# Patient Record
Sex: MALE | Race: WHITE | NOT HISPANIC OR LATINO | Employment: OTHER | URBAN - METROPOLITAN AREA
[De-identification: names, ages, dates, MRNs, and addresses within clinical notes are randomized per-mention and may not be internally consistent; named-entity substitution may affect disease eponyms.]

---

## 2017-01-25 ENCOUNTER — GENERIC CONVERSION - ENCOUNTER (OUTPATIENT)
Dept: OTHER | Facility: OTHER | Age: 82
End: 2017-01-25

## 2017-03-07 ENCOUNTER — ALLSCRIPTS OFFICE VISIT (OUTPATIENT)
Dept: OTHER | Facility: OTHER | Age: 82
End: 2017-03-07

## 2017-03-07 DIAGNOSIS — J20.9 ACUTE BRONCHITIS: ICD-10-CM

## 2017-03-13 ENCOUNTER — HOSPITAL ENCOUNTER (OUTPATIENT)
Dept: RADIOLOGY | Facility: HOSPITAL | Age: 82
Discharge: HOME | End: 2017-03-13
Payer: COMMERCIAL

## 2017-03-13 DIAGNOSIS — J20.9 ACUTE BRONCHITIS: ICD-10-CM

## 2017-03-13 PROCEDURE — 71020 HB CHEST X-RAY 2VW FRONTAL&LATL: CPT

## 2017-03-19 ENCOUNTER — GENERIC CONVERSION - ENCOUNTER (OUTPATIENT)
Dept: OTHER | Facility: OTHER | Age: 82
End: 2017-03-19

## 2017-03-20 ENCOUNTER — ALLSCRIPTS OFFICE VISIT (OUTPATIENT)
Dept: OTHER | Facility: OTHER | Age: 82
End: 2017-03-20

## 2017-04-24 ENCOUNTER — GENERIC CONVERSION - ENCOUNTER (OUTPATIENT)
Dept: OTHER | Facility: OTHER | Age: 82
End: 2017-04-24

## 2017-05-08 ENCOUNTER — ALLSCRIPTS OFFICE VISIT (OUTPATIENT)
Dept: OTHER | Facility: OTHER | Age: 82
End: 2017-05-08

## 2017-05-08 ENCOUNTER — TRANSCRIBE ORDERS (OUTPATIENT)
Dept: ADMINISTRATIVE | Facility: HOSPITAL | Age: 82
End: 2017-05-08

## 2017-05-08 DIAGNOSIS — I82.4Z1 ACUTE EMBOLISM AND THROMBOSIS OF DEEP VEIN OF RIGHT DISTAL LOWER EXTREMITY (HCC): ICD-10-CM

## 2017-05-08 DIAGNOSIS — J40 BRONCHITIS: ICD-10-CM

## 2017-05-08 DIAGNOSIS — I10 ESSENTIAL HYPERTENSION, MALIGNANT: Primary | ICD-10-CM

## 2017-05-08 DIAGNOSIS — H26.9 CATARACT: ICD-10-CM

## 2017-05-08 DIAGNOSIS — F41.9 ANXIETY DISORDER: ICD-10-CM

## 2017-05-08 DIAGNOSIS — D23.30 OTHER BENIGN NEOPLASM OF SKIN OF UNSPECIFIED PART OF FACE: ICD-10-CM

## 2017-05-08 DIAGNOSIS — N40.0 ENLARGED PROSTATE WITHOUT LOWER URINARY TRACT SYMPTOMS (LUTS): ICD-10-CM

## 2017-05-08 DIAGNOSIS — J61 PNEUMOCONIOSIS DUE TO ASBESTOS AND OTHER MINERAL FIBERS (HCC): ICD-10-CM

## 2017-05-31 ENCOUNTER — HOSPITAL ENCOUNTER (OUTPATIENT)
Dept: NON INVASIVE DIAGNOSTICS | Facility: CLINIC | Age: 82
Discharge: HOME | End: 2017-05-31
Payer: COMMERCIAL

## 2017-05-31 ENCOUNTER — TRANSCRIBE ORDERS (OUTPATIENT)
Dept: ADMINISTRATIVE | Facility: HOSPITAL | Age: 82
End: 2017-05-31

## 2017-05-31 DIAGNOSIS — F41.9 ANXIETY DISORDER: ICD-10-CM

## 2017-05-31 DIAGNOSIS — J40 BRONCHITIS: ICD-10-CM

## 2017-05-31 DIAGNOSIS — I82.4Z1 ACUTE EMBOLISM AND THROMBOSIS OF DEEP VEIN OF RIGHT DISTAL LOWER EXTREMITY (HCC): ICD-10-CM

## 2017-05-31 DIAGNOSIS — N40.0 ENLARGED PROSTATE WITHOUT LOWER URINARY TRACT SYMPTOMS (LUTS): ICD-10-CM

## 2017-05-31 DIAGNOSIS — D23.30 OTHER BENIGN NEOPLASM OF SKIN OF UNSPECIFIED PART OF FACE: ICD-10-CM

## 2017-05-31 DIAGNOSIS — H26.9 CATARACT: ICD-10-CM

## 2017-05-31 DIAGNOSIS — J61 PNEUMOCONIOSIS DUE TO ASBESTOS AND OTHER MINERAL FIBERS (HCC): ICD-10-CM

## 2017-05-31 DIAGNOSIS — Q24.9 HEART ABNORMALITY: Primary | ICD-10-CM

## 2017-05-31 DIAGNOSIS — I10 ESSENTIAL HYPERTENSION, MALIGNANT: ICD-10-CM

## 2017-05-31 LAB
ARRHY DURING EX: NORMAL
CHEST PAIN STATEMENT: NORMAL
MAX DIASTOLIC BP: 90 MMHG
MAX HEART RATE: 146 BPM
MAX PREDICTED HEART RATE: 138 BPM
MAX. SYSTOLIC BP: 180 MMHG
PROTOCOL NAME: NORMAL
REASON FOR TERMINATION: NORMAL
TARGET HR FORMULA: NORMAL
TEST INDICATION: NORMAL
TIME IN EXERCISE PHASE: 300 S

## 2017-05-31 PROCEDURE — 93226 XTRNL ECG REC<48 HR SCAN A/R: CPT

## 2017-05-31 PROCEDURE — 93306 TTE W/DOPPLER COMPLETE: CPT

## 2017-05-31 PROCEDURE — 93017 CV STRESS TEST TRACING ONLY: CPT

## 2017-05-31 PROCEDURE — 93225 XTRNL ECG REC<48 HRS REC: CPT

## 2017-06-28 ENCOUNTER — TRANSCRIBE ORDERS (OUTPATIENT)
Dept: ADMINISTRATIVE | Facility: HOSPITAL | Age: 82
End: 2017-06-28

## 2017-06-28 ENCOUNTER — HOSPITAL ENCOUNTER (OUTPATIENT)
Dept: NON INVASIVE DIAGNOSTICS | Facility: CLINIC | Age: 82
Discharge: HOME | End: 2017-06-28
Payer: COMMERCIAL

## 2017-06-28 DIAGNOSIS — J90 UNSPECIFIED PLEURAL EFFUSION: Primary | ICD-10-CM

## 2017-06-28 DIAGNOSIS — J90 PLEURAL EFFUSION, NOT ELSEWHERE CLASSIFIED: ICD-10-CM

## 2017-06-28 DIAGNOSIS — Q24.9 HEART ABNORMALITY: ICD-10-CM

## 2017-06-28 PROCEDURE — 93308 TTE F-UP OR LMTD: CPT

## 2017-07-05 ENCOUNTER — APPOINTMENT (OUTPATIENT)
Dept: LAB | Facility: HOSPITAL | Age: 82
End: 2017-07-05
Attending: INTERNAL MEDICINE
Payer: COMMERCIAL

## 2017-07-05 DIAGNOSIS — J90 PLEURAL EFFUSION, NOT ELSEWHERE CLASSIFIED: ICD-10-CM

## 2017-07-05 LAB
ANION GAP SERPL CALCULATED.3IONS-SCNC: 8 MMOL/L (ref 4–13)
BUN SERPL-MCNC: 25 MG/DL (ref 5–25)
CALCIUM SERPL-MCNC: 9.6 MG/DL (ref 8.3–10.1)
CHLORIDE SERPL-SCNC: 102 MMOL/L (ref 100–108)
CO2 SERPL-SCNC: 30 MMOL/L (ref 21–32)
CREAT SERPL-MCNC: 1.39 MG/DL (ref 0.6–1.3)
GFR SERPL CREATININE-BSD FRML MDRD: 48.9 ML/MIN/1.73SQ M
GLUCOSE P FAST SERPL-MCNC: 119 MG/DL (ref 65–99)
MAGNESIUM SERPL-MCNC: 1.7 MG/DL (ref 1.6–2.6)
POTASSIUM SERPL-SCNC: 4.1 MMOL/L (ref 3.5–5.3)
SODIUM SERPL-SCNC: 140 MMOL/L (ref 136–145)

## 2017-07-05 PROCEDURE — 80048 BASIC METABOLIC PNL TOTAL CA: CPT

## 2017-07-05 PROCEDURE — 36415 COLL VENOUS BLD VENIPUNCTURE: CPT

## 2017-07-05 PROCEDURE — 83735 ASSAY OF MAGNESIUM: CPT

## 2017-07-17 ENCOUNTER — HOSPITAL ENCOUNTER (OUTPATIENT)
Dept: NON INVASIVE DIAGNOSTICS | Facility: CLINIC | Age: 82
Discharge: HOME | End: 2017-07-17
Payer: COMMERCIAL

## 2017-07-17 DIAGNOSIS — J90 UNSPECIFIED PLEURAL EFFUSION: ICD-10-CM

## 2017-07-17 PROCEDURE — 93308 TTE F-UP OR LMTD: CPT

## 2017-08-08 ENCOUNTER — ALLSCRIPTS OFFICE VISIT (OUTPATIENT)
Dept: OTHER | Facility: OTHER | Age: 82
End: 2017-08-08

## 2017-08-28 ENCOUNTER — TRANSCRIBE ORDERS (OUTPATIENT)
Dept: ADMINISTRATIVE | Facility: HOSPITAL | Age: 82
End: 2017-08-28

## 2017-08-28 ENCOUNTER — ALLSCRIPTS OFFICE VISIT (OUTPATIENT)
Dept: OTHER | Facility: OTHER | Age: 82
End: 2017-08-28

## 2017-08-28 DIAGNOSIS — I31.3 EFFUSION, PERICARDIUM: Primary | ICD-10-CM

## 2017-08-28 DIAGNOSIS — I31.3 NONINFLAMMATORY PERICARDIAL EFFUSION: ICD-10-CM

## 2017-09-01 ENCOUNTER — HOSPITAL ENCOUNTER (OUTPATIENT)
Dept: NON INVASIVE DIAGNOSTICS | Facility: CLINIC | Age: 82
Discharge: HOME | End: 2017-09-01
Payer: COMMERCIAL

## 2017-09-01 DIAGNOSIS — I31.3 EFFUSION, PERICARDIUM: ICD-10-CM

## 2017-09-01 PROCEDURE — 93308 TTE F-UP OR LMTD: CPT

## 2017-09-08 ENCOUNTER — TRANSCRIBE ORDERS (OUTPATIENT)
Dept: ADMINISTRATIVE | Facility: HOSPITAL | Age: 82
End: 2017-09-08

## 2017-09-08 ENCOUNTER — APPOINTMENT (OUTPATIENT)
Dept: LAB | Facility: HOSPITAL | Age: 82
End: 2017-09-08
Attending: INTERNAL MEDICINE
Payer: COMMERCIAL

## 2017-09-08 DIAGNOSIS — I31.3 EFFUSION, PERICARDIUM: ICD-10-CM

## 2017-09-08 DIAGNOSIS — I31.3 EFFUSION, PERICARDIUM: Primary | ICD-10-CM

## 2017-09-08 DIAGNOSIS — I31.3 NONINFLAMMATORY PERICARDIAL EFFUSION: ICD-10-CM

## 2017-09-08 LAB
ALBUMIN SERPL BCP-MCNC: 3.8 G/DL (ref 3.5–5)
ALP SERPL-CCNC: 52 U/L (ref 46–116)
ALT SERPL W P-5'-P-CCNC: 24 U/L (ref 12–78)
ANION GAP SERPL CALCULATED.3IONS-SCNC: 6 MMOL/L (ref 4–13)
AST SERPL W P-5'-P-CCNC: 18 U/L (ref 5–45)
BILIRUB SERPL-MCNC: 0.36 MG/DL (ref 0.2–1)
BUN SERPL-MCNC: 23 MG/DL (ref 5–25)
CALCIUM SERPL-MCNC: 9.3 MG/DL (ref 8.3–10.1)
CHLORIDE SERPL-SCNC: 102 MMOL/L (ref 100–108)
CO2 SERPL-SCNC: 29 MMOL/L (ref 21–32)
CREAT SERPL-MCNC: 1.57 MG/DL (ref 0.6–1.3)
GFR SERPL CREATININE-BSD FRML MDRD: 40 ML/MIN/1.73SQ M
GLUCOSE P FAST SERPL-MCNC: 122 MG/DL (ref 65–99)
MAGNESIUM SERPL-MCNC: 1.8 MG/DL (ref 1.6–2.6)
NT-PROBNP SERPL-MCNC: 59 PG/ML
POTASSIUM SERPL-SCNC: 3.9 MMOL/L (ref 3.5–5.3)
PROT SERPL-MCNC: 7.2 G/DL (ref 6.4–8.2)
SODIUM SERPL-SCNC: 137 MMOL/L (ref 136–145)

## 2017-09-08 PROCEDURE — 36415 COLL VENOUS BLD VENIPUNCTURE: CPT

## 2017-09-08 PROCEDURE — 80053 COMPREHEN METABOLIC PANEL: CPT

## 2017-09-08 PROCEDURE — 83735 ASSAY OF MAGNESIUM: CPT

## 2017-09-08 PROCEDURE — 83880 ASSAY OF NATRIURETIC PEPTIDE: CPT

## 2017-10-24 NOTE — PROGRESS NOTES
Assessment  Assessed    1  Cardiomegaly (429 3) (I51 7)   2  Pericardial effusion without cardiac tamponade (423 9) (I31 3)    Plan  Pericardial effusion without cardiac tamponade    · Furosemide 40 MG Oral Tablet; TAKE 1 TABLET TWICE DAILY   Rx By: Aurora Orellana; Dispense: 90 Days ; #:180 Tablet; Refill: 3;For: Pericardial effusion without cardiac tamponade; ZEYNEP = N; Sent To: [facility]/PHARMACY #2736  · Potassium Chloride ER 10 MEQ Oral Capsule Extended Release; Take 1  capsule daily twice a day   Rx By: Aurora Orellana; Dispense: 90 Days ; #:180 Capsule Extended Release; Refill: 3;For: Pericardial effusion without cardiac tamponade; ZEYNEP = N; Sent To: [facility]/PHARMACY #9761  · (1) COMPREHENSIVE METABOLIC PANEL; Status:Active; Requested for:28Aug2017;    Perform:[facility]; Due:28Aug2018; Ordered; For:Pericardial effusion without cardiac tamponade; Ordered By:Chandra Mcneal;   · (1) MAGNESIUM; Status:Active; Requested for:28Aug2017;    Perform:[facility]; Due:28Aug2018; Ordered; For:Pericardial effusion without cardiac tamponade; Ordered By:Chandra Mcneal;   · (1) NT- BNP (PRO BRAIN NATRIURETIC PEPTIDE); Status:Active; Requested  for:28Aug2017;    Perform:[facility]; Due:28Aug2018; Ordered; For:Pericardial effusion without cardiac tamponade; Ordered By:Chandra Mcneal;   · ECHO FOLLOW UP/LIMITED; Status:Need Information - Financial Authorization; Requested for:28Aug2017;    Perform:[facility]; Due:28Aug2018; Last Updated By:Ling Castillo; 8/28/2017 8:49:12 AM;Ordered; For:Pericardial effusion without cardiac tamponade; Ordered By:Chandra Mcneal;   · Follow-up visit in 6 months Evaluation and Treatment  Follow-up  Status: Complete   Done: 28Aug2017   Ordered; For: Pericardial effusion without cardiac tamponade; Ordered By: Aurora Orellana Performed:  Due: 40Zit8686; Last Updated By: Elena Mena; 8/28/2017 8:49:12 AM    Discussion/Summary  Cardiology Discussion Summary Free Text Note Form [facility]:   Sub acute pericardial effusion, first diagnosed in May this year  After abnormal chest x-ray  Follow-up echocardiogram revealed no improvement, moderate in severity  Early tamponade physiology with some respiratory variation left-sided AV valve  Left ventricle function initially normal, most recently low normal 45-50% in the setting of atrial fibrillation  With mild concomitant RV dysfunction mild in severity  We'll follow-up with an echo in 3 months times  At the present time there is no indication for pericardiocentesis  There is no clinical tamponade  Stress test suggested no ischemia  Holter monitor revealed controlled atrial fibrillation average of 98  No sustained tachyarrhythmias or bradyarrhythmias  We did a started diuretic therapy, will increase it to twice a day for the next few weeks and assess response on follow-up echocardiogram       Chief Complaint  Chief Complaint Free Text Note Form: 2 month f/u  Pt has no cardiac complaints  History of Present Illness  Cardiology HPI Free Text Note Form [facility]: Cardiology follow-up  Patient continues to do well, he denies any chest pain or dyspnea, his active as a slow pace  Denies orthopnea or PND  No issues with blood pressure, comply with his medications  No issues with bleeding on anticoagulation  Review of Systems  Cardiology Male ROS:     Cardiac: rhythm problems, but-- no chest pain,-- no fainting/blackouts,-- no heart murmur present,-- no signs of swelling-- and-- no palpitations present  Skin: No complaints of nonhealing sores or skin rash  Genitourinary: No complaints of recurrent urinary tract infections, frequent urination at night, difficult urination, blood in urine, kidney stones, loss of bladder control, no kidney or prostate problems, no erectile dysfunction  -- and-- no kidney problems   Psychological: No complaints of feeling depressed, anxiety, panic attacks, or difficulty concentrating     General: No complaints of trouble sleeping, lack of energy, fatigue, appetite changes, weight changes, fever, frequent infections, or night sweats  ,-- no trouble sleeping,-- no changes in weight-- and-- no lack of energy/fatigue  Respiratory: No complaints of shortness of breath, cough with sputum, or wheezing ,-- no shortness of breath-- and-- no hemoptysis  HEENT: No complaints of serious problems, hearing problems, nose problems, throat problems, or snoring  Gastrointestinal: No complaints of liver problems, nausea, vomiting, heartburn, constipation, bloody stools, diarrhea, problems swallowing, adbominal pain, or rectal bleeding  Hematologic: No complaints of bleeding disorders, anemia, blood clots, or excessive brusing ,-- no bleeding disorders,-- no anemia-- and-- no excessive bruising   Musculoskeletal: No complaints of arthritis, back pain, or painfull swelling  Active Problems  Problems    1  Anxiety disorder (300 00) (F41 9)   2  Asbestosis (0681 [id number] [id number] 54) (Jodi Rias)   3  Benign enlargement of prostate (600 00) (N40 0)   4  Benign neoplasm of skin of face (216 3) (D23 30)   5  Bronchitis (490) (J40)   6  Cardiomegaly (429 3) (I51 7)   7  Cataract (366 9) (H26 9)   8  Cataract (366 9) (H26 9)   9  Chest discomfort (786 59) (R07 89)   10  Dyslipidemia (272 4) (E78 5)   11  Esophageal reflux (530 81) (K21 9)   12  Flu vaccine need (V04 81) (Z23)   13  Hypertension (401 9) (I10)   14  Intractable hiccups (786 8) (R06 6)   15  Lower leg DVT (deep venous thromboembolism), acute, right (453 42) (I82 4Z1)   16  Medicare annual wellness visit, subsequent (V70 0) (Z00 00)   17  Pancreatic cyst (577 2) (K86 2)   18  Pleural effusion (511 9) (J90)   19  Pneumonia (486) (J18 9)   20  Pulmonary embolism (415 19) (I26 99)   21  Pulmonary nodule seen on imaging study (793 11) (R91 1)   22  Type 2 diabetes mellitus (250 00) (E11 9)   23  Vertigo (780 4) (R42)   24  Visit for pre-operative examination (V72 84) (L57 142)    Past Medical History  Problems    1  Acute bronchitis (466 0) (J20 9)   2  History of Hearing Loss (389 9)   3  History of acute bronchitis (V12 69) (Z87 09)   4  History of acute sinusitis (V12 69) (Z87 09)   5  History of conjunctivitis (V12 49) (Z86 69)   6  History of Medicare annual wellness visit, subsequent (V70 0) (Z00 00)   7  History of Medicare annual wellness visit, subsequent (V70 0) (Z00 00)   8  History of Visit for pre-operative examination (V72 84) (J96 418)  Active Problems And Past Medical History Reviewed: The active problems and past medical history were reviewed and updated today  Surgical History  Surgical History Reviewed: The surgical history was reviewed and updated today  Family History  Mother    1  Family history of Diabetes Mellitus (V18 0)  Father    2  Family history of Diabetes Mellitus (V18 0)  Sister    3  Family history of Pulmonary Embolism  Brother    4  Family history of Diabetes Mellitus (V18 0)  Family History Reviewed: The family history was reviewed and updated today  Social History  Problems    · Current non-smoker (V49 89) (Z78 9)   · Never a smoker   · History of Never a smoker   · Stopped Drinking Alcohol  Social History Reviewed: The social history was reviewed and is unchanged  Current Meds   1  ALPRAZolam 0 25 MG Oral Tablet; 1 tablet every 8 hours as needed for anxiety; Therapy: 94WFY5797 to (Last Rx:28Mar2017)  Requested for: 28Mar2017 Ordered   2  AmLODIPine Besylate 5 MG Oral Tablet; TAKE 1 TABLET DAILY; Therapy: 44Lzg0014 to (Evaluate:11Mry7739)  Requested for: 35Gwi6745; Last   Rx:42Xzo7825 Ordered   3  Blood Glucose Monitor System w/Device Kit; Test once daily prior to meal;   Therapy: 69Otb3002 to (Last Rx:18Vxe1081) Ordered   4  Blood Glucose Test In Vitro Strip; TEST TWICE A DAY; Therapy: 86WGI8101 to (Last WB:71UZO8550)  Requested for: 62VOU4431 Ordered   5  Dulera 100-5 MCG/ACT Inhalation Aerosol; INHALE 2 PUFFS AT 12 HOUR INTERVALS   (MORNING AND EVENING);    Therapy: 16TAW4201 to (Last SW:07RIA5548)  Requested for: 32HPD8600 Ordered   6  Eliquis 5 MG Oral Tablet; 2 tablets twice daily for 7 days, then 1 tablet twice daily after   that; Therapy: 91OGN9989 to (Last Rx:2017)  Requested for: 2017 Ordered   7  EpiPen 2-Kirt 0 3 MG/0 3ML YASMEEN; use as directed, as needed for allergic reaction; Therapy: 56DWF8082 to (Evaluate:79Dtf8229)  Requested for: 04SDI4055; Last   Rx:77Wfw5419 Ordered   8  Fish Oil 1200 MG Oral Capsule; take 1 capsule daily; Therapy: (Recorded:05Gds4532) to Recorded   9  Furosemide 40 MG Oral Tablet; Take 1 tablet daily; Therapy: 84IGA1550 to (Last Darlina Click)  Requested for: 2017 Ordered   10  Lancets Miscellaneous; test twice daily; Therapy: 51Tmo5783 to (Last Rx:65Kyx0902) Ordered   11  Losartan Potassium 50 MG Oral Tablet; TAKE 1 TABLET DAILY; Therapy: 07QUB9143 to (Evaluate:79Dsu5935)  Requested for: 81Uqn3190; Last    Rx:79Vqk7287 Ordered   12  MetFORMIN HCl - 500 MG Oral Tablet; TAKE 1 TABLET TWICE DAILY; Therapy: 84JZM1906 to (Evaluate:19Din0426)  Requested for: 79ATZ2617; Last    Rx:2017 Ordered   13  Omeprazole 40 MG Oral Capsule Delayed Release; TAKE 1 CAPSULE Daily for    heartburn; Therapy: 93VLW7088 to (Last Rx:2017)  Requested for: 2017 Ordered   14  PARoxetine HCl - 30 MG Oral Tablet; TAKE 1 TABLET DAILY AS DIRECTED; Therapy: 01XWN8096 to (Evaluate:16Mmx2246)  Requested for: 96Awk2868; Last    Rx:55Zzu3368 Ordered   15  Potassium Chloride ER 10 MEQ Oral Capsule Extended Release; take 1 capsule daily; Therapy: 67HQN2949 to (Last Darlina Click)  Requested for: 2017 Ordered   16  Rosuvastatin Calcium 10 MG Oral Tablet; One tablet at bedtime each night for elevated    cholesterol; Therapy: 83JTL9222 to (Last Rx:2017)  Requested for: 51TNR8625 Ordered   17  Terazosin HCl - 2 MG Oral Capsule; TAKE 1 CAPSULE DAILY;     Therapy: 98DLF6697 to (Bernice Apgar)  Requested for: 87MXJ3863; Last YR:13KTV1769 Ordered   18  Vitamin D 2000 UNIT Oral Capsule; Therapy: (Recorded:58Jpu0272) to Recorded   19  V-R Vitamin C 1000 MG TABS; Therapy: (Recorded:65Xvl9340) to Recorded  Medication List Reviewed: The medication list was reviewed and updated today  Allergies  Medication    1  ACE Inhibitors  Non-Medication    2  Bee sting    Vitals  Vital Signs    Recorded: 89Jnk0682 08:34AM   Heart Rate 67, L Radial   Systolic 774, LUE, Sitting   Diastolic 62, LUE, Sitting   Height 5 ft 4 in   Weight 153 lb 3 oz   BMI Calculated 26 29   BSA Calculated 1 75     Physical Exam    Constitutional   General appearance: No acute distress, well appearing and well nourished  appears healthy,-- within normal limits of ideal weight,-- well hydrated-- and-- appearance reflects stated age  Neck   Neck and thyroid: Normal, supple, trachea midline, no thyromegaly  Jugular Veins: the JVP was not elevated  Pulmonary   Respiratory effort: No increased work of breathing or signs of respiratory distress  Auscultation of lungs: Clear to auscultation, no rales, no rhonchi, no wheezing, good air movement  Cardiovascular   Palpation of heart: Abnormal   The apical impulse was not palpable  Auscultation of heart: Abnormal   The heart rate was normal  The rhythm was irregularly irregular  Heart sounds: the heart sounds were distant, but-- normal S1,-- normal S2-- and-- no gallop heard  No pericardial rub  no murmurs were heard  Carotid pulses: Normal, 2+ bilaterally  Pedal pulses: Normal, 2+ bilaterally      Examination of extremities for edema and/or varicosities: Normal     Chest - Chest: Normal    Neurologic - Speech: Normal     Psychiatric - Orientation to person, place, and time: Normal -- Mood and affect: Normal       Future Appointments    Date/Time Provider Specialty Site   01/25/2018 09:00 AM Daniele Bernal MD Urology [address]   01/31/2018 08:00 AM DO Katarzyna Velazco [address] [address]     Signatures   Electronically signed by : GRETA Anderson ; Aug 28 2017  8:52AM EST                       (Author)

## 2017-11-17 ENCOUNTER — GENERIC CONVERSION - ENCOUNTER (OUTPATIENT)
Dept: FAMILY MEDICINE CLINIC | Facility: CLINIC | Age: 82
End: 2017-11-17

## 2017-11-17 ENCOUNTER — GENERIC CONVERSION - ENCOUNTER (OUTPATIENT)
Dept: OTHER | Facility: OTHER | Age: 82
End: 2017-11-17

## 2018-01-11 NOTE — RESULT NOTES
Message   Blood testing looks okay  Verified Results  (1) CBC/PLT/DIFF 94NYI7586 07:38AM Nash Russell Order Number: [id number]     Test Name Result Flag Reference   WBC COUNT 4 46 Thousand/uL  4 31-10 16   RBC COUNT 4 25 Million/uL  3 88-5 62   HEMOGLOBIN 13 2 g/dL  12 0-17 0   HEMATOCRIT 39 0 %  36 5-49 3   MCV 92 fL  82-98   MCH 31 1 pg  26 8-34 3   MCHC 33 8 g/dL  31 4-37 4   RDW 12 2 %  11 6-15 1   MPV 10 4 fL  8 9-12 7   PLATELET COUNT 004 Thousands/uL  149-390   nRBC AUTOMATED 0 /100 WBCs     NEUTROPHILS RELATIVE PERCENT 58 %  43-75   LYMPHOCYTES RELATIVE PERCENT 30 %  14-44   MONOCYTES RELATIVE PERCENT 9 %  4-12   EOSINOPHILS RELATIVE PERCENT 3 %  0-6   BASOPHILS RELATIVE PERCENT 0 %  0-1   NEUTROPHILS ABSOLUTE COUNT 2 57 Thousands/?L  1 85-7 62   LYMPHOCYTES ABSOLUTE COUNT 1 33 Thousands/?L  0 60-4 47   MONOCYTES ABSOLUTE COUNT 0 42 Thousand/?L  0 17-1 22   EOSINOPHILS ABSOLUTE COUNT 0 13 Thousand/?L  0 00-0 61   BASOPHILS ABSOLUTE COUNT 0 01 Thousands/?L  0 00-0 10   - Patient Instructions: This bloodwork is non-fasting  Please drink two glasses of water morning of bloodwork  - Patient Instructions: This bloodwork is non-fasting  Please drink two glasses of water morning of bloodwork  (1) COMPREHENSIVE METABOLIC PANEL 96JCZ8991 40:61IZ Nash Russell Order Number: [id number]     Test Name Result Flag Reference   GLUCOSE,RANDM 124 mg/dL     If the patient is fasting, the ADA then defines impaired fasting glucose as > 100 mg/dL and diabetes as > or equal to 123 mg/dL     SODIUM 143 mmol/L  136-145   POTASSIUM 3 9 mmol/L  3 5-5 3   CHLORIDE 106 mmol/L  100-108   CARBON DIOXIDE 28 mmol/L  21-32   ANION GAP (CALC) 9 mmol/L  4-13   BLOOD UREA NITROGEN 18 mg/dL  5-25   CREATININE 0 91 mg/dL  0 60-1 30   Standardized to IDMS reference method   CALCIUM 9 4 mg/dL  8 3-10 1   BILI, TOTAL 0 29 mg/dL  0 20-1 00   ALK PHOSPHATAS 60 U/L     ALT (SGPT) 34 U/L  12-78 AST(SGOT) 21 U/L  5-45   ALBUMIN 3 7 g/dL  3 5-5 0   TOTAL PROTEIN 6 9 g/dL  6 4-8 2   eGFR Non-African American      >60 0 ml/min/1 73sq m   - Patient Instructions: This is a fasting blood test  Water,black tea or black  coffee only after 9:00pm the night before test Drink 2 glasses of water the morning of test   National Kidney Disease Education Program recommendations are as follows:  GFR calculation is accurate only with a steady state creatinine  Chronic Kidney disease less than 60 ml/min/1 73 sq  meters  Kidney failure less than 15 ml/min/1 73 sq  meters  (1) HEMOGLOBIN A1C [id number] 07:38AM Dylon Baeza Order Number: [id number]     Test Name Result Flag Reference   HEMOGLOBIN A1C 6 4 % H 4 2-6 3   EST  AVG  GLUCOSE 137 mg/dl       (1) LIPID PANEL, FASTING 24KTA1260 07:38AM Dylon Baeza Order Number: [id number]     Test Name Result Flag Reference   CHOLESTEROL 141 mg/dL     HDL,DIRECT 34 mg/dL L 40-60   Specimen collection should occur prior to Metamizole administration due to the potential for falsely depressed results  LDL CHOLESTEROL CALCULATED 78 mg/dL  0-100   - Patient Instructions: This is a fasting blood test  Water,black tea or black  coffee only after 9:00pm the night before test   Drink 2 glasses of water the morning of test     - Patient Instructions: This is a fasting blood test  Water,black tea or black  coffee only after 9:00pm the night before test Drink 2 glasses of water the morning of test   Triglyceride:         Normal              <150 mg/dl       Borderline High    150-199 mg/dl       High               200-499 mg/dl       Very High          >499 mg/dl  Cholesterol:         Desirable        <200 mg/dl      Borderline High  200-239 mg/dl      High             >239 mg/dl  HDL Cholesterol:        High    >59 mg/dL      Low     <41 mg/dL  LDL CALCULATED:    This screening LDL is a calculated result    It does not have the accuracy of the Direct Measured LDL in the monitoring of patients with hyperlipidemia and/or statin therapy  Direct Measure LDL (CTL600) must be ordered separately in these patients  TRIGLYCERIDES 146 mg/dL  <=150   Specimen collection should occur prior to N-Acetylcysteine or Metamizole administration due to the potential for falsely depressed results       (1) MICROALBUMIN CREATININE RATIO, RANDOM URINE [id number] 07:38AM Shira Duckworthman Order Number: [id number]     Test Name Result Flag Reference   MICROALBUMIN/ CREAT R <6 mg/g creatinine  0-30   MICROALBUMIN,URINE <5 0 mg/L  0 0-20 0   CREATININE URINE 81 8 mg/dL

## 2018-01-12 VITALS
DIASTOLIC BLOOD PRESSURE: 60 MMHG | TEMPERATURE: 97.5 F | WEIGHT: 161 LBS | BODY MASS INDEX: 28.53 KG/M2 | SYSTOLIC BLOOD PRESSURE: 120 MMHG | HEIGHT: 63 IN

## 2018-01-12 NOTE — RESULT NOTES
Message   no pneumonia on Chest xray but heart is enlarged  Rec: follow up with cardiology  Verified Results  * XR CHEST PA & LATERAL 76HRY1807 12:56PM Gretchen Baxter Order Number: [id number]     Test Name Result Flag Reference   XR CHEST PA & LATERAL (Report)     CHEST      INDICATION: Cough and congestion  COMPARISON: 8/10/2015     VIEWS: Frontal and lateral projections     IMAGES: 3     FINDINGS:        Cardiomediastinal silhouette appears enlarged  Scarring or atelectasis at the left lung base  No infiltrates  Minimal blunting of the costophrenic angles  Visualized osseous structures appear within normal limits for the patient's age  IMPRESSION:     Cardiomegaly  Scarring or atelectasis at the left lung base  Workstation performed: [id number]     Signed by:   Luther Blanchard MD   3/14/17       Plan  Cardiomegaly    · *1 -  [facility] (CARDIOLOGY ) Physician Referral  Consult Only: the  expectation is that the referring provider will communicate back to the patient on  treatment options  Evaluation and Treatment: the expectation is that the referred to  provider will communicate back to the patient on treatment options    Status: Active   Requested for: 71JDT4513  Care Summary provided  : Yes    Signatures   Electronically signed by : Lien Oliveros DO; Mar 19 2017 10:41PM EST                       (Author)

## 2018-01-13 VITALS
DIASTOLIC BLOOD PRESSURE: 62 MMHG | HEART RATE: 67 BPM | WEIGHT: 153.19 LBS | HEIGHT: 64 IN | BODY MASS INDEX: 26.15 KG/M2 | SYSTOLIC BLOOD PRESSURE: 122 MMHG

## 2018-01-13 VITALS
SYSTOLIC BLOOD PRESSURE: 124 MMHG | DIASTOLIC BLOOD PRESSURE: 72 MMHG | HEIGHT: 64 IN | HEART RATE: 90 BPM | BODY MASS INDEX: 27.31 KG/M2 | WEIGHT: 160 LBS

## 2018-01-13 VITALS
HEIGHT: 63 IN | BODY MASS INDEX: 28.53 KG/M2 | DIASTOLIC BLOOD PRESSURE: 72 MMHG | SYSTOLIC BLOOD PRESSURE: 118 MMHG | WEIGHT: 161 LBS | TEMPERATURE: 97.9 F

## 2018-01-13 NOTE — PROGRESS NOTES
Assessment    1  Medicare annual wellness visit, subsequent (V70 0) (Z00 00)    Plan  Health Maintenance    · AmLODIPine Besylate 5 MG Oral Tablet; TAKE 1 TABLET DAILY   · PARoxetine HCl - 30 MG Oral Tablet; TAKE 1 TABLET DAILY AS DIRECTED  Hypertension    · Losartan Potassium 50 MG Oral Tablet; TAKE 1 TABLET DAILY    Discussion/Summary  Impression: Subsequent Annual Wellness Visit, with preventive exam as well as age and risk appropriate counseling completed  Cardiovascular screening and counseling: the risks and benefits of screening were discussed  Diabetes screening and counseling: the risks and benefits of screening were discussed  Colorectal cancer screening and counseling: the risks and benefits of screening were discussed  Prostate cancer screening and counseling: the risks and benefits of screening were discussed  Osteoporosis screening and counseling: the risks and benefits of screening were discussed  Abdominal aortic aneurysm screening and counseling: the risks and benefits of screening were discussed  Glaucoma screening and counseling: screening is current  HIV screening and counseling: the risks and benefits of screening were discussed and screening not indicated  Immunizations: the risks and benefits of influenza vaccination were discussed with the patient, the risks and benefits of pneumococcal vaccination were discussed with the patient, the risks and benefits of the hepatitis vaccination series were discussed with the patient, the risks and benefits of the Zostavax vaccine were discussed with the patient and the risks and benefits of the Tdap vaccine were discussed with the patient  Advance Directive Planning: not complete, he was encouraged to follow-up with me to discuss his questions and/or decisions  Advice and education were given regarding fall risk reduction  He was referred to cardiology  Patient Discussion: follow-up visit needed in 6 months       Self Referrals: No History of Present Illness  The patient is being seen for the subsequent annual wellness visit  Medicare Screening and Risk Factors   Hospitalizations: no previous hospitalizations  Medicare Screening Tests Risk Questions   Drug and Alcohol Use: The patient has never smoked cigarettes  The patient reports never drinking alcohol  He has never used illicit drugs  Diet and Physical Activity: Current diet includes unhealthy food choices, frequent junk food, 1 servings of fruit per day, 1 servings of vegetables per day, 1 servings of meat per day, 1 servings of whole grains per day, 0 servings of simple carbohydrates per day, 1 servings of dairy products per day, 1 cups of coffee per day, 0 cups of tea per day, 0 cans of regular soda per day, 0 cans of diet soda per day and 4 glasses/day  He exercises 3 times per week  Exercise: walking  Mood Disorder and Cognitive Impairment Screening: He denies feeling down, depressed, or hopeless over the past two weeks  He denies feeling little interest or pleasure in doing things over the past two weeks  Cognitive impairment screening: denies difficulty learning/retaining new information, denies difficulty handling complex tasks, denies difficulty with reasoning, denies difficulty with spatial ability and orientation, denies difficulty with language and denies difficulty with behavior  Functional Ability/Level of Safety: Hearing is significantly decreased, significantly decreased in the right ear and significantly decreased in the left ear  He reports hearing difficulties  He uses a hearing aid  Activities of daily living details: needs help using the phone, but no transportation help needed, does not need help shopping, no meal preparation help needed, does not need help doing housework, does not need help doing laundry, does not need help managing medications and does not need help managing money  Fall risk factors: The patient fell 0 times in the past 12 months  Home safety risk factors:  no handrails on the stairs, but no unfamiliar surroundings, no loose rugs, no poor household lighting, no uneven floors, no household clutter and grab bars in the bathroom  Advance Directives: Advance directives: no living will, no durable power of  for health care directives and no advance directives  end of life decisions were reviewed with the patient and I agree with the patient's decisions  Co-Managers and Medical Equipment/Suppliers: See Patient Care Team      Patient Care Team    Care Team Member Role Specialty Office Number   Lashaun Rosales M D  Cardiology [phone]     Review of Systems    Constitutional: negative  Eyes: negative  ENT: negative  Cardiovascular: negative  Respiratory: negative  Gastrointestinal: negative  Genitourinary: negative  Musculoskeletal: negative  Integumentary and Breasts: negative  Neurological: negative  Psychiatric: negative  Endocrine: negative  Hematologic and Lymphatic: negative  Active Problems    1  Anxiety disorder (300 00) (F41 9)   2  Asbestosis ([phone]) (Annita Valadez)   3  Benign enlargement of prostate (600 00) (N40 0)   4  Benign neoplasm of skin of face (216 3) (D23 30)   5  Bronchitis (490) (J40)   6  Cardiomegaly (429 3) (I51 7)   7  Cataract (366 9) (H26 9)   8  Cataract (366 9) (H26 9)   9  Chest discomfort (786 59) (R07 89)   10  Dyslipidemia (272 4) (E78 5)   11  Esophageal reflux (530 81) (K21 9)   12  Flu vaccine need (V04 81) (Z23)   13  Hypertension (401 9) (I10)   14  Intractable hiccups (786 8) (R06 6)   15  Lower leg DVT (deep venous thromboembolism), acute, right (453 42) (I82 4Z1)   16  Pancreatic cyst (577 2) (K86 2)   17  Pleural effusion (511 9) (J90)   18  Pneumonia (486) (J18 9)   19  Pulmonary embolism (415 19) (I26 99)   20  Pulmonary nodule seen on imaging study (793 11) (R91 1)   21  Type 2 diabetes mellitus (250 00) (E11 9)   22  Vertigo (780 4) (R42)   23   Visit for pre-operative examination (V72 84) (E43 007)    Past Medical History    1  Acute bronchitis (466 0) (J20 9)   2  History of Hearing Loss (389 9)   3  History of acute bronchitis (V12 69) (Z87 09)   4  History of acute sinusitis (V12 69) (Z87 09)   5  History of conjunctivitis (V12 49) (Z86 69)   6  History of Medicare annual wellness visit, subsequent (V70 0) (Z00 00)   7  History of Medicare annual wellness visit, subsequent (V70 0) (Z00 00)   8  History of Visit for pre-operative examination (V72 84) (O15 699)    The active problems and past medical history were reviewed and updated today  Family History  Mother    1  Family history of Diabetes Mellitus (V18 0)  Father    2  Family history of Diabetes Mellitus (V18 0)  Sister    3  Family history of Pulmonary Embolism  Brother    4  Family history of Diabetes Mellitus (V18 0)    Social History    · Current non-smoker (V49 89) (Z78 9)   · Never a smoker   · History of Never a smoker   · Stopped Drinking Alcohol  The social history was reviewed and updated today  The social history was reviewed and is unchanged  Current Meds   1  ALPRAZolam 0 25 MG Oral Tablet; 1 tablet every 8 hours as needed for anxiety; Therapy: 68KYN8183 to (Last Rx:28Mar2017)  Requested for: 28Mar2017 Ordered   2  AmLODIPine Besylate 5 MG Oral Tablet; TAKE 1 TABLET DAILY; Therapy: 58Krd9447 to (Evaluate:55Pds5281)  Requested for: 07VGF4248; Last   Rx:08Jun2017 Ordered   3  Baclofen 10 MG Oral Tablet; 1 tablet every 8 hours as needed for hiccups; Therapy: 96LPA4648 to (Last Rx:28Mar2017)  Requested for: 28Mar2017 Ordered   4  Blood Glucose Monitor System w/Device Kit; Test once daily prior to meal;   Therapy: 81Jpx4830 to (Last Rx:75Jbc1611) Ordered   5  Blood Glucose Test In Vitro Strip; TEST TWICE A DAY; Therapy: 22QKQ3543 to (Last SV:99PKM9245)  Requested for: 86WYV3990 Ordered   6   Dulera 100-5 MCG/ACT Inhalation Aerosol; INHALE 2 PUFFS AT 12 HOUR INTERVALS   (MORNING AND EVENING); Therapy: 01OPP0255 to (Last Rx:07Mar2017)  Requested for: 43RYZ2972 Ordered   7  Eliquis 5 MG Oral Tablet; 2 tablets twice daily for 7 days, then 1 tablet twice daily after   that; Therapy: 85RND6052 to (Last Rx:27Lkr0654)  Requested for: 40Znz3753 Ordered   8  EpiPen 2-Kirt 0 3 MG/0 3ML YASMEEN; use as directed, as needed for allergic reaction; Therapy: 04QKW2882 to (Evaluate:51Yqo8373)  Requested for: 43TCG9895; Last   Rx:28Jwv9256 Ordered   9  Fish Oil 1200 MG Oral Capsule; take 1 capsule daily; Therapy: (Recorded:82Zxn3282) to Recorded   10  Furosemide 40 MG Oral Tablet; Take 1 tablet daily; Therapy: 29ZOU7021 to (Last Nabila Neville)  Requested for: 28Jun2017 Ordered   11  Lancets Miscellaneous; test twice daily; Therapy: 01Efz6849 to (Last Rx:95Rqj8084) Ordered   12  Losartan Potassium 50 MG Oral Tablet; TAKE 1 TABLET DAILY; Therapy: 34PFZ5110 to (Evaluate:03Jun2018)  Requested for: 07GPS0194; Last    Rx:08Jun2017 Ordered   13  MetFORMIN HCl - 500 MG Oral Tablet; TAKE 1 TABLET TWICE DAILY; Therapy: 42OZR1988 to (Evaluate:71Vsq9939)  Requested for: 95XKU4877; Last    Rx:05Jan2017 Ordered   14  Omeprazole 40 MG Oral Capsule Delayed Release; TAKE 1 CAPSULE Daily for    heartburn; Therapy: 43XJG4219 to (Last Rx:20Mar2017)  Requested for: 20Mar2017 Ordered   15  PARoxetine HCl - 30 MG Oral Tablet; TAKE 1 TABLET DAILY AS DIRECTED; Therapy: 98ETD2982 to (Evaluate:03Jun2018)  Requested for: 39HKF7319; Last    Rx:08Jun2017 Ordered   16  Potassium Chloride ER 10 MEQ Oral Capsule Extended Release; take 1 capsule daily; Therapy: 52HYE0902 to (Last Claryce Pascaleler)  Requested for: 28Jun2017 Ordered   17  Rosuvastatin Calcium 10 MG Oral Tablet; One tablet at bedtime each night for elevated    cholesterol; Therapy: 81HUO7169 to (Last Rx:05Jun2017)  Requested for: 93TYM6705 Ordered   18  Terazosin HCl - 2 MG Oral Capsule; TAKE 1 CAPSULE DAILY;     Therapy: 40CZL4703 to (Evaluate:40Axe6906)  Requested for: 24SPZ8297; Last    L13PMO4521 Ordered   19  Vitamin D 2000 UNIT Oral Capsule; Therapy: (Recorded:90Rop9275) to Recorded   20  V-R Vitamin C 1000 MG TABS; Therapy: (Recorded:93Xnu8464) to Recorded    The medication list was reviewed and updated today  Allergies    1  ACE Inhibitors    2  Bee sting    Immunizations  Influenza --- Anastasiia Escobar: [id number] Buff: 19-Sep-2012; Zula Cipro: 12-Sep-2013; Saira Christiansener:  04-Sep-2014; Series5: 03-Sep-2015; Series6: 03-Oct-2016   PCV --- Series1: 01-Oct-2015   PPSV --- Anastasiia Shawn: 2005     Vitals  Signs   Recorded: 40Sko5509 10:40AM   Temperature: 04 9 F  Systolic: 958  Diastolic: 78  Height: 5 ft 4 in  Weight: 154 lb   BMI Calculated: 26 43  BSA Calculated: 1 75    Results/Data  PHQ-9 Adult Depression Screening 77Vjw9456 10:51AM User, s     Test Name Result Flag Reference   PHQ-9 Adult Depression Score 0     Over the last two weeks, how often have you been bothered by any of the following problems? Little interest or pleasure in doing things: Not at all - 0  Feeling down, depressed, or hopeless: Not at all - 0  Trouble falling or staying asleep, or sleeping too much: Not at all - 0  Feeling tired or having little energy: Not at all - 0  Poor appetite or over eating: Not at all - 0  Feeling bad about yourself - or that you are a failure or have let yourself or your family down: Not at all - 0  Trouble concentrating on things, such as reading the newspaper or watching television: Not at all - 0  Moving or speaking so slowly that other people could have noticed   Or the opposite -  being so fidgety or restless that you have been moving around a lot more than usual: Not at all - 0  Thoughts that you would be better off dead, or of hurting yourself in some way: Not at all - 0   PHQ-9 Adult Depression Screening Negative     PHQ-9 Difficulty Level Not difficult at all     PHQ-9 Severity No Depression         Future Appointments    Date/Time Provider Specialty Site   2017 09:00 AM GRETA Nathan   Cardiology [facility]     Signatures   Electronically signed by : César Gonsales DO; Aug  8 2017 11:18AM EST                       (Author)

## 2018-01-14 NOTE — RESULT NOTES
Verified Results  (Q) CBC (H/H, RBC, INDICES, WBC, PLT) 48UED5575 12:00AM Fred Smith     Test Name Result Flag Reference   WHITE BLOOD CELL COUNT 4 5 Thousand/uL  3 8-10 8   RED BLOOD CELL COUNT 4 41 Million/uL  4 20-5 80   HEMOGLOBIN 13 4 g/dL  13 2-17 1   HEMATOCRIT 40 9 %  38 5-50 0   MCV 92 8 fL  80 0-100 0   MCH 30 5 pg  27 0-33 0   MCHC 32 9 g/dL  32 0-36 0   RDW 13 2 %  11 0-15 0   PLATELET COUNT 368 Thousand/uL  140-400   MPV 8 7 fL  7 5-11 5   WHITE BLOOD CELL COUNT 4 5 Thousand/uL  3 8-10 8   RED BLOOD CELL COUNT 4 41 Million/uL  4 20-5 80   HEMOGLOBIN 13 4 g/dL  13 2-17 1   HEMATOCRIT 40 9 %  38 5-50 0   MCV 92 8 fL  80 0-100 0   MCH 30 5 pg  27 0-33 0   MCHC 32 9 g/dL  32 0-36 0   RDW 13 2 %  11 0-15 0   PLATELET COUNT 455 Thousand/uL  140-400   MPV 8 7 fL  7 5-11 5           (Q) COMPREHENSIVE METABOLIC PNL W/ADJUSTED CALCIUM 80Nau5855 12:00AM Ezadeline Arys     Test Name Result Flag Reference   GLUCOSE 102 mg/dL H 65-99   Fasting reference interval   UREA NITROGEN (BUN) 17 mg/dL  7-25   CREATININE 1 02 mg/dL  0 70-1 11   For patients >52years of age, the reference limit  for Creatinine is approximately 13% higher for people  identified as -American  eGFR NON-AFR   AMERICAN 69 mL/min/1 73m2  > OR = 60   eGFR AFRICAN AMERICAN 80 mL/min/1 73m2  > OR = 60   BUN/CREATININE RATIO   1-65   NOT APPLICABLE (calc)   AST 15 U/L  10-35   ALT 12 U/L  9-46   PROTEIN, TOTAL 7 3 g/dL  6 1-8 1   ALBUMIN 4 5 g/dL  3 6-5 1   GLOBULIN 2 8 g/dL (calc)  1 9-3 7   ALBUMIN/GLOBULIN RATIO 1 6 (calc)  1 0-2 5   BILIRUBIN, TOTAL 0 4 mg/dL  0 2-1 2   ALKALINE PHOSPHATASE 57 U/L     SODIUM 138 mmol/L  135-146   POTASSIUM 4 3 mmol/L  3 5-5 3   CHLORIDE 103 mmol/L     CARBON DIOXIDE 20 mmol/L  19-30   CALCIUM 9 7 mg/dL  8 6-10 3   CALCIUM (ADJUSTED FOR$ALBUMIN) 9 6 mg/dL (calc)  8 6-10 2     (Q) HEMOGLOBIN A1c WITH eAG 69Vzk3181 12:00AM Fred Smith     Test Name Result Flag Reference   HEMOGLOBIN A1c 6 5 % of total Hgb H <5 7   According to ADA guidelines, hemoglobin A1c <7 0%  represents optimal control in non-pregnant diabetic  patients  Different metrics may apply to specific  patient populations  Standards of Medical Care in    Diabetes Care  2013;36:s11-s66     For the purpose of screening for the presence of  diabetes  <5 7%       Consistent with the absence of diabetes  5 7-6 4%    Consistent with increased risk for diabetes              (prediabetes)  >or=6 5%    Consistent with diabetes     This assay result is consistent with diabetes  mellitus  Currently, no consensus exists for use of hemoglobin  A1c for diagnosis of diabetes for children  eAG (mg/dL) 140 (calc)     eAG (mmol/L) 7 7 (calc)       (Q) LIPID PANEL WITH DIRECT LDL 70YBO9398 12:00AM Joaquim Bryant     Test Name Result Flag Reference   CHOLESTEROL, TOTAL 151 mg/dL  125-200   HDL CHOLESTEROL 27 mg/dL L > OR = 40   TRIGLICERIDES 590 mg/dL H <150   DIRECT LDL 96 mg/dL  <130   Desirable range <100 mg/dL for patients with CHD or  diabetes and <70 mg/dL for diabetic patients with  known heart disease  CHOL/HDLC RATIO 5 6 (calc) H < OR = 5 0   NON HDL CHOLESTEROL 124 mg/dL (calc)     Target for non-HDL cholesterol is 30 mg/dL higher than   LDL cholesterol target       (Q) MICROALBUMIN, RANDOM URINE (W/CREATININE) [id number] 12:00AM Rosalba Perry     Test Name Result Flag Reference   CREATININE, RANDOM URINE 260 mg/dL     MICROALBUMIN 2 0 mg/dL     Reference Range  Not established   MICROALBUMIN/CREATININE$RATIO, RANDOM URINE 8 mcg/mg creat  <30   The ADA defines abnormalities in albumin  excretion as follows:     Category         Result (mcg/mg creatinine)     Normal                    <30  Microalbuminuria            Clinical albuminuria   > OR = 300     The ADA recommends that at least two of three  specimens collected within a 3-6 month period be  abnormal before considering a patient to be  within a diagnostic category       *VB-Foot Exam [id number] 12:00AM Uzma Brochure     Test Name Result Flag Reference   FOOT EXAM [id number]

## 2018-01-18 ENCOUNTER — TRANSCRIBE ORDERS (OUTPATIENT)
Dept: ADMINISTRATIVE | Facility: HOSPITAL | Age: 83
End: 2018-01-18

## 2018-01-18 ENCOUNTER — APPOINTMENT (OUTPATIENT)
Dept: LAB | Facility: HOSPITAL | Age: 83
End: 2018-01-18
Attending: UROLOGY
Payer: COMMERCIAL

## 2018-01-18 DIAGNOSIS — N40.1 ENLARGED PROSTATE WITH URINARY OBSTRUCTION: ICD-10-CM

## 2018-01-18 DIAGNOSIS — N40.1 ENLARGED PROSTATE WITH URINARY OBSTRUCTION: Primary | ICD-10-CM

## 2018-01-18 DIAGNOSIS — N13.8 ENLARGED PROSTATE WITH URINARY OBSTRUCTION: Primary | ICD-10-CM

## 2018-01-18 DIAGNOSIS — N13.8 ENLARGED PROSTATE WITH URINARY OBSTRUCTION: ICD-10-CM

## 2018-01-18 LAB
ALBUMIN SERPL BCP-MCNC: 4.2 G/DL (ref 3.5–5)
ALP SERPL-CCNC: 55 U/L (ref 46–116)
ALT SERPL W P-5'-P-CCNC: 26 U/L (ref 12–78)
ANION GAP SERPL CALCULATED.3IONS-SCNC: 9 MMOL/L (ref 4–13)
AST SERPL W P-5'-P-CCNC: 21 U/L (ref 5–45)
BILIRUB SERPL-MCNC: 0.55 MG/DL (ref 0.2–1)
BUN SERPL-MCNC: 22 MG/DL (ref 5–25)
CALCIUM SERPL-MCNC: 9.4 MG/DL (ref 8.3–10.1)
CHLORIDE SERPL-SCNC: 103 MMOL/L (ref 100–108)
CO2 SERPL-SCNC: 30 MMOL/L (ref 21–32)
CREAT SERPL-MCNC: 1.41 MG/DL (ref 0.6–1.3)
GFR SERPL CREATININE-BSD FRML MDRD: 46 ML/MIN/1.73SQ M
GLUCOSE P FAST SERPL-MCNC: 125 MG/DL (ref 65–99)
POTASSIUM SERPL-SCNC: 4.5 MMOL/L (ref 3.5–5.3)
PROT SERPL-MCNC: 7.7 G/DL (ref 6.4–8.2)
PSA SERPL-MCNC: 9.4 NG/ML (ref 0–4)
SODIUM SERPL-SCNC: 142 MMOL/L (ref 136–145)

## 2018-01-18 PROCEDURE — G0103 PSA SCREENING: HCPCS

## 2018-01-18 PROCEDURE — 36415 COLL VENOUS BLD VENIPUNCTURE: CPT

## 2018-01-18 PROCEDURE — 80053 COMPREHEN METABOLIC PANEL: CPT

## 2018-01-22 VITALS
DIASTOLIC BLOOD PRESSURE: 78 MMHG | BODY MASS INDEX: 26.29 KG/M2 | TEMPERATURE: 97.5 F | WEIGHT: 154 LBS | HEIGHT: 64 IN | SYSTOLIC BLOOD PRESSURE: 118 MMHG

## 2018-01-25 ENCOUNTER — OFFICE VISIT (OUTPATIENT)
Dept: UROLOGY | Facility: MEDICAL CENTER | Age: 83
End: 2018-01-25
Payer: COMMERCIAL

## 2018-01-25 VITALS
BODY MASS INDEX: 26.93 KG/M2 | WEIGHT: 152 LBS | DIASTOLIC BLOOD PRESSURE: 76 MMHG | SYSTOLIC BLOOD PRESSURE: 124 MMHG | HEIGHT: 63 IN

## 2018-01-25 DIAGNOSIS — N40.1 BENIGN PROSTATIC HYPERPLASIA WITH URINARY FREQUENCY: Primary | ICD-10-CM

## 2018-01-25 DIAGNOSIS — R35.0 BENIGN PROSTATIC HYPERPLASIA WITH URINARY FREQUENCY: Primary | ICD-10-CM

## 2018-01-25 DIAGNOSIS — R97.20 ELEVATED PSA, LESS THAN 10 NG/ML: ICD-10-CM

## 2018-01-25 LAB
SL AMB  POCT GLUCOSE, UA: NORMAL
SL AMB LEUKOCYTE ESTERASE,UA: NORMAL
SL AMB POCT BILIRUBIN,UA: NORMAL
SL AMB POCT BLOOD,UA: NORMAL
SL AMB POCT CLARITY,UA: CLEAR
SL AMB POCT COLOR,UA: YELLOW
SL AMB POCT KETONES,UA: NORMAL
SL AMB POCT NITRITE,UA: NORMAL
SL AMB POCT PH,UA: 6.5
SL AMB POCT SPECIFIC GRAVITY,UA: 1.01

## 2018-01-25 PROCEDURE — 99214 OFFICE O/P EST MOD 30 MIN: CPT | Performed by: UROLOGY

## 2018-01-25 PROCEDURE — 81002 URINALYSIS NONAUTO W/O SCOPE: CPT | Performed by: UROLOGY

## 2018-01-25 RX ORDER — EPINEPHRINE 0.3 MG/.3ML
INJECTION SUBCUTANEOUS
COMMUNITY
Start: 2011-06-23 | End: 2020-09-17

## 2018-01-25 RX ORDER — LOSARTAN POTASSIUM 50 MG/1
50 TABLET ORAL DAILY
COMMUNITY
Start: 2017-11-23 | End: 2018-07-25 | Stop reason: SDUPTHER

## 2018-01-25 RX ORDER — POTASSIUM CHLORIDE 750 MG/1
CAPSULE, EXTENDED RELEASE ORAL
COMMUNITY
Start: 2018-01-22 | End: 2018-05-29 | Stop reason: SDUPTHER

## 2018-01-25 RX ORDER — FUROSEMIDE 40 MG/1
TABLET ORAL DAILY
COMMUNITY
Start: 2018-01-22 | End: 2018-05-29 | Stop reason: SDUPTHER

## 2018-01-25 RX ORDER — ALPRAZOLAM 0.25 MG/1
TABLET ORAL
COMMUNITY
Start: 2011-08-19 | End: 2018-06-27 | Stop reason: SDUPTHER

## 2018-01-25 RX ORDER — AMLODIPINE BESYLATE 5 MG/1
5 TABLET ORAL DAILY
COMMUNITY
Start: 2017-10-22 | End: 2018-09-04 | Stop reason: SDUPTHER

## 2018-01-25 RX ORDER — ROSUVASTATIN CALCIUM 10 MG/1
10 TABLET, COATED ORAL
Refills: 3 | COMMUNITY
Start: 2017-11-13 | End: 2018-05-10 | Stop reason: SDUPTHER

## 2018-01-25 RX ORDER — PAROXETINE 30 MG/1
TABLET, FILM COATED ORAL EVERY MORNING
COMMUNITY
Start: 2017-10-22 | End: 2018-09-04 | Stop reason: SDUPTHER

## 2018-01-25 RX ORDER — TERAZOSIN 2 MG/1
2 CAPSULE ORAL DAILY
Refills: 3 | COMMUNITY
Start: 2018-01-08 | End: 2018-01-25 | Stop reason: SDUPTHER

## 2018-01-25 RX ORDER — AMOXICILLIN 500 MG
1 CAPSULE ORAL EVERY MORNING
COMMUNITY
End: 2019-07-02 | Stop reason: ALTCHOICE

## 2018-01-25 RX ORDER — TERAZOSIN 2 MG/1
2 CAPSULE ORAL DAILY
Qty: 90 CAPSULE | Refills: 3 | Status: SHIPPED | OUTPATIENT
Start: 2018-01-25 | End: 2019-03-06 | Stop reason: HOSPADM

## 2018-01-25 RX ORDER — OMEPRAZOLE 40 MG/1
CAPSULE, DELAYED RELEASE ORAL
Refills: 1 | COMMUNITY
Start: 2017-12-06 | End: 2018-03-05 | Stop reason: SDUPTHER

## 2018-01-25 NOTE — PROGRESS NOTES
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  Inasmuch as the patient is 80years of age and relatively asymptomatic urinary wise I will keep an eye on his PSA levels but do not recommend any intervention therapeutically or diagnostically at this time  2   BPH with urinary frequency-the patient notes that his symptoms have been stable for quite some time  He remains on terazosin 2 mg p o  q h s     This has been refilled for the next year  No problem-specific Assessment & Plan notes found for this encounter  Diagnoses and all orders for this visit:    Benign prostatic hyperplasia with urinary frequency  -     POCT urine dip  -     PSA, total and free; Future  -     Comprehensive metabolic panel; Future  -     terazosin (HYTRIN) 2 mg capsule; Take 1 capsule by mouth daily  -     PSA, total and free  -     Comprehensive metabolic panel    Elevated PSA, less than 10 ng/ml    Other orders  -     ALPRAZolam (XANAX) 0 25 mg tablet; Take by mouth  -     amLODIPine (NORVASC) 5 mg tablet;   -     Mometasone Furo-Formoterol Fum (DULERA) 100-5 MCG/ACT AERO; Inhale 2 puffs every 12 (twelve) hours  -     apixaban (ELIQUIS) 5 mg; Take by mouth  -     EPINEPHrine (EPIPEN) 0 3 mg/0 3 mL SOAJ; Inject as directed  -     Omega-3 Fatty Acids (FISH OIL) 1200 MG CAPS; Take by mouth  -     furosemide (LASIX) 40 mg tablet;   -     ONE TOUCH ULTRA TEST test strip; TEST TWICE A DAY  -     losartan (COZAAR) 50 mg tablet;   -     metFORMIN (GLUCOPHAGE) 500 mg tablet;  Take 500 mg by mouth 2 (two) times a day  -     omeprazole (PriLOSEC) 40 MG capsule; TAKE 1 CAPSULE DAILY FOR HEARTBURN  -     PARoxetine (PAXIL) 30 mg tablet;   -     potassium chloride (MICRO-K) 10 MEQ CR capsule;   -     rosuvastatin (CRESTOR) 10 MG tablet; Take 10 mg by mouth daily at bedtime  -     Discontinue: terazosin (HYTRIN) 2 mg capsule; Take 2 mg by mouth daily  -     Cholecalciferol (VITAMIN D) 2000 units CAPS; Take by mouth  -     Ascorbic Acid (V-R VITAMIN C CR PO); Take by mouth          Subjective:      Patient ID: Tona Tariq  is a 80 y o  male  HPI this is a 72-year-old male who is well known to me  He has been followed in this office for BPH with urinary frequency and urgency for many years  He is known to have had an elevated PSA below 10 for some time with variability in that number  At no time has any malignancy been identified on biopsy except for 1 core with high-grade PIN in 2012  The patient denies dysuria  urgency incontinence but does note daytime frequency  Interestingly he rarely has nocturia  He notes no gross hematuria  The following portions of the patient's history were reviewed and updated as appropriate: allergies, current medications, past family history, past medical history, past social history, past surgical history and problem list     Review of Systems   Constitutional: Negative  HENT: Negative  Eyes: Negative  Respiratory: Negative  Cardiovascular: Negative  Gastrointestinal: Negative  Genitourinary: Positive for frequency  Musculoskeletal: Negative  Neurological: Negative  Hematological: Negative  All other systems reviewed and are negative  Objective:     Physical Exam   Constitutional: He appears well-developed and well-nourished  HENT:   Head: Normocephalic and atraumatic  Eyes: EOM are normal    Neck: Neck supple  Pulmonary/Chest: Effort normal  No respiratory distress  Abdominal: Soft

## 2018-01-31 ENCOUNTER — OFFICE VISIT (OUTPATIENT)
Dept: FAMILY MEDICINE CLINIC | Facility: CLINIC | Age: 83
End: 2018-01-31
Payer: COMMERCIAL

## 2018-01-31 VITALS
BODY MASS INDEX: 27.46 KG/M2 | SYSTOLIC BLOOD PRESSURE: 122 MMHG | TEMPERATURE: 97.3 F | DIASTOLIC BLOOD PRESSURE: 80 MMHG | WEIGHT: 155 LBS | HEIGHT: 63 IN

## 2018-01-31 DIAGNOSIS — I10 ESSENTIAL HYPERTENSION: ICD-10-CM

## 2018-01-31 DIAGNOSIS — K21.9 GASTROESOPHAGEAL REFLUX DISEASE WITHOUT ESOPHAGITIS: ICD-10-CM

## 2018-01-31 DIAGNOSIS — E11.9 TYPE 2 DIABETES MELLITUS WITHOUT COMPLICATION, WITHOUT LONG-TERM CURRENT USE OF INSULIN (HCC): ICD-10-CM

## 2018-01-31 DIAGNOSIS — Z00.00 MEDICARE ANNUAL WELLNESS VISIT, SUBSEQUENT: Primary | ICD-10-CM

## 2018-01-31 DIAGNOSIS — E78.5 DYSLIPIDEMIA: ICD-10-CM

## 2018-01-31 PROBLEM — J90 PLEURAL EFFUSION: Status: ACTIVE | Noted: 2017-06-28

## 2018-01-31 PROBLEM — I51.7 CARDIOMEGALY: Status: ACTIVE | Noted: 2017-03-19

## 2018-01-31 PROCEDURE — G0439 PPPS, SUBSEQ VISIT: HCPCS | Performed by: FAMILY MEDICINE

## 2018-01-31 PROCEDURE — 99213 OFFICE O/P EST LOW 20 MIN: CPT | Performed by: FAMILY MEDICINE

## 2018-01-31 NOTE — PROGRESS NOTES
Assessment/Plan:  Patient appears to be doing well on current medications  Script given for lab testing  Recommend return to office for recheck in 3-4 months  Sooner if needed  Continue current medication  Adjust medication if A1c results warrant  No problem-specific Assessment & Plan notes found for this encounter  Diagnoses and all orders for this visit:    Medicare annual wellness visit, subsequent    Type 2 diabetes mellitus without complication, without long-term current use of insulin (HCC)  -     CBC and differential; Future  -     Comprehensive metabolic panel; Future  -     Hemoglobin A1c; Future  -     Lipid Panel with Direct LDL reflex; Future  -     Microalbumin / creatinine urine ratio  -     CBC and differential  -     Comprehensive metabolic panel  -     Hemoglobin A1c    Dyslipidemia    Essential hypertension    Gastroesophageal reflux disease without esophagitis          Subjective:      Patient ID: Joanna Matias  is a 80 y o  male  Patient is here for recheck on diabetes  He is generally feeling well  Patient denies any shortness of breath or chest pain  No GI complaints  He is due for blood testing for check on diabetes  He continues to see an eye doctor annually and just saw them a few weeks ago  No changes in his vision  The following portions of the patient's history were reviewed and updated as appropriate: allergies, current medications, past family history, past medical history, past social history, past surgical history and problem list     Review of Systems   Constitutional: Negative  HENT: Negative  Eyes: Negative  Respiratory: Negative  Cardiovascular: Negative  Gastrointestinal: Negative  Endocrine: Negative  Genitourinary: Negative  Musculoskeletal: Negative  Skin: Negative  Allergic/Immunologic: Negative  Neurological: Negative  Hematological: Negative  Psychiatric/Behavioral: Negative            Objective: Physical Exam   Constitutional: He is oriented to person, place, and time  He appears well-developed and well-nourished  HENT:   Head: Normocephalic and atraumatic  Right Ear: External ear normal    Left Ear: External ear normal    Nose: Nose normal    Mouth/Throat: Oropharynx is clear and moist  No oropharyngeal exudate  Eyes: Conjunctivae and EOM are normal  Right eye exhibits no discharge  Left eye exhibits no discharge  No scleral icterus  Neck: Normal range of motion  Neck supple  No thyromegaly present  Cardiovascular: Normal rate, regular rhythm and normal heart sounds  Exam reveals no gallop and no friction rub  No murmur heard  Pulmonary/Chest: Effort normal  No respiratory distress  He has no wheezes  He has no rales  He exhibits no tenderness  Abdominal: Soft  Bowel sounds are normal  He exhibits no distension and no mass  There is no tenderness  There is no rebound and no guarding  Musculoskeletal: Normal range of motion  He exhibits no edema, tenderness or deformity  Lymphadenopathy:     He has no cervical adenopathy  Neurological: He is alert and oriented to person, place, and time  He has normal reflexes  No cranial nerve deficit  He exhibits normal muscle tone  Coordination normal    Skin: Skin is warm and dry  No rash noted  No erythema  No pallor  Psychiatric: He has a normal mood and affect  His behavior is normal    Vitals reviewed  HPI:  Hannah Hayes  is a 80 y o  male here for his Subsequent Wellness Visit      Patient Active Problem List   Diagnosis    Benign prostatic hyperplasia with urinary frequency    Elevated PSA, less than 10 ng/ml    Anxiety disorder    Asbestosis ([address] )    Benign enlargement of prostate    Bronchitis    Cardiomegaly    Dyslipidemia    Esophageal reflux    Hypertension    Lower leg DVT (deep venous thromboembolism), acute, right (HCC)    Pancreatic cyst    Pleural effusion    Pulmonary embolism (HCC)    Pulmonary nodule seen on imaging study    Type 2 diabetes mellitus ([facility] [address] )     Past Medical History:   Diagnosis Date    Anxiety     Diabetes ([facility] [facility]     Hypercholesterolemia     Hypertension     Skin cancer      Past Surgical History:   Procedure Laterality Date    APPENDECTOMY      CATARACT EXTRACTION      PROSTATE BIOPSY       Family History   Problem Relation Age of Onset    Diabetes Mother     Diabetes Father     Prostate cancer Brother     Diabetes Brother     Pulmonary embolism Sister      History   Smoking Status    Former Smoker   Smokeless Tobacco    Never Used     Comment: current non-smoker, per [employer]     History   Alcohol Use No      History   Drug Use No       Current Outpatient Prescriptions   Medication Sig Dispense Refill    ALPRAZolam (XANAX) 0 25 mg tablet Take by mouth      amLODIPine (NORVASC) 5 mg tablet       apixaban (ELIQUIS) 5 mg Take by mouth      Ascorbic Acid (V-R VITAMIN C CR PO) Take by mouth      Cholecalciferol (VITAMIN D) 2000 units CAPS Take by mouth      EPINEPHrine (EPIPEN) 0 3 mg/0 3 mL SOAJ Inject as directed      furosemide (LASIX) 40 mg tablet       losartan (COZAAR) 50 mg tablet       metFORMIN (GLUCOPHAGE) 500 mg tablet Take 500 mg by mouth 2 (two) times a day  3    Mometasone Furo-Formoterol Fum (DULERA) 100-5 MCG/ACT AERO Inhale 2 puffs every 12 (twelve) hours      Omega-3 Fatty Acids (FISH OIL) 1200 MG CAPS Take by mouth      omeprazole (PriLOSEC) 40 MG capsule TAKE 1 CAPSULE DAILY FOR HEARTBURN  1    ONE TOUCH ULTRA TEST test strip TEST TWICE A DAY  1    PARoxetine (PAXIL) 30 mg tablet       potassium chloride (MICRO-K) 10 MEQ CR capsule       rosuvastatin (CRESTOR) 10 MG tablet Take 10 mg by mouth daily at bedtime  3    terazosin (HYTRIN) 2 mg capsule Take 1 capsule by mouth daily 90 capsule 3     No current facility-administered medications for this visit        Allergies   Allergen Reactions    Ace Inhibitors     Bee Venom      Immunization History Administered Date(s) Administered    Influenza 09/12/2013    Influenza Split High Dose Preservative Free IM 09/19/2012, 09/04/2014, 09/03/2015, 10/03/2016, 09/13/2017    Influenza TIV (IM) 10/06/2011    Pneumococcal Conjugate 13-Valent 10/01/2015    Pneumococcal Polysaccharide PPV23 01/01/2005       Patient Care Team:  Therese Griggs DO as PCP - General  MD Therese Bob DO      Medicare Screening Tests and Risk Assessments:  AWV Clinical     ISAR:   Previous hospitalizations?:  No       Once in a Lifetime Medicare Screening:       Medicare Screening Tests and Risk Assessment:   AAA Risk Assessment    Osteoporosis Risk Assessment    HIV Risk Assessment        Drug and Alcohol Use:   Tobacco use    Cigarettes:  former smoker    Tobacco use duration    Tobacco Cessation Readiness    Alcohol use    Alcohol use:  never    Alcohol Treatment Readiness   Illicit Drug Use    Drug use:  never        Diet & Exercise:   Diet   What is your diet?:  Regular, Low Saturated Fat, Limited junk food   How many servings a day of the following:   Fruits and Vegetables:  1-2, 3-4 Meat:  1-2   Whole Grains:  2    Coffee:  2    Exercise    Do you currently exercise?:  currently not exercising       Cognitive Impairment Screening:   Depression screening preformed:  Yes    Depression screening results:  negative for symptoms   Cognitive Impairment Screening    Do you have difficulty learning or retaining new information?:  No Do you have difficulty handling new tasks?:  No   Do you have difficulty with reasoning?:  No Do you have difficulty with spatial ability and orientation?:  No   Do you have difficulty with language?:  No Do you have difficulty with behavior?:  No       Functional Ability/Level of Safety:   Hearing    Hearing difficulties:  Yes Bilateral:  significantly decreased   Left:  significantly decreased Right:  significantly decreased   Hearing aid:  Yes    Hearing Impairment Assessment    Current Activities    Help needed with the folllowing:    Using the phone:  No Transportation:  No   Shopping:  No Preparing Meals:  No   Doing Housework:  No Doing Laundry:  No   Managing Medications:  No Managing Money:  No   ADL    Fall Risk   Have you fallen in the last 12 months?:  No    Injury History       Home Safety:   Home Safety Risk Factors   Unfamilar with surroundings:  No Uneven floors:  No   Stairs or handrail saftey risk:  No Loose rugs:  No   Household clutter:  No Poor household lighting:  No   No grab bars in bathroom:  No Further evaluation needed:  No       Advanced Directives:   Advanced Directives    Living Will:  Yes Durable POA for healthcare: Yes   Advanced directive:  Yes    Patient's End of Life Decisions        Urinary Incontinence:       Glaucoma:            Provider Screening    No data filed        No exam data present  Reviewed Updated [facility] Prior Wellness Visits:   Last Medicare wellness visit information was reviewed, patient interviewed , no change since last AWVyes  Last Medicare wellness visit information was reviewed, patient interviewed and updates made to the record today yes    Assessment and Plan:  1  Medicare annual wellness visit, subsequent     2  Type 2 diabetes mellitus without complication, without long-term current use of insulin (HCC)  CBC and differential    Comprehensive metabolic panel    Hemoglobin A1c    Lipid Panel with Direct LDL reflex    Microalbumin / creatinine urine ratio    CBC and differential    Comprehensive metabolic panel    Hemoglobin A1c   3  Dyslipidemia     4  Essential hypertension     5   Gastroesophageal reflux disease without esophagitis         Health Maintenance Due   Topic Date Due    DTaP,Tdap,and Td Vaccines (1 - Tdap) 01/23/1942    GLAUCOMA SCREENING 67+ YR  01/23/2002

## 2018-02-12 ENCOUNTER — OFFICE VISIT (OUTPATIENT)
Dept: CARDIOLOGY CLINIC | Facility: CLINIC | Age: 83
End: 2018-02-12
Payer: COMMERCIAL

## 2018-02-12 VITALS
HEIGHT: 65 IN | HEART RATE: 66 BPM | BODY MASS INDEX: 25.33 KG/M2 | WEIGHT: 152 LBS | DIASTOLIC BLOOD PRESSURE: 66 MMHG | SYSTOLIC BLOOD PRESSURE: 120 MMHG

## 2018-02-12 DIAGNOSIS — I48.0 PAROXYSMAL ATRIAL FIBRILLATION (HCC): Chronic | ICD-10-CM

## 2018-02-12 DIAGNOSIS — I10 ESSENTIAL HYPERTENSION: ICD-10-CM

## 2018-02-12 DIAGNOSIS — I31.3 PERICARDIAL EFFUSION: Primary | Chronic | ICD-10-CM

## 2018-02-12 PROBLEM — I31.39 PERICARDIAL EFFUSION: Chronic | Status: ACTIVE | Noted: 2018-02-12

## 2018-02-12 PROCEDURE — 99214 OFFICE O/P EST MOD 30 MIN: CPT | Performed by: INTERNAL MEDICINE

## 2018-02-12 NOTE — PROGRESS NOTES
Cardiology Follow Up    Verlin Cornea   1935  [id number]  [address] [facility]  [address] [place] [address] [address]    1  Pericardial effusion  Echo complete with contrast if indicated   2  Essential hypertension     3  Paroxysmal atrial fibrillation (HCC)  Echo complete with contrast if indicated       Interval History:  Cardiology follow-up  Patient doing well  Denies any chest pain or dyspnea  No orthopnea no PND  States his blood pressures been well control, compliant with low-sodium diet  Compliant with low-cholesterol diet, on high-intensity statin therapy denies any palpitations, no syncope or presyncope  Patient Active Problem List   Diagnosis    Benign prostatic hyperplasia with urinary frequency    Elevated PSA, less than 10 ng/ml    Anxiety disorder    [facility]bestosis ([facility]    Benign enlargement of prostate    Bronchitis    Cardiomegaly    Dyslipidemia    Esophageal reflux    Hypertension    Lower leg DVT (deep venous thromboembolism), acute, right (HCC)    Pancreatic cyst    Pleural effusion    Pulmonary embolism (HCC)    Pulmonary nodule seen on imaging study    Type 2 diabetes mellitus (HCC)    Pericardial effusion    Paroxysmal atrial fibrillation (HCC)     Past Medical History:   Diagnosis Date    Anxiety     Diabetes ([facility] [facility] )     Hypercholesterolemia     Hypertension     Skin cancer      Social History     Social History    Marital status: /Civil Union     Spouse name: N/A    Number of children: N/A    Years of education: N/A     Occupational History    Not on file       Social History Main Topics    Smoking status: Former Smoker    Smokeless tobacco: Never Used      Comment: current non-smoker, per [employer]    Alcohol use No    Drug use: No    Sexual activity: Not on file     Other Topics Concern    Not on file     Social History Narrative    No narrative on file Family History   Problem Relation Age of Onset    Diabetes Mother     Diabetes Father     Prostate cancer Brother     Diabetes Brother     Pulmonary embolism Sister      Past Surgical History:   Procedure Laterality Date    APPENDECTOMY      CATARACT EXTRACTION      PROSTATE BIOPSY         Current Outpatient Prescriptions:     ALPRAZolam (XANAX) 0 25 mg tablet, Take by mouth, Disp: , Rfl:     amLODIPine (NORVASC) 5 mg tablet, 5 mg daily  , Disp: , Rfl:     apixaban (ELIQUIS) 5 mg, Take by mouth, Disp: , Rfl:     Ascorbic Acid (V-R VITAMIN C CR PO), Take by mouth, Disp: , Rfl:     Cholecalciferol (VITAMIN D) 2000 units CAPS, Take by mouth, Disp: , Rfl:     EPINEPHrine (EPIPEN) 0 3 mg/0 3 mL SOAJ, Inject as directed, Disp: , Rfl:     furosemide (LASIX) 40 mg tablet, daily  , Disp: , Rfl:     losartan (COZAAR) 50 mg tablet, 50 mg daily  , Disp: , Rfl:     metFORMIN (GLUCOPHAGE) 500 mg tablet, Take 500 mg by mouth 2 (two) times a day, Disp: , Rfl: 3    Mometasone Furo-Formoterol Fum (DULERA) 100-5 MCG/ACT AERO, Inhale 2 puffs every 12 (twelve) hours, Disp: , Rfl:     Omega-3 Fatty Acids (FISH OIL) 1200 MG CAPS, Take by mouth, Disp: , Rfl:     omeprazole (PriLOSEC) 40 MG capsule, TAKE 1 CAPSULE DAILY FOR HEARTBURN, Disp: , Rfl: 1    ONE TOUCH ULTRA TEST test strip, TEST TWICE A DAY, Disp: , Rfl: 1    PARoxetine (PAXIL) 30 mg tablet, every morning  , Disp: , Rfl:     potassium chloride (MICRO-K) 10 MEQ CR capsule, , Disp: , Rfl:     rosuvastatin (CRESTOR) 10 MG tablet, Take 10 mg by mouth daily at bedtime, Disp: , Rfl: 3    terazosin (HYTRIN) 2 mg capsule, Take 1 capsule by mouth daily, Disp: 90 capsule, Rfl: 3  Allergies   Allergen Reactions    Ace Inhibitors     Bee Venom        Labs:  Office Visit on 01/25/2018   Component Date Value     COLOR,UA 01/25/2018 YELLOW      CLARITY,UA 01/25/2018 CLEAR      SPECIFIC GRAVITY,UA 01/25/2018 1 010      PH,UA 01/25/2018 6 5     LEUKOCYTE ESTERASE,UA 01/25/2018 NEG      NITRITE,UA 01/25/2018 NEG     GLUCOSE, UA 01/25/2018 NEG      KETONES,UA 01/25/2018 NEG      BILIRUBIN,UA 01/25/2018 NEG      BLOOD,UA 01/25/2018 NEG    Appointment on 01/18/2018   Component Date Value    Sodium 01/18/2018 142     Potassium 01/18/2018 4 5     Chloride 01/18/2018 103     CO2 01/18/2018 30     Anion Gap 01/18/2018 9     BUN 01/18/2018 22     Creatinine 01/18/2018 1 41*    Glucose, Fasting 01/18/2018 125*    Calcium 01/18/2018 9 4     AST 01/18/2018 21     ALT 01/18/2018 26     Alkaline Phosphatase 01/18/2018 55     Total Protein 01/18/2018 7 7     Albumin 01/18/2018 4 2     Total Bilirubin 01/18/2018 0 55     eGFR 01/18/2018 46     PSA 01/18/2018 9 4*   Appointment on 09/08/2017   Component Date Value    NT-proBNP 09/08/2017 59     Sodium 09/08/2017 137     Potassium 09/08/2017 3 9     Chloride 09/08/2017 102     CO2 09/08/2017 29     Anion Gap 09/08/2017 6     BUN 09/08/2017 23     Creatinine 09/08/2017 1 57*    Glucose, Fasting 09/08/2017 122*    Calcium 09/08/2017 9 3     AST 09/08/2017 18     ALT 09/08/2017 24     Alkaline Phosphatase 09/08/2017 52     Total Protein 09/08/2017 7 2     Albumin 09/08/2017 3 8     Total Bilirubin 09/08/2017 0 36     eGFR 09/08/2017 40     Magnesium 09/08/2017 1 8      Imaging: No results found  Review of Systems:  Review of Systems   Constitutional: Negative for activity change, appetite change, diaphoresis, fatigue and unexpected weight change  HENT: Negative for nosebleeds  Eyes: Negative for visual disturbance  Respiratory: Negative for apnea, cough, chest tightness, shortness of breath, wheezing and stridor  Cardiovascular: Negative for chest pain, palpitations and leg swelling  Gastrointestinal: Negative for abdominal pain and blood in stool  Endocrine: Negative for cold intolerance  Genitourinary: Negative for hematuria     Musculoskeletal: Negative for arthralgias, back pain, gait problem, joint swelling and myalgias  Skin: Negative for pallor and rash  Allergic/Immunologic: Negative for immunocompromised state  Neurological: Negative for dizziness, tremors, syncope, speech difficulty, weakness, light-headedness and numbness  Hematological: Bruises/bleeds easily  Psychiatric/Behavioral: Negative for confusion  Physical Exam:  Physical Exam   Constitutional: He is oriented to person, place, and time  He appears well-developed and well-nourished  No distress  HENT:   Head: Normocephalic  Eyes: No scleral icterus  Neck: No JVD present  No tracheal deviation present  Cardiovascular: Normal rate, regular rhythm, normal heart sounds and intact distal pulses  Exam reveals no gallop and no friction rub  No murmur heard  Pulses paradox is less than 5 mm of mercury   Pulmonary/Chest: Effort normal and breath sounds normal  No stridor  No respiratory distress  He has no wheezes  He has no rales  He exhibits no tenderness  Abdominal: Soft  Neurological: He is alert and oriented to person, place, and time  Skin: Skin is warm and dry  He is not diaphoretic  Psychiatric: He has a normal mood and affect  Discussion/Summary:  Chronic pericardial effusion, uncertain etiology, 1st diagnosed 5/17, follow-up echocardiogram on 08/17 revealed no changes  Left ventricular systolic function of initially reported as low normal, currently normal  RV function also was reported low-normal range  Most recent echocardiogram normal  No significant valvular abnormalities  Repeat an echocardiogram to assess stability  Stress test last year he did 5 minutes on a [place] protocol, there was no EKG criteria for ischemia  Paroxysmal atrial fibrillation, remains clinically electrographically in sinus rhythm  On beta-blocker plus full anticoagulation  Factor Xa inhibitor  Creatinine 1 4, GFR in the 40s

## 2018-02-14 ENCOUNTER — APPOINTMENT (OUTPATIENT)
Dept: LAB | Facility: HOSPITAL | Age: 83
End: 2018-02-14
Payer: COMMERCIAL

## 2018-02-14 DIAGNOSIS — E11.9 TYPE 2 DIABETES MELLITUS WITHOUT COMPLICATION, WITHOUT LONG-TERM CURRENT USE OF INSULIN (HCC): ICD-10-CM

## 2018-02-14 LAB
ALBUMIN SERPL BCP-MCNC: 4.1 G/DL (ref 3.5–5)
ALP SERPL-CCNC: 61 U/L (ref 46–116)
ALT SERPL W P-5'-P-CCNC: 21 U/L (ref 12–78)
ANION GAP SERPL CALCULATED.3IONS-SCNC: 7 MMOL/L (ref 4–13)
AST SERPL W P-5'-P-CCNC: 18 U/L (ref 5–45)
BASOPHILS # BLD AUTO: 0.01 THOUSANDS/ΜL (ref 0–0.1)
BASOPHILS NFR BLD AUTO: 0 % (ref 0–1)
BILIRUB SERPL-MCNC: 0.25 MG/DL (ref 0.2–1)
BUN SERPL-MCNC: 25 MG/DL (ref 5–25)
CALCIUM SERPL-MCNC: 9.7 MG/DL (ref 8.3–10.1)
CHLORIDE SERPL-SCNC: 104 MMOL/L (ref 100–108)
CHOLEST SERPL-MCNC: 119 MG/DL (ref 50–200)
CO2 SERPL-SCNC: 30 MMOL/L (ref 21–32)
CREAT SERPL-MCNC: 1.37 MG/DL (ref 0.6–1.3)
CREAT UR-MCNC: 110 MG/DL
EOSINOPHIL # BLD AUTO: 0.07 THOUSAND/ΜL (ref 0–0.61)
EOSINOPHIL NFR BLD AUTO: 2 % (ref 0–6)
ERYTHROCYTE [DISTWIDTH] IN BLOOD BY AUTOMATED COUNT: 12.4 % (ref 11.6–15.1)
EST. AVERAGE GLUCOSE BLD GHB EST-MCNC: 148 MG/DL
GFR SERPL CREATININE-BSD FRML MDRD: 47 ML/MIN/1.73SQ M
GLUCOSE P FAST SERPL-MCNC: 141 MG/DL (ref 65–99)
HBA1C MFR BLD: 6.8 % (ref 4.2–6.3)
HCT VFR BLD AUTO: 39.3 % (ref 36.5–49.3)
HDLC SERPL-MCNC: 34 MG/DL (ref 40–60)
HGB BLD-MCNC: 13.1 G/DL (ref 12–17)
LDLC SERPL CALC-MCNC: 68 MG/DL (ref 0–100)
LYMPHOCYTES # BLD AUTO: 0.91 THOUSANDS/ΜL (ref 0.6–4.47)
LYMPHOCYTES NFR BLD AUTO: 21 % (ref 14–44)
MCH RBC QN AUTO: 31.9 PG (ref 26.8–34.3)
MCHC RBC AUTO-ENTMCNC: 33.3 G/DL (ref 31.4–37.4)
MCV RBC AUTO: 96 FL (ref 82–98)
MICROALBUMIN UR-MCNC: 12.3 MG/L (ref 0–20)
MICROALBUMIN/CREAT 24H UR: 11 MG/G CREATININE (ref 0–30)
MONOCYTES # BLD AUTO: 0.3 THOUSAND/ΜL (ref 0.17–1.22)
MONOCYTES NFR BLD AUTO: 7 % (ref 4–12)
NEUTROPHILS # BLD AUTO: 3.05 THOUSANDS/ΜL (ref 1.85–7.62)
NEUTS SEG NFR BLD AUTO: 70 % (ref 43–75)
PLATELET # BLD AUTO: 179 THOUSANDS/UL (ref 149–390)
PMV BLD AUTO: 10 FL (ref 8.9–12.7)
POTASSIUM SERPL-SCNC: 4.7 MMOL/L (ref 3.5–5.3)
PROT SERPL-MCNC: 7.7 G/DL (ref 6.4–8.2)
RBC # BLD AUTO: 4.11 MILLION/UL (ref 3.88–5.62)
SODIUM SERPL-SCNC: 141 MMOL/L (ref 136–145)
TRIGL SERPL-MCNC: 87 MG/DL
WBC # BLD AUTO: 4.34 THOUSAND/UL (ref 4.31–10.16)

## 2018-02-14 PROCEDURE — 85025 COMPLETE CBC W/AUTO DIFF WBC: CPT | Performed by: FAMILY MEDICINE

## 2018-02-14 PROCEDURE — 80061 LIPID PANEL: CPT

## 2018-02-14 PROCEDURE — 84154 ASSAY OF PSA FREE: CPT | Performed by: UROLOGY

## 2018-02-14 PROCEDURE — 82570 ASSAY OF URINE CREATININE: CPT | Performed by: FAMILY MEDICINE

## 2018-02-14 PROCEDURE — 80053 COMPREHEN METABOLIC PANEL: CPT | Performed by: UROLOGY

## 2018-02-14 PROCEDURE — 83036 HEMOGLOBIN GLYCOSYLATED A1C: CPT | Performed by: FAMILY MEDICINE

## 2018-02-14 PROCEDURE — 82043 UR ALBUMIN QUANTITATIVE: CPT | Performed by: FAMILY MEDICINE

## 2018-02-14 PROCEDURE — 84153 ASSAY OF PSA TOTAL: CPT | Performed by: UROLOGY

## 2018-02-14 PROCEDURE — 36415 COLL VENOUS BLD VENIPUNCTURE: CPT | Performed by: UROLOGY

## 2018-02-15 LAB
PSA FREE MFR SERPL: 18.3 %
PSA FREE SERPL-MCNC: 1.63 NG/ML
PSA SERPL-MCNC: 8.9 NG/ML (ref 0–4)

## 2018-02-27 ENCOUNTER — HOSPITAL ENCOUNTER (OUTPATIENT)
Dept: NON INVASIVE DIAGNOSTICS | Facility: CLINIC | Age: 83
Discharge: HOME | End: 2018-02-27
Payer: COMMERCIAL

## 2018-02-27 DIAGNOSIS — I48.0 PAROXYSMAL ATRIAL FIBRILLATION (HCC): Chronic | ICD-10-CM

## 2018-02-27 DIAGNOSIS — I31.3 PERICARDIAL EFFUSION: Chronic | ICD-10-CM

## 2018-02-27 PROCEDURE — 93306 TTE W/DOPPLER COMPLETE: CPT | Performed by: INTERNAL MEDICINE

## 2018-02-27 PROCEDURE — 93306 TTE W/DOPPLER COMPLETE: CPT

## 2018-03-05 DIAGNOSIS — K21.9 GASTROESOPHAGEAL REFLUX DISEASE WITHOUT ESOPHAGITIS: Primary | ICD-10-CM

## 2018-03-06 ENCOUNTER — TELEPHONE (OUTPATIENT)
Dept: CARDIOLOGY CLINIC | Facility: CLINIC | Age: 83
End: 2018-03-06

## 2018-03-06 RX ORDER — OMEPRAZOLE 40 MG/1
CAPSULE, DELAYED RELEASE ORAL
Qty: 90 CAPSULE | Refills: 1 | Status: SHIPPED | OUTPATIENT
Start: 2018-03-06 | End: 2018-06-11 | Stop reason: SDUPTHER

## 2018-03-06 NOTE — TELEPHONE ENCOUNTER
Improved but still present  Continue current medications current regimen    Please call patient thanks

## 2018-03-26 ENCOUNTER — OFFICE VISIT (OUTPATIENT)
Dept: FAMILY MEDICINE CLINIC | Facility: CLINIC | Age: 83
End: 2018-03-26
Payer: COMMERCIAL

## 2018-03-26 VITALS
TEMPERATURE: 97.4 F | BODY MASS INDEX: 25.29 KG/M2 | SYSTOLIC BLOOD PRESSURE: 108 MMHG | DIASTOLIC BLOOD PRESSURE: 48 MMHG | WEIGHT: 152 LBS

## 2018-03-26 DIAGNOSIS — E11.9 TYPE 2 DIABETES MELLITUS WITHOUT COMPLICATION, WITHOUT LONG-TERM CURRENT USE OF INSULIN (HCC): Primary | ICD-10-CM

## 2018-03-26 DIAGNOSIS — H65.93 BILATERAL NON-SUPPURATIVE OTITIS MEDIA: ICD-10-CM

## 2018-03-26 PROCEDURE — 99213 OFFICE O/P EST LOW 20 MIN: CPT | Performed by: FAMILY MEDICINE

## 2018-03-26 PROCEDURE — 3725F SCREEN DEPRESSION PERFORMED: CPT | Performed by: FAMILY MEDICINE

## 2018-03-26 RX ORDER — AMOXICILLIN 500 MG/1
500 TABLET, FILM COATED ORAL 3 TIMES DAILY
Qty: 21 TABLET | Refills: 0 | Status: SHIPPED | OUTPATIENT
Start: 2018-03-26 | End: 2018-04-02

## 2018-03-26 NOTE — PROGRESS NOTES
Assessment/Plan:    Patient will continue to check home blood glucose readings over the next 1 week  If blood glucose readings do not come down recommend recheck in office with some blood testing  Glucose may be elevated secondary to recent stress and otitis media  They will call with any new symptoms developing in the interim  No problem-specific Assessment & Plan notes found for this encounter  Diagnoses and all orders for this visit:    Type 2 diabetes mellitus without complication, without long-term current use of insulin (HCC)    Bilateral non-suppurative otitis media  -     amoxicillin (AMOXIL) 500 MG tablet; Take 1 tablet (500 mg total) by mouth 3 (three) times a day for 7 days          Subjective:      Patient ID: Kaleb Cary  is a 80 y o  male  Patient notes he had cold symptoms last week  Blood glucose readings have been averaging in the high 100s over the last several days  He is generally feeling fine otherwise  He typically runs in the low 100s  He is concern that there may be something triggering elevated blood glucose readings  He does admit to slight increase in number of stressful days  Sleep is unchanged  Blood Sugar Problem   Associated symptoms include congestion  The following portions of the patient's history were reviewed and updated as appropriate: allergies, current medications, past family history, past medical history, past social history, past surgical history and problem list     Review of Systems   Constitutional: Negative  HENT: Positive for congestion  Eyes: Negative  Respiratory: Negative  Cardiovascular: Negative  Gastrointestinal: Negative  Endocrine: Negative  Genitourinary: Negative  Musculoskeletal: Negative  Skin: Negative  Allergic/Immunologic: Negative  Neurological: Negative  Hematological: Negative  Psychiatric/Behavioral: Negative            Objective:      BP (!) 108/48   Temp (!) 97 4 °F (36 3 °C)   Wt 68 9 kg (152 lb)   BMI 25 29 kg/m²          Physical Exam   Constitutional: He is oriented to person, place, and time  He appears well-developed and well-nourished  HENT:   Head: Normocephalic and atraumatic  Right Ear: External ear normal  Tympanic membrane is not erythematous and not bulging  Left Ear: External ear normal  Tympanic membrane is not erythematous and not bulging  Nose: Nose normal    Mouth/Throat: Oropharynx is clear and moist and mucous membranes are normal  No oral lesions  No oropharyngeal exudate  Fluid to the posterior right TM  Eyes: Conjunctivae and EOM are normal  Right eye exhibits no discharge  Left eye exhibits no discharge  No scleral icterus  Neck: Normal range of motion  Neck supple  No thyromegaly present  Cardiovascular: Normal rate, regular rhythm and normal heart sounds  Exam reveals no gallop and no friction rub  No murmur heard  Pulmonary/Chest: Effort normal  No respiratory distress  He has no wheezes  He has no rales  He exhibits no tenderness  Abdominal: Soft  Bowel sounds are normal  He exhibits no distension and no mass  There is no tenderness  There is no rebound and no guarding  Musculoskeletal: Normal range of motion  He exhibits no edema, tenderness or deformity  Lymphadenopathy:     He has no cervical adenopathy  Neurological: He is alert and oriented to person, place, and time  He has normal reflexes  No cranial nerve deficit  He exhibits normal muscle tone  Coordination normal    Skin: Skin is warm and dry  No rash noted  No erythema  No pallor  Psychiatric: He has a normal mood and affect  His behavior is normal    Vitals reviewed

## 2018-04-01 DIAGNOSIS — I48.91 ATRIAL FIBRILLATION, UNSPECIFIED TYPE (HCC): Primary | ICD-10-CM

## 2018-04-06 DIAGNOSIS — E11.9 TYPE 2 DIABETES MELLITUS WITHOUT COMPLICATION, UNSPECIFIED LONG TERM INSULIN USE STATUS: Primary | ICD-10-CM

## 2018-05-10 DIAGNOSIS — E78.5 DYSLIPIDEMIA: Primary | ICD-10-CM

## 2018-05-10 RX ORDER — ROSUVASTATIN CALCIUM 10 MG/1
TABLET, COATED ORAL
Qty: 90 TABLET | Refills: 3 | Status: SHIPPED | OUTPATIENT
Start: 2018-05-10 | End: 2018-06-16 | Stop reason: ALTCHOICE

## 2018-05-29 DIAGNOSIS — I10 ESSENTIAL HYPERTENSION: Primary | ICD-10-CM

## 2018-06-04 RX ORDER — FUROSEMIDE 40 MG/1
TABLET ORAL
Qty: 30 TABLET | Refills: 3 | Status: SHIPPED | OUTPATIENT
Start: 2018-06-04 | End: 2018-07-25 | Stop reason: SDUPTHER

## 2018-06-04 RX ORDER — POTASSIUM CHLORIDE 750 MG/1
CAPSULE, EXTENDED RELEASE ORAL
Qty: 30 CAPSULE | Refills: 3 | Status: SHIPPED | OUTPATIENT
Start: 2018-06-04 | End: 2018-07-25 | Stop reason: SDUPTHER

## 2018-06-11 DIAGNOSIS — K21.9 GASTROESOPHAGEAL REFLUX DISEASE WITHOUT ESOPHAGITIS: ICD-10-CM

## 2018-06-11 RX ORDER — OMEPRAZOLE 40 MG/1
CAPSULE, DELAYED RELEASE ORAL
Qty: 90 CAPSULE | Refills: 3 | Status: SHIPPED | OUTPATIENT
Start: 2018-06-11 | End: 2018-06-16 | Stop reason: ALTCHOICE

## 2018-06-16 ENCOUNTER — HOSPITAL ENCOUNTER (EMERGENCY)
Facility: HOSPITAL | Age: 83
Discharge: HOME | End: 2018-06-16
Attending: EMERGENCY MEDICINE
Payer: COMMERCIAL

## 2018-06-16 VITALS
BODY MASS INDEX: 24.32 KG/M2 | OXYGEN SATURATION: 98 % | DIASTOLIC BLOOD PRESSURE: 60 MMHG | WEIGHT: 146.16 LBS | HEART RATE: 88 BPM | SYSTOLIC BLOOD PRESSURE: 118 MMHG | RESPIRATION RATE: 16 BRPM | TEMPERATURE: 98 F

## 2018-06-16 DIAGNOSIS — R36.9 BLOODY DISCHARGE FROM PENIS: Primary | ICD-10-CM

## 2018-06-16 LAB
BACTERIA UR QL AUTO: ABNORMAL /HPF
BILIRUB UR QL STRIP: NEGATIVE
CLARITY UR: CLEAR
COLOR UR: ABNORMAL
GLUCOSE UR STRIP-MCNC: NEGATIVE MG/DL
HGB UR QL STRIP.AUTO: ABNORMAL
KETONES UR STRIP-MCNC: NEGATIVE MG/DL
LEUKOCYTE ESTERASE UR QL STRIP: NEGATIVE
NITRITE UR QL STRIP: NEGATIVE
NON-SQ EPI CELLS URNS QL MICRO: ABNORMAL /HPF
PH UR STRIP.AUTO: 6.5 [PH] (ref 4.5–8)
PROT UR STRIP-MCNC: ABNORMAL MG/DL
RBC #/AREA URNS AUTO: ABNORMAL /HPF
SP GR UR STRIP.AUTO: 1.01 (ref 1–1.03)
UROBILINOGEN UR QL STRIP.AUTO: 1 E.U./DL
WBC #/AREA URNS AUTO: ABNORMAL /HPF

## 2018-06-16 PROCEDURE — 99283 EMERGENCY DEPT VISIT LOW MDM: CPT

## 2018-06-16 PROCEDURE — 81002 URINALYSIS NONAUTO W/O SCOPE: CPT | Performed by: EMERGENCY MEDICINE

## 2018-06-16 PROCEDURE — 81001 URINALYSIS AUTO W/SCOPE: CPT

## 2018-06-16 RX ORDER — ASPIRIN 325 MG
325 TABLET ORAL DAILY
COMMUNITY
End: 2018-07-25 | Stop reason: ALTCHOICE

## 2018-06-16 RX ORDER — SIMVASTATIN 40 MG
40 TABLET ORAL
COMMUNITY
End: 2018-07-25 | Stop reason: ALTCHOICE

## 2018-06-16 NOTE — DISCHARGE INSTRUCTIONS
Acute Hematuria   WHAT YOU SHOULD KNOW:   Hematuria is blood in your urine from an injury or medical condition  Acute means the problem starts suddenly, worsens quickly, and lasts a short time  Your urine may be bright red to dark brown  Some common causes of hematuria are bladder infection, kidney stone, trauma to the kidneys or bladder, and some medications  Sometimes blood clots can block the urethra so that you cannot empty your bladder  AFTER YOU LEAVE:   Medicines:  Ask about these or other medicines you may need to treat the cause of your acute hematuria:  · Antibiotics: This medicine is given to fight or prevent an infection caused by bacteria  Always take your antibiotics exactly as ordered by your healthcare provider  Do not stop taking your medicine unless directed by your healthcare provider  Never save antibiotics or take leftover antibiotics that were given to you for another illness  · Take your medicine as directed  Call your healthcare provider if you think your medicine is not helping or if you have side effects  Tell him if you are allergic to any medicine  Keep a list of the medicines, vitamins, and herbs you take  Include the amounts, and when and why you take them  Bring the list or the pill bottles to follow-up visits  Carry your medicine list with you in case of an emergency  Increase the amount of fluid you drink:  Drink clear fluids to help flush the blood from your urinary tract  Follow instructions about how much fluid to drink  Follow up with your healthcare provider as directed: Your healthcare provider will tell you how often to come in for follow-up visits  He may refer you to a specialist, such as a urologist or nephrologist  The specialists may do tests or procedures to find more serious problems with your urinary system  Write down your questions so you remember to ask them during your visits     Contact your healthcare provider if:   · You have a fever that gets worse or does not go away with treatment  · You cannot keep liquids or medicines down  · Your urine gets darker, even after you drink extra liquids  · You have questions or concerns about your condition, treatment, or care  Seek care immediately or call 911 if:   · You have blood in your urine after a new injury, such as a fall  · You are urinating very small amounts or not at all  · You feel like you cannot empty your bladder  · You have severe back or side pain that does not go away with treatment  © 2014 [address] is for End User's use only and may not be sold, redistributed or otherwise used for commercial purposes  All illustrations and images included in CareNotes® are the copyrighted property of [employer]  or [employer]  The above information is an  only  It is not intended as medical advice for individual conditions or treatments  Talk to your doctor, nurse or pharmacist before following any medical regimen to see if it is safe and effective for you

## 2018-06-16 NOTE — ED PROVIDER NOTES
Final Diagnosis:  1  Bloody discharge from penis      Chief Complaint   Patient presents with    Blood in Urine     Started yesterday  This is an 70-year-old male presenting for hematuria  The patient states that he was doing pretty well yesterday  About 2:30 p m  he took a shower, everything was normal at that time  Sometime in the evening after gardening he started knows that whenever he urinated he would see blood in the toilet  It would still be there at the start of urination but usually the urine was seemed to clear up by the end  The blood is intermittent in nature  Throughout the night though when he was lying down he would have dribbles of blood onto his underwear  He denies any complaints related to this though  Denies fevers chills nausea vomiting chest pain shortness of breath  He has a little bit of suprapubic discomfort at times  He denies back pain that is new  Denies falls or injuries  Denies any zipper related injuries  Denies history of similar episodes in the past   He does have history of prostate problems but has never had a catheter  No recent procedures  There is no burning with urination  There is no increased frequency of urination  He is on a blood thinner, aspirin 325 mg  PMH:  - Anxiety  - DM  - HLD  - HTN  - Skin CA  PSH:  - Appendectomy  - Cataract extration  - prostate biopsy: "He is known to have had an elevated PSA below 10 for some time with variability in that number  At no time has any malignancy been identified on biopsy except for 1 core with high-grade PIN in 2012 " per urologist  Former smoker  No alcohol  No drugs  PE:   Vitals:    06/16/18 0730   BP: 117/58   BP Location: Right arm   Pulse: 90   Resp: 16   Temp: 98 °F (36 7 °C)   TempSrc: Oral   SpO2: 95%   Weight: 66 3 kg (146 lb 2 6 oz)   General: VSS, NAD, awake, alert  Well-nourished, well-developed  Appears stated age  Head: Normocephalic, atraumatic, nontender  Eyes: PERRL, EOM-I  No diplopia  No hyphema  No subconjunctival hemorrhages  Symmetrical lids  ENT: Atraumatic external nose and ears  Pharynx normal    MMM  No malocclusion  No stridor  Normal phonation  No drooling  Normal swallowing  Neck: Symmetric, trachea midline  No JVD  CV: RRR  +S1/S2  No murmurs or gallops  Peripheral pulses +2 throughout  No chest wall tenderness  Lungs:   Unlabored No retractions  CTAB, lungs sounds equal bilateral    No crepitus  No tachypnea  No paradoxical motion  Abd: +BS, soft  Ventral hernia noted  NT/ND  No guarding  No rigidity  No rebound  No hepatosplenomegaly noted  No peritoneal signs  Heel strike sign absent  MSK:   FROM   No lower extremity edema  Back:   No CVAT  Skin: Dry, intact  Neuro: AAOx3, GCS 15, CN II-XII grossly intact  Motor grossly intact  Psychiatric/Behavioral: Appropriate mood and affect   Exam: uncircumcized penis  As soon as I started to unsheath the penis, there is dark red blood with a tiny clot present  There is no foreskin laceration  There is no pain with palpation of the penis  There is no rash  Urinoid smelling region  There is no blood when I milk the shaft  I visualized the penis continuously with foreskin pulled back for about 3-4 minutes without re-accumulation of blood  No signs of stricture of foreskin  A:  - blood at penile meatus without obvious source  P:  - I can't find the source of the tiny amount of blood that I am seeing    - It doesn't sound like it is from the urine but patient does state he saw a clot before when urinating  Will check urine (after wiping + cleaning) + PVR  - Likely DC with close outpatient urology f/u, strict return precautions  - 13 point ROS was performed and all are normal unless stated in the history above  - Nursing note reviewed  Vitals reviewed     - Orders placed by myself and/or advanced practitioner / resident     - Previous chart was reviewed  - No language barrier    - History obtained from wife patient  - There are no limitations to the history obtained  - Critical care time: Not applicable for this patient  ED Course as of Jun 16 0840   Sat Jun 16, 2018   0754 Leukocytes, UA: Negative   0754 Nitrite, UA: Negative   0803 PVR 86  Urology paged (x1) and texted  1538 Urology paged (x2)    6671 Patient apparently on Eliquis, but it isn't on his medication list      0431 Urology paged (x3)    3602 Spoke to   [facility] who suggests this is likely a urethral issue  Suggested f/u in office for cysto  200 I reviewed all testing with the patient: urine + discussions with urology    I gave oral return precautions for what to return for in addition to the written return precautions  The patient (and any family present: wife) verbalized understanding of the discharge instructions and warnings that would necessitate return to the Emergency Department  I specifically highlighted areas of special concern regarding the written and verbal discharge instructions and return precautions  All questions were answered prior to discharge        Medications - No data to display  No orders to display     Orders Placed This Encounter   Procedures    Urine Microscopic    Measure post void residual    POCT urinalysis dipstick     Labs Reviewed   POCT URINALYSIS DIPSTICK - Abnormal    ED URINE MACROSCOPIC - Abnormal        Result Value Ref Range Status    Color, UA Bloody   Final    Clarity, UA Clear   Final    pH, UA 6 5  4 5 - 8 0 Final    Leukocytes, UA Negative  Negative Final    Nitrite, UA Negative  Negative Final    Protein, UA 30 (1+) (*) Negative mg/dl Final    Glucose, UA Negative  Negative mg/dl Final    Ketones, UA Negative  Negative mg/dl Final    Urobilinogen, UA 1 0  0 2, 1 0 E U /dl E U /dl Final    Bilirubin, UA Negative  Negative Final    Blood, UA Large (*) Negative Final    Specific [place], UA 1 015  1 003 - 1 030 Final    Narrative:     CLINITEK RESULT   URINE MICROSCOPIC Time reflects when diagnosis was documented in both MDM as applicable and the Disposition within this note     Time User Action Codes Description Comment    6/16/2018  7:52 AM Joni Phipps Add [R36 9] Bloody discharge from penis       ED Disposition     ED Disposition Condition Comment    Discharge  Sara Reinoso  discharge to home/self care  Condition at discharge: Good        Follow-up Information     Follow up With Specialties Details Why Contact Info Additional [address] [facility] Emergency Department Emergency Medicine Go to If symptoms worsen or increased bleeding or dizziness/lightheadedness or any other specific concerns  [address] [facility]  [phone] [place] ED, [address] [facility] , [place], [place], [address], DO Family Medicine Call in 1 day To make appointment for re-evaluation in 1 week [place]   [phone]       Art Acosta MD Urology Call in 1 day To make appointment for re-eval in 3-5 days [address] [facility]  [phone]           Patient's Medications   Discharge Prescriptions    No medications on file     No discharge procedures on file  Prior to Admission Medications   Prescriptions Last Dose Informant Patient Reported? Taking?    ALPRAZolam (XANAX) 0 25 mg tablet 6/16/2018 at Unknown time Spouse/Significant Other Yes Yes   Sig: Take by mouth   Ascorbic Acid (V-R VITAMIN C CR PO) 6/16/2018 at Unknown time Spouse/Significant Other Yes Yes   Sig: Take by mouth   Cholecalciferol (VITAMIN D) 2000 units CAPS 6/16/2018 at Unknown time Spouse/Significant Other Yes Yes   Sig: Take by mouth   EPINEPHrine (EPIPEN) 0 3 mg/0 3 mL SOAJ More than a month at Unknown time Spouse/Significant Other Yes No   Sig: Inject as directed   Mometasone Furo-Formoterol Fum (DULERA) 100-5 MCG/ACT AERO 6/16/2018 at Unknown time Spouse/Significant Other Yes Yes   Sig: Inhale 2 puffs every 12 (twelve) hours   ONE TOUCH ULTRA TEST test strip 2018 at Unknown time  No Yes   Sig: TEST TWICE A DAY   Omega-3 Fatty Acids (FISH OIL) 1200 MG CAPS 2018 at Unknown time Spouse/Significant Other Yes Yes   Sig: Take by mouth   PARoxetine (PAXIL) 30 mg tablet 2018 at Unknown time Spouse/Significant Other Yes Yes   Sig: every morning     amLODIPine (NORVASC) 5 mg tablet 2018 at Unknown time Spouse/Significant Other Yes Yes   Si mg daily     apixaban (ELIQUIS) 5 mg 2018 at Unknown time  No Yes   Sig: Take 1 tablet (5 mg total) by mouth 2 (two) times a day   aspirin 325 mg tablet 2018 at Unknown time  Yes Yes   Sig: Take 325 mg by mouth daily   furosemide (LASIX) 40 mg tablet 2018 at Unknown time  No Yes   Sig: TAKE 1 TABLET BY MOUTH EVERY DAY   losartan (COZAAR) 50 mg tablet 2018 at Unknown time Spouse/Significant Other Yes Yes   Si mg daily     metFORMIN (GLUCOPHAGE) 500 mg tablet 2018 at Unknown time Spouse/Significant Other Yes Yes   Sig: Take 500 mg by mouth 2 (two) times a day   potassium chloride (MICRO-K) 10 MEQ CR capsule 2018 at Unknown time  No Yes   Sig: TAKE ONE CAPSULE BY MOUTH EVERY DAY   simvastatin (ZOCOR) 40 mg tablet   Yes Yes   Sig: Take 40 mg by mouth daily at bedtime   terazosin (HYTRIN) 2 mg capsule 2018 at Unknown time Spouse/Significant Other No Yes   Sig: Take 1 capsule by mouth daily      Facility-Administered Medications: None       Portions of the record may have been created with voice recognition software  Occasional wrong word or "sound a like" substitutions may have occurred due to the inherent limitations of voice recognition software  Read the chart carefully and recognize, using context, where substitutions have occurred      Electronically signed by:  Ant Mckeon MD  18 1705

## 2018-06-20 ENCOUNTER — OFFICE VISIT (OUTPATIENT)
Dept: UROLOGY | Facility: MEDICAL CENTER | Age: 83
End: 2018-06-20
Payer: COMMERCIAL

## 2018-06-20 VITALS
BODY MASS INDEX: 24.92 KG/M2 | SYSTOLIC BLOOD PRESSURE: 102 MMHG | DIASTOLIC BLOOD PRESSURE: 50 MMHG | WEIGHT: 146 LBS | HEIGHT: 64 IN

## 2018-06-20 DIAGNOSIS — N40.1 BPH WITH OBSTRUCTION/LOWER URINARY TRACT SYMPTOMS: ICD-10-CM

## 2018-06-20 DIAGNOSIS — N13.8 BPH WITH OBSTRUCTION/LOWER URINARY TRACT SYMPTOMS: ICD-10-CM

## 2018-06-20 DIAGNOSIS — R31.0 GROSS HEMATURIA: Primary | ICD-10-CM

## 2018-06-20 LAB
SL AMB  POCT GLUCOSE, UA: NORMAL
SL AMB LEUKOCYTE ESTERASE,UA: NORMAL
SL AMB POCT BILIRUBIN,UA: NORMAL
SL AMB POCT BLOOD,UA: NORMAL
SL AMB POCT CLARITY,UA: CLEAR
SL AMB POCT COLOR,UA: YELLOW
SL AMB POCT KETONES,UA: NORMAL
SL AMB POCT NITRITE,UA: NORMAL
SL AMB POCT PH,UA: 5.5
SL AMB POCT SPECIFIC GRAVITY,UA: 1.01
SL AMB POCT URINE PROTEIN: NORMAL
SL AMB POCT UROBILINOGEN: 0.2

## 2018-06-20 PROCEDURE — 81003 URINALYSIS AUTO W/O SCOPE: CPT | Performed by: UROLOGY

## 2018-06-20 PROCEDURE — 52000 CYSTOURETHROSCOPY: CPT | Performed by: UROLOGY

## 2018-06-20 PROCEDURE — 99214 OFFICE O/P EST MOD 30 MIN: CPT | Performed by: UROLOGY

## 2018-06-20 NOTE — PROGRESS NOTES
Assessment/Plan:   1  Gross painless hematuria  The patient notes approximately 4-5 days ago after a strenuous day working outside he noted blood spotting his undergarments  The patient is on Eliquis and noted that he had gross blood from the urethral meatus throughout the night into the next morning and then was seen in the emergency room  No treatment was prescribed other than increase fluid intake  The patient's blood spontaneously stopped and currently his urinalysis is free of even microscopic amounts of hematuria  The patient did not void bloody urine and in fact noted that even when his blood was coming out of the urethra when he voided the urine was clear  The patient will undergo renal ultrasound for upper tract evaluation with cystourethroscopy today revealing no evidence of lower urinary tract abnormality except for mild BPH and mild bladder trabeculation  2   BPH with obstructive symptoms  The patient notes mild urinary urgency but notes a good urinary flow and no change in his urinary habits  Diagnoses and all orders for this visit:    Gross hematuria  -     POCT urine dip auto non-scope  -     US kidney and bladder with pvr; Future    BPH with obstruction/lower urinary tract symptoms          Subjective:     Patient ID: Rudy Andino  is a 80 y o  male  HPI 49-year-old male followed in this office for BPH with an elevated PSA but no evidence of prostate cancer and previous biopsy  The patient notes a history of gross painless hematuria approximately 4 days ago noting that his urinary stream was clear but he was leaking some blood out of the urethral meatus 4 days ago  This spontaneously resolved with the patient denying dysuria noting no change in his baseline frequency and urgency  He notes a good urinary stream and denies fevers or pain  He is voiding adequately at this time  Review of Systems   Constitutional: Negative  HENT: Negative  Respiratory: Negative  Cardiovascular: Negative  Gastrointestinal: Negative  Genitourinary: Positive for hematuria  Musculoskeletal: Negative  Neurological: Negative  Hematological: Bruises/bleeds easily  Psychiatric/Behavioral: Negative  Objective:     Physical Exam   Constitutional: He is oriented to person, place, and time  He appears well-developed and well-nourished  No distress  HENT:   Head: Normocephalic and atraumatic  Eyes: Conjunctivae and EOM are normal    Neck: Neck supple  Pulmonary/Chest: Effort normal  No respiratory distress  Abdominal: Soft  He exhibits no distension  Genitourinary: Penis normal    Musculoskeletal: Normal range of motion  Neurological: He is alert and oriented to person, place, and time  Psychiatric: He has a normal mood and affect  His behavior is normal  Judgment and thought content normal    Vitals reviewed  Cystoscopy  Date/Time: 6/20/2018 11:22 AM  Performed by: Yandel Hannah  Authorized by: Yandel Hannah     Procedure details: cystoscopy    Patient tolerance: Patient tolerated the procedure well with no immediate complications    Additional Procedure Details: The patient was placed in the male lithotomy position in the urethral meatus prepped with Betadine and then 2% intraurethral lidocaine lubricant was left indwelling in the urethra for 2 minutes  Flexible cystoscopy revealed a completely normal anterior urethra  The prostatic urethra revealed bilobar enlargement of the lateral lobes of the prostate with mild to moderate median lobe enlargement  There was no active bleeding noted and no urethral abnormalities noted  The urinary bladder was free of any intrinsic lesions or evidence of extrinsic mass compression  Ureteral orifices were normal position and configuration bilaterally with clear efflux bilaterally  Patient tolerated the procedure well  He will return in 6 weeks to discuss results of renal ultrasound

## 2018-06-20 NOTE — LETTER
June 20, 2018     Felipe Edmonds DO  [address] [address]    Patient: José Miguel Morrell  YOB: 1935   Date of Visit: 6/20/2018       Dear Dr Kelley Collins: Thank you for referring Tayna melo to me for evaluation  Below are my notes for this consultation  If you have questions, please do not hesitate to call me  I look forward to following your patient along with you  Sincerely,        Bruce Albarran MD        CC: No Recipients  Bruce Albarran MD  6/20/2018 11:23 AM  Sign at close encounter  Assessment/Plan:   1  Gross painless hematuria  The patient notes approximately 4-5 days ago after a strenuous day working outside he noted blood spotting his undergarments  The patient is on Eliquis and noted that he had gross blood from the urethral meatus throughout the night into the next morning and then was seen in the emergency room  No treatment was prescribed other than increase fluid intake  The patient's blood spontaneously stopped and currently his urinalysis is free of even microscopic amounts of hematuria  The patient did not void bloody urine and in fact noted that even when his blood was coming out of the urethra when he voided the urine was clear  The patient will undergo renal ultrasound for upper tract evaluation with cystourethroscopy today revealing no evidence of lower urinary tract abnormality except for mild BPH and mild bladder trabeculation  2   BPH with obstructive symptoms  The patient notes mild urinary urgency but notes a good urinary flow and no change in his urinary habits  Diagnoses and all orders for this visit:    Gross hematuria  -     POCT urine dip auto non-scope  -     US kidney and bladder with pvr; Future    BPH with obstruction/lower urinary tract symptoms          Subjective:     Patient ID: José Miguel Morrell  is a 80 y o  male      HPI 72-year-old male followed in this office for BPH with an elevated PSA but no evidence of prostate cancer and previous biopsy  The patient notes a history of gross painless hematuria approximately 4 days ago noting that his urinary stream was clear but he was leaking some blood out of the urethral meatus 4 days ago  This spontaneously resolved with the patient denying dysuria noting no change in his baseline frequency and urgency  He notes a good urinary stream and denies fevers or pain  He is voiding adequately at this time  Review of Systems   Constitutional: Negative  HENT: Negative  Respiratory: Negative  Cardiovascular: Negative  Gastrointestinal: Negative  Genitourinary: Positive for hematuria  Musculoskeletal: Negative  Neurological: Negative  Hematological: Bruises/bleeds easily  Psychiatric/Behavioral: Negative  Objective:     Physical Exam   Constitutional: He is oriented to person, place, and time  He appears well-developed and well-nourished  No distress  HENT:   Head: Normocephalic and atraumatic  Eyes: Conjunctivae and EOM are normal    Neck: Neck supple  Pulmonary/Chest: Effort normal  No respiratory distress  Abdominal: Soft  He exhibits no distension  Genitourinary: Penis normal    Musculoskeletal: Normal range of motion  Neurological: He is alert and oriented to person, place, and time  Psychiatric: He has a normal mood and affect  His behavior is normal  Judgment and thought content normal    Vitals reviewed  Cystoscopy  Date/Time: 6/20/2018 11:22 AM  Performed by: Dominik Bryan  Authorized by: Dominik Bryan     Procedure details: cystoscopy    Patient tolerance: Patient tolerated the procedure well with no immediate complications    Additional Procedure Details: The patient was placed in the male lithotomy position in the urethral meatus prepped with Betadine and then 2% intraurethral lidocaine lubricant was left indwelling in the urethra for 2 minutes    Flexible cystoscopy revealed a completely normal anterior urethra  The prostatic urethra revealed bilobar enlargement of the lateral lobes of the prostate with mild to moderate median lobe enlargement  There was no active bleeding noted and no urethral abnormalities noted  The urinary bladder was free of any intrinsic lesions or evidence of extrinsic mass compression  Ureteral orifices were normal position and configuration bilaterally with clear efflux bilaterally  Patient tolerated the procedure well  He will return in 6 weeks to discuss results of renal ultrasound

## 2018-06-27 DIAGNOSIS — F41.1 GENERALIZED ANXIETY DISORDER: Primary | ICD-10-CM

## 2018-06-27 RX ORDER — ALPRAZOLAM 0.25 MG/1
TABLET ORAL
Qty: 270 TABLET | Refills: 0 | Status: SHIPPED | OUTPATIENT
Start: 2018-06-27 | End: 2018-11-06 | Stop reason: SDUPTHER

## 2018-07-09 ENCOUNTER — HOSPITAL ENCOUNTER (OUTPATIENT)
Dept: ULTRASOUND IMAGING | Facility: HOSPITAL | Age: 83
Discharge: HOME | End: 2018-07-09
Attending: UROLOGY
Payer: COMMERCIAL

## 2018-07-09 DIAGNOSIS — R31.0 GROSS HEMATURIA: ICD-10-CM

## 2018-07-09 PROCEDURE — 51798 US URINE CAPACITY MEASURE: CPT

## 2018-07-23 ENCOUNTER — TELEPHONE (OUTPATIENT)
Dept: UROLOGY | Facility: MEDICAL CENTER | Age: 83
End: 2018-07-23

## 2018-07-25 ENCOUNTER — OFFICE VISIT (OUTPATIENT)
Dept: FAMILY MEDICINE CLINIC | Facility: CLINIC | Age: 83
End: 2018-07-25
Payer: COMMERCIAL

## 2018-07-25 VITALS
SYSTOLIC BLOOD PRESSURE: 108 MMHG | DIASTOLIC BLOOD PRESSURE: 60 MMHG | BODY MASS INDEX: 26.58 KG/M2 | HEIGHT: 63 IN | OXYGEN SATURATION: 98 % | HEART RATE: 87 BPM | WEIGHT: 150 LBS | TEMPERATURE: 97.6 F

## 2018-07-25 DIAGNOSIS — I10 ESSENTIAL HYPERTENSION: ICD-10-CM

## 2018-07-25 DIAGNOSIS — I48.0 PAROXYSMAL ATRIAL FIBRILLATION (HCC): Primary | Chronic | ICD-10-CM

## 2018-07-25 DIAGNOSIS — E11.9 TYPE 2 DIABETES MELLITUS WITHOUT COMPLICATION, WITHOUT LONG-TERM CURRENT USE OF INSULIN (HCC): ICD-10-CM

## 2018-07-25 LAB — SL AMB POCT HEMOGLOBIN AIC: 7.3

## 2018-07-25 PROCEDURE — 99214 OFFICE O/P EST MOD 30 MIN: CPT | Performed by: FAMILY MEDICINE

## 2018-07-25 PROCEDURE — 3078F DIAST BP <80 MM HG: CPT | Performed by: FAMILY MEDICINE

## 2018-07-25 PROCEDURE — 83036 HEMOGLOBIN GLYCOSYLATED A1C: CPT | Performed by: FAMILY MEDICINE

## 2018-07-25 PROCEDURE — 3074F SYST BP LT 130 MM HG: CPT | Performed by: FAMILY MEDICINE

## 2018-07-25 RX ORDER — ROSUVASTATIN CALCIUM 40 MG/1
40 TABLET, COATED ORAL DAILY
COMMUNITY
End: 2018-07-25 | Stop reason: SDUPTHER

## 2018-07-25 RX ORDER — FUROSEMIDE 40 MG/1
40 TABLET ORAL DAILY
Qty: 90 TABLET | Refills: 3 | Status: SHIPPED | OUTPATIENT
Start: 2018-07-25 | End: 2019-03-06 | Stop reason: HOSPADM

## 2018-07-25 RX ORDER — POTASSIUM CHLORIDE 750 MG/1
10 CAPSULE, EXTENDED RELEASE ORAL DAILY
Qty: 90 CAPSULE | Refills: 1 | Status: SHIPPED | OUTPATIENT
Start: 2018-07-25 | End: 2019-01-23 | Stop reason: SDUPTHER

## 2018-07-25 RX ORDER — ROSUVASTATIN CALCIUM 40 MG/1
40 TABLET, COATED ORAL DAILY
Qty: 90 TABLET | Refills: 3 | Status: SHIPPED | OUTPATIENT
Start: 2018-07-25 | End: 2019-04-26 | Stop reason: SDUPTHER

## 2018-07-25 RX ORDER — LOSARTAN POTASSIUM 50 MG/1
50 TABLET ORAL DAILY
Qty: 90 TABLET | Refills: 3 | Status: SHIPPED | OUTPATIENT
Start: 2018-07-25 | End: 2018-09-04 | Stop reason: SDUPTHER

## 2018-07-25 NOTE — ASSESSMENT & PLAN NOTE
Lab Results   Component Value Date    HGBA1C 7 3 07/25/2018       No results for input(s): POCGLU in the last 72 hours  Blood Sugar Average: Last 72 hrs:   A1c is under fairly good control at 7 3  Recheck in 4-6 months  Sooner if needed

## 2018-07-27 DIAGNOSIS — J44.9 CHRONIC OBSTRUCTIVE PULMONARY DISEASE, UNSPECIFIED COPD TYPE (HCC): Primary | ICD-10-CM

## 2018-07-30 ENCOUNTER — OFFICE VISIT (OUTPATIENT)
Dept: UROLOGY | Facility: MEDICAL CENTER | Age: 83
End: 2018-07-30
Payer: COMMERCIAL

## 2018-07-30 VITALS
SYSTOLIC BLOOD PRESSURE: 120 MMHG | WEIGHT: 149 LBS | HEIGHT: 63 IN | DIASTOLIC BLOOD PRESSURE: 60 MMHG | BODY MASS INDEX: 26.4 KG/M2

## 2018-07-30 DIAGNOSIS — N13.8 BPH WITH URINARY OBSTRUCTION: Primary | ICD-10-CM

## 2018-07-30 DIAGNOSIS — N40.1 BPH WITH URINARY OBSTRUCTION: Primary | ICD-10-CM

## 2018-07-30 LAB
POST-VOID RESIDUAL VOLUME, ML POC: 178 ML
SL AMB  POCT GLUCOSE, UA: NORMAL
SL AMB LEUKOCYTE ESTERASE,UA: NORMAL
SL AMB POCT BILIRUBIN,UA: NORMAL
SL AMB POCT BLOOD,UA: NORMAL
SL AMB POCT CLARITY,UA: CLEAR
SL AMB POCT COLOR,UA: YELLOW
SL AMB POCT KETONES,UA: NORMAL
SL AMB POCT NITRITE,UA: NORMAL
SL AMB POCT PH,UA: 6
SL AMB POCT SPECIFIC GRAVITY,UA: 1.01
SL AMB POCT URINE PROTEIN: NORMAL
SL AMB POCT UROBILINOGEN: 0.2

## 2018-07-30 PROCEDURE — 99214 OFFICE O/P EST MOD 30 MIN: CPT | Performed by: UROLOGY

## 2018-07-30 PROCEDURE — 81003 URINALYSIS AUTO W/O SCOPE: CPT | Performed by: UROLOGY

## 2018-07-30 PROCEDURE — 51798 US URINE CAPACITY MEASURE: CPT | Performed by: UROLOGY

## 2018-07-30 NOTE — PROGRESS NOTES
IPSS Questionnaire (AUA-7): Over the past month    1)  How often have you had a sensation of not emptying your bladder completely after you finish urinating? 0 - Not at all   2)  How often have you had to urinate again less than two hours after you finished urinating? 1 - Less than 1 time in 5   3)  How often have you found you stopped and started again several times when you urinated? 2 - Less than half the time   4) How difficult have you found it to postpone urination? 0 - Not at all   5) How often have you had a weak urinary stream?  1 - Less than 1 time in 5   6) How often have you had to push or strain to begin urination? 0 - Not at all   7) How many times did you most typically get up to urinate from the time you went to bed until the time you got up in the morning?   1 - 1 time   Total Score:  5

## 2018-07-30 NOTE — PROGRESS NOTES
Assessment/Plan:   1  Gross hematuria on Eliquis  Renal ultrasound and cystourethroscopy failed to reveal any acute pathology that requires intervention  2   BPH with obstructive symptoms  The patient continues on Hytrin 2 mg p o  q h s  with excellent response  No change in therapy is recommended  Diagnoses and all orders for this visit:    BPH with urinary obstruction  -     POCT urine dip auto non-scope  -     POCT Measure PVR  -     Comprehensive metabolic panel; Future          Subjective:     Patient ID: Roe Joshi  is a 80 y o  male  Chief complaint:  Blood in urine    History of present illness:  68-year-old male who is on Eliquis returns for discussion of renal ultrasound results  He reported gross painless hematuria and underwent cystoscopy at the time shortly after presentation in June of 2018 with no evidence of lower urinary tract lesions  Renal ultrasound results will be reviewed with the patient today  Currently he notes a good urinary flow no urgency gross hematuria dysuria incontinence  Minimal nocturia is noted  The patient is pleased with results of his urinary voiding pattern on Hytrin 2 mg p o  q h s           Review of Systems   Constitutional: Negative  HENT: Positive for hearing loss  Respiratory: Negative  Cardiovascular: Negative  Gastrointestinal: Negative  Genitourinary: Positive for frequency and hematuria  Neurological: Negative  Psychiatric/Behavioral: Negative  Objective:     Physical Exam   Constitutional: He is oriented to person, place, and time  He appears well-nourished  HENT:   Head: Atraumatic  Eyes: EOM are normal    Neck: Neck supple  Pulmonary/Chest: Effort normal  No respiratory distress  Abdominal: Soft  Neurological: He is alert and oriented to person, place, and time  Psychiatric: He has a normal mood and affect  His behavior is normal  Judgment and thought content normal    Vitals reviewed

## 2018-08-03 ENCOUNTER — APPOINTMENT (OUTPATIENT)
Dept: LAB | Facility: HOSPITAL | Age: 83
End: 2018-08-03
Attending: UROLOGY
Payer: COMMERCIAL

## 2018-08-03 DIAGNOSIS — N40.1 BPH WITH URINARY OBSTRUCTION: ICD-10-CM

## 2018-08-03 DIAGNOSIS — N13.8 BPH WITH URINARY OBSTRUCTION: ICD-10-CM

## 2018-08-03 LAB
ALBUMIN SERPL BCP-MCNC: 3.7 G/DL (ref 3.5–5)
ALP SERPL-CCNC: 55 U/L (ref 46–116)
ALT SERPL W P-5'-P-CCNC: 25 U/L (ref 12–78)
ANION GAP SERPL CALCULATED.3IONS-SCNC: 5 MMOL/L (ref 4–13)
AST SERPL W P-5'-P-CCNC: 22 U/L (ref 5–45)
BILIRUB SERPL-MCNC: 0.25 MG/DL (ref 0.2–1)
BUN SERPL-MCNC: 22 MG/DL (ref 5–25)
CALCIUM SERPL-MCNC: 9.4 MG/DL (ref 8.3–10.1)
CHLORIDE SERPL-SCNC: 104 MMOL/L (ref 100–108)
CO2 SERPL-SCNC: 31 MMOL/L (ref 21–32)
CREAT SERPL-MCNC: 1.35 MG/DL (ref 0.6–1.3)
GFR SERPL CREATININE-BSD FRML MDRD: 48 ML/MIN/1.73SQ M
GLUCOSE P FAST SERPL-MCNC: 145 MG/DL (ref 65–99)
POTASSIUM SERPL-SCNC: 4 MMOL/L (ref 3.5–5.3)
PROT SERPL-MCNC: 7.2 G/DL (ref 6.4–8.2)
SODIUM SERPL-SCNC: 140 MMOL/L (ref 136–145)

## 2018-08-03 PROCEDURE — 80053 COMPREHEN METABOLIC PANEL: CPT

## 2018-08-03 PROCEDURE — 36415 COLL VENOUS BLD VENIPUNCTURE: CPT

## 2018-08-24 DIAGNOSIS — J44.9 CHRONIC OBSTRUCTIVE PULMONARY DISEASE, UNSPECIFIED COPD TYPE (HCC): ICD-10-CM

## 2018-08-28 ENCOUNTER — OFFICE VISIT (OUTPATIENT)
Dept: CARDIOLOGY CLINIC | Facility: CLINIC | Age: 83
End: 2018-08-28
Payer: COMMERCIAL

## 2018-08-28 VITALS
BODY MASS INDEX: 24.75 KG/M2 | WEIGHT: 145 LBS | SYSTOLIC BLOOD PRESSURE: 106 MMHG | HEIGHT: 64 IN | DIASTOLIC BLOOD PRESSURE: 60 MMHG | HEART RATE: 78 BPM

## 2018-08-28 DIAGNOSIS — I31.3 PERICARDIAL EFFUSION: Primary | Chronic | ICD-10-CM

## 2018-08-28 DIAGNOSIS — E78.5 DYSLIPIDEMIA: ICD-10-CM

## 2018-08-28 DIAGNOSIS — I48.0 PAROXYSMAL ATRIAL FIBRILLATION (HCC): Chronic | ICD-10-CM

## 2018-08-28 PROCEDURE — 93000 ELECTROCARDIOGRAM COMPLETE: CPT | Performed by: INTERNAL MEDICINE

## 2018-08-28 PROCEDURE — 99214 OFFICE O/P EST MOD 30 MIN: CPT | Performed by: INTERNAL MEDICINE

## 2018-08-28 NOTE — PROGRESS NOTES
Cardiology Follow Up    Beronica Paz   1935  [id number]  [address] [facility]  [address] [place] [address] [address] [place] [address]  [phone]    1  Pericardial effusion     2  Paroxysmal atrial fibrillation (HCC)  POCT ECG   3  Dyslipidemia         Interval History:   Cardiology follow-up  Patient continues to do well from a cardiac point of view  His ambulatory, walks regularly  No exertional symptoms denies chest pain or dyspnea  Denies any syncope or presyncope  Compliant with low-sodium diet, blood pressures been well control, compliant with low-cholesterol diet  His lipids are well control, LDL 68 on statin therapy high-intensity  Patient Active Problem List   Diagnosis    Benign prostatic hyperplasia with urinary frequency    Elevated PSA, less than 10 ng/ml    Anxiety disorder    [facility]bestosis [facility]    Benign enlargement of prostate    Bronchitis    Cardiomegaly    Dyslipidemia    Esophageal reflux    Essential hypertension    Lower leg DVT (deep venous thromboembolism), acute, right (HCC)    Pancreatic cyst    Pleural effusion    Pulmonary embolism (HCC)    Pulmonary nodule seen on imaging study    Type 2 diabetes mellitus (HCC)    Pericardial effusion    Paroxysmal atrial fibrillation (HCC)     Past Medical History:   Diagnosis Date    Anxiety     Diabetes ([facility] )     Hypercholesterolemia     Hypertension      Social History     Social History    Marital status: /Civil Union     Spouse name: N/A    Number of children: N/A    Years of education: N/A     Occupational History    Not on file       Social History Main Topics    Smoking status: Former Smoker    Smokeless tobacco: Never Used      Comment: current non-smoker, per [employer]    Alcohol use No    Drug use: No    Sexual activity: Not on file     Other Topics Concern    Not on file     Social History Narrative    No narrative on file      Family History   Problem Relation Age of Onset    Diabetes Mother     Diabetes Father     Prostate cancer Brother     Diabetes Brother     Pulmonary embolism Sister      Past Surgical History:   Procedure Laterality Date    APPENDECTOMY      CATARACT EXTRACTION      PROSTATE BIOPSY         Current Outpatient Prescriptions:     ALPRAZolam (XANAX) 0 25 mg tablet, TAKE 1 TABLET EVERY 8 HOURS AS NEEDED FOR ANXIETY, Disp: 270 tablet, Rfl: 0    amLODIPine (NORVASC) 5 mg tablet, 5 mg daily  , Disp: , Rfl:     apixaban (ELIQUIS) 5 mg, Take 1 tablet (5 mg total) by mouth 2 (two) times a day, Disp: 180 tablet, Rfl: 3    Ascorbic Acid (V-R VITAMIN C CR PO), Take by mouth, Disp: , Rfl:     Cholecalciferol (VITAMIN D) 2000 units CAPS, Take by mouth, Disp: , Rfl:     DULERA 100-5 MCG/ACT inhaler, INHALE 2 PUFFS EVERY 12 (TWELVE) HOURS, Disp: 13 Inhaler, Rfl: 0    EPINEPHrine (EPIPEN) 0 3 mg/0 3 mL SOAJ, Inject as directed, Disp: , Rfl:     furosemide (LASIX) 40 mg tablet, Take 1 tablet (40 mg total) by mouth daily, Disp: 90 tablet, Rfl: 3    losartan (COZAAR) 50 mg tablet, Take 1 tablet (50 mg total) by mouth daily, Disp: 90 tablet, Rfl: 3    metFORMIN (GLUCOPHAGE) 500 mg tablet, Take 500 mg by mouth 2 (two) times a day, Disp: , Rfl: 3    Omega-3 Fatty Acids (FISH OIL) 1200 MG CAPS, Take by mouth, Disp: , Rfl:     ONE TOUCH ULTRA TEST test strip, TEST TWICE A DAY, Disp: 180 each, Rfl: 1    PARoxetine (PAXIL) 30 mg tablet, every morning  , Disp: , Rfl:     potassium chloride (MICRO-K) 10 MEQ CR capsule, Take 1 capsule (10 mEq total) by mouth daily, Disp: 90 capsule, Rfl: 1    rosuvastatin (CRESTOR) 40 MG tablet, Take 1 tablet (40 mg total) by mouth daily for 90 days, Disp: 90 tablet, Rfl: 3    terazosin (HYTRIN) 2 mg capsule, Take 1 capsule by mouth daily, Disp: 90 capsule, Rfl: 3  Allergies   Allergen Reactions    Ace Inhibitors     Bee Venom        Labs:  Appointment on 08/03/2018   Component Date Value    Sodium 08/03/2018 140     Potassium 08/03/2018 4 0     Chloride 08/03/2018 104     CO2 08/03/2018 31     ANION GAP 08/03/2018 5     BUN 08/03/2018 22     Creatinine 08/03/2018 1 35*    Glucose, Fasting 08/03/2018 145*    Calcium 08/03/2018 9 4     AST 08/03/2018 22     ALT 08/03/2018 25     Alkaline Phosphatase 08/03/2018 55     Total Protein 08/03/2018 7 2     Albumin 08/03/2018 3 7     Total Bilirubin 08/03/2018 0 25     eGFR 08/03/2018 48    Office Visit on 07/30/2018   Component Date Value     COLOR,UA 07/30/2018 yellow      CLARITY,UA 07/30/2018 clear      SPECIFIC GRAVITY,UA 07/30/2018 1 010      PH,UA 07/30/2018 6 0     LEUKOCYTE ESTERASE,UA 07/30/2018 n      NITRITE,UA 07/30/2018 n     GLUCOSE, UA 07/30/2018 n      KETONES,UA 07/30/2018 n      BILIRUBIN,UA 07/30/2018 n      BLOOD,UA 07/30/2018 n     SL AMB POCT URINE PROTEIN 07/30/2018 n     SL AMB POCT UROBILINOGEN 07/30/2018 0 2     POST-VOID RESIDUAL VOLUM* 07/30/2018 178    Office Visit on 07/25/2018   Component Date Value    Hemoglobin A1C 07/25/2018 7 3    Office Visit on 06/20/2018   Component Date Value     COLOR,UA 06/20/2018 yellow      CLARITY,UA 06/20/2018 clear      SPECIFIC GRAVITY,UA 06/20/2018 1 010      PH,UA 06/20/2018 5 5     LEUKOCYTE ESTERASE,UA 06/20/2018 neg      NITRITE,UA 06/20/2018 neg     GLUCOSE, UA 06/20/2018 neg      KETONES,UA 06/20/2018 neg      BILIRUBIN,UA 06/20/2018 neg      BLOOD,UA 06/20/2018 neg     SL AMB POCT URINE PROTEIN 06/20/2018 neg     SL AMB POCT UROBILINOGEN 06/20/2018 0 2    Admission on 06/16/2018, Discharged on 06/16/2018   Component Date Value    Color, UA 06/16/2018 Bloody     Clarity, UA 06/16/2018 Clear     pH, UA 06/16/2018 6 5     Leukocytes, UA 06/16/2018 Negative     Nitrite, UA 06/16/2018 Negative     Protein, UA 06/16/2018 30 (1+)*    Glucose, UA 06/16/2018 Negative     Ketones, UA 06/16/2018 Negative     Urobilinogen, UA 06/16/2018 1  0     Bilirubin, UA 06/16/2018 Negative     Blood, UA 06/16/2018 Large*    Specific [place], UA 06/16/2018 1 015     RBC, UA 06/16/2018 Innumerable*    WBC, UA 06/16/2018 None Seen     Epithelial Cells 06/16/2018 Occasional     Bacteria, UA 06/16/2018 Occasional      Imaging: No results found  Review of Systems:  Review of Systems   Constitutional: Positive for unexpected weight change  Negative for activity change and fatigue  HENT: Negative for nosebleeds  Eyes: Negative for visual disturbance  Respiratory: Negative for apnea, shortness of breath, wheezing and stridor  Cardiovascular: Negative for chest pain, palpitations and leg swelling  Gastrointestinal: Negative for blood in stool  Endocrine: Negative for cold intolerance  Genitourinary: Negative for hematuria  Musculoskeletal: Positive for arthralgias and gait problem  Negative for myalgias  Skin: Negative for pallor  Allergic/Immunologic: Negative for immunocompromised state  Neurological: Negative for dizziness and syncope  Hematological: Bruises/bleeds easily  Psychiatric/Behavioral: Negative for sleep disturbance  The patient is nervous/anxious  Physical Exam:  Physical Exam   Constitutional: He appears well-developed and well-nourished  No distress  Eyes: No scleral icterus  Neck: No JVD present  No tracheal deviation present  Cardiovascular: Normal rate, normal heart sounds and intact distal pulses  Exam reveals no gallop and no friction rub  No murmur heard  Frequent extra systoles   Pulmonary/Chest: Effort normal and breath sounds normal  No respiratory distress  He has no wheezes  He has no rales  He exhibits no tenderness  Neurological: He is alert  Skin: Skin is warm and dry  He is not diaphoretic  Discussion/Summary:  Chronic pericardial effusion, unclear etiology    First diagnosed over a year ago, follow-up echocardiogram most recently earlier this year revealed mild-to-moderate in severity, improved  No cardiac tamponade  Left ventricular systolic function is low normal   There is mild tricuspid insufficiency with estimated normal pulmonary pressures suggested by Doppler criteria  Stress test last year was negative for ischemia  Continue clinical regimen

## 2018-09-04 DIAGNOSIS — F41.1 GENERALIZED ANXIETY DISORDER: Primary | ICD-10-CM

## 2018-09-04 DIAGNOSIS — I10 ESSENTIAL HYPERTENSION: ICD-10-CM

## 2018-09-04 RX ORDER — LOSARTAN POTASSIUM 50 MG/1
TABLET ORAL
Qty: 90 TABLET | Refills: 3 | Status: SHIPPED | OUTPATIENT
Start: 2018-09-04 | End: 2019-03-06 | Stop reason: HOSPADM

## 2018-09-04 RX ORDER — AMLODIPINE BESYLATE 5 MG/1
TABLET ORAL
Qty: 90 TABLET | Refills: 3 | Status: SHIPPED | OUTPATIENT
Start: 2018-09-04 | End: 2019-03-06 | Stop reason: HOSPADM

## 2018-09-04 RX ORDER — PAROXETINE 30 MG/1
TABLET, FILM COATED ORAL
Qty: 90 TABLET | Refills: 0 | Status: SHIPPED | OUTPATIENT
Start: 2018-09-04 | End: 2018-12-01 | Stop reason: SDUPTHER

## 2018-09-05 DIAGNOSIS — E11.8 TYPE 2 DIABETES MELLITUS WITH COMPLICATION, UNSPECIFIED WHETHER LONG TERM INSULIN USE: Primary | ICD-10-CM

## 2018-09-05 NOTE — TELEPHONE ENCOUNTER
Aiyana Garcia is also requesting an order for lancets - I do not see any on his med list if a new order could be sent as well   He uses the [employer] device testing 2 times a day

## 2018-09-09 DIAGNOSIS — E11.9 TYPE 2 DIABETES MELLITUS WITHOUT COMPLICATION, UNSPECIFIED WHETHER LONG TERM INSULIN USE (HCC): Primary | ICD-10-CM

## 2018-09-09 RX ORDER — LANCETS
EACH MISCELLANEOUS
Qty: 100 EACH | Refills: 3 | Status: SHIPPED | OUTPATIENT
Start: 2018-09-09 | End: 2018-12-06 | Stop reason: SDUPTHER

## 2018-09-21 ENCOUNTER — OFFICE VISIT (OUTPATIENT)
Dept: FAMILY MEDICINE CLINIC | Facility: CLINIC | Age: 83
End: 2018-09-21
Payer: COMMERCIAL

## 2018-09-21 VITALS
HEIGHT: 63 IN | OXYGEN SATURATION: 94 % | BODY MASS INDEX: 24.98 KG/M2 | TEMPERATURE: 97.6 F | SYSTOLIC BLOOD PRESSURE: 112 MMHG | WEIGHT: 141 LBS | HEART RATE: 88 BPM | DIASTOLIC BLOOD PRESSURE: 52 MMHG

## 2018-09-21 DIAGNOSIS — R10.9 ABDOMINAL PAIN, UNSPECIFIED ABDOMINAL LOCATION: Primary | ICD-10-CM

## 2018-09-21 DIAGNOSIS — Z23 NEEDS FLU SHOT: ICD-10-CM

## 2018-09-21 DIAGNOSIS — R63.4 WEIGHT LOSS: ICD-10-CM

## 2018-09-21 LAB
SL AMB  POCT GLUCOSE, UA: NORMAL
SL AMB LEUKOCYTE ESTERASE,UA: NEGATIVE
SL AMB POCT BILIRUBIN,UA: NEGATIVE
SL AMB POCT BLOOD,UA: NORMAL
SL AMB POCT CLARITY,UA: CLEAR
SL AMB POCT COLOR,UA: YELLOW
SL AMB POCT KETONES,UA: NEGATIVE
SL AMB POCT NITRITE,UA: NEGATIVE
SL AMB POCT PH,UA: 5
SL AMB POCT SPECIFIC GRAVITY,UA: 1.02
SL AMB POCT URINE PROTEIN: NORMAL
SL AMB POCT UROBILINOGEN: NORMAL

## 2018-09-21 PROCEDURE — G0008 ADMIN INFLUENZA VIRUS VAC: HCPCS

## 2018-09-21 PROCEDURE — 90662 IIV NO PRSV INCREASED AG IM: CPT

## 2018-09-21 PROCEDURE — 99214 OFFICE O/P EST MOD 30 MIN: CPT | Performed by: FAMILY MEDICINE

## 2018-09-21 PROCEDURE — 87086 URINE CULTURE/COLONY COUNT: CPT | Performed by: FAMILY MEDICINE

## 2018-09-21 PROCEDURE — 81002 URINALYSIS NONAUTO W/O SCOPE: CPT | Performed by: FAMILY MEDICINE

## 2018-09-21 PROCEDURE — 3008F BODY MASS INDEX DOCD: CPT | Performed by: FAMILY MEDICINE

## 2018-09-21 RX ORDER — PANTOPRAZOLE SODIUM 20 MG/1
20 TABLET, DELAYED RELEASE ORAL DAILY
Qty: 30 TABLET | Refills: 0 | Status: SHIPPED | OUTPATIENT
Start: 2018-09-21 | End: 2018-10-18 | Stop reason: SDUPTHER

## 2018-09-21 NOTE — PROGRESS NOTES
Assessment/Plan:  Considering unexpected weight change and abdominal symptoms recommended CT of the abdomen  Urine today appears normal   Await urine culture results  Return to office in 1 week for recheck  Recommend consideration for blood testing if continued weight loss  Counseling for flu vaccine given today  Risks and benefits reviewed  No problem-specific Assessment & Plan notes found for this encounter  Diagnoses and all orders for this visit:    Abdominal pain, unspecified abdominal location  -     POCT urine dip  -     Urine culture; Future  -     Urine culture  -     CT abdomen pelvis wo contrast; Future  -     pantoprazole (PROTONIX) 20 mg tablet; Take 1 tablet (20 mg total) by mouth daily For one month then discontinue    Weight loss    Needs flu shot  -     influenza vaccine, [id number], high-dose, PF 0 5 mL, for patients 65 yr+ (FLUZONE HIGH-DOSE)          Subjective:      Patient ID: Nina Kelly  is a 80 y o  male  Patient with abdominal discomfort and bloating over the last few weeks  He has had an 8 lb weight loss over that time  He notes bowels are slower  Occasional belching  He has abdominal burning sensation at times without fever  No chest pain or shortness of breath  Abdominal Pain         The following portions of the patient's history were reviewed and updated as appropriate: allergies, current medications, past family history, past medical history, past social history, past surgical history and problem list     Review of Systems   Constitutional: Positive for unexpected weight change  HENT: Negative  Eyes: Negative  Respiratory: Negative  Cardiovascular: Negative  Gastrointestinal: Positive for abdominal pain  Endocrine: Negative  Genitourinary: Negative  Musculoskeletal: Negative  Skin: Negative  Allergic/Immunologic: Negative  Neurological: Negative  Hematological: Negative  Psychiatric/Behavioral: Negative  Objective:      /52 (BP Location: Left arm, Patient Position: Sitting, Cuff Size: Standard)   Pulse 88   Temp 97 6 °F (36 4 °C) (Tympanic)   Ht 5' 2 8" (1 595 m)   Wt 64 kg (141 lb)   SpO2 94%   BMI 25 14 kg/m²          Physical Exam   Constitutional: He is oriented to person, place, and time  He appears well-developed and well-nourished  HENT:   Head: Normocephalic and atraumatic  Right Ear: External ear normal  Tympanic membrane is not erythematous and not bulging  Left Ear: External ear normal  Tympanic membrane is not erythematous and not bulging  Nose: Nose normal    Mouth/Throat: Oropharynx is clear and moist and mucous membranes are normal  No oral lesions  No oropharyngeal exudate  Eyes: Conjunctivae and EOM are normal  Right eye exhibits no discharge  Left eye exhibits no discharge  No scleral icterus  Neck: Normal range of motion  Neck supple  No thyromegaly present  Cardiovascular: Normal rate, regular rhythm and normal heart sounds  Exam reveals no gallop and no friction rub  No murmur heard  Pulmonary/Chest: Effort normal  No respiratory distress  He has no wheezes  He has no rales  He exhibits no tenderness  Abdominal: Soft  Bowel sounds are normal  He exhibits no distension and no mass  There is no tenderness  There is no rebound and no guarding  Musculoskeletal: Normal range of motion  He exhibits no edema, tenderness or deformity  Lymphadenopathy:     He has no cervical adenopathy  Neurological: He is alert and oriented to person, place, and time  He has normal reflexes  No cranial nerve deficit  He exhibits normal muscle tone  Coordination normal    Skin: Skin is warm and dry  No rash noted  No erythema  No pallor  Psychiatric: He has a normal mood and affect  His behavior is normal    Vitals reviewed

## 2018-09-22 LAB — BACTERIA UR CULT: NORMAL

## 2018-09-25 ENCOUNTER — TELEPHONE (OUTPATIENT)
Dept: FAMILY MEDICINE CLINIC | Facility: CLINIC | Age: 83
End: 2018-09-25

## 2018-09-25 NOTE — TELEPHONE ENCOUNTER
Spoke with Evelia To at [address] [place] [address] Road of Abdomen + Pelvis  (CPT [id number]) is approved  Auth number [id number] expires 10/25/2018  Left detailed message on machine for pt to make him aware  Test scheduled for 09/27/2018

## 2018-09-27 ENCOUNTER — HOSPITAL ENCOUNTER (OUTPATIENT)
Dept: CT IMAGING | Facility: HOSPITAL | Age: 83
Discharge: HOME | End: 2018-09-27
Payer: COMMERCIAL

## 2018-09-27 DIAGNOSIS — R10.9 ABDOMINAL PAIN, UNSPECIFIED ABDOMINAL LOCATION: ICD-10-CM

## 2018-09-27 DIAGNOSIS — J44.9 CHRONIC OBSTRUCTIVE PULMONARY DISEASE, UNSPECIFIED COPD TYPE (HCC): ICD-10-CM

## 2018-09-27 PROCEDURE — 74176 CT ABD & PELVIS W/O CONTRAST: CPT

## 2018-10-03 DIAGNOSIS — E11.9 TYPE 2 DIABETES MELLITUS WITHOUT COMPLICATION (HCC): ICD-10-CM

## 2018-10-03 NOTE — TELEPHONE ENCOUNTER
Patients wife called stating that [facility] is supposed to be getting some testing done and they have not heard from us as to when the appointment is  She is not certain as to what exactly needs to be scheduled or where  Please call them with the information

## 2018-10-18 DIAGNOSIS — R10.9 ABDOMINAL PAIN, UNSPECIFIED ABDOMINAL LOCATION: ICD-10-CM

## 2018-10-19 RX ORDER — PANTOPRAZOLE SODIUM 20 MG/1
20 TABLET, DELAYED RELEASE ORAL DAILY
Qty: 30 TABLET | Refills: 0 | Status: SHIPPED | OUTPATIENT
Start: 2018-10-19 | End: 2019-02-18

## 2018-10-28 DIAGNOSIS — E11.8 TYPE 2 DIABETES MELLITUS WITH COMPLICATION, UNSPECIFIED WHETHER LONG TERM INSULIN USE: ICD-10-CM

## 2018-10-30 RX ORDER — CALCIUM CITRATE/VITAMIN D3 200MG-6.25
TABLET ORAL
Qty: 300 EACH | Refills: 0 | Status: SHIPPED | OUTPATIENT
Start: 2018-10-30 | End: 2019-04-29 | Stop reason: SDUPTHER

## 2018-11-06 DIAGNOSIS — F41.1 GENERALIZED ANXIETY DISORDER: ICD-10-CM

## 2018-11-06 RX ORDER — ALPRAZOLAM 0.25 MG/1
0.25 TABLET ORAL EVERY 8 HOURS PRN
Qty: 270 TABLET | Refills: 0 | OUTPATIENT
Start: 2018-11-06 | End: 2020-04-06 | Stop reason: SDUPTHER

## 2018-11-15 ENCOUNTER — OFFICE VISIT (OUTPATIENT)
Dept: FAMILY MEDICINE CLINIC | Facility: CLINIC | Age: 83
End: 2018-11-15
Payer: COMMERCIAL

## 2018-11-15 VITALS
BODY MASS INDEX: 24.45 KG/M2 | WEIGHT: 138 LBS | HEART RATE: 87 BPM | SYSTOLIC BLOOD PRESSURE: 110 MMHG | OXYGEN SATURATION: 93 % | DIASTOLIC BLOOD PRESSURE: 50 MMHG | HEIGHT: 63 IN | TEMPERATURE: 96.5 F

## 2018-11-15 DIAGNOSIS — R31.9 HEMATURIA, UNSPECIFIED TYPE: Primary | ICD-10-CM

## 2018-11-15 DIAGNOSIS — N40.1 BENIGN PROSTATIC HYPERPLASIA WITH LOWER URINARY TRACT SYMPTOMS, SYMPTOM DETAILS UNSPECIFIED: ICD-10-CM

## 2018-11-15 LAB
LEFT EYE DIABETIC RETINOPATHY: NORMAL
RIGHT EYE DIABETIC RETINOPATHY: NORMAL
SEVERITY (EYE EXAM): NORMAL
SL AMB  POCT GLUCOSE, UA: NORMAL
SL AMB LEUKOCYTE ESTERASE,UA: ABNORMAL
SL AMB POCT BILIRUBIN,UA: ABNORMAL
SL AMB POCT BLOOD,UA: ABNORMAL
SL AMB POCT CLARITY,UA: ABNORMAL
SL AMB POCT COLOR,UA: ABNORMAL
SL AMB POCT KETONES,UA: ABNORMAL
SL AMB POCT NITRITE,UA: ABNORMAL
SL AMB POCT PH,UA: 5
SL AMB POCT SPECIFIC GRAVITY,UA: 1.01
SL AMB POCT URINE PROTEIN: ABNORMAL
SL AMB POCT UROBILINOGEN: NORMAL

## 2018-11-15 PROCEDURE — 99214 OFFICE O/P EST MOD 30 MIN: CPT | Performed by: FAMILY MEDICINE

## 2018-11-15 PROCEDURE — 81002 URINALYSIS NONAUTO W/O SCOPE: CPT | Performed by: FAMILY MEDICINE

## 2018-11-15 RX ORDER — ROSUVASTATIN CALCIUM 10 MG/1
10 TABLET, COATED ORAL
Refills: 3 | COMMUNITY
Start: 2018-11-03 | End: 2019-02-18

## 2018-11-15 RX ORDER — OMEPRAZOLE 40 MG/1
CAPSULE, DELAYED RELEASE ORAL DAILY
Refills: 3 | COMMUNITY
Start: 2018-09-04 | End: 2018-11-15

## 2018-11-15 RX ORDER — OMEPRAZOLE 40 MG/1
CAPSULE, DELAYED RELEASE ORAL
Refills: 3 | COMMUNITY
Start: 2018-11-11 | End: 2019-05-07 | Stop reason: SDUPTHER

## 2018-11-15 RX ORDER — OMEPRAZOLE 40 MG/1
CAPSULE, DELAYED RELEASE ORAL
COMMUNITY
Start: 2018-11-11 | End: 2018-11-15

## 2018-11-15 NOTE — PROGRESS NOTES
Assessment/Plan:  Patient with no significant findings on physical exam today  Hematuria may be secondary to migrating renal calculi  He is on Eliquis  Recommend follow-up with his urologist   Recommend holding Eliquis for 4 days  If hematuria resolves he may restart this provided no subsequent gross hematuria occurs  We discussed possibly repeating CT scan but this may not be very valuable considering he recently had a CT scan done 6 weeks ago  See results  He will call if any new or worsening symptoms in the interim  No problem-specific Assessment & Plan notes found for this encounter  Diagnoses and all orders for this visit:    Hematuria, unspecified type  -     POCT urine dip    Benign prostatic hyperplasia with lower urinary tract symptoms, symptom details unspecified    Other orders  -     omeprazole (PriLOSEC) 40 MG capsule; TAKE DAILY AS DIRECTED   -     Discontinue: omeprazole (PriLOSEC) 40 MG capsule;   -     rosuvastatin (CRESTOR) 10 MG tablet; Take 10 mg by mouth daily at bedtime  -     Discontinue: omeprazole (PriLOSEC) 40 MG capsule; daily As directed  -     CT renal protocol; Future          Subjective:      Patient ID: Miguelangel Park  is a 80 y o  male  Patient is here for 3 day history of hematuria with urination  He denies any back pain or abdominal pain  He did have a CT of the abdomen and pelvis done approximately 6 weeks ago that showed a cyst to the kidney as well as renal calculus present  Patient denies any fevers  He currently is on Eliquis  No changes in dose  He states he has been compliant with the medication  Blood in Urine         The following portions of the patient's history were reviewed and updated as appropriate: allergies, current medications, past family history, past medical history, past social history, past surgical history and problem list     Review of Systems   Constitutional: Negative  HENT: Negative  Eyes: Negative      Respiratory: Negative  Cardiovascular: Negative  Gastrointestinal: Negative  Endocrine: Negative  Genitourinary: Positive for hematuria  Musculoskeletal: Negative  Skin: Negative  Allergic/Immunologic: Negative  Neurological: Negative  Hematological: Negative  Psychiatric/Behavioral: Negative  Objective:      /50 (BP Location: Left arm, Patient Position: Sitting, Cuff Size: Adult)   Pulse 87   Temp (!) 96 5 °F (35 8 °C)   Ht 5' 2 99" (1 6 m)   Wt 62 6 kg (138 lb)   SpO2 93%   BMI 24 45 kg/m²          Physical Exam   Constitutional: He is oriented to person, place, and time  He appears well-developed and well-nourished  HENT:   Head: Normocephalic and atraumatic  Right Ear: External ear normal  Tympanic membrane is not erythematous and not bulging  Left Ear: External ear normal  Tympanic membrane is not erythematous and not bulging  Nose: Nose normal    Mouth/Throat: Oropharynx is clear and moist and mucous membranes are normal  No oral lesions  No oropharyngeal exudate  Eyes: Conjunctivae and EOM are normal  Right eye exhibits no discharge  Left eye exhibits no discharge  No scleral icterus  Neck: Normal range of motion  Neck supple  No thyromegaly present  Cardiovascular: Normal rate, regular rhythm and normal heart sounds  Exam reveals no gallop and no friction rub  No murmur heard  Pulmonary/Chest: Effort normal  No respiratory distress  He has no wheezes  He has no rales  He exhibits no tenderness  Abdominal: Soft  Bowel sounds are normal  He exhibits no distension and no mass  There is no tenderness  There is no rebound and no guarding  Musculoskeletal: Normal range of motion  He exhibits no edema, tenderness or deformity  Lymphadenopathy:     He has no cervical adenopathy  Neurological: He is alert and oriented to person, place, and time  He has normal reflexes  No cranial nerve deficit  He exhibits normal muscle tone   Coordination normal    Skin: Skin is warm and dry  No rash noted  No erythema  No pallor  Psychiatric: He has a normal mood and affect  His behavior is normal    Vitals reviewed

## 2018-11-21 ENCOUNTER — OFFICE VISIT (OUTPATIENT)
Dept: UROLOGY | Facility: MEDICAL CENTER | Age: 83
End: 2018-11-21
Payer: COMMERCIAL

## 2018-11-21 VITALS
BODY MASS INDEX: 24.45 KG/M2 | WEIGHT: 138 LBS | HEART RATE: 84 BPM | HEIGHT: 63 IN | DIASTOLIC BLOOD PRESSURE: 60 MMHG | SYSTOLIC BLOOD PRESSURE: 120 MMHG

## 2018-11-21 DIAGNOSIS — R31.0 GROSS HEMATURIA: ICD-10-CM

## 2018-11-21 DIAGNOSIS — N40.1 BENIGN PROSTATIC HYPERPLASIA WITH URINARY FREQUENCY: ICD-10-CM

## 2018-11-21 DIAGNOSIS — R35.0 BENIGN PROSTATIC HYPERPLASIA WITH URINARY FREQUENCY: ICD-10-CM

## 2018-11-21 DIAGNOSIS — R31.29 MICROSCOPIC HEMATURIA: Primary | ICD-10-CM

## 2018-11-21 LAB
SL AMB  POCT GLUCOSE, UA: ABNORMAL
SL AMB LEUKOCYTE ESTERASE,UA: ABNORMAL
SL AMB POCT BILIRUBIN,UA: ABNORMAL
SL AMB POCT BLOOD,UA: ABNORMAL
SL AMB POCT CLARITY,UA: CLEAR
SL AMB POCT COLOR,UA: YELLOW
SL AMB POCT KETONES,UA: ABNORMAL
SL AMB POCT NITRITE,UA: ABNORMAL
SL AMB POCT PH,UA: 5.5
SL AMB POCT SPECIFIC GRAVITY,UA: 1.02
SL AMB POCT URINE PROTEIN: ABNORMAL
SL AMB POCT UROBILINOGEN: 0.2

## 2018-11-21 PROCEDURE — 99214 OFFICE O/P EST MOD 30 MIN: CPT | Performed by: UROLOGY

## 2018-11-21 PROCEDURE — 81003 URINALYSIS AUTO W/O SCOPE: CPT | Performed by: UROLOGY

## 2018-11-21 PROCEDURE — 4040F PNEUMOC VAC/ADMIN/RCVD: CPT | Performed by: UROLOGY

## 2018-11-21 RX ORDER — FINASTERIDE 5 MG/1
5 TABLET, FILM COATED ORAL DAILY
Qty: 90 TABLET | Refills: 3 | Status: SHIPPED | OUTPATIENT
Start: 2018-11-21 | End: 2019-11-09 | Stop reason: SDUPTHER

## 2018-11-21 NOTE — PROGRESS NOTES
Assessment/Plan:   1 gross hematuria-recurrent, on Eliquis  If this recurs Eliquis will be held until it spontaneously resolved  2 BPH with bladder outlet obstructive symptoms and urinary urgency-responding to alpha blockade  Will add finasteride because of recurrent gross hematuria and large prostate size  3 pivvhijyraqptya-[id number]-[id number] intervention in this 80year-old at this time     Diagnoses and all orders for this visit:    Microscopic hematuria  -     Cancel: POCT urine dip auto non-scope  -     POCT urine dip auto non-scope    Gross hematuria    Benign prostatic hyperplasia with urinary frequency  -     finasteride (PROSCAR) 5 mg tablet; Take 1 tablet (5 mg total) by mouth daily          Subjective:     Patient ID: Anitha Arredondo  is a 80 y o  male  Chief complaint:  Recurrent gross hematuria    History of present illness:  59-year-old male well known to me with BPH and urinary urgency and frequency treated with alpha blockade successfully  The patient is on Eliquis and has noted intermittent spontaneous gross hematuria often triggered by physical activity  He has gone through CT of the abdomen and pelvis and renal ultrasound this year and no upper tract lesions consistent with something that would cause hematuria are noted  He does have some nephrolithiasis within cysts and he has known prostatic enlargement  The patient was recently evaluated with CT as well as cystoscopy with resolution of his hematuria however he had another episode the other day and was treated by his PCP with spontaneous resolution after cessation of the Eliquis for 3 days  He is back on Eliquis at this point and does have microscopic blood in his urine but has no visible or gross amount of blood in the urine  The patient and I did discuss finasteride and this will be started        Review of Systems   Constitutional: Negative  HENT: Negative  Eyes: Negative  Respiratory: Negative  Cardiovascular: Negative  Gastrointestinal: Negative  Genitourinary:        See HPI   Musculoskeletal: Negative  Neurological: Negative  Psychiatric/Behavioral: Negative  Objective:     Physical Exam   Constitutional: He is oriented to person, place, and time  He appears well-nourished  No distress  HENT:   Head: Atraumatic  Eyes: EOM are normal    Neck: Neck supple  Pulmonary/Chest: Effort normal and breath sounds normal  No respiratory distress  Abdominal: Soft  He exhibits no distension  Genitourinary: Penis normal    Neurological: He is alert and oriented to person, place, and time  Psychiatric: He has a normal mood and affect  His behavior is normal  Judgment and thought content normal    Vitals reviewed

## 2018-11-21 NOTE — LETTER
November 21, 2018     Anita Jaramillo, [address] [place] [address]    Patient: Aline Novak  YOB: 1935   Date of Visit: 11/21/2018       Dear Dr Rebecca Sharma: Thank you for referring Funmilayo Hernadez to me for evaluation  Below are my notes for this consultation  If you have questions, please do not hesitate to call me  I look forward to following your patient along with you  Sincerely,        Celena Eagle MD        CC: No Recipients  Celena Eagle MD  11/21/2018  4:38 PM  Sign at close encounter  Assessment/Plan:   1 gross hematuria-recurrent, on Eliquis  If this recurs Eliquis will be held until it spontaneously resolved  2 BPH with bladder outlet obstructive symptoms and urinary urgency-responding to alpha blockade  Will add finasteride because of recurrent gross hematuria and large prostate size  3 eavmqvrjoxdammy-[id number]-pt intervention in this 80year-old at this time     Diagnoses and all orders for this visit:    Microscopic hematuria  -     Cancel: POCT urine dip auto non-scope  -     POCT urine dip auto non-scope    Gross hematuria    Benign prostatic hyperplasia with urinary frequency  -     finasteride (PROSCAR) 5 mg tablet; Take 1 tablet (5 mg total) by mouth daily          Subjective:     Patient ID: Aline Novak  is a 80 y o  male  Chief complaint:  Recurrent gross hematuria    History of present illness:  51-year-old male well known to me with BPH and urinary urgency and frequency treated with alpha blockade successfully  The patient is on Eliquis and has noted intermittent spontaneous gross hematuria often triggered by physical activity  He has gone through CT of the abdomen and pelvis and renal ultrasound this year and no upper tract lesions consistent with something that would cause hematuria are noted  He does have some nephrolithiasis within cysts and he has known prostatic enlargement    The patient was recently evaluated with CT as well as cystoscopy with resolution of his hematuria however he had another episode the other day and was treated by his PCP with spontaneous resolution after cessation of the Eliquis for 3 days  He is back on Eliquis at this point and does have microscopic blood in his urine but has no visible or gross amount of blood in the urine  The patient and I did discuss finasteride and this will be started        Review of Systems   Constitutional: Negative  HENT: Negative  Eyes: Negative  Respiratory: Negative  Cardiovascular: Negative  Gastrointestinal: Negative  Genitourinary:        See HPI   Musculoskeletal: Negative  Neurological: Negative  Psychiatric/Behavioral: Negative  Objective:     Physical Exam   Constitutional: He is oriented to person, place, and time  He appears well-nourished  No distress  HENT:   Head: Atraumatic  Eyes: EOM are normal    Neck: Neck supple  Pulmonary/Chest: Effort normal and breath sounds normal  No respiratory distress  Abdominal: Soft  He exhibits no distension  Genitourinary: Penis normal    Neurological: He is alert and oriented to person, place, and time  Psychiatric: He has a normal mood and affect  His behavior is normal  Judgment and thought content normal    Vitals reviewed

## 2018-12-01 DIAGNOSIS — F41.1 GENERALIZED ANXIETY DISORDER: ICD-10-CM

## 2018-12-01 RX ORDER — PAROXETINE 30 MG/1
TABLET, FILM COATED ORAL
Qty: 90 TABLET | Refills: 0 | Status: ON HOLD | OUTPATIENT
Start: 2018-12-01 | End: 2019-02-28 | Stop reason: SDUPTHER

## 2018-12-03 DIAGNOSIS — Z79.4 TYPE 2 DIABETES MELLITUS WITHOUT COMPLICATION, WITH LONG-TERM CURRENT USE OF INSULIN (HCC): ICD-10-CM

## 2018-12-03 DIAGNOSIS — E11.9 TYPE 2 DIABETES MELLITUS WITHOUT COMPLICATION, WITH LONG-TERM CURRENT USE OF INSULIN (HCC): ICD-10-CM

## 2018-12-06 DIAGNOSIS — E11.9 TYPE 2 DIABETES MELLITUS WITHOUT COMPLICATION, UNSPECIFIED WHETHER LONG TERM INSULIN USE (HCC): ICD-10-CM

## 2018-12-06 RX ORDER — LANCETS
EACH MISCELLANEOUS
Qty: 100 EACH | Refills: 2 | Status: SHIPPED | OUTPATIENT
Start: 2018-12-06 | End: 2019-04-26 | Stop reason: SDUPTHER

## 2018-12-10 DIAGNOSIS — J44.9 CHRONIC OBSTRUCTIVE PULMONARY DISEASE, UNSPECIFIED COPD TYPE (HCC): ICD-10-CM

## 2018-12-19 DIAGNOSIS — E11.9 TYPE 2 DIABETES MELLITUS WITHOUT COMPLICATION, UNSPECIFIED WHETHER LONG TERM INSULIN USE (HCC): Primary | ICD-10-CM

## 2018-12-21 ENCOUNTER — OFFICE VISIT (OUTPATIENT)
Dept: GASTROENTEROLOGY | Facility: MEDICAL CENTER | Age: 83
End: 2018-12-21
Payer: COMMERCIAL

## 2018-12-21 VITALS
WEIGHT: 142 LBS | HEIGHT: 62 IN | HEART RATE: 56 BPM | DIASTOLIC BLOOD PRESSURE: 68 MMHG | TEMPERATURE: 97.8 F | BODY MASS INDEX: 26.13 KG/M2 | SYSTOLIC BLOOD PRESSURE: 120 MMHG

## 2018-12-21 DIAGNOSIS — R93.5 ABNORMAL CT OF THE ABDOMEN: ICD-10-CM

## 2018-12-21 DIAGNOSIS — I51.7 CARDIOMEGALY: ICD-10-CM

## 2018-12-21 DIAGNOSIS — R63.4 WEIGHT LOSS: ICD-10-CM

## 2018-12-21 DIAGNOSIS — K86.2 PANCREATIC CYST: Primary | ICD-10-CM

## 2018-12-21 DIAGNOSIS — K21.9 GASTROESOPHAGEAL REFLUX DISEASE WITHOUT ESOPHAGITIS: ICD-10-CM

## 2018-12-21 PROCEDURE — 99204 OFFICE O/P NEW MOD 45 MIN: CPT | Performed by: INTERNAL MEDICINE

## 2018-12-21 NOTE — PATIENT INSTRUCTIONS
Colonoscopy and EGD scheduled with Dr Malissa Good (Per pt and Dr Severo Alex)  at [facility] sent to pharmacy/ instructions given to patient   PT is diabetic and on Eliquis

## 2018-12-21 NOTE — PROGRESS NOTES
[facility] - Outpatient Consultation  Matthew Khan  80 y o  male MRN: [id number]  Encounter: [id number]          ASSESSMENT AND PLAN:      1  Weight loss-he has unintentional weight loss associated with upper abdominal symptoms  EGD is planned for further evaluation especially in setting of abnormal CT  His last colonoscopy was over 10 years ago colonoscopy plan for weight loss as well  - Na Sulfate-K Sulfate-Mg Sulf (SUPREP BOWEL PREP KIT) 17 5-3 13-1 6 GM/177ML SOLN; Take 177 mL by mouth once for 1 dose  Dispense: 2 Bottle; Refill: 0  - Case request operating room: ESOPHAGOGASTRODUODENOSCOPY (EGD), COLONOSCOPY; Standing    2  Abnormal CT of the abdomen-gastric thickening  CT scan thickening is of concern for malignancy due to his age other etiology may be gastritis versus peptic ulcer disease  EGD for further evaluation  3  Pancreatic cyst-no evidence on recent CT scan of pancreatic cyst likely a benign cyst which was identified previously  Discuss holding off any further evaluation at this time since/CT did not show any obvious cysts  4  Gastroesophageal reflux disease without esophagitis  5  Cardiomegaly-he will need cardiac clearance the as he is on anticoagulation and also has history of pericardial effusion  Fortunately pericardial effusion decreasing in size from moderate to mild  He also clinically has no active symptoms  Reviewed echo from February 2018 which shows decrease in pericardial effusion  ______________________________________________________________________    HPI:      He is a 29-year-old male presents here for unintentional weight loss associated with upper GI symptoms  He has lost 20 lb over the last 5 months  He recent CT scan for workup of abdominal pain showed gastric thickening  Abdominal pain is improved with PPI therapy  Abdominal pain was previously in the epigastric region without any radiation    Pain was associated oral intake of food   He has not had EGD evaluation the past   He currently still has symptoms of belching  Weight loss is of concern as it is unintentional   Last colonoscopy was over 10 years ago  He does have history of pericardial effusion and he is on anticoagulation  Clinically he is overall stable without any shortness of breath or chest pain  Anticoagulation is for history of atrial fibrillation and DVTs  He does have history of pancreatic cyst but this was not well visualized on recent CT scan of abdomen  REVIEW OF SYSTEMS:    CONSTITUTIONAL: Denies any fever, chills, rigors, and weight loss  HEENT: No earache or tinnitus  Denies hearing loss or visual disturbances  CARDIOVASCULAR: No chest pain or palpitations  RESPIRATORY: Denies any cough, hemoptysis, shortness of breath or dyspnea on exertion  GASTROINTESTINAL: As noted in the History of Present Illness  GENITOURINARY: No problems with urination  Denies any hematuria or dysuria  NEUROLOGIC: No dizziness or vertigo, denies headaches  MUSCULOSKELETAL: Denies any muscle or joint pain  SKIN: Denies skin rashes or itching  ENDOCRINE: Denies excessive thirst  Denies intolerance to heat or cold  PSYCHOSOCIAL: Denies depression or anxiety  Denies any recent memory loss         Historical Information   Past Medical History:   Diagnosis Date    Anxiety     Diabetes ([facility] [address] )     Hypercholesterolemia     Hypertension      Past Surgical History:   Procedure Laterality Date    APPENDECTOMY      CATARACT EXTRACTION      PROSTATE BIOPSY       Social History   History   Alcohol Use No     History   Drug Use No     History   Smoking Status    Former Smoker   Smokeless Tobacco    Never Used     Comment: current non-smoker, per [employer]     Family History   Problem Relation Age of Onset    Diabetes Mother     Diabetes Father     Prostate cancer Brother     Diabetes Brother     Pulmonary embolism Sister        Meds/Allergies       Current Outpatient Prescriptions:     ALPRAZolam (XANAX) 0 25 mg tablet    amLODIPine (NORVASC) 5 mg tablet    apixaban (ELIQUIS) 5 mg    Ascorbic Acid (V-R VITAMIN C CR PO)    Cholecalciferol (VITAMIN D) 2000 units CAPS    DULERA 100-5 MCG/ACT inhaler    EPINEPHrine (EPIPEN) 0 3 mg/0 3 mL SOAJ    finasteride (PROSCAR) 5 mg tablet    furosemide (LASIX) 40 mg tablet    glucose blood (ONE TOUCH ULTRA TEST) test strip    Lancets (ONETOUCH ULTRASOFT) lancets    losartan (COZAAR) 50 mg tablet    metFORMIN (GLUCOPHAGE) 500 mg tablet    Na Sulfate-K Sulfate-Mg Sulf (SUPREP BOWEL PREP KIT) 17 5-3 13-1 6 GM/177ML SOLN    Omega-3 Fatty Acids (FISH OIL) 1200 MG CAPS    omeprazole (PriLOSEC) 40 MG capsule    pantoprazole (PROTONIX) 20 mg tablet    PARoxetine (PAXIL) 30 mg tablet    potassium chloride (MICRO-K) 10 MEQ CR capsule    rosuvastatin (CRESTOR) 10 MG tablet    rosuvastatin (CRESTOR) 40 MG tablet    terazosin (HYTRIN) 2 mg capsule    TRUE METRIX BLOOD GLUCOSE TEST test strip    Allergies   Allergen Reactions    Ace Inhibitors     Bee Venom            Objective     Blood pressure 120/68, pulse 56, temperature 97 8 °F (36 6 °C), temperature source Tympanic, height 5' 2" (1 575 m), weight 64 4 kg (142 lb)  Body mass index is 25 97 kg/m²  PHYSICAL EXAM:      General Appearance:   Alert, cooperative, no distress   HEENT:   Normocephalic, atraumatic, anicteric      Neck:  Supple, symmetrical, trachea midline   Lungs:   Clear to auscultation bilaterally; no rales, rhonchi or wheezing; respirations unlabored    Heart[de-identified]   Regular rate and rhythm; no murmur, rub, or gallop     Abdomen:   Soft, non-tender, non-distended; normal bowel sounds; no masses, no organomegaly    Genitalia:   Deferred    Rectal:   Deferred    Extremities:  No cyanosis, clubbing or edema    Pulses:  2+ and symmetric    Skin:  No jaundice, rashes, or lesions    Lymph nodes:  No palpable cervical lymphadenopathy        Lab Results:   No visits with results within 1 Day(s) from this visit  Latest known visit with results is:   Office Visit on 11/21/2018   Component Date Value     COLOR,UA 11/21/2018 YELLOW     CLARITY,UA 11/21/2018 CLEAR     SPECIFIC GRAVITY,UA 11/21/2018 1 025      PH,UA 11/21/2018 5 5     LEUKOCYTE ESTERASE,UA 11/21/2018 NEG     NITRITE,UA 11/21/2018 NEG     GLUCOSE, UA 11/21/2018 NEG     KETONES,UA 11/21/2018 NEG     BILIRUBIN,UA 11/21/2018 NEG     BLOOD,UA 11/21/2018 LARGE     POCT URINE PROTEIN 11/21/2018 100 MG/DL     SL AMB POCT UROBILINOGEN 11/21/2018 0 2          Radiology Results:   No results found

## 2018-12-21 NOTE — LETTER
December 21, 2018     Mabel Sanchez, [address] [place] [address]    Patient: Joanna Matias  YOB: 1935   Date of Visit: 12/21/2018       Dear Dr Chema Almazan: Thank you for referring Vidhya Gomez to me for evaluation  Below are my notes for this consultation  If you have questions, please do not hesitate to call me  I look forward to following your patient along with you           Sincerely,        Brody Dahl MD        CC: No Recipients

## 2019-01-03 ENCOUNTER — TELEPHONE (OUTPATIENT)
Dept: GASTROENTEROLOGY | Facility: MEDICAL CENTER | Age: 84
End: 2019-01-03

## 2019-01-03 NOTE — TELEPHONE ENCOUNTER
Called spoke to pt he is aware to hold his eliquis for 3 days prior to procedure on 1/24/19 with dr King Connolly

## 2019-01-22 DIAGNOSIS — E11.9 TYPE 2 DIABETES MELLITUS WITHOUT COMPLICATION, UNSPECIFIED WHETHER LONG TERM INSULIN USE (HCC): ICD-10-CM

## 2019-01-22 RX ORDER — LANCETS
EACH MISCELLANEOUS
Qty: 100 EACH | Refills: 3 | Status: SHIPPED | OUTPATIENT
Start: 2019-01-22 | End: 2019-06-16 | Stop reason: SDUPTHER

## 2019-01-23 ENCOUNTER — ANESTHESIA EVENT (OUTPATIENT)
Dept: GASTROENTEROLOGY | Facility: HOSPITAL | Age: 84
End: 2019-01-23
Payer: COMMERCIAL

## 2019-01-23 DIAGNOSIS — I10 ESSENTIAL HYPERTENSION: ICD-10-CM

## 2019-01-23 RX ORDER — POTASSIUM CHLORIDE 750 MG/1
CAPSULE, EXTENDED RELEASE ORAL
Qty: 90 CAPSULE | Refills: 1 | Status: ON HOLD | OUTPATIENT
Start: 2019-01-23 | End: 2019-02-23

## 2019-01-24 ENCOUNTER — ANESTHESIA (OUTPATIENT)
Dept: GASTROENTEROLOGY | Facility: HOSPITAL | Age: 84
End: 2019-01-24
Payer: COMMERCIAL

## 2019-01-24 ENCOUNTER — HOSPITAL ENCOUNTER (OUTPATIENT)
Facility: HOSPITAL | Age: 84
Setting detail: OUTPATIENT SURGERY
Discharge: HOME | End: 2019-01-24
Attending: INTERNAL MEDICINE | Admitting: INTERNAL MEDICINE
Payer: COMMERCIAL

## 2019-01-24 VITALS
HEIGHT: 62 IN | DIASTOLIC BLOOD PRESSURE: 57 MMHG | BODY MASS INDEX: 24.84 KG/M2 | WEIGHT: 135 LBS | SYSTOLIC BLOOD PRESSURE: 115 MMHG | RESPIRATION RATE: 16 BRPM | OXYGEN SATURATION: 97 % | TEMPERATURE: 98.1 F | HEART RATE: 88 BPM

## 2019-01-24 DIAGNOSIS — R63.4 WEIGHT LOSS: ICD-10-CM

## 2019-01-24 DIAGNOSIS — R93.5 ABNORMAL CT OF THE ABDOMEN: ICD-10-CM

## 2019-01-24 LAB — GLUCOSE SERPL-MCNC: 157 MG/DL (ref 65–140)

## 2019-01-24 PROCEDURE — 88305 TISSUE EXAM BY PATHOLOGIST: CPT | Performed by: PATHOLOGY

## 2019-01-24 PROCEDURE — 45385 COLONOSCOPY W/LESION REMOVAL: CPT | Performed by: INTERNAL MEDICINE

## 2019-01-24 PROCEDURE — 43239 EGD BIOPSY SINGLE/MULTIPLE: CPT | Performed by: INTERNAL MEDICINE

## 2019-01-24 PROCEDURE — 82948 REAGENT STRIP/BLOOD GLUCOSE: CPT

## 2019-01-24 RX ORDER — SODIUM CHLORIDE 9 MG/ML
125 INJECTION, SOLUTION INTRAVENOUS CONTINUOUS
Status: DISCONTINUED | OUTPATIENT
Start: 2019-01-24 | End: 2019-01-24 | Stop reason: HOSPADM

## 2019-01-24 RX ORDER — PROPOFOL 10 MG/ML
INJECTION, EMULSION INTRAVENOUS AS NEEDED
Status: DISCONTINUED | OUTPATIENT
Start: 2019-01-24 | End: 2019-01-24 | Stop reason: SURG

## 2019-01-24 RX ORDER — EPHEDRINE SULFATE 50 MG/ML
INJECTION, SOLUTION INTRAVENOUS AS NEEDED
Status: DISCONTINUED | OUTPATIENT
Start: 2019-01-24 | End: 2019-01-24 | Stop reason: SURG

## 2019-01-24 RX ORDER — ONDANSETRON 2 MG/ML
4 INJECTION INTRAMUSCULAR; INTRAVENOUS ONCE AS NEEDED
Status: DISCONTINUED | OUTPATIENT
Start: 2019-01-24 | End: 2019-01-24 | Stop reason: HOSPADM

## 2019-01-24 RX ADMIN — PROPOFOL 50 MG: 10 INJECTION, EMULSION INTRAVENOUS at 09:52

## 2019-01-24 RX ADMIN — EPHEDRINE SULFATE 10 MG: 50 INJECTION, SOLUTION INTRAMUSCULAR; INTRAVENOUS; SUBCUTANEOUS at 10:19

## 2019-01-24 RX ADMIN — SODIUM CHLORIDE: 0.9 INJECTION, SOLUTION INTRAVENOUS at 09:50

## 2019-01-24 RX ADMIN — PROPOFOL 50 MG: 10 INJECTION, EMULSION INTRAVENOUS at 09:57

## 2019-01-24 RX ADMIN — EPHEDRINE SULFATE 10 MG: 50 INJECTION, SOLUTION INTRAMUSCULAR; INTRAVENOUS; SUBCUTANEOUS at 10:11

## 2019-01-24 RX ADMIN — PROPOFOL 50 MG: 10 INJECTION, EMULSION INTRAVENOUS at 10:02

## 2019-01-24 RX ADMIN — PROPOFOL 50 MG: 10 INJECTION, EMULSION INTRAVENOUS at 10:06

## 2019-01-24 RX ADMIN — PROPOFOL 50 MG: 10 INJECTION, EMULSION INTRAVENOUS at 10:16

## 2019-01-24 RX ADMIN — PROPOFOL 50 MG: 10 INJECTION, EMULSION INTRAVENOUS at 10:11

## 2019-01-24 NOTE — ANESTHESIA PREPROCEDURE EVALUATION
Review of Systems/Medical History  Patient summary reviewed    No history of anesthetic complications     Cardiovascular  EKG reviewed, Exercise tolerance (METS): >4,  Hyperlipidemia, Hypertension controlled, Dysrhythmias , atrial fibrillation, DVT  Comment: Echo 2018: EF 50%, no regional wall motion abnormalities  , PE,  Pulmonary  Smoker ex-smoker  ,        GI/Hepatic    GERD well controlled, Bowel prep       Negative  ROS        Endo/Other  Diabetes well controlled type 2 Oral agent,      GYN       Hematology  Negative hematology ROS   Coagulation disorder (last took elliquis on monday) currently taking oral anticoagulants,    Musculoskeletal  Negative musculoskeletal ROS        Neurology  Negative neurology ROS      Psychology   Anxiety,              Physical Exam    Airway    Mallampati score: II  TM Distance: >3 FB  Neck ROM: full     Dental   upper dentures and lower dentures,     Cardiovascular  Rhythm: regular, Rate: normal,     Pulmonary  Breath sounds clear to auscultation,     Other Findings        Anesthesia Plan  ASA Score- 3     Anesthesia Type- IV sedation with anesthesia with ASA Monitors  Additional Monitors:   Airway Plan:         Plan Factors-  Patient did not smoke on day of surgery  Induction- intravenous  Postoperative Plan-     Informed Consent- Anesthetic plan and risks discussed with patient  I personally reviewed this patient with the CRNA  Discussed and agreed on the Anesthesia Plan with the CRNA  Caridad Pace

## 2019-01-24 NOTE — DISCHARGE INSTRUCTIONS
Upper Endoscopy   WHAT YOU NEED TO KNOW:   An upper endoscopy is also called an upper gastrointestinal (GI) endoscopy, or an esophagogastroduodenoscopy (EGD)  You may feel bloated, gassy, or have some abdominal discomfort after your procedure  Your throat may be sore for 24 to 36 hours  You may burp or pass gas from air that is still inside your body  DISCHARGE INSTRUCTIONS:   Call 911 if:   · You have sudden chest pain or trouble breathing  Seek care immediately if:   · You feel dizzy or faint  · You have trouble swallowing  · You have severe throat pain  · Your bowel movements are very dark or black  · Your abdomen is hard and firm and you have severe pain  · You vomit blood  Contact your healthcare provider if:   · You feel full or bloated and cannot burp or pass gas  · You have not had a bowel movement for 3 days after your procedure  · You have neck pain  · You have a fever or chills  · You have nausea or are vomiting  · You have a rash or hives  · You have questions or concerns about your endoscopy  Relieve a sore throat:  Suck on throat lozenges or crushed ice  Gargle with a small amount of warm salt water  Mix 1 teaspoon of salt and 1 cup of warm water to make salt water  Relieve gas and discomfort from bloating:  Lie on your right side with a heating pad on your abdomen  Take short walks to help pass gas  Eat small meals until bloating is relieved  Rest after your procedure:  Do not drive or make important decisions until the day after your procedure  Return to your normal activity as directed  You can usually return to work the day after your procedure  Follow up with your healthcare provider as directed:  Write down your questions so you remember to ask them during your visits  © 2017 [employer]  Information is for End User's use only and may not be sold, redistributed or otherwise used for commercial purposes   All illustrations and images included in [employer] are the copyrighted property of [employer]  or Castro Hastings  The above information is an  only  It is not intended as medical advice for individual conditions or treatments  Talk to your doctor, nurse or pharmacist before following any medical regimen to see if it is safe and effective for you  Hiatal Hernia   WHAT YOU NEED TO KNOW:   A hiatal hernia is a condition that causes part of your stomach to bulge through the hiatus (small opening) in your diaphragm  The part of the stomach may move up and down, or it may get trapped above the diaphragm  DISCHARGE INSTRUCTIONS:   Seek care immediately if:   · You have severe abdominal pain  · You try to vomit but nothing comes out (retching)  · You have severe chest pain and sudden trouble breathing  · Your bowel movements are black or bloody  · Your vomit looks like coffee grounds or has blood in it  Contact your healthcare provider if:   · Your symptoms are getting worse  · You have nausea, and you are vomiting  · You are losing weight without trying  · You have questions or concerns about your condition or care  Medicines:   · Medicines  may be given to relieve heartburn symptoms  These medicines help to decrease or block stomach acid  You may also be given medicines that help to tighten the esophageal sphincter  · Take your medicine as directed  Contact your healthcare provider if you think your medicine is not helping or if you have side effects  Tell him or her if you are allergic to any medicine  Keep a list of the medicines, vitamins, and herbs you take  Include the amounts, and when and why you take them  Bring the list or the pill bottles to follow-up visits  Carry your medicine list with you in case of an emergency  Follow up with your healthcare provider as directed:  Write down your questions so you remember to ask them during your visits    Self care:   · Avoid foods that make your symptoms worse  These may include spicy foods, fruit juices, alcohol, caffeine, chocolate, and mint  · Eat several small meals during the day  Small meals give your stomach less food to digest     · Avoid lying down and bending forward after you eat  Do not eat meals 2 to 3 hours before bedtime  This decreases your risk for reflux  · Maintain a healthy weight  If you are overweight, weight loss may help relieve your symptoms  · Sleep with your head elevated  at least 6 inches  · Do not smoke  Smoking can increase your symptoms of heartburn  © 2017 [address] [facility] Information is for End User's use only and may not be sold, redistributed or otherwise used for commercial purposes  All illustrations and images included in CareNotes® are the copyrighted property of A D A [employer] , [employer]  or Castro Hastings  The above information is an  only  It is not intended as medical advice for individual conditions or treatments  Talk to your doctor, nurse or pharmacist before following any medical regimen to see if it is safe and effective for you  Gastritis   WHAT YOU NEED TO KNOW:   Gastritis is inflammation or irritation of the lining of your stomach  DISCHARGE INSTRUCTIONS:   Call 911 for any of the following:   · You develop chest pain or shortness of breath  Seek care immediately if:   · You vomit blood  · You have black or bloody bowel movements  · You have severe stomach or back pain  Contact your healthcare provider if:   · You have a fever  · You have new or worsening symptoms, even after treatment  · You have questions or concerns about your condition or care  Medicines:   · Medicines  may be given to help treat a bacterial infection or decrease stomach acid  · Take your medicine as directed  Contact your healthcare provider if you think your medicine is not helping or if you have side effects   Tell him or her if you are allergic to any medicine  Keep a list of the medicines, vitamins, and herbs you take  Include the amounts, and when and why you take them  Bring the list or the pill bottles to follow-up visits  Carry your medicine list with you in case of an emergency  Manage or prevent gastritis:   · Do not smoke  Nicotine and other chemicals in cigarettes and cigars can make your symptoms worse and cause lung damage  Ask your healthcare provider for information if you currently smoke and need help to quit  E-cigarettes or smokeless tobacco still contain nicotine  Talk to your healthcare provider before you use these products  · Do not drink alcohol  Alcohol can prevent healing and make your gastritis worse  Talk to your healthcare provider if you need help to stop drinking  · Do not take NSAIDs or aspirin unless directed  These and similar medicines can cause irritation  If your healthcare provider says it is okay to take NSAIDs, take them with food  · Do not eat foods that cause irritation  Foods such as oranges and salsa can cause burning or pain  Eat a variety of healthy foods  Examples include fruits (not citrus), vegetables, low-fat dairy products, beans, whole-grain breads, and lean meats and fish  Try to eat small meals, and drink water with your meals  Do not eat for at least 3 hours before you go to bed  · Find ways to relax and decrease stress  Stress can increase stomach acid and make gastritis worse  Activities such as yoga, meditation, or listening to music can help you relax  Spend time with friends, or do things you enjoy  Follow up with your healthcare provider as directed: You may need ongoing tests or treatment, or referral to a gastroenterologist  Write down your questions so you remember to ask them during your visits  © 2017 [address] [employer] Information is for End User's use only and may not be sold, redistributed or otherwise used for commercial purposes   All illustrations and images included in CareNotes® are the copyrighted property of A D A M , Inc  or Castro Hastings  The above information is an  only  It is not intended as medical advice for individual conditions or treatments  Talk to your doctor, nurse or pharmacist before following any medical regimen to see if it is safe and effective for you  Duodenitis   WHAT YOU NEED TO KNOW:   Duodenitis is inflammation or irritation of the duodenum  The duodenum is the first part of the small intestine, just below your stomach  DISCHARGE INSTRUCTIONS:   Seek care immediately if:   · You have severe abdominal pain  · You have bloody or black, tarry bowel movements or vomit  Contact your healthcare provider if:   · You have new or worsening symptoms, even after treatment  · You have questions or concerns about your condition or care  Medicines:   · Medicines  may be given to treat an infection or to control stomach acid  · Take your medicine as directed  Contact your healthcare provider if you think your medicine is not helping or if you have side effects  Tell him or her if you are allergic to any medicine  Keep a list of the medicines, vitamins, and herbs you take  Include the amounts, and when and why you take them  Bring the list or the pill bottles to follow-up visits  Carry your medicine list with you in case of an emergency  Follow up with your healthcare provider as directed:  Write down your questions so you remember to ask them during your visits  Do not smoke:  Nicotine and other chemicals in cigarettes and cigars can cause blood vessel and lung damage  Ask your healthcare provider for information if you currently smoke and need help to quit  E-cigarettes or smokeless tobacco still contain nicotine  Talk to your healthcare provider before you use these products  Limit or do not drink alcohol:  Alcohol can make your duodenitis worse   Talk to your healthcare provider if you need help to stop drinking  Keep batteries and similar objects out of the reach of children:  Babies often put items in their mouths to explore them  Button batteries are easy to swallow and can cause serious damage  Keep the battery covers of electronic devices such as remote controls taped closed  Store all batteries and toxic materials where children cannot get to them  Use childproof locks to keep children away from dangerous materials  Manage or prevent duodenitis:   · Do not take NSAIDs or aspirin unless directed  These and similar medicines can cause irritation  It may help to take NSAIDs with food, but you may not be able to take them at all  · Do not eat foods that cause irritation  Foods such as oranges and salsa can cause burning or pain  Eat a variety of healthy foods  Examples include fruits (not citrus), vegetables, low-fat dairy products, beans, whole-grain breads, and lean meats and fish  Try to eat small meals, and drink water with your meals  Do not eat for at least 3 hours before you go to bed  © 2017 [address] [facility] Information is for End User's use only and may not be sold, redistributed or otherwise used for commercial purposes  All illustrations and images included in CareNotes® are the copyrighted property of A D A M , Inc  or Castro Hastings  The above information is an  only  It is not intended as medical advice for individual conditions or treatments  Talk to your doctor, nurse or pharmacist before following any medical regimen to see if it is safe and effective for you  Colonoscopy   WHAT YOU NEED TO KNOW:   A colonoscopy is a procedure to examine the inside of your colon (intestine) with a scope  Polyps or tissue growths may have been removed during your colonoscopy  It is normal to feel bloated and to have some abdominal discomfort  You should be passing gas   If you have hemorrhoids or you had polyps removed, you may have a small amount of bleeding  DISCHARGE INSTRUCTIONS:   Seek care immediately if:   · You have a large amount of bright red blood in your bowel movements  · Your abdomen is hard and firm and you have severe pain  · You have sudden trouble breathing  Contact your healthcare provider if:   · You develop a rash or hives  · You have a fever within 24 hours of your procedure       · You have not had a bowel movement for 3 days after your procedure  · You have questions or concerns about your condition or care  Activity:   · Do not lift, strain, or run  for 3 days after your procedure  · Rest after your procedure  You have been given medicine to relax you  Do not  drive or make important decisions until the day after your procedure  Return to your normal activity as directed  · Relieve gas and discomfort from bloating  by lying on your right side with a heating pad on your abdomen  You may need to take short walks to help the gas move out  Eat small meals until bloating is relieved  If you had polyps removed: For 7 days after your procedure:  · Do not  take aspirin  · Do not  go on long car rides  Follow up with your healthcare provider as directed:  Write down your questions so you remember to ask them during your visits  © 2017 [address] is for End User's use only and may not be sold, redistributed or otherwise used for commercial purposes  All illustrations and images included in CareNotes® are the copyrighted property of A [employer] M , Inc  or Castro Hastings  The above information is an  only  It is not intended as medical advice for individual conditions or treatments  Talk to your doctor, nurse or pharmacist before following any medical regimen to see if it is safe and effective for you  Diverticulosis   WHAT YOU NEED TO KNOW:   Diverticulosis is a condition that causes small pockets called diverticula to form in your intestine   These pockets make it difficult for bowel movements to pass through your digestive system  DISCHARGE INSTRUCTIONS:   Seek care immediately if:   · You have severe pain on the left side of your lower abdomen  · Your bowel movements are bright or dark red  Contact your healthcare provider if:   · You have a fever and chills  · You feel dizzy or lightheaded  · You have nausea, or you are vomiting  · You have a change in your bowel movements  · You have questions or concerns about your condition or care  Medicines:   · Medicines  to soften your bowel movements may be given  You may also need medicines to treat symptoms such as bloating and pain  · Take your medicine as directed  Contact your healthcare provider if you think your medicine is not helping or if you have side effects  Tell him or her if you are allergic to any medicine  Keep a list of the medicines, vitamins, and herbs you take  Include the amounts, and when and why you take them  Bring the list or the pill bottles to follow-up visits  Carry your medicine list with you in case of an emergency  Self-care: The goal of treatment is to manage any symptoms you have and prevent other problems such as diverticulitis  Diverticulitis is swelling or infection of the diverticula  Your healthcare provider may recommend any of the following:  · Eat a variety of high-fiber foods  High-fiber foods help you have regular bowel movements  High-fiber foods include cooked beans, fruits, vegetables, and some cereals  Most adults need 25 to 35 grams of fiber each day  Your healthcare provider may recommend that you have more  Ask your healthcare provider how much fiber you need  Increase fiber slowly  You may have abdominal discomfort, bloating, and gas if you add fiber to your diet too quickly  You may need to take a fiber supplement if you are not getting enough fiber from food  · Drink liquids as directed    You may need to drink 2 to 3 liters (8 to 12 cups) of liquids every day  Ask your healthcare provider how much liquid to drink each day and which liquids are best for you  · Apply heat  on your abdomen for 20 to 30 minutes every 2 hours for as many days as directed  Heat helps decrease pain and muscle spasms  Help prevent diverticulitis or other symptoms: The following may help decrease your risk for diverticulitis or symptoms, such as bleeding  Talk to your provider about these or other things you can do to prevent problems that may occur with diverticulosis  · Exercise regularly  Ask your healthcare provider about the best exercise plan for you  Exercise can help you have regular bowel movements  Get 30 minutes of exercise on most days of the week  · Maintain a healthy weight  Ask your healthcare provider how much you should weigh  Ask him or her to help you create a weight loss plan if you are overweight  · Do not smoke  Nicotine and other chemicals in cigarettes increase your risk for diverticulitis  Ask your healthcare provider for information if you currently smoke and need help to quit  E-cigarettes or smokeless tobacco still contain nicotine  Talk to your healthcare provider before you use these products  · Ask your healthcare provider if it is safe to take NSAIDs  NSAIDs may increase your risk of diverticulitis  Follow up with your healthcare provider as directed:  Write down your questions so you remember to ask them during your visits  © 2017 [address] [employer]  Information is for End User's use only and may not be sold, redistributed or otherwise used for commercial purposes  All illustrations and images included in CareNotes® are the copyrighted property of A [employer] , [employer]  or Castro Hastings  The above information is an  only  It is not intended as medical advice for individual conditions or treatments   Talk to your doctor, nurse or pharmacist before following any medical regimen to see if it is safe and effective for you  Colorectal Polyps   WHAT YOU NEED TO KNOW:   Colorectal polyps are small growths of tissue in the lining of the colon and rectum  Most polyps are hyperplastic polyps and are usually benign (noncancerous)  Certain types of polyps, called adenomatous polyps, may turn into cancer  DISCHARGE INSTRUCTIONS:   Follow up with your healthcare provider or gastroenterologist as directed: You may need to return for more tests, such as another colonoscopy  Write down your questions so you remember to ask them during your visits  Reduce your risk for colorectal polyps:   · Eat a variety of healthy foods:  Healthy foods include fruit, vegetables, whole-grain breads, low-fat dairy products, beans, lean meat, and fish  Ask if you need to be on a special diet  · Maintain a healthy weight:  Ask your healthcare provider if you need to lose weight and how much you need to lose  Ask for help with a weight loss program     · Exercise:  Begin to exercise slowly and do more as you get stronger  Talk with your healthcare provider before you start an exercise program      · Limit alcohol:  Your risk for polyps increases the more you drink  · Do not smoke: If you smoke, it is never too late to quit  Ask for information about how to stop  For support and more information:   · Raji Luna ([facility] [place] [place]  [address], [place] [address]  Phone: [phone]  Web Address: www digestive  niddk nih gov  Contact your healthcare provider or gastroenterologist if:   · You have a fever  · You have chills, a cough, or feel weak and achy  · You have abdominal pain that does not go away or gets worse after you take medicine  · Your abdomen is swollen  · You are losing weight without trying  · You have questions or concerns about your condition or care  Seek care immediately or call 911 if:   · You have sudden shortness of breath  · You have a fast heart rate, fast breathing, or are too dizzy to stand up  · You have severe abdominal pain  · You see blood in your bowel movement  © 2017 [address] [employer]  Information is for End User's use only and may not be sold, redistributed or otherwise used for commercial purposes  All illustrations and images included in CareNotes® are the copyrighted property of A D A M , Inc  or Castro Hastings  The above information is an  only  It is not intended as medical advice for individual conditions or treatments  Talk to your doctor, nurse or pharmacist before following any medical regimen to see if it is safe and effective for you  Hemorrhoids   WHAT YOU NEED TO KNOW:   Hemorrhoids are swollen blood vessels inside your rectum (internal hemorrhoids) or on your anus (external hemorrhoids)  Sometimes a hemorrhoid may prolapse  This means it extends out of your anus  DISCHARGE INSTRUCTIONS:   Seek care immediately if:   · You have severe pain in your rectum or around your anus  · You have severe pain in your abdomen and you are vomiting  · You have bleeding from your anus that soaks through your underwear  Contact your healthcare provider if:   · You have frequent and painful bowel movements  · Your hemorrhoid looks or feels more swollen than usual      · You do not have a bowel movement for 2 days or more  · You see or feel tissue coming through your anus  · You have questions or concerns about your condition or care  Medicines: You may  need any of the following:  · Medicine  may be given to decrease pain, swelling, and itching  The medicine may come as a pad, cream, or ointment  · Stool softeners  help treat or prevent constipation  · NSAIDs , such as ibuprofen, help decrease swelling, pain, and fever  NSAIDs can cause stomach bleeding or kidney problems in certain people   If you take blood thinner medicine, always ask your healthcare provider if NSAIDs are safe for you  Always read the medicine label and follow directions  · Take your medicine as directed  Contact your healthcare provider if you think your medicine is not helping or if you have side effects  Tell him or her if you are allergic to any medicine  Keep a list of the medicines, vitamins, and herbs you take  Include the amounts, and when and why you take them  Bring the list or the pill bottles to follow-up visits  Carry your medicine list with you in case of an emergency  Manage your symptoms:   · Apply ice on your anus for 15 to 20 minutes every hour or as directed  Use an ice pack, or put crushed ice in a plastic bag  Cover it with a towel before you apply it to your anus  Ice helps prevent tissue damage and decreases swelling and pain  · Take a sitz bath  Fill a bathtub with 4 to 6 inches of warm water  You may also use a sitz bath pan that fits inside a toilet bowl  Sit in the sitz bath for 15 minutes  Do this 3 times a day, and after each bowel movement  The warm water can help decrease pain and swelling  · Keep your anal area clean  Gently wash the area with warm water daily  Soap may irritate the area  After a bowel movement, wipe with moist towelettes or wet toilet paper  Dry toilet paper can irritate the area  Prevent hemorrhoids:   · Do not strain to have a bowel movement  Do not sit on the toilet too long  These actions can increase pressure on the tissues in your rectum and anus  · Drink plenty of liquids  Liquids can help prevent constipation  Ask how much liquid to drink each day and which liquids are best for you  · Eat a variety of high-fiber foods  Examples include fruits, vegetables, and whole grains  Ask your healthcare provider how much fiber you need each day  You may need to take a fiber supplement  · Exercise as directed  Exercise, such as walking, may make it easier to have a bowel movement   Ask your healthcare provider to help you create an exercise plan  · Do not have anal sex  Anal sex can weaken the skin around your rectum and anus  · Avoid heavy lifting  This can cause straining and increase your risk for another hemorrhoid  Follow up with your healthcare provider as directed:  Write down your questions so you remember to ask them during your visits  © 2017 [address] [employer] Information is for End User's use only and may not be sold, redistributed or otherwise used for commercial purposes  All illustrations and images included in CareNotes® are the copyrighted property of A D A [employer] , [employer]  or Castro Hastings  The above information is an  only  It is not intended as medical advice for individual conditions or treatments  Talk to your doctor, nurse or pharmacist before following any medical regimen to see if it is safe and effective for you

## 2019-01-24 NOTE — OP NOTE
OPERATIVE REPORT  PATIENT NAME: Palmer Cruz  :  1935  MRN: [id number]  Pt Location: [facility]    SURGERY DATE: 2019    Surgeon(s) and Role:     * Chandler Carbajal MD - Primary    Preop Diagnosis:  Weight loss [R63 4]  Abnormal CT of the abdomen [R93 5]    Post-Op Diagnosis Codes:     * Weight loss [R63 4]     * Abnormal CT of the abdomen [R93 5]    Procedure(s) (LRB):  ESOPHAGOGASTRODUODENOSCOPY (EGD) with bx (N/A)  COLONOSCOPY (N/A)    Specimen(s):  ID Type Source Tests Collected by Time Destination   1 : duodenal bx Tissue Small Bowel, NOS TISSUE EXAM Chandler Carbajal MD 2019 5190    2 : gastric bx Tissue Stomach TISSUE EXAM Chandler Carbajal MD 2019 0959    3 : G-E junction bx r/o Barretts Tissue Esophagus TISSUE EXAM Chandler Carbajal MD 2019 1000    4 : ascending colon polyp x 2 Tissue Large Intestine, Right/Ascending Colon TISSUE EXAM Chandler Carbajal MD 2019 1018        Estimated Blood Loss:   Minimal    COLONOSCOPY    PROCEDURE: Colonoscopy/ Polypectomy (Cold Snare)    INDICATIONS: Weight loss    POST-OP DIAGNOSIS: See the impression below    SEDATION: Monitored anesthesia care, check anesthesia records    PHYSICAL EXAM:    /67   Pulse 90   Temp 98 1 °F (36 7 °C) (Temporal)   Resp 16   Ht 5' 2" (1 575 m)   Wt 61 2 kg (135 lb)   SpO2 95%   BMI 24 69 kg/m²   Body mass index is 24 69 kg/m²  General: NAD  Heart: S1 & S2 normal, RRR  Lungs: CTA, No rales or rhonchi  Abdomen: Soft, nontender, nondistended, good bowel sounds    CONSENT:  Informed consent was obtained for the procedure, including sedation after explaining the risks and benefits of the procedure  Risks including but not limited to bleeding, perforation, infection, aspiration were discussed in detail  Also explained about less than 100%$ sensitivity with the exam and other alternatives  PREPARATION:   EKG tracing, pulse oximetry, blood pressure were monitored throughout the procedure    Patient was identified by myself both verbally and by visual inspection of ID band  DESCRIPTION:   Patient was placed in the left lateral decubitus position and was sedated with the above medication  Digital rectal examination was performed  The pediatric colonoscope was introduced in to the anal canal and advanced up to terminal ileum  A careful inspection was made as the colonoscope was withdrawn, including a retroflexed view of the rectum; findings and interventions are described below  Appropriate photodocumentation was obtained  The quality of the colonic preparation was good  FINDINGS:    Normal terminal ileum  Two semi pedunculated ascending colon polyps, the 1st was between 15-20 mm, the 2nd approximately 7-10 mm  Both were removed via cold snare polypectomies, with complete tissue retrieval   Severe sigmoid diverticulosis  Large internal hemorrhoids seen on rectal retroflexed views  IMPRESSIONS:      Two polyps, removed  Sigmoid diverticulosis  Internal hemorrhoids    RECOMMENDATIONS:    Follow up pathology  Continue workup per primary of weight loss  Resume regular diet  Discharge home  Restart xarelto tomorrow    COMPLICATIONS:  None; patient tolerated the procedure well      DISPOSITION: PACU           CONDITION: Stable            SIGNATURE: Ara Fields MD  DATE: January 24, 2019  TIME: 10:28 AM

## 2019-01-24 NOTE — OP NOTE
OPERATIVE REPORT  PATIENT NAME: Matthew Khan  :  1935  MRN: [id number]  Pt Location: [facility]    SURGERY DATE: 2019    Surgeon(s) and Role:     * Vee Simpson MD - Primary    Preop Diagnosis:  Weight loss [R63 4]  Abnormal CT of the abdomen [R93 5]    Post-Op Diagnosis Codes:     * Weight loss [R63 4]     * Abnormal CT of the abdomen [R93 5]    Procedure(s) (LRB):  ESOPHAGOGASTRODUODENOSCOPY (EGD) with bx (N/A)  COLONOSCOPY (N/A)    ESOPHAGOGASTRODUODENOSCOPY    PROCEDURE: EGD    SEDATION: Monitored anesthesia care, check anesthesia records    ASA Class: 3    INDICATIONS: weight loss, abnormal CT imaging - gastric wall thickening    CONSENT:  Informed consent was obtained for the procedure, including sedation after explaining the risks and benefits of the procedure  Risks including but not limited to bleeding, perforation, infection, and missed lesion  PREPARATION:   Telemetry, pulse oximetry, blood pressure were monitored throughout the procedure  Patient was identified by myself both verbally and by visual inspection of ID band  DESCRIPTION:   Patient was placed in the left lateral decubitus position and was sedated with the above medication  The gastroscope was introduced in to the oropharynx and the esophagus was intubated under direct visualization  Scope was passed down the esophagus up to 2nd part of the duodenum  A careful inspection was made as the gastroscope was withdrawn, including a retroflexed view of the stomach; findings and interventions are described below  FINDINGS:    #1  Esophagus- widely patent esophagus, normal esophagus  GE junction at 36 cm from the incisors  One cm tongue of pink squamocolumnar tissue seen extending proximally from the GE junction, biopsies taken to evaluate for Obrien's esophagus  #2  Stomach- mild antral predominant gastritis, biopsies taken  Hiatal hernia seen on GE junction retroflexed views      #3  Duodenum- mild duodenal bulb duodenitis, biopsies taken  Normal 2nd portion of duodenum  IMPRESSIONS:      No etiology for weight loss seen  RECOMMENDATIONS:     Follow up pathology  Proceed to colonoscopy as previously scheduled    COMPLICATIONS:  None; patient tolerated the procedure well      SPECIMENS:    ID Type Source Tests Collected by Time Destination   1 : duodenal bx Tissue Small Bowel, NOS TISSUE EXAM Sheela Andrea MD 1/24/2019 1588    2 : gastric bx Tissue Stomach TISSUE EXAM Sheela Andrea MD 1/24/2019 6909    3 : G-E junction bx r/o Barretts Tissue Esophagus TISSUE EXAM Sheela Andrea MD 1/24/2019 1000        ESTIMATED BLOOD LOSS:  Minimal      SIGNATURE: Sheela Andrea MD  DATE: January 24, 2019  TIME: 10:01 AM

## 2019-01-24 NOTE — DISCHARGE INSTR - AVS FIRST PAGE
OPERATIVE REPORT  PATIENT NAME: Archana Sanchez  :  1935  MRN: [id number]  Pt Location: [facility]    SURGERY DATE: 2019    Surgeon(s) and Role:     * Ara Fields MD - Primary    Preop Diagnosis:  Weight loss [R63 4]  Abnormal CT of the abdomen [R93 5]    Post-Op Diagnosis Codes:     * Weight loss [R63 4]     * Abnormal CT of the abdomen [R93 5]    Procedure(s) (LRB):  ESOPHAGOGASTRODUODENOSCOPY (EGD) with bx (N/A)  COLONOSCOPY (N/A)    ESOPHAGOGASTRODUODENOSCOPY    PROCEDURE: EGD    SEDATION: Monitored anesthesia care, check anesthesia records    ASA Class: 3    INDICATIONS: weight loss, abnormal CT imaging - gastric wall thickening    CONSENT:  Informed consent was obtained for the procedure, including sedation after explaining the risks and benefits of the procedure  Risks including but not limited to bleeding, perforation, infection, and missed lesion  PREPARATION:   Telemetry, pulse oximetry, blood pressure were monitored throughout the procedure  Patient was identified by myself both verbally and by visual inspection of ID band  DESCRIPTION:   Patient was placed in the left lateral decubitus position and was sedated with the above medication  The gastroscope was introduced in to the oropharynx and the esophagus was intubated under direct visualization  Scope was passed down the esophagus up to 2nd part of the duodenum  A careful inspection was made as the gastroscope was withdrawn, including a retroflexed view of the stomach; findings and interventions are described below  FINDINGS:    #1  Esophagus- widely patent esophagus, normal esophagus  GE junction at 36 cm from the incisors  One cm tongue of pink squamocolumnar tissue seen extending proximally from the GE junction, biopsies taken to evaluate for Obrien's esophagus  #2  Stomach- mild antral predominant gastritis, biopsies taken  Hiatal hernia seen on GE junction retroflexed views      #3  Duodenum- mild duodenal bulb duodenitis, biopsies taken  Normal 2nd portion of duodenum  IMPRESSIONS:      No etiology for weight loss seen  RECOMMENDATIONS:     Follow up pathology  Proceed to colonoscopy as previously scheduled    COMPLICATIONS:  None; patient tolerated the procedure well  SPECIMENS:    ID Type Source Tests Collected by Time Destination   1 : duodenal bx Tissue Small Bowel, NOS TISSUE EXAM Avis Centeno MD 2019 7065    2 : gastric bx Tissue Stomach TISSUE EXAM Avis Centeno MD 2019 0020    3 : G-E junction bx r/o Barretts Tissue Esophagus TISSUE EXAM Avis Centeno MD 2019 1000        ESTIMATED BLOOD LOSS:  Minimal      SIGNATURE: Avis Centeno MD  DATE: 2019  TIME: 10:01 AM      OPERATIVE REPORT  PATIENT NAME: Alba Salinas      :  1935  MRN: [id number]  Pt Location: [facility]    SURGERY DATE: 2019    Surgeon(s) and Role:     * Avis Centeno MD - Primary    Preop Diagnosis:  Weight loss [R63 4]  Abnormal CT of the abdomen [R93 5]    Post-Op Diagnosis Codes:     * Weight loss [R63 4]     * Abnormal CT of the abdomen [R93 5]    Procedure(s) (LRB):  ESOPHAGOGASTRODUODENOSCOPY (EGD) with bx (N/A)  COLONOSCOPY (N/A)    Specimen(s):  ID Type Source Tests Collected by Time Destination   1 : duodenal bx Tissue Small Bowel, NOS TISSUE EXAM Avis Centeno MD 2019 9883    2 : gastric bx Tissue Stomach TISSUE EXAM Avis Centeno MD 2019 0959    3 : G-E junction bx r/o Barretts Tissue Esophagus TISSUE EXAM Avis Centeno MD 2019 1000    4 : ascending colon polyp x 2 Tissue Large Intestine, Right/Ascending Colon TISSUE EXAM Avis Centeno MD 2019 1018        Estimated Blood Loss:   Minimal    COLONOSCOPY    PROCEDURE: Colonoscopy/ Polypectomy (Cold Snare)    INDICATIONS: Weight loss    POST-OP DIAGNOSIS: See the impression below    SEDATION: Monitored anesthesia care, check anesthesia records    PHYSICAL EXAM:    /67   Pulse 90   Temp 98 1 °F (36 7 °C) (Temporal)   Resp 16   Ht 5' 2" (1 575 m)   Wt 61 2 kg (135 lb)   SpO2 95%   BMI 24 69 kg/m²    Body mass index is 24 69 kg/m²  General: NAD  Heart: S1 & S2 normal, RRR  Lungs: CTA, No rales or rhonchi  Abdomen: Soft, nontender, nondistended, good bowel sounds    CONSENT:  Informed consent was obtained for the procedure, including sedation after explaining the risks and benefits of the procedure  Risks including but not limited to bleeding, perforation, infection, aspiration were discussed in detail  Also explained about less than 100%$ sensitivity with the exam and other alternatives  PREPARATION:   EKG tracing, pulse oximetry, blood pressure were monitored throughout the procedure  Patient was identified by myself both verbally and by visual inspection of ID band  DESCRIPTION:   Patient was placed in the left lateral decubitus position and was sedated with the above medication  Digital rectal examination was performed  The pediatric colonoscope was introduced in to the anal canal and advanced up to terminal ileum  A careful inspection was made as the colonoscope was withdrawn, including a retroflexed view of the rectum; findings and interventions are described below  Appropriate photodocumentation was obtained  The quality of the colonic preparation was good  FINDINGS:    Normal terminal ileum  Two semi pedunculated ascending colon polyps, the 1st was between 15-20 mm, the 2nd approximately 7-10 mm  Both were removed via cold snare polypectomies, with complete tissue retrieval   Severe sigmoid diverticulosis  Large internal hemorrhoids seen on rectal retroflexed views  IMPRESSIONS:      Two polyps, removed  Sigmoid diverticulosis  Internal hemorrhoids    RECOMMENDATIONS:    Follow up pathology  Continue workup per primary of weight loss  Resume regular diet  Discharge home  Restart xarelto tomorrow    COMPLICATIONS:  None; patient tolerated the procedure well      DISPOSITION: PACU           CONDITION: Stable            SIGNATURE: Luciano Seo MD  DATE: January 24, 2019  TIME: 10:28 AM

## 2019-02-18 ENCOUNTER — OFFICE VISIT (OUTPATIENT)
Dept: FAMILY MEDICINE CLINIC | Facility: CLINIC | Age: 84
End: 2019-02-18
Payer: COMMERCIAL

## 2019-02-18 VITALS
BODY MASS INDEX: 23.74 KG/M2 | RESPIRATION RATE: 16 BRPM | HEART RATE: 92 BPM | HEIGHT: 62 IN | DIASTOLIC BLOOD PRESSURE: 60 MMHG | TEMPERATURE: 96.6 F | WEIGHT: 129 LBS | SYSTOLIC BLOOD PRESSURE: 110 MMHG

## 2019-02-18 DIAGNOSIS — K21.9 GASTROESOPHAGEAL REFLUX DISEASE WITHOUT ESOPHAGITIS: Primary | ICD-10-CM

## 2019-02-18 DIAGNOSIS — R63.4 WEIGHT LOSS: ICD-10-CM

## 2019-02-18 DIAGNOSIS — F41.9 ANXIETY DISORDER, UNSPECIFIED TYPE: ICD-10-CM

## 2019-02-18 PROCEDURE — 99214 OFFICE O/P EST MOD 30 MIN: CPT | Performed by: FAMILY MEDICINE

## 2019-02-18 RX ORDER — ONDANSETRON 4 MG/1
4 TABLET, FILM COATED ORAL EVERY 8 HOURS PRN
Qty: 20 TABLET | Refills: 0 | Status: SHIPPED | OUTPATIENT
Start: 2019-02-18 | End: 2019-02-23

## 2019-02-23 ENCOUNTER — HOSPITAL ENCOUNTER (INPATIENT)
Facility: HOSPITAL | Age: 84
LOS: 11 days | Discharge: HOME HEALTH | DRG: 674 | End: 2019-03-06
Attending: EMERGENCY MEDICINE | Admitting: INTERNAL MEDICINE
Payer: COMMERCIAL

## 2019-02-23 ENCOUNTER — APPOINTMENT (INPATIENT)
Dept: RADIOLOGY | Facility: HOSPITAL | Age: 84
DRG: 674 | End: 2019-02-23
Payer: COMMERCIAL

## 2019-02-23 ENCOUNTER — APPOINTMENT (EMERGENCY)
Dept: CT IMAGING | Facility: HOSPITAL | Age: 84
DRG: 674 | End: 2019-02-23
Payer: COMMERCIAL

## 2019-02-23 ENCOUNTER — APPOINTMENT (EMERGENCY)
Dept: RADIOLOGY | Facility: HOSPITAL | Age: 84
DRG: 674 | End: 2019-02-23
Payer: COMMERCIAL

## 2019-02-23 DIAGNOSIS — I48.0 PAROXYSMAL ATRIAL FIBRILLATION (HCC): Chronic | ICD-10-CM

## 2019-02-23 DIAGNOSIS — N17.0 ACUTE RENAL FAILURE WITH TUBULAR NECROSIS (HCC): ICD-10-CM

## 2019-02-23 DIAGNOSIS — E87.2 METABOLIC ACIDOSIS: ICD-10-CM

## 2019-02-23 DIAGNOSIS — N19 RENAL FAILURE: Primary | ICD-10-CM

## 2019-02-23 DIAGNOSIS — I48.91 ATRIAL FIBRILLATION, UNSPECIFIED TYPE (HCC): ICD-10-CM

## 2019-02-23 DIAGNOSIS — F41.1 GENERALIZED ANXIETY DISORDER: ICD-10-CM

## 2019-02-23 DIAGNOSIS — I31.3 PERICARDIAL EFFUSION: ICD-10-CM

## 2019-02-23 DIAGNOSIS — E87.5 HYPERKALEMIA: ICD-10-CM

## 2019-02-23 DIAGNOSIS — E86.9 VOLUME DEPLETION: ICD-10-CM

## 2019-02-23 PROBLEM — N17.9 ACUTE KIDNEY INJURY (HCC): Status: ACTIVE | Noted: 2019-02-23

## 2019-02-23 LAB
ACETONE SERPL-MCNC: ABNORMAL MG/DL
ALBUMIN SERPL BCP-MCNC: 3.9 G/DL (ref 3.5–5)
ALP SERPL-CCNC: 69 U/L (ref 46–116)
ALT SERPL W P-5'-P-CCNC: 22 U/L (ref 12–78)
AMORPH URATE CRY URNS QL MICRO: ABNORMAL /HPF
ANION GAP SERPL CALCULATED.3IONS-SCNC: 24 MMOL/L (ref 4–13)
ANION GAP SERPL CALCULATED.3IONS-SCNC: 30 MMOL/L (ref 4–13)
ARTERIAL PATENCY WRIST A: YES
AST SERPL W P-5'-P-CCNC: 16 U/L (ref 5–45)
BACTERIA UR QL AUTO: ABNORMAL /HPF
BASE EX.OXY STD BLDV CALC-SCNC: 68 % (ref 60–80)
BASE EXCESS BLDA CALC-SCNC: -12.4 MMOL/L
BASE EXCESS BLDV CALC-SCNC: -16.4 MMOL/L
BASOPHILS # BLD AUTO: 0.02 THOUSANDS/ΜL (ref 0–0.1)
BASOPHILS NFR BLD AUTO: 0 % (ref 0–1)
BILIRUB SERPL-MCNC: 1.63 MG/DL (ref 0.2–1)
BILIRUB UR QL STRIP: NEGATIVE
BUN SERPL-MCNC: 113 MG/DL (ref 5–25)
BUN SERPL-MCNC: 114 MG/DL (ref 5–25)
BUN SERPL-MCNC: 117 MG/DL (ref 5–25)
BUN SERPL-MCNC: 121 MG/DL (ref 5–25)
CA-I BLD-SCNC: 1.04 MMOL/L (ref 1.12–1.32)
CALCIUM SERPL-MCNC: 8.4 MG/DL (ref 8.3–10.1)
CALCIUM SERPL-MCNC: 8.8 MG/DL (ref 8.3–10.1)
CALCIUM SERPL-MCNC: 9.3 MG/DL (ref 8.3–10.1)
CALCIUM SERPL-MCNC: 9.3 MG/DL (ref 8.3–10.1)
CHLORIDE SERPL-SCNC: 101 MMOL/L (ref 100–108)
CHLORIDE SERPL-SCNC: 97 MMOL/L (ref 100–108)
CHLORIDE SERPL-SCNC: 97 MMOL/L (ref 100–108)
CHLORIDE SERPL-SCNC: 99 MMOL/L (ref 100–108)
CLARITY UR: ABNORMAL
CO2 SERPL-SCNC: 13 MMOL/L (ref 21–32)
CO2 SERPL-SCNC: 15 MMOL/L (ref 21–32)
CO2 SERPL-SCNC: 18 MMOL/L (ref 21–32)
CO2 SERPL-SCNC: 18 MMOL/L (ref 21–32)
COLOR UR: YELLOW
CREAT SERPL-MCNC: 11.42 MG/DL (ref 0.6–1.3)
CREAT SERPL-MCNC: 11.46 MG/DL (ref 0.6–1.3)
CREAT SERPL-MCNC: 11.56 MG/DL (ref 0.6–1.3)
CREAT SERPL-MCNC: 11.6 MG/DL (ref 0.6–1.3)
EOSINOPHIL # BLD AUTO: 0 THOUSAND/ΜL (ref 0–0.61)
EOSINOPHIL NFR BLD AUTO: 0 % (ref 0–6)
ERYTHROCYTE [DISTWIDTH] IN BLOOD BY AUTOMATED COUNT: 11.9 % (ref 11.6–15.1)
EST. AVERAGE GLUCOSE BLD GHB EST-MCNC: 166 MG/DL
GFR SERPL CREATININE-BSD FRML MDRD: 4 ML/MIN/1.73SQ M
GLUCOSE SERPL-MCNC: 102 MG/DL (ref 65–140)
GLUCOSE SERPL-MCNC: 110 MG/DL (ref 65–140)
GLUCOSE SERPL-MCNC: 122 MG/DL (ref 65–140)
GLUCOSE SERPL-MCNC: 123 MG/DL (ref 65–140)
GLUCOSE SERPL-MCNC: 130 MG/DL (ref 65–140)
GLUCOSE SERPL-MCNC: 139 MG/DL (ref 65–140)
GLUCOSE UR STRIP-MCNC: NEGATIVE MG/DL
HBA1C MFR BLD: 7.4 % (ref 4.2–6.3)
HCO3 BLDA-SCNC: 14.5 MMOL/L (ref 22–28)
HCO3 BLDV-SCNC: 12.8 MMOL/L (ref 24–30)
HCT VFR BLD AUTO: 32 % (ref 36.5–49.3)
HGB BLD-MCNC: 10 G/DL (ref 12–17)
HGB UR QL STRIP.AUTO: ABNORMAL
IMM GRANULOCYTES # BLD AUTO: 0.04 THOUSAND/UL (ref 0–0.2)
IMM GRANULOCYTES NFR BLD AUTO: 1 % (ref 0–2)
KETONES UR STRIP-MCNC: ABNORMAL MG/DL
LEUKOCYTE ESTERASE UR QL STRIP: NEGATIVE
LIPASE SERPL-CCNC: 207 U/L (ref 73–393)
LYMPHOCYTES # BLD AUTO: 0.55 THOUSANDS/ΜL (ref 0.6–4.47)
LYMPHOCYTES NFR BLD AUTO: 8 % (ref 14–44)
MAGNESIUM SERPL-MCNC: 1.4 MG/DL (ref 1.6–2.6)
MCH RBC QN AUTO: 32.2 PG (ref 26.8–34.3)
MCHC RBC AUTO-ENTMCNC: 31.3 G/DL (ref 31.4–37.4)
MCV RBC AUTO: 103 FL (ref 82–98)
MONOCYTES # BLD AUTO: 0.3 THOUSAND/ΜL (ref 0.17–1.22)
MONOCYTES NFR BLD AUTO: 5 % (ref 4–12)
NEUTROPHILS # BLD AUTO: 5.67 THOUSANDS/ΜL (ref 1.85–7.62)
NEUTS SEG NFR BLD AUTO: 86 % (ref 43–75)
NITRITE UR QL STRIP: NEGATIVE
NON VENT ROOM AIR: 21 %
NON-SQ EPI CELLS URNS QL MICRO: ABNORMAL /HPF
NRBC BLD AUTO-RTO: 0 /100 WBCS
O2 CT BLDA-SCNC: 12 ML/DL (ref 16–23)
O2 CT BLDV-SCNC: 9.9 ML/DL
OXYHGB MFR BLDA: 91.2 % (ref 94–97)
PCO2 BLDA: 37.1 MM HG (ref 36–44)
PCO2 BLDV: 43.9 MM HG (ref 42–50)
PH BLDA: 7.21 [PH] (ref 7.35–7.45)
PH BLDV: 7.08 [PH] (ref 7.3–7.4)
PH UR STRIP.AUTO: 5 [PH] (ref 4.5–8)
PHOSPHATE SERPL-MCNC: 8.7 MG/DL (ref 2.3–4.1)
PLATELET # BLD AUTO: 211 THOUSANDS/UL (ref 149–390)
PMV BLD AUTO: 10 FL (ref 8.9–12.7)
PO2 BLDA: 72.6 MM HG (ref 75–129)
PO2 BLDV: 43.3 MM HG (ref 35–45)
POTASSIUM SERPL-SCNC: 5.5 MMOL/L (ref 3.5–5.3)
POTASSIUM SERPL-SCNC: 5.7 MMOL/L (ref 3.5–5.3)
POTASSIUM SERPL-SCNC: 6.8 MMOL/L (ref 3.5–5.3)
POTASSIUM SERPL-SCNC: 6.8 MMOL/L (ref 3.5–5.3)
PROT SERPL-MCNC: 7.9 G/DL (ref 6.4–8.2)
PROT UR STRIP-MCNC: ABNORMAL MG/DL
RBC # BLD AUTO: 3.11 MILLION/UL (ref 3.88–5.62)
RBC #/AREA URNS AUTO: ABNORMAL /HPF
SODIUM SERPL-SCNC: 139 MMOL/L (ref 136–145)
SODIUM SERPL-SCNC: 139 MMOL/L (ref 136–145)
SODIUM SERPL-SCNC: 140 MMOL/L (ref 136–145)
SODIUM SERPL-SCNC: 142 MMOL/L (ref 136–145)
SP GR UR STRIP.AUTO: 1.02 (ref 1–1.03)
SPECIMEN SOURCE: ABNORMAL
UROBILINOGEN UR QL STRIP.AUTO: 0.2 E.U./DL
WBC # BLD AUTO: 6.58 THOUSAND/UL (ref 4.31–10.16)
WBC #/AREA URNS AUTO: ABNORMAL /HPF

## 2019-02-23 PROCEDURE — 96365 THER/PROPH/DIAG IV INF INIT: CPT

## 2019-02-23 PROCEDURE — 71045 X-RAY EXAM CHEST 1 VIEW: CPT

## 2019-02-23 PROCEDURE — 99222 1ST HOSP IP/OBS MODERATE 55: CPT | Performed by: INTERNAL MEDICINE

## 2019-02-23 PROCEDURE — 83735 ASSAY OF MAGNESIUM: CPT | Performed by: INTERNAL MEDICINE

## 2019-02-23 PROCEDURE — 90945 DIALYSIS ONE EVALUATION: CPT

## 2019-02-23 PROCEDURE — 82009 KETONE BODYS QUAL: CPT | Performed by: INTERNAL MEDICINE

## 2019-02-23 PROCEDURE — 5A1D90Z PERFORMANCE OF URINARY FILTRATION, CONTINUOUS, GREATER THAN 18 HOURS PER DAY: ICD-10-PCS | Performed by: INTERNAL MEDICINE

## 2019-02-23 PROCEDURE — 36415 COLL VENOUS BLD VENIPUNCTURE: CPT | Performed by: EMERGENCY MEDICINE

## 2019-02-23 PROCEDURE — 80048 BASIC METABOLIC PNL TOTAL CA: CPT | Performed by: EMERGENCY MEDICINE

## 2019-02-23 PROCEDURE — 81003 URINALYSIS AUTO W/O SCOPE: CPT

## 2019-02-23 PROCEDURE — 82805 BLOOD GASES W/O2 SATURATION: CPT | Performed by: NURSE PRACTITIONER

## 2019-02-23 PROCEDURE — 80048 BASIC METABOLIC PNL TOTAL CA: CPT | Performed by: INTERNAL MEDICINE

## 2019-02-23 PROCEDURE — 83690 ASSAY OF LIPASE: CPT | Performed by: EMERGENCY MEDICINE

## 2019-02-23 PROCEDURE — 80053 COMPREHEN METABOLIC PANEL: CPT | Performed by: EMERGENCY MEDICINE

## 2019-02-23 PROCEDURE — 80048 BASIC METABOLIC PNL TOTAL CA: CPT | Performed by: PHYSICIAN ASSISTANT

## 2019-02-23 PROCEDURE — 82805 BLOOD GASES W/O2 SATURATION: CPT | Performed by: EMERGENCY MEDICINE

## 2019-02-23 PROCEDURE — 74176 CT ABD & PELVIS W/O CONTRAST: CPT

## 2019-02-23 PROCEDURE — 83036 HEMOGLOBIN GLYCOSYLATED A1C: CPT | Performed by: NURSE PRACTITIONER

## 2019-02-23 PROCEDURE — 96361 HYDRATE IV INFUSION ADD-ON: CPT

## 2019-02-23 PROCEDURE — 82948 REAGENT STRIP/BLOOD GLUCOSE: CPT

## 2019-02-23 PROCEDURE — 82330 ASSAY OF CALCIUM: CPT | Performed by: INTERNAL MEDICINE

## 2019-02-23 PROCEDURE — 85025 COMPLETE CBC W/AUTO DIFF WBC: CPT | Performed by: EMERGENCY MEDICINE

## 2019-02-23 PROCEDURE — 96375 TX/PRO/DX INJ NEW DRUG ADDON: CPT

## 2019-02-23 PROCEDURE — 81001 URINALYSIS AUTO W/SCOPE: CPT

## 2019-02-23 PROCEDURE — 02HV33Z INSERTION OF INFUSION DEVICE INTO SUPERIOR VENA CAVA, PERCUTANEOUS APPROACH: ICD-10-PCS | Performed by: INTERNAL MEDICINE

## 2019-02-23 PROCEDURE — 84100 ASSAY OF PHOSPHORUS: CPT | Performed by: INTERNAL MEDICINE

## 2019-02-23 PROCEDURE — 99223 1ST HOSP IP/OBS HIGH 75: CPT | Performed by: INTERNAL MEDICINE

## 2019-02-23 PROCEDURE — 99285 EMERGENCY DEPT VISIT HI MDM: CPT

## 2019-02-23 PROCEDURE — 94640 AIRWAY INHALATION TREATMENT: CPT

## 2019-02-23 PROCEDURE — 93005 ELECTROCARDIOGRAM TRACING: CPT

## 2019-02-23 RX ORDER — PANTOPRAZOLE SODIUM 40 MG/1
40 TABLET, DELAYED RELEASE ORAL
Status: DISCONTINUED | OUTPATIENT
Start: 2019-02-24 | End: 2019-03-06 | Stop reason: HOSPADM

## 2019-02-23 RX ORDER — METOPROLOL TARTRATE 5 MG/5ML
2.5 INJECTION INTRAVENOUS EVERY 6 HOURS PRN
Status: DISCONTINUED | OUTPATIENT
Start: 2019-02-23 | End: 2019-03-06 | Stop reason: HOSPADM

## 2019-02-23 RX ORDER — DEXTROSE MONOHYDRATE 25 G/50ML
25 INJECTION, SOLUTION INTRAVENOUS ONCE
Status: COMPLETED | OUTPATIENT
Start: 2019-02-23 | End: 2019-02-23

## 2019-02-23 RX ORDER — FINASTERIDE 5 MG/1
5 TABLET, FILM COATED ORAL DAILY
Status: DISCONTINUED | OUTPATIENT
Start: 2019-02-23 | End: 2019-03-06 | Stop reason: HOSPADM

## 2019-02-23 RX ORDER — CHLORAL HYDRATE 500 MG
1000 CAPSULE ORAL DAILY
Status: DISCONTINUED | OUTPATIENT
Start: 2019-02-23 | End: 2019-02-25

## 2019-02-23 RX ORDER — SODIUM POLYSTYRENE SULFONATE 4.1 MEQ/G
30 POWDER, FOR SUSPENSION ORAL; RECTAL ONCE
Status: COMPLETED | OUTPATIENT
Start: 2019-02-23 | End: 2019-02-23

## 2019-02-23 RX ORDER — FLUTICASONE FUROATE AND VILANTEROL 100; 25 UG/1; UG/1
1 POWDER RESPIRATORY (INHALATION) DAILY
Status: DISCONTINUED | OUTPATIENT
Start: 2019-02-23 | End: 2019-03-06 | Stop reason: HOSPADM

## 2019-02-23 RX ORDER — DEXTROSE MONOHYDRATE 25 G/50ML
50 INJECTION, SOLUTION INTRAVENOUS ONCE
Status: COMPLETED | OUTPATIENT
Start: 2019-02-23 | End: 2019-02-23

## 2019-02-23 RX ORDER — ALPRAZOLAM 0.25 MG/1
0.25 TABLET ORAL EVERY 8 HOURS PRN
Status: DISCONTINUED | OUTPATIENT
Start: 2019-02-23 | End: 2019-03-06 | Stop reason: HOSPADM

## 2019-02-23 RX ORDER — LIDOCAINE HYDROCHLORIDE 20 MG/ML
JELLY TOPICAL ONCE
Status: COMPLETED | OUTPATIENT
Start: 2019-02-23 | End: 2019-02-23

## 2019-02-23 RX ORDER — INSULIN GLARGINE 100 [IU]/ML
12 INJECTION, SOLUTION SUBCUTANEOUS
Status: DISCONTINUED | OUTPATIENT
Start: 2019-02-23 | End: 2019-02-28

## 2019-02-23 RX ORDER — HEPARIN SODIUM 5000 [USP'U]/ML
5000 INJECTION, SOLUTION INTRAVENOUS; SUBCUTANEOUS EVERY 8 HOURS SCHEDULED
Status: DISCONTINUED | OUTPATIENT
Start: 2019-02-23 | End: 2019-02-23

## 2019-02-23 RX ORDER — ATORVASTATIN CALCIUM 40 MG/1
40 TABLET, FILM COATED ORAL
Status: DISCONTINUED | OUTPATIENT
Start: 2019-02-23 | End: 2019-02-25

## 2019-02-23 RX ORDER — DILTIAZEM HYDROCHLORIDE 5 MG/ML
INJECTION INTRAVENOUS
Status: DISPENSED
Start: 2019-02-23 | End: 2019-02-24

## 2019-02-23 RX ORDER — DILTIAZEM HYDROCHLORIDE 5 MG/ML
INJECTION INTRAVENOUS
Status: COMPLETED
Start: 2019-02-23 | End: 2019-02-23

## 2019-02-23 RX ORDER — ALBUTEROL SULFATE 2.5 MG/3ML
2.5 SOLUTION RESPIRATORY (INHALATION) ONCE
Status: COMPLETED | OUTPATIENT
Start: 2019-02-23 | End: 2019-02-23

## 2019-02-23 RX ORDER — TERAZOSIN 2 MG/1
2 CAPSULE ORAL DAILY
Status: DISCONTINUED | OUTPATIENT
Start: 2019-02-23 | End: 2019-02-25

## 2019-02-23 RX ORDER — ALBUTEROL SULFATE 2.5 MG/3ML
5 SOLUTION RESPIRATORY (INHALATION) ONCE
Status: COMPLETED | OUTPATIENT
Start: 2019-02-23 | End: 2019-02-23

## 2019-02-23 RX ORDER — PAROXETINE HYDROCHLORIDE 20 MG/1
30 TABLET, FILM COATED ORAL DAILY
Status: DISCONTINUED | OUTPATIENT
Start: 2019-02-23 | End: 2019-03-06 | Stop reason: HOSPADM

## 2019-02-23 RX ORDER — LORAZEPAM 2 MG/ML
0.5 INJECTION INTRAMUSCULAR ONCE
Status: DISCONTINUED | OUTPATIENT
Start: 2019-02-23 | End: 2019-02-23

## 2019-02-23 RX ORDER — ASCORBIC ACID 500 MG
250 TABLET ORAL DAILY
Status: DISCONTINUED | OUTPATIENT
Start: 2019-02-23 | End: 2019-03-06 | Stop reason: HOSPADM

## 2019-02-23 RX ORDER — MELATONIN
2000 DAILY
Status: DISCONTINUED | OUTPATIENT
Start: 2019-02-23 | End: 2019-03-06 | Stop reason: HOSPADM

## 2019-02-23 RX ORDER — DILTIAZEM HYDROCHLORIDE 5 MG/ML
10 INJECTION INTRAVENOUS ONCE
Status: COMPLETED | OUTPATIENT
Start: 2019-02-23 | End: 2019-02-23

## 2019-02-23 RX ADMIN — CALCIUM GLUCONATE 1 G: 98 INJECTION, SOLUTION INTRAVENOUS at 09:44

## 2019-02-23 RX ADMIN — LIDOCAINE HYDROCHLORIDE: 20 JELLY TOPICAL at 10:50

## 2019-02-23 RX ADMIN — DEXTROSE MONOHYDRATE 50 ML: 25 INJECTION, SOLUTION INTRAVENOUS at 14:09

## 2019-02-23 RX ADMIN — FINASTERIDE 5 MG: 5 TABLET, FILM COATED ORAL at 16:35

## 2019-02-23 RX ADMIN — PAROXETINE 30 MG: 20 TABLET, FILM COATED ORAL at 16:36

## 2019-02-23 RX ADMIN — DILTIAZEM HYDROCHLORIDE 10 MG: 5 INJECTION INTRAVENOUS at 14:55

## 2019-02-23 RX ADMIN — ATORVASTATIN CALCIUM 40 MG: 40 TABLET, FILM COATED ORAL at 16:35

## 2019-02-23 RX ADMIN — ALBUTEROL SULFATE 5 MG: 2.5 SOLUTION RESPIRATORY (INHALATION) at 09:34

## 2019-02-23 RX ADMIN — INSULIN HUMAN 10 UNITS: 100 INJECTION, SOLUTION PARENTERAL at 09:36

## 2019-02-23 RX ADMIN — Medication 1000 MG: at 16:35

## 2019-02-23 RX ADMIN — OXYCODONE HYDROCHLORIDE AND ACETAMINOPHEN 250 MG: 500 TABLET ORAL at 16:35

## 2019-02-23 RX ADMIN — SODIUM POLYSTYRENE SULFONATE 30 G: 1 POWDER ORAL; RECTAL at 14:16

## 2019-02-23 RX ADMIN — SODIUM CHLORIDE 1000 ML: 0.9 INJECTION, SOLUTION INTRAVENOUS at 08:35

## 2019-02-23 RX ADMIN — SODIUM BICARBONATE: 84 INJECTION, SOLUTION INTRAVENOUS at 12:03

## 2019-02-23 RX ADMIN — INSULIN GLARGINE 12 UNITS: 100 INJECTION, SOLUTION SUBCUTANEOUS at 21:32

## 2019-02-23 RX ADMIN — ALBUTEROL SULFATE 2.5 MG: 2.5 SOLUTION RESPIRATORY (INHALATION) at 14:05

## 2019-02-23 RX ADMIN — SODIUM BICARBONATE: 84 INJECTION, SOLUTION INTRAVENOUS at 20:23

## 2019-02-23 RX ADMIN — INSULIN HUMAN 10 UNITS: 100 INJECTION, SOLUTION PARENTERAL at 14:07

## 2019-02-23 RX ADMIN — DEXTROSE MONOHYDRATE 25 ML: 25 INJECTION, SOLUTION INTRAVENOUS at 09:37

## 2019-02-23 RX ADMIN — FLUTICASONE FUROATE AND VILANTEROL TRIFENATATE 1 PUFF: 100; 25 POWDER RESPIRATORY (INHALATION) at 16:36

## 2019-02-23 RX ADMIN — VITAMIN D, TAB 1000IU (100/BT) 2000 UNITS: 25 TAB at 16:35

## 2019-02-23 RX ADMIN — SODIUM BICARBONATE 50 MEQ: 84 INJECTION, SOLUTION INTRAVENOUS at 09:40

## 2019-02-23 NOTE — ED PROVIDER NOTES
History  Chief Complaint   Patient presents with    Abdominal Pain     Generalized abominal pain, nausea and vomiting x2 weeks  Per wife, symptoms all started after having a colonscopy and endoscopy  Decreased appetite  79 yo male with HTN, diabetes, known percardial effusion and h/o pleural effusion, DVT, brought to ED by his wife for c/o nausea, vomiting, almost daily going back about 2 weeks, onset seemed to be after EGD/colonoscopy 1/24/19, and c/o abdominal pain, not severe, more upper abdominal area  His appetite has been very poor, and he said he is not eating or drinking much at all  He denies fever, chills, diarrhea-says he had normal BM yesterday  He denies urinary symptoms  History provided by:  Patient      Prior to Admission Medications   Prescriptions Last Dose Informant Patient Reported? Taking? ALPRAZolam (XANAX) 0 25 mg tablet   No Yes   Sig: Take 1 tablet (0 25 mg total) by mouth every 8 (eight) hours as needed for anxiety   Ascorbic Acid (V-R VITAMIN C CR PO)  Spouse/Significant Other Yes Yes   Sig: Take by mouth   Cholecalciferol (VITAMIN D) 2000 units CAPS  Spouse/Significant Other Yes Yes   Sig: Take by mouth   DULERA 100-5 MCG/ACT inhaler   No Yes   Sig: INHALE 2 PUFFS EVERY 12 (TWELVE) HOURS   EPINEPHrine (EPIPEN) 0 3 mg/0 3 mL SOAJ  Spouse/Significant Other Yes Yes   Sig: Inject as directed   Lancets (ONETOUCH ULTRASOFT) lancets   No Yes   Sig: Use daily as directed  Lancets (ONETOUCH ULTRASOFT) lancets   No Yes   Sig: USE DAILY AS DIRECTED     Omega-3 Fatty Acids (FISH OIL) 1200 MG CAPS  Spouse/Significant Other Yes Yes   Sig: Take by mouth   PARoxetine (PAXIL) 30 mg tablet   No Yes   Sig: TAKE 1 TABLET BY MOUTH EVERY DAY AS DIRECTED   TRUE METRIX BLOOD GLUCOSE TEST test strip   No Yes   Sig: TEST TWO TIMES A DAY   amLODIPine (NORVASC) 5 mg tablet   No Yes   Sig: TAKE 1 TABLET DAILY   apixaban (ELIQUIS) 5 mg  Spouse/Significant Other No Yes   Sig: Take 1 tablet (5 mg total) by mouth 2 (two) times a day   finasteride (PROSCAR) 5 mg tablet   No Yes   Sig: Take 1 tablet (5 mg total) by mouth daily   furosemide (LASIX) 40 mg tablet  Spouse/Significant Other No Yes   Sig: Take 1 tablet (40 mg total) by mouth daily   glucose blood (ONE TOUCH ULTRA TEST) test strip   No Yes   Sig: Use daily as directed  losartan (COZAAR) 50 mg tablet   No Yes   Sig: TAKE 1 TABLET DAILY   metFORMIN (GLUCOPHAGE) 500 mg tablet   No Yes   Sig: TAKE 1 TABLET TWICE DAILY  omeprazole (PriLOSEC) 40 MG capsule   Yes Yes   Sig: TAKE DAILY AS DIRECTED  rosuvastatin (CRESTOR) 40 MG tablet  Spouse/Significant Other No Yes   Sig: Take 1 tablet (40 mg total) by mouth daily for 90 days   terazosin (HYTRIN) 2 mg capsule  Spouse/Significant Other No Yes   Sig: Take 1 capsule by mouth daily      Facility-Administered Medications: None       Past Medical History:   Diagnosis Date    Anxiety     Cardiomegaly     Diabetes ([address] )     DVT (deep venous thrombosis) (HCC)     right leg    GERD (gastroesophageal reflux disease)     Hypercholesterolemia     Hypertension     Irregular heart beat     paroxsysmal a fib    Pancreatic cyst     Pericardial effusion     Pleural effusion     Weight loss, non-intentional        Past Surgical History:   Procedure Laterality Date    APPENDECTOMY      CATARACT EXTRACTION      COLONOSCOPY      COLONOSCOPY N/A 1/24/2019    Procedure: COLONOSCOPY with polypectomies;  Surgeon: Lisa Linn MD;  Location: [facility]; Service: Gastroenterology    ESOPHAGOGASTRODUODENOSCOPY N/A 1/24/2019    Procedure: ESOPHAGOGASTRODUODENOSCOPY (EGD) with bx;  Surgeon: Lisa Linn MD;  Location: [facility];   Service: Gastroenterology    PROSTATE BIOPSY         Family History   Problem Relation Age of Onset    Diabetes Mother     Diabetes Father     Prostate cancer Brother     Diabetes Brother     Pulmonary embolism Sister      I have reviewed and agree with the history as documented  Social History     Tobacco Use    Smoking status: Former Smoker    Smokeless tobacco: Never Used    Tobacco comment: current non-smoker, per [employer]   Substance Use Topics    Alcohol use: No    Drug use: No        Review of Systems    Physical Exam  Physical Exam   Constitutional: He is oriented to person, place, and time  He appears well-developed  Non-toxic appearance  HENT:   Head: Normocephalic  Right Ear: Tympanic membrane and external ear normal    Left Ear: External ear normal    Nose: Nose normal    Mouth/Throat: Oropharynx is clear and moist  Mucous membranes are pale and dry  Eyes: Pupils are equal, round, and reactive to light  Conjunctivae, EOM and lids are normal    Neck: Normal range of motion  Neck supple  No JVD present  No Brudzinski's sign and no Kernig's sign noted  Cardiovascular: Normal rate, regular rhythm and normal heart sounds  No murmur heard  Pulmonary/Chest: Effort normal and breath sounds normal  No accessory muscle usage  No tachypnea  No respiratory distress  He has no wheezes  Abdominal: Soft  Normal appearance  He exhibits no distension and no mass  Bowel sounds are decreased  There is generalized tenderness  There is no rigidity, no rebound and no guarding  Musculoskeletal: Normal range of motion  Lymphadenopathy:        Head (right side): No submental, no submandibular, no preauricular and no posterior auricular adenopathy present  Head (left side): No submental, no submandibular, no preauricular and no posterior auricular adenopathy present  He has no cervical adenopathy  Neurological: He is alert and oriented to person, place, and time  He has normal strength and normal reflexes  No cranial nerve deficit or sensory deficit  Coordination normal  GCS eye subscore is 4  GCS verbal subscore is 5  GCS motor subscore is 6  Skin: Skin is warm and dry  Capillary refill takes 2 to 3 seconds  No rash noted  He is not diaphoretic  Psychiatric: He has a normal mood and affect  His speech is normal and behavior is normal  Thought content normal  Cognition and memory are normal    Nursing note and vitals reviewed        Vital Signs  ED Triage Vitals   Temperature Pulse Respirations Blood Pressure SpO2   02/23/19 0821 02/23/19 0821 02/23/19 0821 02/23/19 0821 02/23/19 0821   97 6 °F (36 4 °C) 95 20 130/65 94 %      Temp Source Heart Rate Source Patient Position - Orthostatic VS BP Location FiO2 (%)   02/23/19 0821 02/23/19 0821 02/23/19 0821 02/23/19 0821 --   Oral Monitor Lying Right arm       Pain Score       02/23/19 1208       No Pain           Vitals:    02/23/19 0821 02/23/19 1058 02/23/19 1330 02/23/19 1500   BP: 130/65 (!) 107/49 (!) 97/47 (!) 96/44   Pulse: 95 76 86 (!) 136   Patient Position - Orthostatic VS: Lying Lying Lying        Visual Acuity      ED Medications  Medications   sodium chloride 0 9 % bolus 1,000 mL (1,000 mL Intravenous Not Given 2/23/19 1148)   sodium bicarbonate 150 mEq in sterile water 1,000 mL infusion ( Intravenous Rate/Dose Change 2/23/19 1345)   ALPRAZolam (XANAX) tablet 0 25 mg (has no administration in time range)   apixaban (ELIQUIS) tablet 2 5 mg (has no administration in time range)   ascorbic acid (VITAMIN C) tablet 250 mg (has no administration in time range)   cholecalciferol (VITAMIN D3) tablet 2,000 Units (has no administration in time range)   finasteride (PROSCAR) tablet 5 mg (has no administration in time range)   fish oil capsule 1,000 mg (has no administration in time range)   PARoxetine (PAXIL) tablet 30 mg (has no administration in time range)   atorvastatin (LIPITOR) tablet 40 mg (has no administration in time range)   terazosin (HYTRIN) capsule 2 mg (2 mg Oral Not Given 2/23/19 9249)   fluticasone-vilanterol (BREO ELLIPTA) 100-25 mcg/inh inhaler 1 puff (has no administration in time range)   insulin lispro (HumaLOG) 100 units/mL subcutaneous injection 4 Units (has no administration in time range)   insulin lispro (HumaLOG) 100 units/mL subcutaneous injection 4 Units (has no administration in time range)   insulin lispro (HumaLOG) 100 units/mL subcutaneous injection 4 Units (has no administration in time range)   insulin lispro (HumaLOG) 100 units/mL subcutaneous injection 1-6 Units (has no administration in time range)   insulin glargine (LANTUS) subcutaneous injection 12 Units 0 12 mL (has no administration in time range)   metoprolol (LOPRESSOR) injection 2 5 mg (has no administration in time range)   sodium chloride 0 9 % bolus 1,000 mL (0 mL Intravenous Stopped 2/23/19 1203)   calcium gluconate 1 g in sodium chloride 0 9 % 100 mL IVPB (0 g Intravenous Stopped 2/23/19 1036)   albuterol inhalation solution 5 mg (5 mg Nebulization Given 2/23/19 0934)   insulin regular (HumuLIN R,NovoLIN R) injection 10 Units (10 Units Intravenous Given 2/23/19 0936)   sodium bicarbonate 8 4 % injection 50 mEq (50 mEq Intravenous Given 2/23/19 0940)   dextrose 50 % IV solution 25 mL (25 mL Intravenous Given 2/23/19 0937)   lidocaine (URO-JET) 2 % topical gel ( Topical Given 2/23/19 1050)   albuterol inhalation solution 2 5 mg (2 5 mg Nebulization Given 2/23/19 1405)   dextrose 50 % IV solution 50 mL (50 mL Intravenous Given 2/23/19 1409)   insulin regular (HumuLIN R,NovoLIN R) injection 10 Units (10 Units Intravenous Given 2/23/19 1407)   sodium polystyrene (KAYEXALATE) powder 30 g (30 g Oral Given 2/23/19 1416)   diltiazem (CARDIZEM) injection 10 mg ( Intravenous Not Given 2/23/19 1542)       Diagnostic Studies  Results Reviewed     Procedure Component Value Units Date/Time    Basic metabolic panel [[id number]]     Lab Status:  No result Specimen:  Blood     Occult blood 1-3, stool [[id number]]     Lab Status:  No result Specimen:  Stool     Basic metabolic panel [[id number]]  (Abnormal) Collected:  02/23/19 1144    Lab Status:  Final result Specimen:  Blood from Arm, Right Updated:  02/23/19 1217     Sodium 140 mmol/L Potassium 6 8 mmol/L      Chloride 101 mmol/L      CO2 15 mmol/L      ANION GAP 24 mmol/L       mg/dL      Creatinine 11 46 mg/dL      Glucose 102 mg/dL      Calcium 9 3 mg/dL      eGFR 4 ml/min/1 73sq m     Narrative:       National Kidney Disease Education Program recommendations are as follows:  GFR calculation is accurate only with a steady state creatinine  Chronic Kidney disease less than 60 ml/min/1 73 sq  meters  Kidney failure less than 15 ml/min/1 73 sq  meters      Urine Microscopic [[id number]]  (Abnormal) Collected:  02/23/19 1130    Lab Status:  Final result Specimen:  Urine, Indwelling Singh Catheter Updated:  02/23/19 1153     RBC, UA None Seen /hpf      WBC, UA 0-1 /hpf      Epithelial Cells Occasional /hpf      Bacteria, UA Innumerable /hpf      AMORPH URATES Moderate /hpf     POCT urinalysis dipstick [[id number]]  (Abnormal) Resulted:  02/23/19 1131    Lab Status:  Final result Specimen:  Urine Updated:  02/23/19 1131    ED Urine Macroscopic [[id number]]  (Abnormal) Collected:  02/23/19 1130    Lab Status:  Final result Specimen:  Urine Updated:  02/23/19 1130     Color, UA Yellow     Clarity, UA Slightly Cloudy     pH, UA 5 0     Leukocytes, UA Negative     Nitrite, UA Negative     Protein, UA 30 (1+) mg/dl      Glucose, UA Negative mg/dl      Ketones, UA 15 (1+) mg/dl      Urobilinogen, UA 0 2 E U /dl      Bilirubin, UA Negative     Blood, UA Trace     Specific [place], UA 1 020    Narrative:       CLINITEK RESULT    Blood gas, venous [[id number]]  (Abnormal) Collected:  02/23/19 1036    Lab Status:  Final result Specimen:  Blood from Arm, Left Updated:  02/23/19 1043     pH, Saroj 7 081     pCO2, Saroj 43 9 mm Hg      pO2, Saroj 43 3 mm Hg      HCO3, Saroj 12 8 mmol/L      Base Excess, Saroj -16 4 mmol/L      O2 Content, Saroj 9 9 ml/dL      O2 HGB, VENOUS 68 0 %     Comprehensive metabolic panel [[id number]]  (Abnormal) Collected:  02/23/19 0836    Lab Status:  Final result Specimen:  Blood from Arm, Left Updated:  02/23/19 6960     Sodium 142 mmol/L      Potassium 6 8 mmol/L      Chloride 99 mmol/L      CO2 13 mmol/L      ANION GAP 30 mmol/L       mg/dL      Creatinine 11 60 mg/dL      Glucose 110 mg/dL      Calcium 9 3 mg/dL      AST 16 U/L      ALT 22 U/L      Alkaline Phosphatase 69 U/L      Total Protein 7 9 g/dL      Albumin 3 9 g/dL      Total Bilirubin 1 63 mg/dL      eGFR 4 ml/min/1 73sq m     Narrative:       National Kidney Disease Education Program recommendations are as follows:  GFR calculation is accurate only with a steady state creatinine  Chronic Kidney disease less than 60 ml/min/1 73 sq  meters  Kidney failure less than 15 ml/min/1 73 sq  meters  Lipase [[id number]]  (Normal) Collected:  02/23/19 0836    Lab Status:  Final result Specimen:  Blood from Arm, Left Updated:  02/23/19 0913     Lipase 207 u/L     CBC and differential [[id number]]  (Abnormal) Collected:  02/23/19 0836    Lab Status:  Final result Specimen:  Blood from Arm, Left Updated:  02/23/19 0845     WBC 6 58 Thousand/uL      RBC 3 11 Million/uL      Hemoglobin 10 0 g/dL      Hematocrit 32 0 %       fL      MCH 32 2 pg      MCHC 31 3 g/dL      RDW 11 9 %      MPV 10 0 fL      Platelets 809 Thousands/uL      nRBC 0 /100 WBCs      Neutrophils Relative 86 %      Immat GRANS % 1 %      Lymphocytes Relative 8 %      Monocytes Relative 5 %      Eosinophils Relative 0 %      Basophils Relative 0 %      Neutrophils Absolute 5 67 Thousands/µL      Immature Grans Absolute 0 04 Thousand/uL      Lymphocytes Absolute 0 55 Thousands/µL      Monocytes Absolute 0 30 Thousand/µL      Eosinophils Absolute 0 00 Thousand/µL      Basophils Absolute 0 02 Thousands/µL                  CT abdomen pelvis wo contrast   Final Result by Niki Bowles DO (02/23 0919)   1  Limited examination due to lack of oral and intravenous contrast material    2   Enlarging, incompletely visualized large pericardial effusion     3   Moderate constipation  4   Enlarging left pleural effusion and left basilar infiltrate representing atelectasis or pneumonia  5   Prostatomegaly with bladder outlet obstruction  The study was marked in [facility] for immediate notification        Workstation performed: [id number]         XR chest 1 view portable    (Results Pending)              Procedures  ECG 12 Lead Documentation  Date/Time: 2/23/2019 9:45 AM  Performed by: Chris Fuentes MD  Authorized by: Chris Fuentes MD     Rate:     ECG rate:  91  Rhythm:     Rhythm: sinus rhythm    Ectopy:     Ectopy: none    QRS:     QRS axis:  Normal    QRS intervals:  Normal  Conduction:     Conduction: normal    ST segments:     ST segments:  Normal  T waves:     T waves: normal      CriticalCare Time  Performed by: Chris Fuentes MD  Authorized by: Chris Fuentes MD     Critical care provider statement:     Critical care time (minutes):  60    Critical care time was exclusive of:  Separately billable procedures and treating other patients and teaching time    Critical care was necessary to treat or prevent imminent or life-threatening deterioration of the following conditions:  Metabolic crisis and renal failure    Critical care was time spent personally by me on the following activities:  Blood draw for specimens, obtaining history from patient or surrogate, development of treatment plan with patient or surrogate, discussions with consultants, evaluation of patient's response to treatment, examination of patient, interpretation of cardiac output measurements, ordering and performing treatments and interventions, ordering and review of laboratory studies, ordering and review of radiographic studies, re-evaluation of patient's condition and review of old charts    I assumed direction of critical care for this patient from another provider in my specialty: no             Phone Contacts  ED Phone Contact    ED Course  ED Course as of Feb 23 1621   Sat Feb 23, 2019   0912 Unexpected, but corresponds to new onset renal failure, starting treatment immediately   Potassium(!!): 6 8   0912 Creatinine(!): 11 60   0932 CT findings in the abdomen are limited, but do give some findings in the chest that need investigated  Bladder is somewhat decompressed-does not seem to correspond to obstructive uropathy that could cause the renal failure  He said he is making urine--so I will place medeiros to assess UOP  CT abdomen pelvis wo contrast   1006 Chest findings have been seen frequently on previous imaging  65 D/W Nephrology who agreed with treatment up to this point, asking for repeat labs, will consult, and start bicarb drip                                  MDM    Disposition  Final diagnoses:   Renal failure   Metabolic acidosis   Hyperkalemia   Volume depletion     Time reflects when diagnosis was documented in both MDM as applicable and the Disposition within this note     Time User Action Codes Description Comment    2/23/2019 11:16 AM Spicer Brittle L Add [N19] Renal failure     2/23/2019 11:16 AM Spicer Brittle L Add [Q78 2] Metabolic acidosis     8/85/9289 11:17 AM Joyice Alert Add [E87 5] Hyperkalemia     2/23/2019 11:17 AM Joyice Alert Add [E86 9] Volume depletion     2/23/2019  2:53 PM Durel Lei [I31 3] Pericardial effusion     2/23/2019  2:53 PM Bernice Wild Modify [I31 3] Pericardial effusion       ED Disposition     ED Disposition Condition Date/Time Comment    Admit Stable Sat Feb 23, 2019 11:50 AM Admit to Step Down with Dr Jeremie Mehta  Follow-up Information    None         Current Discharge Medication List      CONTINUE these medications which have NOT CHANGED    Details   ALPRAZolam (XANAX) 0 25 mg tablet Take 1 tablet (0 25 mg total) by mouth every 8 (eight) hours as needed for anxiety  Qty: 270 tablet, Refills: 0    Comments:  This request is for a new prescription for a controlled substance as required by Federal/State law     Associated Diagnoses: Generalized anxiety disorder      amLODIPine (NORVASC) 5 mg tablet TAKE 1 TABLET DAILY  Qty: 90 tablet, Refills: 3    Associated Diagnoses: Essential hypertension      apixaban (ELIQUIS) 5 mg Take 1 tablet (5 mg total) by mouth 2 (two) times a day  Qty: 180 tablet, Refills: 3    Associated Diagnoses: Atrial fibrillation, unspecified type ([facility])      Ascorbic Acid (V-R VITAMIN C CR PO) Take by mouth      Cholecalciferol (VITAMIN D) 2000 units CAPS Take by mouth      DULERA 100-5 MCG/ACT inhaler INHALE 2 PUFFS EVERY 12 (TWELVE) HOURS  Qty: 13 Inhaler, Refills: 2    Associated Diagnoses: Chronic obstructive pulmonary disease, unspecified COPD type ([facility])      EPINEPHrine (EPIPEN) 0 3 mg/0 3 mL SOAJ Inject as directed      finasteride (PROSCAR) 5 mg tablet Take 1 tablet (5 mg total) by mouth daily  Qty: 90 tablet, Refills: 3    Associated Diagnoses: Benign prostatic hyperplasia with urinary frequency      furosemide (LASIX) 40 mg tablet Take 1 tablet (40 mg total) by mouth daily  Qty: 90 tablet, Refills: 3    Associated Diagnoses: Essential hypertension      !! glucose blood (ONE TOUCH ULTRA TEST) test strip Use daily as directed  Qty: 200 each, Refills: 3    Associated Diagnoses: Type 2 diabetes mellitus without complication, with long-term current use of insulin ([address] )      ! ! Lancets (ONETOUCH ULTRASOFT) lancets Use daily as directed  Qty: 100 each, Refills: 2    Associated Diagnoses: Type 2 diabetes mellitus without complication, unspecified whether long term insulin use ([address] )      ! ! Lancets (ONETOUCH ULTRASOFT) lancets USE DAILY AS DIRECTED    Qty: 100 each, Refills: 3    Associated Diagnoses: Type 2 diabetes mellitus without complication, unspecified whether long term insulin use ([facility])      losartan (COZAAR) 50 mg tablet TAKE 1 TABLET DAILY  Qty: 90 tablet, Refills: 3    Associated Diagnoses: Essential hypertension      metFORMIN (GLUCOPHAGE) 500 mg tablet TAKE 1 TABLET TWICE DAILY  Qty: 180 tablet, Refills: 3    Associated Diagnoses: Type 2 diabetes mellitus without complication, unspecified whether long term insulin use (HCC)      Omega-3 Fatty Acids (FISH OIL) 1200 MG CAPS Take by mouth      omeprazole (PriLOSEC) 40 MG capsule TAKE DAILY AS DIRECTED  Refills: 3      PARoxetine (PAXIL) 30 mg tablet TAKE 1 TABLET BY MOUTH EVERY DAY AS DIRECTED  Qty: 90 tablet, Refills: 0    Associated Diagnoses: Generalized anxiety disorder      rosuvastatin (CRESTOR) 40 MG tablet Take 1 tablet (40 mg total) by mouth daily for 90 days  Qty: 90 tablet, Refills: 3    Associated Diagnoses: Type 2 diabetes mellitus without complication, without long-term current use of insulin ([facility])      terazosin (HYTRIN) 2 mg capsule Take 1 capsule by mouth daily  Qty: 90 capsule, Refills: 3    Associated Diagnoses: Benign prostatic hyperplasia with urinary frequency      !! TRUE METRIX BLOOD GLUCOSE TEST test strip TEST TWO TIMES A DAY  Qty: 300 each, Refills: 0    Associated Diagnoses: Type 2 diabetes mellitus with complication, unspecified whether long term insulin use ([facility]       ! ! - Potential duplicate medications found  Please discuss with provider  No discharge procedures on file      ED Provider  Electronically Signed by           Jennifer Arguello MD  02/23/19 1213

## 2019-02-23 NOTE — ED NOTES
2 attempts by Pastor Miller RN made for IV unsuccessful at this time     Anni Matias RN  02/23/19 3549

## 2019-02-23 NOTE — ED NOTES
Pt heart rate increased at this time  EKG run and ED MD made aware, ICU RN informed as well        Bella Andino RN  02/23/19 3588

## 2019-02-23 NOTE — H&P
History & Physical Exam - Critical Care   Efra Coreas  80 y o  male MRN: [id number]  Unit/Bed#: ICU 13 Encounter: [id number]      Assessment/Plan:  1  Acute kidney injury on chronic kidney disease stage 3  · Nephrology following  · Monitor kidney indices  · Continue IV fluid  · Likely secondary to nausea and vomiting and poor intake  · Hold nephrotoxin agents  2  Hyperkalemia secondary to 1  · Continue IV fluids  · Patient received albuterol treatments and insulin with D50 in the emergency department  · Monitor potassium  3  Diabetes mellitus type 2  · Hold p o  Anti hyperglycemics  · Sliding scale insulin  4  Metabolic acidosis likely a combination of renal failure and starvation ketoacidosis  · Continue bicarb drip  · Encourage p o  Intake  · Continue fluid resuscitation  5  Paroxysmal atrial fibrillation:  IV metoprolol as needed  6  Gastroesophageal reflux disease:   Protonix  7  Chronic anxiety:    Xanax  8  Benign prostatic hyperplasia:    Proscar  9  Hyperlipidemia:     Atorvastatin      Critical Care Time:   Documented critical care time excludes any procedures documented elsewhere  It also excludes any family updates    _____________________________________________________________________      HPI:    Efra Coreas  is a 80 y o  male who presents with a 1 week complaint of abdominal discomfort and associated vomiting and diarrhea  Upon arrival to the emergency department the patient is noted to have acute kidney injury on chronic kidney disease stage 3  He is hyperkalemic  He has a metabolic acidosis  Upon my arrival to the emergency department, the patient is resting comfortably on room air  He denies complaints of dizziness or lightheadedness  He denies visual disturbances  He denies shortness of breath  He denies chest pain, his abdominal pain has resolved  He denies lower extremity edema    He has been seen by Nephrology and they are treating his hyperkalemia and acute kidney injury  Patient will be admitted to the step-down portion of the ICU  He will require greater than 2 midnight stay    Review of Systems:    Full 12 point review of systems was performed  Aside from what was mentioned in the HPI, it is otherwise negative  Historical Information   Past Medical History:   Diagnosis Date    Anxiety     Cardiomegaly     Diabetes ([address] )     DVT (deep venous thrombosis) (HCC)     right leg    GERD (gastroesophageal reflux disease)     Hypercholesterolemia     Hypertension     Irregular heart beat     paroxsysmal a fib    Pancreatic cyst     Pericardial effusion     Pleural effusion     Weight loss, non-intentional      Past Surgical History:   Procedure Laterality Date    APPENDECTOMY      CATARACT EXTRACTION      COLONOSCOPY      COLONOSCOPY N/A 1/24/2019    Procedure: COLONOSCOPY with polypectomies;  Surgeon: Carolina Santiago MD;  Location: [facility]; Service: Gastroenterology    ESOPHAGOGASTRODUODENOSCOPY N/A 1/24/2019    Procedure: ESOPHAGOGASTRODUODENOSCOPY (EGD) with bx;  Surgeon: Carolina Santiago MD;  Location: [facility];   Service: Gastroenterology    PROSTATE BIOPSY       Social History   Social History     Substance and Sexual Activity   Alcohol Use No     Social History     Substance and Sexual Activity   Drug Use No     Social History     Tobacco Use   Smoking Status Former Smoker   Smokeless Tobacco Never Used   Tobacco Comment    current non-smoker, per [employer]       Family History:   Family History   Problem Relation Age of Onset    Diabetes Mother     Diabetes Father     Prostate cancer Brother     Diabetes Brother     Pulmonary embolism Sister        Medications:  Pertinent medications were reviewed    Current Facility-Administered Medications:  ALPRAZolam 0 25 mg Oral Q8H PRN SUJATA Padron    apixaban 5 mg Oral BID SUJATA Padron    ascorbic acid 250 mg Oral Daily SUJATA Padron    atorvastatin 40 mg Oral Daily With [employer] Refugia James, CRNP    cholecalciferol 2,000 Units Oral Daily Refugia James, CRNP    diltiazem        diltiazem        finasteride 5 mg Oral Daily Refugia James, CRNP    fish oil 1,000 mg Oral Daily Refugia James, CRNP    fluticasone-vilanterol 1 puff Inhalation Daily Refugia James, CRNP    PARoxetine 30 mg Oral Daily Refugia James, CRNP    IV infusion builder  Intravenous Continuous [place], [place] Last Rate: 150 mL/hr at 02/23/19 1345   sodium chloride 1,000 mL Intravenous Once Chris Fuentes MD    terazosin 2 mg Oral Daily Refugia James, CRNP          Allergies   Allergen Reactions    Ace Inhibitors     Bee Venom          Vitals:   BP (!) 97/47 (BP Location: Right arm)   Pulse 86   Temp 97 6 °F (36 4 °C) (Oral)   Resp 20   Wt 58 2 kg (128 lb 4 9 oz) Comment: Simultaneous filing  User may not have seen previous data  SpO2 95%   BMI 23 47 kg/m²   Body mass index is 23 47 kg/m²  SpO2: 95 %,   SpO2 Activity: At Rest,   O2 Device: None (Room air)      Intake/Output Summary (Last 24 hours) at 2/23/2019 1454  Last data filed at 2/23/2019 1203  Gross per 24 hour   Intake 500 ml   Output 60 ml   Net 440 ml     Invasive Devices     Peripheral Intravenous Line            Peripheral IV 02/23/19 Left Antecubital less than 1 day          Drain            Urethral Catheter Temperature probe 16 Fr  less than 1 day                Physical Exam:  Gen:  Pleasant and cooperative  HEENT:  Atraumatic normocephalic pupils equal round reactive to light extraocular muscles intact sclerae anicteric oral mucosa is pink and moist  Neck:  Supple no JVD no lymphadenopathy  Chest:  Clear to auscultation respirations even and nonlabored on room air  Cor:  Regular rate and rhythm with occasional extrasystole    Sinus rhythm on the monitor with PACs  Abd:  Soft nontender with hypoactive bowel sounds  Ext:  No clubbing cyanosis or edema  Neuro:  Alert and oriented x3 moves all extremities  Skin:  Warm dry and intact, no rash      Diagnostic Data:  Lab: I have personally reviewed pertinent lab results  ,   CBC:  Results from last 7 days   Lab Units 02/23/19  0836   WBC Thousand/uL 6 58   HEMOGLOBIN g/dL 10 0*   HEMATOCRIT % 32 0*   PLATELETS Thousands/uL 211      CMP: Lab Results   Component Value Date    SODIUM 140 02/23/2019    K 6 8 (HH) 02/23/2019     02/23/2019    CO2 15 (L) 02/23/2019     (H) 02/23/2019    CREATININE 11 46 (H) 02/23/2019    CALCIUM 9 3 02/23/2019    AST 16 02/23/2019    ALT 22 02/23/2019    ALKPHOS 69 02/23/2019    EGFR 4 02/23/2019   ,   PT/INR: No results found for: PT, INR,   Magnesium: No components found for: MAG,  Phosphorous: No results found for: PHOS    ABG: No results found for: PHART, MJZ0SKP, PO2ART, SRA0ICQ, B8LCAXUD, BEART, SOURCE,     Microbiology:  No pending microbiology    Imaging: I have personally reviewed the pertinent imaging studies on the PACS system  CT of the abdomen and pelvis demonstrates:  1  Limited examination due to lack of oral and intravenous contrast material   2   Enlarging, incompletely visualized large pericardial effusion  3   Moderate constipation  4   Enlarging left pleural effusion and left basilar infiltrate representing atelectasis or pneumonia  5   Prostatomegaly with bladder outlet obstruction  Cardiac/EKG/telemetry/Echo:     Normal sinus rhythm      VTE Prophylaxis: Sequential compression device (Venodyne)  and Reason for no pharmacologic prophylaxis On Eliquis    Code Status: Level 1 - Full Code    SUJATA Allen    Portions of the record may have been created with voice recognition software  Occasional wrong word or "sound a like" substitutions may have occurred due to the inherent limitations of voice recognition software  Read the chart carefully and recognize, using context, where substitutions have occurred

## 2019-02-23 NOTE — ED NOTES
Gabe Cai RN attempts for 2nd IV with US X's 2, unsuccessful at this time  MD aware        Aliya Hamilton, RN  02/23/19 2901

## 2019-02-23 NOTE — CONSULTS
Consultation -  Cardiac Electrophysiology  Dedra Baumgarten  80 y o  male MRN: [id number]  Unit/Bed#: ICU 13 Encounter: [id number]      Assessment:  Principal Problem:    Acute kidney injury [facility])  Active Problems:    Benign prostatic hyperplasia with urinary frequency    Anxiety disorder    Dyslipidemia    Esophageal reflux    Essential hypertension    Lower leg DVT (deep venous thromboembolism), acute, right (HCC)    Pulmonary nodule seen on imaging study    Type 2 diabetes mellitus (HCC)    Paroxysmal atrial fibrillation (HCC)    Hyperkalemia      Plan:  I personally performed a bedside echo he has a large pericardial effusion the review of his CT scan from September showed a moderate pericardial effusion  He has not had adequate PO intake for days according to his family  He appears clinically dehydrated  He is uremic with creatinine of 11 and K of 6 8  The echocardiogram showed no right ventricular diastolic collapse and no right atrial collapse  I think is very reasonable at this point time to hydrate the patient significantly  This is highly likely a chronic effusion it may have become worse with acute uremia  Okay to move forward with CVVH  I discussed with Critical Care and told him if there is any acute hemodynamic changes or inability to dialyze or perform CVVH then I would recommend asking thoracic surgery to perform a pericardial window  I have personally notified thoracic surgery of the patient's situation and they have made it clear that they will be available should there be any changes in the patient's status  In terms of his atrial fibrillation if he develops atrial fibrillation with rapid ventricular response I would recommend boluses of metoprolol 2 5-5 mg IV avoid diltiazem given peripheral vasodilation also could try 125 mics of digoxin  Volume replacement is being initiated by Nephrology  We will get a repeat echo in the morning      IF ANY CHANGES IF BLOOD PRESSURE APPEARS TO BE DECREASING HEMODYNAMIC STATUS OR MENTAL STATUS CHANGING OR INTOLERANCE OF CVVH RECOMMEND PERICARDIAL WINDOW    History of Present Illness   Physician Requesting Consult: Dinh Carranza MD  Reason for Consult / Principal Problem:  Pericardial effusion  HPI: Yasmine Jones  is a 80y o  year old male who presents with malaise weakness with decreased p o  Intake nausea and vomiting for the past five days  He was noted to have a pericardial effusion on his chest x-ray  Prior CT scan in September also showed a pericardial effusion that was moderate in size  I was asked to assess the patient for pericardial tamponade  He has a history of hypertension and is on antihypertensive agents  Consults    Review of Systems:  Nausea vomiting malaise decreased p o  Intake and decreased urinary output no chest pain no shortness of breath no cough sputum no fevers no chills all other 12 point ROS negative    Historical Information   Past Medical History:   Diagnosis Date    Anxiety     Cardiomegaly     Diabetes ([address] )     DVT (deep venous thrombosis) (HCC)     right leg    GERD (gastroesophageal reflux disease)     Hypercholesterolemia     Hypertension     Irregular heart beat     paroxsysmal a fib    Pancreatic cyst     Pericardial effusion     Pleural effusion     Weight loss, non-intentional      Past Surgical History:   Procedure Laterality Date    APPENDECTOMY      CATARACT EXTRACTION      COLONOSCOPY      COLONOSCOPY N/A 1/24/2019    Procedure: COLONOSCOPY with polypectomies;  Surgeon: Magali Brooks MD;  Location: [facility]; Service: Gastroenterology    ESOPHAGOGASTRODUODENOSCOPY N/A 1/24/2019    Procedure: ESOPHAGOGASTRODUODENOSCOPY (EGD) with bx;  Surgeon: Magali Brooks MD;  Location: [facility];   Service: Gastroenterology    PROSTATE BIOPSY       Social History     Substance and Sexual Activity   Alcohol Use No     Social History     Substance and Sexual Activity   Drug Use No     Social History Tobacco Use   Smoking Status Former Smoker   Smokeless Tobacco Never Used   Tobacco Comment    current non-smoker, per [employer]     Family History:   Family History   Problem Relation Age of Onset    Diabetes Mother     Diabetes Father     Prostate cancer Brother     Diabetes Brother     Pulmonary embolism Sister        Meds/Allergies   current meds:   Current Facility-Administered Medications   Medication Dose Route Frequency    ALPRAZolam (XANAX) tablet 0 25 mg  0 25 mg Oral Q8H PRN    apixaban (ELIQUIS) tablet 2 5 mg  2 5 mg Oral BID    ascorbic acid (VITAMIN C) tablet 250 mg  250 mg Oral Daily    atorvastatin (LIPITOR) tablet 40 mg  40 mg Oral Daily With Dinner    cholecalciferol (VITAMIN D3) tablet 2,000 Units  2,000 Units Oral Daily    finasteride (PROSCAR) tablet 5 mg  5 mg Oral Daily    fish oil capsule 1,000 mg  1,000 mg Oral Daily    fluticasone-vilanterol (BREO ELLIPTA) 100-25 mcg/inh inhaler 1 puff  1 puff Inhalation Daily    insulin glargine (LANTUS) subcutaneous injection 12 Units 0 12 mL  12 Units Subcutaneous HS    insulin lispro (HumaLOG) 100 units/mL subcutaneous injection 1-6 Units  1-6 Units Subcutaneous TID AC    [START ON 2/24/2019] insulin lispro (HumaLOG) 100 units/mL subcutaneous injection 4 Units  4 Units Subcutaneous Daily With Breakfast    [START ON 2/24/2019] insulin lispro (HumaLOG) 100 units/mL subcutaneous injection 4 Units  4 Units Subcutaneous Daily With Lunch    insulin lispro (HumaLOG) 100 units/mL subcutaneous injection 4 Units  4 Units Subcutaneous Daily With Dinner    metoprolol (LOPRESSOR) injection 2 5 mg  2 5 mg Intravenous Q6H PRN    PARoxetine (PAXIL) tablet 30 mg  30 mg Oral Daily    sodium bicarbonate 150 mEq in sterile water 1,000 mL infusion   Intravenous Continuous    sodium chloride 0 9 % bolus 1,000 mL  1,000 mL Intravenous Once    terazosin (HYTRIN) capsule 2 mg  2 mg Oral Daily     Allergies   Allergen Reactions    Ace Inhibitors     Bee Venom        Objective   Vitals: Blood pressure (!) 96/44, pulse (!) 136, temperature 97 9 °F (36 6 °C), temperature source Temporal, resp  rate 20, height 5' 4" (1 626 m), weight 57 9 kg (127 lb 10 3 oz), SpO2 94 %  , Body mass index is 21 91 kg/m² ,   Orthostatic Blood Pressures      Most Recent Value   Blood Pressure  96/44  (Abnormal)  filed at 02/23/2019 1500   Patient Position - Orthostatic VS  Lying filed at 02/23/2019 1330            Intake/Output Summary (Last 24 hours) at 2/23/2019 1553  Last data filed at 2/23/2019 1203  Gross per 24 hour   Intake 500 ml   Output 60 ml   Net 440 ml       Invasive Devices     Peripheral Intravenous Line            Peripheral IV 02/23/19 Left Antecubital less than 1 day          Drain            Urethral Catheter Temperature probe 16 Fr  less than 1 day                  Physical Exam:  GEN: Rozina Vera   appears well, alert and oriented x 3, pleasant and cooperative   HEENT: pupils equal, round, and reactive to light; extraocular muscles intact  NECK: supple, no carotid bruits   HEART: regular rhythm, normal S1 and S2, no murmurs, clicks, gallops or rubs   LUNGS: clear to auscultation bilaterally; no wheezes, rales, or rhonchi   ABDOMEN: normal bowel sounds, soft, no tenderness, no distention  EXTREMITIES: peripheral pulses normal; no clubbing, cyanosis, or edema  NEURO: no focal findings   SKIN: normal without suspicious lesions on exposed skin    Lab Results:   Admission on 02/23/2019   Component Date Value    Sodium 02/23/2019 142     Potassium 02/23/2019 6 8*    Chloride 02/23/2019 99*    CO2 02/23/2019 13*    ANION GAP 02/23/2019 30*    BUN 02/23/2019 117*    Creatinine 02/23/2019 11 60*    Glucose 02/23/2019 110     Calcium 02/23/2019 9 3     AST 02/23/2019 16     ALT 02/23/2019 22     Alkaline Phosphatase 02/23/2019 69     Total Protein 02/23/2019 7 9     Albumin 02/23/2019 3 9     Total Bilirubin 02/23/2019 1 63*    eGFR 02/23/2019 4     WBC 02/23/2019 6 58     RBC 02/23/2019 3 11*    Hemoglobin 02/23/2019 10 0*    Hematocrit 02/23/2019 32 0*    MCV 02/23/2019 103*    MCH 02/23/2019 32 2     MCHC 02/23/2019 31 3*    RDW 02/23/2019 11 9     MPV 02/23/2019 10 0     Platelets 30/75/6943 211     nRBC 02/23/2019 0     Neutrophils Relative 02/23/2019 86*    Immat GRANS % 02/23/2019 1     Lymphocytes Relative 02/23/2019 8*    Monocytes Relative 02/23/2019 5     Eosinophils Relative 02/23/2019 0     Basophils Relative 02/23/2019 0     Neutrophils Absolute 02/23/2019 5 67     Immature Grans Absolute 02/23/2019 0 04     Lymphocytes Absolute 02/23/2019 0 55*    Monocytes Absolute 02/23/2019 0 30     Eosinophils Absolute 02/23/2019 0 00     Basophils Absolute 02/23/2019 0 02     Lipase 02/23/2019 207     pH, Saroj 02/23/2019 7 081*    pCO2, Saroj 02/23/2019 43 9     pO2, Saroj 02/23/2019 43 3     HCO3, Saroj 02/23/2019 12 8*    Base Excess, Saroj 02/23/2019 -16 4     O2 Content, Saroj 02/23/2019 9 9     O2 HGB, VENOUS 02/23/2019 68 0     Sodium 02/23/2019 140     Potassium 02/23/2019 6 8*    Chloride 02/23/2019 101     CO2 02/23/2019 15*    ANION GAP 02/23/2019 24*    BUN 02/23/2019 121*    Creatinine 02/23/2019 11 46*    Glucose 02/23/2019 102     Calcium 02/23/2019 9 3     eGFR 02/23/2019 4     Color, UA 02/23/2019 Yellow     Clarity, UA 02/23/2019 Slightly Cloudy     pH, UA 02/23/2019 5 0     Leukocytes, UA 02/23/2019 Negative     Nitrite, UA 02/23/2019 Negative     Protein, UA 02/23/2019 30 (1+)*    Glucose, UA 02/23/2019 Negative     Ketones, UA 02/23/2019 15 (1+)*    Urobilinogen, UA 02/23/2019 0 2     Bilirubin, UA 02/23/2019 Negative     Blood, UA 02/23/2019 Trace*    Specific Juda, UA 02/23/2019 1 020     RBC, UA 02/23/2019 None Seen     WBC, UA 02/23/2019 0-1*    Epithelial Cells 02/23/2019 Occasional     Bacteria, UA 02/23/2019 Innumerable*    AMORPH URATES 02/23/2019 Moderate        Imaging: I have personally reviewed pertinent films in PACS  Ct Abdomen Pelvis Wo Contrast    Result Date: 2/23/2019  Narrative: CT ABDOMEN AND PELVIS WITHOUT IV CONTRAST INDICATION:   abdominal pain  Generalized abdominal pain, nausea and vomiting for 2 weeks  COMPARISON:  CT abdomen pelvis September 2017, 2018  TECHNIQUE:  CT examination of the abdomen and pelvis was performed without intravenous contrast   Axial, sagittal, and coronal 2D reformatted images were created from the source data and submitted for interpretation  Radiation dose length product (DLP) for this visit:  300 mGy-cm   This examination, like all CT scans performed in the [facility], was performed utilizing techniques to minimize radiation dose exposure, including the use of iterative reconstruction and automated exposure control  Enteric contrast was not administered  FINDINGS: ABDOMEN LOWER CHEST:  Small right pleural effusion, increased in size from the prior study  Enlarging infiltrate left lower lobe representing atelectasis or pneumonia  The heart is enlarged  Coronary artery calcifications  There is a large pericardial effusion, increased in size from the prior study  LIVER/BILIARY TREE:  Unremarkable  GALLBLADDER: Moderately distended  No calcified gallstones  No pericholecystic inflammatory change  SPLEEN:  Calcified granuloma  PANCREAS:  Atrophic  ADRENAL GLANDS:  Unremarkable  KIDNEYS/URETERS:  Unremarkable  No hydronephrosis  STOMACH AND BOWEL:  Evaluation of the GI tract is limited due to lack of oral contrast material  Stomach moderately distended and filled with recently ingested food products  There is a hiatal hernia  No evidence of small bowel obstruction  Moderate amount of feces throughout the colon  Scattered diverticula in the descending colon and sigmoid colon  Sigmoid colon redundant and tortuous  Nothing to suggest acute diverticulitis  APPENDIX:  Surgically absent   ABDOMINOPELVIC CAVITY:  No ascites or free intraperitoneal air  No lymphadenopathy  VESSELS:  Atherosclerotic changes are present  No evidence of aneurysm  PELVIS REPRODUCTIVE ORGANS:  Prostate gland enlarged and heterogeneous in CT density  Elevation of the floor of the urinary bladder  URINARY BLADDER:  Incompletely distended  Circumferential wall thickening  Elevation of the bladder floor  No discrete cleavage plane with the underlying prostate gland  ABDOMINAL WALL/INGUINAL REGIONS:  Unremarkable  OSSEOUS STRUCTURES:  No acute fracture or destructive osseous lesion  Degenerative changes lumbar spine  Impression: 1  Limited examination due to lack of oral and intravenous contrast material  2   Enlarging, incompletely visualized large pericardial effusion  3   Moderate constipation  4   Enlarging left pleural effusion and left basilar infiltrate representing atelectasis or pneumonia  5   Prostatomegaly with bladder outlet obstruction  The study was marked in [facility] for immediate notification   Workstation performed: [id number]       Cardiac testing:   Results for orders placed during the hospital encounter of 18   Echo complete with contrast if indicated    Narrative [facility]  [facility], [address] [place] [address]  [phone]    Transthoracic Echocardiogram  2D, M-mode, Doppler, and Color Doppler    Study date:  2018    Patient: Gracia Eagle  MR number: [id number]  Account number: [de-identified]  : 1935  Age: 80 years  Gender: Male  Status: Outpatient  Location: [address] [place] [address] [facility]  Height: 65 in  Weight: 152 lb  BP: 122/ 62 mmHg    Indications: Pericardial effusion; Atrial fibrillation    Diagnoses: I31 3 - Pericardial effusion (noninflammatory), I48 0 - Atrial fibrillation    Sonographer:  MARVIN Narayanan, RDCS  Primary Physician:  Nupur Babcock DO  Referring Physician:  Tomeka Hernandez MD  Group:  [facility] [id number] [facility]  Interpreting Physician:  Yecenia Tello MD    SUMMARY    LEFT VENTRICLE:  Systolic function was at the lower limits of normal  Ejection fraction was estimated to be 50 %  There were no regional wall motion abnormalities  There was mild diffuse hypokinesis  Wall thickness was at the upper limits of normal     MITRAL VALVE:  There was trace regurgitation  TRICUSPID VALVE:  There was mild regurgitation  Pulmonary artery systolic pressure was within the normal range  PULMONIC VALVE:  There was mild regurgitation  PERICARDIUM:  A small to moderate pericardial effusion was identified circumferential to the heart  There was no evidence of hemodynamic compromise  COMPARISONS:  The pericardial effusion was previously described as being moderate in size  Comparison was made with the previous study of 01-Sep-2017  HISTORY: PRIOR HISTORY: Pericardial effusion; Atrial fibrillation; Hypertension; Dyslipidemia; Cardiomegaly; DVT; Pulmonary embolism; Diabetes Mellitus type II; Skin Cancer; Former smoker    PROCEDURE: The study was performed in the [address] [facility]  This was a routine study  The transthoracic approach was used  The study included complete 2D imaging, M-mode, complete spectral Doppler, and color Doppler  The  heart rate was 85 bpm, at the start of the study  Images were obtained from the parasternal, apical, subcostal, and suprasternal notch acoustic windows  Image quality was adequate  LEFT VENTRICLE: Size was normal  Systolic function was at the lower limits of normal  Ejection fraction was estimated to be 50 %  There were no regional wall motion abnormalities  There was mild diffuse hypokinesis  Wall thickness was at  the upper limits of normal  No evidence of apical thrombus   DOPPLER: Left ventricular diastolic function parameters were normal     RIGHT VENTRICLE: The size was normal  Systolic function was normal  Wall thickness was normal     LEFT ATRIUM: Size was normal     RIGHT ATRIUM: Size was normal     MITRAL VALVE: Valve structure was normal  There was mild thickening  There was normal leaflet separation  DOPPLER: The transmitral velocity was within the normal range  There was no evidence for stenosis  There was trace regurgitation  AORTIC VALVE: The valve was trileaflet  Leaflets exhibited mildly increased thickness, mild calcification, and normal cuspal separation  DOPPLER: Transaortic velocity was within the normal range  There was no evidence for stenosis  There  was no significant regurgitation  TRICUSPID VALVE: The valve structure was normal  There was normal leaflet separation  DOPPLER: The transtricuspid velocity was within the normal range  There was no evidence for stenosis  There was mild regurgitation  Pulmonary artery  systolic pressure was within the normal range  PULMONIC VALVE: Leaflets exhibited normal thickness, no calcification, and normal cuspal separation  DOPPLER: The transpulmonic velocity was within the normal range  There was mild regurgitation  PERICARDIUM: A small to moderate pericardial effusion was identified circumferential to the heart  There was no evidence of hemodynamic compromise  The pericardium was normal in appearance  AORTA: The root exhibited normal size  SYSTEMIC VEINS: IVC: The inferior vena cava was normal in size      SYSTEM MEASUREMENT TABLES    2D  %FS: 34 57 %  Ao Diam: 3 29 cm  EDV(Teich): 80 75 ml  EF(Cube): 71 99 %  EF(Teich): 64 04 %  ESV(Cube): 21 49 ml  ESV(Teich): 29 04 ml  IVSd: 1 12 cm  LA Area: 11 61 cm2  LA Diam: 3 01 cm  LVEDV MOD A4C: 79 27 ml  LVEF MOD A4C: 60 54 %  LVESV MOD A4C: 31 28 ml  LVIDd: 4 25 cm  LVIDs: 2 78 cm  LVLd A4C: 6 57 cm  LVLs A4C: 5 64 cm  LVPWd: 1 08 cm  RA Area: 13 13 cm2  RV Diam : 3 58 cm  SV MOD A4C: 47 99 ml  SV(Cube): 55 22 ml  SV(Teich): 51 72 ml    CW  TR Vmax: 2 48 m/s  TR maxP 69 mmHg    MM  TAPSE: 1 93 cm    PW  E': 0 07 m/s  E/E': 10  MV A Dre: 0 85 m/s  MV Dec Ford: 4 04 m/s2  MV DecT: 183 04 ms  MV E Dre: 0 74 m/s  MV E/A Ratio: 0 88    [facility] Accredited Echocardiography Laboratory    Prepared and electronically signed by    [place] MD Sera  Signed [id number] 10:53:08       No results found for this or any previous visit  No results found for this or any previous visit  No results found for this or any previous visit             Initial EKG showed sinus rhythm heart rate 90 beats per minute 2nd EKG atrial fibrillation with rapid ventricular response

## 2019-02-23 NOTE — CONSULTS
Consultation - Nephrology   Keli Dominguez  80 y o  male MRN: [id number]  Unit/Bed#: ED 16 Encounter: [id number]    ASSESSMENT:  1  Acute Kidney Injury- likely secondary to prerenal azotemia with acute tubular necrosis due to poor oral intake, vomiting, lasix, losartan,    - medeiros catheter inserted with only 60ml output initially = not retaining  - CT scan without hydronephrosis  - UA: 1+ ketones, trace blood (no rbc), 1+ protein, innumerable bacteria  - continue IVF  - hold lasix and losartan  - avoid hypotension, avoid nephrotoxics, avoid contrast  - no urgent indication for dialysis currently but patient agreeable to dialysis if needed, closely monitor  2  Chronic Kidney Disease stage III- Baseline creatinine is 1 3-1 5   3  Azotemia- check FOBT to rule out bleed but likely secondary to ERIK  - continue to monitor with IVF  4  Hyperkalemia- treat with medical management  - if unable to medically manage, will have to dialyze  5  Metabolic Acidosis- improving with bicarbonate drip  6  Large Pericardial Effusion- consider cardiology consult  7  Enlarging left pleural effusion- monitor respiratory status with IVF  - CXR pending  8  Anemia- continue to monitor    PLAN:  Medically treat potassium (again)  q6h BMP  Strict intake/output  Check FOBT  Continue bicarbonate drip and increase to 150ml/hr  Consider bolus NSS      HISTORY OF PRESENT ILLNESS:  Requesting Physician: Guan[place]ito To MD  Reason for Consult: ERIK    Keli Dominguez  is a 80y o  year old male who was admitted to [facility] 28  after presenting with generalized malaise and weakness and poor oral intake  The patient states he has been weak for quite some time but since Monday (5 days ago) he started having vomiting, not eating anything, not drinking anything  He denies SOB  He denies NSAID use  He denies changes in urination  A renal consultation is requested today for assistance in the management of acute kidney injury    Looking back at medical records, the patient's baseline creatinine is around 1 3-1 5 from August 2018  He denies family history of renal dysfunction  He denies anyone being on dialysis  He states that if dialysis is indicated he would be agreeable to it  PAST MEDICAL HISTORY:  Past Medical History:   Diagnosis Date    Anxiety     Cardiomegaly     Diabetes ([address] )     DVT [facility] thrombosis) ([facility])     right leg    GERD (gastroesophageal reflux disease)     Hypercholesterolemia     Hypertension     Irregular heart beat     paroxsysmal a fib    Pancreatic cyst     Pericardial effusion     Pleural effusion     Weight loss, non-intentional        PAST SURGICAL HISTORY:  Past Surgical History:   Procedure Laterality Date    APPENDECTOMY      CATARACT EXTRACTION      COLONOSCOPY      COLONOSCOPY N/A 1/24/2019    Procedure: COLONOSCOPY with polypectomies;  Surgeon: Ellen Young MD;  Location: [facility]; Service: Gastroenterology    ESOPHAGOGASTRODUODENOSCOPY N/A 1/24/2019    Procedure: ESOPHAGOGASTRODUODENOSCOPY (EGD) with bx;  Surgeon: Ellen Young MD;  Location: [facility];   Service: Gastroenterology    PROSTATE BIOPSY         ALLERGIES:  Allergies   Allergen Reactions    Ace Inhibitors     Bee Venom        SOCIAL HISTORY:  Social History     Substance and Sexual Activity   Alcohol Use No     Social History     Substance and Sexual Activity   Drug Use No     Social History     Tobacco Use   Smoking Status Former Smoker   Smokeless Tobacco Never Used   Tobacco Comment    current non-smoker, per [employer]       FAMILY HISTORY:  Family History   Problem Relation Age of Onset    Diabetes Mother     Diabetes Father     Prostate cancer Brother     Diabetes Brother     Pulmonary embolism Sister        MEDICATIONS:    Current Facility-Administered Medications:     heparin (porcine) subcutaneous injection 5,000 Units, 5,000 Units, Subcutaneous, Q8H [facility] **AND** Platelet count, , , Once, Sherin Pandey SUJATA Bernal    sodium bicarbonate 150 mEq in sterile water 1,000 mL infusion, , Intravenous, Continuous, Donny Biswas MD, Last Rate: 125 mL/hr at 02/23/19 1203    sodium chloride 0 9 % bolus 1,000 mL, 1,000 mL, Intravenous, Once, Donny Biswas MD    Current Outpatient Medications:     ALPRAZolam (XANAX) 0 25 mg tablet, Take 1 tablet (0 25 mg total) by mouth every 8 (eight) hours as needed for anxiety, Disp: 270 tablet, Rfl: 0    amLODIPine (NORVASC) 5 mg tablet, TAKE 1 TABLET DAILY, Disp: 90 tablet, Rfl: 3    apixaban (ELIQUIS) 5 mg, Take 1 tablet (5 mg total) by mouth 2 (two) times a day, Disp: 180 tablet, Rfl: 3    Ascorbic Acid (V-R VITAMIN C CR PO), Take by mouth, Disp: , Rfl:     Cholecalciferol (VITAMIN D) 2000 units CAPS, Take by mouth, Disp: , Rfl:     DULERA 100-5 MCG/ACT inhaler, INHALE 2 PUFFS EVERY 12 (TWELVE) HOURS, Disp: 13 Inhaler, Rfl: 2    EPINEPHrine (EPIPEN) 0 3 mg/0 3 mL SOAJ, Inject as directed, Disp: , Rfl:     finasteride (PROSCAR) 5 mg tablet, Take 1 tablet (5 mg total) by mouth daily, Disp: 90 tablet, Rfl: 3    furosemide (LASIX) 40 mg tablet, Take 1 tablet (40 mg total) by mouth daily, Disp: 90 tablet, Rfl: 3    glucose blood (ONE TOUCH ULTRA TEST) test strip, Use daily as directed , Disp: 200 each, Rfl: 3    Lancets (ONETOUCH ULTRASOFT) lancets, Use daily as directed , Disp: 100 each, Rfl: 2    Lancets (ONETOUCH ULTRASOFT) lancets, USE DAILY AS DIRECTED , Disp: 100 each, Rfl: 3    losartan (COZAAR) 50 mg tablet, TAKE 1 TABLET DAILY, Disp: 90 tablet, Rfl: 3    metFORMIN (GLUCOPHAGE) 500 mg tablet, TAKE 1 TABLET TWICE DAILY  , Disp: 180 tablet, Rfl: 3    Omega-3 Fatty Acids (FISH OIL) 1200 MG CAPS, Take by mouth, Disp: , Rfl:     omeprazole (PriLOSEC) 40 MG capsule, TAKE DAILY AS DIRECTED , Disp: , Rfl: 3    PARoxetine (PAXIL) 30 mg tablet, TAKE 1 TABLET BY MOUTH EVERY DAY AS DIRECTED, Disp: 90 tablet, Rfl: 0    potassium chloride (MICRO-K) 10 MEQ CR capsule, TAKE 1 CAPSULE BY MOUTH EVERY DAY, Disp: 90 capsule, Rfl: 1    rosuvastatin (CRESTOR) 40 MG tablet, Take 1 tablet (40 mg total) by mouth daily for 90 days, Disp: 90 tablet, Rfl: 3    terazosin (HYTRIN) 2 mg capsule, Take 1 capsule by mouth daily, Disp: 90 capsule, Rfl: 3    TRUE METRIX BLOOD GLUCOSE TEST test strip, TEST TWO TIMES A DAY, Disp: 300 each, Rfl: 0    REVIEW OF SYSTEMS:  A complete review of systems was performed and found to be negative unless otherwise noted in the history of present illness  General: No fevers, chills  + poor oral intake  Cardiovascular:  No chest pain, No leg edema  Respiratory: No cough, sputum production,  No shortness of breath  Gastrointestinal:  + nausea/vomiting,  No diarrhea,  No abdominal pain  Genitourinary: No hematuria  No foamy urine  No dysuria  No incomplete emptying  PHYSICAL EXAM:  Current Weight: Weight - Scale: 58 2 kg (128 lb 4 9 oz)(Simultaneous filing  User may not have seen previous data )  First Weight: Weight - Scale: 58 2 kg (128 lb 4 9 oz)(Simultaneous filing  User may not have seen previous data )  Vitals:    02/23/19 0821 02/23/19 1058 02/23/19 1330   BP: 130/65 (!) 107/49 (!) 97/47   BP Location: Right arm Right arm Right arm   Pulse: 95 76 86   Resp: 20 18 20   Temp: 97 6 °F (36 4 °C)     TempSrc: Oral     SpO2: 94% 95% 95%   Weight: 58 2 kg (128 lb 4 9 oz)         Intake/Output Summary (Last 24 hours) at 2/23/2019 1334  Last data filed at 2/23/2019 1203  Gross per 24 hour   Intake 500 ml   Output 60 ml   Net 440 ml     General: NAD  Skin: no rash, dry, pale  HEENT: normocephalic, mm dry  Neck: supple  Chest: CTAB  CVS: RRR  Abdomen: soft nt nd  Extremities: no edema  Neuro: alert awake  Psych: mood and affect appropriate    Invasive Devices:   Urethral Catheter Temperature probe 16 Fr   (Active)   Amt returned on insertion(mL) 60 mL 2/23/2019 11:19 AM   Site Assessment Clean;Skin intact 2/23/2019 11:19 AM   Collection Container Standard drainage bag 2/23/2019 11:19 AM   Securement Method Securing device (Describe) 2/23/2019 11:19 AM     Lab Results:   Results from last 7 days   Lab Units 02/23/19  1144 02/23/19  0836   WBC Thousand/uL  --  6 58   HEMOGLOBIN g/dL  --  10 0*   HEMATOCRIT %  --  32 0*   PLATELETS Thousands/uL  --  211   POTASSIUM mmol/L 6 8* 6 8*   CHLORIDE mmol/L 101 99*   CO2 mmol/L 15* 13*   BUN mg/dL 121* 117*   CREATININE mg/dL 11 46* 11 60*   CALCIUM mg/dL 9 3 9 3   ALK PHOS U/L  --  69   ALT U/L  --  22   AST U/L  --  16

## 2019-02-24 ENCOUNTER — APPOINTMENT (INPATIENT)
Dept: NON INVASIVE DIAGNOSTICS | Facility: HOSPITAL | Age: 84
DRG: 674 | End: 2019-02-24
Payer: COMMERCIAL

## 2019-02-24 LAB
ANION GAP SERPL CALCULATED.3IONS-SCNC: 13 MMOL/L (ref 4–13)
ANION GAP SERPL CALCULATED.3IONS-SCNC: 7 MMOL/L (ref 4–13)
ANION GAP SERPL CALCULATED.3IONS-SCNC: 7 MMOL/L (ref 4–13)
APTT PPP: 38 SECONDS (ref 26–38)
ATRIAL RATE: 91 BPM
BUN SERPL-MCNC: 57 MG/DL (ref 5–25)
BUN SERPL-MCNC: 76 MG/DL (ref 5–25)
BUN SERPL-MCNC: 93 MG/DL (ref 5–25)
CA-I BLD-SCNC: 1.02 MMOL/L (ref 1.12–1.32)
CA-I BLD-SCNC: 1.1 MMOL/L (ref 1.12–1.32)
CA-I BLD-SCNC: 1.11 MMOL/L (ref 1.12–1.32)
CALCIUM SERPL-MCNC: 7.9 MG/DL (ref 8.3–10.1)
CALCIUM SERPL-MCNC: 8.4 MG/DL (ref 8.3–10.1)
CALCIUM SERPL-MCNC: 8.4 MG/DL (ref 8.3–10.1)
CHLORIDE SERPL-SCNC: 104 MMOL/L (ref 100–108)
CHLORIDE SERPL-SCNC: 95 MMOL/L (ref 100–108)
CHLORIDE SERPL-SCNC: 99 MMOL/L (ref 100–108)
CO2 SERPL-SCNC: 28 MMOL/L (ref 21–32)
CREAT SERPL-MCNC: 5.61 MG/DL (ref 0.6–1.3)
CREAT SERPL-MCNC: 7.52 MG/DL (ref 0.6–1.3)
CREAT SERPL-MCNC: 8.84 MG/DL (ref 0.6–1.3)
ERYTHROCYTE [DISTWIDTH] IN BLOOD BY AUTOMATED COUNT: 11.9 % (ref 11.6–15.1)
GFR SERPL CREATININE-BSD FRML MDRD: 5 ML/MIN/1.73SQ M
GFR SERPL CREATININE-BSD FRML MDRD: 6 ML/MIN/1.73SQ M
GFR SERPL CREATININE-BSD FRML MDRD: 9 ML/MIN/1.73SQ M
GLUCOSE SERPL-MCNC: 162 MG/DL (ref 65–140)
GLUCOSE SERPL-MCNC: 215 MG/DL (ref 65–140)
GLUCOSE SERPL-MCNC: 232 MG/DL (ref 65–140)
GLUCOSE SERPL-MCNC: 239 MG/DL (ref 65–140)
GLUCOSE SERPL-MCNC: 70 MG/DL (ref 65–140)
GLUCOSE SERPL-MCNC: 73 MG/DL (ref 65–140)
GLUCOSE SERPL-MCNC: 78 MG/DL (ref 65–140)
HCT VFR BLD AUTO: 21.9 % (ref 36.5–49.3)
HGB BLD-MCNC: 7.4 G/DL (ref 12–17)
INR PPP: 1.14 (ref 0.86–1.17)
MAGNESIUM SERPL-MCNC: 1.2 MG/DL (ref 1.6–2.6)
MAGNESIUM SERPL-MCNC: 1.8 MG/DL (ref 1.6–2.6)
MAGNESIUM SERPL-MCNC: 2.1 MG/DL (ref 1.6–2.6)
MCH RBC QN AUTO: 32.3 PG (ref 26.8–34.3)
MCHC RBC AUTO-ENTMCNC: 33.8 G/DL (ref 31.4–37.4)
MCV RBC AUTO: 96 FL (ref 82–98)
P AXIS: 86 DEGREES
PHOSPHATE SERPL-MCNC: 4 MG/DL (ref 2.3–4.1)
PHOSPHATE SERPL-MCNC: 4.2 MG/DL (ref 2.3–4.1)
PHOSPHATE SERPL-MCNC: 5.6 MG/DL (ref 2.3–4.1)
PHOSPHATE SERPL-MCNC: 5.9 MG/DL (ref 2.3–4.1)
PLATELET # BLD AUTO: 122 THOUSANDS/UL (ref 149–390)
PMV BLD AUTO: 9.9 FL (ref 8.9–12.7)
POTASSIUM SERPL-SCNC: 3.9 MMOL/L (ref 3.5–5.3)
POTASSIUM SERPL-SCNC: 4.1 MMOL/L (ref 3.5–5.3)
POTASSIUM SERPL-SCNC: 4.3 MMOL/L (ref 3.5–5.3)
PR INTERVAL: 196 MS
PROTHROMBIN TIME: 14.7 SECONDS (ref 11.8–14.2)
QRS AXIS: 45 DEGREES
QRSD INTERVAL: 92 MS
QT INTERVAL: 358 MS
QTC INTERVAL: 440 MS
RBC # BLD AUTO: 2.29 MILLION/UL (ref 3.88–5.62)
SODIUM SERPL-SCNC: 130 MMOL/L (ref 136–145)
SODIUM SERPL-SCNC: 139 MMOL/L (ref 136–145)
SODIUM SERPL-SCNC: 140 MMOL/L (ref 136–145)
T WAVE AXIS: 35 DEGREES
VENTRICULAR RATE: 91 BPM
WBC # BLD AUTO: 3.99 THOUSAND/UL (ref 4.31–10.16)

## 2019-02-24 PROCEDURE — 82330 ASSAY OF CALCIUM: CPT | Performed by: INTERNAL MEDICINE

## 2019-02-24 PROCEDURE — 99233 SBSQ HOSP IP/OBS HIGH 50: CPT | Performed by: INTERNAL MEDICINE

## 2019-02-24 PROCEDURE — 80048 BASIC METABOLIC PNL TOTAL CA: CPT | Performed by: PHYSICIAN ASSISTANT

## 2019-02-24 PROCEDURE — 99232 SBSQ HOSP IP/OBS MODERATE 35: CPT | Performed by: INTERNAL MEDICINE

## 2019-02-24 PROCEDURE — 93306 TTE W/DOPPLER COMPLETE: CPT

## 2019-02-24 PROCEDURE — 90945 DIALYSIS ONE EVALUATION: CPT

## 2019-02-24 PROCEDURE — 93010 ELECTROCARDIOGRAM REPORT: CPT | Performed by: INTERNAL MEDICINE

## 2019-02-24 PROCEDURE — 85730 THROMBOPLASTIN TIME PARTIAL: CPT | Performed by: NURSE PRACTITIONER

## 2019-02-24 PROCEDURE — 80048 BASIC METABOLIC PNL TOTAL CA: CPT | Performed by: INTERNAL MEDICINE

## 2019-02-24 PROCEDURE — 90945 DIALYSIS ONE EVALUATION: CPT | Performed by: INTERNAL MEDICINE

## 2019-02-24 PROCEDURE — 85027 COMPLETE CBC AUTOMATED: CPT | Performed by: NURSE PRACTITIONER

## 2019-02-24 PROCEDURE — 85610 PROTHROMBIN TIME: CPT | Performed by: NURSE PRACTITIONER

## 2019-02-24 PROCEDURE — 83735 ASSAY OF MAGNESIUM: CPT | Performed by: INTERNAL MEDICINE

## 2019-02-24 PROCEDURE — 84100 ASSAY OF PHOSPHORUS: CPT | Performed by: PHYSICIAN ASSISTANT

## 2019-02-24 PROCEDURE — 84100 ASSAY OF PHOSPHORUS: CPT | Performed by: INTERNAL MEDICINE

## 2019-02-24 PROCEDURE — 83735 ASSAY OF MAGNESIUM: CPT | Performed by: PHYSICIAN ASSISTANT

## 2019-02-24 PROCEDURE — 84100 ASSAY OF PHOSPHORUS: CPT | Performed by: NURSE PRACTITIONER

## 2019-02-24 PROCEDURE — 82948 REAGENT STRIP/BLOOD GLUCOSE: CPT

## 2019-02-24 RX ORDER — DEXTROSE AND SODIUM CHLORIDE 5; .9 G/100ML; G/100ML
50 INJECTION, SOLUTION INTRAVENOUS CONTINUOUS
Status: DISCONTINUED | OUTPATIENT
Start: 2019-02-24 | End: 2019-02-28

## 2019-02-24 RX ORDER — POTASSIUM CHLORIDE 14.9 MG/ML
20 INJECTION INTRAVENOUS
Status: COMPLETED | OUTPATIENT
Start: 2019-02-24 | End: 2019-02-24

## 2019-02-24 RX ORDER — MAGNESIUM SULFATE 1 G/100ML
1 INJECTION INTRAVENOUS ONCE
Status: COMPLETED | OUTPATIENT
Start: 2019-02-24 | End: 2019-02-24

## 2019-02-24 RX ORDER — HEPARIN SODIUM 1000 [USP'U]/ML
2200 INJECTION, SOLUTION INTRAVENOUS; SUBCUTANEOUS AS NEEDED
Status: DISCONTINUED | OUTPATIENT
Start: 2019-02-24 | End: 2019-02-24

## 2019-02-24 RX ORDER — HEPARIN SODIUM 10000 [USP'U]/100ML
3-30 INJECTION, SOLUTION INTRAVENOUS
Status: DISCONTINUED | OUTPATIENT
Start: 2019-02-24 | End: 2019-02-24

## 2019-02-24 RX ORDER — HEPARIN SODIUM 1000 [USP'U]/ML
4400 INJECTION, SOLUTION INTRAVENOUS; SUBCUTANEOUS AS NEEDED
Status: DISCONTINUED | OUTPATIENT
Start: 2019-02-24 | End: 2019-02-24

## 2019-02-24 RX ORDER — MAGNESIUM SULFATE HEPTAHYDRATE 40 MG/ML
2 INJECTION, SOLUTION INTRAVENOUS ONCE
Status: COMPLETED | OUTPATIENT
Start: 2019-02-24 | End: 2019-02-24

## 2019-02-24 RX ORDER — HEPARIN SODIUM 1000 [USP'U]/ML
4400 INJECTION, SOLUTION INTRAVENOUS; SUBCUTANEOUS ONCE
Status: DISCONTINUED | OUTPATIENT
Start: 2019-02-24 | End: 2019-02-24

## 2019-02-24 RX ADMIN — DEXTROSE AND SODIUM CHLORIDE 100 ML/HR: 5; .9 INJECTION, SOLUTION INTRAVENOUS at 17:18

## 2019-02-24 RX ADMIN — CALCIUM GLUCONATE 2 G: 98 INJECTION, SOLUTION INTRAVENOUS at 03:52

## 2019-02-24 RX ADMIN — MAGNESIUM SULFATE IN WATER 2 G: 40 INJECTION, SOLUTION INTRAVENOUS at 03:53

## 2019-02-24 RX ADMIN — POTASSIUM CHLORIDE 20 MEQ: 200 INJECTION, SOLUTION INTRAVENOUS at 11:36

## 2019-02-24 RX ADMIN — POTASSIUM CHLORIDE 20 MEQ: 200 INJECTION, SOLUTION INTRAVENOUS at 13:17

## 2019-02-24 RX ADMIN — PANTOPRAZOLE SODIUM 40 MG: 40 TABLET, DELAYED RELEASE ORAL at 05:06

## 2019-02-24 RX ADMIN — MAGNESIUM SULFATE HEPTAHYDRATE 1 G: 1 INJECTION, SOLUTION INTRAVENOUS at 16:51

## 2019-02-24 RX ADMIN — FLUTICASONE FUROATE AND VILANTEROL TRIFENATATE 1 PUFF: 100; 25 POWDER RESPIRATORY (INHALATION) at 11:35

## 2019-02-24 RX ADMIN — INSULIN GLARGINE 12 UNITS: 100 INJECTION, SOLUTION SUBCUTANEOUS at 21:20

## 2019-02-24 RX ADMIN — DEXTROSE AND SODIUM CHLORIDE 500 ML: 5; .9 INJECTION, SOLUTION INTRAVENOUS at 11:30

## 2019-02-24 RX ADMIN — DEXTROSE AND SODIUM CHLORIDE 100 ML/HR: 5; .9 INJECTION, SOLUTION INTRAVENOUS at 12:30

## 2019-02-24 RX ADMIN — INSULIN LISPRO 3 UNITS: 100 INJECTION, SOLUTION INTRAVENOUS; SUBCUTANEOUS at 16:46

## 2019-02-24 RX ADMIN — ATORVASTATIN CALCIUM 40 MG: 40 TABLET, FILM COATED ORAL at 17:51

## 2019-02-24 RX ADMIN — CALCIUM GLUCONATE 1 G: 98 INJECTION, SOLUTION INTRAVENOUS at 16:51

## 2019-02-24 RX ADMIN — CALCIUM GLUCONATE 1 G: 98 INJECTION, SOLUTION INTRAVENOUS at 11:27

## 2019-02-24 NOTE — TREATMENT PLAN
Discussed lab results with nurse  Urine output minimal, now with medeiros catheter  Cardiology notes reviewed    K 5 5 from 6 8 temporized with pericadialy effusion, uremia, hyperkalemia will plan on initiating CVVHD 2K/3Ca bath run even to positive and continue ongoing monitoring in ICU of hemodynamics

## 2019-02-24 NOTE — UTILIZATION REVIEW
Initial Clinical Review    Admission: Date/Time/Statement: 2/23/19 @ 1150   Orders Placed This Encounter   Procedures    Inpatient Admission     Standing Status:   Standing     Number of Occurrences:   1     Order Specific Question:   Admitting Physician     Answer:   Agustina Saunders [[id number]]     Order Specific Question:   Level of Care     Answer:   Level 1 Stepdown [13]     Order Specific Question:   Estimated length of stay     Answer:   More than 2 Midnights     Order Specific Question:   Certification     Answer:   I certify that inpatient services are medically necessary for this patient for a duration of greater than two midnights  See H&P and MD Progress Notes for additional information about the patient's course of treatment  ED: Date/Time/Mode of Arrival:   ED Arrival Information     Expected Arrival Acuity Means of Arrival Escorted By Service Admission Type    - 2/23/2019 08:15 Urgent Walk-In Family Member Critical Care/ICU Urgent    Arrival Complaint    vomiting        Chief Complaint:   Chief Complaint   Patient presents with    Abdominal Pain     Generalized abominal pain, nausea and vomiting x2 weeks  Per wife, symptoms all started after having a colonscopy and endoscopy  Decreased appetite  History of Illness: 79 yo male with HTN, diabetes, known percardial effusion and h/o pleural effusion, DVT, brought to ED by his wife for c/o nausea, vomiting, almost daily going back about 2 weeks, onset seemed to be after EGD/colonoscopy 1/24/19, and c/o abdominal pain, not severe, more upper abdominal area  His appetite has been very poor, and he said he is not eating or drinking much at all  He denies fever, chills, diarrhea-says he had normal BM yesterday  He denies urinary symptoms      MM dry,  abd  With generalized tenderness    ED Vital Signs:   ED Triage Vitals   Temperature Pulse Respirations Blood Pressure SpO2   02/23/19 0821 02/23/19 0821 02/23/19 0821 02/23/19 0821 02/23/19 0821   97 6 °F (36 4 °C) 95 20 130/65 94 %      Temp Source Heart Rate Source Patient Position - Orthostatic VS BP Location FiO2 (%)   02/23/19 0821 02/23/19 0821 02/23/19 0821 02/23/19 0821 --   Oral Monitor Lying Right arm       Pain Score       02/23/19 1208       No Pain        Wt Readings from Last 1 Encounters:   02/24/19 58 2 kg (128 lb 4 9 oz)     Vital Signs (abnormal):   02/23/19 1330 -- 86 20 97/47 95 % -- AF   02/23/19 1317 -- -- -- -- --     02/23/19 1058 -- 76 18 107/49          Pertinent Labs/Diagnostic Test Results:   Na 142,  K 6 8,   Cl 99,  CO2  13 ,  ,  Cr 11 60,  eGFR 4,  A gap 30,  T Bili 1 63  WBC's 6 58,   H&H 10 / 32  Urine: 1+ protein,  1+ketones,  Trace blood,   innum bacteria    CXR: Trace left pleural effusion   Stable mild cardiomegaly    CT A&P:    Limited examination due to lack of oral and intravenous contrast material   2   Enlarging, incompletely visualized large pericardial effusion  3   Moderate constipation  4   Enlarging left pleural effusion and left basilar infiltrate representing atelectasis or pneumonia  5   Prostatomegaly with bladder outlet obstruction      ED Treatment:  BEST Placed  Medication Administration from 02/23/2019 0815 to 02/23/2019 1441       Date/Time Order Dose Route Action Action by Comments     02/23/2019 1203 sodium chloride 0 9 % bolus 1,000 mL 0 mL Intravenous Stopped       02/23/2019 0835 sodium chloride 0 9 % bolus 1,000 mL 1,000 mL Intravenous New Bag       02/23/2019 1036 calcium gluconate 1 g in sodium chloride 0 9 % 100 mL IVPB 0 g Intravenous Stopped       02/23/2019 0944 calcium gluconate 1 g in sodium chloride 0 9 % 100 mL IVPB 1 g Intravenous New Bag       02/23/2019 0934 albuterol inhalation solution 5 mg 5 mg Nebulization Given       02/23/2019 0936 insulin regular (HumuLIN R,NovoLIN R) injection 10 Units 10 Units Intravenous Given       02/23/2019 0940 sodium bicarbonate 8 4 % injection 50 mEq 50 mEq Intravenous Given       02/23/2019 1148 sodium chloride 0 9 % bolus 1,000 mL 1,000 mL Intravenous Not Given  MD aware , switched order     02/23/2019 0937 dextrose 50 % IV solution 25 mL 25 mL Intravenous Given       02/23/2019 1050 lidocaine (URO-JET) 2 % topical gel   Topical Given       02/23/2019 1345 sodium bicarbonate 150 mEq in sterile water 1,000 mL infusion   Intravenous Rate/Dose Change       02/23/2019 1203 sodium bicarbonate 150 mEq in sterile water 1,000 mL infusion   Intravenous New Bag       02/23/2019 1405 albuterol inhalation solution 2 5 mg 2 5 mg Nebulization Given       02/23/2019 1409 dextrose 50 % IV solution 50 mL 50 mL Intravenous Given       02/23/2019 1407 insulin regular (HumuLIN R,NovoLIN R) injection 10 Units 10 Units Intravenous Given       02/23/2019 1416 sodium polystyrene (KAYEXALATE) powder 30 g 30 g Oral Given          Past Medical/Surgical History:   Past Medical History:   Diagnosis Date    Anxiety     Cardiomegaly     Diabetes ([address] )     [facility]     GERD (gastroesophageal reflux disease)     Hypercholesterolemia     Hypertension     Irregular heart beat     Pancreatic cyst     Pericardial effusion     Pleural effusion     Weight loss, non-intentional      Admitting Diagnosis: Hyperkalemia [E87 5]  Vomiting [R65 42]  Metabolic acidosis [G99 8]  Renal failure [N19]  Volume depletion [E86 9]     Age/Sex: 80 y o  male  Assessment/Plan:  79 yo Male admitted with ERIK with severe metabolic acidosis  Baseline Cr 1 3 - 1 5  IVF"s, consult nephrology, hold nephrotoxic agents,  Monitor kidney indicies    Bicarb Drip    Hyperkalemia - Continue IV fluids  Patient received albuterol treatments and insulin with D50 in the emergency department  Monitor potassium  DM - Hold p o  Anti hyperglycemics  Sliding scale insulin  Paroxysmal atrial fibrillation:  IV metoprolol as needed        Admission Orders: IP CardioPUlm Monitoring  Scheduled Meds:   Current Facility-Administered Medications:  ALPRAZolam 0 25 mg Oral Q8H PRN     ascorbic acid 250 mg Oral Daily     atorvastatin 40 mg Oral Daily With Dinner     cholecalciferol 2,000 Units Oral Daily             finasteride 5 mg Oral Daily     fish oil 1,000 mg Oral Daily     fluticasone-vilanterol 1 puff Inhalation Daily     insulin glargine 12 Units Subcutaneous HS     insulin lispro 1-6 Units Subcutaneous TID AC     insulin lispro 4 Units Subcutaneous Daily With Breakfast     insulin lispro 4 Units Subcutaneous Daily With Lunch     insulin lispro 4 Units Subcutaneous Daily With Dinner     metoprolol 2 5 mg Intravenous Q6H PRN     NxStage K 2/Ca 3 20,000 mL Dialysis Continuous     pantoprazole 40 mg Oral Early Morning     PARoxetine 30 mg Oral Daily     potassium chloride 20 mEq Intravenous Q2H             terazosin 2 mg Oral Daily       Continuous Infusions:   Na Bicarb Drip  @ 150 / hr  dextrose 5 % and sodium chloride 0 9 % - started 2/24 100 mL/hr Last Rate: 100 mL/hr (02/24/19 1230)   NxStage K 2/Ca 3 20,000 mL        Consult Nephrology  Consult Cardio  Neuro checks q4h  SCD's  ECHO  Cardizem 10 mg IV X 1  BMP, Ca, Mag & Phos q6h    Per Nephrology: Asterixis and tremors in the hands  Acute kidney injury likely secondary to prolonged prerenal azotemia which is probably progressed to acute tubular necrosis, agree with holding the ARB, avoid Lasix at this time, continue sodium bicarbonate drip at 150 mL/hour, repeat labs at 6:00 p m   if his potassium still remains elevated and there is no dramatic improvement in his renal function will need to start him on CVVHD  Repeat Labs:  K 5 5,   Cl 97,  CO2  18,  A gap 24,   ,   Cr 11 42,  Phos 8 7,   Mag 1 4    Temporary Dialysis cath placed - started on CVVHD    Network Utilization Review Department  Phone: [phone]; Fax [phone]  [email or web address]  ATTENTION: Please call with any questions or concerns to [phone]  and carefully listen to the prompts so that you are directed to the right person  Send all requests for admission clinical reviews, approved or denied determinations and any other requests to fax [phone]   All voicemails are confidential

## 2019-02-24 NOTE — PROGRESS NOTES
EPS Progress Note - Von Sarabia  80 y o  male MRN: [id number]    Unit/Bed#: ICU 13 Encounter: [id number]      Assessment:  Principal Problem:    Acute kidney injury [facility])  Active Problems:    Benign prostatic hyperplasia with urinary frequency    Anxiety disorder    Dyslipidemia    Esophageal reflux    Essential hypertension    Lower leg DVT (deep venous thromboembolism), acute, right (HCC)    Pulmonary nodule seen on imaging study    Type 2 diabetes mellitus (HCC)    Paroxysmal atrial fibrillation (HCC)    Hyperkalemia      Plan:  He is making some urine he remains hemodynamically stable no evidence of tamponade  I am going to discontinue his Eliquis he has not received any since he has been in the hospital he is currently on CVVHD echocardiogram is pending for today  If there is still large pericardial effusion which I suspect there will be I am going to put a formal consult into thoracic surgery for elective pericardial window  Subjective:   Patient seen and examined  No significant events overnight  He states he is feeling better    Objective:     Vitals: Blood pressure 112/53, pulse 84, temperature 97 5 °F (36 4 °C), temperature source Temporal, resp  rate 12, height 5' 4" (1 626 m), weight 58 2 kg (128 lb 4 9 oz), SpO2 94 %  , Body mass index is 22 02 kg/m² ,   Orthostatic Blood Pressures      Most Recent Value   Blood Pressure  112/53 filed at 02/24/2019 0715   Patient Position - Orthostatic VS  Lying filed at 02/23/2019 1330            Intake/Output Summary (Last 24 hours) at 2/24/2019 0953  Last data filed at 2/24/2019 0700  Gross per 24 hour   Intake 3300 ml   Output 1137 ml   Net 2163 ml       No significant arrhythmias seen on telemetry review  Appears to be in sinus rhythm with low voltage      Physical Exam:    GEN: Von Sarabia   appears well, alert and oriented x 3, pleasant and cooperative   HEENT: pupils equal, round, and reactive to light; extraocular muscles intact  NECK: supple, no carotid bruits   HEART: regular rhythm, normal S1 and S2, no murmurs, clicks, gallops or rubs   LUNGS: clear to auscultation bilaterally; no wheezes, rales, or rhonchi   ABDOMEN: normal bowel sounds, soft, no tenderness, no distention  EXTREMITIES: peripheral pulses normal; no clubbing, cyanosis, or edema  NEURO: no focal findings   SKIN: normal without suspicious lesions on exposed skin    Medications:      Current Facility-Administered Medications:     ALPRAZolam (XANAX) tablet 0 25 mg, 0 25 mg, Oral, Q8H PRN, Kelby Najjar, CRNP    ascorbic acid (VITAMIN C) tablet 250 mg, 250 mg, Oral, Daily, Kelby Najjar, CRNP, 250 mg at 02/23/19 1635    atorvastatin (LIPITOR) tablet 40 mg, 40 mg, Oral, Daily With SUJATA Locke, 40 mg at 02/23/19 1635    cholecalciferol (VITAMIN D3) tablet 2,000 Units, 2,000 Units, Oral, Daily, Kelby Najjar, CRNP, 2,000 Units at 02/23/19 1635    finasteride (PROSCAR) tablet 5 mg, 5 mg, Oral, Daily, Kelby Najjar, CRNP, 5 mg at 02/23/19 1635    fish oil capsule 1,000 mg, 1,000 mg, Oral, Daily, Kelby Najjar, CRNP, 1,000 mg at 02/23/19 1635    fluticasone-vilanterol (BREO ELLIPTA) 100-25 mcg/inh inhaler 1 puff, 1 puff, Inhalation, Daily, Kelby Najjar, CRNP, 1 puff at 02/23/19 1636    insulin glargine (LANTUS) subcutaneous injection 12 Units 0 12 mL, 12 Units, Subcutaneous, HS, Kelby Najjar, CRNP, 12 Units at 02/23/19 2132    insulin lispro (HumaLOG) 100 units/mL subcutaneous injection 1-6 Units, 1-6 Units, Subcutaneous, TID AC **AND** Fingerstick Glucose (POCT), , , TID AC, Kelby Najjar, CRNP    insulin lispro (HumaLOG) 100 units/mL subcutaneous injection 4 Units, 4 Units, Subcutaneous, Daily With Breakfast, Kelby Najjar, CRNP    insulin lispro (HumaLOG) 100 units/mL subcutaneous injection 4 Units, 4 Units, Subcutaneous, Daily With Lunch, Kelby Najjar, CRNP    insulin lispro (HumaLOG) 100 units/mL subcutaneous injection 4 Units, 4 Units, Subcutaneous, Daily With Dinner, SUJATA Klein    metoprolol (LOPRESSOR) injection 2 5 mg, 2 5 mg, Intravenous, Q6H PRN, SUJATA Klein    NxStage K 2/Ca 3 dialysis solution (RFP-400) 20,000 mL, 20,000 mL, Dialysis, Continuous, Sudheer Norton DO    pantoprazole (PROTONIX) EC tablet 40 mg, 40 mg, Oral, Early Morning, SUJATA Klein, 40 mg at 19 0979    PARoxetine (PAXIL) tablet 30 mg, 30 mg, Oral, Daily, SUJATA Klein, 30 mg at 19 1636    sodium chloride 0 9 % bolus 1,000 mL, 1,000 mL, Intravenous, Once, Sarah Maldonado MD    terazosin (HYTRIN) capsule 2 mg, 2 mg, Oral, Daily, SUJATA Klein     Labs & Results:        Results from last 7 days   Lab Units 19  0836   WBC Thousand/uL 6 58   HEMOGLOBIN g/dL 10 0*   HEMATOCRIT % 32 0*   PLATELETS Thousands/uL 211         Results from last 7 days   Lab Units 19  0230 19  1814  19  0836   POTASSIUM mmol/L 4 1 5 7* 5 5*   < > 6 8*   CHLORIDE mmol/L 95* 97* 97*   < > 99*   CO2 mmol/L 28 18* 18*   < > 13*   BUN mg/dL 93* 113* 114*   < > 117*   CREATININE mg/dL 8 84* 11 56* 11 42*   < > 11 60*   CALCIUM mg/dL 7 9* 8 4 8 8   < > 9 3   ALK PHOS U/L  --   --   --   --  69   ALT U/L  --   --   --   --  22   AST U/L  --   --   --   --  16    < > = values in this interval not displayed           Results from last 7 days   Lab Units 19  0230 19   MAGNESIUM mg/dL 1 2* 1 4*        Cardiac testing:   Results for orders placed during the hospital encounter of 18   Echo complete with contrast if indicated    [address]  [facility] [place], [address] [facility]  [phone]    Transthoracic Echocardiogram  2D, M-mode, Doppler, and Color Doppler    Study date:  2018    Patient: Cindy Marc  MR number: [id number]  Account number: [de-identified]  : 1935  Age: 80 years  Gender: Male  Status: Outpatient  Location: [facility]  Height: 65 in  Weight: 152 lb  BP: 122/ 62 mmHg    Indications: Pericardial effusion; Atrial fibrillation    Diagnoses: I31 3 - Pericardial effusion (noninflammatory), I48 0 - Atrial fibrillation    Sonographer:  AMRVIN Workman, RDCS  Primary Physician:  Carlos Dimas DO  Referring Physician:  Whit Lozada MD  Group:  [id number]  Interpreting Physician:  Ame Mccormick MD    SUMMARY    LEFT VENTRICLE:  Systolic function was at the lower limits of normal  Ejection fraction was estimated to be 50 %  There were no regional wall motion abnormalities  There was mild diffuse hypokinesis  Wall thickness was at the upper limits of normal     MITRAL VALVE:  There was trace regurgitation  TRICUSPID VALVE:  There was mild regurgitation  Pulmonary artery systolic pressure was within the normal range  PULMONIC VALVE:  There was mild regurgitation  PERICARDIUM:  A small to moderate pericardial effusion was identified circumferential to the heart  There was no evidence of hemodynamic compromise  COMPARISONS:  The pericardial effusion was previously described as being moderate in size  Comparison was made with the previous study of 01-Sep-2017  HISTORY: PRIOR HISTORY: Pericardial effusion; Atrial fibrillation; Hypertension; Dyslipidemia; Cardiomegaly; DVT; Pulmonary embolism; Diabetes Mellitus type II; Skin Cancer; Former smoker    PROCEDURE: The study was performed in the [address] [facility]  This was a routine study  The transthoracic approach was used  The study included complete 2D imaging, M-mode, complete spectral Doppler, and color Doppler  The  heart rate was 85 bpm, at the start of the study  Images were obtained from the parasternal, apical, subcostal, and suprasternal notch acoustic windows  Image quality was adequate  LEFT VENTRICLE: Size was normal  Systolic function was at the lower limits of normal  Ejection fraction was estimated to be 50 %  There were no regional wall motion abnormalities  There was mild diffuse hypokinesis  Wall thickness was at  the upper limits of normal  No evidence of apical thrombus  DOPPLER: Left ventricular diastolic function parameters were normal     RIGHT VENTRICLE: The size was normal  Systolic function was normal  Wall thickness was normal     LEFT ATRIUM: Size was normal     RIGHT ATRIUM: Size was normal     MITRAL VALVE: Valve structure was normal  There was mild thickening  There was normal leaflet separation  DOPPLER: The transmitral velocity was within the normal range  There was no evidence for stenosis  There was trace regurgitation  AORTIC VALVE: The valve was trileaflet  Leaflets exhibited mildly increased thickness, mild calcification, and normal cuspal separation  DOPPLER: Transaortic velocity was within the normal range  There was no evidence for stenosis  There  was no significant regurgitation  TRICUSPID VALVE: The valve structure was normal  There was normal leaflet separation  DOPPLER: The transtricuspid velocity was within the normal range  There was no evidence for stenosis  There was mild regurgitation  Pulmonary artery  systolic pressure was within the normal range  PULMONIC VALVE: Leaflets exhibited normal thickness, no calcification, and normal cuspal separation  DOPPLER: The transpulmonic velocity was within the normal range  There was mild regurgitation  PERICARDIUM: A small to moderate pericardial effusion was identified circumferential to the heart  There was no evidence of hemodynamic compromise  The pericardium was normal in appearance  AORTA: The root exhibited normal size  SYSTEMIC VEINS: IVC: The inferior vena cava was normal in size      SYSTEM MEASUREMENT TABLES    2D  %FS: 34 57 %  Ao Diam: 3 29 cm  EDV(Teich): 80 75 ml  EF(Cube): 71 99 %  EF(Teich): 64 04 %  ESV(Cube): 21 49 ml  ESV(Teich): 29 04 ml  IVSd: 1 12 cm  LA Area: 11 61 cm2  LA Diam: 3 01 cm  LVEDV MOD A4C: 79 27 ml  LVEF MOD A4C: 60 54 %  LVESV MOD A4C: 31 28 ml  LVIDd: 4 25 cm  LVIDs: 2 78 cm  LVLd A4C: 6 57 cm  LVLs A4C: 5 64 cm  LVPWd: 1 08 cm  RA Area: 13 13 cm2  RV Diam : 3 58 cm  SV MOD A4C: 47 99 ml  SV(Cube): 55 22 ml  SV(Teich): 51 72 ml    CW  TR Vmax: 2 48 m/s  TR maxP 69 mmHg    MM  TAPSE: 1 93 cm    PW  E': 0 07 m/s  E/E': 10  MV A Dre: 0 85 m/s  MV Dec [place]: 4 04 m/s2  MV DecT: 183 04 ms  MV E Dre: 0 74 m/s  MV E/A Ratio: 0 88    [employer] Accredited Echocardiography Laboratory    Prepared and electronically signed by    Linda Houston MD  Signed [id number] 10:53:08       No results found for this or any previous visit  No results found for this or any previous visit  No results found for this or any previous visit

## 2019-02-24 NOTE — PROGRESS NOTES
NEPHROLOGY PROGRESS NOTE   Neeta Pandey  80 y o  male MRN: [id number]  Unit/Bed#: ICU 13 Encounter: [id number]  Reason for Consult: ERIK    ASSESSMENT/PLAN:  1  Acute Kidney Injury- likely secondary to prerenal azotemia with acute tubular necrosis due to poor oral intake, vomiting, lasix, losartan  - medeiros catheter inserted with only 60ml output initially = no retention  - CT scan without hydronephrosis  - UA: 1+ ketones, trace blood (no rbc), 1+ protein, innumerable bacteria  - started on CRRT 2/23 pm, continue CRRT, consider transition to IHD in next 24-48 hours  - hold lasix and losartan  - avoid hypotension, avoid nephrotoxics, avoid contrast  2  Chronic Kidney Disease stage III- Baseline creatinine is 1 3-1 5   3  Azotemia- check FOBT to rule out bleed but likely secondary to ERIK  - trending down with CRRT  4  Hyperkalemia- treat with medical management but not significantly improved so now on CRRT with improvement, continue to monitor serial labs  5  Metabolic Acidosis- improved  6  Large Pericardial Effusion- appreciate cardiology consult, no evidence of tamponade  - okay to run CRRT even or positive  7  Enlarging left pleural effusion- monitor  8  Anemia- continue to monitor    Disposition:  Continue CRRT, continue q6hr labs, add heparin to CRRT to prevent clotting  Discussed with pharmacy and appreciate their assistance    SUBJECTIVE:  Patient without acute complaints  Overnight events noted  Patient now on CRRT  Discussed with cc nurse: difficulty with catheter and with blood flow rate, clotted system and exchanging now      OBJECTIVE:  Current Weight: Weight - Scale: 58 2 kg (128 lb 4 9 oz)  Vitals:    02/24/19 0400 02/24/19 0500 02/24/19 0600 02/24/19 0715   BP: 106/51 111/50 110/52 112/53   Pulse: 86 84 84 84   Resp: 14 17 14 12   Temp:       TempSrc:       SpO2: 91% 90% 92% 94%   Weight:   58 2 kg (128 lb 4 9 oz)    Height:           Intake/Output Summary (Last 24 hours) at 2/24/2019 0924  Last data filed at 2/24/2019 0700  Gross per 24 hour   Intake 3300 ml   Output 1137 ml   Net 2163 ml     General: NAD  Skin: no rash  HEENT: normocephalic  Neck: supple  Chest: CTAB  Heart: RRR  Abdomen: soft nt nd  Extremities: no edema  Neuro: alert awake  Psych: mood and affect appropriate    Medications:    Current Facility-Administered Medications:     ALPRAZolam (XANAX) tablet 0 25 mg, 0 25 mg, Oral, Q8H PRN, Magdy Reusing, CRNP    apixaban (ELIQUIS) tablet 2 5 mg, 2 5 mg, Oral, BID, Magdy Reusing, CRNP    ascorbic acid (VITAMIN C) tablet 250 mg, 250 mg, Oral, Daily, Magdy Reusing, CRNP, 250 mg at 02/23/19 1635    atorvastatin (LIPITOR) tablet 40 mg, 40 mg, Oral, Daily With Ayo Stalker, CRNP, 40 mg at 02/23/19 1635    cholecalciferol (VITAMIN D3) tablet 2,000 Units, 2,000 Units, Oral, Daily, Magdy Reusing, CRNP, 2,000 Units at 02/23/19 1635    finasteride (PROSCAR) tablet 5 mg, 5 mg, Oral, Daily, Magdy Reusing, CRNP, 5 mg at 02/23/19 1635    fish oil capsule 1,000 mg, 1,000 mg, Oral, Daily, Magdy Reusing, CRNP, 1,000 mg at 02/23/19 1635    fluticasone-vilanterol (BREO ELLIPTA) 100-25 mcg/inh inhaler 1 puff, 1 puff, Inhalation, Daily, Magdy Reusing, CRNP, 1 puff at 02/23/19 1636    insulin glargine (LANTUS) subcutaneous injection 12 Units 0 12 mL, 12 Units, Subcutaneous, HS, Magdy Reusing, CRNP, 12 Units at 02/23/19 2132    insulin lispro (HumaLOG) 100 units/mL subcutaneous injection 1-6 Units, 1-6 Units, Subcutaneous, TID AC **AND** Fingerstick Glucose (POCT), , , TID AC, Magdy Reusing, CRNP    insulin lispro (HumaLOG) 100 units/mL subcutaneous injection 4 Units, 4 Units, Subcutaneous, Daily With Breakfast, Magdy Reusing, CRNP    insulin lispro (HumaLOG) 100 units/mL subcutaneous injection 4 Units, 4 Units, Subcutaneous, Daily With Lunch, Magdy Reusing, CRNP    insulin lispro (HumaLOG) 100 units/mL subcutaneous injection 4 Units, 4 Units, Subcutaneous, Daily With Mirta Velasco SUJATA Bernal    metoprolol (LOPRESSOR) injection 2 5 mg, 2 5 mg, Intravenous, Q6H PRN, SUJATA Kumar    NxStage K 2/Ca 3 dialysis solution (RFP-400) 20,000 mL, 20,000 mL, Dialysis, Continuous, Sudheer Norton DO    pantoprazole (PROTONIX) EC tablet 40 mg, 40 mg, Oral, Early Morning, SUJATA Kumar, 40 mg at 02/24/19 8037    PARoxetine (PAXIL) tablet 30 mg, 30 mg, Oral, Daily, SUJATA Kumar, 30 mg at 02/23/19 1636    sodium chloride 0 9 % bolus 1,000 mL, 1,000 mL, Intravenous, Once, Chad Kinney MD    terazosin (HYTRIN) capsule 2 mg, 2 mg, Oral, Daily, SUJATA Kumar    Laboratory Results:  Results from last 7 days   Lab Units 02/24/19  0230 02/23/19  2021 02/23/19  1814 02/23/19  1144 02/23/19  0836   WBC Thousand/uL  --   --   --   --  6 58   HEMOGLOBIN g/dL  --   --   --   --  10 0*   HEMATOCRIT %  --   --   --   --  32 0*   PLATELETS Thousands/uL  --   --   --   --  211   POTASSIUM mmol/L 4 1 5 7* 5 5* 6 8* 6 8*   CHLORIDE mmol/L 95* 97* 97* 101 99*   CO2 mmol/L 28 18* 18* 15* 13*   BUN mg/dL 93* 113* 114* 121* 117*   CREATININE mg/dL 8 84* 11 56* 11 42* 11 46* 11 60*   CALCIUM mg/dL 7 9* 8 4 8 8 9 3 9 3   MAGNESIUM mg/dL 1 2* 1 4*  --   --   --    PHOSPHORUS mg/dL 5 9* 8 7*  --   --   --

## 2019-02-24 NOTE — PROGRESS NOTES
Progress Note - Critical Care   Efra Coreas  80 y o  male MRN: [id number]  Unit/Bed#: ICU 13 Encounter: [id number]    Assessment/Plan:  1  ERIK on CKD stage 3 likely secondary to prerenal azotemia progressed to ATN in the setting of prolonged nausea and vomiting  · Nephrology following  Started on CVVH overnight  Management per Nephrology  · Electrolyte replacement per protocol  2  Elevated anion gap metabolic acidosis, multifactorial secondary to renal failure and possible of starvation ketoacidosis  · Improving with dialysis  Anion gap normalized  Will stop bicarb gtt  3  Suspected viral gastroenteritis  · Supportive care  4  Pericardial effusion  · Patient seen by Cardiology  No evidence of tamponade on bedside echo  Plan for 2D echo today  If patient becomes hemodynamically unstable would require pericardial window  5  Paroxysmal AFib on anticoagulation  · Has been tachycardic although does currently sinus rhythm  Metoprolol p r n  Larayne Chroman · Continue Eliquis per home dosing  6  Diabetes mellitus type 2  · Holding home antihyperglycemics given ERIK  Blood sugar controlled  Lantus/SSI regimen  7  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  · Continue Eliquis as above  8  Depression/Anxiety  · Continue Paxil and xanax    _____________________________________________________________________    HPI/24hr events:   Afebrile  Started on CVVH  No acute events overnight      Medications:    Current Facility-Administered Medications:  ALPRAZolam 0 25 mg Oral Q8H PRN Katheryn Augustus, CRNP    apixaban 2 5 mg Oral BID [place] Augustus, CRNP    ascorbic acid 250 mg Oral Daily [place] Augustus, CRNP    atorvastatin 40 mg Oral Daily With Robyn Serene, CRNP    cholecalciferol 2,000 Units Oral Daily Katheryn Augustus, CRNP    finasteride 5 mg Oral Daily Katheryn Augustus, CRNP    fish oil 1,000 mg Oral Daily [place] Augustus, CRNP    fluticasone-vilanterol 1 puff Inhalation Daily Katheryn Augustus, CRNP    insulin glargine 12 Units Subcutaneous HS Lelan Croissant, CRNP    insulin lispro 1-6 Units Subcutaneous TID AC Lelan Croissant, CRNP    insulin lispro 4 Units Subcutaneous Daily With Breakfast Lelan Croissant, CRNP    insulin lispro 4 Units Subcutaneous Daily With Lunch Lelan Croissant, CRNP    insulin lispro 4 Units Subcutaneous Daily With Dinner Lelan Croissant, CRNP    metoprolol 2 5 mg Intravenous Q6H PRN Lelan Croissant, CRNP    NxStage K 2/Ca 3 20,000 mL Dialysis Continuous Carmela Crouch DO    pantoprazole 40 mg Oral Early Morning Lelan Croissant, CRNP    PARoxetine 30 mg Oral Daily Lelan Croissant, CRNP    IV infusion builder  Intravenous Continuous [place], [place] Last Rate: 150 mL/hr at 02/23/19 2023   sodium chloride 1,000 mL Intravenous Once Natalya Fischer MD    terazosin 2 mg Oral Daily Lelan Croissant, CRNP          NxStage K 2/Ca 3 20,000 mL    IV infusion builder  Last Rate: 150 mL/hr at 02/23/19 2023         Physical exam:  Vitals: Body mass index is 21 91 kg/m²  Blood pressure 110/52, pulse 84, temperature 97 5 °F (36 4 °C), temperature source Temporal, resp  rate 14, height 5' 4" (1 626 m), weight 57 9 kg (127 lb 10 3 oz), SpO2 92 %  ,  Temp  Min: 97 5 °F (36 4 °C)  Max: 97 9 °F (36 6 °C)  IBW: 59 2 kg    SpO2: 92 %  SpO2 Activity: At Rest  O2 Device: None (Room air)      Intake/Output Summary (Last 24 hours) at 2/24/2019 6742  Last data filed at 2/24/2019 0600  Gross per 24 hour   Intake 3300 ml   Output 1114 ml   Net 2186 ml       Invasive/non-invasive ventilation settings:   Respiratory    Lab Data (Last 4 hours)    None         O2/Vent Data (Last 4 hours)    None              Invasive Devices     Peripheral Intravenous Line            Peripheral IV 02/23/19 Left Antecubital less than 1 day          Line            Temporary HD Catheter less than 1 day          Drain            Urethral Catheter Temperature probe 16 Fr  less than 1 day                  Physical Exam:  Gen:  Awake, alert, appropriate, no acute distress  HEENT:  Atraumatic, normocephalic, extraocular movements intact, pupils 3 mm equal and reactive, oropharynx clear, extremely hard of hearing  Neck:  Supple, trachea midline, no JVD, no lymphadenopathy  Chest:  Diminished otherwise clear to auscultation  Cor:  Single S1/S2, no murmurs, rubs, gallops, regular rhythm, tachycardic  Abd:  Soft, nontender, nondistended, bowel sounds normoactive  Ext:  No edema, no clubbing or cyanosis  Neuro:  Oriented x3, cranial nerves 2-12 grossly intact, no focal deficits  Skin:  Warm, dry      Diagnostic Data:  Lab: I have personally reviewed pertinent lab results  CBC:   Results from last 7 days   Lab Units 02/23/19  0836   WBC Thousand/uL 6 58   HEMOGLOBIN g/dL 10 0*   HEMATOCRIT % 32 0*   PLATELETS Thousands/uL 211       CMP:   Results from last 7 days   Lab Units 02/24/19  0230 02/23/19 2021 02/23/19  1814  02/23/19  0836   SODIUM mmol/L 130* 139 139   < > 142   POTASSIUM mmol/L 4 1 5 7* 5 5*   < > 6 8*   CHLORIDE mmol/L 95* 97* 97*   < > 99*   CO2 mmol/L 28 18* 18*   < > 13*   BUN mg/dL 93* 113* 114*   < > 117*   CREATININE mg/dL 8 84* 11 56* 11 42*   < > 11 60*   CALCIUM mg/dL 7 9* 8 4 8 8   < > 9 3   ALK PHOS U/L  --   --   --   --  69   ALT U/L  --   --   --   --  22   AST U/L  --   --   --   --  16    < > = values in this interval not displayed  PT/INR:   No results found for: PT, INR,   Magnesium:   Results from last 7 days   Lab Units 02/24/19 0230 02/23/19 2021   MAGNESIUM mg/dL 1 2* 1 4*     Phosphorous:   Results from last 7 days   Lab Units 02/24/19 0230 02/23/19 2021   PHOSPHORUS mg/dL 5 9* 8 7*       Microbiology:        Imaging:  CXR - R IJ dialysis catheter with tip in good position  No pneumothorax    Cardiac lab/EKG/telemetry/ECHO:   Sinus tachycardia    VTE Prophylaxis: Eliquis, SCDs    Code Status: Level 1 - Full Code    Madolyn Grade Spatzer, CRNP    Portions of the record may have been created with voice recognition software   Occasional wrong word or "sound a like" substitutions may have occurred due to the inherent limitations of voice recognition software  Read the chart carefully and recognize, using context, where substitutions have occurred

## 2019-02-24 NOTE — PROCEDURES
Temporary HD Catheter  Date/Time: 2/23/2019 9:30 PM  Performed by: SUJATA Her  Authorized by: SUJATA Her     Patient location:  Bedside  Consent:     Consent obtained:  Verbal    Consent given by:  Patient    Risks discussed:  Arterial puncture, bleeding, infection, incorrect placement and pneumothorax    Alternatives discussed:  No treatment and delayed treatment  Universal protocol:     Procedure explained and questions answered to patient or proxy's satisfaction: yes      Relevant documents present and verified: yes      Required blood products, implants, devices, and special equipment available: yes      Site/side marked: yes      Immediately prior to procedure, a time out was called: yes      Patient identity confirmed:  Verbally with patient, hospital-assigned identification number and arm band  Pre-procedure details:     Hand hygiene: Hand hygiene performed prior to insertion      Sterile barrier technique: All elements of maximal sterile technique followed      Skin preparation:  ChloraPrep    Skin preparation agent: Skin preparation agent completely dried prior to procedure    Indications:     Central line indications: dialysis    Anesthesia (see MAR for exact dosages):      Anesthesia method:  Local infiltration    Local anesthetic:  Lidocaine 1% w/o epi  Procedure details:     Location:  Right internal jugular    Vessel type: vein      Laterality:  Right    Approach: percutaneous technique used      Patient position:  Flat    Catheter length:  16 cm    Landmarks identified: yes      Ultrasound guidance: yes      Sterile ultrasound techniques: Sterile gel and sterile probe covers were used      Number of attempts:  1    Successful placement: yes    Post-procedure details:     Post-procedure:  Dressing applied and line sutured    Assessment:  No pneumothorax on x-ray, placement verified by x-ray, blood return through all ports and free fluid flow    Post-procedure complications: none Patient tolerance of procedure:   Tolerated well, no immediate complications

## 2019-02-25 ENCOUNTER — ANESTHESIA EVENT (INPATIENT)
Dept: PERIOP | Facility: HOSPITAL | Age: 84
DRG: 674 | End: 2019-02-25
Payer: COMMERCIAL

## 2019-02-25 PROBLEM — I51.7 CARDIOMEGALY: Status: RESOLVED | Noted: 2017-03-19 | Resolved: 2019-02-25

## 2019-02-25 PROBLEM — R97.20 ELEVATED PSA, LESS THAN 10 NG/ML: Status: RESOLVED | Noted: 2018-01-25 | Resolved: 2019-02-25

## 2019-02-25 PROBLEM — N18.30 STAGE 3 CHRONIC KIDNEY DISEASE (HCC): Status: ACTIVE | Noted: 2019-02-25

## 2019-02-25 PROBLEM — J44.9 COPD (CHRONIC OBSTRUCTIVE PULMONARY DISEASE) (HCC): Status: ACTIVE | Noted: 2019-02-25

## 2019-02-25 PROBLEM — J90 PLEURAL EFFUSION: Status: RESOLVED | Noted: 2017-06-28 | Resolved: 2019-02-25

## 2019-02-25 PROBLEM — R63.4 WEIGHT LOSS: Status: RESOLVED | Noted: 2018-12-21 | Resolved: 2019-02-25

## 2019-02-25 PROBLEM — D69.6 THROMBOCYTOPENIA (HCC): Status: ACTIVE | Noted: 2019-02-25

## 2019-02-25 PROBLEM — E87.5 HYPERKALEMIA: Status: RESOLVED | Noted: 2019-02-23 | Resolved: 2019-02-25

## 2019-02-25 PROBLEM — R93.5 ABNORMAL CT OF THE ABDOMEN: Status: RESOLVED | Noted: 2018-12-21 | Resolved: 2019-02-25

## 2019-02-25 LAB
ANION GAP SERPL CALCULATED.3IONS-SCNC: 12 MMOL/L (ref 4–13)
BASOPHILS # BLD AUTO: 0 THOUSANDS/ΜL (ref 0–0.1)
BASOPHILS NFR BLD AUTO: 0 % (ref 0–1)
BUN SERPL-MCNC: 65 MG/DL (ref 5–25)
CA-I BLD-SCNC: 1.1 MMOL/L (ref 1.12–1.32)
CALCIUM SERPL-MCNC: 7.9 MG/DL (ref 8.3–10.1)
CHLORIDE SERPL-SCNC: 105 MMOL/L (ref 100–108)
CO2 SERPL-SCNC: 24 MMOL/L (ref 21–32)
CREAT SERPL-MCNC: 6.38 MG/DL (ref 0.6–1.3)
EOSINOPHIL # BLD AUTO: 0.03 THOUSAND/ΜL (ref 0–0.61)
EOSINOPHIL NFR BLD AUTO: 1 % (ref 0–6)
ERYTHROCYTE [DISTWIDTH] IN BLOOD BY AUTOMATED COUNT: 11.9 % (ref 11.6–15.1)
GFR SERPL CREATININE-BSD FRML MDRD: 7 ML/MIN/1.73SQ M
GLUCOSE SERPL-MCNC: 102 MG/DL (ref 65–140)
GLUCOSE SERPL-MCNC: 124 MG/DL (ref 65–140)
GLUCOSE SERPL-MCNC: 170 MG/DL (ref 65–140)
GLUCOSE SERPL-MCNC: 88 MG/DL (ref 65–140)
GLUCOSE SERPL-MCNC: 94 MG/DL (ref 65–140)
HCT VFR BLD AUTO: 21.4 % (ref 36.5–49.3)
HGB BLD-MCNC: 7 G/DL (ref 12–17)
HGB BLD-MCNC: 7.5 G/DL (ref 12–17)
IMM GRANULOCYTES # BLD AUTO: 0.02 THOUSAND/UL (ref 0–0.2)
IMM GRANULOCYTES NFR BLD AUTO: 1 % (ref 0–2)
LYMPHOCYTES # BLD AUTO: 0.47 THOUSANDS/ΜL (ref 0.6–4.47)
LYMPHOCYTES NFR BLD AUTO: 13 % (ref 14–44)
MAGNESIUM SERPL-MCNC: 2 MG/DL (ref 1.6–2.6)
MCH RBC QN AUTO: 32.1 PG (ref 26.8–34.3)
MCHC RBC AUTO-ENTMCNC: 32.7 G/DL (ref 31.4–37.4)
MCV RBC AUTO: 98 FL (ref 82–98)
MONOCYTES # BLD AUTO: 0.42 THOUSAND/ΜL (ref 0.17–1.22)
MONOCYTES NFR BLD AUTO: 12 % (ref 4–12)
NEUTROPHILS # BLD AUTO: 2.58 THOUSANDS/ΜL (ref 1.85–7.62)
NEUTS SEG NFR BLD AUTO: 73 % (ref 43–75)
NRBC BLD AUTO-RTO: 0 /100 WBCS
PHOSPHATE SERPL-MCNC: 4.2 MG/DL (ref 2.3–4.1)
PLATELET # BLD AUTO: 107 THOUSANDS/UL (ref 149–390)
PMV BLD AUTO: 9.9 FL (ref 8.9–12.7)
POTASSIUM SERPL-SCNC: 3.6 MMOL/L (ref 3.5–5.3)
RBC # BLD AUTO: 2.18 MILLION/UL (ref 3.88–5.62)
SODIUM SERPL-SCNC: 141 MMOL/L (ref 136–145)
WBC # BLD AUTO: 3.52 THOUSAND/UL (ref 4.31–10.16)

## 2019-02-25 PROCEDURE — 99232 SBSQ HOSP IP/OBS MODERATE 35: CPT | Performed by: INTERNAL MEDICINE

## 2019-02-25 PROCEDURE — 82330 ASSAY OF CALCIUM: CPT | Performed by: NURSE PRACTITIONER

## 2019-02-25 PROCEDURE — 99233 SBSQ HOSP IP/OBS HIGH 50: CPT | Performed by: INTERNAL MEDICINE

## 2019-02-25 PROCEDURE — 93321 DOPPLER ECHO F-UP/LMTD STD: CPT | Performed by: INTERNAL MEDICINE

## 2019-02-25 PROCEDURE — 80048 BASIC METABOLIC PNL TOTAL CA: CPT | Performed by: NURSE PRACTITIONER

## 2019-02-25 PROCEDURE — 82948 REAGENT STRIP/BLOOD GLUCOSE: CPT

## 2019-02-25 PROCEDURE — 85025 COMPLETE CBC W/AUTO DIFF WBC: CPT | Performed by: NURSE PRACTITIONER

## 2019-02-25 PROCEDURE — 85018 HEMOGLOBIN: CPT | Performed by: NURSE PRACTITIONER

## 2019-02-25 PROCEDURE — 83735 ASSAY OF MAGNESIUM: CPT | Performed by: NURSE PRACTITIONER

## 2019-02-25 PROCEDURE — 84100 ASSAY OF PHOSPHORUS: CPT | Performed by: NURSE PRACTITIONER

## 2019-02-25 PROCEDURE — 93308 TTE F-UP OR LMTD: CPT | Performed by: INTERNAL MEDICINE

## 2019-02-25 PROCEDURE — 93325 DOPPLER ECHO COLOR FLOW MAPG: CPT | Performed by: INTERNAL MEDICINE

## 2019-02-25 RX ORDER — ATORVASTATIN CALCIUM 10 MG/1
10 TABLET, FILM COATED ORAL
Status: DISCONTINUED | OUTPATIENT
Start: 2019-02-25 | End: 2019-03-06 | Stop reason: HOSPADM

## 2019-02-25 RX ADMIN — INSULIN LISPRO 1 UNITS: 100 INJECTION, SOLUTION INTRAVENOUS; SUBCUTANEOUS at 18:16

## 2019-02-25 RX ADMIN — PAROXETINE 30 MG: 20 TABLET, FILM COATED ORAL at 09:44

## 2019-02-25 RX ADMIN — INSULIN GLARGINE 12 UNITS: 100 INJECTION, SOLUTION SUBCUTANEOUS at 21:26

## 2019-02-25 RX ADMIN — OXYCODONE HYDROCHLORIDE AND ACETAMINOPHEN 250 MG: 500 TABLET ORAL at 18:13

## 2019-02-25 RX ADMIN — FINASTERIDE 5 MG: 5 TABLET, FILM COATED ORAL at 09:44

## 2019-02-25 RX ADMIN — TERAZOSIN HYDROCHLORIDE 2 MG: 2 CAPSULE ORAL at 09:44

## 2019-02-25 RX ADMIN — DEXTROSE AND SODIUM CHLORIDE 100 ML/HR: 5; .9 INJECTION, SOLUTION INTRAVENOUS at 02:38

## 2019-02-25 RX ADMIN — ATORVASTATIN CALCIUM 10 MG: 20 TABLET, FILM COATED ORAL at 18:12

## 2019-02-25 RX ADMIN — DEXTROSE AND SODIUM CHLORIDE 100 ML/HR: 5; .9 INJECTION, SOLUTION INTRAVENOUS at 12:18

## 2019-02-25 RX ADMIN — FLUTICASONE FUROATE AND VILANTEROL TRIFENATATE 1 PUFF: 100; 25 POWDER RESPIRATORY (INHALATION) at 09:48

## 2019-02-25 RX ADMIN — VITAMIN D, TAB 1000IU (100/BT) 2000 UNITS: 25 TAB at 18:13

## 2019-02-25 RX ADMIN — INSULIN LISPRO 4 UNITS: 100 INJECTION, SOLUTION INTRAVENOUS; SUBCUTANEOUS at 18:16

## 2019-02-25 RX ADMIN — DEXTROSE AND SODIUM CHLORIDE 100 ML/HR: 5; .9 INJECTION, SOLUTION INTRAVENOUS at 21:29

## 2019-02-25 RX ADMIN — PANTOPRAZOLE SODIUM 40 MG: 40 TABLET, DELAYED RELEASE ORAL at 05:25

## 2019-02-25 NOTE — PROGRESS NOTES
Progress Note - Critical Care   Aston Novoa  80 y o  male MRN: [id number]  Unit/Bed#: ICU 13 Encounter: [id number]    Assessment/Plan:  1  Acute kidney injury on CKD 3 likely secondary to prerenal azotemia progressed to ATN in the setting of nausea and vomiting  · CVVH ended yesterday after technical difficulties  · Creatinine worse this morning  · Monitor urine output, renal indices; currently u/o 20 ml/hr  · Nephrology following    2  Elevated anion gap metabolic acidosis secondary to renal failure and possible starvation ketoacidosis  · Resolved    3  Pericardial effusion possible tamponade  · Tamponade confirmed on echo yesterday  · Plan for pericardia window this week  · Monitor hemodynamics closely  · Cardiology following    4  Paroxysmal atrial fibrillation  · Currently in sinus rhythm, reportedly goes in and out of atrial fib  · Continue metoprolol prn  · Hold eliquis until surgery    5  Acute blood loss anemia secondary to CVVH filter change x 2 yesterday  · Will discuss transfusing with PRBCs today  · Patient has a recent history of gross hematuria, none noted on this admission  · Will monitor hemoglobin, bleeding    6  Diabetes, type 2  · Continue lantus and SSI coverage    7  History of DVT, 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  · On eliquis at home, will continue once surgery completed    8  Depression/anxiety  · Continue paxil, xanax  _____________________________________________    HPI/24hr events:   No events overnight      Medications:    Current Facility-Administered Medications:  ALPRAZolam 0 25 mg Oral Q8H PRN SUJATA Zamarripa    ascorbic acid 250 mg Oral Daily SUJATA Zamarripa    atorvastatin 40 mg Oral Daily With SUJATA Watt    cholecalciferol 2,000 Units Oral Daily SUJATA Zamarripa    dextrose 5 % and sodium chloride 0 9 % 100 mL/hr Intravenous Continuous SUJATA Zamarripa Last Rate: 100 mL/hr (02/25/19 0238)   finasteride 5 mg Oral Daily SUJATA Zamarripa    fish oil 1,000 mg Oral Daily SUJATA Padron    fluticasone-vilanterol 1 puff Inhalation Daily SUJATA Padron    insulin glargine 12 Units Subcutaneous HS SUJATA Padron    insulin lispro 1-6 Units Subcutaneous TID AC SUJATA Padron    insulin lispro 4 Units Subcutaneous Daily With Breakfast SUJATA Padron    insulin lispro 4 Units Subcutaneous Daily With Lunch SUJATA Padron    insulin lispro 4 Units Subcutaneous Daily With Dinner SUJATA Padron    metoprolol 2 5 mg Intravenous Q6H PRN SUJATA Padron    NxStage K 2/Ca 3 20,000 mL Dialysis Continuous Socorro Morrissey,     pantoprazole 40 mg Oral Early Morning SUJATA Padron    PARoxetine 30 mg Oral Daily SUJATA Padron    sodium chloride 1,000 mL Intravenous Once Ekaterina Moralez MD    terazosin 2 mg Oral Daily SUJATA Padron          dextrose 5 % and sodium chloride 0 9 % 100 mL/hr Last Rate: 100 mL/hr (02/25/19 0238)   NxStage K 2/Ca 3 20,000 mL          Physical exam:  Vitals: Body mass index is 22 02 kg/m²  Blood pressure 109/55, pulse 90, temperature 98 4 °F (36 9 °C), temperature source Temporal, resp   rate 19, height 5' 4" (1 626 m), weight 58 2 kg (128 lb 4 9 oz), SpO2 91 % ,  Temp  Min: 96 9 °F (36 1 °C)  Max: 98 4 °F (36 9 °C)  IBW: 59 2 kg    SpO2: 91 %  SpO2 Activity: At Rest  O2 Device: None (Room air)      Intake/Output Summary (Last 24 hours) at 2/25/2019 0552  Last data filed at 2/25/2019 0200  Gross per 24 hour   Intake 2580 ml   Output 1055 ml   Net 1525 ml       Invasive/non-invasive ventilation settings:   Respiratory    Lab Data (Last 4 hours)    None         O2/Vent Data (Last 4 hours)    None              Invasive Devices     Peripheral Intravenous Line            Peripheral IV 02/23/19 Left Antecubital 1 day    Peripheral IV 02/23/19 Left Hand 1 day          Line            Temporary HD Catheter 1 day          Drain            Urethral Catheter Temperature probe 16 Fr  1 day Physical Exam:  Gen: pleasant, elderly, in NAD  HEENT: normocephalic, atraumatic, oropharynx clear  Neck: no JVD, no lymphadenopathy, trachea midline  Chest: lung sounds clear, equal  Cor: audible S1,S2, no edema  Abd: soft, non tender, non distended  Ext: moves all extremities  Neuro: alert and oriented  Skin: warm, dry      Diagnostic Data:  Lab: I have personally reviewed pertinent lab results  CBC:   Results from last 7 days   Lab Units 02/25/19  0454 02/24/19  1023 02/23/19  0836   WBC Thousand/uL 3 52* 3 99* 6 58   HEMOGLOBIN g/dL 7 0* 7 4* 10 0*   HEMATOCRIT % 21 4* 21 9* 32 0*   PLATELETS Thousands/uL 107* 122* 211       CMP:   Results from last 7 days   Lab Units 02/25/19  0454 02/24/19  1545 02/24/19  0945  02/23/19  0836   SODIUM mmol/L 141 139 140   < > 142   POTASSIUM mmol/L 3 6 4 3 3 9   < > 6 8*   CHLORIDE mmol/L 105 104 99*   < > 99*   CO2 mmol/L 24 28 28   < > 13*   BUN mg/dL 65* 57* 76*   < > 117*   CREATININE mg/dL 6 38* 5 61* 7 52*   < > 11 60*   CALCIUM mg/dL 7 9* 8 4 8 4   < > 9 3   ALK PHOS U/L  --   --   --   --  69   ALT U/L  --   --   --   --  22   AST U/L  --   --   --   --  16    < > = values in this interval not displayed  PT/INR:   Lab Results   Component Value Date    INR 1 14 02/24/2019   ,   Magnesium:   Results from last 7 days   Lab Units 02/25/19  0454 02/24/19  1545 02/24/19  0945   MAGNESIUM mg/dL 2 0 1 8 2 1     Phosphorous:   Results from last 7 days   Lab Units 02/24/19  1948 02/24/19  1545 02/24/19  0945   PHOSPHORUS mg/dL 4 0 4 2* 5 6*       Microbiology:    n/a    Imaging:  No new imaging    Cardiac lab/EKG/telemetry/ECHO:   NSR    VTE Prophylaxis: SCD    Code Status: Level 1 - Full Code    SUJATA Reardon    Portions of the record may have been created with voice recognition software  Occasional wrong word or "sound a like" substitutions may have occurred due to the inherent limitations of voice recognition software   Read the chart carefully and recognize, using context, where substitutions have occurred

## 2019-02-25 NOTE — PROGRESS NOTES
NEPHROLOGY PROGRESS NOTE    Keli Dominguez  80 y o  male MRN: [id number]  Unit/Bed#: ICU 13 Encounter: [id number]  Reason for Consult:  Acute renal failure    The patient is lying in bed CVVH was stopped yesterday afternoon due to access issues  The patient has been hemodynamically stable says he has a little bit of stomach discomfort but has not had any nausea or vomiting  ASSESSMENT/PLAN:  1  Renal    The patient developed acute renal failure due to ATN  He has been on renal replacement therapy and this was held due to poor access and clotting of filters yesterday afternoon  Volume status appears stable clinically electrolytes are acceptable as well  Urine output starting to increase but creatinine did rise so there is no signs of renal recovery  At this point CVVH was discontinued  Of note baseline creatinine is around 1 5  Discontinue CVVH and related orders  Will evaluate likely plan for hemodialysis tomorrow  2  Pericardial effusion    Apparently this is known and chronic cardiology following and appears thoracic surgery was consulted  SUBJECTIVE:  Review of Systems   Constitution: Negative for chills and fever  HENT: Positive for hearing loss  Chronic hearing loss  Eyes: Negative  Cardiovascular: Negative  Negative for chest pain and orthopnea  Respiratory: Negative  Negative for cough and shortness of breath  Skin: Negative  Gastrointestinal: Negative for bloating, diarrhea, nausea and vomiting  Mild abdominal discomfort  Genitourinary: Negative for hematuria  Singh catheter in  Neurological: Negative  OBJECTIVE:  Current Weight: Weight - Scale: 58 2 kg (128 lb 4 9 oz)  Vitals:Temp (24hrs), Av 2 °F (36 8 °C), Min:96 9 °F (36 1 °C), Max:98 6 °F (37 °C)  Current: Temperature: 98 6 °F (37 °C)   Blood pressure 101/53, pulse 86, temperature 98 6 °F (37 °C), temperature source Temporal, resp   rate 12, height 5' 4" (1 626 m), weight 58 2 kg (128 lb 4 9 oz), SpO2 (!) 89 %  , Body mass index is 22 02 kg/m²  Intake/Output Summary (Last 24 hours) at 2/25/2019 0944  Last data filed at 2/25/2019 0701  Gross per 24 hour   Intake 2830 ml   Output 803 ml   Net 2027 ml       Physical Exam: /53   Pulse 86   Temp 98 6 °F (37 °C) (Temporal)   Resp 12   Ht 5' 4" (1 626 m)   Wt 58 2 kg (128 lb 4 9 oz)   SpO2 (!) 89%   BMI 22 02 kg/m²   Physical Exam   Constitutional: He is oriented to person, place, and time  HENT:   Head: Atraumatic  Eyes: No scleral icterus  Cardiovascular: Normal rate and regular rhythm  Exam reveals no gallop  Pulmonary/Chest: Effort normal and breath sounds normal  No respiratory distress  He has no wheezes  He has no rales  Abdominal: Soft  Normal appearance and bowel sounds are normal  He exhibits no distension  Neurological: He is alert and oriented to person, place, and time         Medications:    Current Facility-Administered Medications:     ALPRAZolam (XANAX) tablet 0 25 mg, 0 25 mg, Oral, Q8H PRN, Rhea Murray CRNP    ascorbic acid (VITAMIN C) tablet 250 mg, 250 mg, Oral, Daily, Rhea , CRNP, 250 mg at 02/23/19 1635    atorvastatin (LIPITOR) tablet 40 mg, 40 mg, Oral, Daily With Dinner, Rhea Murray, CRNP, 40 mg at 02/24/19 1751    cholecalciferol (VITAMIN D3) tablet 2,000 Units, 2,000 Units, Oral, Daily, Rhea Clerk CRNP, 2,000 Units at 02/23/19 1635    dextrose 5 % and sodium chloride 0 9 % infusion, 100 mL/hr, Intravenous, Continuous, SUJATA Haq, Last Rate: 100 mL/hr at 02/25/19 0238, 100 mL/hr at 02/25/19 0238    finasteride (PROSCAR) tablet 5 mg, 5 mg, Oral, Daily, Rhea , CRNP, 5 mg at 02/23/19 1635    fish oil capsule 1,000 mg, 1,000 mg, Oral, Daily, Rhea , CRNP, 1,000 mg at 02/23/19 1635    fluticasone-vilanterol (BREO ELLIPTA) 100-25 mcg/inh inhaler 1 puff, 1 puff, Inhalation, Daily, SUJATA Haq, 1 puff at 02/24/19 1135    insulin glargine (LANTUS) subcutaneous injection 12 Units 0 12 mL, 12 Units, Subcutaneous, HS, SUJATA Raya, 12 Units at 02/24/19 2120    insulin lispro (HumaLOG) 100 units/mL subcutaneous injection 1-6 Units, 1-6 Units, Subcutaneous, TID AC, 3 Units at 02/24/19 1646 **AND** Fingerstick Glucose (POCT), , , TID AC, SUJATA Raya    insulin lispro (HumaLOG) 100 units/mL subcutaneous injection 4 Units, 4 Units, Subcutaneous, Daily With Breakfast, SUJATA Raya    insulin lispro (HumaLOG) 100 units/mL subcutaneous injection 4 Units, 4 Units, Subcutaneous, Daily With Lunch, SUJATA Raya    insulin lispro (HumaLOG) 100 units/mL subcutaneous injection 4 Units, 4 Units, Subcutaneous, Daily With Dinner, SUJATA Raya    metoprolol (LOPRESSOR) injection 2 5 mg, 2 5 mg, Intravenous, Q6H PRN, SUJATA Raya    pantoprazole (PROTONIX) EC tablet 40 mg, 40 mg, Oral, Early Morning, SUJATA Raya, 40 mg at 02/25/19 3733    PARoxetine (PAXIL) tablet 30 mg, 30 mg, Oral, Daily, SUJATA Raya, 30 mg at 02/23/19 1636    sodium chloride 0 9 % bolus 1,000 mL, 1,000 mL, Intravenous, Once, Christian Xiong MD    terazosin (HYTRIN) capsule 2 mg, 2 mg, Oral, Daily, SUJATA Raya    Laboratory Results:  Lab Results   Component Value Date    WBC 3 52 (L) 02/25/2019    HGB 7 0 (L) 02/25/2019    HCT 21 4 (L) 02/25/2019    MCV 98 02/25/2019     (L) 02/25/2019     Lab Results   Component Value Date    GLUCOSE 130 07/07/2015    CALCIUM 7 9 (L) 02/25/2019     07/21/2016    K 3 6 02/25/2019    CO2 24 02/25/2019     02/25/2019    BUN 65 (H) 02/25/2019    CREATININE 6 38 (H) 02/25/2019     Lab Results   Component Value Date    CALCIUM 7 9 (L) 02/25/2019    PHOS 4 2 (H) 02/25/2019     No results found for: LABPROT

## 2019-02-25 NOTE — PROGRESS NOTES
Progress Note - Vito Tariq  80 y o  male MRN: [id number]    Unit/Bed#: ICU 13 Encounter: [id number]  Subjective:   No chest pain or SOB  Jerryl Rm No palpitations or lightheadedness  Weak  No edema    Objective:   Vitals: Blood pressure 126/59, pulse 86, temperature 99 2 °F (37 3 °C), temperature source Temporal, resp  rate 12, height 5' 4" (1 626 m), weight 58 2 kg (128 lb 4 9 oz), SpO2 95 %  ,Body mass index is 22 02 kg/m²  CBC with diff:   Results from last 7 days   Lab Units 02/25/19  0454   WBC Thousand/uL 3 52*   RBC Million/uL 2 18*   HEMOGLOBIN g/dL 7 0*   HEMATOCRIT % 21 4*   MCV fL 98   MCH pg 32 1   MCHC g/dL 32 7   RDW % 11 9   MPV fL 9 9   PLATELETS Thousands/uL 107*     CMP:   Results from last 7 days   Lab Units 02/25/19  0454  02/23/19  0836   SODIUM mmol/L 141   < > 142   POTASSIUM mmol/L 3 6   < > 6 8*   CHLORIDE mmol/L 105   < > 99*   CO2 mmol/L 24   < > 13*   BUN mg/dL 65*   < > 117*   CREATININE mg/dL 6 38*   < > 11 60*   CALCIUM mg/dL 7 9*   < > 9 3   AST U/L  --   --  16   ALT U/L  --   --  22   ALK PHOS U/L  --   --  69   EGFR ml/min/1 73sq m 7   < > 4    < > = values in this interval not displayed  Magnesium:   Results from last 7 days   Lab Units 02/25/19  0454   MAGNESIUM mg/dL 2 0     Physical exam:  Lungs-decreased breath sounds especially at the left base  No rales or rhonchi  Heart-irregular with grade 2 systolic murmur at the base  No diastolic murmur rub or gallop  Abdomen-soft nontender without mass organomegaly  Extremities-no edema    Assessment:  1  Moderate to large pericardial effusion  Has known pericardial effusion  No clear-cut tamponade  Will hold on pericardiocentesis  Patient likely for dialysis tomorrow  If any intolerance to dialysis would favor pericardial window  2  Acute kidney injury  Patient is oliguric  Plans for dialysis tomorrow  Perhaps renal failure is contributing to pericardial effusion  3  Paroxysmal atrial fibrillation  Currently sinus rhythm  Having a lot of ectopy  4  Hyperlipidemia  On statin therapy    Plan:   Will stop alpha-blocker so patient has more blood pressure to work with with dialysis  Hold Eliquis with acute renal failure  Reduce atorvastatin at 10 mg daily in light of acute renal failure  Observe for now but have low threshold for pericardial window if patient he stabilizes or we cannot appropriately dialyze him      Татьяна Tomlinson MD

## 2019-02-26 ENCOUNTER — APPOINTMENT (INPATIENT)
Dept: RADIOLOGY | Facility: HOSPITAL | Age: 84
DRG: 674 | End: 2019-02-26
Payer: COMMERCIAL

## 2019-02-26 ENCOUNTER — ANESTHESIA (INPATIENT)
Dept: PERIOP | Facility: HOSPITAL | Age: 84
DRG: 674 | End: 2019-02-26
Payer: COMMERCIAL

## 2019-02-26 LAB
ABO GROUP BLD BPU: NORMAL
ABO GROUP BLD: NORMAL
ANION GAP SERPL CALCULATED.3IONS-SCNC: 12 MMOL/L (ref 4–13)
APPEARANCE FLD: CLEAR
ATRIAL RATE: 156 BPM
BLD GP AB SCN SERPL QL: NEGATIVE
BLD SMEAR INTERP: NORMAL
BPU ID: NORMAL
BUN SERPL-MCNC: 64 MG/DL (ref 5–25)
CA-I BLD-SCNC: 1.1 MMOL/L (ref 1.12–1.32)
CALCIUM SERPL-MCNC: 7.7 MG/DL (ref 8.3–10.1)
CHLORIDE SERPL-SCNC: 108 MMOL/L (ref 100–108)
CO2 SERPL-SCNC: 23 MMOL/L (ref 21–32)
COLOR FLD: YELLOW
CREAT SERPL-MCNC: 7.26 MG/DL (ref 0.6–1.3)
CROSSMATCH: NORMAL
ERYTHROCYTE [DISTWIDTH] IN BLOOD BY AUTOMATED COUNT: 11.9 % (ref 11.6–15.1)
GFR SERPL CREATININE-BSD FRML MDRD: 6 ML/MIN/1.73SQ M
GLUCOSE SERPL-MCNC: 107 MG/DL (ref 65–140)
GLUCOSE SERPL-MCNC: 130 MG/DL (ref 65–140)
GLUCOSE SERPL-MCNC: 143 MG/DL (ref 65–140)
GLUCOSE SERPL-MCNC: 163 MG/DL (ref 65–140)
GLUCOSE SERPL-MCNC: 90 MG/DL (ref 65–140)
HCT VFR BLD AUTO: 20.8 % (ref 36.5–49.3)
HGB BLD-MCNC: 6.4 G/DL (ref 12–17)
HGB BLD-MCNC: 6.8 G/DL (ref 12–17)
HGB BLD-MCNC: 7.1 G/DL (ref 12–17)
INR PPP: 1.22 (ref 0.86–1.17)
LYMPHOCYTES NFR BLD AUTO: 100 %
MAGNESIUM SERPL-MCNC: 1.9 MG/DL (ref 1.6–2.6)
MCH RBC QN AUTO: 32.2 PG (ref 26.8–34.3)
MCHC RBC AUTO-ENTMCNC: 32.7 G/DL (ref 31.4–37.4)
MCV RBC AUTO: 99 FL (ref 82–98)
P AXIS: 91 DEGREES
PHOSPHATE SERPL-MCNC: 4.4 MG/DL (ref 2.3–4.1)
PLATELET # BLD AUTO: 96 THOUSANDS/UL (ref 149–390)
PMV BLD AUTO: 9.7 FL (ref 8.9–12.7)
POTASSIUM SERPL-SCNC: 3.5 MMOL/L (ref 3.5–5.3)
PROTHROMBIN TIME: 15.5 SECONDS (ref 11.8–14.2)
QRS AXIS: -17 DEGREES
QRSD INTERVAL: 88 MS
QT INTERVAL: 296 MS
QTC INTERVAL: 456 MS
RBC # BLD AUTO: 2.11 MILLION/UL (ref 3.88–5.62)
RH BLD: POSITIVE
SITE: NORMAL
SODIUM SERPL-SCNC: 143 MMOL/L (ref 136–145)
SPECIMEN EXPIRATION DATE: NORMAL
T WAVE AXIS: -5 DEGREES
TOTAL CELLS COUNTED SPEC: 100
UNIT DISPENSE STATUS: NORMAL
UNIT PRODUCT CODE: NORMAL
UNIT RH: NORMAL
VENTRICULAR RATE: 143 BPM
WBC # BLD AUTO: 3.1 THOUSAND/UL (ref 4.31–10.16)
WBC # FLD MANUAL: 54 /UL

## 2019-02-26 PROCEDURE — 80048 BASIC METABOLIC PNL TOTAL CA: CPT | Performed by: INTERNAL MEDICINE

## 2019-02-26 PROCEDURE — 87070 CULTURE OTHR SPECIMN AEROBIC: CPT | Performed by: THORACIC SURGERY (CARDIOTHORACIC VASCULAR SURGERY)

## 2019-02-26 PROCEDURE — 85027 COMPLETE CBC AUTOMATED: CPT | Performed by: NURSE PRACTITIONER

## 2019-02-26 PROCEDURE — 93010 ELECTROCARDIOGRAM REPORT: CPT | Performed by: INTERNAL MEDICINE

## 2019-02-26 PROCEDURE — 30233N1 TRANSFUSION OF NONAUTOLOGOUS RED BLOOD CELLS INTO PERIPHERAL VEIN, PERCUTANEOUS APPROACH: ICD-10-PCS | Performed by: INTERNAL MEDICINE

## 2019-02-26 PROCEDURE — 88305 TISSUE EXAM BY PATHOLOGIST: CPT | Performed by: PATHOLOGY

## 2019-02-26 PROCEDURE — 99233 SBSQ HOSP IP/OBS HIGH 50: CPT | Performed by: INTERNAL MEDICINE

## 2019-02-26 PROCEDURE — 85610 PROTHROMBIN TIME: CPT | Performed by: NURSE PRACTITIONER

## 2019-02-26 PROCEDURE — 85018 HEMOGLOBIN: CPT | Performed by: NURSE PRACTITIONER

## 2019-02-26 PROCEDURE — 86900 BLOOD TYPING SEROLOGIC ABO: CPT | Performed by: NURSE PRACTITIONER

## 2019-02-26 PROCEDURE — 71045 X-RAY EXAM CHEST 1 VIEW: CPT

## 2019-02-26 PROCEDURE — 86901 BLOOD TYPING SEROLOGIC RH(D): CPT | Performed by: NURSE PRACTITIONER

## 2019-02-26 PROCEDURE — 88112 CYTOPATH CELL ENHANCE TECH: CPT | Performed by: PATHOLOGY

## 2019-02-26 PROCEDURE — 33025 INCISION OF HEART SAC: CPT | Performed by: THORACIC SURGERY (CARDIOTHORACIC VASCULAR SURGERY)

## 2019-02-26 PROCEDURE — 86335 IMMUNFIX E-PHORSIS/URINE/CSF: CPT | Performed by: INTERNAL MEDICINE

## 2019-02-26 PROCEDURE — 87205 SMEAR GRAM STAIN: CPT | Performed by: THORACIC SURGERY (CARDIOTHORACIC VASCULAR SURGERY)

## 2019-02-26 PROCEDURE — 84100 ASSAY OF PHOSPHORUS: CPT | Performed by: NURSE PRACTITIONER

## 2019-02-26 PROCEDURE — 84165 PROTEIN E-PHORESIS SERUM: CPT | Performed by: PATHOLOGY

## 2019-02-26 PROCEDURE — 0W9D00Z DRAINAGE OF PERICARDIAL CAVITY WITH DRAINAGE DEVICE, OPEN APPROACH: ICD-10-PCS | Performed by: THORACIC SURGERY (CARDIOTHORACIC VASCULAR SURGERY)

## 2019-02-26 PROCEDURE — P9016 RBC LEUKOCYTES REDUCED: HCPCS

## 2019-02-26 PROCEDURE — 33025 INCISION OF HEART SAC: CPT | Performed by: PHYSICIAN ASSISTANT

## 2019-02-26 PROCEDURE — 99232 SBSQ HOSP IP/OBS MODERATE 35: CPT | Performed by: INTERNAL MEDICINE

## 2019-02-26 PROCEDURE — 99223 1ST HOSP IP/OBS HIGH 75: CPT | Performed by: THORACIC SURGERY (CARDIOTHORACIC VASCULAR SURGERY)

## 2019-02-26 PROCEDURE — 89051 BODY FLUID CELL COUNT: CPT | Performed by: THORACIC SURGERY (CARDIOTHORACIC VASCULAR SURGERY)

## 2019-02-26 PROCEDURE — 83735 ASSAY OF MAGNESIUM: CPT | Performed by: NURSE PRACTITIONER

## 2019-02-26 PROCEDURE — 82948 REAGENT STRIP/BLOOD GLUCOSE: CPT

## 2019-02-26 PROCEDURE — 84165 PROTEIN E-PHORESIS SERUM: CPT | Performed by: INTERNAL MEDICINE

## 2019-02-26 PROCEDURE — 86850 RBC ANTIBODY SCREEN: CPT | Performed by: NURSE PRACTITIONER

## 2019-02-26 PROCEDURE — 86038 ANTINUCLEAR ANTIBODIES: CPT | Performed by: INTERNAL MEDICINE

## 2019-02-26 PROCEDURE — 84166 PROTEIN E-PHORESIS/URINE/CSF: CPT | Performed by: INTERNAL MEDICINE

## 2019-02-26 PROCEDURE — 86335 IMMUNFIX E-PHORSIS/URINE/CSF: CPT | Performed by: PATHOLOGY

## 2019-02-26 PROCEDURE — 86255 FLUORESCENT ANTIBODY SCREEN: CPT | Performed by: INTERNAL MEDICINE

## 2019-02-26 PROCEDURE — 86920 COMPATIBILITY TEST SPIN: CPT

## 2019-02-26 PROCEDURE — 84166 PROTEIN E-PHORESIS/URINE/CSF: CPT | Performed by: PATHOLOGY

## 2019-02-26 PROCEDURE — 82330 ASSAY OF CALCIUM: CPT | Performed by: NURSE PRACTITIONER

## 2019-02-26 RX ORDER — FENTANYL CITRATE/PF 50 MCG/ML
25 SYRINGE (ML) INJECTION
Status: DISCONTINUED | OUTPATIENT
Start: 2019-02-26 | End: 2019-02-26 | Stop reason: HOSPADM

## 2019-02-26 RX ORDER — HYDROMORPHONE HCL/PF 1 MG/ML
SYRINGE (ML) INJECTION AS NEEDED
Status: DISCONTINUED | OUTPATIENT
Start: 2019-02-26 | End: 2019-02-26 | Stop reason: SURG

## 2019-02-26 RX ORDER — GLYCOPYRROLATE 0.2 MG/ML
INJECTION INTRAMUSCULAR; INTRAVENOUS AS NEEDED
Status: DISCONTINUED | OUTPATIENT
Start: 2019-02-26 | End: 2019-02-26 | Stop reason: SURG

## 2019-02-26 RX ORDER — ACETAMINOPHEN 325 MG/1
650 TABLET ORAL EVERY 6 HOURS PRN
Status: DISCONTINUED | OUTPATIENT
Start: 2019-02-26 | End: 2019-03-06 | Stop reason: HOSPADM

## 2019-02-26 RX ORDER — ROCURONIUM BROMIDE 10 MG/ML
INJECTION, SOLUTION INTRAVENOUS AS NEEDED
Status: DISCONTINUED | OUTPATIENT
Start: 2019-02-26 | End: 2019-02-26 | Stop reason: SURG

## 2019-02-26 RX ORDER — POTASSIUM CHLORIDE 20 MEQ/1
40 TABLET, EXTENDED RELEASE ORAL ONCE
Status: COMPLETED | OUTPATIENT
Start: 2019-02-26 | End: 2019-02-26

## 2019-02-26 RX ORDER — HYDROCODONE BITARTRATE AND ACETAMINOPHEN 5; 325 MG/1; MG/1
1 TABLET ORAL EVERY 6 HOURS PRN
Status: DISCONTINUED | OUTPATIENT
Start: 2019-02-26 | End: 2019-03-06 | Stop reason: HOSPADM

## 2019-02-26 RX ORDER — SODIUM CHLORIDE 9 MG/ML
INJECTION, SOLUTION INTRAVENOUS CONTINUOUS PRN
Status: DISCONTINUED | OUTPATIENT
Start: 2019-02-26 | End: 2019-02-26

## 2019-02-26 RX ORDER — NEOSTIGMINE METHYLSULFATE 1 MG/ML
INJECTION INTRAVENOUS AS NEEDED
Status: DISCONTINUED | OUTPATIENT
Start: 2019-02-26 | End: 2019-02-26 | Stop reason: SURG

## 2019-02-26 RX ORDER — CEFAZOLIN SODIUM 1 G/50ML
1000 SOLUTION INTRAVENOUS ONCE
Status: COMPLETED | OUTPATIENT
Start: 2019-02-26 | End: 2019-02-26

## 2019-02-26 RX ORDER — MAGNESIUM HYDROXIDE 1200 MG/15ML
LIQUID ORAL AS NEEDED
Status: DISCONTINUED | OUTPATIENT
Start: 2019-02-26 | End: 2019-02-26 | Stop reason: HOSPADM

## 2019-02-26 RX ORDER — EPHEDRINE SULFATE 50 MG/ML
INJECTION, SOLUTION INTRAVENOUS AS NEEDED
Status: DISCONTINUED | OUTPATIENT
Start: 2019-02-26 | End: 2019-02-26 | Stop reason: SURG

## 2019-02-26 RX ORDER — ONDANSETRON 2 MG/ML
4 INJECTION INTRAMUSCULAR; INTRAVENOUS ONCE AS NEEDED
Status: DISCONTINUED | OUTPATIENT
Start: 2019-02-26 | End: 2019-02-26 | Stop reason: HOSPADM

## 2019-02-26 RX ORDER — SODIUM CHLORIDE 9 MG/ML
INJECTION, SOLUTION INTRAVENOUS CONTINUOUS PRN
Status: DISCONTINUED | OUTPATIENT
Start: 2019-02-26 | End: 2019-02-26 | Stop reason: SURG

## 2019-02-26 RX ORDER — FENTANYL CITRATE 50 UG/ML
INJECTION, SOLUTION INTRAMUSCULAR; INTRAVENOUS AS NEEDED
Status: DISCONTINUED | OUTPATIENT
Start: 2019-02-26 | End: 2019-02-26 | Stop reason: SURG

## 2019-02-26 RX ORDER — ONDANSETRON 2 MG/ML
INJECTION INTRAMUSCULAR; INTRAVENOUS AS NEEDED
Status: DISCONTINUED | OUTPATIENT
Start: 2019-02-26 | End: 2019-02-26 | Stop reason: SURG

## 2019-02-26 RX ORDER — PROPOFOL 10 MG/ML
INJECTION, EMULSION INTRAVENOUS AS NEEDED
Status: DISCONTINUED | OUTPATIENT
Start: 2019-02-26 | End: 2019-02-26 | Stop reason: SURG

## 2019-02-26 RX ORDER — KETAMINE HCL IN NACL, ISO-OSM 100MG/10ML
SYRINGE (ML) INJECTION AS NEEDED
Status: DISCONTINUED | OUTPATIENT
Start: 2019-02-26 | End: 2019-02-26 | Stop reason: SURG

## 2019-02-26 RX ADMIN — EPHEDRINE SULFATE 10 MG: 50 INJECTION, SOLUTION INTRAMUSCULAR; INTRAVENOUS; SUBCUTANEOUS at 12:00

## 2019-02-26 RX ADMIN — SODIUM CHLORIDE: 9 INJECTION, SOLUTION INTRAVENOUS at 11:41

## 2019-02-26 RX ADMIN — PANTOPRAZOLE SODIUM 40 MG: 40 TABLET, DELAYED RELEASE ORAL at 05:44

## 2019-02-26 RX ADMIN — HYDROCODONE BITARTRATE AND ACETAMINOPHEN 1 TABLET: 5; 325 TABLET ORAL at 19:33

## 2019-02-26 RX ADMIN — LIDOCAINE HYDROCHLORIDE 50 MG: 20 INJECTION, SOLUTION INTRAVENOUS at 11:49

## 2019-02-26 RX ADMIN — Medication 25 MG: at 11:49

## 2019-02-26 RX ADMIN — ONDANSETRON 4 MG: 2 INJECTION INTRAMUSCULAR; INTRAVENOUS at 12:38

## 2019-02-26 RX ADMIN — OXYCODONE HYDROCHLORIDE AND ACETAMINOPHEN 250 MG: 500 TABLET ORAL at 08:29

## 2019-02-26 RX ADMIN — FLUTICASONE FUROATE AND VILANTEROL TRIFENATATE 1 PUFF: 100; 25 POWDER RESPIRATORY (INHALATION) at 08:36

## 2019-02-26 RX ADMIN — CEFAZOLIN SODIUM 1000 MG: 1 SOLUTION INTRAVENOUS at 11:48

## 2019-02-26 RX ADMIN — EPHEDRINE SULFATE 10 MG: 50 INJECTION, SOLUTION INTRAMUSCULAR; INTRAVENOUS; SUBCUTANEOUS at 12:15

## 2019-02-26 RX ADMIN — FENTANYL CITRATE 150 MCG: 50 INJECTION, SOLUTION INTRAMUSCULAR; INTRAVENOUS at 11:49

## 2019-02-26 RX ADMIN — POTASSIUM CHLORIDE 40 MEQ: 1500 TABLET, EXTENDED RELEASE ORAL at 10:42

## 2019-02-26 RX ADMIN — NEOSTIGMINE METHYLSULFATE 2 MG: 1 INJECTION INTRAVENOUS at 12:58

## 2019-02-26 RX ADMIN — FINASTERIDE 5 MG: 5 TABLET, FILM COATED ORAL at 08:30

## 2019-02-26 RX ADMIN — PROPOFOL 80 MG: 10 INJECTION, EMULSION INTRAVENOUS at 11:49

## 2019-02-26 RX ADMIN — GLYCOPYRROLATE 0.4 MG: 0.2 INJECTION, SOLUTION INTRAMUSCULAR; INTRAVENOUS at 12:58

## 2019-02-26 RX ADMIN — ATORVASTATIN CALCIUM 10 MG: 20 TABLET, FILM COATED ORAL at 18:05

## 2019-02-26 RX ADMIN — ROCURONIUM BROMIDE 40 MG: 10 INJECTION INTRAVENOUS at 11:50

## 2019-02-26 RX ADMIN — HYDROMORPHONE HYDROCHLORIDE 0.5 MG: 1 INJECTION, SOLUTION INTRAMUSCULAR; INTRAVENOUS; SUBCUTANEOUS at 12:14

## 2019-02-26 RX ADMIN — VITAMIN D, TAB 1000IU (100/BT) 2000 UNITS: 25 TAB at 08:29

## 2019-02-26 RX ADMIN — DEXTROSE AND SODIUM CHLORIDE 200 ML: 5; .9 INJECTION, SOLUTION INTRAVENOUS at 12:58

## 2019-02-26 RX ADMIN — INSULIN GLARGINE 12 UNITS: 100 INJECTION, SOLUTION SUBCUTANEOUS at 22:04

## 2019-02-26 RX ADMIN — INSULIN LISPRO 4 UNITS: 100 INJECTION, SOLUTION INTRAVENOUS; SUBCUTANEOUS at 16:16

## 2019-02-26 RX ADMIN — PAROXETINE 30 MG: 20 TABLET, FILM COATED ORAL at 08:32

## 2019-02-26 RX ADMIN — EPHEDRINE SULFATE 10 MG: 50 INJECTION, SOLUTION INTRAMUSCULAR; INTRAVENOUS; SUBCUTANEOUS at 12:05

## 2019-02-26 NOTE — ANESTHESIA PREPROCEDURE EVALUATION
Review of Systems/Medical History          Cardiovascular  Hypertension , Dysrhythmias , atrial fibrillation,   Comment: Pericardial effusion, PE,  Pulmonary  COPD ,        GI/Hepatic    GERD ,        Dialysis (Acute renal failure, for HD today before procedure) ,        Endo/Other  Diabetes ,      GYN       Hematology  Anemia ,     Musculoskeletal  Negative musculoskeletal ROS        Neurology   Psychology   Anxiety,              Physical Exam    Airway    Mallampati score: II  TM Distance: >3 FB  Neck ROM: full     Dental       Cardiovascular  Rhythm: irregular, Rate: normal,     Pulmonary  Pulmonary exam normal     Other Findings        Anesthesia Plan  ASA Score- 4     Anesthesia Type- general with ASA Monitors  Additional Monitors:   Airway Plan:     Comment: Right IJ dialysis line in place  Plan Factors-    Induction- intravenous  Postoperative Plan-     Informed Consent- Anesthetic plan and risks discussed with patient

## 2019-02-26 NOTE — CONSULTS
Consultation - Thoracic Surgery   Solomon Phan  80 y o  male MRN: [id number]  Unit/Bed#: [facility] [place] Encounter: [id number]      Assessment/Plan     Assessment:  79yo M who presents with pericardial effusion    Plan:  Subxiphoid pericardial window today  Return to ICU after procedure  Care per ICU team    History of Present Illness   Reason for Consult / Principal Problem: Pericardial effusion  HPI: Solomon Phan  is a 80y o  year old male who presented to [facility] with abdominal pain, nausea, vomiting, and REIK  Upon workup, the patient had an echocardiogram and was found to have a pericardial effusion  He denies any SOB, CP  His nausea and vomiting is now improved although he reports continued abdominal pain  Upon review, the patient as had an effusion for at least two years but was not symptomatic at that time  With the patient's ERIK and new requirement for dialysis, there was concern for tamponade  Consults    Review of Systems   Constitutional: Positive for fatigue  Negative for activity change, chills, fever and unexpected weight change  HENT: Negative  Eyes: Negative  Negative for visual disturbance  Respiratory: Negative for cough, chest tightness, shortness of breath, wheezing and stridor  Cardiovascular: Negative for chest pain and leg swelling  Gastrointestinal: Positive for abdominal pain  Negative for nausea and vomiting  Endocrine: Negative  Genitourinary: Negative  Musculoskeletal: Negative  Skin: Negative  Allergic/Immunologic: Negative  Neurological: Negative for speech difficulty, weakness, light-headedness and headaches  Hematological: Negative for adenopathy  Psychiatric/Behavioral: Negative          Historical Information   Past Medical History:   Diagnosis Date    Anxiety     Cardiomegaly     Diabetes ([facility] [address] )     DVT [facility] venous thrombosis) ([facility])     right leg    GERD (gastroesophageal reflux disease)     Hypercholesterolemia     Hypertension     Irregular Pt updated on the below.    Pt had a CT done on 27 and went to Emergency Room the same day, he was told that the stone was ready to enter a bladder which can take couple of day.  Pt has not had pain since then.    He will watch his symptoms and if pain returns will keep urology apt.    Analisa Vidal RN     heart beat     paroxsysmal a fib    Pancreatic cyst     Pericardial effusion     Pleural effusion     Weight loss, non-intentional      Past Surgical History:   Procedure Laterality Date    APPENDECTOMY      CATARACT EXTRACTION      COLONOSCOPY      COLONOSCOPY N/A 1/24/2019    Procedure: COLONOSCOPY with polypectomies;  Surgeon: Magali Brooks MD;  Location: [facility]; Service: Gastroenterology    ESOPHAGOGASTRODUODENOSCOPY N/A 1/24/2019    Procedure: ESOPHAGOGASTRODUODENOSCOPY (EGD) with bx;  Surgeon: Magali Brooks MD;  Location: [facility]; Service: Gastroenterology    PROSTATE BIOPSY       Social History     Substance and Sexual Activity   Alcohol Use No    Frequency: Never    Binge frequency: Never     Social History     Substance and Sexual Activity   Drug Use No     Social History     Tobacco Use   Smoking Status Former Smoker   Smokeless Tobacco Never Used   Tobacco Comment    current non-smoker, per [employer]     Family History: non-contributory    Meds/Allergies   all current active meds have been reviewed  Allergies   Allergen Reactions    Ace Inhibitors     Bee Venom        Objective   Vitals: Blood pressure 112/55, pulse 80, temperature 98 4 °F (36 9 °C), temperature source Temporal, resp  rate (!) 8, height 5' 4" (1 626 m), weight 47 6 kg (104 lb 15 oz), SpO2 94 %  Invasive Devices     Peripheral Intravenous Line            Peripheral IV 02/23/19 Left Antecubital 3 days    Peripheral IV 02/23/19 Left Hand 2 days          Line            Temporary HD Catheter 2 days          Drain            Urethral Catheter Temperature probe 16 Fr  2 days                Physical Exam   Constitutional: He is oriented to person, place, and time  He appears well-developed and well-nourished  No distress  HENT:   Head: Normocephalic and atraumatic  Eyes: Pupils are equal, round, and reactive to light  Conjunctivae are normal  No scleral icterus  Neck: Normal range of motion  No JVD present   No tracheal deviation present  Cardiovascular: Normal rate, regular rhythm, normal heart sounds and intact distal pulses  Exam reveals no gallop and no friction rub  No murmur heard  Pulmonary/Chest: Effort normal and breath sounds normal  No stridor  No respiratory distress  He has no wheezes  He exhibits no tenderness  Abdominal: Soft  Bowel sounds are normal  He exhibits no distension and no mass  There is no tenderness  There is no rebound  No hernia  Musculoskeletal: Normal range of motion  He exhibits no edema or tenderness  Lymphadenopathy:     He has no cervical adenopathy  Neurological: He is alert and oriented to person, place, and time  No cranial nerve deficit  Skin: Capillary refill takes less than 2 seconds  He is not diaphoretic  Psychiatric: He has a normal mood and affect  His behavior is normal  Judgment and thought content normal    Nursing note and vitals reviewed  Lab Results: I have personally reviewed pertinent reports  Imaging Studies: I have personally reviewed pertinent reports     and I have personally reviewed pertinent films in PACS  EKG, Pathology, and Other Studies: I have personally reviewed pertinent films in PACS  VTE Prophylaxis: Sequential compression device (Venodyne)     Code Status: Level 1 - Full Code  Advance Directive and Living Will:      Power of :    POLST:

## 2019-02-26 NOTE — PROGRESS NOTES
Progress Note - Cardiology   Dortha Records  80 y o  male MRN: [id number]  Unit/Bed#: ICU 13 Encounter: [id number]      Assessment:     1  Moderate to large pericardial effusion  2  Acute renal failure in the setting of nausea and vomiting  3  Paroxysmal atrial fibrillation  4  Acute blood-loss anemia   5  Diabetes  6  History of DVT/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   7  Depression/anxiety    Discussion/Recommendations: For pericardial window today-fluid to be sent for cytology, cell count, and culture  Subjective:  No complaints      Physical Exam:  GEN:  NAD  HEENT:  MMM, NCAT, pink conjunctiva, EOMI, nonicteric sclera  CV:  NO JVD/HJR, RR, NO M/R/G, +S1/S2, NO PARASTERNAL HEAVE/THRILL, NO LE EDEMA, NO HEPATIC SYSTOLIC PULSATION, WARM EXTREMITIES  RESP:  CTAB/L  ABD:  SOFT, NT, NO GROSS ORGANOMEGALY        Vitals:   /55   Pulse 80   Temp 98 4 °F (36 9 °C) (Temporal)   Resp (!) 8   Ht 5' 4" (1 626 m)   Wt 47 6 kg (104 lb 15 oz)   SpO2 94%   BMI 18 01 kg/m²   Vitals:    02/24/19 0600 02/26/19 0600   Weight: 58 2 kg (128 lb 4 9 oz) 47 6 kg (104 lb 15 oz)       Intake/Output Summary (Last 24 hours) at 2/26/2019 0811  Last data filed at 2/26/2019 0600  Gross per 24 hour   Intake 2678 33 ml   Output 585 ml   Net 2093 33 ml         TELEMETRY:  No events  Lab Results:  Results from last 7 days   Lab Units 02/26/19  0535   WBC Thousand/uL 3 10*   HEMOGLOBIN g/dL 6 8*   HEMATOCRIT % 20 8*   PLATELETS Thousands/uL 96*     Results from last 7 days   Lab Units 02/26/19  0535  02/23/19  0836   POTASSIUM mmol/L 3 5   < > 6 8*   CHLORIDE mmol/L 108   < > 99*   CO2 mmol/L 23   < > 13*   BUN mg/dL 64*   < > 117*   CREATININE mg/dL 7 26*   < > 11 60*   CALCIUM mg/dL 7 7*   < > 9 3   ALK PHOS U/L  --   --  69   ALT U/L  --   --  22   AST U/L  --   --  16    < > = values in this interval not displayed       Results from last 7 days   Lab Units 02/26/19  0535   POTASSIUM mmol/L 3 5   CHLORIDE mmol/L 108   CO2 mmol/L 23 BUN mg/dL 64*   CREATININE mg/dL 7 26*   CALCIUM mg/dL 7 7*             Medications:    Current Facility-Administered Medications:     ALPRAZolam (XANAX) tablet 0 25 mg, 0 25 mg, Oral, Q8H PRN, SUJATA Burt    ascorbic acid (VITAMIN C) tablet 250 mg, 250 mg, Oral, Daily, SUJATA Burt, 250 mg at 02/25/19 1813    atorvastatin (LIPITOR) tablet 10 mg, 10 mg, Oral, Daily With Gary Rae MD, 10 mg at 02/25/19 1812    cholecalciferol (VITAMIN D3) tablet 2,000 Units, 2,000 Units, Oral, Daily, SUJATA Burt, 2,000 Units at 02/25/19 1813    dextrose 5 % and sodium chloride 0 9 % infusion, 100 mL/hr, Intravenous, Continuous, SUJATA Burt, Last Rate: 100 mL/hr at 02/25/19 2129, 100 mL/hr at 02/25/19 2129    finasteride (PROSCAR) tablet 5 mg, 5 mg, Oral, Daily, SUJATA Burt, 5 mg at 02/25/19 0944    fluticasone-vilanterol (BREO ELLIPTA) 100-25 mcg/inh inhaler 1 puff, 1 puff, Inhalation, Daily, SUJATA Burt, 1 puff at 02/25/19 0948    insulin glargine (LANTUS) subcutaneous injection 12 Units 0 12 mL, 12 Units, Subcutaneous, HS, SUJATA Burt, 12 Units at 02/25/19 2126    insulin lispro (HumaLOG) 100 units/mL subcutaneous injection 1-6 Units, 1-6 Units, Subcutaneous, TID AC, 1 Units at 02/25/19 1816 **AND** Fingerstick Glucose (POCT), , , TID AC, SUJATA Burt    insulin lispro (HumaLOG) 100 units/mL subcutaneous injection 4 Units, 4 Units, Subcutaneous, Daily With Breakfast, SUJATA Burt    insulin lispro (HumaLOG) 100 units/mL subcutaneous injection 4 Units, 4 Units, Subcutaneous, Daily With Lunch, SUJATA Burt    insulin lispro (HumaLOG) 100 units/mL subcutaneous injection 4 Units, 4 Units, Subcutaneous, Daily With SUJATA Valenzuela, 4 Units at 02/25/19 1816    metoprolol (LOPRESSOR) injection 2 5 mg, 2 5 mg, Intravenous, Q6H PRN, SUJATA Burt    pantoprazole (PROTONIX) EC tablet 40 mg, 40 mg, Oral, Early Morning, SUJATA Zamarripa, 40 mg at 02/26/19 0544    PARoxetine (PAXIL) tablet 30 mg, 30 mg, Oral, Daily, SUJATA Zamarripa, 30 mg at 02/25/19 0944    sodium chloride 0 9 % bolus 1,000 mL, 1,000 mL, Intravenous, Once, Charles Long MD    Portions of the record may have been created with voice recognition software  Occasional wrong word or "sound a like" substitutions may have occurred due to the inherent limitations of voice recognition software  Read the chart carefully and recognize, using context, where substitutions have occurred

## 2019-02-26 NOTE — ANESTHESIA POSTPROCEDURE EVALUATION
Post-Op Assessment Note    CV Status:  Stable  Pain Score: 2    Pain management: adequate     Mental Status:  Alert and awake   Hydration Status:  Euvolemic and stable   PONV Controlled:  Controlled   Airway Patency:  Patent   Post Op Vitals Reviewed: Yes      Staff: Anesthesiologist           BP      Temp      Pulse     Resp     SpO2

## 2019-02-26 NOTE — SOCIAL WORK
CM attempted to meet with the patient who was confused today  CM apparently just missed the patients wife  Will attempt to meet with wife to complete a general assessment tomorrow

## 2019-02-26 NOTE — PROGRESS NOTES
NEPHROLOGY PROGRESS NOTE    Efra Coreas  80 y o  male MRN: [id number]  Unit/Bed#: ICU 13 Encounter: [id number]  Reason for Consult:  Acute renal failure    The patient is awake and alert no acute changes he is comfortable no shortness of breath lying flat  ASSESSMENT/PLAN:  1  Renal  The patient has acute renal failure and he initially presented with some days of nausea and vomiting so it is presumed he may have ATN due to severe pre renal azotemia  He presented with severely elevated creatinine with a previous baseline of 1 3 in August of last year and it was 11 on admission  He was started on renal replacement therapy  He is now starting to make urine about 40 cc an hour electrolytes are stable no volume overload  Holding dialysis and then will assess need tomorrow to see if the creatinine continues to rise  If it is higher tomorrow likely will get a treatment  I discussed this with the ICU attending in team     Evaluate for need of dialysis tomorrow  Reduce IV fluids to 50 cc/hour    Of note there is still a possibility the patient may have had some other systemic illness that had evolved so will check somewhat of a workup including urine protein creatinine ratio and serologies for vasculitis and SPEP/UPEP  2  Pericardial effusion  Apparently this was present in the past because an echocardiogram sometime ago when creatinine was in the 1s revealed a moderate circumferential pericardial effusion  That would make this very unlikely to be uremic alone  Patient is scheduled for pericardial window today  SUBJECTIVE:  Review of Systems   Constitution: Negative for chills and fever  HENT: Positive for hearing loss  Eyes: Negative  Cardiovascular: Negative  Negative for chest pain, leg swelling and orthopnea  Respiratory: Negative  Negative for cough, hemoptysis and shortness of breath  Skin: Negative for rash  Musculoskeletal: Negative  Gastrointestinal: Negative    Negative for bloating, abdominal pain, diarrhea, nausea and vomiting  Genitourinary: Negative for hematuria  Singh catheter in   Neurological: Negative  OBJECTIVE:  Current Weight: Weight - Scale: 47 6 kg (104 lb 15 oz)  Vitals:Temp (24hrs), Av 2 °F (36 8 °C), Min:97 8 °F (36 6 °C), Max:99 2 °F (37 3 °C)  Current: Temperature: 98 4 °F (36 9 °C)   Blood pressure 112/55, pulse 80, temperature 98 4 °F (36 9 °C), temperature source Temporal, resp  rate (!) 8, height 5' 4" (1 626 m), weight 47 6 kg (104 lb 15 oz), SpO2 94 %  , Body mass index is 18 01 kg/m²  Intake/Output Summary (Last 24 hours) at 2019 1036  Last data filed at 2019 0801  Gross per 24 hour   Intake 2478 33 ml   Output 530 ml   Net 1948 33 ml       Physical Exam: /55   Pulse 80   Temp 98 4 °F (36 9 °C) (Temporal)   Resp (!) 8   Ht 5' 4" (1 626 m)   Wt 47 6 kg (104 lb 15 oz)   SpO2 94%   BMI 18 01 kg/m²   Physical Exam   Constitutional: He is oriented to person, place, and time  HENT:   Head: Atraumatic  Eyes: No scleral icterus  Cardiovascular: Normal rate and regular rhythm  Exam reveals no friction rub  Pulmonary/Chest: Effort normal  No respiratory distress  Decreased breath sounds bases   Abdominal: Soft  Bowel sounds are normal  He exhibits no distension  There is no tenderness  Neurological: He is alert and oriented to person, place, and time         Medications:    Current Facility-Administered Medications:     ALPRAZolam (XANAX) tablet 0 25 mg, 0 25 mg, Oral, Q8H PRN, Ashwini Donnelly, DULCENP    ascorbic acid (VITAMIN C) tablet 250 mg, 250 mg, Oral, Daily, SUJATA Romero, 250 mg at 19 5406    atorvastatin (LIPITOR) tablet 10 mg, 10 mg, Oral, Daily With Eron Moreno MD, 10 mg at 19 1812    cholecalciferol (VITAMIN D3) tablet 2,000 Units, 2,000 Units, Oral, Daily, SUJATA Romero, 2,000 Units at 19 0829    dextrose 5 % and sodium chloride 0 9 % infusion, 50 mL/hr, Intravenous, Continuous, Roslyn Grey MD, Last Rate: 100 mL/hr at 02/25/19 2129, 100 mL/hr at 02/25/19 2129    finasteride (PROSCAR) tablet 5 mg, 5 mg, Oral, Daily, SUJATA Osuna, 5 mg at 02/26/19 0830    fluticasone-vilanterol (BREO ELLIPTA) 100-25 mcg/inh inhaler 1 puff, 1 puff, Inhalation, Daily, SUJATA Osuna, 1 puff at 02/26/19 0836    insulin glargine (LANTUS) subcutaneous injection 12 Units 0 12 mL, 12 Units, Subcutaneous, HS, SUJATA Osuna, 12 Units at 02/25/19 2126    insulin lispro (HumaLOG) 100 units/mL subcutaneous injection 1-6 Units, 1-6 Units, Subcutaneous, TID AC, 1 Units at 02/25/19 1816 **AND** Fingerstick Glucose (POCT), , , TID AC, SUJATA Osuna    insulin lispro (HumaLOG) 100 units/mL subcutaneous injection 4 Units, 4 Units, Subcutaneous, Daily With Breakfast, SUJATA Osuna    insulin lispro (HumaLOG) 100 units/mL subcutaneous injection 4 Units, 4 Units, Subcutaneous, Daily With Lunch, SUJATA Osuna    insulin lispro (HumaLOG) 100 units/mL subcutaneous injection 4 Units, 4 Units, Subcutaneous, Daily With Leilani Elizabethini, SUJATA, 4 Units at 02/25/19 1816    metoprolol (LOPRESSOR) injection 2 5 mg, 2 5 mg, Intravenous, Q6H PRN, SUJATA Osuna    pantoprazole (PROTONIX) EC tablet 40 mg, 40 mg, Oral, Early Morning, SUJATA Osuna, 40 mg at 02/26/19 0544    PARoxetine (PAXIL) tablet 30 mg, 30 mg, Oral, Daily, SUJATA Osuna, 30 mg at 02/26/19 5921    potassium chloride (K-DUR,KLOR-CON) CR tablet 40 mEq, 40 mEq, Oral, Once, Analy K SUJATA Schmidt    sodium chloride 0 9 % bolus 1,000 mL, 1,000 mL, Intravenous, Once, Chuck Mar MD    Laboratory Results:  Lab Results   Component Value Date    WBC 3 10 (L) 02/26/2019    HGB 6 8 (LL) 02/26/2019    HCT 20 8 (L) 02/26/2019    MCV 99 (H) 02/26/2019    PLT 96 (L) 02/26/2019     Lab Results   Component Value Date    GLUCOSE 130 07/07/2015    CALCIUM 7 7 (L) 02/26/2019     07/21/2016    K 3 5 02/26/2019    CO2 23 02/26/2019     02/26/2019    BUN 64 (H) 02/26/2019    CREATININE 7 26 (H) 02/26/2019     Lab Results   Component Value Date    CALCIUM 7 7 (L) 02/26/2019    PHOS 4 4 (H) 02/26/2019     No results found for: LABPROT

## 2019-02-26 NOTE — OP NOTE
OPERATIVE REPORT  PATIENT NAME: Neeta Pandey  :  1935  MRN: [id number]  Pt Location: [facility] OR ROOM 03    SURGERY DATE: 2019    Surgeon(s) and Role:     * Marlene Beverly MD - Primary     * Anastasiya Berry PA-C - Assisting     * James Turner PA-C - Assisting    Preop Diagnosis:  Pericardial effusion [I31 3]    Post-Op Diagnosis Codes:     * Pericardial effusion [I31 3]    Procedure(s) (LRB):  WINDOW PERICARDIAL (N/A)    Specimen(s):  ID Type Source Tests Collected by Time Destination   1 : pericardium Tissue Pericardium TISSUE EXAM Marlene Beverly MD 2019 1219    2 : Pericardial Cytology  Body Fluid Pericardium BODY FLUID WHITE CELL COUNT WITH DIFF, BODY FLUID CULTURE AND GRAM STAIN, PULMONARY CYTOLOGY Marlene Beverly MD 2019 1219        Estimated Blood Loss:   Minimal    Drains:  Closed/Suction Drain Left Abdomen Bulb 19 Fr  (Active)   Number of days: 0       Urethral Catheter Temperature probe 16 Fr  (Active)   Amt returned on insertion(mL) 60 mL 2019 11:19 AM   Reasons to continue Urinary Catheter  Accurate I&O assessment in critically ill patients (48 hr  max) 2019 12:24 PM   Goal for Removal Other (Comment) 2019  2:49 PM   Site Assessment Clean;Skin intact 2019  4:01 PM   Collection Container Standard drainage bag 2019  4:01 PM   Securement Method Securing device (Describe) 2019  4:01 PM   Output (mL) 60 mL 2019 11:01 AM   Number of days: 3       Anesthesia Type:   General    Operative Indications:  Pericardial effusion [I31 3]    Operative Findings:  Large pericardial effusion  500cc of fluid  Complications:   None    Procedure and Technique:  The patient was correctly identified in the holding area and was brought to the operating room and placed in the supine position  The patient was prepped and draped in the usual fashion  A timeout was performed confirming procedure and patient      A 5 cm incision was made over the xiphoid and dissection was carried down onto the xiphoid  The xiphoid was excised  The midline fascia was opened  A retractor was placed under the bottom sternal edge and a retrosternal plane was dissected  The pericardium was identified and grasped with an Allis clamp  An incision was made with a Metzenbaum scissors and the fluid was released  Some of the fluid was sent for cytology and culture  A 500 cc pericardial effusion that appeared serous was drained  Once all the fluid was drained, a piece of anterior pericardium approximately 2 x 3 cm was excised and sent for routine pathology  There are no pericardial masses on digital palpation  A 19 [employer] Sole Suzette Birch was placed through a separate stab incision in the left upper quadrant to lie along the diaphragmatic surface within the pericardium  This drain was sutured in place and placed to Bulb drainage  Hemostasis was adequate  The midline fascia was closed with a running #1 PDS  The subcutaneous and subcuticular layers were closed with running absorbable suture  The incision was dressed with Dermabond  Sponge and instrument counts were correct x2  The patient went to the PACU in stable condition     I was present for the entire procedure, A qualified resident physician was not available and A physician assistant was required during the procedure for retraction tissue handling,dissection and suturing    Patient Disposition:  PACU     SIGNATURE: Kathy Camacho MD  DATE: February 26, 2019  TIME: 12:35 PM

## 2019-02-26 NOTE — PLAN OF CARE
Problem: Potential for Falls  Goal: Patient will remain free of falls  Description  INTERVENTIONS:  - Assess patient frequently for physical needs  -  Identify cognitive and physical deficits and behaviors that affect risk of falls  -  Paron fall precautions as indicated by assessment   - Educate patient/family on patient safety including physical limitations  - Instruct patient to call for assistance with activity based on assessment  - Modify environment to reduce risk of injury  - Consider OT/PT consult to assist with strengthening/mobility  Outcome: Progressing     Problem: Prexisting or High Potential for Compromised Skin Integrity  Goal: Skin integrity is maintained or improved  Description  INTERVENTIONS:  - Identify patients at risk for skin breakdown  - Assess and monitor skin integrity  - Assess and monitor nutrition and hydration status  - Monitor labs (i e  albumin)  - Assess for incontinence   - Turn and reposition patient  - Assist with mobility/ambulation  - Relieve pressure over bony prominences  - Avoid friction and shearing  - Provide appropriate hygiene as needed including keeping skin clean and dry  - Evaluate need for skin moisturizer/barrier cream  - Collaborate with interdisciplinary team (i e  Nutrition, Rehabilitation, etc )   - Patient/family teaching  Outcome: Progressing     Problem: Nutrition/Hydration-ADULT  Goal: Nutrient/Hydration intake appropriate for improving, restoring or maintaining nutritional needs  Description  Monitor and assess patient's nutrition/hydration status for malnutrition (ex- brittle hair, bruises, dry skin, pale skin and conjunctiva, muscle wasting, smooth red tongue, and disorientation)  Collaborate with interdisciplinary team and initiate plan and interventions as ordered  Monitor patient's weight and dietary intake as ordered or per policy  Utilize nutrition screening tool and intervene per policy   Determine patient's food preferences and provide high-protein, high-caloric foods as appropriate  INTERVENTIONS:  - Monitor oral intake, urinary output, labs, and treatment plans  - Assess nutrition and hydration status and recommend course of action  - Evaluate amount of meals eaten  - Assist patient with eating if necessary   - Allow adequate time for meals  - Recommend/ encourage appropriate diets, oral nutritional supplements, and vitamin/mineral supplements  - Order, calculate, and assess calorie counts as needed  - Recommend, monitor, and adjust tube feedings and TPN/PPN based on assessed needs  - Assess need for intravenous fluids  - Provide specific nutrition/hydration education as appropriate  - Include patient/family/caregiver in decisions related to nutrition  Outcome: Progressing     Problem: CARDIOVASCULAR - ADULT  Goal: Maintains optimal cardiac output and hemodynamic stability  Description  INTERVENTIONS:  - Monitor I/O, vital signs and rhythm  - Monitor for S/S and trends of decreased cardiac output i e  bleeding, hypotension  - Administer and titrate ordered vasoactive medications to optimize hemodynamic stability  - Assess quality of pulses, skin color and temperature  - Assess for signs of decreased coronary artery perfusion - ex   Angina  - Instruct patient to report change in severity of symptoms  Outcome: Progressing  Goal: Absence of cardiac dysrhythmias or at baseline rhythm  Description  INTERVENTIONS:  - Continuous cardiac monitoring, monitor vital signs, obtain 12 lead EKG if indicated  - Administer antiarrhythmic and heart rate control medications as ordered  - Monitor electrolytes and administer replacement therapy as ordered  Outcome: Progressing     Problem: METABOLIC, FLUID AND ELECTROLYTES - ADULT  Goal: Electrolytes maintained within normal limits  Description  INTERVENTIONS:  - Monitor labs and assess patient for signs and symptoms of electrolyte imbalances  - Administer electrolyte replacement as ordered  - Monitor response to electrolyte replacements, including repeat lab results as appropriate  - Instruct patient on fluid and nutrition as appropriate  Outcome: Progressing  Goal: Fluid balance maintained  Description  INTERVENTIONS:  - Monitor labs and assess for signs and symptoms of volume excess or deficit  - Monitor I/O and WT  - Instruct patient on fluid and nutrition as appropriate  Outcome: Progressing  Goal: Glucose maintained within target range  Description  INTERVENTIONS:  - Monitor Blood Glucose as ordered  - Assess for signs and symptoms of hyperglycemia and hypoglycemia  - Administer ordered medications to maintain glucose within target range  - Assess nutritional intake and initiate nutrition service referral as needed  Outcome: Progressing     Problem: SKIN/TISSUE INTEGRITY - ADULT  Goal: Skin integrity remains intact  Description  INTERVENTIONS  - Identify patients at risk for skin breakdown  - Assess and monitor skin integrity  - Assess and monitor nutrition and hydration status  - Monitor labs (i e  albumin)  - Assess for incontinence   - Turn and reposition patient  - Assist with mobility/ambulation  - Relieve pressure over bony prominences  - Avoid friction and shearing  - Provide appropriate hygiene as needed including keeping skin clean and dry  - Evaluate need for skin moisturizer/barrier cream  - Collaborate with interdisciplinary team (i e  Nutrition, Rehabilitation, etc )   - Patient/family teaching  Outcome: Progressing

## 2019-02-26 NOTE — PROGRESS NOTES
Progress Note - Critical Care   Rozina Vera  80 y o  male MRN: [id number]  Unit/Bed#: ICU 13 Encounter: [id number]    Assessment/Plan:  1  ERIK on CKD 3, 2/2 ATN in the setting of nausea and vomiting  · Off of CVVH since Sunday  · Plan for HD today  · Continue to monitor renal indices and urine output  · Nephrology following    2  Pericardial effusion vs tamponade  · Remains hemodynamically stable  · Schedule for pericardial window today at 11:30 am  · Cardiology following    3  Paroxysmal atrial fibrillation  · Eliquis remains on hold  · Metoprolol prn    4  Acute blood loss anemia likely secondary to CVVH filter changes  · Hemoglobin at 6 4 currently  · Patient is stable, does not warrant emergent transfusion  · Will transfuse today after consent obtained  · Continue to trend hemoglobins Q6 hours    5  Diabetes, type 2  · Continue lantus and SSI coverage with goal -180    6  History of DVT, 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  · eliquis on hold for surgery    7  Depreesion/anxiety  · Continue paxil and xanax  _____________________________________________________________________    HPI/24hr events:   Hemoglobin decreased overnight, remained hemodynamically stable  Will continue to trend and obtain blood consent today      Medications:    Current Facility-Administered Medications:  ALPRAZolam 0 25 mg Oral Q8H PRN SUJATA Padron    ascorbic acid 250 mg Oral Daily SUJATA Padron    atorvastatin 10 mg Oral Daily With Alejandra Sue MD    cholecalciferol 2,000 Units Oral Daily SUJATA Padron    dextrose 5 % and sodium chloride 0 9 % 100 mL/hr Intravenous Continuous SUJATA Padron Last Rate: 100 mL/hr (02/25/19 2129)   finasteride 5 mg Oral Daily SUJATA Padron    fluticasone-vilanterol 1 puff Inhalation Daily SUJATA Padron    insulin glargine 12 Units Subcutaneous HS SUJATA Padron    insulin lispro 1-6 Units Subcutaneous TID AC SUJATA Padron    insulin lispro 4 Units Subcutaneous Daily With Breakfast Sharon Holly, CRNP    insulin lispro 4 Units Subcutaneous Daily With Lunch Sharon Holly, CRNP    insulin lispro 4 Units Subcutaneous Daily With Astrid Douglasparth, SUJATA    metoprolol 2 5 mg Intravenous Q6H PRN Sharon Holly, CRNP    pantoprazole 40 mg Oral Early Morning Sharon Quarlese, CRNP    PARoxetine 30 mg Oral Daily Sharon Barrera, DULCENP    sodium chloride 1,000 mL Intravenous Once Christian Xiong MD          dextrose 5 % and sodium chloride 0 9 % 100 mL/hr Last Rate: 100 mL/hr (02/25/19 2129)         Physical exam:  Vitals: Body mass index is 18 01 kg/m²  Blood pressure 112/55, pulse 80, temperature 98 4 °F (36 9 °C), temperature source Temporal, resp  rate (!) 8, height 5' 4" (1 626 m), weight 47 6 kg (104 lb 15 oz), SpO2 94 %  ,  Temp  Min: 96 9 °F (36 1 °C)  Max: 99 2 °F (37 3 °C)  IBW: 59 2 kg    SpO2: 94 %  SpO2 Activity: At Rest  O2 Device: Nasal cannula      Intake/Output Summary (Last 24 hours) at 2/26/2019 4553  Last data filed at 2/26/2019 0600  Gross per 24 hour   Intake 2880 ml   Output 645 ml   Net 2235 ml       Invasive/non-invasive ventilation settings:   Respiratory    Lab Data (Last 4 hours)    None         O2/Vent Data (Last 4 hours)    None              Invasive Devices     Peripheral Intravenous Line            Peripheral IV 02/23/19 Left Antecubital 2 days    Peripheral IV 02/23/19 Left Hand 2 days          Line            Temporary HD Catheter 2 days          Drain            Urethral Catheter Temperature probe 16 Fr  2 days                  Physical Exam:  Gen: cooperative, in NAD  HEENT: normocephalic, atraumatic, oropharynx clear  Neck: no JVD, no lymphadenopathy, trachea midline  Chest: lung sounds clear, equal  Cor: audible S1,S2, no edema  Abd: soft, non tender, non distended  Ext: moves all extremities,equally  Neuro: alert and oriented x3  Skin: warm, dry      Diagnostic Data:  Lab: I have personally reviewed pertinent lab results  CBC:   Results from last 7 days   Lab Units 02/26/19  0535 02/26/19  0011 02/25/19  1833 02/25/19  0454 02/24/19  1023   WBC Thousand/uL 3 10*  --   --  3 52* 3 99*   HEMOGLOBIN g/dL 6 8* 6 4* 7 5* 7 0* 7 4*   HEMATOCRIT % 20 8*  --   --  21 4* 21 9*   PLATELETS Thousands/uL 96*  --   --  107* 122*       CMP:   Results from last 7 days   Lab Units 02/25/19  0454 02/24/19  1545 02/24/19  0945  02/23/19  0836   SODIUM mmol/L 141 139 140   < > 142   POTASSIUM mmol/L 3 6 4 3 3 9   < > 6 8*   CHLORIDE mmol/L 105 104 99*   < > 99*   CO2 mmol/L 24 28 28   < > 13*   BUN mg/dL 65* 57* 76*   < > 117*   CREATININE mg/dL 6 38* 5 61* 7 52*   < > 11 60*   CALCIUM mg/dL 7 9* 8 4 8 4   < > 9 3   ALK PHOS U/L  --   --   --   --  69   ALT U/L  --   --   --   --  22   AST U/L  --   --   --   --  16    < > = values in this interval not displayed  PT/INR:   Lab Results   Component Value Date    INR 1 22 (H) 02/26/2019   ,   Magnesium:   Results from last 7 days   Lab Units 02/25/19  0454 02/24/19  1545 02/24/19  0945   MAGNESIUM mg/dL 2 0 1 8 2 1     Phosphorous:   Results from last 7 days   Lab Units 02/25/19  0454 02/24/19  1948 02/24/19  1545   PHOSPHORUS mg/dL 4 2* 4 0 4 2*       Microbiology:        Imaging:  No new     Cardiac lab/EKG/telemetry/ECHO:   Atrial fibrillation    VTE Prophylaxis: SCD, eliquis onhold    Code Status: Level 1 - Full Code    SUJATA Dai    Portions of the record may have been created with voice recognition software  Occasional wrong word or "sound a like" substitutions may have occurred due to the inherent limitations of voice recognition software  Read the chart carefully and recognize, using context, where substitutions have occurred

## 2019-02-27 ENCOUNTER — APPOINTMENT (INPATIENT)
Dept: DIALYSIS | Facility: HOSPITAL | Age: 84
DRG: 674 | End: 2019-02-27
Attending: INTERNAL MEDICINE
Payer: COMMERCIAL

## 2019-02-27 LAB
ABO GROUP BLD BPU: NORMAL
ANION GAP SERPL CALCULATED.3IONS-SCNC: 11 MMOL/L (ref 4–13)
BPU ID: NORMAL
BUN SERPL-MCNC: 62 MG/DL (ref 5–25)
CALCIUM SERPL-MCNC: 7.7 MG/DL (ref 8.3–10.1)
CHLORIDE SERPL-SCNC: 110 MMOL/L (ref 100–108)
CO2 SERPL-SCNC: 21 MMOL/L (ref 21–32)
CREAT SERPL-MCNC: 7.71 MG/DL (ref 0.6–1.3)
CROSSMATCH: NORMAL
ERYTHROCYTE [DISTWIDTH] IN BLOOD BY AUTOMATED COUNT: 12.5 % (ref 11.6–15.1)
GFR SERPL CREATININE-BSD FRML MDRD: 6 ML/MIN/1.73SQ M
GLUCOSE SERPL-MCNC: 104 MG/DL (ref 65–140)
GLUCOSE SERPL-MCNC: 120 MG/DL (ref 65–140)
GLUCOSE SERPL-MCNC: 59 MG/DL (ref 65–140)
GLUCOSE SERPL-MCNC: 61 MG/DL (ref 65–140)
GLUCOSE SERPL-MCNC: 76 MG/DL (ref 65–140)
HBV CORE AB SER QL: NORMAL
HBV CORE IGM SER QL: NORMAL
HBV SURFACE AB SER-ACNC: <3.1 MIU/ML
HBV SURFACE AG SER QL: NORMAL
HCT VFR BLD AUTO: 28.3 % (ref 36.5–49.3)
HCV AB SER QL: NORMAL
HGB BLD-MCNC: 9.3 G/DL (ref 12–17)
MCH RBC QN AUTO: 32.1 PG (ref 26.8–34.3)
MCHC RBC AUTO-ENTMCNC: 32.9 G/DL (ref 31.4–37.4)
MCV RBC AUTO: 98 FL (ref 82–98)
PLATELET # BLD AUTO: 119 THOUSANDS/UL (ref 149–390)
PMV BLD AUTO: 10 FL (ref 8.9–12.7)
POTASSIUM SERPL-SCNC: 4 MMOL/L (ref 3.5–5.3)
RBC # BLD AUTO: 2.9 MILLION/UL (ref 3.88–5.62)
RYE IGE QN: NEGATIVE
SODIUM SERPL-SCNC: 142 MMOL/L (ref 136–145)
UNIT DISPENSE STATUS: NORMAL
UNIT PRODUCT CODE: NORMAL
UNIT RH: NORMAL
WBC # BLD AUTO: 4.61 THOUSAND/UL (ref 4.31–10.16)

## 2019-02-27 PROCEDURE — 82948 REAGENT STRIP/BLOOD GLUCOSE: CPT

## 2019-02-27 PROCEDURE — 90935 HEMODIALYSIS ONE EVALUATION: CPT | Performed by: INTERNAL MEDICINE

## 2019-02-27 PROCEDURE — 86706 HEP B SURFACE ANTIBODY: CPT | Performed by: INTERNAL MEDICINE

## 2019-02-27 PROCEDURE — 80048 BASIC METABOLIC PNL TOTAL CA: CPT | Performed by: PHYSICIAN ASSISTANT

## 2019-02-27 PROCEDURE — 99232 SBSQ HOSP IP/OBS MODERATE 35: CPT | Performed by: INTERNAL MEDICINE

## 2019-02-27 PROCEDURE — 85027 COMPLETE CBC AUTOMATED: CPT | Performed by: NURSE PRACTITIONER

## 2019-02-27 PROCEDURE — 87340 HEPATITIS B SURFACE AG IA: CPT | Performed by: INTERNAL MEDICINE

## 2019-02-27 PROCEDURE — 5A1D70Z PERFORMANCE OF URINARY FILTRATION, INTERMITTENT, LESS THAN 6 HOURS PER DAY: ICD-10-PCS | Performed by: INTERNAL MEDICINE

## 2019-02-27 PROCEDURE — 86705 HEP B CORE ANTIBODY IGM: CPT | Performed by: INTERNAL MEDICINE

## 2019-02-27 PROCEDURE — 86803 HEPATITIS C AB TEST: CPT | Performed by: INTERNAL MEDICINE

## 2019-02-27 PROCEDURE — 86704 HEP B CORE ANTIBODY TOTAL: CPT | Performed by: INTERNAL MEDICINE

## 2019-02-27 RX ORDER — DOCUSATE SODIUM 100 MG/1
100 CAPSULE, LIQUID FILLED ORAL 2 TIMES DAILY
Status: DISCONTINUED | OUTPATIENT
Start: 2019-02-27 | End: 2019-03-06 | Stop reason: HOSPADM

## 2019-02-27 RX ORDER — SENNOSIDES 8.6 MG
1 TABLET ORAL
Status: DISCONTINUED | OUTPATIENT
Start: 2019-02-27 | End: 2019-03-06 | Stop reason: HOSPADM

## 2019-02-27 RX ADMIN — DOCUSATE SODIUM 100 MG: 100 CAPSULE, LIQUID FILLED ORAL at 17:28

## 2019-02-27 RX ADMIN — INSULIN GLARGINE 12 UNITS: 100 INJECTION, SOLUTION SUBCUTANEOUS at 22:58

## 2019-02-27 RX ADMIN — FINASTERIDE 5 MG: 5 TABLET, FILM COATED ORAL at 08:43

## 2019-02-27 RX ADMIN — PANTOPRAZOLE SODIUM 40 MG: 40 TABLET, DELAYED RELEASE ORAL at 04:42

## 2019-02-27 RX ADMIN — APIXABAN 2.5 MG: 2.5 TABLET, FILM COATED ORAL at 17:28

## 2019-02-27 RX ADMIN — VITAMIN D, TAB 1000IU (100/BT) 2000 UNITS: 25 TAB at 08:43

## 2019-02-27 RX ADMIN — FLUTICASONE FUROATE AND VILANTEROL TRIFENATATE 1 PUFF: 100; 25 POWDER RESPIRATORY (INHALATION) at 08:45

## 2019-02-27 RX ADMIN — ATORVASTATIN CALCIUM 10 MG: 20 TABLET, FILM COATED ORAL at 17:28

## 2019-02-27 RX ADMIN — OXYCODONE HYDROCHLORIDE AND ACETAMINOPHEN 250 MG: 500 TABLET ORAL at 08:44

## 2019-02-27 RX ADMIN — APIXABAN 2.5 MG: 2.5 TABLET, FILM COATED ORAL at 15:05

## 2019-02-27 RX ADMIN — SENNOSIDES 8.6 MG: 8.6 TABLET, FILM COATED ORAL at 22:58

## 2019-02-27 RX ADMIN — PAROXETINE 30 MG: 20 TABLET, FILM COATED ORAL at 08:44

## 2019-02-27 RX ADMIN — DOCUSATE SODIUM 100 MG: 100 CAPSULE, LIQUID FILLED ORAL at 12:48

## 2019-02-27 NOTE — PROGRESS NOTES
Progress Note - Cardiology   Stephanie Manual  80 y o  male MRN: [id number]  Unit/Bed#: ICU 13 Encounter: [id number]      Assessment:     1  Moderate to large pericardial effusion status post pericardial window with drain in place  2  Acute renal failure in the setting of nausea and vomiting  3  Paroxysmal atrial fibrillation  4  Acute blood-loss anemia   5  Diabetes  6  History of DVT/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   7  Depression/anxiety    Discussion/Recommendations:    Doing well after pericardial window  Probable echo before discharge  Subjective:  Feels overall well      Physical Exam:  GEN:  NAD  HEENT:  MMM, NCAT, pink conjunctiva, EOMI, nonicteric sclera  CV:  NO JVD/HJR, RR, NO M/R/G, +S1/S2, NO PARASTERNAL HEAVE/THRILL, NO LE EDEMA, NO HEPATIC SYSTOLIC PULSATION, WARM EXTREMITIES  RESP:  CTAB/L  ABD:  SOFT, NT, NO GROSS ORGANOMEGALY        Vitals:   /60   Pulse 84   Temp 98 3 °F (36 8 °C) (Temporal)   Resp 15   Ht 5' 4" (1 626 m)   Wt 47 6 kg (104 lb 15 oz)   SpO2 91%   BMI 18 01 kg/m²   Vitals:    02/24/19 0600 02/26/19 0600   Weight: 58 2 kg (128 lb 4 9 oz) 47 6 kg (104 lb 15 oz)       Intake/Output Summary (Last 24 hours) at 2/27/2019 0801  Last data filed at 2/27/2019 0601  Gross per 24 hour   Intake 2805 83 ml   Output 1020 ml   Net 1785 83 ml         TELEMETRY:  No events  Lab Results:  Results from last 7 days   Lab Units 02/27/19  0429   WBC Thousand/uL 4 61   HEMOGLOBIN g/dL 9 3*   HEMATOCRIT % 28 3*   PLATELETS Thousands/uL 119*     Results from last 7 days   Lab Units 02/27/19  0502  02/23/19  0836   POTASSIUM mmol/L 4 0   < > 6 8*   CHLORIDE mmol/L 110*   < > 99*   CO2 mmol/L 21   < > 13*   BUN mg/dL 62*   < > 117*   CREATININE mg/dL 7 71*   < > 11 60*   CALCIUM mg/dL 7 7*   < > 9 3   ALK PHOS U/L  --   --  69   ALT U/L  --   --  22   AST U/L  --   --  16    < > = values in this interval not displayed       Results from last 7 days   Lab Units 02/27/19  0502   POTASSIUM mmol/L 4  0   CHLORIDE mmol/L 110*   CO2 mmol/L 21   BUN mg/dL 62*   CREATININE mg/dL 7 71*   CALCIUM mg/dL 7 7*             Medications:    Current Facility-Administered Medications:     acetaminophen (TYLENOL) tablet 650 mg, 650 mg, Oral, Q6H PRN, Vickye Olimpia, PA-C    ALPRAZolam Jeris Eaton) tablet 0 25 mg, 0 25 mg, Oral, Q8H PRN, Vickye Olimpia, PA-C    ascorbic acid (VITAMIN C) tablet 250 mg, 250 mg, Oral, Daily, Vickye Olimpia, PA-C, 250 mg at 02/26/19 5855    atorvastatin (LIPITOR) tablet 10 mg, 10 mg, Oral, Daily With Dinner, Heathere Olimpia, PA-C, 10 mg at 02/26/19 1805    cholecalciferol (VITAMIN D3) tablet 2,000 Units, 2,000 Units, Oral, Daily, Vickye Olimpia, PA-C, 2,000 Units at 02/26/19 1862    dextrose 5 % and sodium chloride 0 9 % infusion, 50 mL/hr, Intravenous, Continuous, José Miguel Johnsonamp, PA-C, Last Rate: 50 mL/hr at 02/26/19 1046, 200 mL at 02/26/19 1258    finasteride (PROSCAR) tablet 5 mg, 5 mg, Oral, Daily, Vickye Olimpia, PA-C, 5 mg at 02/26/19 0830    fluticasone-vilanterol (BREO ELLIPTA) 100-25 mcg/inh inhaler 1 puff, 1 puff, Inhalation, Daily, José Miguel Doan, PA-C, 1 puff at 02/26/19 0836    HYDROcodone-acetaminophen (NORCO) 5-325 mg per tablet 1 tablet, 1 tablet, Oral, Q6H PRN, Heathere Olimpia, PA-C, 1 tablet at 02/26/19 1933    insulin glargine (LANTUS) subcutaneous injection 12 Units 0 12 mL, 12 Units, Subcutaneous, HS, Heathere Olimpia, PA-C, 12 Units at 02/26/19 2204    insulin lispro (HumaLOG) 100 units/mL subcutaneous injection 1-6 Units, 1-6 Units, Subcutaneous, TID AC, 1 Units at 02/25/19 1816 **AND** Fingerstick Glucose (POCT), , , TID AC, José Miguel Doan PA-C    insulin lispro (HumaLOG) 100 units/mL subcutaneous injection 4 Units, 4 Units, Subcutaneous, Daily With Breakfast, José Miguel Doan PA-C    insulin lispro (HumaLOG) 100 units/mL subcutaneous injection 4 Units, 4 Units, Subcutaneous, Daily With Lunch, Radha Hui PA-C    insulin lispro (HumaLOG) 100 units/mL subcutaneous injection 4 Units, 4 Units, Subcutaneous, Daily With Mora Sotelo PA-C, 4 Units at 02/26/19 1616    metoprolol (LOPRESSOR) injection 2 5 mg, 2 5 mg, Intravenous, Q6H PRN, Phyllis Guerrero PA-C    pantoprazole (PROTONIX) EC tablet 40 mg, 40 mg, Oral, Early Morning, Druryelia Guerrero PA-C, 40 mg at 02/27/19 3650    PARoxetine (PAXIL) tablet 30 mg, 30 mg, Oral, Daily, Phyllis Guerrero PA-C, 30 mg at 02/26/19 3167    Portions of the record may have been created with voice recognition software  Occasional wrong word or "sound a like" substitutions may have occurred due to the inherent limitations of voice recognition software  Read the chart carefully and recognize, using context, where substitutions have occurred

## 2019-02-27 NOTE — HEMODIALYSIS
Stable 3hr 40min dialysis treatment  Intermittent poor flow from catheter, blood flow rate lowered and lines reversed  No fluid removal, post weight 46 3kg,  1kg gain due to nss infusing

## 2019-02-27 NOTE — PLAN OF CARE
Problem: DISCHARGE PLANNING - CARE MANAGEMENT  Goal: Discharge to post-acute care or home with appropriate resources  Description  INTERVENTIONS:  - Conduct assessment to determine patient/family and health care team treatment goals, and need for post-acute services based on payer coverage, community resources, and patient preferences, and barriers to discharge  - Address psychosocial, clinical, and financial barriers to discharge as identified in assessment in conjunction with the patient/family and health care team  - Arrange appropriate level of post-acute services according to patient?s   needs and preference and payer coverage in collaboration with the physician and health care team  - Communicate with and update the patient/family, physician, and health care team regarding progress on the discharge plan  - Arrange appropriate transportation to post-acute venues  Outcome: Completed     Following for discharge needs

## 2019-02-27 NOTE — PROGRESS NOTES
02/27/19 0958   Clinical Encounter Type   Visited With Patient   Routine Visit Introduction   Referral From Nurse   Confucianist Encounters   Confucianist Needs [facility] Text;Prayer   Patient Spiritual Encounters   Spiritual Assessment 5   Coping 4   Social Interaction 100% of the time

## 2019-02-27 NOTE — SOCIAL WORK
CM met with the patient and his wife to do a general SW assessment:    The patient lives with his wife, Yoly Acevedo in a ranch home  4/5 steps to enter  Is independent with ADLs and Ambulation  Does not use any DME at home  No hx of VNA or STR needs in the past  Drives  PCP is Jaclyn Young  The patient uses the [facility] in [place] for rx needs  CM following for discharge needs

## 2019-02-27 NOTE — PROGRESS NOTES
02/27/19 100 [facility] Involvement Patient active with Jainism   Spiritual Leader Mandaeism in transition, no     Spiritual Beliefs/Perceptions   Concept of God Accepting   Relationship with God Close   Spiritual Strengths faithful   Support Systems Spouse/significant other;Parent; Family members;Mandaeism/yash community   Stress Factors   Patient Stress Factors Other (Comment)  (Wife Solomon Curiel just broke her arm while he is here in [place])   Coping Responses   Patient Coping Accepting   Plan of Care   Comments Josi Nabeelmartha said God has gotten him through many trials in life  He also described a big supportive family that comes together when things are difficult  He loves his Jainism as well but longs for a new       Assessment Completed by:  Other (Comment)  (Check in with ICU)

## 2019-02-27 NOTE — NURSING NOTE
Patient transferred to room 445 with both hearing aids in and his dentures in    All belongings sent with patient

## 2019-02-27 NOTE — PROGRESS NOTES
Progress Note - Critical Care   Rudy Andino  80 y o  male MRN: [id number]  Unit/Bed#: ICU 13 Encounter: [id number]    Assessment/Plan:  1  Acute kidney injury on Chronic kidney disease  · Likely a component of ATN due to severe pre-renal azotemia  · CVVH off since 02/24  · Creatinine trending upward since CVVH off  · Dialysis plan per Nephrology  · Monitor/trend BUN and creatinine  · Monitor UOP  · Nephrology following  2  Pericardial effusion s/p pericardial window  · Pericardial window for 500 ml on 02/26  · Follow up with Thoracic surgery regarding restarting anticoagulation  · Cardiology following  · Follow up on culture  3  Paroxysmal atrial fibrillation  · Eliquis currently on hold  · Continue metoprolol prn for rate control  4  Acute blood loss anemia  · Likely secondary to CVVH filter changes   · Hgb improved at 9 3, platelets decreased to 96  · Monitor hgb/plts  · Transfuse for hgb less than 7 0  5  Diabetes mellitus type 2  · Continue SSI coverage  · Continue lantus  · Goal glucose 140-180  6  History of DVT and PE  · Eliquis on hold  · SCDs bilaterally  7  History of Depression  · Continue paxil  8  History of anxiety  · Continue xanax      _____________________________________________________________________    HPI/24hr events:   Patient is s/p pericardial window day 1  No acute events overnight      Medications:    Current Facility-Administered Medications:  acetaminophen 650 mg Oral Q6H PRN Granjeno Lock, PA-C    ALPRAZolam 0 25 mg Oral Q8H PRN Granjeno Lock, PA-C    ascorbic acid 250 mg Oral Daily Granjeno Lock, PA-C    atorvastatin 10 mg Oral Daily With KATHERIN Martinez-C    cholecalciferol 2,000 Units Oral Daily Granjeno Lock, PA-C    dextrose 5 % and sodium chloride 0 9 % 50 mL/hr Intravenous Continuous Granjeno Lock, PA-C Last Rate: 50 mL/hr (02/26/19 1046)   finasteride 5 mg Oral Daily Granjeno Lock, PA-C    fluticasone-vilanterol 1 puff Inhalation Daily Granjeno Lock, [place] HYDROcodone-acetaminophen 1 tablet Oral Q6H PRN Mohini Monahan, PA-C    insulin glargine 12 Units Subcutaneous HS Mohini Monahan, PA-C    insulin lispro 1-6 Units Subcutaneous TID AC Mohini Monahan, PA-C    insulin lispro 4 Units Subcutaneous Daily With Breakfast Mohini Monahan, PA-C    insulin lispro 4 Units Subcutaneous Daily With Lunch Mohini Monahan, PA-C    insulin lispro 4 Units Subcutaneous Daily With Dinner Mohini Monahan, PA-C    metoprolol 2 5 mg Intravenous Q6H PRN Mohini Monahan, PA-C    pantoprazole 40 mg Oral Early Morning Mohini Monahan, PA-C    PARoxetine 30 mg Oral Daily Mohini Monahan, PA-C          dextrose 5 % and sodium chloride 0 9 % 50 mL/hr Last Rate: 50 mL/hr (02/26/19 1046)         Physical exam:  Vitals: Body mass index is 18 01 kg/m²  Blood pressure 105/62, pulse 80, temperature 98 7 °F (37 1 °C), temperature source Temporal, resp  rate 14, height 5' 4" (1 626 m), weight 47 6 kg (104 lb 15 oz), SpO2 (!) 89 %  ,  Temp  Min: 96 9 °F (36 1 °C)  Max: 99 2 °F (37 3 °C)  IBW: 59 2 kg    SpO2: (!) 89 %  SpO2 Activity: At Rest  O2 Device: None (Room air)      Intake/Output Summary (Last 24 hours) at 2/27/2019 6405  Last data filed at 2/27/2019 0434  Gross per 24 hour   Intake 2493 33 ml   Output 1130 ml   Net 1363 33 ml       Invasive/non-invasive ventilation settings:   Respiratory    Lab Data (Last 4 hours)    None         O2/Vent Data (Last 4 hours)    None              Invasive Devices     Peripheral Intravenous Line            Peripheral IV 02/23/19 Left Antecubital 3 days    Peripheral IV 02/27/19 Right Wrist less than 1 day          Line            Temporary HD Catheter 3 days          Drain            Closed/Suction Drain Left Abdomen Bulb 19 Fr  less than 1 day                  Physical Exam:  Gen: A/A/Ox3, flat affect, cooperative  HEENT: AT/NC, PERRL, O/P clear and moist  Neck: supple, no cervical lymphadenopathy, RIJ temporary dialysis catheter  Chest: clear, respirations even  Cor: RRR  Abd: soft, NT, ND, normoactive bowel sounds  Ext: no edema/clubbing/cyanosis  Neuro: A/A/Ox3, PERRL, PYLE  Skin: pink, warm, dry  Right abdominal dressing intact  Diagnostic Data:  Lab: I have personally reviewed pertinent lab results  CBC:   Results from last 7 days   Lab Units 02/27/19  0429 02/26/19  1052 02/26/19  0535  02/25/19  0454   WBC Thousand/uL 4 61  --  3 10*  --  3 52*   HEMOGLOBIN g/dL 9 3* 7 1* 6 8*   < > 7 0*   HEMATOCRIT % 28 3*  --  20 8*  --  21 4*   PLATELETS Thousands/uL 119*  --  96*  --  107*    < > = values in this interval not displayed  CMP:   Results from last 7 days   Lab Units 02/27/19  0502 02/26/19  0535 02/25/19  0454  02/23/19  0836   SODIUM mmol/L 142 143 141   < > 142   POTASSIUM mmol/L 4 0 3 5 3 6   < > 6 8*   CHLORIDE mmol/L 110* 108 105   < > 99*   CO2 mmol/L 21 23 24   < > 13*   BUN mg/dL 62* 64* 65*   < > 117*   CREATININE mg/dL 7 71* 7 26* 6 38*   < > 11 60*   CALCIUM mg/dL 7 7* 7 7* 7 9*   < > 9 3   ALK PHOS U/L  --   --   --   --  69   ALT U/L  --   --   --   --  22   AST U/L  --   --   --   --  16    < > = values in this interval not displayed  PT/INR:   No results found for: PT, INR,   Magnesium:   Results from last 7 days   Lab Units 02/26/19  0535 02/25/19  0454 02/24/19  1545   MAGNESIUM mg/dL 1 9 2 0 1 8     Phosphorous:   Results from last 7 days   Lab Units 02/26/19  0535 02/25/19  0454 02/24/19  1948   PHOSPHORUS mg/dL 4 4* 4 2* 4 0       Microbiology:  Results from last 7 days   Lab Units 02/26/19  1219   GRAM STAIN RESULT  No Polys or Bacteria seen       Imaging:      Cardiac lab/EKG/telemetry/ECHO:       VTE Prophylaxis: SCDs, eliquis on hold    Code Status: Level 1 - Full Code    SUJATA Nation    Portions of the record may have been created with voice recognition software  Occasional wrong word or "sound a like" substitutions may have occurred due to the inherent limitations of voice recognition software   Read the chart carefully and recognize, using context, where substitutions have occurred

## 2019-02-27 NOTE — PROGRESS NOTES
NEPHROLOGY PROGRESS NOTE    Yasmine Jones  80 y o  male MRN: [id number]  Unit/Bed#: ICU 13 Encounter: [id number]  Reason for Consult:  Acute renal failure    The patient looks great today he is actually up sitting in a chair has no distress no shortness of breath appears awake and alert  ASSESSMENT/PLAN:  1  Renal  The patient presented with severe acute renal failure and creatinine of 11 was started on renal replacement therapy  He presented with some GI symptoms nausea vomiting severe volume depletion was felt that he likely has ATN  I did send some serologies to make sure there is no intrinsic process such as vasculitis or plasma cell dyscrasia  Urine output is starting to improve but creatinine is still slightly trending upward some going to plan dialysis is outline below  We will continually monitor the patient for signs of renal recovery and determine need for dialysis and await serologic workup as well  If it ends up the patient does not appear to be in recovery over the weekend we may need to consider PermCath early next week  HEMODIALYSIS PROCEDURE NOTE  The patient was seen and examined on hemodialysis  Time: 4 hours  Sodium: 138 Blood flow: 350   Dialyzer: F160 Potassium: 3 Dialysate flow: 800   Access: catheter Bicarbonate: 35 Ultrafiltration goal: even        2  Pericardial effusion  This was present chronically before his renal failure he underwent pericardial window with drainage yesterday and has external PATTI drain in place  Cardiology and thoracic surgery following  SUBJECTIVE:  Review of Systems   Constitution: Negative for chills and fever  HENT: Negative  Eyes: Negative  Cardiovascular: Negative  Negative for chest pain, leg swelling and orthopnea  Respiratory: Negative  Negative for cough and shortness of breath  Gastrointestinal: Negative  Negative for bloating, abdominal pain, diarrhea and vomiting  Genitourinary: Negative          OBJECTIVE:  Current Weight: Weight - Scale: 47 6 kg (104 lb 15 oz)  Vitals:Temp (24hrs), Av °F (36 7 °C), Min:97 1 °F (36 2 °C), Max:98 7 °F (37 1 °C)  Current: Temperature: 98 5 °F (36 9 °C)   Blood pressure 122/58, pulse 80, temperature 98 5 °F (36 9 °C), temperature source Temporal, resp  rate (!) 25, height 5' 4" (1 626 m), weight 47 6 kg (104 lb 15 oz), SpO2 92 %  , Body mass index is 18 01 kg/m²  Intake/Output Summary (Last 24 hours) at 2019 1156  Last data filed at 2019 1010  Gross per 24 hour   Intake 2725 83 ml   Output 1040 ml   Net 1685 83 ml       Physical Exam: /58   Pulse 80   Temp 98 5 °F (36 9 °C) (Temporal)   Resp (!) 25   Ht 5' 4" (1 626 m)   Wt 47 6 kg (104 lb 15 oz)   SpO2 92%   BMI 18 01 kg/m²   Physical Exam   Constitutional: No distress  Eyes: No scleral icterus  Cardiovascular: Normal rate and regular rhythm  Exam reveals no friction rub  Pulmonary/Chest: Effort normal and breath sounds normal  No respiratory distress  He has no rhonchi  He has no rales  Abdominal: Soft  Bowel sounds are normal  He exhibits no distension  There is no tenderness         Medications:    Current Facility-Administered Medications:     acetaminophen (TYLENOL) tablet 650 mg, 650 mg, Oral, Q6H PRN, Jalen Burroughs PA-C    ALPRAZolam Cydne Bard) tablet 0 25 mg, 0 25 mg, Oral, Q8H PRN, Jalen Burroughs PA-C    ascorbic acid (VITAMIN C) tablet 250 mg, 250 mg, Oral, Daily, Jalen Burroughs PA-C, 250 mg at 19 5976    atorvastatin (LIPITOR) tablet 10 mg, 10 mg, Oral, Daily With Dinner, Jalen Burroughs PA-C, 10 mg at 19 1805    cholecalciferol (VITAMIN D3) tablet 2,000 Units, 2,000 Units, Oral, Daily, Jalen Burroughs PA-C, 2,000 Units at 19 1396    dextrose 5 % and sodium chloride 0 9 % infusion, 50 mL/hr, Intravenous, Continuous, Jalen Burroughs PA-C, Last Rate: 50 mL/hr at 19 1046, 200 mL at 19 1258    finasteride (PROSCAR) tablet 5 mg, 5 mg, Oral, Daily, Jalen Burroughs PA-C, 5 mg at 19 8516    fluticasone-vilanterol (BREO ELLIPTA) 100-25 mcg/inh inhaler 1 puff, 1 puff, Inhalation, Daily, Silvia Zambrano PA-C, 1 puff at 02/27/19 0845    HYDROcodone-acetaminophen ([facility]) 5-325 mg per tablet 1 tablet, 1 tablet, Oral, Q6H PRN, OCTAVIO CookC, 1 tablet at 02/26/19 1933    insulin glargine (LANTUS) subcutaneous injection 12 Units 0 12 mL, 12 Units, Subcutaneous, HS, KATHERIN Cook-C, 12 Units at 02/26/19 2204    insulin lispro (HumaLOG) 100 units/mL subcutaneous injection 1-6 Units, 1-6 Units, Subcutaneous, TID AC, 1 Units at 02/25/19 1816 **AND** Fingerstick Glucose (POCT), , , TID AC, Silvia Zambrano PA-C    insulin lispro (HumaLOG) 100 units/mL subcutaneous injection 4 Units, 4 Units, Subcutaneous, Daily With Breakfast, Silvia Zambrano PA-C    insulin lispro (HumaLOG) 100 units/mL subcutaneous injection 4 Units, 4 Units, Subcutaneous, Daily With Lunch, Radha Hui PA-C    insulin lispro (HumaLOG) 100 units/mL subcutaneous injection 4 Units, 4 Units, Subcutaneous, Daily With Dinner, OCTAVIO CookC, 4 Units at 02/26/19 1616    metoprolol (LOPRESSOR) injection 2 5 mg, 2 5 mg, Intravenous, Q6H PRN, KATHERIN Cook-C    pantoprazole (PROTONIX) EC tablet 40 mg, 40 mg, Oral, Early Morning, KATHERIN Cook-C, 40 mg at 02/27/19 0442    PARoxetine (PAXIL) tablet 30 mg, 30 mg, Oral, Daily, Silvia Zambrano PA-C, 30 mg at 02/27/19 0844    Laboratory Results:  Lab Results   Component Value Date    WBC 4 61 02/27/2019    HGB 9 3 (L) 02/27/2019    HCT 28 3 (L) 02/27/2019    MCV 98 02/27/2019     (L) 02/27/2019     Lab Results   Component Value Date    GLUCOSE 130 07/07/2015    CALCIUM 7 7 (L) 02/27/2019     07/21/2016    K 4 0 02/27/2019    CO2 21 02/27/2019     (H) 02/27/2019    BUN 62 (H) 02/27/2019    CREATININE 7 71 (H) 02/27/2019     Lab Results   Component Value Date    CALCIUM 7 7 (L) 02/27/2019    PHOS 4 4 (H) 02/26/2019     No results found for: LABPROT

## 2019-02-27 NOTE — PROGRESS NOTES
Progress Note - ICU Transfer to SD/MS tele/MS   Nina Kelly  80 y o  male MRN: [id number]  [address]   Unit/Bed#: ICU 27 Encounter: [id number]    Code Status: Level 1 - Full Code  POA:    Referring Physician: Dr Morataya Age  Accepting Physician:  Dr Charley Sebastian  _____________________________________________________________________    Reason for ICU/SD admission: Acute kidney injury requiring CVVH    History of Present Illness:  80year old male with a past medical history significant for pleural effusion, pericardial effusion, hypertension, hypercholesterolemia, DVT, and cardiomegaly  He presented to the ER complaining of abdominal discomfort, nausea and vomiting  On arrival to the ER was found to be in an acute kidney injury on chronic kidney disease  He was started on IVF and bicarbonate infusion  Summary of clinical course:  During hospitalization had an echocardiogram due to known pericardial effusion and on repeat echocardiogram had a large pericardial effusion  At that point had no right ventricular diastolic collapse with no atrial collapse  Nephrology was consulted due to ERIK and was started on CVVH after temporary dialysis catheter was placed  He continued on CVVH and then was discontinued due to improved urine output and clotting of filters  Was planned to have hemodialysis and most recently had HD today  As per his pericardial effusion did not have hemodynamic compromise but went for cardiac window on 2/26 now with a PATTI drain  He is chronically on Eliquis for atrial fibrillation and will be restarted on anticoagulation  Patient is now stable and able to be transferred to the floor       Consultants:  Cardiology and nephrology   _____________________________________________________________________    Diagnostic Data:  CBC:  Results from last 7 days   Lab Units 02/27/19  0429   WBC Thousand/uL 4 61   HEMOGLOBIN g/dL 9 3*   HEMATOCRIT % 28 3*   PLATELETS Thousands/uL 119* CMP:   Lab Results   Component Value Date    SODIUM 142 02/27/2019    K 4 0 02/27/2019     (H) 02/27/2019    CO2 21 02/27/2019    BUN 62 (H) 02/27/2019    CREATININE 7 71 (H) 02/27/2019    CALCIUM 7 7 (L) 02/27/2019    EGFR 6 02/27/2019   ,   PT/INR: No results found for: PT, INR,   Magnesium: No components found for: MAG,  Phosphorous: No results found for: PHOS    ABG: No results found for: PHART, TSO5ENB, PO2ART, FPW7TSQ, C6FQKDSY, BEART, SOURCE,     Microbiology:  Results from last 7 days   Lab Units 02/26/19  1219   GRAM STAIN RESULT  No Polys or Bacteria seen   BODY FLUID CULTURE, STERILE  No growth       Imaging: I have personally reviewed the pertinent imaging studies on the PACS system  ECHO    Cardiac/EKG/telemetry/Echo:       Echocardiogram: large pericardial effusion  _____________________________________________________________________    Recent or scheduled procedures:  2/26 pericardial window    Outstanding/pending diagnostics:       Mobilization Plan: Activity as tolerated     Home medications that are not reordered and reason why:      Spoke with Dr Randee Simon  regarding transfer  Please call [phone] with any questions or concerns  Portions of the record may have been created with voice recognition software  Occasional wrong word or "sound a like" substitutions may have occurred due to the inherent limitations of voice recognition software  Read the chart carefully and recognize, using context, where substitutions have occurred      Whitney Hawk

## 2019-02-27 NOTE — PLAN OF CARE
Problem: Potential for Falls  Goal: Patient will remain free of falls  Description  INTERVENTIONS:  - Assess patient frequently for physical needs  -  Identify cognitive and physical deficits and behaviors that affect risk of falls  -  [place] fall precautions as indicated by assessment   - Educate patient/family on patient safety including physical limitations  - Instruct patient to call for assistance with activity based on assessment  - Modify environment to reduce risk of injury  - Consider OT/PT consult to assist with strengthening/mobility  Outcome: Progressing     Problem: Prexisting or High Potential for Compromised Skin Integrity  Goal: Skin integrity is maintained or improved  Description  INTERVENTIONS:  - Identify patients at risk for skin breakdown  - Assess and monitor skin integrity  - Assess and monitor nutrition and hydration status  - Monitor labs (i e  albumin)  - Assess for incontinence   - Turn and reposition patient  - Assist with mobility/ambulation  - Relieve pressure over bony prominences  - Avoid friction and shearing  - Provide appropriate hygiene as needed including keeping skin clean and dry  - Evaluate need for skin moisturizer/barrier cream  - Collaborate with interdisciplinary team (i e  Nutrition, Rehabilitation, etc )   - Patient/family teaching  Outcome: Progressing     Problem: Nutrition/Hydration-ADULT  Goal: Nutrient/Hydration intake appropriate for improving, restoring or maintaining nutritional needs  Description  Monitor and assess patient's nutrition/hydration status for malnutrition (ex- brittle hair, bruises, dry skin, pale skin and conjunctiva, muscle wasting, smooth red tongue, and disorientation)  Collaborate with interdisciplinary team and initiate plan and interventions as ordered  Monitor patient's weight and dietary intake as ordered or per policy  Utilize nutrition screening tool and intervene per policy   Determine patient's food preferences and provide high-protein, high-caloric foods as appropriate  INTERVENTIONS:  - Monitor oral intake, urinary output, labs, and treatment plans  - Assess nutrition and hydration status and recommend course of action  - Evaluate amount of meals eaten  - Assist patient with eating if necessary   - Allow adequate time for meals  - Recommend/ encourage appropriate diets, oral nutritional supplements, and vitamin/mineral supplements  - Order, calculate, and assess calorie counts as needed  - Recommend, monitor, and adjust tube feedings and TPN/PPN based on assessed needs  - Assess need for intravenous fluids  - Provide specific nutrition/hydration education as appropriate  - Include patient/family/caregiver in decisions related to nutrition  Outcome: Progressing     Problem: CARDIOVASCULAR - ADULT  Goal: Maintains optimal cardiac output and hemodynamic stability  Description  INTERVENTIONS:  - Monitor I/O, vital signs and rhythm  - Monitor for S/S and trends of decreased cardiac output i e  bleeding, hypotension  - Administer and titrate ordered vasoactive medications to optimize hemodynamic stability  - Assess quality of pulses, skin color and temperature  - Assess for signs of decreased coronary artery perfusion - ex   Angina  - Instruct patient to report change in severity of symptoms  Outcome: Progressing  Goal: Absence of cardiac dysrhythmias or at baseline rhythm  Description  INTERVENTIONS:  - Continuous cardiac monitoring, monitor vital signs, obtain 12 lead EKG if indicated  - Administer antiarrhythmic and heart rate control medications as ordered  - Monitor electrolytes and administer replacement therapy as ordered  Outcome: Progressing     Problem: METABOLIC, FLUID AND ELECTROLYTES - ADULT  Goal: Electrolytes maintained within normal limits  Description  INTERVENTIONS:  - Monitor labs and assess patient for signs and symptoms of electrolyte imbalances  - Administer electrolyte replacement as ordered  - Monitor response to electrolyte replacements, including repeat lab results as appropriate  - Instruct patient on fluid and nutrition as appropriate  Outcome: Progressing  Goal: Fluid balance maintained  Description  INTERVENTIONS:  - Monitor labs and assess for signs and symptoms of volume excess or deficit  - Monitor I/O and WT  - Instruct patient on fluid and nutrition as appropriate  Outcome: Progressing  Goal: Glucose maintained within target range  Description  INTERVENTIONS:  - Monitor Blood Glucose as ordered  - Assess for signs and symptoms of hyperglycemia and hypoglycemia  - Administer ordered medications to maintain glucose within target range  - Assess nutritional intake and initiate nutrition service referral as needed  Outcome: Progressing     Problem: SKIN/TISSUE INTEGRITY - ADULT  Goal: Skin integrity remains intact  Description  INTERVENTIONS  - Identify patients at risk for skin breakdown  - Assess and monitor skin integrity  - Assess and monitor nutrition and hydration status  - Monitor labs (i e  albumin)  - Assess for incontinence   - Turn and reposition patient  - Assist with mobility/ambulation  - Relieve pressure over bony prominences  - Avoid friction and shearing  - Provide appropriate hygiene as needed including keeping skin clean and dry  - Evaluate need for skin moisturizer/barrier cream  - Collaborate with interdisciplinary team (i e  Nutrition, Rehabilitation, etc )   - Patient/family teaching  Outcome: Progressing

## 2019-02-28 DIAGNOSIS — F41.1 GENERALIZED ANXIETY DISORDER: ICD-10-CM

## 2019-02-28 PROBLEM — D64.9 CHRONIC ANEMIA: Status: ACTIVE | Noted: 2019-02-28

## 2019-02-28 LAB
ALBUMIN SERPL ELPH-MCNC: 2.8 G/DL (ref 3.5–5)
ALBUMIN SERPL ELPH-MCNC: 58.3 % (ref 52–65)
ALPHA1 GLOB SERPL ELPH-MCNC: 0.32 G/DL (ref 0.1–0.4)
ALPHA1 GLOB SERPL ELPH-MCNC: 6.6 % (ref 2.5–5)
ALPHA2 GLOB SERPL ELPH-MCNC: 0.53 G/DL (ref 0.4–1.2)
ALPHA2 GLOB SERPL ELPH-MCNC: 11 % (ref 7–13)
ANION GAP SERPL CALCULATED.3IONS-SCNC: 10 MMOL/L (ref 4–13)
BETA GLOB ABNORMAL SERPL ELPH-MCNC: 0.25 G/DL (ref 0.4–0.8)
BETA1 GLOB SERPL ELPH-MCNC: 5.3 % (ref 5–13)
BETA2 GLOB SERPL ELPH-MCNC: 6.7 % (ref 2–8)
BETA2+GAMMA GLOB SERPL ELPH-MCNC: 0.32 G/DL (ref 0.2–0.5)
BUN SERPL-MCNC: 31 MG/DL (ref 5–25)
C-ANCA TITR SER IF: NORMAL TITER
CALCIUM SERPL-MCNC: 8.1 MG/DL (ref 8.3–10.1)
CHLORIDE SERPL-SCNC: 106 MMOL/L (ref 100–108)
CO2 SERPL-SCNC: 25 MMOL/L (ref 21–32)
CREAT SERPL-MCNC: 4.65 MG/DL (ref 0.6–1.3)
ERYTHROCYTE [DISTWIDTH] IN BLOOD BY AUTOMATED COUNT: 12.2 % (ref 11.6–15.1)
FERRITIN SERPL-MCNC: 378 NG/ML (ref 8–388)
FOLATE SERPL-MCNC: 7.8 NG/ML (ref 3.1–17.5)
GAMMA GLOB ABNORMAL SERPL ELPH-MCNC: 0.58 G/DL (ref 0.5–1.6)
GAMMA GLOB SERPL ELPH-MCNC: 12.1 % (ref 12–22)
GFR SERPL CREATININE-BSD FRML MDRD: 11 ML/MIN/1.73SQ M
GLUCOSE SERPL-MCNC: 121 MG/DL (ref 65–140)
GLUCOSE SERPL-MCNC: 59 MG/DL (ref 65–140)
GLUCOSE SERPL-MCNC: 62 MG/DL (ref 65–140)
GLUCOSE SERPL-MCNC: 85 MG/DL (ref 65–140)
GLUCOSE SERPL-MCNC: 98 MG/DL (ref 65–140)
HCT VFR BLD AUTO: 27 % (ref 36.5–49.3)
HGB BLD-MCNC: 9.1 G/DL (ref 12–17)
IGG/ALB SER: 1.4 {RATIO} (ref 1.1–1.8)
MCH RBC QN AUTO: 31.9 PG (ref 26.8–34.3)
MCHC RBC AUTO-ENTMCNC: 33.7 G/DL (ref 31.4–37.4)
MCV RBC AUTO: 95 FL (ref 82–98)
MYELOPEROXIDASE AB SER IA-ACNC: <9 U/ML (ref 0–9)
P-ANCA ATYPICAL TITR SER IF: NORMAL TITER
P-ANCA TITR SER IF: NORMAL TITER
PLATELET # BLD AUTO: 141 THOUSANDS/UL (ref 149–390)
PMV BLD AUTO: 10.1 FL (ref 8.9–12.7)
POTASSIUM SERPL-SCNC: 3.8 MMOL/L (ref 3.5–5.3)
PROT PATTERN SERPL ELPH-IMP: ABNORMAL
PROT SERPL-MCNC: 4.8 G/DL (ref 6.4–8.2)
PROTEINASE3 AB SER IA-ACNC: <3.5 U/ML (ref 0–3.5)
RBC # BLD AUTO: 2.85 MILLION/UL (ref 3.88–5.62)
SODIUM SERPL-SCNC: 141 MMOL/L (ref 136–145)
VIT B12 SERPL-MCNC: 268 PG/ML (ref 100–900)
WBC # BLD AUTO: 5.02 THOUSAND/UL (ref 4.31–10.16)

## 2019-02-28 PROCEDURE — 82948 REAGENT STRIP/BLOOD GLUCOSE: CPT

## 2019-02-28 PROCEDURE — 80048 BASIC METABOLIC PNL TOTAL CA: CPT | Performed by: NURSE PRACTITIONER

## 2019-02-28 PROCEDURE — 82728 ASSAY OF FERRITIN: CPT | Performed by: FAMILY MEDICINE

## 2019-02-28 PROCEDURE — 82746 ASSAY OF FOLIC ACID SERUM: CPT | Performed by: FAMILY MEDICINE

## 2019-02-28 PROCEDURE — 99024 POSTOP FOLLOW-UP VISIT: CPT | Performed by: THORACIC SURGERY (CARDIOTHORACIC VASCULAR SURGERY)

## 2019-02-28 PROCEDURE — 99233 SBSQ HOSP IP/OBS HIGH 50: CPT | Performed by: FAMILY MEDICINE

## 2019-02-28 PROCEDURE — 85027 COMPLETE CBC AUTOMATED: CPT | Performed by: NURSE PRACTITIONER

## 2019-02-28 PROCEDURE — 99232 SBSQ HOSP IP/OBS MODERATE 35: CPT | Performed by: INTERNAL MEDICINE

## 2019-02-28 PROCEDURE — 82607 VITAMIN B-12: CPT | Performed by: FAMILY MEDICINE

## 2019-02-28 RX ADMIN — FLUTICASONE FUROATE AND VILANTEROL TRIFENATATE 1 PUFF: 100; 25 POWDER RESPIRATORY (INHALATION) at 09:28

## 2019-02-28 RX ADMIN — APIXABAN 2.5 MG: 2.5 TABLET, FILM COATED ORAL at 17:04

## 2019-02-28 RX ADMIN — ATORVASTATIN CALCIUM 10 MG: 20 TABLET, FILM COATED ORAL at 17:03

## 2019-02-28 RX ADMIN — PANTOPRAZOLE SODIUM 40 MG: 40 TABLET, DELAYED RELEASE ORAL at 05:47

## 2019-02-28 RX ADMIN — DOCUSATE SODIUM 100 MG: 100 CAPSULE, LIQUID FILLED ORAL at 17:04

## 2019-02-28 RX ADMIN — PAROXETINE 30 MG: 20 TABLET, FILM COATED ORAL at 09:24

## 2019-02-28 RX ADMIN — FINASTERIDE 5 MG: 5 TABLET, FILM COATED ORAL at 09:24

## 2019-02-28 RX ADMIN — DOCUSATE SODIUM 100 MG: 100 CAPSULE, LIQUID FILLED ORAL at 09:25

## 2019-02-28 RX ADMIN — SENNOSIDES 8.6 MG: 8.6 TABLET, FILM COATED ORAL at 22:28

## 2019-02-28 RX ADMIN — APIXABAN 2.5 MG: 2.5 TABLET, FILM COATED ORAL at 09:24

## 2019-02-28 RX ADMIN — OXYCODONE HYDROCHLORIDE AND ACETAMINOPHEN 250 MG: 500 TABLET ORAL at 09:24

## 2019-02-28 RX ADMIN — VITAMIN D, TAB 1000IU (100/BT) 2000 UNITS: 25 TAB at 09:24

## 2019-02-28 NOTE — PLAN OF CARE
Problem: Potential for Falls  Goal: Patient will remain free of falls  Description  INTERVENTIONS:  - Assess patient frequently for physical needs  -  Identify cognitive and physical deficits and behaviors that affect risk of falls  -  [place] fall precautions as indicated by assessment   - Educate patient/family on patient safety including physical limitations  - Instruct patient to call for assistance with activity based on assessment  - Modify environment to reduce risk of injury  - Consider OT/PT consult to assist with strengthening/mobility  Outcome: Progressing     Problem: Prexisting or High Potential for Compromised Skin Integrity  Goal: Skin integrity is maintained or improved  Description  INTERVENTIONS:  - Identify patients at risk for skin breakdown  - Assess and monitor skin integrity  - Assess and monitor nutrition and hydration status  - Monitor labs (i e  albumin)  - Assess for incontinence   - Turn and reposition patient  - Assist with mobility/ambulation  - Relieve pressure over bony prominences  - Avoid friction and shearing  - Provide appropriate hygiene as needed including keeping skin clean and dry  - Evaluate need for skin moisturizer/barrier cream  - Collaborate with interdisciplinary team (i e  Nutrition, Rehabilitation, etc )   - Patient/family teaching  Outcome: Progressing     Problem: Nutrition/Hydration-ADULT  Goal: Nutrient/Hydration intake appropriate for improving, restoring or maintaining nutritional needs  Description  Monitor and assess patient's nutrition/hydration status for malnutrition (ex- brittle hair, bruises, dry skin, pale skin and conjunctiva, muscle wasting, smooth red tongue, and disorientation)  Collaborate with interdisciplinary team and initiate plan and interventions as ordered  Monitor patient's weight and dietary intake as ordered or per policy  Utilize nutrition screening tool and intervene per policy   Determine patient's food preferences and provide high-protein, high-caloric foods as appropriate  INTERVENTIONS:  - Monitor oral intake, urinary output, labs, and treatment plans  - Assess nutrition and hydration status and recommend course of action  - Evaluate amount of meals eaten  - Assist patient with eating if necessary   - Allow adequate time for meals  - Recommend/ encourage appropriate diets, oral nutritional supplements, and vitamin/mineral supplements  - Order, calculate, and assess calorie counts as needed  - Recommend, monitor, and adjust tube feedings and TPN/PPN based on assessed needs  - Assess need for intravenous fluids  - Provide specific nutrition/hydration education as appropriate  - Include patient/family/caregiver in decisions related to nutrition  Outcome: Progressing     Problem: CARDIOVASCULAR - ADULT  Goal: Maintains optimal cardiac output and hemodynamic stability  Description  INTERVENTIONS:  - Monitor I/O, vital signs and rhythm  - Monitor for S/S and trends of decreased cardiac output i e  bleeding, hypotension  - Administer and titrate ordered vasoactive medications to optimize hemodynamic stability  - Assess quality of pulses, skin color and temperature  - Assess for signs of decreased coronary artery perfusion - ex   Angina  - Instruct patient to report change in severity of symptoms  Outcome: Progressing  Goal: Absence of cardiac dysrhythmias or at baseline rhythm  Description  INTERVENTIONS:  - Continuous cardiac monitoring, monitor vital signs, obtain 12 lead EKG if indicated  - Administer antiarrhythmic and heart rate control medications as ordered  - Monitor electrolytes and administer replacement therapy as ordered  Outcome: Progressing     Problem: METABOLIC, FLUID AND ELECTROLYTES - ADULT  Goal: Electrolytes maintained within normal limits  Description  INTERVENTIONS:  - Monitor labs and assess patient for signs and symptoms of electrolyte imbalances  - Administer electrolyte replacement as ordered  - Monitor response to electrolyte replacements, including repeat lab results as appropriate  - Instruct patient on fluid and nutrition as appropriate  Outcome: Progressing  Goal: Fluid balance maintained  Description  INTERVENTIONS:  - Monitor labs and assess for signs and symptoms of volume excess or deficit  - Monitor I/O and WT  - Instruct patient on fluid and nutrition as appropriate  Outcome: Progressing  Goal: Glucose maintained within target range  Description  INTERVENTIONS:  - Monitor Blood Glucose as ordered  - Assess for signs and symptoms of hyperglycemia and hypoglycemia  - Administer ordered medications to maintain glucose within target range  - Assess nutritional intake and initiate nutrition service referral as needed  Outcome: Progressing     Problem: SKIN/TISSUE INTEGRITY - ADULT  Goal: Skin integrity remains intact  Description  INTERVENTIONS  - Identify patients at risk for skin breakdown  - Assess and monitor skin integrity  - Assess and monitor nutrition and hydration status  - Monitor labs (i e  albumin)  - Assess for incontinence   - Turn and reposition patient  - Assist with mobility/ambulation  - Relieve pressure over bony prominences  - Avoid friction and shearing  - Provide appropriate hygiene as needed including keeping skin clean and dry  - Evaluate need for skin moisturizer/barrier cream  - Collaborate with interdisciplinary team (i e  Nutrition, Rehabilitation, etc )   - Patient/family teaching  Outcome: Progressing

## 2019-02-28 NOTE — PLAN OF CARE
Problem: Potential for Falls  Goal: Patient will remain free of falls  Description  INTERVENTIONS:  - Assess patient frequently for physical needs  -  Identify cognitive and physical deficits and behaviors that affect risk of falls  -  [place] fall precautions as indicated by assessment   - Educate patient/family on patient safety including physical limitations  - Instruct patient to call for assistance with activity based on assessment  - Modify environment to reduce risk of injury  - Consider OT/PT consult to assist with strengthening/mobility  Outcome: Progressing     Problem: Prexisting or High Potential for Compromised Skin Integrity  Goal: Skin integrity is maintained or improved  Description  INTERVENTIONS:  - Identify patients at risk for skin breakdown  - Assess and monitor skin integrity  - Assess and monitor nutrition and hydration status  - Monitor labs (i e  albumin)  - Assess for incontinence   - Turn and reposition patient  - Assist with mobility/ambulation  - Relieve pressure over bony prominences  - Avoid friction and shearing  - Provide appropriate hygiene as needed including keeping skin clean and dry  - Evaluate need for skin moisturizer/barrier cream  - Collaborate with interdisciplinary team (i e  Nutrition, Rehabilitation, etc )   - Patient/family teaching  Outcome: Progressing     Problem: Nutrition/Hydration-ADULT  Goal: Nutrient/Hydration intake appropriate for improving, restoring or maintaining nutritional needs  Description  Monitor and assess patient's nutrition/hydration status for malnutrition (ex- brittle hair, bruises, dry skin, pale skin and conjunctiva, muscle wasting, smooth red tongue, and disorientation)  Collaborate with interdisciplinary team and initiate plan and interventions as ordered  Monitor patient's weight and dietary intake as ordered or per policy  Utilize nutrition screening tool and intervene per policy   Determine patient's food preferences and provide high-protein, high-caloric foods as appropriate  INTERVENTIONS:  - Monitor oral intake, urinary output, labs, and treatment plans  - Assess nutrition and hydration status and recommend course of action  - Evaluate amount of meals eaten  - Assist patient with eating if necessary   - Allow adequate time for meals  - Recommend/ encourage appropriate diets, oral nutritional supplements, and vitamin/mineral supplements  - Order, calculate, and assess calorie counts as needed  - Recommend, monitor, and adjust tube feedings and TPN/PPN based on assessed needs  - Assess need for intravenous fluids  - Provide specific nutrition/hydration education as appropriate  - Include patient/family/caregiver in decisions related to nutrition  Outcome: Progressing     Problem: CARDIOVASCULAR - ADULT  Goal: Maintains optimal cardiac output and hemodynamic stability  Description  INTERVENTIONS:  - Monitor I/O, vital signs and rhythm  - Monitor for S/S and trends of decreased cardiac output i e  bleeding, hypotension  - Administer and titrate ordered vasoactive medications to optimize hemodynamic stability  - Assess quality of pulses, skin color and temperature  - Assess for signs of decreased coronary artery perfusion - ex   Angina  - Instruct patient to report change in severity of symptoms  Outcome: Progressing  Goal: Absence of cardiac dysrhythmias or at baseline rhythm  Description  INTERVENTIONS:  - Continuous cardiac monitoring, monitor vital signs, obtain 12 lead EKG if indicated  - Administer antiarrhythmic and heart rate control medications as ordered  - Monitor electrolytes and administer replacement therapy as ordered  Outcome: Progressing     Problem: METABOLIC, FLUID AND ELECTROLYTES - ADULT  Goal: Electrolytes maintained within normal limits  Description  INTERVENTIONS:  - Monitor labs and assess patient for signs and symptoms of electrolyte imbalances  - Administer electrolyte replacement as ordered  - Monitor response to electrolyte replacements, including repeat lab results as appropriate  - Instruct patient on fluid and nutrition as appropriate  Outcome: Progressing  Goal: Fluid balance maintained  Description  INTERVENTIONS:  - Monitor labs and assess for signs and symptoms of volume excess or deficit  - Monitor I/O and WT  - Instruct patient on fluid and nutrition as appropriate  Outcome: Progressing  Goal: Glucose maintained within target range  Description  INTERVENTIONS:  - Monitor Blood Glucose as ordered  - Assess for signs and symptoms of hyperglycemia and hypoglycemia  - Administer ordered medications to maintain glucose within target range  - Assess nutritional intake and initiate nutrition service referral as needed  Outcome: Progressing     Problem: SKIN/TISSUE INTEGRITY - ADULT  Goal: Skin integrity remains intact  Description  INTERVENTIONS  - Identify patients at risk for skin breakdown  - Assess and monitor skin integrity  - Assess and monitor nutrition and hydration status  - Monitor labs (i e  albumin)  - Assess for incontinence   - Turn and reposition patient  - Assist with mobility/ambulation  - Relieve pressure over bony prominences  - Avoid friction and shearing  - Provide appropriate hygiene as needed including keeping skin clean and dry  - Evaluate need for skin moisturizer/barrier cream  - Collaborate with interdisciplinary team (i e  Nutrition, Rehabilitation, etc )   - Patient/family teaching  Outcome: Progressing

## 2019-02-28 NOTE — MALNUTRITION/BMI
This medical record reflects one or more clinical indicators suggestive of malnutrition and/or morbid obesity  Malnutrition Findings:   Malnutrition type: Acute illness  Degree of Malnutrition: Other severe protein calorie malnutrition(related to prolonged poor po intake as evidenced by showing a 29% weight decrease from 12/21/18 outpatient weight of 64 5 kg to current inpatient weight from 2/28/19 of 46 kg (2 months)  moderate fat wasting around orbitals,moderate muscle loss- temples)  Malnutrition Characteristics: Fat loss, Muscle loss, Weight loss(pt currently refusing nutrition supplements  Initiate calorie count  Will continue to work with pt on food preferences  He is stating po intake has slightly improved since admission)    BMI Findings:  BMI Classifications: Underweight < 18 5     Body mass index is 17 41 kg/m²  See Nutrition note dated 2/28/19 for additional details  Completed nutrition assessment is viewable in flowsheets

## 2019-02-28 NOTE — ASSESSMENT & PLAN NOTE
Continue metoprolol  Monitor BP trend  Patient's amlodipine, losartan, furosemide and terazosin were held on admission due to ERIK  BP controlled in lower ranged  Nephrology following

## 2019-02-28 NOTE — ASSESSMENT & PLAN NOTE
Improved, patient not on HD previously with Cr baseilne 1 3-1 5 was started in [facility] and transitioned to HD during this hospitaliztion  Suspected injury is ATN from prolonged prerenal azotemia per Nephrology  Patient has temporary catheter and may need permacath if continues to require HD long term, this is TBD by Nephrology  Continue to monitor urine output and BMP  Appreciate Nephrology recommendations and management

## 2019-02-28 NOTE — PROGRESS NOTES
NEPHROLOGY PROGRESS NOTE   Nelson Joseph  80 y o  male MRN: [id number]  Unit/Bed#: E4 -01 Encounter: [id number]  Reason for Consult: ERIK    ASSESSMENT/PLAN:  ERIK:  Suspected secondary to prolonged prerenal azotemia with progression to ATN  Initiated on [facility] 02/23 and now I-HD    -continue to monitor renal function closely as well as urine output to assess for renal recovery  - UOP increasing    -will hold off on dialysis today   -will reassess for need for dialysis tomorrow  -CT:  Negative for hydronephrosis  -UA regionally showed +1 ketones, trace blood, +1 protein and innumerable bacteria   -Lasix and losartan continue to be held   -serology sent to rule out interest interstitial process and are pending  Access:  Temporary dialysis catheter   -would consider PermCath placement if no signs of renal recovery in the next week  CKD 3:  With previous baseline creatinine of 1 3-1 5    - Will need OP follow up  Large pericardial effusion: S/P pericardial window with PATTI drain placement  - echo:  Ejection fraction of 60%  - cardiology following  Left pleural effusion: stable on repeat chest x-ray  - continue to monitor  Anemia: Hgb is stable in the low 9's  - will check iron panel  Urinary retention:  Status post Singh catheter placement   -please follow urinary retention protocol  SUBJECTIVE:  The patient is sitting out of bed in the bedside chair  He is not happy this morning  He feels as if he was seen in his doctor's office in October all this could have been prevented  We discussed that we will not complete dialysis today and will reassess tomorrow  He denies any chest pain or increased shortness of breath  He denies nausea, vomiting, diarrhea  A Singh catheter was placed for urinary retention  He is eating and drinking well but minimally      OBJECTIVE:  Current Weight: Weight - Scale: 46 kg (101 lb 6 6 oz)  Vitals:    02/27/19 1830 02/27/19 2324 02/28/19 0600 02/28/19 0820   BP: 133/67 96/62  131/79   BP Location: Right arm Right arm  Right arm   Pulse: 88 80  89   Resp: 18 18  18   Temp: 99 3 °F (37 4 °C) 98 4 °F (36 9 °C)  98 4 °F (36 9 °C)   TempSrc:  Tympanic  Tympanic   SpO2: 93% 95%  93%   Weight:   46 kg (101 lb 6 6 oz)    Height:           Intake/Output Summary (Last 24 hours) at 2/28/2019 1036  Last data filed at 2/28/2019 0901  Gross per 24 hour   Intake 300 ml   Output 1830 ml   Net -1530 ml     General: No apparent distress  Skin: warm, dry, intact, no rash  HEENT: Moist mucous membranes, sclera anicteric, normocephalic  Neck: No apparent JVD appreciated, catheter present  Chest: Lung sounds decreased, left lower lobe absent, on RA  Heart: Regular rate and rhythm, No murmer  Abdomen: Soft, round, NT, +BS, PATTI drain present    Extremities: No B/L LE edema present  Neuro: Alert and oriented  Psych: Appropriate mood and affect    Medications:    Current Facility-Administered Medications:     acetaminophen (TYLENOL) tablet 650 mg, 650 mg, Oral, Q6H PRN, Rudi Pal Bilofsky, CRNP    ALPRAZolam (XANAX) tablet 0 25 mg, 0 25 mg, Oral, Q8H PRN, Rudi Pal Bilofsky, CRNP    apixaban (ELIQUIS) tablet 2 5 mg, 2 5 mg, Oral, BID, Rudi Pal Bilofsky, CRNP, 2 5 mg at 02/28/19 5101    ascorbic acid (VITAMIN C) tablet 250 mg, 250 mg, Oral, Daily, Rudi Pal Bilofsky, CRNP, 250 mg at 02/28/19 9504    atorvastatin (LIPITOR) tablet 10 mg, 10 mg, Oral, Daily With Dinner, Analy K Bilofsky, CRNP, 10 mg at 02/27/19 1728    cholecalciferol (VITAMIN D3) tablet 2,000 Units, 2,000 Units, Oral, Daily, Rudi Pal Bilofsky, CRNP, 2,000 Units at 02/28/19 0924    dextrose 5 % and sodium chloride 0 9 % infusion, 50 mL/hr, Intravenous, Continuous, Analy K Bilofsky, CRNP, Stopped at 02/27/19 1717    docusate sodium (COLACE) capsule 100 mg, 100 mg, Oral, BID, SUJATA Lira, 100 mg at 02/28/19 0926    finasteride (PROSCAR) tablet 5 mg, 5 mg, Oral, Daily, SUJATA Lira, 5 mg at 02/28/19 0924   fluticasone-vilanterol (BREO ELLIPTA) 100-25 mcg/inh inhaler 1 puff, 1 puff, Inhalation, Daily, SUJATA Moncada, 1 puff at 02/28/19 0928    HYDROcodone-acetaminophen (NORCO) 5-325 mg per tablet 1 tablet, 1 tablet, Oral, Q6H PRN, SUJATA Moncada, 1 tablet at 02/26/19 1933    insulin glargine (LANTUS) subcutaneous injection 12 Units 0 12 mL, 12 Units, Subcutaneous, HS, DULCE MoncadaNP, 12 Units at 02/27/19 2258    insulin lispro (HumaLOG) 100 units/mL subcutaneous injection 1-6 Units, 1-6 Units, Subcutaneous, TID AC, 1 Units at 02/25/19 1816 **AND** Fingerstick Glucose (POCT), , , TID AC, SUJATA Brantley    insulin lispro (HumaLOG) 100 units/mL subcutaneous injection 4 Units, 4 Units, Subcutaneous, Daily With Breakfast, AnalySUJATA Saleh    insulin lispro (HumaLOG) 100 units/mL subcutaneous injection 4 Units, 4 Units, Subcutaneous, Daily With Lunch, AnalySUJATA Saleh    insulin lispro (HumaLOG) 100 units/mL subcutaneous injection 4 Units, 4 Units, Subcutaneous, Daily With Amrita Baseman, SUJATA, 4 Units at 02/26/19 1616    metoprolol (LOPRESSOR) injection 2 5 mg, 2 5 mg, Intravenous, Q6H PRN, SUJATA Campbell    pantoprazole (PROTONIX) EC tablet 40 mg, 40 mg, Oral, Early Morning, SUJATA Moncada, 40 mg at 02/28/19 0547    PARoxetine (PAXIL) tablet 30 mg, 30 mg, Oral, Daily, SUJATA Moncada, 30 mg at 02/28/19 0924    senna (SENOKOT) tablet 8 6 mg, 1 tablet, Oral, HS, SUJATA Moncada, 8 6 mg at 02/27/19 2258    Laboratory Results:  Results from last 7 days   Lab Units 02/28/19  0626 02/27/19  0502 02/27/19  0429 02/26/19  1052 02/26/19  0535 02/26/19  0011 02/25/19  1833 02/25/19  0454 02/24/19  1948 02/24/19  1545 02/24/19  1023 02/24/19  0945 02/24/19  0230 02/23/19 2021 02/23/19  0836   WBC Thousand/uL 5 02  --  4 61  --  3 10*  --   --  3 52*  --   --  3 99*  --   --   --   --  6 58   HEMOGLOBIN g/dL 9 1*  --  9 3* 7 1* 6 8* 6 4* 7 5* 7 0*  -- --  7 4*  --   --   --   --  10 0*   HEMATOCRIT % 27 0*  --  28 3*  --  20 8*  --   --  21 4*  --   --  21 9*  --   --   --   --  32 0*   PLATELETS Thousands/uL 141*  --  119*  --  96*  --   --  107*  --   --  122*  --   --   --   --  211   POTASSIUM mmol/L 3 8 4 0  --   --  3 5  --   --  3 6  --  4 3  --  3 9 4 1 5 7*   < > 6 8*   CHLORIDE mmol/L 106 110*  --   --  108  --   --  105  --  104  --  99* 95* 97*   < > 99*   CO2 mmol/L 25 21  --   --  23  --   --  24  --  28  --  28 28 18*   < > 13*   BUN mg/dL 31* 62*  --   --  64*  --   --  65*  --  57*  --  76* 93* 113*   < > 117*   CREATININE mg/dL 4 65* 7 71*  --   --  7 26*  --   --  6 38*  --  5 61*  --  7 52* 8 84* 11 56*   < > 11 60*   CALCIUM mg/dL 8 1* 7 7*  --   --  7 7*  --   --  7 9*  --  8 4  --  8 4 7 9* 8 4   < > 9 3   MAGNESIUM mg/dL  --   --   --   --  1 9  --   --  2 0  --  1 8  --  2 1 1 2* 1 4*  --   --    PHOSPHORUS mg/dL  --   --   --   --  4 4*  --   --  4 2* 4 0 4 2*  --  5 6* 5 9* 8 7*  --   --     < > = values in this interval not displayed

## 2019-02-28 NOTE — NURSING NOTE
Patient was SC x 3 for urinary retention  Indwelling medeiros catheter inserted this morning for urinary retention following urinary protocol

## 2019-02-28 NOTE — ASSESSMENT & PLAN NOTE
Not in exacerbation  Juditho substituted for Bartlett Squire while hospitalized  Respiratory protocol

## 2019-02-28 NOTE — PROGRESS NOTES
Progress Note - Nelson Joseph  1935, 80 y o  male MRN: [id number]    Unit/Bed#: E4 -01 Encounter: [id number]    Primary Care Provider: Isidoro Springer DO   Date and time admitted to hospital: 2/23/2019  8:19 AM        * Acute kidney injury [facility])  Assessment & Plan  Improved, patient not on HD previously with Cr baseilne 1 3-1 5 was started in [facility] and transitioned to HD during this hospitaliztion  Suspected injury is ATN from prolonged prerenal azotemia per Nephrology  Patient has temporary catheter and may need permacath if continues to require HD long term, this is TBD by Nephrology  Continue to monitor urine output and BMP  Appreciate Nephrology recommendations and management       Pericardial effusion  Assessment & Plan  S/p cardiac window on 2/26 now with a PATTI drain  No cardiovascular compromise was identified, echocardiogram was noted for some right atrial collapse and dull right ventricular collapse  Patient reports significant improvement in his symptoms, was able to sleep well last night  Cardiology advising, appreciate input      Chronic anemia  Assessment & Plan  Hb stable  Anemia workup  Monitor CBC    Thrombocytopenia (HCC)  Assessment & Plan  Stable  Continue to monitor CBC    COPD (chronic obstructive pulmonary disease) ([facility] [address] )  Assessment & Plan  Not in exacerbation  Breo substituted for [facility] while hospitalized  Respiratory protocol    Stage 3 chronic kidney disease ([facility] [address] )  Assessment & Plan  Prior creatinine baseline 1 3-1 5, patient presented to severe acute kidney injury requiring hemodialysis  Nephrology following  Creatinine is slowly improving    Paroxysmal atrial fibrillation ([facility] [address] )  Assessment & Plan  Continue metoprolol and Eliquis for AC    Type 2 diabetes mellitus [facility])  Assessment & Plan  Lab Results   Component Value Date    HGBA1C 7 4 (H) 02/23/2019       Recent Labs     02/27/19  1108 02/27/19  1612 02/27/19  2046 02/28/19  0819   POCGLU 76 104 120 62*       Blood Sugar Average: Last 72 hrs:  (P) 826 [id number]   Hemoglobin A1c acceptable for patient's geriatric age  Continue current regimen with Lantus 12 units at bedtime, Humalog 4 units t i d   Monitor Accu-Cheks q i d , insulin sliding scale    Lower leg DVT (deep venous thromboembolism), acute, right (HCC)  Assessment & Plan  On chronic Eliquis       Essential hypertension  Assessment & Plan  Continue metoprolol  Monitor BP trend  Patient's amlodipine, losartan, furosemide and terazosin were held on admission due to ERIK  BP controlled in lower ranged  Nephrology following    Esophageal reflux  Assessment & Plan  Continue PPI    Dyslipidemia  Assessment & Plan  Continue Lipitor    Benign prostatic hyperplasia with urinary frequency  Assessment & Plan  Continue on Proscar   Monitor urine output       VTE Pharmacologic Prophylaxis:   Pharmacologic: Apixaban (Eliquis)  Mechanical VTE Prophylaxis in Place: Yes    Patient Centered Rounds: I have performed bedside rounds with nursing staff today  Discussions with Specialists or Other Care Team Provider: reviewed Critical care, Nephrology, Cardiology records    Education and Discussions with Family / Patient: Patient    Time Spent for Care: 45 minutes  More than 50% of total time spent on counseling and coordination of care as described above  Current Length of Stay: 5 day(s)    Current Patient Status: Inpatient   Certification Statement: The patient will continue to require additional inpatient hospital stay due to need for close monitoring    Discharge Plan: TBD    Code Status: Level 1 - Full Code      Subjective:   Patient seen and examined  He is alert and oriented x3  He is in no acute distress  He reports significant improvement in his breathing and states that he slept well last night  He denies any pain  Denies nausea or vomiting  He reports improvement in his appetite  No overnight events      Objective:     Vitals:   Temp (24hrs), Av 8 °F (37 1 °C), Min:98 4 °F (36 9 °C), Max:99 3 °F (37 4 °C)    Temp:  [98 4 °F (36 9 °C)-99 3 °F (37 4 °C)] 98 4 °F (36 9 °C)  HR:  [74-89] 89  Resp:  [17-28] 18  BP: ()/(57-79) 131/79  SpO2:  [89 %-95 %] 93 %  Body mass index is 17 41 kg/m²  Input and Output Summary (last 24 hours): Intake/Output Summary (Last 24 hours) at 2/28/2019 1140  Last data filed at 2/28/2019 0901  Gross per 24 hour   Intake 300 ml   Output 1830 ml   Net -1530 ml       Physical Exam:     Physical Exam   Constitutional: He is oriented to person, place, and time  No distress  HENT:   Head: Normocephalic and atraumatic  Eyes: Conjunctivae are normal    Neck: No JVD present  Cardiovascular: An irregularly irregular rhythm present  No murmur heard  Pulmonary/Chest: Effort normal  No stridor  No respiratory distress  He has no rales  Chest tube with PATTI drainage - serosanguinous    Abdominal: Soft  Bowel sounds are normal  He exhibits no distension  There is no tenderness  Musculoskeletal: He exhibits no edema  Neurological: He is alert and oriented to person, place, and time  Skin: Skin is warm and dry  Additional Data:     Labs:    Results from last 7 days   Lab Units 02/28/19  0626  02/25/19  0454   WBC Thousand/uL 5 02   < > 3 52*   HEMOGLOBIN g/dL 9 1*   < > 7 0*   HEMATOCRIT % 27 0*   < > 21 4*   PLATELETS Thousands/uL 141*   < > 107*   NEUTROS PCT %  --   --  73   LYMPHS PCT %  --   --  13*   MONOS PCT %  --   --  12   EOS PCT %  --   --  1    < > = values in this interval not displayed       Results from last 7 days   Lab Units 02/28/19  0626  02/23/19  0836   SODIUM mmol/L 141   < > 142   POTASSIUM mmol/L 3 8   < > 6 8*   CHLORIDE mmol/L 106   < > 99*   CO2 mmol/L 25   < > 13*   BUN mg/dL 31*   < > 117*   CREATININE mg/dL 4 65*   < > 11 60*   ANION GAP mmol/L 10   < > 30*   CALCIUM mg/dL 8 1*   < > 9 3   ALBUMIN g/dL  --   --  3 9   TOTAL BILIRUBIN mg/dL  --   --  1 63*   ALK PHOS U/L  --   --  69   ALT U/L  --   -- 22   AST U/L  --   --  16   GLUCOSE RANDOM mg/dL 59*   < > 110    < > = values in this interval not displayed  Results from last 7 days   Lab Units 02/26/19  0535   INR  1 22*     Results from last 7 days   Lab Units 02/28/19  0819 02/27/19  2046 02/27/19  1612 02/27/19  1108 02/27/19  0705 02/26/19  2119 02/26/19  1613 02/26/19  1343 02/26/19  0811 02/25/19  2108 02/25/19  1711 02/25/19  1138   POC GLUCOSE mg/dl 62* 120 104 76 59* 130 143* 163* 107 88 170* 124     Results from last 7 days   Lab Units 02/23/19  1614   HEMOGLOBIN A1C % 7 4*           * I Have Reviewed All Lab Data Listed Above  * Additional Pertinent Lab Tests Reviewed:  Phu [address] Admission Reviewed    Imaging:    Imaging Reports Reviewed Today Include: echo, CXR      Recent Cultures (last 7 days):     Results from last 7 days   Lab Units 02/26/19  1219   GRAM STAIN RESULT  No Polys or Bacteria seen   BODY FLUID CULTURE, STERILE  No growth       Last 24 Hours Medication List:     Current Facility-Administered Medications:  acetaminophen 650 mg Oral Q6H PRN Nesha HammondsutSUJATA Caballero    ALPRAZolam 0 25 mg Oral Q8H PRN Analy K BilofskySUJATA    apixaban 2 5 mg Oral BID Analy K BilofskySUJATA    ascorbic acid 250 mg Oral Daily Analy K BilofskySUJATA    atorvastatin 10 mg Oral Daily With SUJATA Sanches    cholecalciferol 2,000 Units Oral Daily Analy K BilofskySUJATA    dextrose 5 % and sodium chloride 0 9 % 50 mL/hr Intravenous Continuous SUJATA Chávez Last Rate: Stopped (02/27/19 1717)   docusate sodium 100 mg Oral BID Analy K BilofskySUJATA    finasteride 5 mg Oral Daily Analy K BilofskySUJATA    fluticasone-vilanterol 1 puff Inhalation Daily SUJATA Pyle    HYDROcodone-acetaminophen 1 tablet Oral Q6H PRN SUJATA Pyle    insulin glargine 12 Units Subcutaneous HS Analy K Virajofjagruti CRNP    insulin lispro 1-6 Units Subcutaneous TID AC SUJATA Brantley    insulin lispro 4 Units Subcutaneous Daily With Breakfast SUJATA Mccrary    insulin lispro 4 Units Subcutaneous Daily With Lunch SUJATA Brantley    insulin lispro 4 Units Subcutaneous Daily With Dinner SUJATA Mccrary    metoprolol 2 5 mg Intravenous Q6H PRN SUJATA Mccrary    pantoprazole 40 mg Oral Early Morning SUJATA Brantley    PARoxetine 30 mg Oral Daily SUJATA Brantley    senna 1 tablet Oral HS SUJATA Mccrary         Today, Patient Was Seen By: Moustapha Solis MD    ** Please Note: Dictation voice to text software may have been used in the creation of this document   **

## 2019-02-28 NOTE — ASSESSMENT & PLAN NOTE
S/p cardiac window on 2/26 now with a PATTI drain  No cardiovascular compromise was identified, echocardiogram was noted for some right atrial collapse and dull right ventricular collapse  Patient reports significant improvement in his symptoms, was able to sleep well last night  Cardiology advising, appreciate input

## 2019-02-28 NOTE — PROGRESS NOTES
Mr  Katelynn Key is POD #2 s/p subxiphoid pericardial window    Drain: 450/24 hours, serosang  Leave drain in place for at least 72 hours, but will not d/c until <30cc/day output  Will continue to follow      Carlee Mcleod MD  Thoracic Surgery  (Available on [employer])  Office: [phone]

## 2019-02-28 NOTE — UTILIZATION REVIEW
Continued Stay Review    Date:  2/28/2019    Vital Signs: /64 (BP Location: Left arm)   Pulse 80   Temp (!) 97 3 °F (36 3 °C) (Tympanic)   Resp 18   Ht 5' 4" (1 626 m)   Wt 46 kg (101 lb 6 6 oz)   SpO2 94%   BMI 17 41 kg/m²      Assessment/Plan:  Pt admitted 2/23 with ERIK with severe metabolic acidosis and Hyperkalemia  Creat slowly improving  Nephrology following  HD 2/27 - hold off on HD today and re-eval in am / daily  Francisco placed this am  Adjusted Insulin due to Hypoglycemia    S/P Pericardial  Window  2/26 - has PATTI Drain - plan to leave in place x 72 hrs @ least - but will not d/c until <30cc/day output      Medications:   Scheduled Meds:   Current Facility-Administered Medications:  acetaminophen 650 mg Oral Q6H PRN    ALPRAZolam 0 25 mg Oral Q8H PRN    apixaban 2 5 mg Oral BID    ascorbic acid 250 mg Oral Daily    atorvastatin 10 mg Oral Daily With Dinner    cholecalciferol 2,000 Units Oral Daily    docusate sodium 100 mg Oral BID    finasteride 5 mg Oral Daily    fluticasone-vilanterol 1 puff Inhalation Daily    HYDROcodone-acetaminophen 1 tablet Oral Q6H PRN    insulin lispro 1-6 Units Subcutaneous TID AC    metoprolol 2 5 mg Intravenous Q6H PRN    pantoprazole 40 mg Oral Early Morning    PARoxetine 30 mg Oral Daily    senna 1 tablet Oral HS        Pertinent Labs/Diagnostic Results:   BUN 31,   Cr 4 65,  Ca 8 1  H&H 9 1 / 27  BS's 62,  85,  98    Age/Sex: 80 y o  male   Discharge Plan:  TBD         Network Utilization Review Department  Phone: [phone]; Fax [phone]  [email or web address]  ATTENTION: Please call with any questions or concerns to [phone]  and carefully listen to the prompts so that you are directed to the right person  Send all requests for admission clinical reviews, approved or denied determinations and any other requests to fax [phone]   All voicemails are confidential

## 2019-02-28 NOTE — ASSESSMENT & PLAN NOTE
Prior creatinine baseline 1 3-1 5, patient presented to severe acute kidney injury requiring hemodialysis  Nephrology following  Creatinine is slowly improving

## 2019-02-28 NOTE — ASSESSMENT & PLAN NOTE
Lab Results   Component Value Date    HGBA1C 7 4 (H) 02/23/2019       Recent Labs     02/27/19  1108 02/27/19  1612 02/27/19  2046 02/28/19  0819   POCGLU 76 104 120 62*       Blood Sugar Average: Last 72 hrs:  (P) 110 [id number]   Hemoglobin A1c acceptable for patient's geriatric age  Continue current regimen with Lantus 12 units at bedtime, Humalog 4 units t i d   Monitor Accu-Cheks q i d , insulin sliding scale

## 2019-02-28 NOTE — PROGRESS NOTES
Progress Note - Cardiology   Neeta Pandey  80 y o  male MRN: [id number]  Unit/Bed#: E4 -01 Encounter: [id number]        Problem List:  Principal Problem:    Acute kidney injury ([address] )  Active Problems:    Benign prostatic hyperplasia with urinary frequency    Dyslipidemia    Esophageal reflux    Essential hypertension    Lower leg DVT (deep venous thromboembolism), acute, right (HCC)    Pulmonary nodule seen on imaging study    Type 2 diabetes mellitus (HCC)    Pericardial effusion    Paroxysmal atrial fibrillation (HCC)    Stage 3 chronic kidney disease (HCC)    COPD (chronic obstructive pulmonary disease) (HCC)    Thrombocytopenia (HCC)    Chronic anemia      Asessment/Plan:  1  Moderate to large pericardial effusion  2  Acute kidney injury  3  Paroxysmal atrial fibrillation  4  Hyperlipidemia    Plan:  Pericardial effusion --> S/P subxiphoid pericardial window 2/26/19     POD 2     Thoracic surgery following  Per notes to keep drain in at least 72 hrs    ERIK --> Creatinine 4 65   Still quite elevated but improving   Nephrology following  Receiving dialysis    PAF --> Back on Eliquis after surgery   Rate controlled today    HLD --> Low dose statin for now       Subjective:  Seen examined at bedside  Has no specific complaints today  Is starting to feel better  Vitals:  Vitals:    02/26/19 0600 02/28/19 0600   Weight: 47 6 kg (104 lb 15 oz) 46 kg (101 lb 6 6 oz)   ,  Vitals:    02/27/19 2324 02/28/19 0600 02/28/19 0820 02/28/19 1150   BP: 96/62  131/79 126/74   BP Location: Right arm  Right arm Left arm   Pulse: 80  89 76   Resp: 18  18 18   Temp: 98 4 °F (36 9 °C)  98 4 °F (36 9 °C) 98 °F (36 7 °C)   TempSrc: Tympanic  Tympanic Tympanic   SpO2: 95%  93% 95%   Weight:  46 kg (101 lb 6 6 oz)     Height:           Exam:  General:  alert, oriented and in no distress   Tired appearing  Heart: Irregular but rate controlled  Respiratory effort: Breathing comfortable  Lungs: CTA b/l  Lower Limbs: no pitting edema           Telemetry:       Normal sinus rhythm, , Heart Rate 80's    Medications:    Current Facility-Administered Medications:     acetaminophen (TYLENOL) tablet 650 mg, 650 mg, Oral, Q6H PRN, SUJATA Sanderson    ALPRAZolam (XANAX) tablet 0 25 mg, 0 25 mg, Oral, Q8H PRN, SUJATA Sanderson    apixaban (ELIQUIS) tablet 2 5 mg, 2 5 mg, Oral, BID, SUJATA Sanderson, 2 5 mg at 02/28/19 8016    ascorbic acid (VITAMIN C) tablet 250 mg, 250 mg, Oral, Daily, SUJATA Sanderson, 250 mg at 02/28/19 6139    atorvastatin (LIPITOR) tablet 10 mg, 10 mg, Oral, Daily With Dinner, Analy K SUJATA Schmidt, 10 mg at 02/27/19 1728    cholecalciferol (VITAMIN D3) tablet 2,000 Units, 2,000 Units, Oral, Daily, SUJATA Browning, 2,000 Units at 02/28/19 0924    docusate sodium (COLACE) capsule 100 mg, 100 mg, Oral, BID, SUJATA Sanderson, 100 mg at 02/28/19 0925    finasteride (PROSCAR) tablet 5 mg, 5 mg, Oral, Daily, SUJATA Sanderson, 5 mg at 02/28/19 0924    fluticasone-vilanterol (BREO ELLIPTA) 100-25 mcg/inh inhaler 1 puff, 1 puff, Inhalation, Daily, SUJATA Sanderson, 1 puff at 02/28/19 0928    HYDROcodone-acetaminophen (NORCO) 5-325 mg per tablet 1 tablet, 1 tablet, Oral, Q6H PRN, SUJATA Sanderson, 1 tablet at 02/26/19 1933    insulin glargine (LANTUS) subcutaneous injection 12 Units 0 12 mL, 12 Units, Subcutaneous, HS, SUJATA Sanderson, 12 Units at 02/27/19 2258    insulin lispro (HumaLOG) 100 units/mL subcutaneous injection 1-6 Units, 1-6 Units, Subcutaneous, TID AC, 1 Units at 02/25/19 1816 **AND** Fingerstick Glucose (POCT), , , TID AC, SUJATA Brantley    insulin lispro (HumaLOG) 100 units/mL subcutaneous injection 4 Units, 4 Units, Subcutaneous, Daily With Breakfast, SUJATA Brantley    insulin lispro (HumaLOG) 100 units/mL subcutaneous injection 4 Units, 4 Units, Subcutaneous, Daily With Lunch, SUJATA Brantley    insulin lispro (HumaLOG) 100 units/mL subcutaneous injection 4 Units, 4 Units, Subcutaneous, Daily With SUJATA Tipton, 4 Units at 02/26/19 1616    metoprolol (LOPRESSOR) injection 2 5 mg, 2 5 mg, Intravenous, Q6H PRN, Kera Bearded SUJATA Schmidt    pantoprazole (PROTONIX) EC tablet 40 mg, 40 mg, Oral, Early Morning, Analy K SUJATA Schmidt, 40 mg at 02/28/19 0547    PARoxetine (PAXIL) tablet 30 mg, 30 mg, Oral, Daily, Kera Bearded SUJATA Schmidt, 30 mg at 02/28/19 0924    senna (SENOKOT) tablet 8 6 mg, 1 tablet, Oral, HS, Kera Bearded SUJATA Schmidt, 8 6 mg at 02/27/19 2258      Labs/Data:        Results from last 7 days   Lab Units 02/28/19  0626 02/27/19  0429 02/26/19  1052 02/26/19  0535   WBC Thousand/uL 5 02 4 61  --  3 10*   HEMOGLOBIN g/dL 9 1* 9 3* 7 1* 6 8*   HEMATOCRIT % 27 0* 28 3*  --  20 8*   PLATELETS Thousands/uL 141* 119*  --  96*     Results from last 7 days   Lab Units 02/28/19  0626 02/27/19  0502 02/26/19  0535   POTASSIUM mmol/L 3 8 4 0 3 5   CHLORIDE mmol/L 106 110* 108   CO2 mmol/L 25 21 23   BUN mg/dL 31* 62* 64*

## 2019-03-01 ENCOUNTER — APPOINTMENT (INPATIENT)
Dept: DIALYSIS | Facility: HOSPITAL | Age: 84
DRG: 674 | End: 2019-03-01
Payer: COMMERCIAL

## 2019-03-01 LAB
ALBUMIN UR ELPH-MCNC: 43.5 %
ALPHA1 GLOB MFR UR ELPH: 12.6 %
ALPHA2 GLOB MFR UR ELPH: 14.9 %
ANION GAP SERPL CALCULATED.3IONS-SCNC: 10 MMOL/L (ref 4–13)
B-GLOBULIN MFR UR ELPH: 7 %
BACTERIA SPEC BFLD CULT: NO GROWTH
BASOPHILS # BLD AUTO: 0.02 THOUSANDS/ΜL (ref 0–0.1)
BASOPHILS NFR BLD AUTO: 0 % (ref 0–1)
BUN SERPL-MCNC: 44 MG/DL (ref 5–25)
CALCIUM SERPL-MCNC: 7.9 MG/DL (ref 8.3–10.1)
CHLORIDE SERPL-SCNC: 104 MMOL/L (ref 100–108)
CO2 SERPL-SCNC: 25 MMOL/L (ref 21–32)
CREAT SERPL-MCNC: 5.87 MG/DL (ref 0.6–1.3)
EOSINOPHIL # BLD AUTO: 0.11 THOUSAND/ΜL (ref 0–0.61)
EOSINOPHIL NFR BLD AUTO: 2 % (ref 0–6)
ERYTHROCYTE [DISTWIDTH] IN BLOOD BY AUTOMATED COUNT: 11.9 % (ref 11.6–15.1)
FERRITIN SERPL-MCNC: 387 NG/ML (ref 8–388)
GAMMA GLOB MFR UR ELPH: 22 %
GFR SERPL CREATININE-BSD FRML MDRD: 8 ML/MIN/1.73SQ M
GLUCOSE SERPL-MCNC: 104 MG/DL (ref 65–140)
GLUCOSE SERPL-MCNC: 114 MG/DL (ref 65–140)
GLUCOSE SERPL-MCNC: 114 MG/DL (ref 65–140)
GLUCOSE SERPL-MCNC: 115 MG/DL (ref 65–140)
GLUCOSE SERPL-MCNC: 180 MG/DL (ref 65–140)
GRAM STN SPEC: NORMAL
HCT VFR BLD AUTO: 26.4 % (ref 36.5–49.3)
HGB BLD-MCNC: 8.7 G/DL (ref 12–17)
IMM GRANULOCYTES # BLD AUTO: 0.03 THOUSAND/UL (ref 0–0.2)
IMM GRANULOCYTES NFR BLD AUTO: 1 % (ref 0–2)
INTERPRETATION UR IFE-IMP: NORMAL
IRON SATN MFR SERPL: 23 %
IRON SERPL-MCNC: 41 UG/DL (ref 65–175)
LYMPHOCYTES # BLD AUTO: 0.67 THOUSANDS/ΜL (ref 0.6–4.47)
LYMPHOCYTES NFR BLD AUTO: 13 % (ref 14–44)
MCH RBC QN AUTO: 31.8 PG (ref 26.8–34.3)
MCHC RBC AUTO-ENTMCNC: 33 G/DL (ref 31.4–37.4)
MCV RBC AUTO: 96 FL (ref 82–98)
MONOCYTES # BLD AUTO: 0.4 THOUSAND/ΜL (ref 0.17–1.22)
MONOCYTES NFR BLD AUTO: 8 % (ref 4–12)
NEUTROPHILS # BLD AUTO: 3.85 THOUSANDS/ΜL (ref 1.85–7.62)
NEUTS SEG NFR BLD AUTO: 76 % (ref 43–75)
NRBC BLD AUTO-RTO: 0 /100 WBCS
PLATELET # BLD AUTO: 135 THOUSANDS/UL (ref 149–390)
PMV BLD AUTO: 10.4 FL (ref 8.9–12.7)
POTASSIUM SERPL-SCNC: 4 MMOL/L (ref 3.5–5.3)
PROT PATTERN UR ELPH-IMP: ABNORMAL
PROT UR-MCNC: 84 MG/DL
RBC # BLD AUTO: 2.74 MILLION/UL (ref 3.88–5.62)
SODIUM SERPL-SCNC: 139 MMOL/L (ref 136–145)
TIBC SERPL-MCNC: 175 UG/DL (ref 250–450)
WBC # BLD AUTO: 5.08 THOUSAND/UL (ref 4.31–10.16)

## 2019-03-01 PROCEDURE — 82948 REAGENT STRIP/BLOOD GLUCOSE: CPT

## 2019-03-01 PROCEDURE — 83550 IRON BINDING TEST: CPT | Performed by: NURSE PRACTITIONER

## 2019-03-01 PROCEDURE — 82728 ASSAY OF FERRITIN: CPT | Performed by: NURSE PRACTITIONER

## 2019-03-01 PROCEDURE — 85025 COMPLETE CBC W/AUTO DIFF WBC: CPT | Performed by: FAMILY MEDICINE

## 2019-03-01 PROCEDURE — 97530 THERAPEUTIC ACTIVITIES: CPT

## 2019-03-01 PROCEDURE — 97116 GAIT TRAINING THERAPY: CPT

## 2019-03-01 PROCEDURE — G8978 MOBILITY CURRENT STATUS: HCPCS

## 2019-03-01 PROCEDURE — 80048 BASIC METABOLIC PNL TOTAL CA: CPT | Performed by: FAMILY MEDICINE

## 2019-03-01 PROCEDURE — G8979 MOBILITY GOAL STATUS: HCPCS

## 2019-03-01 PROCEDURE — 5A1D70Z PERFORMANCE OF URINARY FILTRATION, INTERMITTENT, LESS THAN 6 HOURS PER DAY: ICD-10-PCS | Performed by: INTERNAL MEDICINE

## 2019-03-01 PROCEDURE — 99232 SBSQ HOSP IP/OBS MODERATE 35: CPT | Performed by: INTERNAL MEDICINE

## 2019-03-01 PROCEDURE — 90935 HEMODIALYSIS ONE EVALUATION: CPT | Performed by: INTERNAL MEDICINE

## 2019-03-01 PROCEDURE — 83540 ASSAY OF IRON: CPT | Performed by: NURSE PRACTITIONER

## 2019-03-01 PROCEDURE — 97163 PT EVAL HIGH COMPLEX 45 MIN: CPT

## 2019-03-01 RX ADMIN — APIXABAN 2.5 MG: 2.5 TABLET, FILM COATED ORAL at 19:27

## 2019-03-01 RX ADMIN — VITAMIN D, TAB 1000IU (100/BT) 2000 UNITS: 25 TAB at 08:55

## 2019-03-01 RX ADMIN — FLUTICASONE FUROATE AND VILANTEROL TRIFENATATE 1 PUFF: 100; 25 POWDER RESPIRATORY (INHALATION) at 08:57

## 2019-03-01 RX ADMIN — PAROXETINE 30 MG: 20 TABLET, FILM COATED ORAL at 08:55

## 2019-03-01 RX ADMIN — DOCUSATE SODIUM 100 MG: 100 CAPSULE, LIQUID FILLED ORAL at 08:55

## 2019-03-01 RX ADMIN — OXYCODONE HYDROCHLORIDE AND ACETAMINOPHEN 250 MG: 500 TABLET ORAL at 08:56

## 2019-03-01 RX ADMIN — PANTOPRAZOLE SODIUM 40 MG: 40 TABLET, DELAYED RELEASE ORAL at 06:33

## 2019-03-01 RX ADMIN — INSULIN LISPRO 1 UNITS: 100 INJECTION, SOLUTION INTRAVENOUS; SUBCUTANEOUS at 12:01

## 2019-03-01 RX ADMIN — DOCUSATE SODIUM 100 MG: 100 CAPSULE, LIQUID FILLED ORAL at 17:05

## 2019-03-01 RX ADMIN — FINASTERIDE 5 MG: 5 TABLET, FILM COATED ORAL at 08:56

## 2019-03-01 RX ADMIN — ATORVASTATIN CALCIUM 10 MG: 20 TABLET, FILM COATED ORAL at 17:05

## 2019-03-01 RX ADMIN — APIXABAN 2.5 MG: 2.5 TABLET, FILM COATED ORAL at 08:56

## 2019-03-01 RX ADMIN — SENNOSIDES 8.6 MG: 8.6 TABLET, FILM COATED ORAL at 21:45

## 2019-03-01 NOTE — ASSESSMENT & PLAN NOTE
Status post pericardial window with drain which nephrology does not believe this from uremia    Continue drain until output less than 30 mL per CTS

## 2019-03-01 NOTE — PLAN OF CARE
Problem: PHYSICAL THERAPY ADULT  Goal: Performs mobility at highest level of function for planned discharge setting  See evaluation for individualized goals  Description  Treatment/Interventions: Functional transfer training, LE strengthening/ROM, Elevations, Therapeutic exercise, Endurance training, Patient/family training, Equipment eval/education, Gait training, Spoke to nursing, Family  Equipment Recommended: Other (Comment)(Rolling Walker)       See flowsheet documentation for full assessment, interventions and recommendations  Note:   Prognosis: Good  Problem List: Decreased strength, Decreased endurance, Impaired balance, Decreased mobility, Impaired hearing  Assessment:  Pt is a 80 y o  male seen for PT evaluation [facility] w/ Acute kidney injury ([facility] [address] )  Sameer Rowley with history of HTN, diabetes, known percardial effusion and h/o pleural effusion, DVT, brought to ED by his wife for c/o nausea, vomiting, almost daily going back about 2 weeks, onset seemed to be after EGD/colonoscopy 1/24/19, and c/o abdominal pain, not severe, more upper abdominal area  Work up revealed ERIK with severe metabolic acidosis and Hyperkalemia  Creatinine slowly improving  Nephrology following has HD 2/27 and being assessed daily if further need  Singh placed  S/P Pericardial  Window  2/26 - has PATTI Drain   Order placed for PT  Prior to admission, pt was independent w/ all functional mobility w/ no device, ambulated community distances and elevations, lived in one floor environment, lived with wife, was retired and was driving  Upon evaluation: Pt requires no assistance for bed mobilty, supervision assistance for transfers and supervision assistance for ambulation with rolling walker    Pt's clinical presentation is currently unstable/unpredictable given the functional mobility deficits above, especially weakness, decreased endurance, gait deviations and decreased functional mobility tolerance, coupled with fall risks including impaired balance, and combined with medical complications of tachycardia, abnormal renal lab values, abnormal H&H, abnormal WBCs and multiple readmissions  Pt to benefit from continued skilled PT tx while in hospital and upon DC to address deficits as defined above and maximize level of functional mobility  From PT/mobility standpoint, recommendation at time of d/c would be Home PT, home with family support and with rolling walker pending progress in order to maximize pt's functional independence and consistency w/ mobility in order to facilitate return to PLOF  Recommend ther ex next 1-2 sessions and trails of gait without AD  Barriers to Discharge: None     Recommendation: Home PT, Home with family support     PT - OK to Discharge: Yes(When medically stable)    See flowsheet documentation for full assessment

## 2019-03-01 NOTE — PHYSICAL THERAPY NOTE
PHYSICAL THERAPY EVALUATION  NAME:  Nelson Joseph  DATE: 03/01/19     Time In: 10:45 am  Time out: 10:59 am    AGE:   80 y o  Mrn:   [id number]  ADMIT DX:  Hyperkalemia [E87 5]  Vomiting [K88 65]  Metabolic acidosis [R00 0]  Renal failure [N19]  Volume depletion [E86 9]    Past Medical History:   Diagnosis Date    Anxiety     Cardiomegaly     Diabetes ([address] )     DVT (deep venous thrombosis) ([facility])     right leg    GERD (gastroesophageal reflux disease)     Hypercholesterolemia     Hypertension     Irregular heart beat     paroxsysmal a fib    Pancreatic cyst     Pericardial effusion     Pleural effusion     Weight loss, non-intentional      Length Of Stay: 6  Performed at least 2 patient identifiers during session: Name and Birthday    PHYSICAL THERAPY EVALUATION :    03/01/19 1045   Note Type   Note type Eval/Treat   Pain Assessment   Pain Assessment No/denies pain   Pain Score No Pain   Home Living   Type of [address] [place] [address] One level;Stairs to enter with rails; Other (Comment)  (2-3 IONA with rails)   Bathroom Shower/Tub Tub/shower unit   Bathroom Toilet Standard   Bathroom Equipment Other (Comment)  (None)   Home Equipment Walker;Cane;Other (Comment)  (AD's for his wife)   Prior Function   Level of [place] Independent with ADLs and functional mobility; Other (Comment)  ((+) )   Lives With Spouse   Receives Help From Family   ADL Assistance Independent   IADLs Needs assistance   Falls in the last 6 months 0   Vocational Retired  (Moshe Rudolph opperator)   Restrictions/Precautions   [place] Bearing Precautions Per Order No   Other Precautions Cardiac/sternal;Multiple lines;Telemetry; Fall Risk  (Pericardial window with drain)   General   Additional Pertinent History Hard of heraring wears hearing aides bilaterally   Family/Caregiver Present No   Cognition   Overall Cognitive Status WFL   Arousal/Participation Alert   Orientation Level Oriented X4   Memory Within functional limits   Following Commands Follows multistep commands with increased time or repetition   RUE Assessment   RUE Assessment WFL   LUE Assessment   LUE Assessment WFL   RLE Assessment   RLE Assessment X   Strength RLE   RLE Overall Strength 4/5   LLE Assessment   LLE Assessment X   Strength LLE   LLE Overall Strength 4/5   Coordination   Movements are Fluid and Coordinated 1   Sensation WFL   Bed Mobility   Rolling R 7  Independent   Rolling L 7  Independent   Supine to Sit 5  Supervision   Sit to Supine 5  Supervision   Transfers   Sit to Stand 5  Supervision   Stand to Sit 5  Supervision   Ambulation/Elevation   Gait pattern Short stride;Decreased foot clearance   Gait Assistance 5  Supervision   Additional items Verbal cues; Other (Comment)  (For walker management)   Assistive Device Rolling walker   Distance 50'   Stair Management Assistance Not tested   Balance   Static Sitting Normal   Dynamic Sitting Good   Static Standing Fair   Dynamic Standing Fair -   Ambulatory Fair -  (TU 1 seconds with RW; 5x STS without UE: 19 seconds)   Endurance Deficit   Endurance Deficit Yes   Endurance Deficit Description Yue Ugarte reports fatigue with HR 87 at rest and 108 post walk   Activity Tolerance   Activity Tolerance Patient limited by fatigue;Patient tolerated treatment well   Nurse Made Aware Spoke with Aurora Roland RN   Assessment   Prognosis Good   Problem List Decreased strength;Decreased endurance; Impaired balance;Decreased mobility; Impaired hearing   Barriers to Discharge None   Goals   Patient Goals To get home soon   STG Expiration Date 19   Treatment Day 1   Plan   Treatment/Interventions Functional transfer training;LE strengthening/ROM; Elevations; Therapeutic exercise; Endurance training;Patient/family training;Equipment eval/education;Gait training;Spoke to nursing;Family   PT Frequency Other (Comment)  (3-5x/wk)   Recommendation   Recommendation Home PT; Home with family support   Equipment Recommended Other (Comment)  (Rolling Walker)   PT - OK to Discharge Yes  (When medically stable)   Barthel Index   Feeding 10   Bathing 0   Grooming Score 0   Dressing Score 5   Bladder Score 0   Bowels Score 10   Toilet Use Score 5   Transfers (Bed/Chair) Score 10   Mobility (Level Surface) Score 10   Stairs Score 0   Barthel Index Score 50       (Please find full objective findings from PT assessment regarding body systems outlined above)  Assessment: Pt is a 80 y o  male seen for PT evaluation s/p admit to [address] on 2/23/2019 w/ Acute kidney injury ([address] )  Isaias Chavez with history of HTN, diabetes, known percardial effusion and h/o pleural effusion, DVT, brought to ED by his wife for c/o nausea, vomiting, almost daily going back about 2 weeks, onset seemed to be after EGD/colonoscopy 1/24/19, and c/o abdominal pain, not severe, more upper abdominal area  Work up revealed ERIK with severe metabolic acidosis and Hyperkalemia  Creatinine slowly improving  Nephrology following has HD 2/27 and being assessed daily if further need  Singh placed  S/P Pericardial  Window  2/26 - has PATTI Drain   Order placed for PT  Prior to admission, pt was independent w/ all functional mobility w/ no device, ambulated community distances and elevations, lived in one floor environment, lived with wife, was retired and was driving  Upon evaluation: Pt requires no assistance for bed mobilty, supervision assistance for transfers and supervision assistance for ambulation with rolling walker  Pt's clinical presentation is currently unstable/unpredictable given the functional mobility deficits above, especially weakness, decreased endurance, gait deviations and decreased functional mobility tolerance, coupled with fall risks including impaired balance, and combined with medical complications of tachycardia, abnormal renal lab values, abnormal H&H, abnormal WBCs and multiple readmissions    Pt to benefit from continued skilled PT tx while in hospital and upon DC to address deficits as defined above and maximize level of functional mobility  From PT/mobility standpoint, recommendation at time of d/c would be Home PT, home with family support and with rolling walker pending progress in order to maximize pt's functional independence and consistency w/ mobility in order to facilitate return to PLOF  Recommend ther ex next 1-2 sessions and trails of gait without AD  Goals: In 7 days pt will demonstrate: bed mobility (I) for home function OOB, sit<>stand and functional transfers mod (I) w/ RW for home function, gait training 100ft mod (I) w/ RW for home distances, 2 steps mod (I) w/ RW for home entrance, improve BLE by 1/2 grade strength to optimize functional mobility, improve balance by 1 grade to decrease fall risk, improve activity tolerance to >45 minutes w/o rest to improve functional endurance for home, pt and family education on PT risk, role, benefits, POC, goals, and recommendations to optimize patient outcomes, patient functional, optimize LOS and promote discharge to least restrictive environment  The following objective measures were performed on IE: Barthel Index 50/100; Timed Up and Go: 26 1 seconds (High risk for falls) and 5xSTS wihtout UE = 19 seconds  Comorbidities affecting pt's physical performance at time of assessment include: DM, HTN, limited hearing and A fib  Personal factors affecting pt at time of IE include: steps to enter environment, advanced age and inability to navigate community distances  Shi Blanco, PT, GCS    Physical Therapy Session Note     Time In: 10:45am  Time Out: 10:59 am     S: When the weather is good my wife and I walk everyday  O: Patient seen for therapy session post evaluation  Patient seen at the bedside and session focused on completion of functional mobility, sitting balance activities, and functional activity tolerance  Eduardo Hanley able to walk 300' with RW and supervision; transfer with supervision   Tolerated session activity for approximately 25 minutes with rest periods  Vitals stable throughout session -108 SpO2 96- 97% on RA  /73  A/P: Pt tolerated session but demonstrated decreased strength and functional activity tolerance  Will continue to monitor and support progression of  mobility without a device  Patient remained Leane [place] in the bedside chair at the end of the session  all needs in his reach with venadyne boots donned and family at the bedside        Naldo Bertrand, MPT, GCS

## 2019-03-01 NOTE — PROGRESS NOTES
NEPHROLOGY PROGRESS NOTE   Sonia Conrad  80 y o  male MRN: [id number]  Unit/Bed#: E4 -01 Encounter: [id number]  Reason for Consult: ERIK    ASSESSMENT/PLAN:  ERIK:  Suspected secondary to prolonged prerenal azotemia with progression to ATN  Initiated on CVVHD on 02/23 and is now on IHD  -will complete dialysis today as creatinine is increasing   -will continue to monitor for renal recovery over the weekend  -urine output is improving  -CT:  Negative for hydronephrosis  -Lasix and losartan continue to be held   -serologies are pending  Access:  Temporary dialysis catheter   -will need PermCath placement if no signs of recovery next week  CKD stage 3: With previous baseline creatinine of 1  3-1 5   -will need outpatient follow-up  Large pericardial effusion:  Status post pericardial window with PATIT drain placement   -cardiology following  Left pleural effusion:  Stable on repeat chest x-ray  Will continue to monitor  Anemia:  Hemoglobin remains stable in the low 9's  - iron panel shows low iron 41, and low TIBC 1757  Urinary retention:  Status post Singh catheter placement   -Singh remains in place, will follow urinary retention protocol  Disposition:  Will require additional inpatient stay  SUBJECTIVE:  The patient is upset this morning  He is telling me that he does not want to talk to me and to leave his room  He states that this all could have been prevented if he would have seen his doctor in October NSAID of December  I tried to redirect him and was unable to      OBJECTIVE:  Current Weight: Weight - Scale: 64 2 kg (141 lb 8 6 oz)(64 2 is accurate)  Vitals:    02/28/19 2300 03/01/19 0600 03/01/19 0750 03/01/19 1138   BP: 129/65  137/73 114/82   BP Location: Right arm  Left arm Right arm   Pulse: 90  94 86   Resp: 18  18 18   Temp: 99 °F (37 2 °C)  97 7 °F (36 5 °C) 97 9 °F (36 6 °C)   TempSrc: Temporal  Tympanic Tympanic   SpO2: 91%  95% 95%   Weight:  64 2 kg (141 lb 8 6 oz)     Height:           Intake/Output Summary (Last 24 hours) at 3/1/2019 1145  Last data filed at 3/1/2019 0500  Gross per 24 hour   Intake    Output 705 ml   Net -705 ml     General: No apparent distress  Skin: warm, dry, intact, no rash  HEENT: Moist mucous membranes, sclera anicteric, normocephalic  Neck: No apparent JVD appreciated  Chest: Lung sounds clear B/L, on RA  Heart: Regular rate and rhythm, No murmer  Abdomen: Soft, round, NT, +BS  Extremities: No B/L LE edema present  Neuro: Alert and oriented  Psych: Appropriate mood and affect    Medications:    Current Facility-Administered Medications:     acetaminophen (TYLENOL) tablet 650 mg, 650 mg, Oral, Q6H PRN, Nereyda Moron Bilofsky, CRNP    ALPRAZolam (XANAX) tablet 0 25 mg, 0 25 mg, Oral, Q8H PRN, Nereyda Moron Bilofsky, CRNP    apixaban (ELIQUIS) tablet 2 5 mg, 2 5 mg, Oral, BID, Nereyda Moron Bilofsky, CRNP, 2 5 mg at 03/01/19 0856    ascorbic acid (VITAMIN C) tablet 250 mg, 250 mg, Oral, Daily, Nereyda Moron Bilofsky, CRNP, 250 mg at 03/01/19 0856    atorvastatin (LIPITOR) tablet 10 mg, 10 mg, Oral, Daily With Dinner, Analy K Bilofsky, CRNP, 10 mg at 02/28/19 1703    cholecalciferol (VITAMIN D3) tablet 2,000 Units, 2,000 Units, Oral, Daily, Nereyda Moron Bilofsky, CRNP, 2,000 Units at 03/01/19 0855    docusate sodium (COLACE) capsule 100 mg, 100 mg, Oral, BID, Nereyda Moron Bilofsky, CRNP, 100 mg at 03/01/19 0855    finasteride (PROSCAR) tablet 5 mg, 5 mg, Oral, Daily, Analy K Bilofsky, CRNP, 5 mg at 03/01/19 0856    fluticasone-vilanterol (BREO ELLIPTA) 100-25 mcg/inh inhaler 1 puff, 1 puff, Inhalation, Daily, Nereyda Moron Bilofsky, CRNP, 1 puff at 03/01/19 0857    HYDROcodone-acetaminophen (NORCO) 5-325 mg per tablet 1 tablet, 1 tablet, Oral, Q6H PRN, SUJATA Reza, 1 tablet at 02/26/19 1933    insulin lispro (HumaLOG) 100 units/mL subcutaneous injection 1-6 Units, 1-6 Units, Subcutaneous, TID AC, 1 Units at 02/25/19 1816 **AND** Fingerstick Glucose (POCT), , , TID AC, Nereyda Brush Bilofsky, CRNP    metoprolol (LOPRESSOR) injection 2 5 mg, 2 5 mg, Intravenous, Q6H PRN, Starlette Ped Bilofsky, CRNP    pantoprazole (PROTONIX) EC tablet 40 mg, 40 mg, Oral, Early Morning, Starlette Ped Bilofsky, CRNP, 40 mg at 03/01/19 6295    PARoxetine (PAXIL) tablet 30 mg, 30 mg, Oral, Daily, Starlette Ped Bilofsky, CRNP, 30 mg at 03/01/19 0855    senna (SENOKOT) tablet 8 6 mg, 1 tablet, Oral, HS, Starlette Ped Bilofsky, CRNP, 8 6 mg at 02/28/19 2228    Laboratory Results:  Results from last 7 days   Lab Units 03/01/19  0611 02/28/19  0626 02/27/19  0502 02/27/19  0429 02/26/19  1052 02/26/19  0535 02/26/19  0011 02/25/19  1833 02/25/19  0454 02/24/19  1948 02/24/19  1545 02/24/19  1023 02/24/19  0945 02/24/19  0230 02/23/19  2021  02/23/19  0836   WBC Thousand/uL 5 08 5 02  --  4 61  --  3 10*  --   --  3 52*  --   --  3 99*  --   --   --   --  6 58   HEMOGLOBIN g/dL 8 7* 9 1*  --  9 3* 7 1* 6 8* 6 4* 7 5* 7 0*  --   --  7 4*  --   --   --   --  10 0*   HEMATOCRIT % 26 4* 27 0*  --  28 3*  --  20 8*  --   --  21 4*  --   --  21 9*  --   --   --   --  32 0*   PLATELETS Thousands/uL 135* 141*  --  119*  --  96*  --   --  107*  --   --  122*  --   --   --   --  211   POTASSIUM mmol/L 4 0 3 8 4 0  --   --  3 5  --   --  3 6  --  4 3  --  3 9 4 1 5 7*   < > 6 8*   CHLORIDE mmol/L 104 106 110*  --   --  108  --   --  105  --  104  --  99* 95* 97*   < > 99*   CO2 mmol/L 25 25 21  --   --  23  --   --  24  --  28  --  28 28 18*   < > 13*   BUN mg/dL 44* 31* 62*  --   --  64*  --   --  65*  --  57*  --  76* 93* 113*   < > 117*   CREATININE mg/dL 5 87* 4 65* 7 71*  --   --  7 26*  --   --  6 38*  --  5 61*  --  7 52* 8 84* 11 56*   < > 11 60*   CALCIUM mg/dL 7 9* 8 1* 7 7*  --   --  7 7*  --   --  7 9*  --  8 4  --  8 4 7 9* 8 4   < > 9 3   MAGNESIUM mg/dL  --   --   --   --   --  1 9  --   --  2 0  --  1 8  --  2 1 1 2* 1 4*  --   --    PHOSPHORUS mg/dL  --   --   --   --   --  4 4*  --   --  4 2* 4 0 4 2*  --  5 6* 5 9* 8 7*  --   --     < > = values in this interval not displayed

## 2019-03-01 NOTE — PLAN OF CARE
Problem: Potential for Falls  Goal: Patient will remain free of falls  Description  INTERVENTIONS:  - Assess patient frequently for physical needs  -  Identify cognitive and physical deficits and behaviors that affect risk of falls  -  [place] fall precautions as indicated by assessment   - Educate patient/family on patient safety including physical limitations  - Instruct patient to call for assistance with activity based on assessment  - Modify environment to reduce risk of injury  - Consider OT/PT consult to assist with strengthening/mobility  Outcome: Progressing     Problem: Prexisting or High Potential for Compromised Skin Integrity  Goal: Skin integrity is maintained or improved  Description  INTERVENTIONS:  - Identify patients at risk for skin breakdown  - Assess and monitor skin integrity  - Assess and monitor nutrition and hydration status  - Monitor labs (i e  albumin)  - Assess for incontinence   - Turn and reposition patient  - Assist with mobility/ambulation  - Relieve pressure over bony prominences  - Avoid friction and shearing  - Provide appropriate hygiene as needed including keeping skin clean and dry  - Evaluate need for skin moisturizer/barrier cream  - Collaborate with interdisciplinary team (i e  Nutrition, Rehabilitation, etc )   - Patient/family teaching  Outcome: Progressing     Problem: Nutrition/Hydration-ADULT  Goal: Nutrient/Hydration intake appropriate for improving, restoring or maintaining nutritional needs  Description  Monitor and assess patient's nutrition/hydration status for malnutrition (ex- brittle hair, bruises, dry skin, pale skin and conjunctiva, muscle wasting, smooth red tongue, and disorientation)  Collaborate with interdisciplinary team and initiate plan and interventions as ordered  Monitor patient's weight and dietary intake as ordered or per policy  Utilize nutrition screening tool and intervene per policy   Determine patient's food preferences and provide high-protein, high-caloric foods as appropriate  INTERVENTIONS:  - Monitor oral intake, urinary output, labs, and treatment plans  - Assess nutrition and hydration status and recommend course of action  - Evaluate amount of meals eaten  - Assist patient with eating if necessary   - Allow adequate time for meals  - Recommend/ encourage appropriate diets, oral nutritional supplements, and vitamin/mineral supplements  - Order, calculate, and assess calorie counts as needed  - Recommend, monitor, and adjust tube feedings and TPN/PPN based on assessed needs  - Assess need for intravenous fluids  - Provide specific nutrition/hydration education as appropriate  - Include patient/family/caregiver in decisions related to nutrition  Outcome: Progressing     Problem: CARDIOVASCULAR - ADULT  Goal: Maintains optimal cardiac output and hemodynamic stability  Description  INTERVENTIONS:  - Monitor I/O, vital signs and rhythm  - Monitor for S/S and trends of decreased cardiac output i e  bleeding, hypotension  - Administer and titrate ordered vasoactive medications to optimize hemodynamic stability  - Assess quality of pulses, skin color and temperature  - Assess for signs of decreased coronary artery perfusion - ex   Angina  - Instruct patient to report change in severity of symptoms  Outcome: Progressing  Goal: Absence of cardiac dysrhythmias or at baseline rhythm  Description  INTERVENTIONS:  - Continuous cardiac monitoring, monitor vital signs, obtain 12 lead EKG if indicated  - Administer antiarrhythmic and heart rate control medications as ordered  - Monitor electrolytes and administer replacement therapy as ordered  Outcome: Progressing     Problem: METABOLIC, FLUID AND ELECTROLYTES - ADULT  Goal: Electrolytes maintained within normal limits  Description  INTERVENTIONS:  - Monitor labs and assess patient for signs and symptoms of electrolyte imbalances  - Administer electrolyte replacement as ordered  - Monitor response to electrolyte replacements, including repeat lab results as appropriate  - Instruct patient on fluid and nutrition as appropriate  Outcome: Progressing  Goal: Fluid balance maintained  Description  INTERVENTIONS:  - Monitor labs and assess for signs and symptoms of volume excess or deficit  - Monitor I/O and WT  - Instruct patient on fluid and nutrition as appropriate  Outcome: Progressing  Goal: Glucose maintained within target range  Description  INTERVENTIONS:  - Monitor Blood Glucose as ordered  - Assess for signs and symptoms of hyperglycemia and hypoglycemia  - Administer ordered medications to maintain glucose within target range  - Assess nutritional intake and initiate nutrition service referral as needed  Outcome: Progressing     Problem: SKIN/TISSUE INTEGRITY - ADULT  Goal: Skin integrity remains intact  Description  INTERVENTIONS  - Identify patients at risk for skin breakdown  - Assess and monitor skin integrity  - Assess and monitor nutrition and hydration status  - Monitor labs (i e  albumin)  - Assess for incontinence   - Turn and reposition patient  - Assist with mobility/ambulation  - Relieve pressure over bony prominences  - Avoid friction and shearing  - Provide appropriate hygiene as needed including keeping skin clean and dry  - Evaluate need for skin moisturizer/barrier cream  - Collaborate with interdisciplinary team (i e  Nutrition, Rehabilitation, etc )   - Patient/family teaching  Outcome: Progressing

## 2019-03-01 NOTE — ASSESSMENT & PLAN NOTE
Essential hypertension stable  Given renal failure and hypotension amlodipine and losartan has been held

## 2019-03-01 NOTE — PROGRESS NOTES
Progress Note - Solomon Emilie  1935, 80 y o  male MRN: [id number]    Unit/Bed#: E4 -01 Encounter: [id number]    Primary Care Provider: Anderson Sen DO   Date and time admitted to hospital: 2/23/2019  8:19 AM        * Acute kidney injury [facility])  Assessment & Plan  ERIK/ATN in the setting of prolonged prerenal azotemia from poor intake  Was started on CVVH in ICU and now on hemodialysis with temporary catheter  COPD (chronic obstructive pulmonary disease) ([facility] [address] )  Assessment & Plan  COPD without exacerbation  Continue Breo    Paroxysmal atrial fibrillation (HCC)  Assessment & Plan  Continue metoprolol and eliquis    Pericardial effusion  Assessment & Plan  Status post pericardial window with drain which nephrology does not believe this from uremia  Continue drain until output less than 30 mL per CTS    Essential hypertension  Assessment & Plan  Essential hypertension stable  Given renal failure and hypotension amlodipine and losartan has been held  Benign prostatic hyperplasia with urinary frequency  Assessment & Plan  Urinary retention with catheter in place      VTE Pharmacologic Prophylaxis: Apixaban (Eliquis)    Patient Centered Rounds: I have performed bedside rounds with nursing staff today  Discussions with Specialists or Other Care Team Provider:  Case management  Education and Discussions with Family / Patient:  Wife and family at bedside    Time Spent for Care: 35 mins  More than 50% of total time spent on counseling and coordination of care as described above      Current Length of Stay: 6 day(s)  Current Patient Status: Inpatient     Certification Statement: The patient will continue to require additional inpatient hospital stay due to Acute kidney injury [facility])  Discharge Plan / Estimated Discharge Date:  Monitor over the weekend given renal failure    Code Status: Level 1 - Full Code  ______________________________________________________________________________    Subjective: Patient seen and examined  Minimal chest pain  No shortness of breath    Objective:   Vitals: Blood pressure 119/81, pulse 91, temperature (!) 97 3 °F (36 3 °C), temperature source Tympanic, resp  rate 18, height 5' 4" (1 626 m), weight 64 2 kg (141 lb 8 6 oz), SpO2 97 %      Physical Exam:   General appearance: alert, appears stated age and cooperative  Head: atraumatic  Lungs: diminished breath sounds  Heart: irregularly irregular rhythm and PATTI drain from pericardial window  Abdomen: soft, non-tender, positive bowel sounds   Back: negative, range of motion normal  Extremities: edema lower extremities bilaterally  Neurologic: Grossly normal    Additional Data:   Labs:  Results from last 7 days   Lab Units 03/01/19  0611 02/28/19  0626 02/27/19  0429 02/26/19  1052 02/26/19  0535 02/26/19  0011 02/25/19  1833 02/25/19  0454 02/24/19  1023 02/23/19  0836   WBC Thousand/uL 5 08 5 02 4 61  --  3 10*  --   --  3 52* 3 99* 6 58   HEMOGLOBIN g/dL 8 7* 9 1* 9 3* 7 1* 6 8* 6 4* 7 5* 7 0* 7 4* 10 0*   HEMATOCRIT % 26 4* 27 0* 28 3*  --  20 8*  --   --  21 4* 21 9* 32 0*   MCV fL 96 95 98  --  99*  --   --  98 96 103*   PLATELETS Thousands/uL 135* 141* 119*  --  96*  --   --  107* 122* 211   INR   --   --   --   --  1 22*  --   --   --  1 14  --      Results from last 7 days   Lab Units 03/01/19  0611 02/28/19  0626 02/27/19  0502 02/26/19  0535 02/25/19  0454 02/24/19  1545 02/24/19  0945 02/24/19  0230 02/23/19  2021 02/23/19  1814  02/23/19  0836   SODIUM mmol/L 139 141 142 143 141 139 140 130* 139 139   < > 142   POTASSIUM mmol/L 4 0 3 8 4 0 3 5 3 6 4 3 3 9 4 1 5 7* 5 5*   < > 6 8*   CHLORIDE mmol/L 104 106 110* 108 105 104 99* 95* 97* 97*   < > 99*   CO2 mmol/L 25 25 21 23 24 28 28 28 18* 18*   < > 13*   ANION GAP mmol/L 10 10 11 12 12 7 13 7 24* 24*   < > 30*   BUN mg/dL 44* 31* 62* 64* 65* 57* 76* 93* 113* 114*   < > 117*   CREATININE mg/dL 5 87* 4 65* 7 71* 7 26* 6 38* 5 61* 7 52* 8 84* 11 56* 11 42*   < > 11 60* CALCIUM mg/dL 7 9* 8 1* 7 7* 7 7* 7 9* 8 4 8 4 7 9* 8 4 8 8   < > 9 3   ALBUMIN g/dL  --   --   --   --   --   --   --   --   --   --   --  3 9   TOTAL BILIRUBIN mg/dL  --   --   --   --   --   --   --   --   --   --   --  1 63*   ALK PHOS U/L  --   --   --   --   --   --   --   --   --   --   --  69   ALT U/L  --   --   --   --   --   --   --   --   --   --   --  22   AST U/L  --   --   --   --   --   --   --   --   --   --   --  16   EGFR ml/min/1 73sq m 8 11 6 6 7 9 6 5 4 4   < > 4   GLUCOSE RANDOM mg/dL 104 59* 61* 90 102 215* 73 70 122 123   < > 110    < > = values in this interval not displayed  Results from last 7 days   Lab Units 02/26/19  0535 02/25/19  0454 02/24/19  1948 02/24/19  1545 02/24/19  0945 02/24/19  0230   MAGNESIUM mg/dL 1 9 2 0  --  1 8 2 1 1 2*   PHOSPHORUS mg/dL 4 4* 4 2* 4 0 4 2* 5 6* 5 9*                  Results from last 7 days   Lab Units 03/01/19  1140 03/01/19  0746 02/28/19  2039 02/28/19  1622 02/28/19  1153 02/28/19  0819 02/27/19  2046 02/27/19  1612 02/27/19  1108 02/27/19  0705 02/26/19  2119 02/26/19  1613   POC GLUCOSE mg/dl 180* 115 121 98 85 62* 120 104 76 59* 130 143*     Results from last 7 days   Lab Units 02/23/19  1614   HEMOGLOBIN A1C % 7 4*         * I Have Reviewed All Lab Data Listed Above  Cultures:   Results from last 7 days   Lab Units 02/26/19  1219   GRAM STAIN RESULT  No Polys or Bacteria seen   BODY FLUID CULTURE, STERILE  No growth         Imaging:  Imaging Reports Reviewed Today Include:   No results found    Scheduled Meds:  Current Facility-Administered Medications:  acetaminophen 650 mg Oral Q6H PRN SUJATA Friend   ALPRAZolam 0 25 mg Oral Q8H PRN SUJATA Friend   apixaban 2 5 mg Oral BID SUJATA Brantley   ascorbic acid 250 mg Oral Daily SUJATA Brantley   atorvastatin 10 mg Oral Daily With Dinner SUJATA Portillo   cholecalciferol 2,000 Units Oral Daily SUJATA Brantley   docusate sodium 100 mg Oral BID Garui Cortes SUJATA Schmidt   finasteride 5 mg Oral Daily SUJATA Brantley   fluticasone-vilanterol 1 puff Inhalation Daily Kera Bearded SUJATA Schmidt   HYDROcodone-acetaminophen 1 tablet Oral Q6H PRN Kera Bearded SUJATA Schmidt   insulin lispro 1-6 Units Subcutaneous TID AC SUJATA Brantley   metoprolol 2 5 mg Intravenous Q6H PRN Kera BearSUJATA Pascual   pantoprazole 40 mg Oral Early Morning Analy SUJATA Renee   PARoxetine 30 mg Oral Daily Kera Bearded SUJATA Schmidt   senna 1 tablet Oral HS Kera BearSUJATA Pascual DO [address] Internal Medicine  Hospitalist    ** Please Note: This note has been constructed using a voice recognition system   **

## 2019-03-01 NOTE — ASSESSMENT & PLAN NOTE
ERIK/ATN in the setting of prolonged prerenal azotemia from poor intake  Was started on CVVH in ICU and now on hemodialysis with temporary catheter

## 2019-03-01 NOTE — PLAN OF CARE
Problem: Potential for Falls  Goal: Patient will remain free of falls  Description  INTERVENTIONS:  - Assess patient frequently for physical needs  -  Identify cognitive and physical deficits and behaviors that affect risk of falls  -  [place] fall precautions as indicated by assessment   - Educate patient/family on patient safety including physical limitations  - Instruct patient to call for assistance with activity based on assessment  - Modify environment to reduce risk of injury  - Consider OT/PT consult to assist with strengthening/mobility  Outcome: Progressing     Problem: Prexisting or High Potential for Compromised Skin Integrity  Goal: Skin integrity is maintained or improved  Description  INTERVENTIONS:  - Identify patients at risk for skin breakdown  - Assess and monitor skin integrity  - Assess and monitor nutrition and hydration status  - Monitor labs (i e  albumin)  - Assess for incontinence   - Turn and reposition patient  - Assist with mobility/ambulation  - Relieve pressure over bony prominences  - Avoid friction and shearing  - Provide appropriate hygiene as needed including keeping skin clean and dry  - Evaluate need for skin moisturizer/barrier cream  - Collaborate with interdisciplinary team (i e  Nutrition, Rehabilitation, etc )   - Patient/family teaching  Outcome: Progressing     Problem: Nutrition/Hydration-ADULT  Goal: Nutrient/Hydration intake appropriate for improving, restoring or maintaining nutritional needs  Description  Monitor and assess patient's nutrition/hydration status for malnutrition (ex- brittle hair, bruises, dry skin, pale skin and conjunctiva, muscle wasting, smooth red tongue, and disorientation)  Collaborate with interdisciplinary team and initiate plan and interventions as ordered  Monitor patient's weight and dietary intake as ordered or per policy  Utilize nutrition screening tool and intervene per policy   Determine patient's food preferences and provide high-protein, high-caloric foods as appropriate  INTERVENTIONS:  - Monitor oral intake, urinary output, labs, and treatment plans  - Assess nutrition and hydration status and recommend course of action  - Evaluate amount of meals eaten  - Assist patient with eating if necessary   - Allow adequate time for meals  - Recommend/ encourage appropriate diets, oral nutritional supplements, and vitamin/mineral supplements  - Order, calculate, and assess calorie counts as needed  - Recommend, monitor, and adjust tube feedings and TPN/PPN based on assessed needs  - Assess need for intravenous fluids  - Provide specific nutrition/hydration education as appropriate  - Include patient/family/caregiver in decisions related to nutrition  Outcome: Progressing     Problem: CARDIOVASCULAR - ADULT  Goal: Maintains optimal cardiac output and hemodynamic stability  Description  INTERVENTIONS:  - Monitor I/O, vital signs and rhythm  - Monitor for S/S and trends of decreased cardiac output i e  bleeding, hypotension  - Administer and titrate ordered vasoactive medications to optimize hemodynamic stability  - Assess quality of pulses, skin color and temperature  - Assess for signs of decreased coronary artery perfusion - ex   Angina  - Instruct patient to report change in severity of symptoms  Outcome: Progressing  Goal: Absence of cardiac dysrhythmias or at baseline rhythm  Description  INTERVENTIONS:  - Continuous cardiac monitoring, monitor vital signs, obtain 12 lead EKG if indicated  - Administer antiarrhythmic and heart rate control medications as ordered  - Monitor electrolytes and administer replacement therapy as ordered  Outcome: Progressing     Problem: METABOLIC, FLUID AND ELECTROLYTES - ADULT  Goal: Electrolytes maintained within normal limits  Description  INTERVENTIONS:  - Monitor labs and assess patient for signs and symptoms of electrolyte imbalances  - Administer electrolyte replacement as ordered  - Monitor response to electrolyte replacements, including repeat lab results as appropriate  - Instruct patient on fluid and nutrition as appropriate  Outcome: Progressing  Goal: Fluid balance maintained  Description  INTERVENTIONS:  - Monitor labs and assess for signs and symptoms of volume excess or deficit  - Monitor I/O and WT  - Instruct patient on fluid and nutrition as appropriate  Outcome: Progressing  Goal: Glucose maintained within target range  Description  INTERVENTIONS:  - Monitor Blood Glucose as ordered  - Assess for signs and symptoms of hyperglycemia and hypoglycemia  - Administer ordered medications to maintain glucose within target range  - Assess nutritional intake and initiate nutrition service referral as needed  Outcome: Progressing     Problem: SKIN/TISSUE INTEGRITY - ADULT  Goal: Skin integrity remains intact  Description  INTERVENTIONS  - Identify patients at risk for skin breakdown  - Assess and monitor skin integrity  - Assess and monitor nutrition and hydration status  - Monitor labs (i e  albumin)  - Assess for incontinence   - Turn and reposition patient  - Assist with mobility/ambulation  - Relieve pressure over bony prominences  - Avoid friction and shearing  - Provide appropriate hygiene as needed including keeping skin clean and dry  - Evaluate need for skin moisturizer/barrier cream  - Collaborate with interdisciplinary team (i e  Nutrition, Rehabilitation, etc )   - Patient/family teaching  Outcome: Progressing

## 2019-03-02 LAB
ALBUMIN SERPL BCP-MCNC: 2.1 G/DL (ref 3.5–5)
ALP SERPL-CCNC: 45 U/L (ref 46–116)
ALT SERPL W P-5'-P-CCNC: 11 U/L (ref 12–78)
ANION GAP SERPL CALCULATED.3IONS-SCNC: 10 MMOL/L (ref 4–13)
AST SERPL W P-5'-P-CCNC: 21 U/L (ref 5–45)
BILIRUB SERPL-MCNC: 0.43 MG/DL (ref 0.2–1)
BUN SERPL-MCNC: 28 MG/DL (ref 5–25)
CALCIUM SERPL-MCNC: 7.9 MG/DL (ref 8.3–10.1)
CHLORIDE SERPL-SCNC: 102 MMOL/L (ref 100–108)
CO2 SERPL-SCNC: 27 MMOL/L (ref 21–32)
CREAT SERPL-MCNC: 3.9 MG/DL (ref 0.6–1.3)
ERYTHROCYTE [DISTWIDTH] IN BLOOD BY AUTOMATED COUNT: 11.9 % (ref 11.6–15.1)
GFR SERPL CREATININE-BSD FRML MDRD: 13 ML/MIN/1.73SQ M
GLUCOSE SERPL-MCNC: 105 MG/DL (ref 65–140)
GLUCOSE SERPL-MCNC: 109 MG/DL (ref 65–140)
GLUCOSE SERPL-MCNC: 137 MG/DL (ref 65–140)
GLUCOSE SERPL-MCNC: 329 MG/DL (ref 65–140)
HCT VFR BLD AUTO: 25.4 % (ref 36.5–49.3)
HGB BLD-MCNC: 8.4 G/DL (ref 12–17)
MCH RBC QN AUTO: 31.6 PG (ref 26.8–34.3)
MCHC RBC AUTO-ENTMCNC: 33.1 G/DL (ref 31.4–37.4)
MCV RBC AUTO: 96 FL (ref 82–98)
PLATELET # BLD AUTO: 141 THOUSANDS/UL (ref 149–390)
PMV BLD AUTO: 10 FL (ref 8.9–12.7)
POTASSIUM SERPL-SCNC: 3.6 MMOL/L (ref 3.5–5.3)
PROT SERPL-MCNC: 5 G/DL (ref 6.4–8.2)
RBC # BLD AUTO: 2.66 MILLION/UL (ref 3.88–5.62)
SODIUM SERPL-SCNC: 139 MMOL/L (ref 136–145)
WBC # BLD AUTO: 4.74 THOUSAND/UL (ref 4.31–10.16)

## 2019-03-02 PROCEDURE — 99232 SBSQ HOSP IP/OBS MODERATE 35: CPT | Performed by: INTERNAL MEDICINE

## 2019-03-02 PROCEDURE — 80053 COMPREHEN METABOLIC PANEL: CPT | Performed by: INTERNAL MEDICINE

## 2019-03-02 PROCEDURE — 85027 COMPLETE CBC AUTOMATED: CPT | Performed by: INTERNAL MEDICINE

## 2019-03-02 PROCEDURE — 82948 REAGENT STRIP/BLOOD GLUCOSE: CPT

## 2019-03-02 RX ADMIN — VITAMIN D, TAB 1000IU (100/BT) 2000 UNITS: 25 TAB at 09:20

## 2019-03-02 RX ADMIN — APIXABAN 2.5 MG: 2.5 TABLET, FILM COATED ORAL at 17:17

## 2019-03-02 RX ADMIN — APIXABAN 2.5 MG: 2.5 TABLET, FILM COATED ORAL at 09:19

## 2019-03-02 RX ADMIN — FLUTICASONE FUROATE AND VILANTEROL TRIFENATATE 1 PUFF: 100; 25 POWDER RESPIRATORY (INHALATION) at 09:21

## 2019-03-02 RX ADMIN — DOCUSATE SODIUM 100 MG: 100 CAPSULE, LIQUID FILLED ORAL at 17:17

## 2019-03-02 RX ADMIN — DOCUSATE SODIUM 100 MG: 100 CAPSULE, LIQUID FILLED ORAL at 09:20

## 2019-03-02 RX ADMIN — PAROXETINE 30 MG: 20 TABLET, FILM COATED ORAL at 09:18

## 2019-03-02 RX ADMIN — OXYCODONE HYDROCHLORIDE AND ACETAMINOPHEN 250 MG: 500 TABLET ORAL at 09:19

## 2019-03-02 RX ADMIN — ATORVASTATIN CALCIUM 10 MG: 20 TABLET, FILM COATED ORAL at 17:17

## 2019-03-02 RX ADMIN — FINASTERIDE 5 MG: 5 TABLET, FILM COATED ORAL at 09:20

## 2019-03-02 RX ADMIN — PANTOPRAZOLE SODIUM 40 MG: 40 TABLET, DELAYED RELEASE ORAL at 05:34

## 2019-03-02 RX ADMIN — INSULIN LISPRO 5 UNITS: 100 INJECTION, SOLUTION INTRAVENOUS; SUBCUTANEOUS at 12:46

## 2019-03-02 NOTE — PROGRESS NOTES
Progress Note - Prema Plant  1935, 80 y o  male MRN: [id number]    Unit/Bed#: E4 -01 Encounter: [id number]    Primary Care Provider: Judge Rogelio DO   Date and time admitted to hospital: 2/23/2019  8:19 AM        * Acute kidney injury [facility])  Assessment & Plan  ERIK/ATN in the setting of prolonged prerenal azotemia from poor intake  Was started on CVVH in ICU and status post treatment of HD via temporary catheter  Monitor over the weekend and re-evaluate need for HD Monday    Paroxysmal atrial fibrillation [facility])  Assessment & Plan  Continue metoprolol and eliquis    Pericardial effusion  Assessment & Plan  Status post pericardial window with drain likely secondary to renal failure/uremia  Continue drain until output less than 30 mL per CTS    Type 2 diabetes mellitus [facility])  Assessment & Plan  Lab Results   Component Value Date    HGBA1C 7 4 (H) 02/23/2019       Recent Labs     03/01/19  1140 03/01/19  1538 03/01/19  2112 03/02/19  1236   POCGLU 180* 114 114 329*     Diabetes mellitus glucose stable on sliding scale lispro    Essential hypertension  Assessment & Plan  Essential hypertension stable  Given renal failure and hypotension amlodipine and losartan has been held  VTE Pharmacologic Prophylaxis: Apixaban (Eliquis)    Patient Centered Rounds: I have performed bedside rounds with nursing staff today  Discussions with Specialists or Other Care Team Provider:  Nephrology  Education and Discussions with Family / Patient:     Time Spent for Care: 30 mins  More than 50% of total time spent on counseling and coordination of care as described above      Current Length of Stay: 7 day(s)  Current Patient Status: Inpatient     Certification Statement: The patient will continue to require additional inpatient hospital stay due to Acute kidney injury [facility])  Discharge Plan / Estimated Discharge Date:     Code Status: Level 1 - Full Code  ______________________________________________________________________________    Subjective:   Patient seen and examined  Reading a magazine during examination, no new complaints  No chest pain  Drain continues to have output    Objective:   Vitals: Blood pressure 123/60, pulse 81, temperature 98 8 °F (37 1 °C), temperature source Tympanic, resp  rate 18, height 5' 4" (1 626 m), weight 64 4 kg (141 lb 15 6 oz), SpO2 98 %      Physical Exam:   General appearance: alert, appears stated age and cooperative  Head: Normocephalic, without obvious abnormality, atraumatic  Lungs: diminished breath sounds  Heart: irregularly irregular rhythm  Abdomen: soft, non-tender, positive bowel sounds   Back: negative, range of motion normal  Extremities: edema +1 lower extremities bilaterally  Neurologic: Grossly normal    Additional Data:   Labs:  Results from last 7 days   Lab Units 03/02/19  0613 03/01/19  7932 02/28/19  0626 02/27/19  0429 02/26/19  1052 02/26/19  0535 02/26/19  0011 02/25/19  1833 02/25/19  0454 02/24/19  1023   WBC Thousand/uL 4 74 5 08 5 02 4 61  --  3 10*  --   --  3 52* 3 99*   HEMOGLOBIN g/dL 8 4* 8 7* 9 1* 9 3* 7 1* 6 8* 6 4* 7 5* 7 0* 7 4*   HEMATOCRIT % 25 4* 26 4* 27 0* 28 3*  --  20 8*  --   --  21 4* 21 9*   MCV fL 96 96 95 98  --  99*  --   --  98 96   PLATELETS Thousands/uL 141* 135* 141* 119*  --  96*  --   --  107* 122*   INR   --   --   --   --   --  1 22*  --   --   --  1 14     Results from last 7 days   Lab Units 03/02/19  0613 03/01/19  7282 02/28/19  0626 02/27/19  0502 02/26/19  0535 02/25/19  0454 02/24/19  1545 02/24/19  0945 02/24/19  0230 02/23/19 2021   SODIUM mmol/L 139 139 141 142 143 141 139 140 130* 139   POTASSIUM mmol/L 3 6 4 0 3 8 4 0 3 5 3 6 4 3 3 9 4 1 5 7*   CHLORIDE mmol/L 102 104 106 110* 108 105 104 99* 95* 97*   CO2 mmol/L 27 25 25 21 23 24 28 28 28 18*   ANION GAP mmol/L 10 10 10 11 12 12 7 13 7 24*   BUN mg/dL 28* 44* 31* 62* 64* 65* 57* 76* 93* 113* CREATININE mg/dL 3 90* 5 87* 4 65* 7 71* 7 26* 6 38* 5 61* 7 52* 8 84* 11 56*   CALCIUM mg/dL 7 9* 7 9* 8 1* 7 7* 7 7* 7 9* 8 4 8 4 7 9* 8 4   ALBUMIN g/dL 2 1*  --   --   --   --   --   --   --   --   --    TOTAL BILIRUBIN mg/dL 0 43  --   --   --   --   --   --   --   --   --    ALK PHOS U/L 45*  --   --   --   --   --   --   --   --   --    ALT U/L 11*  --   --   --   --   --   --   --   --   --    AST U/L 21  --   --   --   --   --   --   --   --   --    EGFR ml/min/1 73sq m 13 8 11 6 6 7 9 6 5 4   GLUCOSE RANDOM mg/dL 105 104 59* 61* 90 102 215* 73 70 122     Results from last 7 days   Lab Units 02/26/19  0535 02/25/19  0454 02/24/19  1948 02/24/19  1545 02/24/19  0945 02/24/19  0230   MAGNESIUM mg/dL 1 9 2 0  --  1 8 2 1 1 2*   PHOSPHORUS mg/dL 4 4* 4 2* 4 0 4 2* 5 6* 5 9*                  Results from last 7 days   Lab Units 03/02/19  1236 03/01/19  2112 03/01/19  1538 03/01/19  1140 03/01/19  0746 02/28/19  2039 02/28/19  1622 02/28/19  1153 02/28/19  0819 02/27/19  2046 02/27/19  1612 02/27/19  1108   POC GLUCOSE mg/dl 329* 114 114 180* 115 121 98 85 62* 120 104 76     Results from last 7 days   Lab Units 02/23/19  1614   HEMOGLOBIN A1C % 7 4*         * I Have Reviewed All Lab Data Listed Above  Cultures:   Results from last 7 days   Lab Units 02/26/19  1219   GRAM STAIN RESULT  No Polys or Bacteria seen   BODY FLUID CULTURE, STERILE  No growth         Imaging:  Imaging Reports Reviewed Today Include:   No results found    Scheduled Meds:  Current Facility-Administered Medications:  acetaminophen 650 mg Oral Q6H PRN Sparkle Schmidt, SUJATA   ALPRAZolam 0 25 mg Oral Q8H PRN Sparkle Schmidt, SUJATA   apixaban 2 5 mg Oral BID Analy Schmidt, SUJATA   ascorbic acid 250 mg Oral Daily Analy Schmidt, SUJATA   atorvastatin 10 mg Oral Daily With Dinner Mariela Signs, CRNP   cholecalciferol 2,000 Units Oral Daily Analy Schmidt, SUJATA   docusate sodium 100 mg Oral BID Sparkle Schmidt, SUJATA   finasteride 5 mg Oral Daily Josette Blocker SUJATA Schmidt   fluticasone-vilanterol 1 puff Inhalation Daily Josette Blocker SUJATA Schmidt   HYDROcodone-acetaminophen 1 tablet Oral Q6H PRN Josette Blocker SUJATA Schmidt   insulin lispro 1-6 Units Subcutaneous TID AC Analy K SUJATA Schmidt   metoprolol 2 5 mg Intravenous Q6H PRN Josette Blocker SUJATA Schmidt   pantoprazole 40 mg Oral Early Morning Analy K SUJATA Schmidt   PARoxetine 30 mg Oral Daily Josette Blocker SUJATA Schmidt   senna 1 tablet Oral HS Josette Blocker SUJATA Schmidt DO [address] Internal Medicine  Hospitalist    ** Please Note: This note has been constructed using a voice recognition system   **

## 2019-03-02 NOTE — ASSESSMENT & PLAN NOTE
ERIK/ATN in the setting of prolonged prerenal azotemia from poor intake  Was started on CVVH in ICU and status post treatment of HD via temporary catheter    Monitor over the weekend and re-evaluate need for HD Monday

## 2019-03-02 NOTE — NURSING NOTE
Assumed care of pt @1845  Agree w/previous nurse assessment  Pt resting comfortably, offering no complaints   Will CTM

## 2019-03-02 NOTE — PROGRESS NOTES
NEPHROLOGY PROGRESS NOTE   Lanre Vargas  80 y o  male MRN: [id number]  Unit/Bed#: E4 -01 Encounter: [id number]  Reason for Consult: ESRD    ASSESSMENT/PLAN:  1  Acute kidney injury:  Likely secondary to prolonged prerenal azotemia with that progressed to ATN  -required CVVHD which completed on 02/23/2019  -currently on IHD status post treatment yesterday without issues  -creatinine continues to improve daily  -electrolytes stable with potassium of 3 6  -will continue to trend for HD need and renal recovery  -of note CT scan did not reveal hydronephrosis  -Lasix and losartan remains on hold  -serologies:  ANCA, CHACHO, SPEP, UPEP-negative, immuno fixation does not reveal a monoclonal gammopathy hepatitis panel nonreactive, hemolyzed smear does not reveal schistocyte or a helmet cells  -continue trend I/O, lab values in volume status    2  Access:  Temporary dialysis catheter right side neck  -may require PermCath placement if no signs of renal recovery  -will continue to trend over weekend    3  Chronic kidney disease III:  Likely secondary to hypertension and diabetes  -baseline creatinine 1 3-1 5  -depending on renal recovery will need outpatient follow-up    4  Anemia likely of Chronic Kidney Disease:  Iron panels okay  -hemoglobin fairly stable  -will continue to trend    5  Urinary retention:  History of BPH  -Singh catheter in place  -continue to follow per primary team    6  Large pericardial effusion:  Status post pericardial window with PATTI drain  -heart cardiology continues to follow    7  Hypertension:  BP acceptable  -will continue trend with dialysis    8  Other history:  COPD, paroxysmal atrial fibrillation        SUBJECTIVE:  Patient seen and examined  Denies chest pain shortness of breath    Overall feels well    OBJECTIVE:  Current Weight: Weight - Scale: 64 4 kg (141 lb 15 6 oz)  Vitals:    03/01/19 2009 03/01/19 2300 03/02/19 0600 03/02/19 0814   BP: 136/80 131/63  129/61   BP Location: Left arm Left arm  Left arm   Pulse: 85 80  75   Resp: 18 18  20   Temp:  (!) 97 4 °F (36 3 °C)  98 8 °F (37 1 °C)   TempSrc:  Tympanic  Tympanic   SpO2: 95% 95%  94%   Weight:   64 4 kg (141 lb 15 6 oz)    Height:           Intake/Output Summary (Last 24 hours) at 3/2/2019 0857  Last data filed at 3/2/2019 0538  Gross per 24 hour   Intake 620 ml   Output 1580 ml   Net -960 ml     General:  No acute distress, cooperative, hard hearing, out of bed  Skin:  Warm and dry without rash  HEENT:  Mucous membranes moist, sclera anicteric  Neck:  Supple without JVD    Right temporary cath site intact  Chest:  Essentially clear to auscultation without crackles, rhonchi, wheezes  Heart:  Regular rate and rhythm without rub  Abdomen:  Soft, nontender, nondistended, active bowel sounds  :  Singh catheter in place with clear yellow urine  Extremities:  No edema noted bilaterally  Neuro:  Alert oriented and awake  Psych:  Appropriate affect    Medications:    Current Facility-Administered Medications:     acetaminophen (TYLENOL) tablet 650 mg, 650 mg, Oral, Q6H PRN, Lynder Holli Bilofsky, CRNP    ALPRAZolam (XANAX) tablet 0 25 mg, 0 25 mg, Oral, Q8H PRN, Lynder Holli Bilofsky, CRNP    apixaban (ELIQUIS) tablet 2 5 mg, 2 5 mg, Oral, BID, Lynder Holli Bilofsky, CRNP, 2 5 mg at 03/01/19 1927    ascorbic acid (VITAMIN C) tablet 250 mg, 250 mg, Oral, Daily, Lynder Holli Bilofsky, CRNP, 250 mg at 03/01/19 0856    atorvastatin (LIPITOR) tablet 10 mg, 10 mg, Oral, Daily With Dinner, Analy K Bilofsky, CRNP, 10 mg at 03/01/19 1705    cholecalciferol (VITAMIN D3) tablet 2,000 Units, 2,000 Units, Oral, Daily, Lynder Holli Bilofsky, CRNP, 2,000 Units at 03/01/19 0855    docusate sodium (COLACE) capsule 100 mg, 100 mg, Oral, BID, Lynder Holli Bilofsky, CRNP, 100 mg at 03/01/19 1705    finasteride (PROSCAR) tablet 5 mg, 5 mg, Oral, Daily, SUJATA Brantley, 5 mg at 03/01/19 0856    fluticasone-vilanterol (BREO ELLIPTA) 100-25 mcg/inh inhaler 1 puff, 1 puff, Inhalation, Daily, SUJATA King, 1 puff at 03/01/19 0857    HYDROcodone-acetaminophen ([facility]) 5-325 mg per tablet 1 tablet, 1 tablet, Oral, Q6H PRN, Josette Blocker SUJATA Schmidt, 1 tablet at 02/26/19 1933    insulin lispro (HumaLOG) 100 units/mL subcutaneous injection 1-6 Units, 1-6 Units, Subcutaneous, TID AC, 1 Units at 03/01/19 1201 **AND** Fingerstick Glucose (POCT), , , TID AC, Analy SUJATA Renee    metoprolol (LOPRESSOR) injection 2 5 mg, 2 5 mg, Intravenous, Q6H PRN, Sandy SUJATA Mani    pantoprazole (PROTONIX) EC tablet 40 mg, 40 mg, Oral, Early Morning, Josette Blocker SUJATA Schmidt, 40 mg at 03/02/19 0534    PARoxetine (PAXIL) tablet 30 mg, 30 mg, Oral, Daily, Josette Blocker SUJATA Schmidt, 30 mg at 03/01/19 0855    senna (SENOKOT) tablet 8 6 mg, 1 tablet, Oral, HS, Josette Blocker SUJATA Schmidt, 8 6 mg at 03/01/19 2145    Laboratory Results:  Results from last 7 days   Lab Units 03/02/19  0613 03/01/19  0611 02/28/19  0626 02/27/19  0502 02/27/19  0429 02/26/19  1052 02/26/19  0535 02/26/19  0011  02/25/19  0454 02/24/19  1948 02/24/19  1545 02/24/19  1023  02/24/19  0945 02/24/19  0230 02/23/19 2021   WBC Thousand/uL 4 74 5 08 5 02  --  4 61  --  3 10*  --   --  3 52*  --   --  3 99*  --   --   --   --    HEMOGLOBIN g/dL 8 4* 8 7* 9 1*  --  9 3* 7 1* 6 8* 6 4*   < > 7 0*  --   --  7 4*   < >  --   --   --    HEMATOCRIT % 25 4* 26 4* 27 0*  --  28 3*  --  20 8*  --   --  21 4*  --   --  21 9*  --   --   --   --    PLATELETS Thousands/uL 141* 135* 141*  --  119*  --  96*  --   --  107*  --   --  122*  --   --   --   --    POTASSIUM mmol/L 3 6 4 0 3 8 4 0  --   --  3 5  --   --  3 6  --  4 3  --   --  3 9 4 1 5 7*   CHLORIDE mmol/L 102 104 106 110*  --   --  108  --   --  105  --  104  --   --  99* 95* 97*   CO2 mmol/L 27 25 25 21  --   --  23  --   --  24  --  28  --   --  28 28 18*   BUN mg/dL 28* 44* 31* 62*  --   --  64*  --   --  65*  --  57*  --   --  76* 93* 113*   CREATININE mg/dL 3 90* 5 87* 4 65* 7 71*  --   -- 7 26*  --   --  6 38*  --  5 61*  --   --  7 52* 8 84* 11 56*   CALCIUM mg/dL 7 9* 7 9* 8 1* 7 7*  --   --  7 7*  --   --  7 9*  --  8 4  --   --  8 4 7 9* 8 4   MAGNESIUM mg/dL  --   --   --   --   --   --  1 9  --   --  2 0  --  1 8  --   --  2 1 1 2* 1 4*   PHOSPHORUS mg/dL  --   --   --   --   --   --  4 4*  --   --  4 2* 4 0 4 2*  --   --  5 6* 5 9* 8 7*    < > = values in this interval not displayed

## 2019-03-02 NOTE — ASSESSMENT & PLAN NOTE
Lab Results   Component Value Date    HGBA1C 7 4 (H) 02/23/2019       Recent Labs     03/01/19  1140 03/01/19  1538 03/01/19  2112 03/02/19  1236   POCGLU 180* 114 114 329*     Diabetes mellitus glucose stable on sliding scale lispro

## 2019-03-02 NOTE — PLAN OF CARE
Problem: Potential for Falls  Goal: Patient will remain free of falls  Description  INTERVENTIONS:  - Assess patient frequently for physical needs  -  Identify cognitive and physical deficits and behaviors that affect risk of falls  -  [place] fall precautions as indicated by assessment   - Educate patient/family on patient safety including physical limitations  - Instruct patient to call for assistance with activity based on assessment  - Modify environment to reduce risk of injury  - Consider OT/PT consult to assist with strengthening/mobility  Outcome: Progressing     Problem: Prexisting or High Potential for Compromised Skin Integrity  Goal: Skin integrity is maintained or improved  Description  INTERVENTIONS:  - Identify patients at risk for skin breakdown  - Assess and monitor skin integrity  - Assess and monitor nutrition and hydration status  - Monitor labs (i e  albumin)  - Assess for incontinence   - Turn and reposition patient  - Assist with mobility/ambulation  - Relieve pressure over bony prominences  - Avoid friction and shearing  - Provide appropriate hygiene as needed including keeping skin clean and dry  - Evaluate need for skin moisturizer/barrier cream  - Collaborate with interdisciplinary team (i e  Nutrition, Rehabilitation, etc )   - Patient/family teaching  Outcome: Progressing     Problem: Nutrition/Hydration-ADULT  Goal: Nutrient/Hydration intake appropriate for improving, restoring or maintaining nutritional needs  Description  Monitor and assess patient's nutrition/hydration status for malnutrition (ex- brittle hair, bruises, dry skin, pale skin and conjunctiva, muscle wasting, smooth red tongue, and disorientation)  Collaborate with interdisciplinary team and initiate plan and interventions as ordered  Monitor patient's weight and dietary intake as ordered or per policy  Utilize nutrition screening tool and intervene per policy   Determine patient's food preferences and provide high-protein, high-caloric foods as appropriate  INTERVENTIONS:  - Monitor oral intake, urinary output, labs, and treatment plans  - Assess nutrition and hydration status and recommend course of action  - Evaluate amount of meals eaten  - Assist patient with eating if necessary   - Allow adequate time for meals  - Recommend/ encourage appropriate diets, oral nutritional supplements, and vitamin/mineral supplements  - Order, calculate, and assess calorie counts as needed  - Recommend, monitor, and adjust tube feedings and TPN/PPN based on assessed needs  - Assess need for intravenous fluids  - Provide specific nutrition/hydration education as appropriate  - Include patient/family/caregiver in decisions related to nutrition  Outcome: Progressing     Problem: CARDIOVASCULAR - ADULT  Goal: Maintains optimal cardiac output and hemodynamic stability  Description  INTERVENTIONS:  - Monitor I/O, vital signs and rhythm  - Monitor for S/S and trends of decreased cardiac output i e  bleeding, hypotension  - Administer and titrate ordered vasoactive medications to optimize hemodynamic stability  - Assess quality of pulses, skin color and temperature  - Assess for signs of decreased coronary artery perfusion - ex   Angina  - Instruct patient to report change in severity of symptoms  Outcome: Progressing  Goal: Absence of cardiac dysrhythmias or at baseline rhythm  Description  INTERVENTIONS:  - Continuous cardiac monitoring, monitor vital signs, obtain 12 lead EKG if indicated  - Administer antiarrhythmic and heart rate control medications as ordered  - Monitor electrolytes and administer replacement therapy as ordered  Outcome: Progressing     Problem: METABOLIC, FLUID AND ELECTROLYTES - ADULT  Goal: Electrolytes maintained within normal limits  Description  INTERVENTIONS:  - Monitor labs and assess patient for signs and symptoms of electrolyte imbalances  - Administer electrolyte replacement as ordered  - Monitor response to electrolyte replacements, including repeat lab results as appropriate  - Instruct patient on fluid and nutrition as appropriate  Outcome: Progressing  Goal: Fluid balance maintained  Description  INTERVENTIONS:  - Monitor labs and assess for signs and symptoms of volume excess or deficit  - Monitor I/O and WT  - Instruct patient on fluid and nutrition as appropriate  Outcome: Progressing  Goal: Glucose maintained within target range  Description  INTERVENTIONS:  - Monitor Blood Glucose as ordered  - Assess for signs and symptoms of hyperglycemia and hypoglycemia  - Administer ordered medications to maintain glucose within target range  - Assess nutritional intake and initiate nutrition service referral as needed  Outcome: Progressing     Problem: SKIN/TISSUE INTEGRITY - ADULT  Goal: Skin integrity remains intact  Description  INTERVENTIONS  - Identify patients at risk for skin breakdown  - Assess and monitor skin integrity  - Assess and monitor nutrition and hydration status  - Monitor labs (i e  albumin)  - Assess for incontinence   - Turn and reposition patient  - Assist with mobility/ambulation  - Relieve pressure over bony prominences  - Avoid friction and shearing  - Provide appropriate hygiene as needed including keeping skin clean and dry  - Evaluate need for skin moisturizer/barrier cream  - Collaborate with interdisciplinary team (i e  Nutrition, Rehabilitation, etc )   - Patient/family teaching  Outcome: Progressing

## 2019-03-02 NOTE — ASSESSMENT & PLAN NOTE
Status post pericardial window with drain likely secondary to renal failure/uremia    Continue drain until output less than 30 mL per CTS

## 2019-03-02 NOTE — PROGRESS NOTES
Progress Note - Cardiology   Ken Ward  80 y o  male MRN: [id number]  Unit/Bed#: E4 -01 Encounter: [id number]      Assessment/Recommendations/Discussion:   1  Moderate to large pericardial effusion, likely secondary to  renal disease  2  Paroxysmal atrial fibrillation, sinus rhythm  3  Dyslipidemia  4  Diabetes  5  Acute kidney injury  6  Benign essential hypertension  7   History of DVT    · Discontinue pericardial drain when drainage slows down, per thoracic surgery  · Blood pressure stable  · Maintaining sinus rhythm on telemetry  · Tolerating apixaban anticoagulation  · Repeat limited echocardiogram prior to discharge, likely repeat 1 month after           Subjective:  Patient seen and examined, feels well, no complaints      Physical Exam:  GEN:  NAD  HEENT:  MMM, NCAT, pink conjunctiva, EOMI, nonicteric sclera  CV:  NO JVD/HJR, RR, NO M/R/G, +S1/S2, NO PARASTERNAL HEAVE/THRILL, NO LE EDEMA, NO HEPATIC SYSTOLIC PULSATION, WARM EXTREMITIES  RESP:  CTAB/L  ABD:  SOFT, NT, NO GROSS ORGANOMEGALY        Vitals:   /60 (BP Location: Left arm)   Pulse 81   Temp 98 8 °F (37 1 °C) (Tympanic)   Resp 18   Ht 5' 4" (1 626 m)   Wt 64 4 kg (141 lb 15 6 oz)   SpO2 98%   BMI 24 37 kg/m²   Vitals:    03/01/19 0600 03/02/19 0600   Weight: 64 2 kg (141 lb 8 6 oz) 64 4 kg (141 lb 15 6 oz)       Intake/Output Summary (Last 24 hours) at 3/2/2019 1312  Last data filed at 3/2/2019 0901  Gross per 24 hour   Intake 620 ml   Output 1645 ml   Net -1025 ml       TELEMETRY:  Sinus rhythm  Lab Results:  Results from last 7 days   Lab Units 03/02/19  0613   WBC Thousand/uL 4 74   HEMOGLOBIN g/dL 8 4*   HEMATOCRIT % 25 4*   PLATELETS Thousands/uL 141*     Results from last 7 days   Lab Units 03/02/19  0613   POTASSIUM mmol/L 3 6   CHLORIDE mmol/L 102   CO2 mmol/L 27   BUN mg/dL 28*   CREATININE mg/dL 3 90*   CALCIUM mg/dL 7 9*   ALK PHOS U/L 45*   ALT U/L 11*   AST U/L 21     Results from last 7 days   Lab Units 03/02/19  0613   POTASSIUM mmol/L 3 6   CHLORIDE mmol/L 102   CO2 mmol/L 27   BUN mg/dL 28*   CREATININE mg/dL 3 90*   CALCIUM mg/dL 7 9*           Medications:    Current Facility-Administered Medications:     acetaminophen (TYLENOL) tablet 650 mg, 650 mg, Oral, Q6H PRN, Mita Harada Bilofjagruti, CRNP    ALPRAZolam (XANAX) tablet 0 25 mg, 0 25 mg, Oral, Q8H PRN, Mita Harada Bilofsky, CRNP    apixaban (ELIQUIS) tablet 2 5 mg, 2 5 mg, Oral, BID, Mita Harada Bilofsky, CRNP, 2 5 mg at 03/02/19 0919    ascorbic acid (VITAMIN C) tablet 250 mg, 250 mg, Oral, Daily, Mita Harada Bilofjagruti, CRNP, 250 mg at 03/02/19 0919    atorvastatin (LIPITOR) tablet 10 mg, 10 mg, Oral, Daily With Dinner, Analy K Roxana CRNP, 10 mg at 03/01/19 1705    cholecalciferol (VITAMIN D3) tablet 2,000 Units, 2,000 Units, Oral, Daily, Mita Harada Bilofsky, CRNP, 2,000 Units at 03/02/19 0920    docusate sodium (COLACE) capsule 100 mg, 100 mg, Oral, BID, Mita Harada Bilofsky, CRNP, 100 mg at 03/02/19 0920    finasteride (PROSCAR) tablet 5 mg, 5 mg, Oral, Daily, Analy K Bilofjagruti, CRNP, 5 mg at 03/02/19 0920    fluticasone-vilanterol (BREO ELLIPTA) 100-25 mcg/inh inhaler 1 puff, 1 puff, Inhalation, Daily, Mitatte Harada Bilofsky, CRNP, 1 puff at 03/02/19 0921    HYDROcodone-acetaminophen (NORCO) 5-325 mg per tablet 1 tablet, 1 tablet, Oral, Q6H PRN, Mita Harada Bilofsky, CRNP, 1 tablet at 02/26/19 1933    insulin lispro (HumaLOG) 100 units/mL subcutaneous injection 1-6 Units, 1-6 Units, Subcutaneous, TID AC, 5 Units at 03/02/19 1246 **AND** Fingerstick Glucose (POCT), , , TID AC, SUJATA Brantley    metoprolol (LOPRESSOR) injection 2 5 mg, 2 5 mg, Intravenous, Q6H PRN, SUJATA Barntley    pantoprazole (PROTONIX) EC tablet 40 mg, 40 mg, Oral, Early Morning, SUJATA Brantley, 40 mg at 03/02/19 0534    PARoxetine (PAXIL) tablet 30 mg, 30 mg, Oral, Daily, Mita Harada SUJATA Schmidt, 30 mg at 03/02/19 0918    senna (SENOKOT) tablet 8 6 mg, 1 tablet, Oral, HS, Analy Schmidt, SUJATA, 8 6 mg at 03/01/19 5748    This note was completed in part utilizing [employer] Software  Grammatical errors, random word insertions, spelling mistakes, and incomplete sentences may be an occasional consequence of this system secondary to software limitations, ambient noise, and hardware issues  If you have any questions or concerns about the content, text, or information contained within the body of this dictation, please contact the provider for clarification

## 2019-03-03 PROBLEM — N17.0 ACUTE RENAL FAILURE WITH TUBULAR NECROSIS (HCC): Status: ACTIVE | Noted: 2019-03-03

## 2019-03-03 LAB
ANION GAP SERPL CALCULATED.3IONS-SCNC: 8 MMOL/L (ref 4–13)
BUN SERPL-MCNC: 35 MG/DL (ref 5–25)
CALCIUM SERPL-MCNC: 8 MG/DL (ref 8.3–10.1)
CHLORIDE SERPL-SCNC: 105 MMOL/L (ref 100–108)
CO2 SERPL-SCNC: 28 MMOL/L (ref 21–32)
CREAT SERPL-MCNC: 5 MG/DL (ref 0.6–1.3)
ERYTHROCYTE [DISTWIDTH] IN BLOOD BY AUTOMATED COUNT: 11.9 % (ref 11.6–15.1)
GFR SERPL CREATININE-BSD FRML MDRD: 10 ML/MIN/1.73SQ M
GLUCOSE SERPL-MCNC: 103 MG/DL (ref 65–140)
GLUCOSE SERPL-MCNC: 106 MG/DL (ref 65–140)
GLUCOSE SERPL-MCNC: 139 MG/DL (ref 65–140)
GLUCOSE SERPL-MCNC: 202 MG/DL (ref 65–140)
GLUCOSE SERPL-MCNC: 96 MG/DL (ref 65–140)
HCT VFR BLD AUTO: 24.7 % (ref 36.5–49.3)
HGB BLD-MCNC: 8.2 G/DL (ref 12–17)
MCH RBC QN AUTO: 31.8 PG (ref 26.8–34.3)
MCHC RBC AUTO-ENTMCNC: 33.2 G/DL (ref 31.4–37.4)
MCV RBC AUTO: 96 FL (ref 82–98)
PLATELET # BLD AUTO: 156 THOUSANDS/UL (ref 149–390)
PMV BLD AUTO: 10 FL (ref 8.9–12.7)
POTASSIUM SERPL-SCNC: 3.9 MMOL/L (ref 3.5–5.3)
RBC # BLD AUTO: 2.58 MILLION/UL (ref 3.88–5.62)
SODIUM SERPL-SCNC: 141 MMOL/L (ref 136–145)
WBC # BLD AUTO: 4.78 THOUSAND/UL (ref 4.31–10.16)

## 2019-03-03 PROCEDURE — 99232 SBSQ HOSP IP/OBS MODERATE 35: CPT | Performed by: INTERNAL MEDICINE

## 2019-03-03 PROCEDURE — 80048 BASIC METABOLIC PNL TOTAL CA: CPT | Performed by: INTERNAL MEDICINE

## 2019-03-03 PROCEDURE — 82948 REAGENT STRIP/BLOOD GLUCOSE: CPT

## 2019-03-03 PROCEDURE — 99024 POSTOP FOLLOW-UP VISIT: CPT | Performed by: THORACIC SURGERY (CARDIOTHORACIC VASCULAR SURGERY)

## 2019-03-03 PROCEDURE — 85027 COMPLETE CBC AUTOMATED: CPT | Performed by: INTERNAL MEDICINE

## 2019-03-03 RX ORDER — PAROXETINE 30 MG/1
TABLET, FILM COATED ORAL
Qty: 90 TABLET | Refills: 0 | Status: SHIPPED | OUTPATIENT
Start: 2019-03-03 | End: 2019-05-31 | Stop reason: SDUPTHER

## 2019-03-03 RX ADMIN — VITAMIN D, TAB 1000IU (100/BT) 2000 UNITS: 25 TAB at 08:07

## 2019-03-03 RX ADMIN — APIXABAN 2.5 MG: 2.5 TABLET, FILM COATED ORAL at 17:17

## 2019-03-03 RX ADMIN — APIXABAN 2.5 MG: 2.5 TABLET, FILM COATED ORAL at 08:08

## 2019-03-03 RX ADMIN — DOCUSATE SODIUM 100 MG: 100 CAPSULE, LIQUID FILLED ORAL at 08:08

## 2019-03-03 RX ADMIN — FINASTERIDE 5 MG: 5 TABLET, FILM COATED ORAL at 08:07

## 2019-03-03 RX ADMIN — FLUTICASONE FUROATE AND VILANTEROL TRIFENATATE 1 PUFF: 100; 25 POWDER RESPIRATORY (INHALATION) at 08:19

## 2019-03-03 RX ADMIN — INSULIN LISPRO 2 UNITS: 100 INJECTION, SOLUTION INTRAVENOUS; SUBCUTANEOUS at 17:17

## 2019-03-03 RX ADMIN — ATORVASTATIN CALCIUM 10 MG: 20 TABLET, FILM COATED ORAL at 17:17

## 2019-03-03 RX ADMIN — OXYCODONE HYDROCHLORIDE AND ACETAMINOPHEN 250 MG: 500 TABLET ORAL at 08:08

## 2019-03-03 RX ADMIN — DOCUSATE SODIUM 100 MG: 100 CAPSULE, LIQUID FILLED ORAL at 17:17

## 2019-03-03 RX ADMIN — SENNOSIDES 8.6 MG: 8.6 TABLET, FILM COATED ORAL at 22:00

## 2019-03-03 RX ADMIN — PAROXETINE 30 MG: 20 TABLET, FILM COATED ORAL at 08:08

## 2019-03-03 RX ADMIN — PANTOPRAZOLE SODIUM 40 MG: 40 TABLET, DELAYED RELEASE ORAL at 06:34

## 2019-03-03 NOTE — PLAN OF CARE
Problem: Potential for Falls  Goal: Patient will remain free of falls  Description  INTERVENTIONS:  - Assess patient frequently for physical needs  -  Identify cognitive and physical deficits and behaviors that affect risk of falls  -  [place] fall precautions as indicated by assessment   - Educate patient/family on patient safety including physical limitations  - Instruct patient to call for assistance with activity based on assessment  - Modify environment to reduce risk of injury  - Consider OT/PT consult to assist with strengthening/mobility  Outcome: Progressing     Problem: Prexisting or High Potential for Compromised Skin Integrity  Goal: Skin integrity is maintained or improved  Description  INTERVENTIONS:  - Identify patients at risk for skin breakdown  - Assess and monitor skin integrity  - Assess and monitor nutrition and hydration status  - Monitor labs (i e  albumin)  - Assess for incontinence   - Turn and reposition patient  - Assist with mobility/ambulation  - Relieve pressure over bony prominences  - Avoid friction and shearing  - Provide appropriate hygiene as needed including keeping skin clean and dry  - Evaluate need for skin moisturizer/barrier cream  - Collaborate with interdisciplinary team (i e  Nutrition, Rehabilitation, etc )   - Patient/family teaching  Outcome: Progressing     Problem: Nutrition/Hydration-ADULT  Goal: Nutrient/Hydration intake appropriate for improving, restoring or maintaining nutritional needs  Description  Monitor and assess patient's nutrition/hydration status for malnutrition (ex- brittle hair, bruises, dry skin, pale skin and conjunctiva, muscle wasting, smooth red tongue, and disorientation)  Collaborate with interdisciplinary team and initiate plan and interventions as ordered  Monitor patient's weight and dietary intake as ordered or per policy  Utilize nutrition screening tool and intervene per policy   Determine patient's food preferences and provide high-protein, high-caloric foods as appropriate  INTERVENTIONS:  - Monitor oral intake, urinary output, labs, and treatment plans  - Assess nutrition and hydration status and recommend course of action  - Evaluate amount of meals eaten  - Assist patient with eating if necessary   - Allow adequate time for meals  - Recommend/ encourage appropriate diets, oral nutritional supplements, and vitamin/mineral supplements  - Order, calculate, and assess calorie counts as needed  - Recommend, monitor, and adjust tube feedings and TPN/PPN based on assessed needs  - Assess need for intravenous fluids  - Provide specific nutrition/hydration education as appropriate  - Include patient/family/caregiver in decisions related to nutrition  Outcome: Progressing     Problem: CARDIOVASCULAR - ADULT  Goal: Maintains optimal cardiac output and hemodynamic stability  Description  INTERVENTIONS:  - Monitor I/O, vital signs and rhythm  - Monitor for S/S and trends of decreased cardiac output i e  bleeding, hypotension  - Administer and titrate ordered vasoactive medications to optimize hemodynamic stability  - Assess quality of pulses, skin color and temperature  - Assess for signs of decreased coronary artery perfusion - ex   Angina  - Instruct patient to report change in severity of symptoms  Outcome: Progressing  Goal: Absence of cardiac dysrhythmias or at baseline rhythm  Description  INTERVENTIONS:  - Continuous cardiac monitoring, monitor vital signs, obtain 12 lead EKG if indicated  - Administer antiarrhythmic and heart rate control medications as ordered  - Monitor electrolytes and administer replacement therapy as ordered  Outcome: Progressing     Problem: METABOLIC, FLUID AND ELECTROLYTES - ADULT  Goal: Electrolytes maintained within normal limits  Description  INTERVENTIONS:  - Monitor labs and assess patient for signs and symptoms of electrolyte imbalances  - Administer electrolyte replacement as ordered  - Monitor response to electrolyte replacements, including repeat lab results as appropriate  - Instruct patient on fluid and nutrition as appropriate  Outcome: Progressing  Goal: Fluid balance maintained  Description  INTERVENTIONS:  - Monitor labs and assess for signs and symptoms of volume excess or deficit  - Monitor I/O and WT  - Instruct patient on fluid and nutrition as appropriate  Outcome: Progressing  Goal: Glucose maintained within target range  Description  INTERVENTIONS:  - Monitor Blood Glucose as ordered  - Assess for signs and symptoms of hyperglycemia and hypoglycemia  - Administer ordered medications to maintain glucose within target range  - Assess nutritional intake and initiate nutrition service referral as needed  Outcome: Progressing     Problem: SKIN/TISSUE INTEGRITY - ADULT  Goal: Skin integrity remains intact  Description  INTERVENTIONS  - Identify patients at risk for skin breakdown  - Assess and monitor skin integrity  - Assess and monitor nutrition and hydration status  - Monitor labs (i e  albumin)  - Assess for incontinence   - Turn and reposition patient  - Assist with mobility/ambulation  - Relieve pressure over bony prominences  - Avoid friction and shearing  - Provide appropriate hygiene as needed including keeping skin clean and dry  - Evaluate need for skin moisturizer/barrier cream  - Collaborate with interdisciplinary team (i e  Nutrition, Rehabilitation, etc )   - Patient/family teaching  Outcome: Progressing

## 2019-03-03 NOTE — ASSESSMENT & PLAN NOTE
Lab Results   Component Value Date    HGBA1C 7 4 (H) 02/23/2019       Recent Labs     03/02/19  2038 03/03/19  0730 03/03/19  1121 03/03/19  1647   POCGLU 137 106 139 202*     Diabetes mellitus glucose stable on sliding scale lispro

## 2019-03-03 NOTE — PROGRESS NOTES
Progress Note - Dedra Baumgarten  1935, 80 y o  male MRN: [id number]    Unit/Bed#: E4 -01 Encounter: [id number]    Primary Care Provider: Aaron Bueno DO   Date and time admitted to hospital: 2/23/2019  8:19 AM        * Acute kidney injury [facility])  Assessment & Plan  ERIK/ATN in the setting of prolonged prerenal azotemia from poor intake  Was started on CVVH in ICU and status post treatment of HD via temporary catheter  Still making urine however having worsening chemistry on labs suspicious for need of further dialysis  May need permcath     COPD (chronic obstructive pulmonary disease) ([facility] [place] [address] )  Assessment & Plan  COPD without exacerbation  Continue breo    Paroxysmal atrial fibrillation [facility])  Assessment & Plan  Eliquis has been restarted at renal dosing    Pericardial effusion  Assessment & Plan  Status post pericardial window with drain likely secondary to renal failure/uremia  Drain removed today by surgeon    Type 2 diabetes mellitus [facility])  Assessment & Plan  Lab Results   Component Value Date    HGBA1C 7 4 (H) 02/23/2019       Recent Labs     03/02/19  2038 03/03/19  0730 03/03/19  1121 03/03/19  1647   POCGLU 137 106 139 202*     Diabetes mellitus glucose stable on sliding scale lispro    Essential hypertension  Assessment & Plan  Essential hypertension stable  Given renal failure and hypotension amlodipine and losartan has been held  Benign prostatic hyperplasia with urinary frequency  Assessment & Plan  Urinary retention with catheter in place        VTE Pharmacologic Prophylaxis: Apixaban (Eliquis)    Patient Centered Rounds: I have performed bedside rounds with nursing staff today  Discussions with Specialists or Other Care Team Provider:  Nephrology  Education and Discussions with Family / Patient:     Time Spent for Care: 30 mins  More than 50% of total time spent on counseling and coordination of care as described above      Current Length of Stay: 8 day(s)  Current Patient Status: Inpatient     Certification Statement: The patient will continue to require additional inpatient hospital stay due to Acute kidney injury [facility])  Discharge Plan / Estimated Discharge Date:     Code Status: Level 1 - Full Code  ______________________________________________________________________________    Subjective:   Patient seen and examined  Seen by CTS this morning and drain from pericardial window removed  No chest pain or shortness of breath    Objective:   Vitals: Blood pressure 135/67, pulse 79, temperature 97 7 °F (36 5 °C), temperature source Tympanic, resp  rate 18, height 5' 4" (1 626 m), weight 64 5 kg (142 lb 3 2 oz), SpO2 91 %      Physical Exam:   General appearance: alert, appears stated age and cooperative  Head: atraumatic  Lungs: diminished breath sounds  Heart: regular rate and rhythm; incision intact  Abdomen: soft, non-tender, positive bowel sounds   Back: negative, range of motion normal  Extremities: edema +1 lower extremities bilaterally  Neurologic: Grossly normal    Additional Data:   Labs:  Results from last 7 days   Lab Units 03/03/19  0605 03/02/19  5752 03/01/19  9373 02/28/19  0626 02/27/19  0429 02/26/19  1052 02/26/19  0535 02/26/19  0011 02/25/19  1833 02/25/19  0454   WBC Thousand/uL 4 78 4 74 5 08 5 02 4 61  --  3 10*  --   --  3 52*   HEMOGLOBIN g/dL 8 2* 8 4* 8 7* 9 1* 9 3* 7 1* 6 8* 6 4* 7 5* 7 0*   HEMATOCRIT % 24 7* 25 4* 26 4* 27 0* 28 3*  --  20 8*  --   --  21 4*   MCV fL 96 96 96 95 98  --  99*  --   --  98   PLATELETS Thousands/uL 156 141* 135* 141* 119*  --  96*  --   --  107*   INR   --   --   --   --   --   --  1 22*  --   --   --      Results from last 7 days   Lab Units 03/03/19  0605 03/02/19  6315 03/01/19  1924 02/28/19  0626 02/27/19  0502 02/26/19  0535 02/25/19  0454   SODIUM mmol/L 141 139 139 141 142 143 141   POTASSIUM mmol/L 3 9 3 6 4 0 3 8 4 0 3 5 3 6   CHLORIDE mmol/L 105 102 104 106 110* 108 105   CO2 mmol/L 28 27 25 25 21 23 24   ANION GAP mmol/L 8 10 10 10 11 12 12   BUN mg/dL 35* 28* 44* 31* 62* 64* 65*   CREATININE mg/dL 5 00* 3 90* 5 87* 4 65* 7 71* 7 26* 6 38*   CALCIUM mg/dL 8 0* 7 9* 7 9* 8 1* 7 7* 7 7* 7 9*   ALBUMIN g/dL  --  2 1*  --   --   --   --   --    TOTAL BILIRUBIN mg/dL  --  0 43  --   --   --   --   --    ALK PHOS U/L  --  45*  --   --   --   --   --    ALT U/L  --  11*  --   --   --   --   --    AST U/L  --  21  --   --   --   --   --    EGFR ml/min/1 73sq m 10 13 8 11 6 6 7   GLUCOSE RANDOM mg/dL 103 105 104 59* 61* 90 102     Results from last 7 days   Lab Units 02/26/19  0535 02/25/19  0454 02/24/19  1948   MAGNESIUM mg/dL 1 9 2 0  --    PHOSPHORUS mg/dL 4 4* 4 2* 4 0                  Results from last 7 days   Lab Units 03/03/19  1647 03/03/19  1121 03/03/19  0730 03/02/19  2038 03/02/19  1558 03/02/19  1236 03/01/19  2112 03/01/19  1538 03/01/19  1140 03/01/19  0746 02/28/19  2039 02/28/19  1622   POC GLUCOSE mg/dl 202* 139 106 137 109 329* 114 114 180* 115 121 98             * I Have Reviewed All Lab Data Listed Above  Cultures:   Results from last 7 days   Lab Units 02/26/19  1219   GRAM STAIN RESULT  No Polys or Bacteria seen   BODY FLUID CULTURE, STERILE  No growth         Imaging:  Imaging Reports Reviewed Today Include:   No results found    Scheduled Meds:  Current Facility-Administered Medications:  acetaminophen 650 mg Oral Q6H PRN SUJATA Johnston   ALPRAZolam 0 25 mg Oral Q8H PRN SUJATA Johnston   apixaban 2 5 mg Oral BID SUJATA Brantley   ascorbic acid 250 mg Oral Daily SUJATA Brantley   atorvastatin 10 mg Oral Daily With Dinner SUJATA White   cholecalciferol 2,000 Units Oral Daily SUJATA Brantley   docusate sodium 100 mg Oral BID SUJATA Brantley   finasteride 5 mg Oral Daily SUJATA Brantley   fluticasone-vilanterol 1 puff Inhalation Daily SUJATA Johnston   HYDROcodone-acetaminophen 1 tablet Oral Q6H PRN SUJATA Brantley   insulin lispro 1-6 Units Subcutaneous TID AC SUJATA Brantley   metoprolol 2 5 mg Intravenous Q6H PRN SUJATA Carson   pantoprazole 40 mg Oral Early Morning SUJATA Brantley   PARoxetine 30 mg Oral Daily SUJATA Carson   senna 1 tablet Oral HS SUJATA Carson DO [address] Internal Medicine  Hospitalist    ** Please Note: This note has been constructed using a voice recognition system   **

## 2019-03-03 NOTE — PLAN OF CARE
Problem: Potential for Falls  Goal: Patient will remain free of falls  Description  INTERVENTIONS:  - Assess patient frequently for physical needs  -  Identify cognitive and physical deficits and behaviors that affect risk of falls  -  [place] fall precautions as indicated by assessment   - Educate patient/family on patient safety including physical limitations  - Instruct patient to call for assistance with activity based on assessment  - Modify environment to reduce risk of injury  - Consider OT/PT consult to assist with strengthening/mobility  Outcome: Progressing     Problem: Prexisting or High Potential for Compromised Skin Integrity  Goal: Skin integrity is maintained or improved  Description  INTERVENTIONS:  - Identify patients at risk for skin breakdown  - Assess and monitor skin integrity  - Assess and monitor nutrition and hydration status  - Monitor labs (i e  albumin)  - Assess for incontinence   - Turn and reposition patient  - Assist with mobility/ambulation  - Relieve pressure over bony prominences  - Avoid friction and shearing  - Provide appropriate hygiene as needed including keeping skin clean and dry  - Evaluate need for skin moisturizer/barrier cream  - Collaborate with interdisciplinary team (i e  Nutrition, Rehabilitation, etc )   - Patient/family teaching  Outcome: Progressing     Problem: Nutrition/Hydration-ADULT  Goal: Nutrient/Hydration intake appropriate for improving, restoring or maintaining nutritional needs  Description  Monitor and assess patient's nutrition/hydration status for malnutrition (ex- brittle hair, bruises, dry skin, pale skin and conjunctiva, muscle wasting, smooth red tongue, and disorientation)  Collaborate with interdisciplinary team and initiate plan and interventions as ordered  Monitor patient's weight and dietary intake as ordered or per policy  Utilize nutrition screening tool and intervene per policy   Determine patient's food preferences and provide high-protein, high-caloric foods as appropriate  INTERVENTIONS:  - Monitor oral intake, urinary output, labs, and treatment plans  - Assess nutrition and hydration status and recommend course of action  - Evaluate amount of meals eaten  - Assist patient with eating if necessary   - Allow adequate time for meals  - Recommend/ encourage appropriate diets, oral nutritional supplements, and vitamin/mineral supplements  - Order, calculate, and assess calorie counts as needed  - Recommend, monitor, and adjust tube feedings and TPN/PPN based on assessed needs  - Assess need for intravenous fluids  - Provide specific nutrition/hydration education as appropriate  - Include patient/family/caregiver in decisions related to nutrition  Outcome: Progressing     Problem: CARDIOVASCULAR - ADULT  Goal: Maintains optimal cardiac output and hemodynamic stability  Description  INTERVENTIONS:  - Monitor I/O, vital signs and rhythm  - Monitor for S/S and trends of decreased cardiac output i e  bleeding, hypotension  - Administer and titrate ordered vasoactive medications to optimize hemodynamic stability  - Assess quality of pulses, skin color and temperature  - Assess for signs of decreased coronary artery perfusion - ex   Angina  - Instruct patient to report change in severity of symptoms  Outcome: Progressing  Goal: Absence of cardiac dysrhythmias or at baseline rhythm  Description  INTERVENTIONS:  - Continuous cardiac monitoring, monitor vital signs, obtain 12 lead EKG if indicated  - Administer antiarrhythmic and heart rate control medications as ordered  - Monitor electrolytes and administer replacement therapy as ordered  Outcome: Progressing     Problem: METABOLIC, FLUID AND ELECTROLYTES - ADULT  Goal: Electrolytes maintained within normal limits  Description  INTERVENTIONS:  - Monitor labs and assess patient for signs and symptoms of electrolyte imbalances  - Administer electrolyte replacement as ordered  - Monitor response to electrolyte replacements, including repeat lab results as appropriate  - Instruct patient on fluid and nutrition as appropriate  Outcome: Progressing  Goal: Fluid balance maintained  Description  INTERVENTIONS:  - Monitor labs and assess for signs and symptoms of volume excess or deficit  - Monitor I/O and WT  - Instruct patient on fluid and nutrition as appropriate  Outcome: Progressing  Goal: Glucose maintained within target range  Description  INTERVENTIONS:  - Monitor Blood Glucose as ordered  - Assess for signs and symptoms of hyperglycemia and hypoglycemia  - Administer ordered medications to maintain glucose within target range  - Assess nutritional intake and initiate nutrition service referral as needed  Outcome: Progressing     Problem: SKIN/TISSUE INTEGRITY - ADULT  Goal: Skin integrity remains intact  Description  INTERVENTIONS  - Identify patients at risk for skin breakdown  - Assess and monitor skin integrity  - Assess and monitor nutrition and hydration status  - Monitor labs (i e  albumin)  - Assess for incontinence   - Turn and reposition patient  - Assist with mobility/ambulation  - Relieve pressure over bony prominences  - Avoid friction and shearing  - Provide appropriate hygiene as needed including keeping skin clean and dry  - Evaluate need for skin moisturizer/barrier cream  - Collaborate with interdisciplinary team (i e  Nutrition, Rehabilitation, etc )   - Patient/family teaching  Outcome: Progressing

## 2019-03-03 NOTE — ASSESSMENT & PLAN NOTE
Status post pericardial window with drain likely secondary to renal failure/uremia    Drain removed today by surgeon

## 2019-03-03 NOTE — PROGRESS NOTES
NEPHROLOGY PROGRESS NOTE   Haider Raya  80 y o  male MRN: [id number]  Unit/Bed#: E4 -01 Encounter: [id number]  Reason for Consult: ERIK    ASSESSMENT/PLAN:  1  Acute kidney injury:  Likely secondary to prolonged prerenal azotemia with that progressed to ATN  -required CVVHD which completed on 02/23/2019  -currently on IHD status post treatment Friday without issues  -monitoring for renal recovery however creatinine has increased to 5 0 this morning  -electrolytes and acid-base balance stable  -of note CT scan did not reveal hydronephrosis  -Lasix and losartan remains on hold  -serologies:  ANCA, CHACHO, SPEP, UPEP-negative, immuno fixation does not reveal a monoclonal gammopathy hepatitis panel nonreactive, hemolyzed smear does not reveal schistocyte or a helmet cells  -continue trend I/O, lab values in volume status  -most likely patient will require PermCath placement for ongoing dialysis needs  -I&O not well documented     2  Access:  Temporary dialysis catheter right side neck  -may require PermCath placement if no signs of renal recovery  -will continue to trend over weekend    3  Chronic kidney disease III:  Likely secondary to hypertension and diabetes  -baseline creatinine 1 3-1 5  -depending on renal recovery will need outpatient follow-up     4  Anemia likely of Chronic Kidney Disease:  Iron panels okay  -hemoglobin fairly stable 8 2  -will continue to trend     5  Urinary retention:  History of BPH  -Singh catheter in place  -continue to follow per primary team     6  Large pericardial effusion:  Status post pericardial window with PATTI drain  -heart cardiology continues to follow  -CT surgery following-per Dr Solomon Tom note from today, " pathology is 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"    7  Hypertension:  BP acceptable  -will continue trend with dialysis     8  Other history:  COPD, paroxysmal atrial fibrillation         SUBJECTIVE:  Patient seen and examined    Denies chest pain or shortness of Breath  Feeling better overall  Reports starting to eat and tolerate diet    OBJECTIVE:  Current Weight: Weight - Scale: 64 5 kg (142 lb 3 2 oz)  Vitals:    03/02/19 1900 03/02/19 2352 03/03/19 0600 03/03/19 0732   BP: 144/66 142/64  138/62   BP Location: Left arm Left arm  Left arm   Pulse: 78 76  84   Resp: 20 20  15   Temp: 97 5 °F (36 4 °C) 98 7 °F (37 1 °C)  98 5 °F (36 9 °C)   TempSrc: Tympanic Tympanic  Tympanic   SpO2: 93% 92%     Weight:   64 5 kg (142 lb 3 2 oz)    Height:           Intake/Output Summary (Last 24 hours) at 3/3/2019 1101  Last data filed at 3/3/2019 0801  Gross per 24 hour   Intake 480 ml   Output 1320 ml   Net -840 ml     General:  No acute distress, cooperative, lying in bed  Skin:  Warm and dry without rash noted  HEENT:  Mucous membranes moist, sclera anicteric  Neck:  No JVD, supple  Right temporary cath site intact  Chest:  Clear to auscultation, decreased in the bases    Patient without crackles, rhonchi, wheezes  Heart:  Regular rate and rhythm without rub noted  Abdomen:  Soft, nontender, no distension, active bowel sounds  :  Singh catheter in place and patent for clear yellow urine  Extremities:  No edema noted bilaterally  Neuro:  Alert, awake, and oriented  Psych:  Appropriate affect    Medications:    Current Facility-Administered Medications:     acetaminophen (TYLENOL) tablet 650 mg, 650 mg, Oral, Q6H PRN, SUJATA Pyle    ALPRAZolam (XANAX) tablet 0 25 mg, 0 25 mg, Oral, Q8H PRN, SUJATA Pyle    apixaban (ELIQUIS) tablet 2 5 mg, 2 5 mg, Oral, BID, SUJATA Pyle, 2 5 mg at 03/03/19 0808    ascorbic acid (VITAMIN C) tablet 250 mg, 250 mg, Oral, Daily, SUJATA Pyle, 250 mg at 03/03/19 0808    atorvastatin (LIPITOR) tablet 10 mg, 10 mg, Oral, Daily With Dinner, SUJATA Chávez, 10 mg at 03/02/19 9628    cholecalciferol (VITAMIN D3) tablet 2,000 Units, 2,000 Units, Oral, Daily, SUJATA Brantley, 2,000 Units at 03/03/19 0807    docusate sodium (COLACE) capsule 100 mg, 100 mg, Oral, BID, [facility], DULCENP, 100 mg at 03/03/19 0808    finasteride (PROSCAR) tablet 5 mg, 5 mg, Oral, Daily, [facility], CRNP, 5 mg at 03/03/19 0807    fluticasone-vilanterol (BREO ELLIPTA) 100-25 mcg/inh inhaler 1 puff, 1 puff, Inhalation, Daily, [facility], CRNP, 1 puff at 03/03/19 0819    HYDROcodone-acetaminophen (NORCO) 5-325 mg per tablet 1 tablet, 1 tablet, Oral, Q6H PRN, [facility] Bilofjagruti CRNP, 1 tablet at 02/26/19 1933    insulin lispro (HumaLOG) 100 units/mL subcutaneous injection 1-6 Units, 1-6 Units, Subcutaneous, TID AC, 5 Units at 03/02/19 1246 **AND** Fingerstick Glucose (POCT), , , TID AC, Analy K SUJATA Schmidt    metoprolol (LOPRESSOR) injection 2 5 mg, 2 5 mg, Intravenous, Q6H PRN, SUJATA Dwyer    pantoprazole (PROTONIX) EC tablet 40 mg, 40 mg, Oral, Early Morning, [facility]of[facility], CRNP, 40 mg at 03/03/19 0634    PARoxetine (PAXIL) tablet 30 mg, 30 mg, Oral, Daily, [facility], CRNP, 30 mg at 03/03/19 0808    senna (SENOKOT) tablet 8 6 mg, 1 tablet, Oral, HS, [facility], CRNP, 8 6 mg at 03/01/19 2145    Laboratory Results:  Results from last 7 days   Lab Units 03/03/19  0605 03/02/19  0613 03/01/19  0611 02/28/19  0626 02/27/19  0502 02/27/19  0429 02/26/19  1052 02/26/19  0535  02/25/19  0454 02/24/19  1948 02/24/19  1545   WBC Thousand/uL 4 78 4 74 5 08 5 02  --  4 61  --  3 10*  --  3 52*  --   --    HEMOGLOBIN g/dL 8 2* 8 4* 8 7* 9 1*  --  9 3* 7 1* 6 8*   < > 7 0*  --   --    HEMATOCRIT % 24 7* 25 4* 26 4* 27 0*  --  28 3*  --  20 8*  --  21 4*  --   --    PLATELETS Thousands/uL 156 141* 135* 141*  --  119*  --  96*  --  107*  --   --    POTASSIUM mmol/L 3 9 3 6 4 0 3 8 4 0  --   --  3 5  --  3 6  --  4 3   CHLORIDE mmol/L 105 102 104 106 110*  --   --  108  --  105  --  104   CO2 mmol/L 28 27 25 25 21  --   --  23  --  24  --  28   BUN mg/dL 35* 28* 44* 31* 62*  --   --  64* --  65*  --  57*   CREATININE mg/dL 5 00* 3 90* 5 87* 4 65* 7 71*  --   --  7 26*  --  6 38*  --  5 61*   CALCIUM mg/dL 8 0* 7 9* 7 9* 8 1* 7 7*  --   --  7 7*  --  7 9*  --  8 4   MAGNESIUM mg/dL  --   --   --   --   --   --   --  1 9  --  2 0  --  1 8   PHOSPHORUS mg/dL  --   --   --   --   --   --   --  4 4*  --  4 2* 4 0 4 2*    < > = values in this interval not displayed

## 2019-03-03 NOTE — PROGRESS NOTES
No complaints  Afebrile, vital signs stable  Incisions clean dry and intact  Pericardial drain put out approximately 10 cc  Pericardial pathology is 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    Impression:  Pericardial effusion, benign  Status post subxiphoid pericardial    Recommendation:  Pericardial drain was removed today without difficulty  Thoracic surgery will sign off  Call with questions

## 2019-03-03 NOTE — ASSESSMENT & PLAN NOTE
ERIK/ATN in the setting of prolonged prerenal azotemia from poor intake  Was started on CVVH in ICU and status post treatment of HD via temporary catheter  Still making urine however having worsening chemistry on labs suspicious for need of further dialysis    May need permcath

## 2019-03-04 PROBLEM — R33.9 URINARY RETENTION: Status: ACTIVE | Noted: 2019-03-04

## 2019-03-04 LAB
ANION GAP SERPL CALCULATED.3IONS-SCNC: 8 MMOL/L (ref 4–13)
BUN SERPL-MCNC: 53 MG/DL (ref 5–25)
CALCIUM SERPL-MCNC: 8.1 MG/DL (ref 8.3–10.1)
CHLORIDE SERPL-SCNC: 99 MMOL/L (ref 100–108)
CO2 SERPL-SCNC: 28 MMOL/L (ref 21–32)
CREAT SERPL-MCNC: 6.25 MG/DL (ref 0.6–1.3)
GFR SERPL CREATININE-BSD FRML MDRD: 8 ML/MIN/1.73SQ M
GLUCOSE SERPL-MCNC: 110 MG/DL (ref 65–140)
GLUCOSE SERPL-MCNC: 131 MG/DL (ref 65–140)
GLUCOSE SERPL-MCNC: 164 MG/DL (ref 65–140)
GLUCOSE SERPL-MCNC: 221 MG/DL (ref 65–140)
GLUCOSE SERPL-MCNC: 247 MG/DL (ref 65–140)
POTASSIUM SERPL-SCNC: 3.8 MMOL/L (ref 3.5–5.3)
SODIUM SERPL-SCNC: 135 MMOL/L (ref 136–145)

## 2019-03-04 PROCEDURE — 80048 BASIC METABOLIC PNL TOTAL CA: CPT | Performed by: NURSE PRACTITIONER

## 2019-03-04 PROCEDURE — 97530 THERAPEUTIC ACTIVITIES: CPT

## 2019-03-04 PROCEDURE — 99232 SBSQ HOSP IP/OBS MODERATE 35: CPT | Performed by: INTERNAL MEDICINE

## 2019-03-04 PROCEDURE — 97110 THERAPEUTIC EXERCISES: CPT

## 2019-03-04 PROCEDURE — 82948 REAGENT STRIP/BLOOD GLUCOSE: CPT

## 2019-03-04 PROCEDURE — G8987 SELF CARE CURRENT STATUS: HCPCS

## 2019-03-04 PROCEDURE — 97116 GAIT TRAINING THERAPY: CPT

## 2019-03-04 PROCEDURE — 97166 OT EVAL MOD COMPLEX 45 MIN: CPT

## 2019-03-04 PROCEDURE — G8988 SELF CARE GOAL STATUS: HCPCS

## 2019-03-04 RX ADMIN — VITAMIN D, TAB 1000IU (100/BT) 2000 UNITS: 25 TAB at 09:19

## 2019-03-04 RX ADMIN — OXYCODONE HYDROCHLORIDE AND ACETAMINOPHEN 250 MG: 500 TABLET ORAL at 09:19

## 2019-03-04 RX ADMIN — PANTOPRAZOLE SODIUM 40 MG: 40 TABLET, DELAYED RELEASE ORAL at 05:21

## 2019-03-04 RX ADMIN — DOCUSATE SODIUM 100 MG: 100 CAPSULE, LIQUID FILLED ORAL at 17:37

## 2019-03-04 RX ADMIN — FLUTICASONE FUROATE AND VILANTEROL TRIFENATATE 1 PUFF: 100; 25 POWDER RESPIRATORY (INHALATION) at 09:20

## 2019-03-04 RX ADMIN — INSULIN LISPRO 3 UNITS: 100 INJECTION, SOLUTION INTRAVENOUS; SUBCUTANEOUS at 11:59

## 2019-03-04 RX ADMIN — FINASTERIDE 5 MG: 5 TABLET, FILM COATED ORAL at 09:20

## 2019-03-04 RX ADMIN — SENNOSIDES 8.6 MG: 8.6 TABLET, FILM COATED ORAL at 22:00

## 2019-03-04 RX ADMIN — APIXABAN 2.5 MG: 2.5 TABLET, FILM COATED ORAL at 09:20

## 2019-03-04 RX ADMIN — PAROXETINE 30 MG: 20 TABLET, FILM COATED ORAL at 09:20

## 2019-03-04 RX ADMIN — DOCUSATE SODIUM 100 MG: 100 CAPSULE, LIQUID FILLED ORAL at 09:19

## 2019-03-04 RX ADMIN — APIXABAN 2.5 MG: 2.5 TABLET, FILM COATED ORAL at 17:37

## 2019-03-04 RX ADMIN — ATORVASTATIN CALCIUM 10 MG: 20 TABLET, FILM COATED ORAL at 17:36

## 2019-03-04 NOTE — PLAN OF CARE
Problem: Potential for Falls  Goal: Patient will remain free of falls  Description  INTERVENTIONS:  - Assess patient frequently for physical needs  -  Identify cognitive and physical deficits and behaviors that affect risk of falls  -  [place] fall precautions as indicated by assessment   - Educate patient/family on patient safety including physical limitations  - Instruct patient to call for assistance with activity based on assessment  - Modify environment to reduce risk of injury  - Consider OT/PT consult to assist with strengthening/mobility  Outcome: Progressing     Problem: Prexisting or High Potential for Compromised Skin Integrity  Goal: Skin integrity is maintained or improved  Description  INTERVENTIONS:  - Identify patients at risk for skin breakdown  - Assess and monitor skin integrity  - Assess and monitor nutrition and hydration status  - Monitor labs (i e  albumin)  - Assess for incontinence   - Turn and reposition patient  - Assist with mobility/ambulation  - Relieve pressure over bony prominences  - Avoid friction and shearing  - Provide appropriate hygiene as needed including keeping skin clean and dry  - Evaluate need for skin moisturizer/barrier cream  - Collaborate with interdisciplinary team (i e  Nutrition, Rehabilitation, etc )   - Patient/family teaching  Outcome: Progressing     Problem: Nutrition/Hydration-ADULT  Goal: Nutrient/Hydration intake appropriate for improving, restoring or maintaining nutritional needs  Description  Monitor and assess patient's nutrition/hydration status for malnutrition (ex- brittle hair, bruises, dry skin, pale skin and conjunctiva, muscle wasting, smooth red tongue, and disorientation)  Collaborate with interdisciplinary team and initiate plan and interventions as ordered  Monitor patient's weight and dietary intake as ordered or per policy  Utilize nutrition screening tool and intervene per policy   Determine patient's food preferences and provide high-protein, high-caloric foods as appropriate  INTERVENTIONS:  - Monitor oral intake, urinary output, labs, and treatment plans  - Assess nutrition and hydration status and recommend course of action  - Evaluate amount of meals eaten  - Assist patient with eating if necessary   - Allow adequate time for meals  - Recommend/ encourage appropriate diets, oral nutritional supplements, and vitamin/mineral supplements  - Order, calculate, and assess calorie counts as needed  - Recommend, monitor, and adjust tube feedings and TPN/PPN based on assessed needs  - Assess need for intravenous fluids  - Provide specific nutrition/hydration education as appropriate  - Include patient/family/caregiver in decisions related to nutrition  Outcome: Progressing     Problem: CARDIOVASCULAR - ADULT  Goal: Maintains optimal cardiac output and hemodynamic stability  Description  INTERVENTIONS:  - Monitor I/O, vital signs and rhythm  - Monitor for S/S and trends of decreased cardiac output i e  bleeding, hypotension  - Administer and titrate ordered vasoactive medications to optimize hemodynamic stability  - Assess quality of pulses, skin color and temperature  - Assess for signs of decreased coronary artery perfusion - ex   Angina  - Instruct patient to report change in severity of symptoms  Outcome: Progressing  Goal: Absence of cardiac dysrhythmias or at baseline rhythm  Description  INTERVENTIONS:  - Continuous cardiac monitoring, monitor vital signs, obtain 12 lead EKG if indicated  - Administer antiarrhythmic and heart rate control medications as ordered  - Monitor electrolytes and administer replacement therapy as ordered  Outcome: Progressing     Problem: METABOLIC, FLUID AND ELECTROLYTES - ADULT  Goal: Electrolytes maintained within normal limits  Description  INTERVENTIONS:  - Monitor labs and assess patient for signs and symptoms of electrolyte imbalances  - Administer electrolyte replacement as ordered  - Monitor response to electrolyte replacements, including repeat lab results as appropriate  - Instruct patient on fluid and nutrition as appropriate  Outcome: Progressing  Goal: Fluid balance maintained  Description  INTERVENTIONS:  - Monitor labs and assess for signs and symptoms of volume excess or deficit  - Monitor I/O and WT  - Instruct patient on fluid and nutrition as appropriate  Outcome: Progressing  Goal: Glucose maintained within target range  Description  INTERVENTIONS:  - Monitor Blood Glucose as ordered  - Assess for signs and symptoms of hyperglycemia and hypoglycemia  - Administer ordered medications to maintain glucose within target range  - Assess nutritional intake and initiate nutrition service referral as needed  Outcome: Progressing     Problem: SKIN/TISSUE INTEGRITY - ADULT  Goal: Skin integrity remains intact  Description  INTERVENTIONS  - Identify patients at risk for skin breakdown  - Assess and monitor skin integrity  - Assess and monitor nutrition and hydration status  - Monitor labs (i e  albumin)  - Assess for incontinence   - Turn and reposition patient  - Assist with mobility/ambulation  - Relieve pressure over bony prominences  - Avoid friction and shearing  - Provide appropriate hygiene as needed including keeping skin clean and dry  - Evaluate need for skin moisturizer/barrier cream  - Collaborate with interdisciplinary team (i e  Nutrition, Rehabilitation, etc )   - Patient/family teaching  Outcome: Progressing

## 2019-03-04 NOTE — PROGRESS NOTES
Progress Note - Rudy Andino  80 y o  male MRN: [id number]    Unit/Bed#: E4 -01 Encounter: [id number]      Assessment/Plan:  1-acute kidney injury superimposed on chronic kidney disease stage 3:  Patient presented with acute kidney injury, after a one-week history of worsening progressive weakness, vomiting, and no PO intake     Was admitted to the intensive care unit and evaluated by the nephrology team   He had a temporary dialysis catheter placed  He underwent CVVH  He is currently undergoing intermittent hemodialysis  -patient has a history of chronic kidney disease stage 3  His baseline creatinine ranges 1 3-1 5   -patient was evaluated by the nephrology service  His acute kidney injury is felt to be secondary to ATN, as well as postobstructive uropathy    -patient's serologies with ANCA, CHACHO, SPEP, UPEP, acute hepatitis panel have been negative  No evidence of monoclonal gammopathy    -patient has good urine output, however his renal function is not improving  May require Perma-Cath placement and ongoing dialysis  2-urinary retention:  Patient had Singh catheter placed  Continued on Proscar  Was noted to have prostatomegaly on his CT scan    -removed catheter today  Check voiding trial    -if does not tolerate voiding trial require replacement of Singh catheter, to remain in at discharge, and Urology consultation  Patient follows with Dr Edith Cazares as an outpatient  3-pericardial effusion:  Patient presented with a large pericardial effusion  Was felt to be most likely secondary to renal failure and uremia  Status post subxiphoid pericardial window  Initially had a PATTI drain which has since been removed  -pathology revealed 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  4-essential hypertension:   Home regimen has been held, due to hypotension  Blood pressure has been adequately controlled with a systolic blood pressure ranged 100  40s today    Continue to monitor  5-anemia:  Secondary to anemia of chronic disease  However there were concerns that patient had acute blood loss anemia while in the ICU, requiring blood transfusion    -hemoglobin has since been stable ranging 8-9     6-COPD:  Without acute exacerbation  Continue Breo-ellipta    7-atrial fibrillation:  Patient has a history of paroxysmal atrial fibrillation    -continue anticoagulation with Eliquis:  However monitor renal function   -not requiring rate control agents  8-diabetes type 2:  Without long-term use of insulin, with chronic kidney disease stage 3  Patient is currently on sliding scale insulin alone  Home metformin on hold  9-hyperlipidemia:  Continue statin    10-history of DVT:  Continue anticoagulation with Eliquis    11-GI:  I reviewed patient's GI office note from 12/2018:  Patient has had an unintentional weight loss with upper GI symptoms, and a CT scan with thickening of the stomach concerning for malignancy  EGD was anticipated    -continue proton pump inhibitor   -prior nausea/vomiting has resolved  Patient tolerating p o     12-status post thrombocytopenia:  Patient had transient thrombocytopenia, most likely secondary to consumption with acute phase illness  This normalized  Continue to monitor closely  Not consistent with DIC/TTP    13-family:  Called wife and gave update      VTE Pharmacologic Prophylaxis: RX contraindicated due to: eliquis  VTE Mechanical Prophylaxis: sequential compression device    Certification Statement: The patient will continue to require additional inpatient hospital stay due to need for further acute intervention for ERIK    Status: inpatient     D/w pts nurse at bedside  D/w     ===================================================================    Subjective:  Patient notes he feels better  He denies any pain anywhere at all except at his incision site from the pericardial window  He denies any pleuritic chest pain      He is just ambulated around the entire hallway with the physical therapist   He denies any shortness of breath at rest or dyspnea with exertion  He denies any nausea, vomiting, diarrhea  He is tolerating p o  He notes he is passing urine without any difficulty, since his catheter has been removed  He denies any dizziness, lightheadedness, weakness  He notes he feels improved  Physical Exam:   Temp:  [96 8 °F (36 °C)-98 5 °F (36 9 °C)] 96 8 °F (36 °C)  HR:  [75-90] 79  Resp:  [18-20] 20  BP: (123-150)/(68-78) 147/68    Gen:  Pleasant, non-tachypnic, non-dyspnic  Conversant  Observed ambulating with the therapist with a rolling walker  Heart: regular rate and rhythm, S1S2 present, no murmur, rub or gallop  Lungs:  Decreased breath sounds right base  Otherwise clear to ausculatation bilaterally  No wheezing, crackles, or rhonchi  No accessory muscle use or respiratory distress  Abd: soft, non-tender, non-distended  NABS, no guarding, rebound or peritoneal signs  Extremities: no clubbing, cyanosis or edema  2+pedal pulses bilaterally  Full range of motion  Neuro: awake, alert and oriented  Fluent speech  Follows commands  Moving all 4 extremities  Skin: warm and dry: no petechiae, purpura and rash  LABS:   Results from last 7 days   Lab Units 03/03/19  0605 03/02/19  0613 03/01/19  0611   WBC Thousand/uL 4 78 4 74 5 08   HEMOGLOBIN g/dL 8 2* 8 4* 8 7*   HEMATOCRIT % 24 7* 25 4* 26 4*   PLATELETS Thousands/uL 156 141* 135*     Results from last 7 days   Lab Units 03/04/19  1059 03/03/19  0605 03/02/19  0613   POTASSIUM mmol/L 3 8 3 9 3 6   CHLORIDE mmol/L 99* 105 102   CO2 mmol/L 28 28 27   BUN mg/dL 53* 35* 28*   CREATININE mg/dL 6 25* 5 00* 3 90*   CALCIUM mg/dL 8 1* 8 0* 7 [address] [place] [address] Data:    2/26:  Chest x-ray:Central pulmonary vascular congestion with mild interstitial edema   Small left basilar effusion      2/24:  Chest x-ray:  No pneumothorax status post right internal jugular central venous catheter placement  Vascular congestion   Cardiomegaly    2/23:  Chest x-ray:  Trace left pleural effusion  Stable mild cardiomegaly    2/23:  CT abdomen/pelvis:  1   Limited examination due to lack of oral and intravenous contrast material   2   Enlarging, incompletely visualized large pericardial effusion  3   Moderate constipation  4   Enlarging left pleural effusion and left basilar infiltrate representing atelectasis or pneumonia  5   Prostatomegaly with bladder outlet obstruction    2/26: Body fluid culture:  Negative growth    2/24:  Echocardiogram:  LEFT VENTRICLE:  Systolic function was normal  Ejection fraction was estimated to be 60 %  There were no regional wall motion abnormalities  Wall thickness was mildly increased  RIGHT VENTRICLE:  The ventricle was mildly dilated  LEFT ATRIUM:  The atrium was mildly to moderately dilated  RIGHT ATRIUM:  The atrium was moderately dilated  MITRAL VALVE:  There was mild regurgitation  TRICUSPID VALVE:  There was mild regurgitation  PERICARDIUM:  A moderate to large pericardial effusion was identified  No RV collapse    Some RA collapse and minor respiratory variation of MV inflow c/w early tamponade    Procedure:  2/26:  Subxiphoid pericardial window     ---------------------------------------------------------------------------------------------------------------  This note has been constructed using a voice recognition system

## 2019-03-04 NOTE — PLAN OF CARE
Problem: OCCUPATIONAL THERAPY ADULT  Goal: Performs self-care activities at highest level of function for planned discharge setting  See evaluation for individualized goals  Description  Treatment Interventions: ADL retraining, Functional transfer training, UE strengthening/ROM, Endurance training, Patient/family training, Equipment evaluation/education, Compensatory technique education, Energy conservation, Activityengagement          See flowsheet documentation for full assessment, interventions and recommendations  Note:   Limitation: Decreased ADL status, Decreased UE strength, Decreased endurance, Decreased self-care trans, Decreased high-level ADLs  Prognosis: Good  Assessment: Pt is a 80 y o  male seen for OT evaluation s/p adm to [facility] on 2/23/2019 w/ abdominal discomfort with associated vomiting and diarrhea and dx'd w/ ERIK on CKD stag 3, and Hyperkalemia  Pt s/p pericardial window on 2/26/19 2* large pericardial effusion  Comorbidities affecting pt?s functional performance include a significant PMH of Anxiety, Cardiomegaly, diabetes, DVT, GERD, Hypercholesterolemia, HTN, Irregular heart beat- paroxysmal A-Fib, pancreatic cyst, pericardial effusion, and pleural effusion  Pt with active OT orders and activity orders for Up with assistance  Pt lives with spouse in a one level home with 4-5 IONA  At baseline, pt was I w/ ADLs, IADLs, and functional mobility/transfers w/o use of AD, (+) , and reports 0 falls PTA  Upon evaluation, pt currently requires Min A-Supervision for overall ADLs and Min A-Supervision for functional mobility/transfers 2* the following deficits impacting occupational performance: weakness, decreased strength, decreased balance and decreased tolerance   These impairments, as well at pt?s steps to enter environment, difficulty performing ADLS and difficulty performing IADLS  limit pt?s ability to safely engage in all baseline areas of occupation, including grooming, bathing, dressing, toileting, functional mobility/transfers, community mobility, laundry , house maintenance, medication management, meal prep, cleaning and leisure activities   Pt scored overall 55/100 on the Barthel Index  Based on the aforementioned OT evaluation, functional performance deficits, and assessments, pt has been identified as a Moderate complexity evaluation  Pt to continue to benefit from continued acute OT services during hospital stay to address defined deficits and to maximize level of functional independence in the following Occupational Performance areas: grooming, bathing/shower, toilet hygiene, dressing, medication management, health maintenance, functional mobility, community mobility, clothing management, cleaning, meal prep and household maintenance  From OT standpoint, recommend Home OT upon D/C   OT will continue to follow pt 3-5x/wk to address the following goals to  w/in 10-14 days:     OT Discharge Recommendation: Home OT  OT - OK to Discharge: Yes(when medically cleared)

## 2019-03-04 NOTE — PLAN OF CARE
Problem: Potential for Falls  Goal: Patient will remain free of falls  Description  INTERVENTIONS:  - Assess patient frequently for physical needs  -  Identify cognitive and physical deficits and behaviors that affect risk of falls  -  [place] fall precautions as indicated by assessment   - Educate patient/family on patient safety including physical limitations  - Instruct patient to call for assistance with activity based on assessment  - Modify environment to reduce risk of injury  - Consider OT/PT consult to assist with strengthening/mobility  Outcome: Progressing     Problem: Prexisting or High Potential for Compromised Skin Integrity  Goal: Skin integrity is maintained or improved  Description  INTERVENTIONS:  - Identify patients at risk for skin breakdown  - Assess and monitor skin integrity  - Assess and monitor nutrition and hydration status  - Monitor labs (i e  albumin)  - Assess for incontinence   - Turn and reposition patient  - Assist with mobility/ambulation  - Relieve pressure over bony prominences  - Avoid friction and shearing  - Provide appropriate hygiene as needed including keeping skin clean and dry  - Evaluate need for skin moisturizer/barrier cream  - Collaborate with interdisciplinary team (i e  Nutrition, Rehabilitation, etc )   - Patient/family teaching  Outcome: Progressing     Problem: Nutrition/Hydration-ADULT  Goal: Nutrient/Hydration intake appropriate for improving, restoring or maintaining nutritional needs  Description  Monitor and assess patient's nutrition/hydration status for malnutrition (ex- brittle hair, bruises, dry skin, pale skin and conjunctiva, muscle wasting, smooth red tongue, and disorientation)  Collaborate with interdisciplinary team and initiate plan and interventions as ordered  Monitor patient's weight and dietary intake as ordered or per policy  Utilize nutrition screening tool and intervene per policy   Determine patient's food preferences and provide high-protein, high-caloric foods as appropriate  INTERVENTIONS:  - Monitor oral intake, urinary output, labs, and treatment plans  - Assess nutrition and hydration status and recommend course of action  - Evaluate amount of meals eaten  - Assist patient with eating if necessary   - Allow adequate time for meals  - Recommend/ encourage appropriate diets, oral nutritional supplements, and vitamin/mineral supplements  - Order, calculate, and assess calorie counts as needed  - Recommend, monitor, and adjust tube feedings and TPN/PPN based on assessed needs  - Assess need for intravenous fluids  - Provide specific nutrition/hydration education as appropriate  - Include patient/family/caregiver in decisions related to nutrition  Outcome: Progressing     Problem: CARDIOVASCULAR - ADULT  Goal: Maintains optimal cardiac output and hemodynamic stability  Description  INTERVENTIONS:  - Monitor I/O, vital signs and rhythm  - Monitor for S/S and trends of decreased cardiac output i e  bleeding, hypotension  - Administer and titrate ordered vasoactive medications to optimize hemodynamic stability  - Assess quality of pulses, skin color and temperature  - Assess for signs of decreased coronary artery perfusion - ex   Angina  - Instruct patient to report change in severity of symptoms  Outcome: Progressing  Goal: Absence of cardiac dysrhythmias or at baseline rhythm  Description  INTERVENTIONS:  - Continuous cardiac monitoring, monitor vital signs, obtain 12 lead EKG if indicated  - Administer antiarrhythmic and heart rate control medications as ordered  - Monitor electrolytes and administer replacement therapy as ordered  Outcome: Progressing     Problem: METABOLIC, FLUID AND ELECTROLYTES - ADULT  Goal: Electrolytes maintained within normal limits  Description  INTERVENTIONS:  - Monitor labs and assess patient for signs and symptoms of electrolyte imbalances  - Administer electrolyte replacement as ordered  - Monitor response to electrolyte replacements, including repeat lab results as appropriate  - Instruct patient on fluid and nutrition as appropriate  Outcome: Progressing  Goal: Fluid balance maintained  Description  INTERVENTIONS:  - Monitor labs and assess for signs and symptoms of volume excess or deficit  - Monitor I/O and WT  - Instruct patient on fluid and nutrition as appropriate  Outcome: Progressing  Goal: Glucose maintained within target range  Description  INTERVENTIONS:  - Monitor Blood Glucose as ordered  - Assess for signs and symptoms of hyperglycemia and hypoglycemia  - Administer ordered medications to maintain glucose within target range  - Assess nutritional intake and initiate nutrition service referral as needed  Outcome: Progressing     Problem: SKIN/TISSUE INTEGRITY - ADULT  Goal: Skin integrity remains intact  Description  INTERVENTIONS  - Identify patients at risk for skin breakdown  - Assess and monitor skin integrity  - Assess and monitor nutrition and hydration status  - Monitor labs (i e  albumin)  - Assess for incontinence   - Turn and reposition patient  - Assist with mobility/ambulation  - Relieve pressure over bony prominences  - Avoid friction and shearing  - Provide appropriate hygiene as needed including keeping skin clean and dry  - Evaluate need for skin moisturizer/barrier cream  - Collaborate with interdisciplinary team (i e  Nutrition, Rehabilitation, etc )   - Patient/family teaching  Outcome: Progressing

## 2019-03-04 NOTE — PROGRESS NOTES
NEPHROLOGY PROGRESS NOTE   Aston Novoa  80 y o  male MRN: [id number]  Unit/Bed#: E4 -01 Encounter: [id number]  Reason for Consult:  Acute kidney injury    ASSESSMENT/PLAN:  Acute kidney injury:  Likely secondary to ATN, previously on CVVH and transitioned to I- l-HD   -last hemodialysis session was on 03/01, will treat today   -continue to monitor for evidence of renal recovery  -I/O   - UOP increasing   -avoid nephrotoxins/hypotensive episode/NSAID/dye  -CT:  Negative for hydronephrosis   -losartan and Lasix remain on hold   -serologies have been negative, hepatitis panel nonreactive, hemolysis smear negative for schistocytes or helmet cells  Access:  Temporary dialysis catheter   -will potentially require PermCath placement this week if no sign of renal recovery  CKD stage 3:  Longstanding disease secondary to hypertension and diabetes  With previous baseline creatinine around 1 3-1 5   -will need outpatient follow-up if he gains renal function  Urinary retention:  With history of BPH  Singh catheter remains in place  Large pericardial effusion:  Likely secondary to renal failure/uremia  Status post pericardial window with PATTI drain placement, now removed  -cardiology continues follow  Hypertension:  Blood pressure overall acceptable   -will continue to trend with UF with dialysis  - Lasix and losartan remain on hold  Anemia:  Hemoglobin remains low but is stable  - iron: 41, TIBC: 175    -will continue to monitor and transfuse for hemoglobin less than 7 0  SUBJECTIVE:  The patient has family present in room  He currently denies any complaints  Denies chest pain or shortness of breath  He is eating and drinking well  His Singh catheter is patent for clear yellow urine      OBJECTIVE:  Current Weight: Weight - Scale: 63 kg (138 lb 14 2 oz)  Vitals:    03/03/19 2300 03/04/19 0600 03/04/19 0830 03/04/19 1130   BP: 150/70   123/78   BP Location: Right arm   Left arm   Pulse: 90  80 81   Resp: 18  18 18   Temp: 98 5 °F (36 9 °C)  (!) 97 2 °F (36 2 °C)    TempSrc: Temporal  Temporal    SpO2: 90%  91% 97%   Weight:  63 kg (138 lb 14 2 oz)     Height:           Intake/Output Summary (Last 24 hours) at 3/4/2019 1133  Last data filed at 3/4/2019 1624  Gross per 24 hour   Intake 870 ml   Output 1500 ml   Net -630 ml     General: No apparent distress  Skin: warm, dry, intact, no rash  HEENT: Moist mucous membranes, sclera anicteric, normocephalic  Neck: No apparent JVD appreciated  Chest: Lung sounds decreased B/L  Heart: Regular rate and rhythm, No murmer  Abdomen: Soft, round, NT, +BS  Extremities: B/L LE edema present  Neuro: Alert and oriented  Psych: Appropriate mood and affect    Medications:    Current Facility-Administered Medications:     acetaminophen (TYLENOL) tablet 650 mg, 650 mg, Oral, Q6H PRN, Lowery Bellow Bilofsky, CRNP    ALPRAZolam (XANAX) tablet 0 25 mg, 0 25 mg, Oral, Q8H PRN, Lowery Bellow Bilofsky, CRNP    apixaban (ELIQUIS) tablet 2 5 mg, 2 5 mg, Oral, BID, Lowery Bellow Bilofsky, CRNP, 2 5 mg at 03/04/19 0920    ascorbic acid (VITAMIN C) tablet 250 mg, 250 mg, Oral, Daily, Lowery Bellow Bilofsky, CRNP, 250 mg at 03/04/19 0919    atorvastatin (LIPITOR) tablet 10 mg, 10 mg, Oral, Daily With Dinner, Analy K Bilofsky, CRNP, 10 mg at 03/03/19 1717    cholecalciferol (VITAMIN D3) tablet 2,000 Units, 2,000 Units, Oral, Daily, Lowery Bellow Bilofsky, CRNP, 2,000 Units at 03/04/19 0919    docusate sodium (COLACE) capsule 100 mg, 100 mg, Oral, BID, Lowery Bellow Bilofsky, CRNP, 100 mg at 03/04/19 0919    finasteride (PROSCAR) tablet 5 mg, 5 mg, Oral, Daily, Analy K Bilofsky, CRNP, 5 mg at 03/04/19 0920    fluticasone-vilanterol (BREO ELLIPTA) 100-25 mcg/inh inhaler 1 puff, 1 puff, Inhalation, Daily, SUJATA Fitzgerald, 1 puff at 03/04/19 0920    HYDROcodone-acetaminophen (NORCO) 5-325 mg per tablet 1 tablet, 1 tablet, Oral, Q6H PRN, SUJATA Fitzgerald, 1 tablet at 02/26/19 1933    insulin lispro (HumaLOG) 100 units/mL subcutaneous injection 1-6 Units, 1-6 Units, Subcutaneous, TID AC, 2 Units at 03/03/19 1717 **AND** Fingerstick Glucose (POCT), , , TID AC, Analy K SUJATA Schmidt    metoprolol (LOPRESSOR) injection 2 5 mg, 2 5 mg, Intravenous, Q6H PRN, Almer Marito K Roxana, DULCENP    pantoprazole (PROTONIX) EC tablet 40 mg, 40 mg, Oral, Early Morning, Analy K Bilofjagruti, CRNP, 40 mg at 03/04/19 0521    PARoxetine (PAXIL) tablet 30 mg, 30 mg, Oral, Daily, Lawayne Zoroastrian SUJATA Schmidt, 30 mg at 03/04/19 0920    senna (SENOKOT) tablet 8 6 mg, 1 tablet, Oral, HS, Lawayne Zoroastrian Bilofsky, CRNP, 8 6 mg at 03/03/19 2200    Laboratory Results:  Results from last 7 days   Lab Units 03/04/19  1059 03/03/19  0605 03/02/19  0919 03/01/19  1345 02/28/19  0626 02/27/19  0502 02/27/19  0429 02/26/19  1052 02/26/19  0535   WBC Thousand/uL  --  4 78 4 74 5 08 5 02  --  4 61  --  3 10*   HEMOGLOBIN g/dL  --  8 2* 8 4* 8 7* 9 1*  --  9 3* 7 1* 6 8*   HEMATOCRIT %  --  24 7* 25 4* 26 4* 27 0*  --  28 3*  --  20 8*   PLATELETS Thousands/uL  --  156 141* 135* 141*  --  119*  --  96*   POTASSIUM mmol/L 3 8 3 9 3 6 4 0 3 8 4 0  --   --  3 5   CHLORIDE mmol/L 99* 105 102 104 106 110*  --   --  108   CO2 mmol/L 28 28 27 25 25 21  --   --  23   BUN mg/dL 53* 35* 28* 44* 31* 62*  --   --  64*   CREATININE mg/dL 6 25* 5 00* 3 90* 5 87* 4 65* 7 71*  --   --  7 26*   CALCIUM mg/dL 8 1* 8 0* 7 9* 7 9* 8 1* 7 7*  --   --  7 7*   MAGNESIUM mg/dL  --   --   --   --   --   --   --   --  1 9   PHOSPHORUS mg/dL  --   --   --   --   --   --   --   --  4 4*

## 2019-03-04 NOTE — PHYSICAL THERAPY NOTE
Physical Therapy Progress Note     03/04/19 1623   Pain Assessment   Pain Assessment No/denies pain   Pain Score No Pain   Restrictions/Precautions   Weight Bearing Precautions Per Order No   Other Precautions Fall Risk;Multiple lines;Hard of hearing   General   Chart Reviewed Yes   Response to Previous Treatment Patient reporting fatigue but able to participate  Family/Caregiver Present No   Subjective   Subjective Willing to participate in therapy this PM    Transfers   Sit to Stand 5  Supervision   Additional items Assist x 1; Armrests; Increased time required;Verbal cues   Stand to Sit 5  Supervision   Additional items Assist x 1; Armrests; Increased time required;Verbal cues   Ambulation/Elevation   Gait pattern Decreased foot clearance; Short stride; Inconsistent val   Gait Assistance 5  Supervision   Additional items Assist x 1;Verbal cues; Tactile cues   Assistive Device Rolling walker   Distance 125' x 2 with a seated resting period in between trials  Balance   Static Sitting Good   Dynamic Sitting Fair +   Static Standing Fair   Dynamic Standing Fair -   Ambulatory Fair -   Endurance Deficit   Endurance Deficit Yes   Endurance Deficit Description fatigue   Activity Tolerance   Activity Tolerance Patient limited by fatigue   Nurse Made Aware Yes   Exercises   TKR Sitting;10 reps;AROM; Bilateral   Assessment   Prognosis Good   Problem List Decreased strength;Decreased range of motion;Decreased endurance; Impaired balance;Decreased mobility; Impaired hearing   Assessment Pt  seated in bedside chair upon my arrival  Reports he is eager to participate in therapy this PM  Performance of HEP seated in chair with cues provided for proper completion  Progressed with transfers being able to complete with A of therapist with cues for hand placement/technique  Progressed with increased amb  trials with use of RW and A of therapist with cues for LE sequencing/pacing   Pt  required a seated resting period in between trials due to fatigue, noted elevated HR via telemetry monitoring informed RN at end of treatment session  After completion of increased amb  trials pt  returned to room and repositioned seated in bedside chair at end of treatment session  Pt  will benefit from stair training prior to d/c, however continue to recommend d/c home with HHPT and family support as needed when medically stable  Barriers to Discharge Decreased caregiver support   Barriers to Discharge Comments Level of support at home  Goals   Patient Goals To go home  STG Expiration Date 03/07/19   Treatment Day 2   Plan   Treatment/Interventions Functional transfer training;LE strengthening/ROM; Therapeutic exercise; Endurance training;Gait training;Spoke to nursing;Spoke to case management;OT   Progress Progressing toward goals   PT Frequency Other (Comment)  (3-5x/wk)   Recommendation   Recommendation Home PT; Home with family support   Equipment Recommended Walker  (RW)   PT - OK to Discharge Yes  (if d/c when medically stable )     Tima Briseno, PTA

## 2019-03-04 NOTE — PLAN OF CARE
Problem: PHYSICAL THERAPY ADULT  Goal: Performs mobility at highest level of function for planned discharge setting  See evaluation for individualized goals  Description  Treatment/Interventions: Functional transfer training, LE strengthening/ROM, Elevations, Therapeutic exercise, Endurance training, Patient/family training, Equipment eval/education, Gait training, Spoke to nursing, Family  Equipment Recommended: Other (Comment)(Rolling Walker)       See flowsheet documentation for full assessment, interventions and recommendations  Outcome: Progressing  Note:   Prognosis: Good  Problem List: Decreased strength, Decreased range of motion, Decreased endurance, Impaired balance, Decreased mobility, Impaired hearing  Assessment: Pt  seated in bedside chair upon my arrival  Reports he is eager to participate in therapy this PM  Performance of HEP seated in chair with cues provided for proper completion  Progressed with transfers being able to complete with A of therapist with cues for hand placement/technique  Progressed with increased amb  trials with use of RW and A of therapist with cues for LE sequencing/pacing  Pt  required a seated resting period in between trials due to fatigue, noted elevated HR via telemetry monitoring informed RN at end of treatment session  After completion of increased amb  trials pt  returned to room and repositioned seated in bedside chair at end of treatment session  Pt  will benefit from stair training prior to d/c, however continue to recommend d/c home with HHPT and family support as needed when medically stable  Barriers to Discharge: Decreased caregiver support  Barriers to Discharge Comments: Level of support at home  Recommendation: Home PT, Home with family support     PT - OK to Discharge: Yes(if d/c when medically stable )    See flowsheet documentation for full assessment

## 2019-03-04 NOTE — OCCUPATIONAL THERAPY NOTE
[address] Evaluation     Patient Name: Keli Dominguez  Today's Date: 3/4/2019  Problem List  Patient Active Problem List   Diagnosis    Benign prostatic hyperplasia with urinary frequency    Dyslipidemia    Esophageal reflux    Essential hypertension    Lower leg DVT (deep venous thromboembolism), acute, right (HCC)    Pancreatic cyst    Pulmonary embolism (HCC)    Pulmonary nodule seen on imaging study    Type 2 diabetes mellitus (HCC)    Pericardial effusion    Paroxysmal atrial fibrillation (HCC)    Acute kidney injury ([address] )    Stage 3 chronic kidney disease (HCC)    COPD (chronic obstructive pulmonary disease) (HCC)    Thrombocytopenia (HCC)    Chronic anemia    Acute renal failure with tubular necrosis (HCC)     Past Medical History  Past Medical History:   Diagnosis Date    Anxiety     Cardiomegaly     Diabetes ([address] )     DVT (deep venous thrombosis) (HCC)     right leg    GERD (gastroesophageal reflux disease)     Hypercholesterolemia     Hypertension     Irregular heart beat     paroxsysmal a fib    Pancreatic cyst     Pericardial effusion     Pleural effusion     Weight loss, non-intentional      Past Surgical History  Past Surgical History:   Procedure Laterality Date    APPENDECTOMY      CATARACT EXTRACTION      COLONOSCOPY      COLONOSCOPY N/A 1/24/2019    Procedure: COLONOSCOPY with polypectomies;  Surgeon: Alejandro Pierce MD;  Location: [facility]; Service: Gastroenterology    ESOPHAGOGASTRODUODENOSCOPY N/A 1/24/2019    Procedure: ESOPHAGOGASTRODUODENOSCOPY (EGD) with bx;  Surgeon: Alejandro Pierce MD;  Location: [facility];   Service: Gastroenterology    PERICARDIAL WINDOW N/A 2/26/2019    Procedure: WINDOW PERICARDIAL;  Surgeon: Addy Torres MD;  Location: [facility];  Service: Thoracic    PROSTATE BIOPSY             03/04/19 1038   Note Type   Note type Eval only   Restrictions/Precautions   Weight Bearing Precautions Per Order No   Other Precautions Telemetry; Fall Risk;Multiple lines;Hard of hearing;Cardiac/sternal   Pain Assessment   Pain Assessment No/denies pain   Pain Score No Pain   Home Living   Type of [address] One level;Stairs to enter with rails  (4-5 IONA)   Bathroom Shower/Tub Tub/shower unit   Bathroom Toilet Standard   Bathroom Equipment Other (Comment)  (none per pt report)   [address] Walker;Cane  (RW and SPC are spouse's AD; Pt w/ no AD use PTA)   Additional Comments Pt lives with spouse in a one level home with 4-5 IONA  Prior Function   Level of [place] Independent with ADLs and functional mobility   Lives With Spouse   Receives Help From Family   ADL Assistance Independent   IADLs Independent   Falls in the last 6 months 0   Vocational Retired   Comments At baseline, pt was I w/ ADLs, IADLs, and functional mobility/transfers w/o use of AD, (+) , and reports 0 falls PTA  Lifestyle   Autonomy At baseline, pt was I w/ ADLs, IADLs, and functional mobility/transfers w/o use of AD, (+) , and reports 0 falls PTA  Reciprocal Relationships Lives with spouse   Service to Others Retired   Intrinsic Gratification Word searches, working on cars   Psychosocial   Psychosocial (WDL) WDL   ADL   Where Assessed Chair   Eating Assistance 6  Modified independent   [address] [facility] 5  [address] [facility] 5  [address] [facility] 4  [address] 5  [address] [facility] 4  [address] [facility]  4  [address] [id number]  Minimal Assistance   Bed Mobility   Supine to Sit Unable to assess  (Pt seated OOB in chair at start/end of session)   Sit to Supine Unable to assess  (Pt seated OOB in chair at start/end of session)   Additional Comments Pt seated OOB in chair at end of session with call bell and phone within reach   All needs met and pt reports no further questions for OT at this time   Transfers   Sit to Stand 4  Minimal assistance   Additional items Assist x 1; Armrests; Increased time required;Verbal cues   Stand to Sit 5  Supervision   Additional items Assist x 1; Armrests; Increased time required;Verbal cues   Additional Comments Cues for safe technique and hand placement   Functional Mobility   Functional Mobility 5  Supervision   Additional Comments Assist x1   Additional items Rolling walker   Balance   Static Sitting Good   Dynamic Sitting Fair +   Static Standing Fair   Dynamic Standing Fair   Ambulatory Fair -   Activity Tolerance   Activity Tolerance Patient limited by fatigue;Patient tolerated treatment well   Nurse Made Aware yes; Micha Kang RN   RUE Assessment   RUE Assessment WFL   RUE Strength   RUE Overall Strength Within Functional Limits - able to perform ADL tasks with strength  (3+/5 throughout)   LUE Assessment   LUE Assessment WFL   LUE Strength   LUE Overall Strength Within Functional Limits - able to perform ADL tasks with strength  (3+/5 throughout)   Hand Function   Gross Motor Coordination Functional   Fine Motor Coordination Functional   Sensation   Light Touch No apparent deficits   Proprioception   Proprioception No apparent deficits   Vision-Basic Assessment   Current Vision Wears glasses only for reading   Vision - Complex Assessment   Ocular Range of Motion [facility]   Acuity Able to read clock/calendar on wall without difficulty   Perception   Inattention/Neglect Appears intact   Cognition   Overall Cognitive Status [facility]   Arousal/Participation Alert; Cooperative   Attention Within functional limits   Orientation Level Oriented X4   Memory Within functional limits   Following Commands Follows multistep commands with increased time or repetition   Comments Pt [place]; Pleasant and cooperative   Assessment   Limitation Decreased ADL status; Decreased UE strength;Decreased endurance;Decreased self-care trans;Decreased high-level ADLs Prognosis Good   Assessment Pt is a 80 y o  male seen for OT evaluation s/p adm to [address] on 2/23/2019 w/ abdominal discomfort with associated vomiting and diarrhea and dx'd w/ ERIK on CKD stag 3, and Hyperkalemia  Pt s/p pericardial window on 2/26/19 2* large pericardial effusion  Comorbidities affecting pts functional performance include a significant PMH of Anxiety, Cardiomegaly, diabetes, DVT, GERD, Hypercholesterolemia, HTN, Irregular heart beat- paroxysmal A-Fib, pancreatic cyst, pericardial effusion, and pleural effusion  Pt with active OT orders and activity orders for Up with assistance  Pt lives with spouse in a one level home with 4-5 IONA  At baseline, pt was I w/ ADLs, IADLs, and functional mobility/transfers w/o use of AD, (+) , and reports 0 falls PTA  Upon evaluation, pt currently requires Min A-Supervision for overall ADLs and Min A-Supervision for functional mobility/transfers 2* the following deficits impacting occupational performance: weakness, decreased strength, decreased balance and decreased tolerance  These impairments, as well at pts steps to enter environment, difficulty performing ADLS and difficulty performing IADLS  limit pts ability to safely engage in all baseline areas of occupation, including grooming, bathing, dressing, toileting, functional mobility/transfers, community mobility, laundry , house maintenance, medication management, meal prep, cleaning and leisure activities   Pt scored overall 55/100 on the Barthel Index  Based on the aforementioned OT evaluation, functional performance deficits, and assessments, pt has been identified as a Moderate complexity evaluation   Pt to continue to benefit from continued acute OT services during hospital stay to address defined deficits and to maximize level of functional independence in the following Occupational Performance areas: grooming, bathing/shower, toilet hygiene, dressing, medication management, health maintenance, functional mobility, community mobility, clothing management, cleaning, meal prep and household maintenance  From OT standpoint, recommend Home OT upon D/C  OT will continue to follow pt 3-5x/wk to address the following goals to  w/in 10-14 days:   Goals   Patient Goals To go home    LTG Time Frame 10-14   Long Term Goal Please refer to LTGs listed below   Plan   Treatment Interventions ADL retraining;Functional transfer training;UE strengthening/ROM; Endurance training;Patient/family training;Equipment evaluation/education; Compensatory technique education; Energy conservation; Activityengagement   Goal Expiration Date 19   Treatment Day 0   OT Frequency 3-5x/wk   Recommendation   OT Discharge Recommendation Home OT   OT - OK to Discharge Yes  (when medically cleared)   Barthel Index   Feeding 10   Bathing 0   Grooming Score 5   Dressing Score 5   Bladder Score 0   Bowels Score 10   Toilet Use Score 5   Transfers (Bed/Chair) Score 10   Mobility (Level Surface) Score 10   Stairs Score 0   Barthel Index Score 55   Modified Lincoln Scale   Modified Lincoln Scale 3       GOALS    1) Pt will improve activity tolerance to G for min 30 min txment sessions for increase engagement in functional tasks    2) Pt will complete UB/LB dressing/self care w/ mod I using adaptive device and DME as needed    3) Pt will complete bathing w/ Mod I w/ use of AE and DME as needed    4) Pt will complete toileting w/ mod I w/ G hygiene/thoroughness using DME as needed    5) Pt will improve functional transfers to Mod I on/off all surfaces using DME as needed w/ G balance/safety     6) Pt will improve functional mobility during ADL/IADL/leisure tasks to Mod I using DME as needed w/ G balance/safety     7) Pt will participate in simulated IADL management task to increase independence to Mod I w/ G safety and endurance    8) Pt will be attentive 100% of the time during ongoing cognitive assessment w/ G participation to assist w/ safe d/c planning/recommendations    9) Pt will demonstrate G carryover of pt/caregiver education and training as appropriate w/o cues w/ good tolerance to increase safety during functional tasks    10) Pt will demonstrate 100% carryover of energy conservation techniques t/o functional I/ADL/leisure tasks w/o cues s/p skilled education to increase endurance during functional tasks     11) Pt will increase BUE strength by 1 MM grade to increase independence in ADLs and transfers      Mare Adames, OTR/L

## 2019-03-05 ENCOUNTER — APPOINTMENT (INPATIENT)
Dept: DIALYSIS | Facility: HOSPITAL | Age: 84
DRG: 674 | End: 2019-03-05
Attending: INTERNAL MEDICINE
Payer: COMMERCIAL

## 2019-03-05 ENCOUNTER — APPOINTMENT (INPATIENT)
Dept: NON INVASIVE DIAGNOSTICS | Facility: HOSPITAL | Age: 84
DRG: 674 | End: 2019-03-05
Payer: COMMERCIAL

## 2019-03-05 LAB
ANION GAP SERPL CALCULATED.3IONS-SCNC: 10 MMOL/L (ref 4–13)
BUN SERPL-MCNC: 64 MG/DL (ref 5–25)
CALCIUM SERPL-MCNC: 8.2 MG/DL (ref 8.3–10.1)
CHLORIDE SERPL-SCNC: 101 MMOL/L (ref 100–108)
CO2 SERPL-SCNC: 27 MMOL/L (ref 21–32)
CREAT SERPL-MCNC: 6.5 MG/DL (ref 0.6–1.3)
ERYTHROCYTE [DISTWIDTH] IN BLOOD BY AUTOMATED COUNT: 12 % (ref 11.6–15.1)
GFR SERPL CREATININE-BSD FRML MDRD: 7 ML/MIN/1.73SQ M
GLUCOSE SERPL-MCNC: 116 MG/DL (ref 65–140)
GLUCOSE SERPL-MCNC: 119 MG/DL (ref 65–140)
GLUCOSE SERPL-MCNC: 134 MG/DL (ref 65–140)
GLUCOSE SERPL-MCNC: 146 MG/DL (ref 65–140)
GLUCOSE SERPL-MCNC: 180 MG/DL (ref 65–140)
HCT VFR BLD AUTO: 24.9 % (ref 36.5–49.3)
HGB BLD-MCNC: 8.2 G/DL (ref 12–17)
MCH RBC QN AUTO: 31.7 PG (ref 26.8–34.3)
MCHC RBC AUTO-ENTMCNC: 32.9 G/DL (ref 31.4–37.4)
MCV RBC AUTO: 96 FL (ref 82–98)
PLATELET # BLD AUTO: 184 THOUSANDS/UL (ref 149–390)
PMV BLD AUTO: 10.2 FL (ref 8.9–12.7)
POTASSIUM SERPL-SCNC: 3.9 MMOL/L (ref 3.5–5.3)
RBC # BLD AUTO: 2.59 MILLION/UL (ref 3.88–5.62)
SODIUM SERPL-SCNC: 138 MMOL/L (ref 136–145)
WBC # BLD AUTO: 4.85 THOUSAND/UL (ref 4.31–10.16)

## 2019-03-05 PROCEDURE — 97535 SELF CARE MNGMENT TRAINING: CPT

## 2019-03-05 PROCEDURE — 93308 TTE F-UP OR LMTD: CPT | Performed by: INTERNAL MEDICINE

## 2019-03-05 PROCEDURE — 97530 THERAPEUTIC ACTIVITIES: CPT

## 2019-03-05 PROCEDURE — 93308 TTE F-UP OR LMTD: CPT

## 2019-03-05 PROCEDURE — 99232 SBSQ HOSP IP/OBS MODERATE 35: CPT | Performed by: INTERNAL MEDICINE

## 2019-03-05 PROCEDURE — 93005 ELECTROCARDIOGRAM TRACING: CPT

## 2019-03-05 PROCEDURE — 80048 BASIC METABOLIC PNL TOTAL CA: CPT | Performed by: INTERNAL MEDICINE

## 2019-03-05 PROCEDURE — 97116 GAIT TRAINING THERAPY: CPT

## 2019-03-05 PROCEDURE — 93325 DOPPLER ECHO COLOR FLOW MAPG: CPT | Performed by: INTERNAL MEDICINE

## 2019-03-05 PROCEDURE — 93321 DOPPLER ECHO F-UP/LMTD STD: CPT | Performed by: INTERNAL MEDICINE

## 2019-03-05 PROCEDURE — 85027 COMPLETE CBC AUTOMATED: CPT | Performed by: INTERNAL MEDICINE

## 2019-03-05 PROCEDURE — 82948 REAGENT STRIP/BLOOD GLUCOSE: CPT

## 2019-03-05 RX ADMIN — PAROXETINE 30 MG: 20 TABLET, FILM COATED ORAL at 08:39

## 2019-03-05 RX ADMIN — APIXABAN 2.5 MG: 2.5 TABLET, FILM COATED ORAL at 18:05

## 2019-03-05 RX ADMIN — OXYCODONE HYDROCHLORIDE AND ACETAMINOPHEN 250 MG: 500 TABLET ORAL at 08:40

## 2019-03-05 RX ADMIN — FLUTICASONE FUROATE AND VILANTEROL TRIFENATATE 1 PUFF: 100; 25 POWDER RESPIRATORY (INHALATION) at 08:39

## 2019-03-05 RX ADMIN — DOCUSATE SODIUM 100 MG: 100 CAPSULE, LIQUID FILLED ORAL at 08:40

## 2019-03-05 RX ADMIN — DOCUSATE SODIUM 100 MG: 100 CAPSULE, LIQUID FILLED ORAL at 18:05

## 2019-03-05 RX ADMIN — APIXABAN 2.5 MG: 2.5 TABLET, FILM COATED ORAL at 08:40

## 2019-03-05 RX ADMIN — VITAMIN D, TAB 1000IU (100/BT) 2000 UNITS: 25 TAB at 08:40

## 2019-03-05 RX ADMIN — ATORVASTATIN CALCIUM 10 MG: 20 TABLET, FILM COATED ORAL at 18:05

## 2019-03-05 RX ADMIN — FINASTERIDE 5 MG: 5 TABLET, FILM COATED ORAL at 08:40

## 2019-03-05 RX ADMIN — SENNOSIDES 8.6 MG: 8.6 TABLET, FILM COATED ORAL at 21:25

## 2019-03-05 RX ADMIN — PANTOPRAZOLE SODIUM 40 MG: 40 TABLET, DELAYED RELEASE ORAL at 05:33

## 2019-03-05 NOTE — PHYSICAL THERAPY NOTE
Physical Therapy Progress Note     03/05/19 1223   Pain Assessment   Pain Assessment No/denies pain   Pain Score No Pain   Restrictions/Precautions   Weight Bearing Precautions Per Order No   Other Precautions Fall Risk;Multiple lines;Telemetry;Hard of hearing   General   Chart Reviewed Yes   Response to Previous Treatment Patient reporting fatigue but able to participate  Family/Caregiver Present Yes  (Pt's spouse present during treatment session )   Subjective   Subjective Willing to participate in therapy this PM    Transfers   Sit to Stand 5  Supervision   Additional items Assist x 1;Bedrails; Increased time required;Verbal cues   Stand to Sit 5  Supervision   Additional items Assist x 1;Bedrails; Increased time required;Verbal cues   Ambulation/Elevation   Gait pattern Decreased foot clearance; Short stride; Inconsistent val; Foward flexed   Gait Assistance 5  Supervision   Additional items Assist x 1;Verbal cues; Tactile cues   Assistive Device Rolling walker   Distance 150' x 1, 125' x 1 with seated resting periods in between and stair training performed   Stair Management Assistance 5  Supervision   Additional items Assist x 1;Verbal cues; Tactile cues   Stair Management Technique One rail R;Step to pattern; Foreward;Nonreciprocal   Number of Stairs 4   Balance   Static Sitting Good   Dynamic Sitting Fair +   Static Standing Fair   Dynamic Standing Fair -   Ambulatory Fair -   Endurance Deficit   Endurance Deficit No   Activity Tolerance   Activity Tolerance Patient tolerated treatment well   Nurse Made Aware Yes   Assessment   Prognosis Good   Problem List Decreased strength;Decreased range of motion;Decreased endurance; Impaired balance;Decreased mobility; Impaired hearing   Assessment Pt  seated at EOB upon my arrival  Pt  able to complete all transfers practicing proper technique with no noted LOB   Progressed with an increased amb  trials with use of RW and standbyA of therapist with cues for LE sequencing  Pt  required a seated resting period in between gait trials due to fatigue  Noted O2 saturation measured at 94% on RA during amb  trials  Progressed to stair training, being able to complete practicing proper technique with no noted LOB  Continued with a second amb  trial with eventual return to room and repositioned seated in bedside chair at end of treatment session  PT will continue to recommend d/c home with HHPT and family support as needed when medically stable  Barriers to Discharge Decreased caregiver support   Barriers to Discharge Comments Level of support at home  Goals   Patient Goals To go home  STG Expiration Date 03/07/19   Treatment Day 3   Plan   Treatment/Interventions Functional transfer training;LE strengthening/ROM; Endurance training;Elevations;Gait training;Spoke to nursing;Spoke to case management;OT;Family   Progress Progressing toward goals   PT Frequency Other (Comment)  (3-5x/wk)   Recommendation   Recommendation Home PT; Home with family support   Equipment Recommended Walker  (RW)   PT - OK to Discharge Yes  (if d/c when medically stable )     Omero Egan, PTA

## 2019-03-05 NOTE — SOCIAL WORK
Per Dr Aleena Abarca and nephrology, pt is going to need HD treatment OP  Cm met with pt's wife at bedside while pt was participating in therapy  Pt's wife is agreeable to [facility] and would like a MWF scheduled, and preferrably a start chair time of 9am     Cm faxed all necessary documents to [facility] and is awaiting a call back to solidify chair time  Pt's wife states his brother and sister are able to drive him to and from dialysis

## 2019-03-05 NOTE — OCCUPATIONAL THERAPY NOTE
OccupationalTherapy Progress Note     Patient Name: Neeta Pandey  Today's Date: 3/5/2019  Problem List  Patient Active Problem List   Diagnosis    Benign prostatic hyperplasia with urinary frequency    Dyslipidemia    Esophageal reflux    Essential hypertension    Lower leg DVT (deep venous thromboembolism), acute, right (HCC)    Pancreatic cyst    Pulmonary embolism (HCC)    Pulmonary nodule seen on imaging study    Type 2 diabetes mellitus with stage 3 chronic kidney disease, without long-term current use of insulin (HCC)    Pericardial effusion    Paroxysmal atrial fibrillation (HCC)    Acute kidney injury ([address] )    Stage 3 chronic kidney disease (HCC)    COPD (chronic obstructive pulmonary disease) (HCC)    Thrombocytopenia (HCC)    Chronic anemia    Acute renal failure with tubular necrosis (HCC)    Urinary retention           03/05/19 1228   Restrictions/Precautions   Weight Bearing Precautions Per Order No   Other Precautions Fall Risk;Multiple lines;Telemetry;Hard of hearing   General   Response to Previous Treatment Patient with no complaints from previous session   Lifestyle   Autonomy At baseline, pt was I w/ ADLs, IADLs, and functional mobility/transfers w/o use of AD, (+) , and reports 0 falls PTA  Reciprocal Relationships Lives with spouse   Service to Others Retired   Intrinsic Gratification Word searches, working on cars   Pain Assessment   Pain Assessment No/denies pain   Pain Score No Pain   ADL   Where Assessed Edge of bed   Grooming Assistance 5  Supervision/Setup   Grooming Deficit Setup;Supervision/safety; Increased time to complete   Grooming Comments Light grooming tasks completed with Supervision 2* setup required and increased time to complete  UB Dressing Assistance 5  Supervision/Setup   UB Dressing Deficit Setup;Supervision/safety; Increased time to complete   UB Dressing Comments UB dressing completed with Supervision 2* setup required and increased time to don/doff hospital gown  LB Dressing Assistance 4  Minimal Assistance   LB Dressing Deficit Setup;Steadying; Requires assistive device for steadying;Verbal cueing;Supervision/safety; Increased time to complete; Don/doff R sock; Don/doff L sock; Thread RLE into pants; Thread LLE into pants;Pull up over hips   LB Dressing Comments LB dressing completed with Min A  Pt able to don/doff B/L socks and thread BLEs into pants with Supervision only, however required CGA when standing to pull pants over hips 2* decreased dynamic standing balance  Functional Standing Tolerance   Time 5 mins   Activity Self care tasks   Comments No LOB noted   Bed Mobility   Supine to Sit Unable to assess  (Pt seated EOB at start of session)   Sit to Supine Unable to assess  (Pt seated OOB in chair at end of session)   Additional Comments Pt seated OOB in chair at end of session with call bell and phone within reach  All needs met and pt reports no further questions for OT at this time  Transfers   Sit to Stand 5  Supervision   Additional items Assist x 1; Increased time required;Verbal cues;Armrests   Stand to Sit 5  Supervision   Additional items Assist x 1; Armrests; Increased time required;Verbal cues   Additional Comments Cues required for safe technique and hand placement during transitions with pt demonstrating carryover in later attempts  Functional Mobility   Functional Mobility 4  Minimal assistance   Additional Comments Assist x1 with CGA    Additional items Rolling walker   Cognition   Overall Cognitive Status WFL   Arousal/Participation Alert; Cooperative   Attention Within functional limits   Orientation Level Oriented to person;Oriented to place   Memory Within functional limits   Following Commands Follows multistep commands with increased time or repetition   Additional Activities   Additional Activities Other (Comment)  (Energy conservation education)   Additional Activities Comments Educated pt on energy conservation education (ie: pacing, compensatory breathing techniques, sitting during ADLs/IADLs, etc ) with pt verbalizing understanding of education  Activity Tolerance   Activity Tolerance Patient limited by fatigue   Medical Staff Nohelia Avendano RN; Rivka Patel PTA   Assessment   Assessment Pt seen for OT treatment session this PM focusing on functional activity tolerance, ADLs, functional mobility/transfers, and energy conservation education  Pt alert and cooperative throughout session  Pt's spouse present for entire session  Pt seated EOB at start of session  O2 resting sats: 93% on RA  ADL tasks completed while seated EOB  UB dressing completed with Supervision 2* setup required and [facility]  LB dressing completed with Min A  Pt able to don/doff B/L socks and thread BLEs into pants with Supervision only, however required CGA when standing to pull pants over hips 2* decreased dynamic standing balance  Light grooming tasks completed with Supervision 2* setup required and increased time to complete  Transfers (sit<>stand) completed with Supervision with cues required for safe technique and hand placement during transitions with pt demonstrating carryover in later attempts  CGA required for functional mobility with use of RW and cues for pacing  O2 sats after mobility: 92-94% on RA  Educated pt on energy conservation education (ie: pacing, compensatory breathing techniques, sitting during ADLs/IADLs, etc ) with pt verbalizing understanding of education  Pt seated OOB in chair at end of session with call bell and phone within reach  All needs met and pt reports no further questions for OT at this time  Continue to recommend Home OT at this time  OT to follow pt on caseload  Plan   Treatment Interventions ADL retraining;Functional transfer training;UE strengthening/ROM; Endurance training;Patient/family training;Equipment evaluation/education; Compensatory technique education; Energy conservation; Activityengagement   Goal Expiration Date 03/18/19   Treatment Day 1   OT Frequency 3-5x/wk   Recommendation   OT Discharge Recommendation Home OT   OT - OK to Discharge Yes  (when medically cleared)   Barthel Index   Feeding 10   Bathing 0   Grooming Score 5   Dressing Score 5   Bladder Score 10   Bowels Score 10   Toilet Use Score 5   Transfers (Bed/Chair) Score 10   Mobility (Level Surface) Score 10   Stairs Score 5   Barthel Index Score 70   Modified [place] Scale   Modified [place] Scale 3       Elise Jacome, OTR/L

## 2019-03-05 NOTE — PROGRESS NOTES
NEPHROLOGY PROGRESS NOTE   Sonia Conrad  80 y o  male MRN: [id number]  Unit/Bed#: E4 -01 Encounter: [id number]  Reason for Consult: ERIK on CKD III    ASSESSMENT/PLAN:  Acute kidney injury (POA):  Likely secondary to ATN, previously on CVVH and transitioned to I- l-HD   -last hemodialysis session was on 03/01, will treat today   -continue to monitor for evidence of renal recovery  - UOP is increasing    -avoid nephrotoxins/hypotensive episode/NSAID/dye  -CT:  Negative for hydronephrosis   -losartan and Lasix remain on hold   -serologies have been negative, hepatitis panel nonreactive, hemolysis smear negative for schistocytes or helmet cells  - case management for OP clinic       Access:  Temporary dialysis catheter   -will consult IR for perm cath placement with intent to discharge by the end of the week      CKD stage 3:  Longstanding disease secondary to hypertension and diabetes  With previous baseline creatinine around 1 3-1 5   -will need outpatient follow-up if he gains renal function       Urinary retention:  With history of BPH  S/P medeiros removal  Urinating today without difficulty  Large pericardial effusion:  Likely secondary to renal failure/uremia  Status post pericardial window with PATTI drain placement, now removed  -cardiology continues follow      Hypertension:  Blood pressure overall acceptable   -will continue to trend with UF with dialysis  - Lasix and losartan remain on hold  - avoid hypotension for renal perfusion       Anemia:  Hemoglobin remains low but is stable  - iron: 41, TIBC: 175    -will continue to monitor and transfuse for hemoglobin less than 7 0      Disposition:  Will need perm cath placed and OP facility arranged prior to discharge  SUBJECTIVE:  The patient is sitting up in bed  He denies any chest pain or shortness of breath  He states that they removed his catheter last night he had difficulty urinating but this has resolved this morning    He denied is breakfast this morning because he states he does not have an appetite  He denies nausea, vomiting, diarrhea  We discussed that he will have dialysis later today  We discussed having is temporary dialysis catheter removed and placement of a permanent catheter      OBJECTIVE:  Current Weight: Weight - Scale: 63 8 kg (140 lb 10 5 oz)  Vitals:    03/04/19 2205 03/05/19 0453 03/05/19 0540 03/05/19 0841   BP: 133/65 131/60  148/84   BP Location: Right arm Right arm  Left arm   Pulse: 86 78  80   Resp: 18 18  18   Temp: (!) 97 °F (36 1 °C) 98 9 °F (37 2 °C)  97 8 °F (36 6 °C)   TempSrc: Tympanic Tympanic  Temporal   SpO2: 90% 91%  95%   Weight:   63 8 kg (140 lb 10 5 oz)    Height:           Intake/Output Summary (Last 24 hours) at 3/5/2019 1112  Last data filed at 3/5/2019 0501  Gross per 24 hour   Intake 540 ml   Output 1525 ml   Net -985 ml     General: No apparent distress  Skin: warm, dry, intact, no rash  HEENT: Moist mucous membranes, sclera anicteric, normocephalic  Neck: No apparent JVD appreciated  Chest: Lung sounds clear B/L, on  RA  Heart: Regular rate and rhythm, No murmer  Abdomen: Soft, round, NT, +BS  Extremities: B/L LE edema present  Neuro: Alert and oriented  Psych: Appropriate mood and affect    Medications:    Current Facility-Administered Medications:     acetaminophen (TYLENOL) tablet 650 mg, 650 mg, Oral, Q6H PRN, Starlette Ped Bilofsky, CRNP    ALPRAZolam (XANAX) tablet 0 25 mg, 0 25 mg, Oral, Q8H PRN, Starlette Ped Bilofsky, CRNP    apixaban (ELIQUIS) tablet 2 5 mg, 2 5 mg, Oral, BID, Starlette Ped Bilofsky, CRNP, 2 5 mg at 03/05/19 0840    ascorbic acid (VITAMIN C) tablet 250 mg, 250 mg, Oral, Daily, Starlette Ped Bilofsky, CRNP, 250 mg at 03/05/19 0840    atorvastatin (LIPITOR) tablet 10 mg, 10 mg, Oral, Daily With Dinner, Starlette Ped Bilofsky, CRNP, 10 mg at 03/04/19 1736    cholecalciferol (VITAMIN D3) tablet 2,000 Units, 2,000 Units, Oral, Daily, SUJATA Matt, 2,000 Units at 03/05/19 0840    docusate sodium (COLACE) capsule 100 mg, 100 mg, Oral, BID, Sb Standard Bilofsky, CRNP, 100 mg at 03/05/19 0840    finasteride (PROSCAR) tablet 5 mg, 5 mg, Oral, Daily, Sb Standard Bilofsky, CRNP, 5 mg at 03/05/19 0840    fluticasone-vilanterol (BREO ELLIPTA) 100-25 mcg/inh inhaler 1 puff, 1 puff, Inhalation, Daily, Sb Standard Bilofsky, CRNP, 1 puff at 03/05/19 0839    HYDROcodone-acetaminophen (NORCO) 5-325 mg per tablet 1 tablet, 1 tablet, Oral, Q6H PRN, Sb Standard Bilofsky, CRNP, 1 tablet at 02/26/19 1933    insulin lispro (HumaLOG) 100 units/mL subcutaneous injection 1-6 Units, 1-6 Units, Subcutaneous, TID AC, 3 Units at 03/04/19 1159 **AND** Fingerstick Glucose (POCT), , , TID AC, Analy K Roxana CRNP    metoprolol (LOPRESSOR) injection 2 5 mg, 2 5 mg, Intravenous, Q6H PRN, Trina Jerry K Roxana, CRNP    pantoprazole (PROTONIX) EC tablet 40 mg, 40 mg, Oral, Early Morning, Analy K Bilofsky, CRNP, 40 mg at 03/05/19 0533    PARoxetine (PAXIL) tablet 30 mg, 30 mg, Oral, Daily, Sb Standard Bilofsky, CRNP, 30 mg at 03/05/19 0839    senna (SENOKOT) tablet 8 6 mg, 1 tablet, Oral, HS, Sb Standard Bilofsky, CRNP, 8 6 mg at 03/04/19 2200    Laboratory Results:  Results from last 7 days   Lab Units 03/05/19  0502 03/04/19  1059 03/03/19  0605 03/02/19  9931 03/01/19  0611 02/28/19  0626 02/27/19  0502 02/27/19  0429   WBC Thousand/uL 4 85  --  4 78 4 74 5 08 5 02  --  4 61   HEMOGLOBIN g/dL 8 2*  --  8 2* 8 4* 8 7* 9 1*  --  9 3*   HEMATOCRIT % 24 9*  --  24 7* 25 4* 26 4* 27 0*  --  28 3*   PLATELETS Thousands/uL 184  --  156 141* 135* 141*  --  119*   POTASSIUM mmol/L 3 9 3 8 3 9 3 6 4 0 3 8 4 0  --    CHLORIDE mmol/L 101 99* 105 102 104 106 110*  --    CO2 mmol/L 27 28 28 27 25 25 21  --    BUN mg/dL 64* 53* 35* 28* 44* 31* 62*  --    CREATININE mg/dL 6 50* 6 25* 5 00* 3 90* 5 87* 4 65* 7 71*  --    CALCIUM mg/dL 8 2* 8 1* 8 0* 7 9* 7 9* 8 1* 7 7*  --

## 2019-03-05 NOTE — UTILIZATION REVIEW
Continued Stay Review    Date:  3/5/2019    Vital Signs: /69   Pulse 75   Temp (!) 96 4 °F (35 8 °C) (Tympanic)   Resp 16   Ht 5' 4" (1 626 m)   Wt 63 8 kg (140 lb 10 5 oz)   SpO2 93%   BMI 24 14 kg/m²      Assessment/Plan:  Pt admitted 2/23 with ERIK with severe metabolic acidosis and Hyperkalemia  S/P Pericardial  Window  2/26  - PATTI removed  Singh Cath was removed 3/4  and pt urinating w/o difficulty today  Urine output good but renal Fx not improving   Last HD TX was 3/1 - HD TX today 3/5  Consult IR for perm cath placement as he will need OP Dialysis  Hg low but stable  Monitor and transfuse for Hg < 7 0  Nephrology following    Medications:   Scheduled Meds:   Current Facility-Administered Medications:  acetaminophen 650 mg Oral Q6H PRN    ALPRAZolam 0 25 mg Oral Q8H PRN    apixaban 2 5 mg Oral BID    ascorbic acid 250 mg Oral Daily    atorvastatin 10 mg Oral Daily With Dinner    cholecalciferol 2,000 Units Oral Daily    docusate sodium 100 mg Oral BID    finasteride 5 mg Oral Daily    fluticasone-vilanterol 1 puff Inhalation Daily    HYDROcodone-acetaminophen 1 tablet Oral Q6H PRN    insulin lispro 1-6 Units Subcutaneous TID AC    metoprolol 2 5 mg Intravenous Q6H PRN    pantoprazole 40 mg Oral Early Morning    PARoxetine 30 mg Oral Daily    senna 1 tablet Oral HS        Pertinent Labs/Diagnostic Results:   H&H 8 2 / 24 9  BUN 64,  Cr 6 64  ECHO: Systolic function was normal  Ejection fraction was estimated to be 55 %  Although no diagnostic regional wall motion abnormality was identified, this possibility cannot be completely excluded on the basis of this study  Doppler parameters were consistent with abnormal left ventricular relaxation (grade 1 diastolic dysfunction)  Age/Sex: 80 y o  male   Discharge Plan: Will Need OP HD Set up      Network Utilization Review Department  Phone: [phone]; Fax [phone]  [email or web address]  [email or web address]  ATTENTION: Please call with any questions or concerns to [phone]  and carefully listen to the prompts so that you are directed to the right person  Send all requests for admission clinical reviews, approved or denied determinations and any other requests to fax [phone]   All voicemails are confidential

## 2019-03-05 NOTE — PLAN OF CARE
Problem: OCCUPATIONAL THERAPY ADULT  Goal: Performs self-care activities at highest level of function for planned discharge setting  See evaluation for individualized goals  Description  Treatment Interventions: ADL retraining, Functional transfer training, UE strengthening/ROM, Endurance training, Patient/family training, Equipment evaluation/education, Compensatory technique education, Energy conservation, Activityengagement          See flowsheet documentation for full assessment, interventions and recommendations  Outcome: Progressing  Note:   Limitation: Decreased ADL status, Decreased UE strength, Decreased endurance, Decreased self-care trans, Decreased high-level ADLs  Prognosis: Good  Assessment: Pt seen for OT treatment session this PM focusing on functional activity tolerance, ADLs, functional mobility/transfers, and energy conservation education  Pt alert and cooperative throughout session  Pt's spouse present for entire session  Pt seated EOB at start of session  O2 resting sats: 93% on RA  ADL tasks completed while seated EOB  UB dressing completed with Supervision 2* setup required and increased time to don/doff [facility] gown  LB dressing completed with Min A  Pt able to don/doff B/L socks and thread BLEs into pants with Supervision only, however required CGA when standing to pull pants over hips 2* decreased dynamic standing balance  Light grooming tasks completed with Supervision 2* setup required and increased time to complete  Transfers (sit<>stand) completed with Supervision with cues required for safe technique and hand placement during transitions with pt demonstrating carryover in later attempts  CGA required for functional mobility with use of RW and cues for pacing  O2 sats after mobility: 92-94% on RA  Educated pt on energy conservation education (ie: pacing, compensatory breathing techniques, sitting during ADLs/IADLs, etc ) with pt verbalizing understanding of education   Pt seated OOB in chair at end of session with call bell and phone within reach  All needs met and pt reports no further questions for OT at this time  Continue to recommend Home OT at this time  OT to follow pt on caseload        OT Discharge Recommendation: Home OT  OT - OK to Discharge: Yes(when medically cleared)

## 2019-03-05 NOTE — QUICK NOTE
Called to see patient for chest pain    Patient relates that he had tightness across his chest when he finished dialysis  He indicates it felt as though the telemetry wires on his chest were too tight  He notes this has completely resolved  He denies any current chest pain, chest pressure, chest discomfort, or chest tightness  He denies any shortness of breath  He indicates he is able to take a deep breath without any discomfort in his chest   He previous he had discomfort at the pericardial window site, he currently denies  He denies any nausea or vomiting  He is eating dinner  He denies any dizziness or lightheadedness  Physical exam:  Temperature 97°, pulse 80, respirations 18, blood pressure 143/70  Telemetry:  Normal sinus rhythm  Previous 10 beat of nonsustained ventricular tachycardia at 2:00 p m , and 3 beats nonsustained ventricular tachycardia    Heart:  Regular rate and rhythm  No murmur, rub, gallop  Lungs:  Clear to auscultation bilaterally  No wheezes, crackles, rhonchi  Musculoskeletal:  Chest wall not tender with palpation    EKG:  Normal sinus rhythm  T-wave inversion in lead V3      Assessment/plan  1-chest discomfort post dialysis:  Shortly after Resolved  Continue to monitor closely  Repeat EKG in a m     Currently in normal sinus rhythm    Monitor on telemetry

## 2019-03-05 NOTE — PLAN OF CARE
Problem: Potential for Falls  Goal: Patient will remain free of falls  Description  INTERVENTIONS:  - Assess patient frequently for physical needs  -  Identify cognitive and physical deficits and behaviors that affect risk of falls  -  Allons fall precautions as indicated by assessment   - Educate patient/family on patient safety including physical limitations  - Instruct patient to call for assistance with activity based on assessment  - Modify environment to reduce risk of injury  - Consider OT/PT consult to assist with strengthening/mobility  Outcome: Progressing     Problem: Prexisting or High Potential for Compromised Skin Integrity  Goal: Skin integrity is maintained or improved  Description  INTERVENTIONS:  - Identify patients at risk for skin breakdown  - Assess and monitor skin integrity  - Assess and monitor nutrition and hydration status  - Monitor labs (i e  albumin)  - Assess for incontinence   - Turn and reposition patient  - Assist with mobility/ambulation  - Relieve pressure over bony prominences  - Avoid friction and shearing  - Provide appropriate hygiene as needed including keeping skin clean and dry  - Evaluate need for skin moisturizer/barrier cream  - Collaborate with interdisciplinary team (i e  Nutrition, Rehabilitation, etc )   - Patient/family teaching  Outcome: Progressing     Problem: Nutrition/Hydration-ADULT  Goal: Nutrient/Hydration intake appropriate for improving, restoring or maintaining nutritional needs  Description  Monitor and assess patient's nutrition/hydration status for malnutrition (ex- brittle hair, bruises, dry skin, pale skin and conjunctiva, muscle wasting, smooth red tongue, and disorientation)  Collaborate with interdisciplinary team and initiate plan and interventions as ordered  Monitor patient's weight and dietary intake as ordered or per policy  Utilize nutrition screening tool and intervene per policy   Determine patient's food preferences and provide high-protein, high-caloric foods as appropriate  INTERVENTIONS:  - Monitor oral intake, urinary output, labs, and treatment plans  - Assess nutrition and hydration status and recommend course of action  - Evaluate amount of meals eaten  - Assist patient with eating if necessary   - Allow adequate time for meals  - Recommend/ encourage appropriate diets, oral nutritional supplements, and vitamin/mineral supplements  - Order, calculate, and assess calorie counts as needed  - Recommend, monitor, and adjust tube feedings and TPN/PPN based on assessed needs  - Assess need for intravenous fluids  - Provide specific nutrition/hydration education as appropriate  - Include patient/family/caregiver in decisions related to nutrition  Outcome: Progressing     Problem: CARDIOVASCULAR - ADULT  Goal: Maintains optimal cardiac output and hemodynamic stability  Description  INTERVENTIONS:  - Monitor I/O, vital signs and rhythm  - Monitor for S/S and trends of decreased cardiac output i e  bleeding, hypotension  - Administer and titrate ordered vasoactive medications to optimize hemodynamic stability  - Assess quality of pulses, skin color and temperature  - Assess for signs of decreased coronary artery perfusion - ex   Angina  - Instruct patient to report change in severity of symptoms  Outcome: Progressing  Goal: Absence of cardiac dysrhythmias or at baseline rhythm  Description  INTERVENTIONS:  - Continuous cardiac monitoring, monitor vital signs, obtain 12 lead EKG if indicated  - Administer antiarrhythmic and heart rate control medications as ordered  - Monitor electrolytes and administer replacement therapy as ordered  Outcome: Progressing     Problem: METABOLIC, FLUID AND ELECTROLYTES - ADULT  Goal: Electrolytes maintained within normal limits  Description  INTERVENTIONS:  - Monitor labs and assess patient for signs and symptoms of electrolyte imbalances  - Administer electrolyte replacement as ordered  - Monitor response to electrolyte replacements, including repeat lab results as appropriate  - Instruct patient on fluid and nutrition as appropriate  Outcome: Progressing  Goal: Fluid balance maintained  Description  INTERVENTIONS:  - Monitor labs and assess for signs and symptoms of volume excess or deficit  - Monitor I/O and WT  - Instruct patient on fluid and nutrition as appropriate  Outcome: Progressing  Goal: Glucose maintained within target range  Description  INTERVENTIONS:  - Monitor Blood Glucose as ordered  - Assess for signs and symptoms of hyperglycemia and hypoglycemia  - Administer ordered medications to maintain glucose within target range  - Assess nutritional intake and initiate nutrition service referral as needed  Outcome: Progressing     Problem: SKIN/TISSUE INTEGRITY - ADULT  Goal: Skin integrity remains intact  Description  INTERVENTIONS  - Identify patients at risk for skin breakdown  - Assess and monitor skin integrity  - Assess and monitor nutrition and hydration status  - Monitor labs (i e  albumin)  - Assess for incontinence   - Turn and reposition patient  - Assist with mobility/ambulation  - Relieve pressure over bony prominences  - Avoid friction and shearing  - Provide appropriate hygiene as needed including keeping skin clean and dry  - Evaluate need for skin moisturizer/barrier cream  - Collaborate with interdisciplinary team (i e  Nutrition, Rehabilitation, etc )   - Patient/family teaching  Outcome: Progressing

## 2019-03-05 NOTE — PROGRESS NOTES
Progress Note - Efra Coreas  80 y o  male MRN: [id number]    Unit/Bed#: E4 -01 Encounter: [id number]      Assessment/Plan:  1-acute kidney injury superimposed on chronic kidney disease stage 3:  Patient presented with acute kidney injury, after a one-week history of worsening progressive weakness, vomiting, and no PO intake     Was admitted to the intensive care unit and evaluated by the nephrology team   He had a temporary dialysis catheter placed  He underwent CVVH  He is currently undergoing intermittent hemodialysis  -patient has a history of chronic kidney disease stage 3  His baseline creatinine ranges 1 3-1 5              -patient was evaluated by the nephrology service  His acute kidney injury is felt to be secondary to ATN, as well as postobstructive uropathy               -patient's serologies with ANCA, CHACHO, SPEP, UPEP, acute hepatitis panel have been negative  No evidence of monoclonal gammopathy               -patient has good urine output, however his renal function is not improving      -creatinine today is 6:  Discussed with the nephrology team:  Patient will require Perma-Cath placement and outpatient dialysis established  Case management assisting     2-urinary retention:  Patient had Singh catheter placed  Continued on Proscar  Was noted to have prostatomegaly on his CT scan               -removed catheter 3/4 no significant urinary retention thus far  Continue to check voiding trial               -if does not tolerate voiding trial require replacement of Singh catheter, to remain in at discharge, and Urology consultation  Patient follows with Dr Kristine Alpers as an outpatient      3-pericardial effusion:  Patient presented with a large pericardial effusion with impending tamponade  Was felt to be most likely secondary to renal failure and uremia  Status post subxiphoid pericardial window  Initially had a PATTI drain which has since been removed                -pathology revealed 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  -repeat echocardiogram with normal left ventricular ejection fraction and small pericardial effusion  -will need outpatient cardiology follow-up in 1 month with repeat echocardiogram   His outpatient cardiologist is Dr Octavio Figueroa     4-essential hypertension:   Home regimen has been held, due to hypotension  Blood pressure has been adequately controlled with a systolic blood pressure ranged 120-130s today  Continue to monitor      5-anemia:  Secondary to anemia of chronic disease  However there were concerns that patient had acute blood loss anemia while in the ICU, requiring blood transfusion               -hemoglobin has since been stable ranging 8-9      6-COPD:  Without acute exacerbation  Continue Breo-ellipta     7-atrial fibrillation:  Patient has a history of paroxysmal atrial fibrillation               -continue anticoagulation with Eliquis:  However monitor renal function              -not requiring rate control agents      8-diabetes type 2:  Without long-term use of insulin, with chronic kidney disease stage 3  Patient is currently on sliding scale insulin alone  Home metformin on hold    -blood sugars today 134, 146     9-hyperlipidemia:  Continue statin     10-history of DVT:  Continue anticoagulation with Eliquis     11-GI:  I reviewed patient's GI office note from 12/2018:  Patient has had an unintentional weight loss with upper GI symptoms, and a CT scan with thickening of the stomach concerning for malignancy  EGD was anticipated               -continue proton pump inhibitor              -prior nausea/vomiting has resolved  Patient tolerating p o      12-status post thrombocytopenia:  Patient had transient thrombocytopenia, most likely secondary to consumption with acute phase illness  This normalized  Continue to monitor closely    Not consistent with DIC/TTP     13-family:   updated wife at bedside    Discussed with patient's nurse at bedside  Discussed with -will arrange home PT and nursing  Discussed with Nephrology team        VTE Pharmacologic Prophylaxis: RX contraindicated due to: eliquis  VTE Mechanical Prophylaxis: sequential compression device      Certification Statement: The patient will continue to require additional inpatient hospital stay due to need for further acute intervention for Perma-Cath, dialysis arrangements    Status: inpatient     ===================================================================    Subjective:  Patient denies any complaints  He denies any current pain anywhere  He denies any shortness of breath or dyspnea on exertion  He denies any cough  He denies any nausea, vomiting, diarrhea  He is tolerating p  O  He denies any dizziness or lightheadedness  He notes he has been passing his urine since the Singh catheter was removed  Physical Exam:   Temp:  [96 4 °F (35 8 °C)-98 9 °F (37 2 °C)] 96 4 °F (35 8 °C)  HR:  [77-86] 77  Resp:  [16-20] 16  BP: (127-148)/(52-84) 134/60    Gen:  Pleasant, non-tachypnic, non-dyspnic  Conversant  Heart: regular rate and rhythm, S1S2 present, no murmur, rub or gallop  Lungs: clear to ausculatation bilaterally  No wheezing, crackles, or rhonchi  No accessory muscle use or respiratory distress  Abd: soft, non-tender, non-distended  NABS, no guarding, rebound or peritoneal signs  Extremities: no clubbing, cyanosis  Trace pedal edema  2+pedal pulses bilaterally  Neuro: awake, alert and oriented  Fluent speech  Skin: warm and dry: no petechiae, purpura and rash      LABS:   Results from last 7 days   Lab Units 03/05/19  0502 03/03/19  0605 03/02/19  0613   WBC Thousand/uL 4 85 4 78 4 74   HEMOGLOBIN g/dL 8 2* 8 2* 8 4*   HEMATOCRIT % 24 9* 24 7* 25 4*   PLATELETS Thousands/uL 184 156 141*     Results from last 7 days   Lab Units 03/05/19  0502 03/04/19  1059 03/03/19  0605   POTASSIUM mmol/L 3 9 3 8 3 9   CHLORIDE mmol/L 101 99* 105   CO2 mmol/L 27 28 28   BUN mg/dL 64* 53* 35*   CREATININE mg/dL 6 50* 6 25* 5 00*   CALCIUM mg/dL 8 2* 8 1* 8 0*       Hospital Data:  3/5:  Limited echocardiogram:  LEFT VENTRICLE:  Systolic function was normal  Ejection fraction was estimated to be 55 %  Although no diagnostic regional wall motion abnormality was identified, this possibility cannot be completely excluded on the basis of this study  Doppler parameters were consistent with abnormal left ventricular relaxation (grade 1 diastolic dysfunction)  2/26:  Chest x-ray:Central pulmonary vascular congestion with mild interstitial edema   Small left basilar effusion      2/24:  Chest x-ray:  No pneumothorax status post right internal jugular central venous catheter placement  Vascular congestion   Cardiomegaly     2/23:  Chest x-ray:  Trace left pleural effusion  Stable mild cardiomegaly     2/23:  CT abdomen/pelvis:  1   Limited examination due to lack of oral and intravenous contrast material   2   Enlarging, incompletely visualized large pericardial effusion  3   Moderate constipation  4   Enlarging left pleural effusion and left basilar infiltrate representing atelectasis or pneumonia  5   Prostatomegaly with bladder outlet obstruction     2/26: Body fluid culture:  Negative growth     2/24:  Echocardiogram:  LEFT VENTRICLE:  Systolic function was normal  Ejection fraction was estimated to be 60 %  There were no regional wall motion abnormalities  Wall thickness was mildly increased  RIGHT VENTRICLE:  The ventricle was mildly dilated  LEFT ATRIUM:  The atrium was mildly to moderately dilated  RIGHT ATRIUM:  The atrium was moderately dilated  MITRAL VALVE:  There was mild regurgitation  TRICUSPID VALVE:  There was mild regurgitation  PERICARDIUM:  A moderate to large pericardial effusion was identified  No RV collapse    Some RA collapse and minor respiratory variation of MV inflow c/w early tamponade     Procedure:  2/26: Subxiphoid pericardial window              ---------------------------------------------------------------------------------------------------------------  This note has been constructed using a voice recognition system

## 2019-03-05 NOTE — PLAN OF CARE
Problem: PHYSICAL THERAPY ADULT  Goal: Performs mobility at highest level of function for planned discharge setting  See evaluation for individualized goals  Description  Treatment/Interventions: Functional transfer training, LE strengthening/ROM, Elevations, Therapeutic exercise, Endurance training, Patient/family training, Equipment eval/education, Gait training, Spoke to nursing, Family  Equipment Recommended: Other (Comment)(Rolling Walker)       See flowsheet documentation for full assessment, interventions and recommendations  Outcome: Progressing  Note:   Prognosis: Good  Problem List: Decreased strength, Decreased range of motion, Decreased endurance, Impaired balance, Decreased mobility, Impaired hearing  Assessment: Pt  seated at EOB upon my arrival  Pt  able to complete all transfers practicing proper technique with no noted LOB  Progressed with an increased amb  trials with use of RW and standbyA of therapist with cues for LE sequencing  Pt  required a seated resting period in between gait trials due to fatigue  Noted O2 saturation measured at 94% on RA during amb  trials  Progressed to stair training, being able to complete practicing proper technique with no noted LOB  Continued with a second amb  trial with eventual return to room and repositioned seated in bedside chair at end of treatment session  PT will continue to recommend d/c home with HHPT and family support as needed when medically stable  Barriers to Discharge: Decreased caregiver support  Barriers to Discharge Comments: Level of support at home  Recommendation: Home PT, Home with family support     PT - OK to Discharge: Yes(if d/c when medically stable )    See flowsheet documentation for full assessment

## 2019-03-06 ENCOUNTER — APPOINTMENT (INPATIENT)
Dept: RADIOLOGY | Facility: HOSPITAL | Age: 84
DRG: 674 | End: 2019-03-06
Payer: COMMERCIAL

## 2019-03-06 VITALS
DIASTOLIC BLOOD PRESSURE: 69 MMHG | HEIGHT: 64 IN | RESPIRATION RATE: 17 BRPM | TEMPERATURE: 97.5 F | OXYGEN SATURATION: 91 % | WEIGHT: 139.99 LBS | HEART RATE: 86 BPM | SYSTOLIC BLOOD PRESSURE: 127 MMHG | BODY MASS INDEX: 23.9 KG/M2

## 2019-03-06 PROBLEM — R33.9 URINARY RETENTION: Status: RESOLVED | Noted: 2019-03-04 | Resolved: 2019-03-06

## 2019-03-06 PROBLEM — I31.3 PERICARDIAL EFFUSION: Chronic | Status: RESOLVED | Noted: 2018-02-12 | Resolved: 2019-03-06

## 2019-03-06 PROBLEM — I31.39 PERICARDIAL EFFUSION: Chronic | Status: RESOLVED | Noted: 2018-02-12 | Resolved: 2019-03-06

## 2019-03-06 PROBLEM — D69.6 THROMBOCYTOPENIA (HCC): Status: RESOLVED | Noted: 2019-02-25 | Resolved: 2019-03-06

## 2019-03-06 LAB
ATRIAL RATE: 82 BPM
ATRIAL RATE: 85 BPM
GLUCOSE SERPL-MCNC: 123 MG/DL (ref 65–140)
GLUCOSE SERPL-MCNC: 129 MG/DL (ref 65–140)
P AXIS: 17 DEGREES
P AXIS: 49 DEGREES
PR INTERVAL: 142 MS
PR INTERVAL: 146 MS
QRS AXIS: -11 DEGREES
QRS AXIS: 0 DEGREES
QRSD INTERVAL: 88 MS
QRSD INTERVAL: 90 MS
QT INTERVAL: 388 MS
QT INTERVAL: 464 MS
QTC INTERVAL: 453 MS
QTC INTERVAL: 552 MS
T WAVE AXIS: 10 DEGREES
T WAVE AXIS: 57 DEGREES
VENTRICULAR RATE: 82 BPM
VENTRICULAR RATE: 85 BPM

## 2019-03-06 PROCEDURE — 36556 INSERT NON-TUNNEL CV CATH: CPT

## 2019-03-06 PROCEDURE — 0JH63XZ INSERTION OF TUNNELED VASCULAR ACCESS DEVICE INTO CHEST SUBCUTANEOUS TISSUE AND FASCIA, PERCUTANEOUS APPROACH: ICD-10-PCS | Performed by: RADIOLOGY

## 2019-03-06 PROCEDURE — 93005 ELECTROCARDIOGRAM TRACING: CPT

## 2019-03-06 PROCEDURE — 99232 SBSQ HOSP IP/OBS MODERATE 35: CPT | Performed by: INTERNAL MEDICINE

## 2019-03-06 PROCEDURE — C1750 CATH, HEMODIALYSIS,LONG-TERM: HCPCS

## 2019-03-06 PROCEDURE — 77001 FLUOROGUIDE FOR VEIN DEVICE: CPT | Performed by: RADIOLOGY

## 2019-03-06 PROCEDURE — 93010 ELECTROCARDIOGRAM REPORT: CPT | Performed by: INTERNAL MEDICINE

## 2019-03-06 PROCEDURE — 99239 HOSP IP/OBS DSCHRG MGMT >30: CPT | Performed by: INTERNAL MEDICINE

## 2019-03-06 PROCEDURE — 76937 US GUIDE VASCULAR ACCESS: CPT | Performed by: RADIOLOGY

## 2019-03-06 PROCEDURE — 99152 MOD SED SAME PHYS/QHP 5/>YRS: CPT | Performed by: RADIOLOGY

## 2019-03-06 PROCEDURE — 02H633Z INSERTION OF INFUSION DEVICE INTO RIGHT ATRIUM, PERCUTANEOUS APPROACH: ICD-10-PCS | Performed by: RADIOLOGY

## 2019-03-06 PROCEDURE — 76937 US GUIDE VASCULAR ACCESS: CPT

## 2019-03-06 PROCEDURE — 82948 REAGENT STRIP/BLOOD GLUCOSE: CPT

## 2019-03-06 PROCEDURE — 97110 THERAPEUTIC EXERCISES: CPT

## 2019-03-06 PROCEDURE — 36558 INSERT TUNNELED CV CATH: CPT | Performed by: RADIOLOGY

## 2019-03-06 PROCEDURE — 77001 FLUOROGUIDE FOR VEIN DEVICE: CPT

## 2019-03-06 PROCEDURE — 99152 MOD SED SAME PHYS/QHP 5/>YRS: CPT

## 2019-03-06 PROCEDURE — C1894 INTRO/SHEATH, NON-LASER: HCPCS

## 2019-03-06 PROCEDURE — 97530 THERAPEUTIC ACTIVITIES: CPT

## 2019-03-06 PROCEDURE — 97116 GAIT TRAINING THERAPY: CPT

## 2019-03-06 RX ORDER — MIDAZOLAM HYDROCHLORIDE 1 MG/ML
INJECTION INTRAMUSCULAR; INTRAVENOUS CODE/TRAUMA/SEDATION MEDICATION
Status: COMPLETED | OUTPATIENT
Start: 2019-03-06 | End: 2019-03-06

## 2019-03-06 RX ORDER — FENTANYL CITRATE 50 UG/ML
INJECTION, SOLUTION INTRAMUSCULAR; INTRAVENOUS CODE/TRAUMA/SEDATION MEDICATION
Status: COMPLETED | OUTPATIENT
Start: 2019-03-06 | End: 2019-03-06

## 2019-03-06 RX ORDER — LIDOCAINE HYDROCHLORIDE AND EPINEPHRINE 10; 10 MG/ML; UG/ML
INJECTION, SOLUTION INFILTRATION; PERINEURAL CODE/TRAUMA/SEDATION MEDICATION
Status: COMPLETED | OUTPATIENT
Start: 2019-03-06 | End: 2019-03-06

## 2019-03-06 RX ADMIN — MIDAZOLAM 1 MG: 1 INJECTION INTRAMUSCULAR; INTRAVENOUS at 09:12

## 2019-03-06 RX ADMIN — DOCUSATE SODIUM 100 MG: 100 CAPSULE, LIQUID FILLED ORAL at 11:06

## 2019-03-06 RX ADMIN — APIXABAN 2.5 MG: 2.5 TABLET, FILM COATED ORAL at 11:07

## 2019-03-06 RX ADMIN — VITAMIN D, TAB 1000IU (100/BT) 2000 UNITS: 25 TAB at 11:06

## 2019-03-06 RX ADMIN — FINASTERIDE 5 MG: 5 TABLET, FILM COATED ORAL at 11:07

## 2019-03-06 RX ADMIN — FLUTICASONE FUROATE AND VILANTEROL TRIFENATATE 1 PUFF: 100; 25 POWDER RESPIRATORY (INHALATION) at 11:08

## 2019-03-06 RX ADMIN — LIDOCAINE HYDROCHLORIDE AND EPINEPHRINE 5 ML: 10; 10 INJECTION, SOLUTION INFILTRATION; PERINEURAL at 09:07

## 2019-03-06 RX ADMIN — FENTANYL CITRATE 50 MCG: 50 INJECTION, SOLUTION INTRAMUSCULAR; INTRAVENOUS at 09:09

## 2019-03-06 RX ADMIN — PAROXETINE 30 MG: 20 TABLET, FILM COATED ORAL at 11:06

## 2019-03-06 RX ADMIN — FENTANYL CITRATE 50 MCG: 50 INJECTION, SOLUTION INTRAMUSCULAR; INTRAVENOUS at 09:06

## 2019-03-06 RX ADMIN — MIDAZOLAM 1 MG: 1 INJECTION INTRAMUSCULAR; INTRAVENOUS at 09:06

## 2019-03-06 RX ADMIN — METOPROLOL TARTRATE 25 MG: 25 TABLET, FILM COATED ORAL at 11:07

## 2019-03-06 RX ADMIN — OXYCODONE HYDROCHLORIDE AND ACETAMINOPHEN 250 MG: 500 TABLET ORAL at 11:07

## 2019-03-06 NOTE — NURSING NOTE
Reviewed and discussed pts discharge instructions  Reviewed and discussed pt medicatons, does times  Reviewed and discussed diet options for pt on dialysis  Reviewed his dialysis days and times  Pt's wife at bedside

## 2019-03-06 NOTE — PHYSICAL THERAPY NOTE
Physical Therapy Progress Note     03/06/19 1332   Pain Assessment   Pain Assessment No/denies pain   Pain Score No Pain   Restrictions/Precautions   Weight Bearing Precautions Per Order No   Other Precautions Fall Risk;Multiple lines;Hard of hearing   General   Chart Reviewed Yes   Response to Previous Treatment Patient with no complaints from previous session  Family/Caregiver Present Yes   Subjective   Subjective Willing to participate in therapy this PM    Transfers   Sit to Stand 5  Supervision   Additional items Assist x 1; Armrests; Increased time required;Verbal cues   Stand to Sit 5  Supervision   Additional items Assist x 1; Armrests; Increased time required;Verbal cues   Ambulation/Elevation   Gait pattern Decreased foot clearance; Short stride; Inconsistent val; Foward flexed   Gait Assistance 5  Supervision   Additional items Assist x 1; Tactile cues; Verbal cues   Assistive Device Rolling walker   Distance 90' x 4 with multiple standing resting periods   Stair Management Assistance   (education provided)   Balance   Static Sitting Good   Dynamic Sitting Fair +   Static Standing Fair   Dynamic Standing Fair   Ambulatory Fair   Endurance Deficit   Endurance Deficit No   Activity Tolerance   Activity Tolerance Patient tolerated treatment well   Nurse Made Aware Yes   Exercises   TKR Sitting;10 reps;AROM; Bilateral   Assessment   Prognosis Good   Problem List Decreased strength;Decreased range of motion;Decreased endurance; Impaired balance;Decreased mobility; Impaired hearing   Assessment Pt  seated in bedside chair upon my arrival  Performance of HEP seated in chair with cues for proper completion  Progressed with transfers with cues for hand placement/technique  Progressed with an increased amb  trial with use of RW and standbyA of therapist with cues for LE sequencing  Pt  required several standing resting periods due to fatigue   Pt  continued with increased amb  trial with eventual return to room and repositioned seated in bedside chair  Discussion in detail about stair training, pt  and pt's spouse both report good understanding at this time  Pt  remained seated in bedside chair at end of treatment session  PT will continue to recommend d/c home with HHPT and family support as needed when medically stable  Barriers to Discharge None   Goals   Patient Goals To go home  STG Expiration Date 03/07/19   Treatment Day 4   Plan   Treatment/Interventions Functional transfer training;LE strengthening/ROM; Therapeutic exercise;Elevations; Endurance training;Gait training;Spoke to nursing;Spoke to case management; Family   Progress Progressing toward goals   PT Frequency Other (Comment)  (3-5x/wk)   Recommendation   Recommendation Home PT; Home with family support   Equipment Recommended Walker  (RW)   PT - OK to Discharge Yes  (if d/c when medically stable )     Marcia Esposito, PTA

## 2019-03-06 NOTE — SOCIAL WORK
Cm received phone call from [facility] Dialysis admissions who stated pt is accepted to [facility], family is choosing a T/TH/SAT schedule chair time starting at 10:30am      [facility] faxed cm paperwork for family which has instructions for when to show up, what to bring etc     Cm awaiting final confirmation from ESTEFANI Ware of discharge today      Pt's family can transport home around 3:30pm

## 2019-03-06 NOTE — PLAN OF CARE
Problem: PHYSICAL THERAPY ADULT  Goal: Performs mobility at highest level of function for planned discharge setting  See evaluation for individualized goals  Description  Treatment/Interventions: Functional transfer training, LE strengthening/ROM, Elevations, Therapeutic exercise, Endurance training, Patient/family training, Equipment eval/education, Gait training, Spoke to nursing, Family  Equipment Recommended: Other (Comment)(Rolling Walker)       See flowsheet documentation for full assessment, interventions and recommendations  Outcome: Progressing  Note:   Prognosis: Good  Problem List: Decreased strength, Decreased range of motion, Decreased endurance, Impaired balance, Decreased mobility, Impaired hearing  Assessment: Pt  seated in bedside chair upon my arrival  Performance of HEP seated in chair with cues for proper completion  Progressed with transfers with cues for hand placement/technique  Progressed with an increased amb  trial with use of RW and standbyA of therapist with cues for LE sequencing  Pt  required several standing resting periods due to fatigue  Pt  continued with increased amb  trial with eventual return to room and repositioned seated in bedside chair  Discussion in detail about stair training, pt  and pt's spouse both report good understanding at this time  Pt  remained seated in bedside chair at end of treatment session  PT will continue to recommend d/c home with HHPT and family support as needed when medically stable  Barriers to Discharge: None  Barriers to Discharge Comments: Level of support at home  Recommendation: Home PT, Home with family support     PT - OK to Discharge: Yes(if d/c when medically stable )    See flowsheet documentation for full assessment

## 2019-03-06 NOTE — PROGRESS NOTES
Progress Note - Cardiology   Saurabh Jeffries  80 y o  male MRN: [id number]  Unit/Bed#: E4 -01 Encounter: [id number]      Assessment:     1  Acute kidney injury new to dialysis  2  Large pericardial effusion with tamponade status post subxiphoid pericardial window and drainage-status post drain removal  3  13 beats of nonsustained ventricular tachycardia  4  Diabetes  5  Hypertension  6  History of DVT    Discussion/Recommendations:    Residual small effusion on echocardiogram     Would add beta-blocker given nonsustained ventricular tachycardia  Can consider ischemic workup as an outpatient  On Eliquis/statin       Subjective:  No chest pain or trouble breathing      Physical Exam:  GEN:  NAD  HEENT:  MMM, NCAT, pink conjunctiva, EOMI, nonicteric sclera  CV:  NO JVD/HJR, RR, NO M/R/G, +S1/S2, NO PARASTERNAL HEAVE/THRILL, NO LE EDEMA, NO HEPATIC SYSTOLIC PULSATION, WARM EXTREMITIES  RESP:  CTAB/L  ABD:  SOFT, NT, NO GROSS ORGANOMEGALY        Vitals:   /64   Pulse 88   Temp 98 8 °F (37 1 °C) (Tympanic)   Resp 18   Ht 5' 4" (1 626 m)   Wt 63 5 kg (139 lb 15 9 oz)   SpO2 98%   BMI 24 03 kg/m²   Vitals:    03/05/19 0540 03/06/19 0600   Weight: 63 8 kg (140 lb 10 5 oz) 63 5 kg (139 lb 15 9 oz)       Intake/Output Summary (Last 24 hours) at 3/6/2019 0953  Last data filed at 3/6/2019 9018  Gross per 24 hour   Intake 600 ml   Output 3025 ml   Net -2425 ml         TELEMETRY:  13 beats of NSVT  Lab Results:  Results from last 7 days   Lab Units 03/05/19  0502   WBC Thousand/uL 4 85   HEMOGLOBIN g/dL 8 2*   HEMATOCRIT % 24 9*   PLATELETS Thousands/uL 184     Results from last 7 days   Lab Units 03/05/19  0502  03/02/19  0613   POTASSIUM mmol/L 3 9   < > 3 6   CHLORIDE mmol/L 101   < > 102   CO2 mmol/L 27   < > 27   BUN mg/dL 64*   < > 28*   CREATININE mg/dL 6 50*   < > 3 90*   CALCIUM mg/dL 8 2*   < > 7 9*   ALK PHOS U/L  --   --  45*   ALT U/L  --   --  11*   AST U/L  --   --  21    < > = values in this interval not displayed       Results from last 7 days   Lab Units 03/05/19  0502   POTASSIUM mmol/L 3 9   CHLORIDE mmol/L 101   CO2 mmol/L 27   BUN mg/dL 64*   CREATININE mg/dL 6 50*   CALCIUM mg/dL 8 2*             Medications:    Current Facility-Administered Medications:     acetaminophen (TYLENOL) tablet 650 mg, 650 mg, Oral, Q6H PRN, Paco Fleeting Bilofsky, CRNP    ALPRAZolam (XANAX) tablet 0 25 mg, 0 25 mg, Oral, Q8H PRN, Paco Fleeting Bilofsky, CRNP    apixaban (ELIQUIS) tablet 2 5 mg, 2 5 mg, Oral, BID, Paco Fleeting Bilofsky, CRNP, 2 5 mg at 03/05/19 1805    ascorbic acid (VITAMIN C) tablet 250 mg, 250 mg, Oral, Daily, Paco Fleeting Bilofsky, CRNP, 250 mg at 03/05/19 0840    atorvastatin (LIPITOR) tablet 10 mg, 10 mg, Oral, Daily With Dinner, Analy K Bilofjagruti, CRNP, 10 mg at 03/05/19 1805    cholecalciferol (VITAMIN D3) tablet 2,000 Units, 2,000 Units, Oral, Daily, Paco Fleeting Bilofjagruti, CRNP, 2,000 Units at 03/05/19 0840    docusate sodium (COLACE) capsule 100 mg, 100 mg, Oral, BID, Paco Fleeting Bilofsky, CRNP, 100 mg at 03/05/19 1805    finasteride (PROSCAR) tablet 5 mg, 5 mg, Oral, Daily, Analy K Bilofsky, CRNP, 5 mg at 03/05/19 0840    fluticasone-vilanterol (BREO ELLIPTA) 100-25 mcg/inh inhaler 1 puff, 1 puff, Inhalation, Daily, Paco Schmidt CRNP, 1 puff at 03/05/19 0839    HYDROcodone-acetaminophen (NORCO) 5-325 mg per tablet 1 tablet, 1 tablet, Oral, Q6H PRN, Paco Warreneting Bilofjagruti, CRNP, 1 tablet at 02/26/19 1933    insulin lispro (HumaLOG) 100 units/mL subcutaneous injection 1-6 Units, 1-6 Units, Subcutaneous, TID AC, 3 Units at 03/04/19 1159 **AND** Fingerstick Glucose (POCT), , , TID AC, SUJATA Brantley    metoprolol (LOPRESSOR) injection 2 5 mg, 2 5 mg, Intravenous, Q6H PRN, SUJATA Brantley    pantoprazole (PROTONIX) EC tablet 40 mg, 40 mg, Oral, Early Morning, SUJATA Brantley, 40 mg at 03/05/19 0533    PARoxetine (PAXIL) tablet 30 mg, 30 mg, Oral, Daily, SUJATA Mccrary, 30 mg at 03/05/19 0839   senna (SENOKOT) tablet 8 6 mg, 1 tablet, Oral, HS, Charles Singh Roxana, SUJATA, 8 6 mg at 03/05/19 2125    Portions of the record may have been created with voice recognition software  Occasional wrong word or "sound a like" substitutions may have occurred due to the inherent limitations of voice recognition software  Read the chart carefully and recognize, using context, where substitutions have occurred

## 2019-03-06 NOTE — PROGRESS NOTES
NEPHROLOGY PROGRESS NOTE   Roe Joshi  80 y o  male MRN: [id number]  Unit/Bed#: E4 -01 Encounter: [id number]  Reason for Consult: REIK (POA)    ASSESSMENT/PLAN:  Acute kidney injury (POA):  Likely secondary to ATN, previously on CVVH and transitioned to l-HD   -last hemodialysis session was on 03/05    - UOP increasing, hopefully recovering    -avoid nephrotoxins/hypotensive episode/NSAID/dye  -CT:  Negative for hydronephrosis   -losartan and Lasix remain on hold   -serologies have been negative, hepatitis panel nonreactive, hemolysis smear negative for schistocytes or helmet cells  - OP follow up arranged at [facility] [address] in [facility] for TTS schedule       Access:  Temporary dialysis catheter  Ivory Candy cath placement today       CKD stage 3:  Longstanding disease secondary to hypertension and diabetes   With previous baseline creatinine around 1 3-1 5   -will need outpatient follow-up if he gains renal function       Urinary retention:  With history of BPH   S/P medeiros removal  Denies urinary complaints       Large pericardial effusion:  Likely secondary to renal failure/uremia   Status post pericardial window with PATTI drain placement, now removed  -cardiology continues follow      Hypertension:  Blood pressure acceptable and stable   -will continue to trend with UF with dialysis  - Lasix and losartan remain on hold  - avoid hypotension for renal perfusion       Anemia:  Hemoglobin remains low but is stable  - iron: 41, TIBC: 175    -will continue to monitor and transfuse for hemoglobin less than 7 0        Disposition:  Okay to discharge from renal with follow up with Dr Lilli King at [facility]  SUBJECTIVE:  Mr Christina Pang is doing well today  He is resting in bed  He had his PermCath placed this morning  He denies any chest pain or shortness of breath  He denies nausea, vomiting, or diarrhea  He denies issues with urination      OBJECTIVE:  Current Weight: Weight - Scale: 63 5 kg (139 lb 15 9 oz)  Vitals:    03/06/19 0905 03/06/19 0910 03/06/19 0915 03/06/19 0920   BP: 151/74 155/74 147/70 140/64   BP Location:       Pulse: 77 89 86 88   Resp:       Temp:       TempSrc:       SpO2: 97% 94% 95% 98%   Weight:       Height:           Intake/Output Summary (Last 24 hours) at 3/6/2019 1051  Last data filed at 3/6/2019 6127  Gross per 24 hour   Intake 600 ml   Output 3025 ml   Net -2425 ml     General: No apparent distress  Skin: warm, dry, intact, no rash  HEENT: Moist mucous membranes, sclera anicteric, normocephalic  Neck: No apparent JVD appreciated, perm cath intact  Chest: Lung sounds clear B/L, on RA  Heart: Regular rate and rhythm, No murmer  Abdomen: Soft, round, NT, +BS  Extremities: B/L LE edema present  Neuro: Alert and oriented  Psych: Appropriate mood and affect    Medications:    Current Facility-Administered Medications:     acetaminophen (TYLENOL) tablet 650 mg, 650 mg, Oral, Q6H PRN, Nati Place Bilofsky, CRNP    ALPRAZolam (XANAX) tablet 0 25 mg, 0 25 mg, Oral, Q8H PRN, Nati Place Bilofsky, CRNP    apixaban (ELIQUIS) tablet 2 5 mg, 2 5 mg, Oral, BID, Nati Place Bilofsky, CRNP, 2 5 mg at 03/05/19 1805    ascorbic acid (VITAMIN C) tablet 250 mg, 250 mg, Oral, Daily, Nati Place Bilofsky, CRNP, 250 mg at 03/05/19 0840    atorvastatin (LIPITOR) tablet 10 mg, 10 mg, Oral, Daily With Dinner, Analy K Bilofsky, CRNP, 10 mg at 03/05/19 1805    cholecalciferol (VITAMIN D3) tablet 2,000 Units, 2,000 Units, Oral, Daily, Nati Place Bilofsky, CRNP, 2,000 Units at 03/05/19 0840    docusate sodium (COLACE) capsule 100 mg, 100 mg, Oral, BID, Nati Place Bilofsky, CRNP, 100 mg at 03/05/19 1805    finasteride (PROSCAR) tablet 5 mg, 5 mg, Oral, Daily, SUJATA Brantley, 5 mg at 03/05/19 0840    fluticasone-vilanterol (BREO ELLIPTA) 100-25 mcg/inh inhaler 1 puff, 1 puff, Inhalation, Daily, SUJATA Guthrie, 1 puff at 03/05/19 0839    HYDROcodone-acetaminophen (NORCO) 5-325 mg per tablet 1 tablet, 1 tablet, Oral, Q6H PRN, Kera Bearded Roxana, CRNP, 1 tablet at 02/26/19 1933    insulin lispro (HumaLOG) 100 units/mL subcutaneous injection 1-6 Units, 1-6 Units, Subcutaneous, TID AC, 3 Units at 03/04/19 1159 **AND** Fingerstick Glucose (POCT), , , TID AC, Analy K Roxana, CRNP    metoprolol (LOPRESSOR) injection 2 5 mg, 2 5 mg, Intravenous, Q6H PRN, Kera Bearded Bilofjagruti, CRNP    metoprolol tartrate (LOPRESSOR) tablet 25 mg, 25 mg, Oral, Q12H [address], Gris Starkey MD    pantoprazole (PROTONIX) EC tablet 40 mg, 40 mg, Oral, Early Morning, Analy K Bilelisabeth, CRNP, 40 mg at 03/05/19 0533    PARoxetine (PAXIL) tablet 30 mg, 30 mg, Oral, Daily, Kera Bearded Bilelisabeth, CRNP, 30 mg at 03/05/19 0839    senna (SENOKOT) tablet 8 6 mg, 1 tablet, Oral, HS, Kera Bearded Bilofsky, CRNP, 8 6 mg at 03/05/19 2125    Laboratory Results:  Results from last 7 days   Lab Units 03/05/19  0502 03/04/19  1059 03/03/19  0605 03/02/19  0613 03/01/19  0611 02/28/19  0626   WBC Thousand/uL 4 85  --  4 78 4 74 5 08 5 02   HEMOGLOBIN g/dL 8 2*  --  8 2* 8 4* 8 7* 9 1*   HEMATOCRIT % 24 9*  --  24 7* 25 4* 26 4* 27 0*   PLATELETS Thousands/uL 184  --  156 141* 135* 141*   POTASSIUM mmol/L 3 9 3 8 3 9 3 6 4 0 3 8   CHLORIDE mmol/L 101 99* 105 102 104 106   CO2 mmol/L 27 28 28 27 25 25   BUN mg/dL 64* 53* 35* 28* 44* 31*   CREATININE mg/dL 6 50* 6 25* 5 00* 3 90* 5 87* 4 65*   CALCIUM mg/dL 8 2* 8 1* 8 0* 7 9* 7 9* 8 1*

## 2019-03-06 NOTE — DISCHARGE SUMMARY
Discharge Summary - Medical Charmaine Flores  80 y o  male MRN: [id number]    [address] [address] MED SURG Room / Bed: [address] [address] 445/E4 -* Encounter: [id number]    BRIEF OVERVIEW      Admission Date: 2/23/2019       Discharge Date: 3/6/2019    Primary Diagnoses  Principal Problem:    Acute kidney injury [facility])  Active Problems:    Benign prostatic hyperplasia with urinary frequency    Dyslipidemia    Esophageal reflux    Essential hypertension    Lower leg DVT (deep venous thromboembolism), acute, right (HCC)    Pulmonary nodule seen on imaging study    Type 2 diabetes mellitus with stage 3 chronic kidney disease, without long-term current use of insulin (HCC)    Paroxysmal atrial fibrillation (HCC)    Stage 3 chronic kidney disease (HCC)    COPD (chronic obstructive pulmonary disease) (HCC)    Chronic anemia    Acute renal failure with tubular necrosis (HCC)  Resolved Problems:    Anxiety disorder    Pericardial effusion    Hyperkalemia    Thrombocytopenia ([address] )    Urinary retention      Service:  Admitted to:  Critical care:  Dr Dayanara Cantu  Discharge by:  [facility], [facility]  Consulting Providers   Nephrology: Dr Abiola Perez  Cardiology: Dr Penny Waddell  Thoracic surgery: Dr Casi Briceno Studies:  3/5:  Limited echocardiogram:  LEFT VENTRICLE:  Systolic function was normal  Ejection fraction was estimated to be 55 %  Although no diagnostic regional wall motion abnormality was identified, this possibility cannot be completely excluded on the basis of this study  Doppler parameters were consistent with abnormal left ventricular relaxation (grade 1 diastolic dysfunction)      2/26:  Chest x-ray:Central pulmonary vascular congestion with mild interstitial edema   Small left basilar effusion      2/24:  Chest x-ray:  No pneumothorax status post right internal jugular central venous catheter placement    Vascular congestion   Cardiomegaly     2/23: Javan Vargas x-ray:  Trace left pleural effusion   Stable mild cardiomegaly     2/23:  CT abdomen/pelvis:  1   Limited examination due to lack of oral and intravenous contrast material   2   Enlarging, incompletely visualized large pericardial effusion  3   Moderate constipation  4   Enlarging left pleural effusion and left basilar infiltrate representing atelectasis or pneumonia  5   Prostatomegaly with bladder outlet obstruction     2/26:  Body fluid culture:  Negative growth     2/24:  Echocardiogram:  LEFT VENTRICLE:  Systolic function was normal  Ejection fraction was estimated to be 60 %  There were no regional wall motion abnormalities  Wall thickness was mildly increased  RIGHT VENTRICLE:  The ventricle was mildly dilated  LEFT ATRIUM:  The atrium was mildly to moderately dilated  RIGHT ATRIUM:  The atrium was moderately dilated  MITRAL VALVE:  There was mild regurgitation  TRICUSPID VALVE:  There was mild regurgitation  PERICARDIUM:  A moderate to large pericardial effusion was identified  No RV collapse    Some RA collapse and minor respiratory variation of MV inflow c/w early tamponade     Procedure:  2/23:  Temporary dialysis catheter placement  2/26:  Subxiphoid pericardial window  3/6:  Dialysis Perma-Cath placement      Results from last 7 days   Lab Units 03/05/19  0502 03/03/19  0605 03/02/19  0613   WBC Thousand/uL 4 85 4 78 4 74   HEMOGLOBIN g/dL 8 2* 8 2* 8 4*   HEMATOCRIT % 24 9* 24 7* 25 4*   PLATELETS Thousands/uL 184 156 141*     Results from last 7 days   Lab Units 03/05/19  0502 03/04/19  1059 03/03/19  0605   POTASSIUM mmol/L 3 9 3 8 3 9   CHLORIDE mmol/L 101 99* 105   CO2 mmol/L 27 28 28   BUN mg/dL 64* 53* 35*   CREATININE mg/dL 6 50* 6 25* 5 00*   CALCIUM mg/dL 8 2* 8 1* 8 0*       History and Physical Exam:  Please refer to the Admission H&P note    Hospital Course by Problem  1-acute kidney injury superimposed on chronic kidney disease stage 3:  Patient presented with acute kidney injury, after a one-week history of worsening progressive weakness, vomiting, and no PO intake  Kamran Arriola admitted to the intensive care unit and evaluated by the nephrology team  [facility] had a temporary dialysis catheter placed  [facility] underwent CVVH  [facility] is currently undergoing intermittent hemodialysis              -MERLYN has a history of chronic kidney disease stage 3  His baseline creatinine ranges 1 3-1 5              -patient was evaluated by the nephrology service  [facility] acute kidney injury is felt to be secondary to ATN, due to his severe dehydration, due to prolonged nausea/vomiting and minimal p o  Intake as well as postobstructive uropathy               -patient's serologies with ANCA, CHACHO, SPEP, UPEP, acute hepatitis panel have been negative   No evidence of monoclonal gammopathy               -patient has good urine output, however his renal function is not improving                 -creatinine ranging approximately 6:  Discussed with the nephrology team:   patient had dialysis PermCath placed today  Case management has arranged outpatient dialysis on Tuesday, Thursday, Saturday  Patient will be discharged home and follow with the nephrology team at [address] [place] [address] as an outpatient      2-status post urinary retention:  Patient was noted to have urinary retention  He was continued on his Proscar  He had a Singh catheter placed  Patient however successfully managed a voiding trial   He currently does not have significant postvoid residuals will be discharged home without a Singh catheter    He will continue to follow up with his primary care team as an outpatient    Patient follows with Dr Jerod Albrecht an outpatient      3-status post pericardial effusion:  Patient presented with a large pericardial effusion with impending tamponade   Was felt to be most likely secondary to renal failure and uremia   Status post subxiphoid pericardial window   Initially had a PATTI drain which has since been removed               -pathology revealed 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  -repeat echocardiogram with normal left ventricular ejection fraction and small pericardial effusion  -will need outpatient cardiology follow-up in 1 month with repeat echocardiogram   His outpatient cardiologist is Dr Aga Gan     4-essential hypertension:   Luiza Grace has a history of essential hypertension  Home regimen has been held, due to hypotension   Blood pressure has been adequately controlled with a systolic blood pressure ranged 120-130s  Patient was followed by the cardiology team   He did have some nonsustained ventricular tachycardia while on dialysis  He will be initiated on a low-dose beta-blocker, metoprolol 25 mg b i d  and his blood pressure will continue to be followed as an outpatient by his family doctor, as well as during dialysis      5-anemia:  Patient noted to have anemia secondary to anemia of chronic disease  However during his hospitalization while he was in the ICU, there were concerns that patient had acute blood loss anemia while in the ICU, requiring blood transfusion               -hemoglobin has since been stable ranging 8-9  No current signs/sx of blood loss      6-COPD:  Without acute exacerbation   Continue Breo-ellipta     7-atrial fibrillation:  Patient has a history of paroxysmal atrial fibrillation  Continue anticoagulation with Eliquis per cardiology, dose renally adjusted to 2 5mg bid        8-diabetes type 2:  Without long-term use of insulin, with chronic kidney disease stage 3   Patient's history of type 2 diabetes  He was previously on metformin which has been discontinued due to his acute kidney injury  Patient's blood sugars have been adequately controlled without any basal medication  His blood sugars today are 129, 123  He will be discharged home on a diabetic diet, and will not be on oral diabetic agents upon discharge    He will continue to check his blood sugars and follow up with his primary care provider     9-hyperlipidemia:  Continue statin     10-history of DVT:  Continue anticoagulation with Eliquis     11-GI:  I reviewed patient's GI office note from 12/2018: Matty Delarosa has had an unintentional weight loss with upper GI symptoms, and a CT scan with thickening of the stomach concerning for malignancy   EGD was anticipated               -continue proton pump inhibitor              -prior nausea/vomiting has resolved   Patient tolerating p o  He will resume his outpatient follow-up with his primary gastroenterologist     12-status post thrombocytopenia:  Patient had transient thrombocytopenia, most likely secondary to consumption with acute phase illness   This normalized   Continue to monitor closely   Not consistent with DIC/TTP    13-nonsustained ventricular tachycardia:  Patient had a 10 beat run of nonsustained ventricular tachycardia yesterday while on dialysis  None since  His EKG was reviewed and his QTC interval has improved  No evidence of acute ischemia  This case was discussed with the cardiology team:  The recommend starting metoprolol 25 mg b i d  Priscilla Lightning 14-severe protein-calorie malnutrition:  Patient presents with severe protein-calorie malnutrition, likely secondary to chronic disease, acutely exacerbated by his week history of intractable nausea/vomiting prior to admission    Continue supplements     15-family:   updated wife at bedside     Discussed with patient's nurse at bedside  Discussed with -will arrange home PT and nursing  Discussed with Nephrology team- ok to dc home today  D/w Dr Brittani Mar:  [id number] [address] to ophelia home on metoprolol        VTE Pharmacologic Prophylaxis: RX contraindicated due to: eliquis  VTE Mechanical Prophylaxis: sequential compression device      Discharge Condition: Improved  Discharge Disposition:  Home with home services    Discharge Note and Physical Exam:   Patient denies any current complaints  He denies any chest pain or chest pressure  He denies any pain anywhere at all  He denies any shortness of breath or dyspnea on exertion  He notes he is ambulating without any difficulties  He denies any dizziness or lightheadedness  He denies any numbness or weakness  He denies any abdominal pain, nausea, vomiting, diarrhea  He denies any complaints and notes he feels markedly improved  Vitals:    03/06/19 1100   BP: 127/69   Pulse: 86   Resp: 17   Temp: 97 5 °F (36 4 °C)   SpO2: 91%     General:  Pleasant, non-tachypnic, non-dyspnic  Conversant  Sitting up in a chair at the bedside  Heart: Regular rate and rhythm, S1S2 present  No murmur, rub or gallop  Lungs:  Decreased breath sounds left base  Otherwise Clear to auscultation bilaterally, no wheezing, rhonchi, or crackles  Good air movement  No accessory muscle use or respiratory distress  Abdomen: soft, non-tender, non-distended, NABS  No rebound or guarding  No mass or peritoneal signs  Extremities: no clubbing, cyanosis or edema  2+ pedal pulses bilaterally  Neurologic:  Awake, alert, oriented  Fluent speech  Moving all 4 extremities  Skin: warm and dry  No petechiae, purpura or rash  Discharge Medications   Please see Medical Reconciliation Discharge Form    Discharge Follow Up Appointments:   Maci Hunter DO: 1 week  Dr Ananth Mart:  1 month for check up and echo  Dr Fay Hernandez:  2 weeks    Discharge  Statement   Total Time Spent today including physical exam, discussion with patient and consultants, and discharge arrangements/care = 55 minutes      This note has been constructed using a voice recognition system

## 2019-03-06 NOTE — PROCEDURES
Central Line Insertion  Date/Time: 3/6/2019 9:49 AM  Performed by: Elliot Currie MD  Authorized by: Elliot Currie MD     Patient location:  IR  Consent:     Consent obtained:  Written    Consent given by:  Patient    Risks discussed:  Bleeding and infection    Alternatives discussed:  No treatment and delayed treatment  Universal protocol:     Procedure explained and questions answered to patient or proxy's satisfaction: yes      Relevant documents present and verified: yes      Test results available and properly labeled: yes      Radiology Images displayed and confirmed  If images not available, report reviewed: yes      Required blood products, implants, devices, and special equipment available: yes      Site/side marked: yes      Immediately prior to procedure, a time out was called: yes      Patient identity confirmed:  Verbally with patient and arm band  Pre-procedure details:     Hand hygiene: Hand hygiene performed prior to insertion      Sterile barrier technique: All elements of maximal sterile technique followed      Skin preparation:  2% chlorhexidine    Skin preparation agent: Skin preparation agent completely dried prior to procedure    Indications:     Central line indications: dialysis    Sedation:     Sedation type: Moderate (conscious) sedation  Anesthesia (see MAR for exact dosages):      Anesthesia method:  Local infiltration    Local anesthetic:  Lidocaine 1% WITH epi  Procedure details:     Location:  Right internal jugular    Vessel type: vein      Laterality:  Right    Approach: percutaneous technique used      Patient position:  Flat    Catheter type:  Double lumen    Catheter size:  14 Fr    Landmarks identified: yes      Ultrasound guidance: yes      Sterile ultrasound techniques: Sterile gel and sterile probe covers were used      Number of attempts:  1    Successful placement: yes    Post-procedure details:     Post-procedure:  Dressing applied and line sutured    Assessment: Blood return through all ports and placement verified by x-ray    Post-procedure complications: none      Patient tolerance of procedure:   Tolerated well, no immediate complications

## 2019-03-07 ENCOUNTER — HOSPITAL ENCOUNTER (EMERGENCY)
Facility: HOSPITAL | Age: 84
Discharge: HOME | End: 2019-03-07
Attending: EMERGENCY MEDICINE
Payer: COMMERCIAL

## 2019-03-07 ENCOUNTER — APPOINTMENT (EMERGENCY)
Dept: RADIOLOGY | Facility: HOSPITAL | Age: 84
End: 2019-03-07
Payer: COMMERCIAL

## 2019-03-07 VITALS
SYSTOLIC BLOOD PRESSURE: 132 MMHG | RESPIRATION RATE: 17 BRPM | BODY MASS INDEX: 23.9 KG/M2 | DIASTOLIC BLOOD PRESSURE: 58 MMHG | WEIGHT: 139.99 LBS | OXYGEN SATURATION: 91 % | HEIGHT: 64 IN | TEMPERATURE: 97.2 F | HEART RATE: 82 BPM

## 2019-03-07 DIAGNOSIS — N18.9 CHRONIC RENAL FAILURE: ICD-10-CM

## 2019-03-07 DIAGNOSIS — N18.9 RENAL FAILURE (ARF), ACUTE ON CHRONIC (HCC): Primary | ICD-10-CM

## 2019-03-07 DIAGNOSIS — N17.9 RENAL FAILURE (ARF), ACUTE ON CHRONIC (HCC): Primary | ICD-10-CM

## 2019-03-07 LAB
ALBUMIN SERPL BCP-MCNC: 3.3 G/DL (ref 3.5–5.7)
ALP SERPL-CCNC: 54 U/L (ref 55–165)
ALT SERPL W P-5'-P-CCNC: 11 U/L (ref 7–52)
ANION GAP SERPL CALCULATED.3IONS-SCNC: 12 MMOL/L (ref 4–13)
APTT PPP: 28 SECONDS (ref 26–38)
AST SERPL W P-5'-P-CCNC: 17 U/L (ref 13–39)
ATRIAL RATE: 91 BPM
BACTERIA UR QL AUTO: ABNORMAL /HPF
BASOPHILS # BLD AUTO: 0 THOUSANDS/ΜL (ref 0–0.1)
BASOPHILS NFR BLD AUTO: 0 % (ref 0–1)
BILIRUB DIRECT SERPL-MCNC: 0.1 MG/DL (ref 0–0.2)
BILIRUB SERPL-MCNC: 0.5 MG/DL (ref 0.2–1)
BILIRUB UR QL STRIP: NEGATIVE
BUN SERPL-MCNC: 48 MG/DL (ref 7–25)
CALCIUM SERPL-MCNC: 9.1 MG/DL (ref 8.6–10.5)
CHLORIDE SERPL-SCNC: 101 MMOL/L (ref 98–107)
CLARITY UR: ABNORMAL
CO2 SERPL-SCNC: 26 MMOL/L (ref 21–31)
COLOR UR: YELLOW
CREAT SERPL-MCNC: 5.06 MG/DL (ref 0.7–1.3)
EOSINOPHIL # BLD AUTO: 0.1 THOUSAND/ΜL (ref 0–0.61)
EOSINOPHIL NFR BLD AUTO: 1 % (ref 0–6)
ERYTHROCYTE [DISTWIDTH] IN BLOOD BY AUTOMATED COUNT: 13.3 % (ref 11.6–15.1)
GFR SERPL CREATININE-BSD FRML MDRD: 10 ML/MIN/1.73SQ M
GLUCOSE SERPL-MCNC: 157 MG/DL (ref 65–140)
GLUCOSE UR STRIP-MCNC: NEGATIVE MG/DL
HCT VFR BLD AUTO: 28.6 % (ref 42–52)
HGB BLD-MCNC: 9.6 G/DL (ref 12–17)
HGB UR QL STRIP.AUTO: ABNORMAL
INR PPP: 1.16 (ref 0.9–1.5)
KETONES UR STRIP-MCNC: NEGATIVE MG/DL
LEUKOCYTE ESTERASE UR QL STRIP: ABNORMAL
LYMPHOCYTES # BLD AUTO: 0.9 THOUSANDS/ΜL (ref 0.6–4.47)
LYMPHOCYTES NFR BLD AUTO: 9 % (ref 14–44)
MCH RBC QN AUTO: 31.6 PG (ref 26.8–34.3)
MCHC RBC AUTO-ENTMCNC: 33.4 G/DL (ref 31.4–37.4)
MCV RBC AUTO: 95 FL (ref 82–98)
MONOCYTES # BLD AUTO: 0.7 THOUSAND/ΜL (ref 0.17–1.22)
MONOCYTES NFR BLD AUTO: 7 % (ref 4–12)
NEUTROPHILS # BLD AUTO: 8 THOUSANDS/ΜL (ref 1.85–7.62)
NEUTS SEG NFR BLD AUTO: 83 % (ref 43–75)
NITRITE UR QL STRIP: POSITIVE
NON-SQ EPI CELLS URNS QL MICRO: ABNORMAL /HPF
NRBC BLD AUTO-RTO: 0 /100 WBCS
P AXIS: 73 DEGREES
PH UR STRIP.AUTO: 7 [PH]
PLATELET # BLD AUTO: 255 THOUSANDS/UL (ref 149–390)
PMV BLD AUTO: 8.4 FL (ref 8.9–12.7)
POTASSIUM SERPL-SCNC: 4 MMOL/L (ref 3.5–5.5)
PR INTERVAL: 158 MS
PROT SERPL-MCNC: 5.5 G/DL (ref 6.4–8.9)
PROT UR STRIP-MCNC: ABNORMAL MG/DL
PROTHROMBIN TIME: 13.4 SECONDS (ref 10.2–13)
QRS AXIS: -18 DEGREES
QRSD INTERVAL: 88 MS
QT INTERVAL: 402 MS
QTC INTERVAL: 494 MS
RBC # BLD AUTO: 3.02 MILLION/UL (ref 3.88–5.62)
RBC #/AREA URNS AUTO: ABNORMAL /HPF
SODIUM SERPL-SCNC: 139 MMOL/L (ref 134–143)
SP GR UR STRIP.AUTO: 1.01 (ref 1–1.03)
T WAVE AXIS: -1 DEGREES
TROPONIN I SERPL-MCNC: 0.1 NG/ML
TROPONIN I SERPL-MCNC: 0.13 NG/ML
UROBILINOGEN UR QL STRIP.AUTO: 0.2 E.U./DL
VENTRICULAR RATE: 91 BPM
WBC # BLD AUTO: 9.6 THOUSAND/UL (ref 4.31–10.16)
WBC #/AREA URNS AUTO: ABNORMAL /HPF

## 2019-03-07 PROCEDURE — 93005 ELECTROCARDIOGRAM TRACING: CPT

## 2019-03-07 PROCEDURE — 80076 HEPATIC FUNCTION PANEL: CPT | Performed by: EMERGENCY MEDICINE

## 2019-03-07 PROCEDURE — 85025 COMPLETE CBC W/AUTO DIFF WBC: CPT | Performed by: EMERGENCY MEDICINE

## 2019-03-07 PROCEDURE — 87077 CULTURE AEROBIC IDENTIFY: CPT | Performed by: EMERGENCY MEDICINE

## 2019-03-07 PROCEDURE — 99285 EMERGENCY DEPT VISIT HI MDM: CPT

## 2019-03-07 PROCEDURE — 85610 PROTHROMBIN TIME: CPT | Performed by: EMERGENCY MEDICINE

## 2019-03-07 PROCEDURE — 87186 SC STD MICRODIL/AGAR DIL: CPT | Performed by: EMERGENCY MEDICINE

## 2019-03-07 PROCEDURE — 87086 URINE CULTURE/COLONY COUNT: CPT | Performed by: EMERGENCY MEDICINE

## 2019-03-07 PROCEDURE — 71046 X-RAY EXAM CHEST 2 VIEWS: CPT

## 2019-03-07 PROCEDURE — 84484 ASSAY OF TROPONIN QUANT: CPT | Performed by: EMERGENCY MEDICINE

## 2019-03-07 PROCEDURE — 93010 ELECTROCARDIOGRAM REPORT: CPT | Performed by: INTERNAL MEDICINE

## 2019-03-07 PROCEDURE — 85730 THROMBOPLASTIN TIME PARTIAL: CPT | Performed by: EMERGENCY MEDICINE

## 2019-03-07 PROCEDURE — 94640 AIRWAY INHALATION TREATMENT: CPT

## 2019-03-07 PROCEDURE — 81001 URINALYSIS AUTO W/SCOPE: CPT | Performed by: EMERGENCY MEDICINE

## 2019-03-07 PROCEDURE — 36415 COLL VENOUS BLD VENIPUNCTURE: CPT | Performed by: EMERGENCY MEDICINE

## 2019-03-07 PROCEDURE — 80048 BASIC METABOLIC PNL TOTAL CA: CPT | Performed by: EMERGENCY MEDICINE

## 2019-03-07 PROCEDURE — 87631 RESP VIRUS 3-5 TARGETS: CPT | Performed by: EMERGENCY MEDICINE

## 2019-03-07 RX ORDER — IPRATROPIUM BROMIDE AND ALBUTEROL SULFATE 2.5; .5 MG/3ML; MG/3ML
3 SOLUTION RESPIRATORY (INHALATION) ONCE
Status: COMPLETED | OUTPATIENT
Start: 2019-03-07 | End: 2019-03-07

## 2019-03-07 RX ADMIN — METOPROLOL TARTRATE 25 MG: 25 TABLET ORAL at 09:26

## 2019-03-07 RX ADMIN — APIXABAN 2.5 MG: 2.5 TABLET, FILM COATED ORAL at 09:26

## 2019-03-07 RX ADMIN — IPRATROPIUM BROMIDE AND ALBUTEROL SULFATE 3 ML: 2.5; .5 SOLUTION RESPIRATORY (INHALATION) at 09:25

## 2019-03-07 NOTE — DISCHARGE INSTRUCTIONS
Follow up with your physicians as directed from your recent admission     You are going directly to dialysis  today from the ER

## 2019-03-07 NOTE — ED TRIAGE NOTES
Patient Discharged from  03/06/2019  Patient is scheduled for his first dialysis treatment today at 1230

## 2019-03-07 NOTE — ED PROVIDER NOTES
History  Chief Complaint   Patient presents with    Weakness - Generalized     Sudden onset when getting out of bed this AM     Shortness of Breath     This is an 69-year-old male brought to the emergency room via ambulance this morning for shortness of breath and feeling fuzzy in the head" on arising this morning  [facility] with a prolonged hospitalization secondary to pleural and pericardial effusions requiring a pericardial window and insertion of a dialysis catheter  Patient was started on dialysis secondary to chronic renal insufficiency secondary to diabetes type 2 and a recent bout of gastroenteritis causing dehydration  Patient was stabilized and discharged yesterday  He is scheduled for outpatient dialysis today at 1:15 p m  Upon arising this morning patient states that he walked to the bathroom and began feeling fuzzy in the head  He was able to void and subsequently returned to his bedroom still feeling fuzzy in the head  He said his chair for a while and decided that the sensation is had was not going away and began to feel some shortness of breath  He used his Dulera inhaler and was transported to the emergency room  He currently states that his shortness of breath has improved  Patient denies any nausea vomiting or diarrhea  He states he has had no fever  He does state that he had a cough which developed yesterday and some rhinitis  Prior to Admission Medications   Prescriptions Last Dose Informant Patient Reported? Taking?    ALPRAZolam (XANAX) 0 25 mg tablet   No Yes   Sig: Take 1 tablet (0 25 mg total) by mouth every 8 (eight) hours as needed for anxiety   Ascorbic Acid (V-R VITAMIN C CR PO) 3/6/2019 at 0900 Spouse/Significant Other Yes Yes   Sig: Take by mouth   Cholecalciferol (VITAMIN D) 2000 units CAPS 3/6/2019 at 0900 Spouse/Significant Other Yes Yes   Sig: Take by mouth   DULERA 100-5 MCG/ACT inhaler 3/7/2019 at Unknown time  No Yes   Sig: INHALE 2 PUFFS EVERY 12 (TWELVE) HOURS   EPINEPHrine (EPIPEN) 0 3 mg/0 3 mL SOAJ  Spouse/Significant Other Yes Yes   Sig: Inject as directed   Lancets (ONETOUCH ULTRASOFT) lancets   No Yes   Sig: Use daily as directed  Lancets (ONETOUCH ULTRASOFT) lancets   No Yes   Sig: USE DAILY AS DIRECTED  Omega-3 Fatty Acids (FISH OIL) 1200 MG CAPS  Spouse/Significant Other Yes Yes   Sig: Take by mouth   PARoxetine (PAXIL) 30 mg tablet 3/6/2019 at 0900  No Yes   Sig: TAKE 1 TABLET BY MOUTH EVERY DAY AS DIRECTED   TRUE METRIX BLOOD GLUCOSE TEST test strip   No Yes   Sig: TEST TWO TIMES A DAY   apixaban (ELIQUIS) 2 5 mg 3/6/2019 at 1700  No Yes   Sig: Take 1 tablet (2 5 mg total) by mouth 2 (two) times a day   finasteride (PROSCAR) 5 mg tablet 3/6/2019 at 0900  No Yes   Sig: Take 1 tablet (5 mg total) by mouth daily   glucose blood (ONE TOUCH ULTRA TEST) test strip   No Yes   Sig: Use daily as directed  metoprolol tartrate (LOPRESSOR) 25 mg tablet 3/6/2019 at 2100  No Yes   Sig: Take 1 tablet (25 mg total) by mouth every 12 (twelve) hours   omeprazole (PriLOSEC) 40 MG capsule 3/6/2019 at Unknown time  Yes Yes   Sig: TAKE DAILY AS DIRECTED     rosuvastatin (CRESTOR) 40 MG tablet  Spouse/Significant Other No Yes   Sig: Take 1 tablet (40 mg total) by mouth daily for 90 days      Facility-Administered Medications: None       Past Medical History:   Diagnosis Date    Anxiety     Cardiomegaly     Diabetes ([address] )     [facility] thrombosis[facility] ([facility])     right leg    GERD (gastroesophageal reflux disease)     Hypercholesterolemia     Hypertension     Irregular heart beat     paroxsysmal a fib    Pancreatic cyst     Pericardial effusion     Pleural effusion     Renal disorder     Weight loss, non-intentional        Past Surgical History:   Procedure Laterality Date    APPENDECTOMY      CATARACT EXTRACTION      COLONOSCOPY      COLONOSCOPY N/A 1/24/2019    Procedure: COLONOSCOPY with polypectomies;  Surgeon: Guanaco Rider MD;  Location: [facility]; Service: Gastroenterology    ESOPHAGOGASTRODUODENOSCOPY N/A 1/24/2019    Procedure: ESOPHAGOGASTRODUODENOSCOPY (EGD) with bx;  Surgeon: Guanaco Rider MD;  Location: [facility]; Service: Gastroenterology    [facility]  3/6/2019    PERICARDIAL WINDOW N/A 2/26/2019    Procedure: WINDOW PERICARDIAL;  Surgeon: Salena Monzon MD;  Location: [facility];  Service: Thoracic    PROSTATE BIOPSY         Family History   Problem Relation Age of Onset    Diabetes Mother     Diabetes Father     Prostate cancer Brother     Diabetes Brother     Pulmonary embolism Sister      I have reviewed and agree with the history as documented  Social History     Tobacco Use    Smoking status: Former Smoker    Smokeless tobacco: Never Used    Tobacco comment: current non-smoker, per [employer]   Substance Use Topics    Alcohol use: No     Frequency: Never     Binge frequency: Never    Drug use: No        Review of Systems   Constitutional: Negative  Negative for fatigue and fever  HENT: Positive for rhinorrhea  Negative for congestion  Eyes: Negative  Respiratory: Positive for cough and shortness of breath  Negative for chest tightness and wheezing  Cardiovascular: Negative  Negative for chest pain, palpitations and leg swelling  Gastrointestinal: Negative  Negative for abdominal distention, abdominal pain, diarrhea, nausea and vomiting  Endocrine: Negative  Genitourinary: Negative  Negative for difficulty urinating, frequency and urgency  Musculoskeletal: Negative  Skin: Negative  Negative for rash  Neurological: Negative  Negative for dizziness, syncope and headaches  Psychiatric/Behavioral: Negative  Physical Exam  Physical Exam   Constitutional: He is oriented to person, place, and time  He appears well-developed  He does not appear ill  HENT:   Head: Normocephalic and atraumatic     Right Ear: External ear normal    Left Ear: External ear normal    Nose: Nose normal    Mouth/Throat: Oropharynx is clear and moist    Eyes: Pupils are equal, round, and reactive to light  Conjunctivae and EOM are normal    Neck: Normal range of motion  Cardiovascular: Normal rate, regular rhythm, normal heart sounds and intact distal pulses  No extrasystoles are present  Exam reveals no friction rub  No murmur heard  Pulmonary/Chest: Effort normal  No respiratory distress  He has decreased breath sounds in the right lower field  He has no wheezes  He has no rhonchi  He has no rales  Abdominal: Soft  Bowel sounds are normal  There is no splenomegaly or hepatomegaly  There is no tenderness  Musculoskeletal: Normal range of motion  Right lower leg: He exhibits edema  Neurological: He is alert and oriented to person, place, and time  Skin: Skin is warm and dry  Psychiatric: He has a normal mood and affect  Nursing note and vitals reviewed        Vital Signs  ED Triage Vitals   Temperature Pulse Respirations Blood Pressure SpO2   03/07/19 0729 03/07/19 0729 03/07/19 0729 03/07/19 0729 03/07/19 0729   (!) 97 2 °F (36 2 °C) 85 17 158/82 (!) 88 %      Temp Source Heart Rate Source Patient Position - Orthostatic VS BP Location FiO2 (%)   03/07/19 0729 03/07/19 0808 03/07/19 0729 03/07/19 0729 --   Tympanic Monitor Sitting Left arm       Pain Score       03/07/19 0729       No Pain           Vitals:    03/07/19 1056 03/07/19 1148 03/07/19 1206 03/07/19 1238   BP: 127/61 136/59 132/58 132/58   Pulse: 71  73 82   Patient Position - Orthostatic VS:  Sitting Sitting Sitting       Visual Acuity      ED Medications  Medications   ipratropium-albuterol (DUO-NEB) 0 5-2 5 mg/3 mL inhalation solution 3 mL (3 mL Nebulization Given 3/7/19 7396)   apixaban (ELIQUIS) tablet 2 5 mg (2 5 mg Oral Given 3/7/19 4434)   metoprolol tartrate (LOPRESSOR) tablet 25 mg (25 mg Oral Given 3/7/19 9015)       Diagnostic Studies  Results Reviewed     Procedure Component Value Units Date/Time    Urine culture [[id number]] Collected:  03/07/19 1236    Lab Status:   In process Specimen:  Urine, Clean Catch Updated:  03/07/19 1534    Urine Microscopic [[id number]]  (Abnormal) Collected:  03/07/19 1236    Lab Status:  Final result Specimen:  Urine, Clean Catch Updated:  03/07/19 1312     RBC, UA 4-10 /hpf      WBC, UA 30-50 /hpf      Epithelial Cells Occasional /hpf      Bacteria, UA Occasional /hpf     UA w Reflex to Microscopic [[id number]]  (Abnormal) Collected:  03/07/19 1236    Lab Status:  Final result Specimen:  Urine, Clean Catch Updated:  03/07/19 1257     Color, UA Yellow     Clarity, UA Cloudy     Specific Gravity, UA 1 015     pH, UA 7 0     Leukocytes, UA 3+     Nitrite, UA Positive     Protein, UA 1+ mg/dl      Glucose, UA Negative mg/dl      Ketones, UA Negative mg/dl      Urobilinogen, UA 0 2 E U /dl      Bilirubin, UA Negative     Blood, UA 1+    Troponin I [[id number]]  (Abnormal) Collected:  03/07/19 1111    Lab Status:  Final result Specimen:  Blood from Arm, Right Updated:  03/07/19 1151     Troponin I 0 13 ng/mL     Troponin I [[id number]]  (Abnormal) Collected:  03/07/19 0800    Lab Status:  Final result Specimen:  Blood from Arm, Right Updated:  03/07/19 0847     Troponin I 0 10 ng/mL     Hepatic function panel [[id number]]  (Abnormal) Collected:  03/07/19 0800    Lab Status:  Final result Specimen:  Blood from Arm, Right Updated:  03/07/19 0840     Total Bilirubin 0 50 mg/dL      Bilirubin, Direct 0 10 mg/dL      Alkaline Phosphatase 54 U/L      AST 17 U/L      ALT 11 U/L      Total Protein 5 5 g/dL      Albumin 3 3 g/dL     Basic metabolic panel [[id number]]  (Abnormal) Collected:  03/07/19 0800    Lab Status:  Final result Specimen:  Blood from Arm, Right Updated:  03/07/19 0840     Sodium 139 mmol/L      Potassium 4 0 mmol/L      Chloride 101 mmol/L      CO2 26 mmol/L      ANION GAP 12 mmol/L      BUN 48 mg/dL      Creatinine 5 06 mg/dL      Glucose 157 mg/dL      Calcium 9 1 mg/dL      eGFR 10 ml/min/1 73sq m     Narrative:       National Kidney Disease Education Program recommendations are as follows:  GFR calculation is accurate only with a steady state creatinine  Chronic Kidney disease less than 60 ml/min/1 73 sq  meters  Kidney failure less than 15 ml/min/1 73 sq  meters  Protime-INR [[id number]]  (Abnormal) Collected:  03/07/19 0800    Lab Status:  Final result Specimen:  Blood from Arm, Right Updated:  03/07/19 0836     Protime 13 4 seconds      INR 1 16    APTT [[id number]]  (Normal) Collected:  03/07/19 0800    Lab Status:  Final result Specimen:  Blood from Arm, Right Updated:  03/07/19 0836     PTT 28 seconds     CBC and differential [[id number]]  (Abnormal) Collected:  03/07/19 0800    Lab Status:  Final result Specimen:  Blood from Arm, Right Updated:  03/07/19 0822     WBC 9 60 Thousand/uL      RBC 3 02 Million/uL      Hemoglobin 9 6 g/dL      Hematocrit 28 6 %      MCV 95 fL      MCH 31 6 pg      MCHC 33 4 g/dL      RDW 13 3 %      MPV 8 4 fL      Platelets 604 Thousands/uL      nRBC 0 /100 WBCs      Neutrophils Relative 83 %      Lymphocytes Relative 9 %      Monocytes Relative 7 %      Eosinophils Relative 1 %      Basophils Relative 0 %      Neutrophils Absolute 8 00 Thousands/µL      Lymphocytes Absolute 0 90 Thousands/µL      Monocytes Absolute 0 70 Thousand/µL      Eosinophils Absolute 0 10 Thousand/µL      Basophils Absolute 0 00 Thousands/µL     Influenza A/B and RSV by PCR [[id number]] Collected:  03/07/19 0813    Lab Status: In process Specimen:  Nasopharyngeal Swab Updated:  03/07/19 0814                 XR chest 2 views   ED Interpretation by Alyssa Raines MD (03/07 1045)   Pulmonary vascular congestion, bilateral pleural effusions  Final Result by Shaun Browning MD (03/07 1145)      Worsening pulmonary edema with enlarging small pleural effusions        Findings concur with the preliminary report by the referring clinician already in PACS and/or our electronic record EPIC  Workstation performed: [id number]                    Procedures  Procedures       Phone Contacts  ED Phone Contact    ED Course  ED Course as of Mar 07 1551   Thu Mar 07, 2019   1045 Pt resting comfortably after duoneb  Pulse ox at 92% on 1 5 liters  Feels improved  Awaiting second troponin  1238 Second troponin at 0 13  I have spoken with cardiology, Dr Cornelius Retana,  who is in agreement with continued plan to send to dialysis today  Troponin elevation not of concern in this pt with recent pericardial window and dialysis  I have spoken with Pt and spouse at bedside  They will proceed to dialysis center  Pt to be transported by wheelchair van to dialysis  MDM    Disposition  Final diagnoses:   Renal failure (ARF), acute on chronic (HCC)   Chronic renal failure     Time reflects when diagnosis was documented in both MDM as applicable and the Disposition within this note     Time User Action Codes Description Comment    3/7/2019 12:41 PM Sophia Chi Add [N17 9,  N18 9] Renal failure (ARF), acute on chronic ([facility] [address] [address] )     3/7/2019 12:43 PM [place] Romp Add [N18 9] Chronic renal failure       ED Disposition     ED Disposition Condition Date/Time Comment    Discharge Stable Thu Mar 7, 2019 12:44 PM Steve Mendez  discharge to home/self care  Follow-up Information     Follow up With Specialties Details Why Contact Nadege Mahoney DO Family Medicine Schedule an appointment as soon as possible for a visit  For follow up of your current symptoms   Mindi Obando  [phone]            Discharge Medication List as of 3/7/2019 12:45 PM      CONTINUE these medications which have NOT CHANGED    Details   ALPRAZolam (XANAX) 0 25 mg tablet Take 1 tablet (0 25 mg total) by mouth every 8 (eight) hours as needed for anxiety, Starting Tue 11/6/2018, Phone In apixaban (ELIQUIS) 2 5 mg Take 1 tablet (2 5 mg total) by mouth 2 (two) times a day, Starting Wed 3/6/2019, Print      Ascorbic Acid (V-R VITAMIN C CR PO) Take by mouth, Historical Med      Cholecalciferol (VITAMIN D) 2000 units CAPS Take by mouth, Historical Med      DULERA 100-5 MCG/ACT inhaler INHALE 2 PUFFS EVERY 12 (TWELVE) HOURS, Starting Tue 12/11/2018, Normal      EPINEPHrine (EPIPEN) 0 3 mg/0 3 mL SOAJ Inject as directed, Starting Thu 6/23/2011, Historical Med      finasteride (PROSCAR) 5 mg tablet Take 1 tablet (5 mg total) by mouth daily, Starting Wed 11/21/2018, Normal      !! glucose blood (ONE TOUCH ULTRA TEST) test strip Use daily as directed , Normal      !! Lancets (ONETOUCH ULTRASOFT) lancets Use daily as directed , Normal      !! Lancets (ONETOUCH ULTRASOFT) lancets USE DAILY AS DIRECTED , Normal      metoprolol tartrate (LOPRESSOR) 25 mg tablet Take 1 tablet (25 mg total) by mouth every 12 (twelve) hours, Starting Wed 3/6/2019, Print      Omega-3 Fatty Acids (FISH OIL) 1200 MG CAPS Take by mouth, Historical Med      omeprazole (PriLOSEC) 40 MG capsule TAKE DAILY AS DIRECTED , Historical Med      PARoxetine (PAXIL) 30 mg tablet TAKE 1 TABLET BY MOUTH EVERY DAY AS DIRECTED, Normal      rosuvastatin (CRESTOR) 40 MG tablet Take 1 tablet (40 mg total) by mouth daily for 90 days, Starting Wed 7/25/2018, Until Thu 3/7/2019, Normal      !! TRUE METRIX BLOOD GLUCOSE TEST test strip TEST TWO TIMES A DAY, Normal       !! - Potential duplicate medications found  Please discuss with provider  No discharge procedures on file  ED Provider  Electronically Signed by           Holly Curran MD  03/07/19 4038  Urine report returned after pt was sent to his dialysis appointment  Pt was contacted at the dialysis center and notified regarding a possible UTI  Culture is pending  Call poaced to his pharmacy for :  Ceftin 250 mgs one tab daily for 7 days  On dialysis days, take after dialysis  [place] was notified  He will take pt to the pharmacy to  prescription        Shanti Chauhan MD  03/07/19 6982       Shanti Chauhan MD  03/07/19 1104

## 2019-03-07 NOTE — ED NOTES
Transport arranged to take patient from this ED to [facility] via [place] [facility]   for 1300 to arrive on time for 1315 appointment        Bharati Grajeda, RN  03/07/19 2413

## 2019-03-08 ENCOUNTER — TRANSITIONAL CARE MANAGEMENT (OUTPATIENT)
Dept: FAMILY MEDICINE CLINIC | Facility: CLINIC | Age: 84
End: 2019-03-08

## 2019-03-08 LAB
FLUAV AG SPEC QL: NOT DETECTED
FLUBV AG SPEC QL: NOT DETECTED
RSV B RNA SPEC QL NAA+PROBE: NOT DETECTED

## 2019-03-09 LAB — BACTERIA UR CULT: ABNORMAL

## 2019-03-11 ENCOUNTER — OFFICE VISIT (OUTPATIENT)
Dept: FAMILY MEDICINE CLINIC | Facility: CLINIC | Age: 84
End: 2019-03-11
Payer: COMMERCIAL

## 2019-03-11 VITALS
SYSTOLIC BLOOD PRESSURE: 104 MMHG | DIASTOLIC BLOOD PRESSURE: 56 MMHG | OXYGEN SATURATION: 98 % | BODY MASS INDEX: 22.68 KG/M2 | HEART RATE: 68 BPM | WEIGHT: 128 LBS | TEMPERATURE: 97.6 F | HEIGHT: 63 IN

## 2019-03-11 DIAGNOSIS — J44.9 CHRONIC OBSTRUCTIVE PULMONARY DISEASE, UNSPECIFIED COPD TYPE (HCC): ICD-10-CM

## 2019-03-11 DIAGNOSIS — E11.22 TYPE 2 DIABETES MELLITUS WITH STAGE 3 CHRONIC KIDNEY DISEASE, WITHOUT LONG-TERM CURRENT USE OF INSULIN (HCC): ICD-10-CM

## 2019-03-11 DIAGNOSIS — N18.30 TYPE 2 DIABETES MELLITUS WITH STAGE 3 CHRONIC KIDNEY DISEASE, WITHOUT LONG-TERM CURRENT USE OF INSULIN (HCC): ICD-10-CM

## 2019-03-11 DIAGNOSIS — N18.30 STAGE 3 CHRONIC KIDNEY DISEASE (HCC): Primary | ICD-10-CM

## 2019-03-11 DIAGNOSIS — K59.00 CONSTIPATION, UNSPECIFIED CONSTIPATION TYPE: ICD-10-CM

## 2019-03-11 DIAGNOSIS — N17.0 ACUTE RENAL FAILURE WITH TUBULAR NECROSIS (HCC): ICD-10-CM

## 2019-03-11 DIAGNOSIS — I48.0 PAROXYSMAL ATRIAL FIBRILLATION (HCC): Chronic | ICD-10-CM

## 2019-03-11 PROCEDURE — 1111F DSCHRG MED/CURRENT MED MERGE: CPT | Performed by: FAMILY MEDICINE

## 2019-03-11 PROCEDURE — 99496 TRANSJ CARE MGMT HIGH F2F 7D: CPT | Performed by: FAMILY MEDICINE

## 2019-03-11 PROCEDURE — 1160F RVW MEDS BY RX/DR IN RCRD: CPT | Performed by: FAMILY MEDICINE

## 2019-03-11 NOTE — PROGRESS NOTES
Assessment/Plan:  No significant physical findings on exam today  Recommend follow-up with Nephrology and Cardiology as scheduled  He is due to see nephrologist and cardiologist sometime over the next week  He continues with dialysis 3 times weekly  It is hoped that his renal function gradually improved after recent acute kidney injury and acute renal failure on top of his chronic renal failure  He will call if any chest pain or shortness of breath or weight changes  Continue current medication  He will monitor for signs of worsening anxiety or depression  No problem-specific Assessment & Plan notes found for this encounter  Diagnoses and all orders for this visit:    Stage 3 chronic kidney disease ([address] )    Acute renal failure with tubular necrosis (HCC)    Type 2 diabetes mellitus with stage 3 chronic kidney disease, without long-term current use of insulin (HCC)    Paroxysmal atrial fibrillation (HCC)    Constipation, unspecified constipation type  -     psyllium (METAMUCIL) 0 52 g capsule; Take 1 capsule (0 52 g total) by mouth daily  -     docusate sodium (COLACE) 50 mg capsule; Take 2 capsules (100 mg total) by mouth daily          Subjective:      Patient ID: Donis Mauro  is a 80 y o  male  Patient is here today for follow-up from recent hospitalization  He was hospitalized for acute renal failure and pleural and pericardial effusion  He is currently going through dialysis as an outpatient 3 days a week  He is currently off his diuretic  Denies any chest pain or shortness of breath  Appetite is slightly suppressed  Denies any fever sweats or chills  No orthopnea  The following portions of the patient's history were reviewed and updated as appropriate: allergies, current medications, past family history, past medical history, past social history, past surgical history and problem list     Review of Systems   Constitutional: Negative  HENT: Negative  Eyes: Negative  Respiratory: Negative  Cardiovascular: Negative  Gastrointestinal: Negative  Endocrine: Negative  Genitourinary: Negative  Musculoskeletal: Negative  Skin: Negative  Allergic/Immunologic: Negative  Neurological: Negative  Hematological: Negative  Psychiatric/Behavioral: Negative  TCM Call (since 2/8/2019)     Date and time call was made  3/8/2019 12:49 PM    Patient was hospitialized at  [address] [place] [address] Drive    Date of Admission  02/23/19    Date of discharge  03/06/19    Diagnosis  Renal Failure    Disposition  Home    Were the patients medications reviewed and updated  No    Current Symptoms  None      TCM Call (since 2/8/2019)     Post hospital issues  None    Should patient be enrolled in anticoag monitoring? No    Scheduled for follow up? Yes    I have advised the patient to call PCP with any new or worsening symptoms  Imtiaz Soto MA    Living Arrangements  Spouse or Significiant other        Objective:      /56 (BP Location: Left arm, Patient Position: Sitting, Cuff Size: Standard)   Pulse 68   Temp 97 6 °F (36 4 °C) (Tympanic)   Ht 5' 2 8" (1 595 m)   Wt 58 1 kg (128 lb)   SpO2 98%   BMI 22 82 kg/m²          Physical Exam   Constitutional: He is oriented to person, place, and time  He appears well-developed and well-nourished  HENT:   Head: Normocephalic and atraumatic  Right Ear: External ear normal  Tympanic membrane is not erythematous and not bulging  Left Ear: External ear normal  Tympanic membrane is not erythematous and not bulging  Nose: Nose normal    Mouth/Throat: Oropharynx is clear and moist and mucous membranes are normal  No oral lesions  No oropharyngeal exudate  Eyes: Conjunctivae and EOM are normal  Right eye exhibits no discharge  Left eye exhibits no discharge  No scleral icterus  Neck: Normal range of motion  Neck supple  No thyromegaly present  Cardiovascular: Normal rate, regular rhythm and normal heart sounds   Exam reveals no gallop and no friction rub  No murmur heard  Pulmonary/Chest: Effort normal  No respiratory distress  He has no wheezes  He has no rales  He exhibits no tenderness  Abdominal: Soft  Bowel sounds are normal  He exhibits no distension and no mass  There is no tenderness  There is no rebound and no guarding  Musculoskeletal: Normal range of motion  He exhibits no edema, tenderness or deformity  Lymphadenopathy:     He has no cervical adenopathy  Neurological: He is alert and oriented to person, place, and time  He has normal reflexes  No cranial nerve deficit  He exhibits normal muscle tone  Coordination normal    Skin: Skin is warm and dry  No rash noted  No erythema  No pallor  Psychiatric: He has a normal mood and affect  His behavior is normal    Vitals reviewed

## 2019-03-13 ENCOUNTER — TELEPHONE (OUTPATIENT)
Dept: FAMILY MEDICINE CLINIC | Facility: CLINIC | Age: 84
End: 2019-03-13

## 2019-03-13 NOTE — TELEPHONE ENCOUNTER
Ciara, home health nurse called, stating that Jef Johnson is having trouble with bowel movements  He is using a stool softener and metamusil, she wants to know if by her next visit on Monday if they can do a Fleet enema?  Her cb is [phone]

## 2019-03-13 NOTE — TELEPHONE ENCOUNTER
Michael Martines, a home PT for [facility], went to evaluate patient after discharge and she wants the OK to continue PT for the next two weeks, two visits per week

## 2019-03-14 DIAGNOSIS — R35.0 BENIGN PROSTATIC HYPERPLASIA WITH URINARY FREQUENCY: ICD-10-CM

## 2019-03-14 DIAGNOSIS — N40.1 BENIGN PROSTATIC HYPERPLASIA WITH URINARY FREQUENCY: ICD-10-CM

## 2019-03-14 RX ORDER — TERAZOSIN 2 MG/1
2 CAPSULE ORAL DAILY
Qty: 90 CAPSULE | Refills: 0 | OUTPATIENT
Start: 2019-03-14

## 2019-03-14 NOTE — TELEPHONE ENCOUNTER
Documentation in [facility] notes that the patient has cancelled both a 3/5/19 and 8/5/19 office visit with Dr Mariposa Joyce  No further refills will be authorized until the patient makes/keeps an office visit  No further action required

## 2019-03-26 DIAGNOSIS — F41.1 GENERALIZED ANXIETY DISORDER: ICD-10-CM

## 2019-03-26 RX ORDER — ALPRAZOLAM 0.25 MG/1
TABLET ORAL
Qty: 270 TABLET | Refills: 0 | OUTPATIENT
Start: 2019-03-26 | End: 2019-04-01 | Stop reason: SDUPTHER

## 2019-03-28 ENCOUNTER — TELEPHONE (OUTPATIENT)
Dept: FAMILY MEDICINE CLINIC | Facility: CLINIC | Age: 84
End: 2019-03-28

## 2019-04-01 ENCOUNTER — OFFICE VISIT (OUTPATIENT)
Dept: CARDIOLOGY CLINIC | Facility: CLINIC | Age: 84
End: 2019-04-01
Payer: COMMERCIAL

## 2019-04-01 VITALS
DIASTOLIC BLOOD PRESSURE: 58 MMHG | WEIGHT: 125.2 LBS | HEIGHT: 63 IN | HEART RATE: 71 BPM | OXYGEN SATURATION: 99 % | BODY MASS INDEX: 22.18 KG/M2 | SYSTOLIC BLOOD PRESSURE: 108 MMHG

## 2019-04-01 DIAGNOSIS — I10 ESSENTIAL HYPERTENSION: ICD-10-CM

## 2019-04-01 DIAGNOSIS — I48.0 PAROXYSMAL ATRIAL FIBRILLATION (HCC): Primary | Chronic | ICD-10-CM

## 2019-04-01 DIAGNOSIS — I31.4 PERICARDIAL EFFUSION WITH CARDIAC TAMPONADE: Chronic | ICD-10-CM

## 2019-04-01 DIAGNOSIS — Z98.890 S/P PERICARDIAL WINDOW CREATION: ICD-10-CM

## 2019-04-01 DIAGNOSIS — I31.3 PERICARDIAL EFFUSION WITH CARDIAC TAMPONADE: Chronic | ICD-10-CM

## 2019-04-01 PROCEDURE — 99213 OFFICE O/P EST LOW 20 MIN: CPT | Performed by: INTERNAL MEDICINE

## 2019-04-08 ENCOUNTER — OFFICE VISIT (OUTPATIENT)
Dept: GASTROENTEROLOGY | Facility: MEDICAL CENTER | Age: 84
End: 2019-04-08
Payer: COMMERCIAL

## 2019-04-08 VITALS
WEIGHT: 125.6 LBS | BODY MASS INDEX: 22.25 KG/M2 | SYSTOLIC BLOOD PRESSURE: 100 MMHG | DIASTOLIC BLOOD PRESSURE: 56 MMHG | TEMPERATURE: 98.1 F | HEART RATE: 63 BPM

## 2019-04-08 DIAGNOSIS — I48.0 PAROXYSMAL ATRIAL FIBRILLATION (HCC): Chronic | ICD-10-CM

## 2019-04-08 DIAGNOSIS — D36.9 TUBULAR ADENOMA: ICD-10-CM

## 2019-04-08 DIAGNOSIS — K22.70 BARRETT'S ESOPHAGUS WITHOUT DYSPLASIA: Primary | ICD-10-CM

## 2019-04-08 PROCEDURE — 99214 OFFICE O/P EST MOD 30 MIN: CPT | Performed by: PHYSICIAN ASSISTANT

## 2019-04-15 ENCOUNTER — TELEPHONE (OUTPATIENT)
Dept: INTERVENTIONAL RADIOLOGY/VASCULAR | Facility: HOSPITAL | Age: 84
End: 2019-04-15

## 2019-04-18 ENCOUNTER — HOSPITAL ENCOUNTER (OUTPATIENT)
Dept: INTERVENTIONAL RADIOLOGY/VASCULAR | Facility: HOSPITAL | Age: 84
Discharge: HOME | End: 2019-04-18
Attending: INTERNAL MEDICINE
Payer: COMMERCIAL

## 2019-04-18 VITALS
RESPIRATION RATE: 16 BRPM | DIASTOLIC BLOOD PRESSURE: 67 MMHG | HEART RATE: 62 BPM | SYSTOLIC BLOOD PRESSURE: 150 MMHG | OXYGEN SATURATION: 100 %

## 2019-04-18 DIAGNOSIS — Z99.2 VASCULAR DIALYSIS CATHETER IN PLACE (HCC): ICD-10-CM

## 2019-04-18 PROCEDURE — 36581 REPLACE TUNNELED CV CATH: CPT

## 2019-04-18 PROCEDURE — 36581 REPLACE TUNNELED CV CATH: CPT | Performed by: RADIOLOGY

## 2019-04-18 PROCEDURE — NC001 PR NO CHARGE: Performed by: RADIOLOGY

## 2019-04-18 PROCEDURE — 77001 FLUOROGUIDE FOR VEIN DEVICE: CPT

## 2019-04-18 PROCEDURE — 77001 FLUOROGUIDE FOR VEIN DEVICE: CPT | Performed by: RADIOLOGY

## 2019-04-18 PROCEDURE — C1750 CATH, HEMODIALYSIS,LONG-TERM: HCPCS

## 2019-04-18 PROCEDURE — C1769 GUIDE WIRE: HCPCS

## 2019-04-18 RX ORDER — LIDOCAINE HYDROCHLORIDE 10 MG/ML
INJECTION, SOLUTION INFILTRATION; PERINEURAL CODE/TRAUMA/SEDATION MEDICATION
Status: COMPLETED | OUTPATIENT
Start: 2019-04-18 | End: 2019-04-18

## 2019-04-18 RX ORDER — HEPARIN SODIUM (PORCINE) LOCK FLUSH IV SOLN 100 UNIT/ML 100 UNIT/ML
SOLUTION INTRAVENOUS CODE/TRAUMA/SEDATION MEDICATION
Status: COMPLETED | OUTPATIENT
Start: 2019-04-18 | End: 2019-04-18

## 2019-04-18 RX ADMIN — LIDOCAINE HYDROCHLORIDE 10 ML: 10 INJECTION, SOLUTION INFILTRATION; PERINEURAL at 10:06

## 2019-04-18 RX ADMIN — HEPARIN SODIUM (PORCINE) LOCK FLUSH IV SOLN 100 UNIT/ML 500 UNITS: 100 SOLUTION at 10:22

## 2019-04-18 RX ADMIN — IOHEXOL 40 ML: 300 INJECTION, SOLUTION INTRAVENOUS at 10:00

## 2019-04-18 RX ADMIN — LIDOCAINE HYDROCHLORIDE 10 ML: 10 INJECTION, SOLUTION INFILTRATION; PERINEURAL at 10:24

## 2019-04-24 ENCOUNTER — TELEPHONE (OUTPATIENT)
Dept: ADMINISTRATIVE | Facility: HOSPITAL | Age: 84
End: 2019-04-24

## 2019-04-24 DIAGNOSIS — N18.30 STAGE 3 CHRONIC KIDNEY DISEASE (HCC): Primary | ICD-10-CM

## 2019-04-26 ENCOUNTER — OFFICE VISIT (OUTPATIENT)
Dept: FAMILY MEDICINE CLINIC | Facility: CLINIC | Age: 84
End: 2019-04-26
Payer: COMMERCIAL

## 2019-04-26 VITALS
BODY MASS INDEX: 22.32 KG/M2 | TEMPERATURE: 98 F | HEIGHT: 63 IN | SYSTOLIC BLOOD PRESSURE: 120 MMHG | WEIGHT: 126 LBS | OXYGEN SATURATION: 97 % | DIASTOLIC BLOOD PRESSURE: 54 MMHG | HEART RATE: 60 BPM

## 2019-04-26 DIAGNOSIS — E78.5 DYSLIPIDEMIA: ICD-10-CM

## 2019-04-26 DIAGNOSIS — E11.9 TYPE 2 DIABETES MELLITUS WITHOUT COMPLICATION, WITHOUT LONG-TERM CURRENT USE OF INSULIN (HCC): ICD-10-CM

## 2019-04-26 DIAGNOSIS — E11.22 TYPE 2 DIABETES MELLITUS WITH STAGE 3 CHRONIC KIDNEY DISEASE, WITHOUT LONG-TERM CURRENT USE OF INSULIN (HCC): Primary | ICD-10-CM

## 2019-04-26 DIAGNOSIS — N17.0 ACUTE RENAL FAILURE WITH TUBULAR NECROSIS (HCC): ICD-10-CM

## 2019-04-26 DIAGNOSIS — Z98.890 S/P PERICARDIAL WINDOW CREATION: ICD-10-CM

## 2019-04-26 DIAGNOSIS — N17.9 ACUTE KIDNEY INJURY (HCC): ICD-10-CM

## 2019-04-26 DIAGNOSIS — N18.30 STAGE 3 CHRONIC KIDNEY DISEASE (HCC): ICD-10-CM

## 2019-04-26 DIAGNOSIS — N18.30 TYPE 2 DIABETES MELLITUS WITH STAGE 3 CHRONIC KIDNEY DISEASE, WITHOUT LONG-TERM CURRENT USE OF INSULIN (HCC): Primary | ICD-10-CM

## 2019-04-26 PROCEDURE — 3725F SCREEN DEPRESSION PERFORMED: CPT | Performed by: FAMILY MEDICINE

## 2019-04-26 PROCEDURE — 99214 OFFICE O/P EST MOD 30 MIN: CPT | Performed by: FAMILY MEDICINE

## 2019-04-26 RX ORDER — ROSUVASTATIN CALCIUM 40 MG/1
40 TABLET, COATED ORAL DAILY
Qty: 90 TABLET | Refills: 3 | Status: SHIPPED | OUTPATIENT
Start: 2019-04-26 | End: 2020-06-16

## 2019-04-26 RX ORDER — ROSUVASTATIN CALCIUM 10 MG/1
TABLET, COATED ORAL
Qty: 90 TABLET | Refills: 3 | OUTPATIENT
Start: 2019-04-26

## 2019-04-29 DIAGNOSIS — E11.8 TYPE 2 DIABETES MELLITUS WITH COMPLICATION, UNSPECIFIED WHETHER LONG TERM INSULIN USE: Primary | ICD-10-CM

## 2019-04-29 RX ORDER — BLOOD-GLUCOSE METER
EACH MISCELLANEOUS 2 TIMES DAILY
Qty: 1 DEVICE | Refills: 0 | Status: SHIPPED | OUTPATIENT
Start: 2019-04-29

## 2019-04-30 ENCOUNTER — TELEPHONE (OUTPATIENT)
Dept: NEPHROLOGY | Facility: CLINIC | Age: 84
End: 2019-04-30

## 2019-05-04 DIAGNOSIS — K21.9 GASTROESOPHAGEAL REFLUX DISEASE WITHOUT ESOPHAGITIS: ICD-10-CM

## 2019-05-06 ENCOUNTER — HOSPITAL ENCOUNTER (OUTPATIENT)
Dept: NON INVASIVE DIAGNOSTICS | Facility: CLINIC | Age: 84
Discharge: HOME | End: 2019-05-06
Payer: COMMERCIAL

## 2019-05-06 DIAGNOSIS — N18.30 STAGE 3 CHRONIC KIDNEY DISEASE (HCC): ICD-10-CM

## 2019-05-06 PROCEDURE — G0365 VESSEL MAPPING HEMO ACCESS: HCPCS

## 2019-05-06 RX ORDER — OMEPRAZOLE 40 MG/1
CAPSULE, DELAYED RELEASE ORAL
Qty: 90 CAPSULE | Refills: 3 | OUTPATIENT
Start: 2019-05-06

## 2019-05-07 ENCOUNTER — CONSULT (OUTPATIENT)
Dept: VASCULAR SURGERY | Facility: CLINIC | Age: 84
End: 2019-05-07
Payer: COMMERCIAL

## 2019-05-07 ENCOUNTER — TELEPHONE (OUTPATIENT)
Dept: VASCULAR SURGERY | Facility: CLINIC | Age: 84
End: 2019-05-07

## 2019-05-07 VITALS
TEMPERATURE: 96.6 F | HEIGHT: 64 IN | WEIGHT: 131 LBS | BODY MASS INDEX: 22.36 KG/M2 | DIASTOLIC BLOOD PRESSURE: 60 MMHG | HEART RATE: 52 BPM | SYSTOLIC BLOOD PRESSURE: 138 MMHG

## 2019-05-07 DIAGNOSIS — N18.6 END STAGE RENAL DISEASE (HCC): Primary | ICD-10-CM

## 2019-05-07 DIAGNOSIS — Z99.2 END STAGE RENAL DISEASE ON DIALYSIS (HCC): ICD-10-CM

## 2019-05-07 DIAGNOSIS — N18.6 END STAGE RENAL DISEASE ON DIALYSIS (HCC): ICD-10-CM

## 2019-05-07 DIAGNOSIS — K22.70 BARRETT'S ESOPHAGUS WITHOUT DYSPLASIA: Primary | ICD-10-CM

## 2019-05-07 PROCEDURE — G0365 VESSEL MAPPING HEMO ACCESS: HCPCS | Performed by: SURGERY

## 2019-05-07 PROCEDURE — 99202 OFFICE O/P NEW SF 15 MIN: CPT | Performed by: SURGERY

## 2019-05-07 RX ORDER — CEFAZOLIN SODIUM 2 G/50ML
2000 SOLUTION INTRAVENOUS ONCE
Status: CANCELLED | OUTPATIENT
Start: 2019-06-28 | End: 2019-05-07

## 2019-05-07 RX ORDER — OMEPRAZOLE 40 MG/1
40 CAPSULE, DELAYED RELEASE ORAL DAILY
Qty: 90 CAPSULE | Refills: 3 | Status: SHIPPED | OUTPATIENT
Start: 2019-05-07 | End: 2020-03-02

## 2019-05-13 ENCOUNTER — TELEPHONE (OUTPATIENT)
Dept: NEPHROLOGY | Facility: CLINIC | Age: 84
End: 2019-05-13

## 2019-05-13 ENCOUNTER — OFFICE VISIT (OUTPATIENT)
Dept: FAMILY MEDICINE CLINIC | Facility: CLINIC | Age: 84
End: 2019-05-13
Payer: COMMERCIAL

## 2019-05-13 VITALS
TEMPERATURE: 96.7 F | SYSTOLIC BLOOD PRESSURE: 126 MMHG | BODY MASS INDEX: 21.97 KG/M2 | DIASTOLIC BLOOD PRESSURE: 72 MMHG | WEIGHT: 128 LBS

## 2019-05-13 DIAGNOSIS — N18.6 END STAGE RENAL DISEASE ON DIALYSIS (HCC): ICD-10-CM

## 2019-05-13 DIAGNOSIS — Z99.2 END STAGE RENAL DISEASE ON DIALYSIS (HCC): ICD-10-CM

## 2019-05-13 DIAGNOSIS — N18.30 TYPE 2 DIABETES MELLITUS WITH STAGE 3 CHRONIC KIDNEY DISEASE, WITHOUT LONG-TERM CURRENT USE OF INSULIN (HCC): Primary | ICD-10-CM

## 2019-05-13 DIAGNOSIS — I10 ESSENTIAL HYPERTENSION: ICD-10-CM

## 2019-05-13 DIAGNOSIS — I48.0 PAROXYSMAL ATRIAL FIBRILLATION (HCC): Chronic | ICD-10-CM

## 2019-05-13 DIAGNOSIS — E11.22 TYPE 2 DIABETES MELLITUS WITH STAGE 3 CHRONIC KIDNEY DISEASE, WITHOUT LONG-TERM CURRENT USE OF INSULIN (HCC): Primary | ICD-10-CM

## 2019-05-13 PROCEDURE — 99214 OFFICE O/P EST MOD 30 MIN: CPT | Performed by: FAMILY MEDICINE

## 2019-05-16 ENCOUNTER — TELEPHONE (OUTPATIENT)
Dept: FAMILY MEDICINE CLINIC | Facility: CLINIC | Age: 84
End: 2019-05-16

## 2019-05-17 ENCOUNTER — CONSULT (OUTPATIENT)
Dept: CARDIOLOGY CLINIC | Facility: CLINIC | Age: 84
End: 2019-05-17
Payer: COMMERCIAL

## 2019-05-17 VITALS
SYSTOLIC BLOOD PRESSURE: 108 MMHG | HEART RATE: 64 BPM | HEIGHT: 64 IN | BODY MASS INDEX: 21.85 KG/M2 | WEIGHT: 128 LBS | DIASTOLIC BLOOD PRESSURE: 50 MMHG

## 2019-05-17 DIAGNOSIS — I82.4Z1 LOWER LEG DVT (DEEP VENOUS THROMBOEMBOLISM), ACUTE, RIGHT (HCC): ICD-10-CM

## 2019-05-17 DIAGNOSIS — I31.4 PERICARDIAL EFFUSION WITH CARDIAC TAMPONADE: Primary | Chronic | ICD-10-CM

## 2019-05-17 DIAGNOSIS — I10 ESSENTIAL HYPERTENSION: ICD-10-CM

## 2019-05-17 DIAGNOSIS — N18.6 END STAGE RENAL DISEASE ON DIALYSIS (HCC): ICD-10-CM

## 2019-05-17 DIAGNOSIS — I31.3 PERICARDIAL EFFUSION WITH CARDIAC TAMPONADE: Primary | Chronic | ICD-10-CM

## 2019-05-17 DIAGNOSIS — N18.6 END STAGE RENAL DISEASE (HCC): ICD-10-CM

## 2019-05-17 DIAGNOSIS — Z99.2 END STAGE RENAL DISEASE ON DIALYSIS (HCC): ICD-10-CM

## 2019-05-17 DIAGNOSIS — I48.0 PAROXYSMAL ATRIAL FIBRILLATION (HCC): Chronic | ICD-10-CM

## 2019-05-17 PROCEDURE — 99204 OFFICE O/P NEW MOD 45 MIN: CPT | Performed by: PHYSICIAN ASSISTANT

## 2019-05-24 ENCOUNTER — HOSPITAL ENCOUNTER (OUTPATIENT)
Dept: NON INVASIVE DIAGNOSTICS | Facility: CLINIC | Age: 84
Discharge: HOME | End: 2019-05-24
Payer: COMMERCIAL

## 2019-05-24 ENCOUNTER — OFFICE VISIT (OUTPATIENT)
Dept: FAMILY MEDICINE CLINIC | Facility: CLINIC | Age: 84
End: 2019-05-24

## 2019-05-24 ENCOUNTER — TELEPHONE (OUTPATIENT)
Dept: FAMILY MEDICINE CLINIC | Facility: CLINIC | Age: 84
End: 2019-05-24

## 2019-05-24 VITALS
BODY MASS INDEX: 21.68 KG/M2 | HEART RATE: 84 BPM | OXYGEN SATURATION: 98 % | SYSTOLIC BLOOD PRESSURE: 110 MMHG | WEIGHT: 127 LBS | HEIGHT: 64 IN | TEMPERATURE: 97.9 F | DIASTOLIC BLOOD PRESSURE: 52 MMHG

## 2019-05-24 DIAGNOSIS — B02.9 HERPES ZOSTER WITHOUT COMPLICATION: ICD-10-CM

## 2019-05-24 DIAGNOSIS — N18.30 TYPE 2 DIABETES MELLITUS WITH STAGE 3 CHRONIC KIDNEY DISEASE, WITHOUT LONG-TERM CURRENT USE OF INSULIN (HCC): Primary | ICD-10-CM

## 2019-05-24 DIAGNOSIS — E11.22 TYPE 2 DIABETES MELLITUS WITH STAGE 3 CHRONIC KIDNEY DISEASE, WITHOUT LONG-TERM CURRENT USE OF INSULIN (HCC): Primary | ICD-10-CM

## 2019-05-24 DIAGNOSIS — I31.4 PERICARDIAL EFFUSION WITH CARDIAC TAMPONADE: Chronic | ICD-10-CM

## 2019-05-24 DIAGNOSIS — Z98.890 S/P PERICARDIAL WINDOW CREATION: ICD-10-CM

## 2019-05-24 DIAGNOSIS — I31.3 PERICARDIAL EFFUSION WITH CARDIAC TAMPONADE: Chronic | ICD-10-CM

## 2019-05-24 LAB — SL AMB POCT HEMOGLOBIN AIC: 5.7 (ref ?–6.5)

## 2019-05-24 PROCEDURE — 83036 HEMOGLOBIN GLYCOSYLATED A1C: CPT | Performed by: FAMILY MEDICINE

## 2019-05-24 PROCEDURE — 99213 OFFICE O/P EST LOW 20 MIN: CPT | Performed by: FAMILY MEDICINE

## 2019-05-24 PROCEDURE — 93306 TTE W/DOPPLER COMPLETE: CPT | Performed by: INTERNAL MEDICINE

## 2019-05-24 PROCEDURE — 93308 TTE F-UP OR LMTD: CPT

## 2019-05-24 RX ORDER — OXYCODONE HYDROCHLORIDE AND ACETAMINOPHEN 5; 325 MG/1; MG/1
1 TABLET ORAL EVERY 6 HOURS PRN
Qty: 40 TABLET | Refills: 0 | Status: SHIPPED | OUTPATIENT
Start: 2019-05-24 | End: 2020-01-06 | Stop reason: ALTCHOICE

## 2019-05-24 RX ORDER — VALACYCLOVIR HYDROCHLORIDE 1 G/1
1000 TABLET, FILM COATED ORAL 3 TIMES DAILY
Qty: 21 TABLET | Refills: 0 | Status: SHIPPED | OUTPATIENT
Start: 2019-05-24 | End: 2019-06-25 | Stop reason: ALTCHOICE

## 2019-05-29 ENCOUNTER — PREP FOR PROCEDURE (OUTPATIENT)
Dept: VASCULAR SURGERY | Facility: CLINIC | Age: 84
End: 2019-05-29

## 2019-05-29 ENCOUNTER — TELEPHONE (OUTPATIENT)
Dept: VASCULAR SURGERY | Facility: CLINIC | Age: 84
End: 2019-05-29

## 2019-05-29 PROBLEM — N18.6 END STAGE RENAL DISEASE (HCC): Status: ACTIVE | Noted: 2019-05-29

## 2019-05-31 DIAGNOSIS — F41.1 GENERALIZED ANXIETY DISORDER: ICD-10-CM

## 2019-05-31 RX ORDER — PAROXETINE 30 MG/1
TABLET, FILM COATED ORAL
Qty: 90 TABLET | Refills: 0 | Status: SHIPPED | OUTPATIENT
Start: 2019-05-31 | End: 2019-08-25 | Stop reason: SDUPTHER

## 2019-06-05 ENCOUNTER — APPOINTMENT (OUTPATIENT)
Dept: LAB | Facility: HOSPITAL | Age: 84
End: 2019-06-05
Attending: SURGERY
Payer: COMMERCIAL

## 2019-06-05 DIAGNOSIS — F41.1 GENERALIZED ANXIETY DISORDER: ICD-10-CM

## 2019-06-05 DIAGNOSIS — N18.6 END STAGE RENAL DISEASE (HCC): ICD-10-CM

## 2019-06-05 LAB
ALBUMIN SERPL BCP-MCNC: 4.2 G/DL (ref 3.5–5.7)
ANION GAP SERPL CALCULATED.3IONS-SCNC: 6 MMOL/L (ref 4–13)
BUN SERPL-MCNC: 31 MG/DL (ref 7–25)
CALCIUM ALBUM COR SERPL-MCNC: 10.1 MG/DL (ref 8.3–10.1)
CALCIUM SERPL-MCNC: 10.3 MG/DL (ref 8.3–10.1)
CALCIUM SERPL-MCNC: 10.3 MG/DL (ref 8.6–10.5)
CHLORIDE SERPL-SCNC: 96 MMOL/L (ref 98–107)
CO2 SERPL-SCNC: 31 MMOL/L (ref 21–31)
CREAT SERPL-MCNC: 2.77 MG/DL (ref 0.7–1.3)
ERYTHROCYTE [DISTWIDTH] IN BLOOD BY AUTOMATED COUNT: 14.2 % (ref 11.5–14.5)
GFR SERPL CREATININE-BSD FRML MDRD: 20 ML/MIN/1.73SQ M
GLUCOSE P FAST SERPL-MCNC: 140 MG/DL (ref 65–99)
HCT VFR BLD AUTO: 39.8 % (ref 42–47)
HGB BLD-MCNC: 13.3 G/DL (ref 14–18)
MCH RBC QN AUTO: 32.4 PG (ref 26–34)
MCHC RBC AUTO-ENTMCNC: 33.5 G/DL (ref 31–37)
MCV RBC AUTO: 97 FL (ref 81–99)
PLATELET # BLD AUTO: 145 THOUSANDS/UL (ref 149–390)
PMV BLD AUTO: 8.4 FL (ref 8.6–11.7)
POTASSIUM SERPL-SCNC: 5 MMOL/L (ref 3.5–5.5)
RBC # BLD AUTO: 4.12 MILLION/UL (ref 4.3–5.9)
SODIUM SERPL-SCNC: 133 MMOL/L (ref 134–143)
WBC # BLD AUTO: 5 THOUSAND/UL (ref 4.8–10.8)

## 2019-06-05 PROCEDURE — 85027 COMPLETE CBC AUTOMATED: CPT

## 2019-06-05 PROCEDURE — 80048 BASIC METABOLIC PNL TOTAL CA: CPT

## 2019-06-05 PROCEDURE — 36415 COLL VENOUS BLD VENIPUNCTURE: CPT

## 2019-06-05 PROCEDURE — 82040 ASSAY OF SERUM ALBUMIN: CPT

## 2019-06-06 RX ORDER — PAROXETINE 30 MG/1
TABLET, FILM COATED ORAL
Qty: 90 TABLET | Refills: 0 | OUTPATIENT
Start: 2019-06-06

## 2019-06-10 DIAGNOSIS — J44.9 CHRONIC OBSTRUCTIVE PULMONARY DISEASE, UNSPECIFIED COPD TYPE (HCC): ICD-10-CM

## 2019-06-16 DIAGNOSIS — E11.9 TYPE 2 DIABETES MELLITUS WITHOUT COMPLICATION, UNSPECIFIED WHETHER LONG TERM INSULIN USE (HCC): ICD-10-CM

## 2019-06-17 RX ORDER — LANCETS
EACH MISCELLANEOUS
Qty: 100 EACH | Refills: 3 | Status: SHIPPED | OUTPATIENT
Start: 2019-06-17

## 2019-06-24 ENCOUNTER — OFFICE VISIT (OUTPATIENT)
Dept: FAMILY MEDICINE CLINIC | Facility: CLINIC | Age: 84
End: 2019-06-24
Payer: COMMERCIAL

## 2019-06-24 VITALS
WEIGHT: 132 LBS | DIASTOLIC BLOOD PRESSURE: 82 MMHG | TEMPERATURE: 97.7 F | HEIGHT: 63 IN | BODY MASS INDEX: 23.39 KG/M2 | SYSTOLIC BLOOD PRESSURE: 124 MMHG

## 2019-06-24 DIAGNOSIS — E78.5 DYSLIPIDEMIA: ICD-10-CM

## 2019-06-24 DIAGNOSIS — N18.30 TYPE 2 DIABETES MELLITUS WITH STAGE 3 CHRONIC KIDNEY DISEASE, WITHOUT LONG-TERM CURRENT USE OF INSULIN (HCC): ICD-10-CM

## 2019-06-24 DIAGNOSIS — K22.70 BARRETT'S ESOPHAGUS WITHOUT DYSPLASIA: ICD-10-CM

## 2019-06-24 DIAGNOSIS — N18.6 END STAGE RENAL DISEASE (HCC): ICD-10-CM

## 2019-06-24 DIAGNOSIS — J30.9 ALLERGIC RHINITIS, UNSPECIFIED SEASONALITY, UNSPECIFIED TRIGGER: ICD-10-CM

## 2019-06-24 DIAGNOSIS — E11.22 TYPE 2 DIABETES MELLITUS WITH STAGE 3 CHRONIC KIDNEY DISEASE, WITHOUT LONG-TERM CURRENT USE OF INSULIN (HCC): ICD-10-CM

## 2019-06-24 DIAGNOSIS — Z00.00 MEDICARE ANNUAL WELLNESS VISIT, SUBSEQUENT: Primary | ICD-10-CM

## 2019-06-24 DIAGNOSIS — I10 ESSENTIAL HYPERTENSION: ICD-10-CM

## 2019-06-24 PROBLEM — N17.9 ACUTE KIDNEY INJURY (HCC): Status: RESOLVED | Noted: 2019-02-23 | Resolved: 2019-06-24

## 2019-06-24 PROBLEM — Z99.2 END STAGE RENAL DISEASE ON DIALYSIS (HCC): Status: RESOLVED | Noted: 2019-05-07 | Resolved: 2019-06-24

## 2019-06-24 PROCEDURE — 3074F SYST BP LT 130 MM HG: CPT | Performed by: FAMILY MEDICINE

## 2019-06-24 PROCEDURE — G0439 PPPS, SUBSEQ VISIT: HCPCS | Performed by: FAMILY MEDICINE

## 2019-06-24 PROCEDURE — 1170F FXNL STATUS ASSESSED: CPT | Performed by: FAMILY MEDICINE

## 2019-06-24 PROCEDURE — 99214 OFFICE O/P EST MOD 30 MIN: CPT | Performed by: FAMILY MEDICINE

## 2019-06-24 PROCEDURE — 1125F AMNT PAIN NOTED PAIN PRSNT: CPT | Performed by: FAMILY MEDICINE

## 2019-06-24 PROCEDURE — 3079F DIAST BP 80-89 MM HG: CPT | Performed by: FAMILY MEDICINE

## 2019-06-24 PROCEDURE — 1160F RVW MEDS BY RX/DR IN RCRD: CPT | Performed by: FAMILY MEDICINE

## 2019-06-24 RX ORDER — FLUTICASONE PROPIONATE 50 MCG
2 SPRAY, SUSPENSION (ML) NASAL DAILY
Qty: 16 G | Refills: 0 | Status: SHIPPED | OUTPATIENT
Start: 2019-06-24 | End: 2019-07-17 | Stop reason: SDUPTHER

## 2019-06-24 RX ORDER — FEXOFENADINE HCL 180 MG/1
180 TABLET ORAL DAILY
Qty: 90 TABLET | Refills: 1 | Status: SHIPPED | OUTPATIENT
Start: 2019-06-24 | End: 2020-03-02 | Stop reason: ALTCHOICE

## 2019-06-24 NOTE — H&P (VIEW-ONLY)
Assessment/Plan: We reviewed patient's last lab testing  Last A1c was good at 5 7  We will continue to monitor glucose readings over the next several months  If he is able to maintain an A1c below 6 5 we will keep him off diabetes medication at this time  Time spent counseling 20 of the 30 minutes visit  Patient also requested medication for his seasonal allergy symptoms  Prescription sent  No problem-specific Assessment & Plan notes found for this encounter  Diagnoses and all orders for this visit:    Allergic rhinitis, unspecified seasonality, unspecified trigger  -     fluticasone (FLONASE) 50 mcg/act nasal spray; 2 sprays into each nostril daily for 30 days For allergies  -     fexofenadine (ALLEGRA) 180 MG tablet; Take 1 tablet (180 mg total) by mouth daily For allergy congestion    Other orders  -     Discontinue: metFORMIN (GLUCOPHAGE) 500 mg tablet; Take 500 mg by mouth 2 (two) times a day          Subjective:      Patient ID: Osiris Marinelli  is a 80 y o  male  Patient is here for recheck on chronic medical conditions  He is generally feeling well  No significant concerns or complaints today  He continues with dialysis  He has end-stage renal disease  Creatinine is much improved from levels over the last 6 months at 2 7  Denies any sleep or appetite changes  The following portions of the patient's history were reviewed and updated as appropriate: allergies, current medications, past family history, past medical history, past social history, past surgical history and problem list     Review of Systems   Constitutional: Negative  HENT: Negative  Eyes: Negative  Respiratory: Negative  Cardiovascular: Negative  Gastrointestinal: Negative  Endocrine: Negative  Genitourinary: Negative  Musculoskeletal: Negative  Skin: Negative  Allergic/Immunologic: Negative  Neurological: Negative  Hematological: Negative  Psychiatric/Behavioral: Negative  Objective:      /82 (BP Location: Left arm, Patient Position: Sitting, Cuff Size: Adult)   Temp 97 7 °F (36 5 °C)   Ht 5' 2 99" (1 6 m)   Wt 59 9 kg (132 lb)   BMI 23 39 kg/m²          Physical Exam   Constitutional: He is oriented to person, place, and time  He appears well-developed and well-nourished  HENT:   Head: Normocephalic and atraumatic  Right Ear: External ear normal  Tympanic membrane is not erythematous and not bulging  Left Ear: External ear normal  Tympanic membrane is not erythematous and not bulging  Nose: Nose normal    Mouth/Throat: Oropharynx is clear and moist and mucous membranes are normal  No oral lesions  No oropharyngeal exudate  Eyes: Conjunctivae and EOM are normal  Right eye exhibits no discharge  Left eye exhibits no discharge  No scleral icterus  Neck: Normal range of motion  Neck supple  No thyromegaly present  Cardiovascular: Normal rate, regular rhythm and normal heart sounds  Exam reveals no gallop and no friction rub  No murmur heard  Pulmonary/Chest: Effort normal  No respiratory distress  He has no wheezes  He has no rales  He exhibits no tenderness  Abdominal: Soft  Bowel sounds are normal  He exhibits no distension and no mass  There is no tenderness  There is no rebound and no guarding  Musculoskeletal: Normal range of motion  He exhibits no edema, tenderness or deformity  Lymphadenopathy:     He has no cervical adenopathy  Neurological: He is alert and oriented to person, place, and time  He has normal reflexes  No cranial nerve deficit  He exhibits normal muscle tone  Coordination normal    Skin: Skin is warm and dry  No rash noted  No erythema  No pallor  Psychiatric: He has a normal mood and affect  His behavior is normal    Vitals reviewed

## 2019-06-25 RX ORDER — AMLODIPINE BESYLATE 10 MG/1
10 TABLET ORAL EVERY MORNING
COMMUNITY
End: 2019-08-19 | Stop reason: SDUPTHER

## 2019-06-25 RX ORDER — POTASSIUM CHLORIDE 750 MG/1
10 CAPSULE, EXTENDED RELEASE ORAL EVERY MORNING
COMMUNITY
End: 2019-07-02 | Stop reason: ALTCHOICE

## 2019-06-25 RX ORDER — LOSARTAN POTASSIUM 50 MG/1
25 TABLET ORAL EVERY MORNING
COMMUNITY
End: 2019-12-26 | Stop reason: SDUPTHER

## 2019-06-25 NOTE — PRE-PROCEDURE INSTRUCTIONS
Pre-Surgery Instructions:   Medication Instructions    ALPRAZolam (XANAX) 0 25 mg tablet Instructed patient per Anesthesia Guidelines  May take DOS if needed    amLODIPine (NORVASC) 10 mg tablet Instructed patient per Anesthesia Guidelines  Take DOS 6/28 with a sip of water    apixaban (ELIQUIS) 2 5 mg Patient was instructed by Physician and understands  Last dose on 6/27 (ok to take per cardiologist and Dr Teodoro Sharma)   [place] Ascorbic Acid (V-R VITAMIN C CR PO) Instructed patient per Anesthesia Guidelines  Last dose on 6/27    Cholecalciferol (VITAMIN D PO) Instructed patient per Anesthesia Guidelines  Last dose on 6/27    DULERA 100-5 MCG/ACT inhaler Instructed patient per Anesthesia Guidelines  Take on DOS 6/28    EPINEPHrine (EPIPEN) 0 3 mg/0 3 mL SOAJ Instructed patient per Anesthesia Guidelines  Take as needed    fexofenadine (ALLEGRA) 180 MG tablet Instructed patient per Anesthesia Guidelines  Take on DOS 6/28    finasteride (PROSCAR) 5 mg tablet Instructed patient per Anesthesia Guidelines  Last dose on 6/27    fluticasone (FLONASE) 50 mcg/act nasal spray Instructed patient per Anesthesia Guidelines  Take on 6/28    losartan (COZAAR) 50 mg tablet Instructed patient per Anesthesia Guidelines  Last dose on 6/27    metoprolol tartrate (LOPRESSOR) 25 mg tablet Patient was instructed to contact Physician for medication instruction  Take on DOS 6/28 with a sip of water    Omega-3 Fatty Acids (FISH OIL) 1200 MG CAPS Instructed patient per Anesthesia Guidelines  Last dose on 6/25    omeprazole (PriLOSEC) 40 MG capsule Instructed patient per Anesthesia Guidelines  Take on DOS 6/28 with a siip of water    PARoxetine (PAXIL) 30 mg tablet Instructed patient per Anesthesia Guidelines  Lst dose on 6/27    potassium chloride (MICRO-K) 10 MEQ CR capsule Instructed patient per Anesthesia Guidelines  Last dose on 6/27    rosuvastatin (CRESTOR) 40 MG tablet Instructed patient per Anesthesia Guidelines  Last dose on 6/27    Spoke to Jamal Pugh   (called her back on 6/25 with clarification of eliquis)    My Surgical Experience    The following information was developed to assist you to prepare for your operation  What do I need to do before coming to the hospital?   Arrange for a responsible person to drive you to and from the hospital    Arrange care for your children at home  Children are not allowed in the recovery areas of the hospital   Plan to wear clothing that is easy to put on and take off  Loose shirt  Bathing  o Shower the evening before and the morning of your surgery with Hibiclens, an antibacterial soap  I reviewed the Pre Op Showering Instructions for Surgery Patients Sheet   o Remove nail polish and all body piercing jewelry; jonnie left arm  o Do not shave any body part for at least 24 hours before surgery-this includes face, arms, legs and upper body  Food  o Nothing to eat or drink after midnight the night before your surgery  This includes candy and chewing gum  o Do not drink alcohol 24 hours before surgery  Medicine  o Follow instructions you received from your surgeon about which medicines you may take on the day of surgery  o If instructed to take medicine on the morning of surgery, take pills with just a small sip of water  Call your prescribing doctor for specific infroamtion on what to do if you take insulin    What should I bring to the hospital?    Bring:  Clelois Cordon or a walker, if you have them, for foot or knee surgery   A list of the daily medicines, vitamins, minerals, herbals and nutritional supplements you take   Include the dosages of medicines and the time you take them each day   Glasses, dentures or hearing aids   Minimal clothing; you will be wearing hospital sleepwear   Photo ID; required to verify your identity   If you have a Living Will or Power of , bring a copy of the documents   If you have an ostomy, bring an extra pouch and any supplies you use    Do not bring   Medicines or inhalers   Money, valuables or jewelry    What other information should I know about the day of surgery?  Notify your surgeons if you develop a cold, sore throat, cough, fever, rash or any other illness   Report to the Ambulatory Surgical/Same Day Surgery Unit   You will be instructed to stop at Registration only if you have not been pre-registered   Inform your  fi they do not stay that they will be asked by the staff to leave a phone number where they can be reached   Be available to be reached before surgery  In the event the operating room schedule changes, you may be asked to come in earlier or later than expected    *It is important to tell your doctor and others involved in your health care if you are taking or have been taking any non-prescription drugs, vitamins, minerals, herbals or other nutritional supplements   Any of these may interact with some food or medicines and cause a reaction

## 2019-06-27 ENCOUNTER — ANESTHESIA EVENT (OUTPATIENT)
Dept: PERIOP | Facility: HOSPITAL | Age: 84
End: 2019-06-27

## 2019-06-28 ENCOUNTER — ANESTHESIA (OUTPATIENT)
Dept: PERIOP | Facility: HOSPITAL | Age: 84
End: 2019-06-28

## 2019-06-28 ENCOUNTER — HOSPITAL ENCOUNTER (OUTPATIENT)
Facility: HOSPITAL | Age: 84
Setting detail: OUTPATIENT SURGERY
Discharge: HOME | End: 2019-06-28
Attending: SURGERY | Admitting: SURGERY
Payer: COMMERCIAL

## 2019-06-28 VITALS
HEART RATE: 64 BPM | DIASTOLIC BLOOD PRESSURE: 64 MMHG | SYSTOLIC BLOOD PRESSURE: 139 MMHG | RESPIRATION RATE: 18 BRPM | OXYGEN SATURATION: 99 % | TEMPERATURE: 98.4 F

## 2019-06-28 LAB
ANION GAP SERPL CALCULATED.3IONS-SCNC: 5 MMOL/L (ref 4–13)
BUN SERPL-MCNC: 32 MG/DL (ref 7–25)
CALCIUM SERPL-MCNC: 9.9 MG/DL (ref 8.6–10.5)
CHLORIDE SERPL-SCNC: 102 MMOL/L (ref 98–107)
CO2 SERPL-SCNC: 30 MMOL/L (ref 21–31)
CREAT SERPL-MCNC: 2.99 MG/DL (ref 0.7–1.3)
GFR SERPL CREATININE-BSD FRML MDRD: 18 ML/MIN/1.73SQ M
GLUCOSE P FAST SERPL-MCNC: 124 MG/DL (ref 65–99)
GLUCOSE SERPL-MCNC: 124 MG/DL (ref 65–99)
POTASSIUM SERPL-SCNC: 4.6 MMOL/L (ref 3.5–5.5)
SODIUM SERPL-SCNC: 137 MMOL/L (ref 134–143)

## 2019-06-28 PROCEDURE — 80048 BASIC METABOLIC PNL TOTAL CA: CPT | Performed by: ANESTHESIOLOGY

## 2019-06-28 RX ORDER — SODIUM CHLORIDE 9 MG/ML
125 INJECTION, SOLUTION INTRAVENOUS CONTINUOUS
Status: DISCONTINUED | OUTPATIENT
Start: 2019-06-28 | End: 2019-06-29 | Stop reason: HOSPADM

## 2019-06-28 RX ORDER — SODIUM CHLORIDE 9 MG/ML
50 INJECTION, SOLUTION INTRAVENOUS CONTINUOUS
Status: DISCONTINUED | OUTPATIENT
Start: 2019-06-28 | End: 2019-06-29 | Stop reason: HOSPADM

## 2019-06-28 RX ORDER — CEFAZOLIN SODIUM 2 G/50ML
2000 SOLUTION INTRAVENOUS ONCE
Status: DISCONTINUED | OUTPATIENT
Start: 2019-06-28 | End: 2019-06-29 | Stop reason: HOSPADM

## 2019-06-28 RX ADMIN — SODIUM CHLORIDE 50 ML/HR: 9 INJECTION, SOLUTION INTRAVENOUS at 07:16

## 2019-07-01 ENCOUNTER — ANESTHESIA EVENT (OUTPATIENT)
Dept: PERIOP | Facility: HOSPITAL | Age: 84
End: 2019-07-01
Payer: COMMERCIAL

## 2019-07-01 NOTE — ANESTHESIA PREPROCEDURE EVALUATION
Review of Systems/Medical History  Patient summary reviewed  Chart reviewed      Cardiovascular  EKG reviewed, Hyperlipidemia, Hypertension , Dysrhythmias , atrial fibrillation,   Comment: Hx of Pericardial effusion  EKG Sinus rhythm  Low voltage QRS  Nonspecific ST abnormality  Echo 5/2019:Summary  1  Left ventricle:  a  Systolic function was at the lower limits of normal  Ejection fraction was estimated to be 50 %  b  There was low normal global contractility  c  Features were consistent with a pseudonormal left ventricular filling pattern, with concomitant  abnormal relaxation and increased filling pressure (grade 2 diastolic dysfunction)  2  Mitral valve:  a  There was mild annular calcification  b  There was mild regurgitation  3  Aortic valve:  a  There was trace regurgitation  4  Tricuspid valve:  a  There was mild regurgitation  5  Pulmonic valve:  a  There was trace regurgitation  6  Pericardium:  a  A very small pericardial effusion was identified, best seen adjacent to RA   There was no evidence  of hemodynamic compromise   , PE,  Pulmonary  COPD ,        GI/Hepatic    GERD ,        Dialysis (Acute renal failure, for HD today before procedure) hemodialysis ,   Comment: Last was yesterday 6/27     Endo/Other  Diabetes ,      GYN       Hematology  Anemia ,     Musculoskeletal  Negative musculoskeletal ROS        Neurology   Psychology   Anxiety,            Lab Results   Component Value Date    WBC 5 00 06/05/2019    HGB 13 3 (L) 06/05/2019    HCT 39 8 (L) 06/05/2019    MCV 97 06/05/2019     (L) 06/05/2019     Lab Results   Component Value Date    GLUCOSE 130 07/07/2015    CALCIUM 9 9 06/28/2019     07/21/2016    K 4 6 06/28/2019    CO2 30 06/28/2019     06/28/2019    BUN 32 (H) 06/28/2019    CREATININE 2 99 (H) 06/28/2019     Lab Results   Component Value Date    INR 1 16 03/07/2019    INR 1 22 (H) 02/26/2019    INR 1 14 02/24/2019    PROTIME 13 4 (H) 03/07/2019    PROTIME 15 5 (H) 02/26/2019    PROTIME 14 7 (H) 02/24/2019     Lab Results   Component Value Date    PTT 28 03/07/2019     Pt  On Eliquis  Physical Exam    Airway    Mallampati score: II  TM Distance: >3 FB  Neck ROM: full     Dental   upper dentures and lower dentures,     Cardiovascular  Rhythm: irregular, Rate: normal,     Pulmonary  Pulmonary exam normal     Other Findings        Anesthesia Plan  ASA Score- 4     Anesthesia Type- IV sedation with anesthesia with ASA Monitors  Additional Monitors:   Airway Plan:     Comment: Plan discussed is MAC with GA as backup  I personally discussed risks and benefits to this anesthetic  All patient questions were answered  Pt agrees with anesthesia plan  Right IJ dialysis line in place  Plan Factors-    Induction- intravenous  Postoperative Plan-     Informed Consent- Anesthetic plan and risks discussed with patient  I personally reviewed this patient with the CRNA  Discussed and agreed on the Anesthesia Plan with the SIMONE Millan

## 2019-07-02 NOTE — PRE-PROCEDURE INSTRUCTIONS
Pre-Surgery Instructions:   Medication Instructions    ALPRAZolam (XANAX) 0 25 mg tablet Instructed patient per Anesthesia Guidelines  Take on DOS if needed    amLODIPine (NORVASC) 10 mg tablet Instructed patient per Anesthesia Guidelines  Take on DOS 7/3 with a sip of water    Cholecalciferol (VITAMIN D PO) Instructed patient per Anesthesia Guidelines  Last dose on 7/2    DULERA 100-5 MCG/ACT inhaler Instructed patient per Anesthesia Guidelines  Use on DOS 7/3    EPINEPHrine (EPIPEN) 0 3 mg/0 3 mL SOAJ Instructed patient per Anesthesia Guidelines  Use as needed         finasteride (PROSCAR) 5 mg tablet Instructed patient per Anesthesia Guidelines  Last dose on 7/2    fluticasone (FLONASE) 50 mcg/act nasal spray Instructed patient per Anesthesia Guidelines  Use on DOS 7/3    losartan (COZAAR) 50 mg tablet Instructed patient per Anesthesia Guidelines  Last dose on 7/2    metoprolol tartrate (LOPRESSOR) 25 mg tablet Instructed patient per Anesthesia Guidelines  Use on DOS 7/3    omeprazole (PriLOSEC) 40 MG capsule Instructed patient per Anesthesia Guidelines  Use on DOS 7/3    oxyCODONE-acetaminophen (PERCOCET) 5-325 mg per tablet Instructed patient per Anesthesia Guidelines  Last dose on 7/2    PARoxetine (PAXIL) 30 mg tablet Instructed patient per Anesthesia Guidelines  Last dose on 7/2    rosuvastatin (CRESTOR) 40 MG tablet Instructed patient per Anesthesia Guidelines  Last dose on 7/2    [DISCONTINUED] apixaban (ELIQUIS) 2 5 mg Instructed patient per Anesthesia Guidelines   [DISCONTINUED] Ascorbic Acid (V-R VITAMIN C CR PO) Instructed patient per Anesthesia Guidelines   [DISCONTINUED] Omega-3 Fatty Acids (FISH OIL) 1200 MG CAPS Instructed patient per Anesthesia Guidelines   [DISCONTINUED] potassium chloride (MICRO-K) 10 MEQ CR capsule Instructed patient per Anesthesia Guidelines      Spoke to [id number] wife states that Virginia Gomez has been taken off several medications/supplements including Eliquis (last dose on 6/27)    My Surgical Experience    The following information was developed to assist you to prepare for your operation  What do I need to do before coming to the hospital?   Arrange for a responsible person to drive you to and from the hospital    Arrange care for your children at home  Children are not allowed in the recovery areas of the hospital   Plan to wear clothing that is easy to put on and take off  If you are having shoulder surgery, wear a shirt that buttons or zippers in the front  Bathing  o Shower the evening before and the morning of your surgery with Hibiclens, an antibacterial soap  I reviewed the Pre Op Showering Instructions for Surgery Patients Sheet   o Remove nail polish and all body piercing jewelry; jonnie left hand  o Do not shave any body part for at least 24 hours before surgery-this includes face, arms, legs and upper body  Food  o Nothing to eat or drink after midnight the night before your surgery  This includes candy and chewing gum  o Do not drink alcohol 24 hours before surgery  Medicine  o Follow instructions you received from your surgeon about which medicines you may take on the day of surgery  o If instructed to take medicine on the morning of surgery, take pills with just a small sip of water  Call your prescribing doctor for specific infroamtion on what to do if you take insulin    What should I bring to the hospital?    Bring:  Lashanda Maxwell or a walker, if you have them, for foot or knee surgery   A list of the daily medicines, vitamins, minerals, herbals and nutritional supplements you take   Include the dosages of medicines and the time you take them each day   Glasses, dentures or hearing aids   Minimal clothing; you will be wearing hospital sleepwear   Photo ID; required to verify your identity   If you have a Living Will or Power of , bring a copy of the documents   If you have an ostomy, bring an extra pouch and any supplies you use    Do not bring   Medicines or inhalers   Money, valuables or jewelry    What other information should I know about the day of surgery?  Notify your surgeons if you develop a cold, sore throat, cough, fever, rash or any other illness   Report to the Ambulatory Surgical/Same Day Surgery Unit   You will be instructed to stop at Registration only if you have not been pre-registered   Inform your  fi they do not stay that they will be asked by the staff to leave a phone number where they can be reached   Be available to be reached before surgery  In the event the operating room schedule changes, you may be asked to come in earlier or later than expected    *It is important to tell your doctor and others involved in your health care if you are taking or have been taking any non-prescription drugs, vitamins, minerals, herbals or other nutritional supplements   Any of these may interact with some food or medicines and cause a reaction

## 2019-07-03 ENCOUNTER — ANESTHESIA (OUTPATIENT)
Dept: PERIOP | Facility: HOSPITAL | Age: 84
End: 2019-07-03
Payer: COMMERCIAL

## 2019-07-03 ENCOUNTER — HOSPITAL ENCOUNTER (OUTPATIENT)
Facility: HOSPITAL | Age: 84
Setting detail: OUTPATIENT SURGERY
Discharge: HOME | End: 2019-07-03
Attending: SURGERY | Admitting: SURGERY
Payer: COMMERCIAL

## 2019-07-03 VITALS
RESPIRATION RATE: 16 BRPM | DIASTOLIC BLOOD PRESSURE: 56 MMHG | TEMPERATURE: 98.4 F | HEIGHT: 63 IN | BODY MASS INDEX: 23.04 KG/M2 | HEART RATE: 65 BPM | WEIGHT: 130 LBS | OXYGEN SATURATION: 98 % | SYSTOLIC BLOOD PRESSURE: 123 MMHG

## 2019-07-03 LAB — GLUCOSE SERPL-MCNC: 112 MG/DL (ref 65–140)

## 2019-07-03 PROCEDURE — 82948 REAGENT STRIP/BLOOD GLUCOSE: CPT

## 2019-07-03 PROCEDURE — 36830 ARTERY-VEIN NONAUTOGRAFT: CPT | Performed by: SURGERY

## 2019-07-03 PROCEDURE — C1768 GRAFT, VASCULAR: HCPCS | Performed by: SURGERY

## 2019-07-03 DEVICE — GRAFT PROPATEN 4-7MM X 45CM: Type: IMPLANTABLE DEVICE | Site: ARM | Status: FUNCTIONAL

## 2019-07-03 RX ORDER — OXYCODONE HYDROCHLORIDE AND ACETAMINOPHEN 5; 325 MG/1; MG/1
1 TABLET ORAL EVERY 4 HOURS PRN
Status: DISCONTINUED | OUTPATIENT
Start: 2019-07-03 | End: 2019-07-05 | Stop reason: HOSPADM

## 2019-07-03 RX ORDER — LIDOCAINE HYDROCHLORIDE 20 MG/ML
INJECTION, SOLUTION EPIDURAL; INFILTRATION; INTRACAUDAL; PERINEURAL AS NEEDED
Status: DISCONTINUED | OUTPATIENT
Start: 2019-07-03 | End: 2019-07-03 | Stop reason: SURG

## 2019-07-03 RX ORDER — SODIUM CHLORIDE, SODIUM LACTATE, POTASSIUM CHLORIDE, CALCIUM CHLORIDE 600; 310; 30; 20 MG/100ML; MG/100ML; MG/100ML; MG/100ML
50 INJECTION, SOLUTION INTRAVENOUS CONTINUOUS
Status: CANCELLED | OUTPATIENT
Start: 2019-07-03

## 2019-07-03 RX ORDER — FENTANYL CITRATE/PF 50 MCG/ML
12.5 SYRINGE (ML) INJECTION
Status: DISCONTINUED | OUTPATIENT
Start: 2019-07-03 | End: 2019-07-03 | Stop reason: HOSPADM

## 2019-07-03 RX ORDER — HEPARIN SODIUM 1000 [USP'U]/ML
INJECTION, SOLUTION INTRAVENOUS; SUBCUTANEOUS AS NEEDED
Status: DISCONTINUED | OUTPATIENT
Start: 2019-07-03 | End: 2019-07-03 | Stop reason: SURG

## 2019-07-03 RX ORDER — MIDAZOLAM HYDROCHLORIDE 1 MG/ML
INJECTION INTRAMUSCULAR; INTRAVENOUS AS NEEDED
Status: DISCONTINUED | OUTPATIENT
Start: 2019-07-03 | End: 2019-07-03 | Stop reason: SURG

## 2019-07-03 RX ORDER — PROPOFOL 10 MG/ML
INJECTION, EMULSION INTRAVENOUS AS NEEDED
Status: DISCONTINUED | OUTPATIENT
Start: 2019-07-03 | End: 2019-07-03 | Stop reason: SURG

## 2019-07-03 RX ORDER — ONDANSETRON 2 MG/ML
4 INJECTION INTRAMUSCULAR; INTRAVENOUS ONCE AS NEEDED
Status: DISCONTINUED | OUTPATIENT
Start: 2019-07-03 | End: 2019-07-03 | Stop reason: HOSPADM

## 2019-07-03 RX ORDER — FENTANYL CITRATE 50 UG/ML
INJECTION, SOLUTION INTRAMUSCULAR; INTRAVENOUS AS NEEDED
Status: DISCONTINUED | OUTPATIENT
Start: 2019-07-03 | End: 2019-07-03 | Stop reason: SURG

## 2019-07-03 RX ORDER — SODIUM CHLORIDE 9 MG/ML
50 INJECTION, SOLUTION INTRAVENOUS CONTINUOUS
Status: DISCONTINUED | OUTPATIENT
Start: 2019-07-03 | End: 2019-07-05 | Stop reason: HOSPADM

## 2019-07-03 RX ORDER — CEFAZOLIN SODIUM 1 G/3ML
INJECTION, POWDER, FOR SOLUTION INTRAMUSCULAR; INTRAVENOUS AS NEEDED
Status: DISCONTINUED | OUTPATIENT
Start: 2019-07-03 | End: 2019-07-03 | Stop reason: SURG

## 2019-07-03 RX ORDER — LIDOCAINE HYDROCHLORIDE 10 MG/ML
INJECTION, SOLUTION INFILTRATION; PERINEURAL AS NEEDED
Status: DISCONTINUED | OUTPATIENT
Start: 2019-07-03 | End: 2019-07-03 | Stop reason: HOSPADM

## 2019-07-03 RX ORDER — BUPIVACAINE HYDROCHLORIDE AND EPINEPHRINE 5; 5 MG/ML; UG/ML
INJECTION, SOLUTION EPIDURAL; INTRACAUDAL; PERINEURAL AS NEEDED
Status: DISCONTINUED | OUTPATIENT
Start: 2019-07-03 | End: 2019-07-03 | Stop reason: HOSPADM

## 2019-07-03 RX ADMIN — MIDAZOLAM HYDROCHLORIDE 0.5 MG: 1 INJECTION, SOLUTION INTRAMUSCULAR; INTRAVENOUS at 12:34

## 2019-07-03 RX ADMIN — SODIUM CHLORIDE 50 ML/HR: 9 INJECTION, SOLUTION INTRAVENOUS at 11:51

## 2019-07-03 RX ADMIN — LIDOCAINE HYDROCHLORIDE 60 MG: 20 INJECTION, SOLUTION EPIDURAL; INFILTRATION; INTRACAUDAL; PERINEURAL at 12:54

## 2019-07-03 RX ADMIN — PROPOFOL 40 MG: 10 INJECTION, EMULSION INTRAVENOUS at 13:02

## 2019-07-03 RX ADMIN — PROPOFOL 20 MG: 10 INJECTION, EMULSION INTRAVENOUS at 13:08

## 2019-07-03 RX ADMIN — PROPOFOL 20 MG: 10 INJECTION, EMULSION INTRAVENOUS at 12:57

## 2019-07-03 RX ADMIN — FENTANYL CITRATE 25 MCG: 50 INJECTION INTRAMUSCULAR; INTRAVENOUS at 12:53

## 2019-07-03 RX ADMIN — PROPOFOL 10 MG: 10 INJECTION, EMULSION INTRAVENOUS at 13:12

## 2019-07-03 RX ADMIN — HEPARIN SODIUM 2000 UNITS: 1000 INJECTION, SOLUTION INTRAVENOUS; SUBCUTANEOUS at 13:25

## 2019-07-03 RX ADMIN — CEFAZOLIN 1000 MG: 1 INJECTION, POWDER, FOR SOLUTION INTRAMUSCULAR; INTRAVENOUS at 13:01

## 2019-07-03 RX ADMIN — PROPOFOL 20 MG: 10 INJECTION, EMULSION INTRAVENOUS at 13:20

## 2019-07-03 RX ADMIN — FENTANYL CITRATE 12.5 MCG: 50 INJECTION INTRAMUSCULAR; INTRAVENOUS at 14:03

## 2019-07-03 RX ADMIN — FENTANYL CITRATE 25 MCG: 50 INJECTION INTRAMUSCULAR; INTRAVENOUS at 12:48

## 2019-07-03 NOTE — DISCHARGE INSTRUCTIONS
Oxycodone/Acetaminophen (By mouth)   Acetaminophen (b-rrzu-o-MIN-oh-fen), Oxycodone Hydrochloride (iz-f-OLO-done epi-droe-KLOR-hiro)  Treats moderate to moderately severe pain  This medicine is a narcotic pain reliever  Brand Name(s): Endocet, Percocet, Primlev, Xartemis XR   There may be other brand names for this medicine  When This Medicine Should Not Be Used: This medicine is not right for everyone  Do not use it if you had an allergic reaction to acetaminophen or oxycodone, or if you have serious breathing problems or paralytic ileus  How to Use This Medicine:   Capsule, Liquid, Tablet, Long Acting Tablet  · Your doctor will tell you how much medicine to use  Do not use more than directed  · An overdose can be dangerous  Follow directions carefully so you do not get too much medicine at one time  · Oral liquid: Measure the oral liquid medicine with a marked measuring spoon, oral syringe, or medicine cup  · Swallow the extended-release tablet whole  Do not crush, break, or chew it  Do not lick or wet the tablet before placing it in your mouth  Do not give this medicine through a feeding tube  · This medicine should come with a Medication Guide  Ask your pharmacist for a copy if you do not have one  · Missed dose: If you miss a dose of this medicine, skip the missed dose and go back to your regular dosing schedule  Do not double doses  · Store the medicine in a closed container at room temperature, away from heat, moisture, and direct light  Ask your pharmacist about the best way to dispose of medicine you do not use  Drugs and Foods to Avoid:   Ask your doctor or pharmacist before using any other medicine, including over-the-counter medicines, vitamins, and herbal products  · Do not use Xartemis XR if you are using or have used an MAO inhibitor in the past 14 days  · Some medicines can affect how this medicine works   Tell your doctor if you are using any of the following:   ¨ Carbamazepine, erythromycin, ketoconazole, lamotrigine, mirtazapine, naltrexone, phenytoin, propranolol, rifampin, ritonavir, tramadol, trazodone, or zidovudine  ¨ Birth control pills  ¨ Diuretic (water pill)  ¨ Medicine to treat depression  ¨ Phenothiazine medicine  ¨ Triptan medicine to treat migraine headaches  · Do not drink alcohol while you are using this medicine  Acetaminophen can damage your liver, and alcohol can increase this risk  Do not take acetaminophen without asking your doctor if you have 3 or more drinks of alcohol every day  · Tell your doctor if you use anything else that makes you sleepy  Some examples are allergy medicine, narcotic pain medicine, and alcohol  Tell your doctor if you are using buprenorphine, butorphanol, nalbuphine, pentazocine, a benzodiazepine, or a muscle relaxer  Warnings While Using This Medicine:   · Tell your doctor if you are pregnant or breastfeeding, or if you have kidney disease, liver disease, heart disease, low blood pressure, breathing problems or lung disease (such as asthma, COPD), thyroid problems, Luther disease, pancreas or gallbladder problems, prostate problems, trouble urinating, or a stomach problems, or a history of head injury or brain damage, seizures, or alcohol or drug abuse  Tell your doctor if you are allergic to codeine  · This medicine may cause the following problems:  ¨ High risk of overdose, which can lead to death  ¨ Respiratory depression (serious breathing problem that can be life-threatening)  ¨ Liver problems  ¨ Serious skin reactions  ¨ Serotonin syndrome (when used with certain medicines)  · This medicine may make you dizzy or drowsy  Do not drive or do anything that could be dangerous until you know how this medicine affects you  Sit or lie down if you feel dizzy  Stand up carefully  · This medicine contains acetaminophen   Read the labels of all other medicines you are using to see if they also contain acetaminophen, or ask your doctor or pharmacist  Solomon Talavera not use more than 4 grams (4,000 milligrams) total of acetaminophen in one day  · This medicine can be habit-forming  Do not use more than your prescribed dose  Call your doctor if you think your medicine is not working  · Do not stop using this medicine suddenly  Your doctor will need to slowly decrease your dose before you stop it completely  · This medicine could cause infertility  Talk with your doctor before using this medicine if you plan to have children  · This medicine may cause constipation, especially with long-term use  Ask your doctor if you should use a laxative to prevent and treat constipation  · Keep all medicine out of the reach of children  Never share your medicine with anyone  Possible Side Effects While Using This Medicine:   Call your doctor right away if you notice any of these side effects:  · Allergic reaction: Itching or hives, swelling in your face or hands, swelling or tingling in your mouth or throat, chest tightness, trouble breathing  · Anxiety, restlessness, fast heartbeat, fever, muscle spasms, twitching, diarrhea, seeing or hearing things that are not there  · Blistering, peeling, red skin rash  · Blue lips, fingernails, or skin  · Dark urine or pale stools, loss of appetite, stomach pain, yellow skin or eyes  · Extreme weakness, shallow breathing, uneven heartbeat, seizures, sweating, or cold or clammy skin  · Severe confusion, lightheadedness, dizziness, or fainting  · Severe constipation, nausea, or vomiting  · Trouble breathing or slow breathing  If you notice these less serious side effects, talk with your doctor:   · Headache  · Mild constipation, nausea, or vomiting  · Mild sleepiness or drowsiness  If you notice other side effects that you think are caused by this medicine, tell your doctor  Call your doctor for medical advice about side effects   You may report side effects to FDA at 6-353-FDA-0779  © 2017 [address] [employer] Information is for End User's use only and may not be sold, redistributed or otherwise used for commercial purposes  The above information is an  only  It is not intended as medical advice for individual conditions or treatments  Talk to your doctor, nurse or pharmacist before following any medical regimen to see if it is safe and effective for you  Arteriovenous Fistula Creation for Hemodialysis   WHAT YOU NEED TO KNOW:   You may have mild bruising or swelling near your wound  Your AVF will take 2 to 3 months to heal  After this time, it can be used for hemodialysis  DISCHARGE INSTRUCTIONS:   Call 911 for any of the following:   · You feel lightheaded, short of breath, and have chest pain  · You cough up blood  · You have trouble breathing  · You cannot stop the bleeding from your wound even after you hold firm pressure for 10 minutes  Seek care immediately if:   · Blood soaks through your bandage  · Your arm, hand, or fingers are cold, numb, blue, or pale  · Your bruise suddenly gets bigger  · You have trouble moving your arm, hand, or fingers  · Your arm or leg feels warm, tender, and painful  It may look swollen and red  Contact your healthcare provider if:   · You have a fever or chills  · Your wound is red, swollen, or draining pus  · You have nausea or are vomiting  · Your skin is itchy, swollen, or you have a rash  · You cannot feel a thrill over your AVF  · You have questions or concerns about your condition or care  Medicines: You may need any of the following:  · Prescription pain medicine  may be given  Ask your healthcare provider how to take this medicine safely  Some prescription pain medicines contain acetaminophen  Do not take other medicines that contain acetaminophen without talking to your healthcare provider  Too much acetaminophen may cause liver damage  Prescription pain medicine may cause constipation   Ask your healthcare provider how to prevent or treat constipation  · Acetaminophen  decreases pain and fever  It is available without a doctor's order  Ask how much to take and how often to take it  Follow directions  Read the labels of all other medicines you are using to see if they also contain acetaminophen, or ask your doctor or pharmacist  Acetaminophen can cause liver damage if not taken correctly  Do not use more than 4 grams (4,000 milligrams) total of acetaminophen in one day  · Take your medicine as directed  Contact your healthcare provider if you think your medicine is not helping or if you have side effects  Tell him or her if you are allergic to any medicine  Keep a list of the medicines, vitamins, and herbs you take  Include the amounts, and when and why you take them  Bring the list or the pill bottles to follow-up visits  Carry your medicine list with you in case of an emergency  Care for your wound as directed:  Remove your bandage in 48 hours or as directed  Carefully wash around the wound with soap and water  Dry the area and put on new, clean bandages as directed  Change your bandages when they get wet or dirty  If bleeding from your wound occurs:  Apply firm, steady pressure to stop the bleeding  Apply pressure with a clean gauze or towel for 5 to 10 minutes  Call 911 if bleeding becomes heavy or does not stop  Activity guidelines for your arm with the AVF:   · Do not lift anything heavier than 5 pounds for 48 hours or as directed  · Do not push or pull with your arm  · Do not sleep with your arm tucked under you  · Do not carry a purse or bags with your arm  · Do not wear tight-fitting clothing or jewelry over your arm or hand  · After 48 hours, do gentle arm exercises as directed  These exercises will help your AVF heal   Feel for a thrill over your AVF:  Your healthcare provider will tell you how often to feel for a thrill  Place your index finger and second finger over your wound   You should feel a vibration  Apply ice:  Apply ice on your wound for 15 to 20 minutes every hour or as directed  Use an ice pack, or put crushed ice in a plastic bag  Cover it with a towel before you apply it to your skin  Ice helps prevent tissue damage and decreases swelling and pain  Elevate your arm:  Elevate your arm with the AVF above the level of your heart as often as you can  This will help decrease swelling and pain  Prop your arm on pillows or blankets to keep it elevated comfortably  Tell healthcare providers that you have an AVF  Tell them not to do IVs, blood draws, and blood pressure readings in your arm with the AVF  Do not get vaccinations, such as a flu shot, in your arm with the AVF  These actions can help prevent infection, bleeding, or damage to your AVF  Follow up with your healthcare provider as directed:  Write down your questions so you remember to ask them during your visits  © 2017 [employer]  Information is for End User's use only and may not be sold, redistributed or otherwise used for commercial purposes  All illustrations and images included in CareNotes® are the copyrighted property of A D A M , Inc  or [place]  The above information is an  only  It is not intended as medical advice for individual conditions or treatments  Talk to your doctor, nurse or pharmacist before following any medical regimen to see if it is safe and effective for you  Arteriovenous Fistula Creation for Hemodialysis   AMBULATORY CARE:   What you need to know about an arteriovenous fistula (AVF) creation: An AVF creation is surgery to connect an artery to a vein  This surgery is done so you can receive hemodialysis  The AVF is usually placed in your forearm or upper arm  How to prepare for an AVF creation:   · Your surgeon will talk to you about how to prepare for surgery  You may need an ultrasound of your arm before surgery   An ultrasound will help your surgeon decide which artery and vein he will use to create your AVF  You may need to stop taking blood thinners 1 week before surgery  · Your surgeon may tell you not to eat or drink anything after midnight on the day of your surgery  He will tell you what medicines to take or not take on the day of your surgery  You may be given an antibiotic through your IV to help prevent a bacterial infection  Tell a healthcare provider if you have ever had an allergic reaction to an antibiotic  Ask someone to drive you home and stay with you after surgery  What will happen during an AVF creation:  You may be given general anesthesia to keep you asleep and free from pain during surgery  You may instead be given local or regional anesthesia to numb the surgery area  With local or regional anesthesia, you may still feel pressure or pushing during surgery, but you should not feel any pain  Your surgeon will make an incision in your arm  He will connect your artery and vein with stitches  Your incision will be closed with stitches and covered with a bandage  What will happen after an AVF creation:   · Healthcare providers will monitor you until you are awake  They will feel the area over your AVF for a thrill, and listen for a bruit  A thrill is a vibration, and a bruit is a humming noise  The presence of a bruit and a thrill mean that blood is moving through your AVF properly  A healthcare provider will show you how to feel for a thrill  · Your arm may feel sore for several days after your surgery  You may have mild bruising or swelling near your wound  Your wound may drain a few drops of blood or pink fluid for 24 hours  Your AVF will take 2 to 3 months to heal  After this time, it can be used for hemodialysis  Risks of an AVF creation:  You may bleed more than expected or get an infection  Your AVF may become blocked  This may stop blood flow through your AVF or to your arm or hand   You may need surgery to fix this or create another AVF  You may get a blood clot in your arm or leg  This may become life-threatening  Call 911 for any of the following:   · You feel lightheaded, short of breath, and have chest pain  · You cough up blood  · You have trouble breathing  · You cannot stop the bleeding from your wound even after you hold firm pressure for 10 minutes  Seek care immediately if:   · Blood soaks through your bandage  · Your arm, hand, or fingers are cold, numb, blue, or pale  · Your bruise suddenly gets bigger  · You have trouble moving your arm, hand, or fingers  · Your arm or leg feels warm, tender, and painful  It may look swollen and red  Contact your healthcare provider if:   · You have a fever or chills  · Your wound is red, swollen, or draining pus  · You have nausea or are vomiting  · Your skin is itchy, swollen, or you have a rash  · You cannot feel a thrill over your AVF  · You have questions or concerns about your condition or care  Medicines: You may need any of the following:  · Prescription pain medicine  may be given  Ask your healthcare provider how to take this medicine safely  Some prescription pain medicines contain acetaminophen  Do not take other medicines that contain acetaminophen without talking to your healthcare provider  Too much acetaminophen may cause liver damage  Prescription pain medicine may cause constipation  Ask your healthcare provider how to prevent or treat constipation  · Acetaminophen  decreases pain and fever  It is available without a doctor's order  Ask how much to take and how often to take it  Follow directions  Read the labels of all other medicines you are using to see if they also contain acetaminophen, or ask your doctor or pharmacist  Acetaminophen can cause liver damage if not taken correctly  Do not use more than 4 grams (4,000 milligrams) total of acetaminophen in one day  · Take your medicine as directed    Contact your healthcare provider if you think your medicine is not helping or if you have side effects  Tell him or her if you are allergic to any medicine  Keep a list of the medicines, vitamins, and herbs you take  Include the amounts, and when and why you take them  Bring the list or the pill bottles to follow-up visits  Carry your medicine list with you in case of an emergency  Care for your wound as directed:  Remove your bandage in 48 hours or as directed  Carefully wash around the wound with soap and water  Dry the area and put on new, clean bandages as directed  Change your bandages when they get wet or dirty  If bleeding from your wound occurs:  Apply firm, steady pressure to stop the bleeding  Apply pressure with a clean gauze or towel for 5 to 10 minutes  Call 911 if bleeding becomes heavy or does not stop  Activity guidelines for your arm with the AVF:   · Do not lift anything heavier than 5 pounds for 48 hours or as directed  · Do not push or pull with your arm  · Do not sleep with your arm tucked under you  · Do not carry a purse or bags with your arm  · Do not wear tight-fitting clothing or jewelry over your arm or hand  · After 48 hours, do gentle arm exercises as directed  These exercises will help your AVF heal   Feel for a thrill over your AVF:  Your healthcare provider will tell you how often to feel for a thrill  Place your index finger and second finger over your wound  You should feel a vibration  Apply ice:  Apply ice on your wound for 15 to 20 minutes every hour or as directed  Use an ice pack, or put crushed ice in a plastic bag  Cover it with a towel before you apply it to your skin  Ice helps prevent tissue damage and decreases swelling and pain  Elevate your arm:  Elevate your arm with the AVF above the level of your heart as often as you can  This will help decrease swelling and pain  Prop your arm on pillows or blankets to keep it elevated comfortably     Tell healthcare providers that you have an AVF  Tell them not to do IVs, blood draws, and blood pressure readings in your arm with the AVF  Do not get vaccinations, such as a flu shot, in your arm with the AVF  These actions can help prevent infection, bleeding, or damage to your AVF  Follow up with your healthcare provider as directed:  Write down your questions so you remember to ask them during your visits  © 2017 [address] [employer] Information is for End User's use only and may not be sold, redistributed or otherwise used for commercial purposes  All illustrations and images included in CareNotes® are the copyrighted property of A D A M , Inc  or [employer]  The above information is an  only  It is not intended as medical advice for individual conditions or treatments  Talk to your doctor, nurse or pharmacist before following any medical regimen to see if it is safe and effective for you  DISCHARGE INSTRUCTIONS  ARM SURGERY    Following discharge from the hospital, you may have some questions about your operation, your activities or your general condition  These instructions may answer some of your questions and help you adjust during the first few weeks following your operation  ACTIVITY:    Limit use of the operated arm to what is absolutely necessary for the first day after surgery  On the second day after surgery, you may start to increase use of your arm as tolerated  One week after surgery, you should start to exercise your hand on the side of the dialysis graft by squeezing a ball  This increases blood flow in your graft and arm so your graft will function better  DIET:   Resume your normal diet  Try to eat low fat and low cholesterol foods  INCISION:   Your surgeon may have chosen to use a type of adhesive glue to close your incision  There are stitches present under the skin, which will absorb on their own    The glue is used to cover the incision, assist in closure, and prevent contamination  This adhesive will darken and peel away on its own within one to two weeks  You may shower after your surgery if there is adhesive glue present, but do not scrub the incision  If you do not have this adhesive glue, you may include the operated area in a shower on the third day following surgery  It is normal to have some pain at the surgical site  You will receive a prescription for pain medicine at the time of discharge  It is normal to have some bruising, swelling or mild discoloration around the incision  If increasing redness or pain develops at the incision site or severe pain, numbness, or weakness occurs in the hand, call our office immediately  Numbness in the region of the incision may occur following the surgery  This normally resolves in six to twelve months  ARM SWELLING: Most patients have some noticeable arm swelling after surgery  This usually disappears within a few weeks  If swelling is present, elevate the arm whenever possible  RESTRICTIONS: Do not have blood draws, IVs, or blood pressures performed on the operated arm  PLEASE CALL THE OFFICE IF YOU HAVE ANY QUESTIONS  [phone] [facility] [phone] [facility] [phone]  [address] [place] [address] , [address], [place], [address]  [address], [address] [facility] [address], [place], [address] [facility]  [address], \[facility] [place] [facility]\"", [address]  [address] [facility], [facility], [address]    [address], [address], [address]  [place] [address], [address] [facility], [facility] 97   1307 [facility], 8614 [facility], [address]  One [facility], 532 [facility], One [facility], [address]    Following discharge from the hospital, you may have some questions about your operation, your activities or your general condition    These instructions may answer some of your questions and help you adjust during the first few weeks following your operation  ACTIVITY:    Limit use of the operated arm to what is absolutely necessary for the first day after surgery  On the second day after surgery, you may start to increase use of your arm as tolerated  One week after surgery, you should start to exercise your hand on the side of the dialysis graft by squeezing a ball  This increases blood flow in your graft and arm so your graft will function better  DIET:   Resume your normal diet  Try to eat low fat and low cholesterol foods  INCISION:   Your surgeon may have chosen to use a type of adhesive glue to close your incision  There are stitches present under the skin, which will absorb on their own  The glue is used to cover the incision, assist in closure, and prevent contamination  This adhesive will darken and peel away on its own within one to two weeks  You may shower after your surgery if there is adhesive glue present, but do not scrub the incision  If you do not have this adhesive glue, you may include the operated area in a shower on the third day following surgery  It is normal to have some pain at the surgical site  You will receive a prescription for pain medicine at the time of discharge  It is normal to have some bruising, swelling or mild discoloration around the incision  If increasing redness or pain develops at the incision site or severe pain, numbness, or weakness occurs in the hand, call our office immediately  Numbness in the region of the incision may occur following the surgery  This normally resolves in six to twelve months  ARM SWELLING: Most patients have some noticeable arm swelling after surgery  This usually disappears within a few weeks  If swelling is present, elevate the arm whenever possible  RESTRICTIONS: Do not have blood draws, IVs, or blood pressures performed on the operated arm        PLEASE CALL THE OFFICE IF YOU HAVE ANY QUESTIONS  [phone]  [phone]  TOLL FREE [phone]  [address] [facility] , [address] [facility] [address], [place], [address]  [address], [address], [place], 210 [facility]  [address], [facility], [address]  611 [facility], [address]    [address], [address]st [address], [address]  [address], [address] [facility], [address] [facility] 97   1201 [facility], 8614 [facility], [place], [address]  One [facility] [address], [address] [facility], [facility], [address], [place] [address]

## 2019-07-03 NOTE — ANESTHESIA POSTPROCEDURE EVALUATION
Post-Op Assessment Note    CV Status:  Stable  Pain Score: 0    Pain management: adequate     Mental Status:  Alert   Hydration Status:  Stable   PONV Controlled:  None   Airway Patency:  Patent   Post Op Vitals Reviewed: Yes      Staff: Anesthesiologist           BP   135/65   Temp  98 0   Pulse 64   Resp   16   SpO2   100

## 2019-07-03 NOTE — INTERVAL H&P NOTE
H&P reviewed  After examining the patient I find no changes in the patients condition since the H&P had been written  /65   Pulse (!) 52   Temp (!) 96 9 °F (36 1 °C) (Temporal)   Resp 18   Ht 5' 3" (1 6 m)   Wt 59 kg (130 lb)   SpO2 98%   BMI 23 03 kg/m²     Plan:  Placement left upper arm dialysis graft

## 2019-07-03 NOTE — OP NOTE
OPERATIVE REPORT  PATIENT NAME: Sara Reinoso  :  1935  MRN: [id number]  Pt Location: [address] [place] [address] OR ROOM 03    SURGERY DATE: 7/3/2019    Surgeon(s) and Role:     * Darlyn Johnson MD - Primary    Preop Diagnosis:  End stage renal disease ([facility] ) [N18 6]    Post-Op Diagnosis Codes:     * End stage renal disease ([facility] ) [N18 6]    Procedure(s) (LRB):  CREATION FISTULA ARTERIOVENOUS (AV) left upper extremity brachial artery to axillary vein Parkman-Mushtaq graft  (Left)    Specimen(s):  * No specimens in log *    Estimated Blood Loss:   Minimal    Drains:  * No LDAs found *    Anesthesia Type:   IV Sedation with Anesthesia    Operative Indications:  [facility] stage [facility] ([facility] ) [N18 6]      Operative Findings:  See op report    Complications:   None    Procedure and Technique:  The patient Justin Mauro was identified in the operating room after adequate Gen  anesthesia was obtained the left arm was prepped and draped using chlorpropamide prep and sterile drape Infiltration with 1% Xylocaine incision was made over the axillary vein  sharp dissection down through the skin and subcutaneous tissue the deep fascia was divided and the Axillary vein was dissected for several centimeters  An incision was made over the distal brachial artery deepening our incision the deep fascia was incised the distal brachial artery was identified and vessel loops were placed proximally and distally  A 4-7 tapered Parkman-Mushtaq graft was then placed in the tunnel, the 4 mm portion was used and an arteriotomy was made,the graft was anastomosis  to the artery and an end to side fashion using a running 6-0 Parkman-Mushtaq suture  The graft was flushednoted to be satisfactory in a non-crushing vascular clamp was placed proximally  The distal anastomosis was then created after a venotomy was made in the axillary vein, the graft was cut to size and the anastomosis was created in an end-to side  fashion using running 6-0 Goretex suture   Each  wound was irrigated with copious amounts of antibiotic solution,and the wounds were closed in 2 layers using running 3-0 Monocryl at the subcutaneous level skin clips were placed    Sterile dressing was placed and he was taken to the recovery room in stable condition       I was present for the entire procedure and A qualified resident physician was not available    Patient Disposition:  PACU  and hemodynamically stable    SIGNATURE: Mitch Louie MD  DATE: July 3, 2019  TIME: 2:33 PM

## 2019-07-05 DIAGNOSIS — I10 ESSENTIAL HYPERTENSION: ICD-10-CM

## 2019-07-05 RX ORDER — FUROSEMIDE 40 MG/1
TABLET ORAL
Qty: 90 TABLET | Refills: 3 | OUTPATIENT
Start: 2019-07-05

## 2019-07-10 LAB — GLUCOSE SERPL-MCNC: 110 MG/DL (ref 65–140)

## 2019-07-10 NOTE — TELEPHONE ENCOUNTER
Refill request sent to the wrong physician  PCP is managing the patient's hypertension  No further action required

## 2019-07-12 ENCOUNTER — TELEPHONE (OUTPATIENT)
Dept: VASCULAR SURGERY | Facility: CLINIC | Age: 84
End: 2019-07-12

## 2019-07-12 NOTE — TELEPHONE ENCOUNTER
Vascular Nurse Navigator Post Op Call    Procedure: CREATION FISTULA ARTERIOVENOUS (AV) left upper extremity brachial artery to axillary vein [place]-Mushtaq graft  (Left)    Date of Procedure: 7/3/2019    Surgeon: Gary Maxwell MD - Primary      Painful tingling or numbness in your fingers?: No    Paleness/Coolness in hands/fingers?: No    Redness, swelling or pus from your wound?: No    Bleeding?: No    Fever/chills?: No    Uncontrolled Pain?: No      Dialysis Days and Location:     Next Office Visit Appointment    Vascular: 7/16/2019 Arsalan Saldana  Transportation Confirmed?: No    Any Questions or Concerns?:  None  Patient and wife have no questions or concerns  The dressing placed on site had not yet been removed, so I instructed them to remove and wash site daily- showers are not doable because of a permacath currently in place  Discharged instructions reviewed to their understanding

## 2019-07-13 DIAGNOSIS — I10 ESSENTIAL HYPERTENSION: ICD-10-CM

## 2019-07-14 RX ORDER — POTASSIUM CHLORIDE 750 MG/1
CAPSULE, EXTENDED RELEASE ORAL
Qty: 90 CAPSULE | Refills: 1 | OUTPATIENT
Start: 2019-07-14

## 2019-07-16 ENCOUNTER — OFFICE VISIT (OUTPATIENT)
Dept: VASCULAR SURGERY | Facility: CLINIC | Age: 84
End: 2019-07-16

## 2019-07-16 VITALS
HEIGHT: 63 IN | TEMPERATURE: 95.9 F | HEART RATE: 57 BPM | BODY MASS INDEX: 24.27 KG/M2 | SYSTOLIC BLOOD PRESSURE: 124 MMHG | WEIGHT: 137 LBS | DIASTOLIC BLOOD PRESSURE: 50 MMHG

## 2019-07-16 DIAGNOSIS — N18.6 END STAGE RENAL DISEASE (HCC): Primary | ICD-10-CM

## 2019-07-16 PROCEDURE — 99024 POSTOP FOLLOW-UP VISIT: CPT | Performed by: SURGERY

## 2019-07-16 NOTE — PROGRESS NOTES
Assessment/Plan:    End stage renal disease ([facility] [address] )  Doing well approximately 2 weeks status post placement of left upper arm dialysis graft  He is having no steal symptoms some mild coolness of his hand but no motor or sensation problems  There is an excellent thrill and bruit in the graft staples were removed today and his wounds have healed nicely  Plan:  Can access his left upper arm dialysis graft on July 23rd and then plan for removal of his catheter as per protocol  Diagnoses and all orders for this visit:    End stage renal [facility] ([facility] )        Subjective:      Patient ID: Chau Daugherty  is a 80 y o  male  HPI doing well 2 weeks status post left upper arm dialysis graft no weakness in his hand full motor and sensation, continues on dialysis with right IJ PermCath and can use his left upper arm graft on July 23rd    The following portions of the patient's history were reviewed and updated as appropriate: allergies, current medications, past family history, past medical history, past social history, past surgical history and problem list     Review of Systems  end-stage renal disease recent left upper arm dialysis graft, right IJ catheter, no skin rashes, no cardiac symptoms angina or shortness of breath, all other systems negative    Objective:      /50 (BP Location: Right arm, Patient Position: Sitting, Cuff Size: Standard)   Pulse 57   Temp (!) 95 9 °F (35 5 °C) (Tympanic)   Ht 5' 3" (1 6 m)   Wt 62 1 kg (137 lb)   BMI 24 27 kg/m²          Physical Exam      Left upper arm dialysis graft evaluated all staples removed wounds healing clean and dry excellent bruit an easily palpable thrill easily palpable left radial pulse with no motor or sensation deficits  I have reviewed and made appropriate changes to the review of systems input by the medical assistant      Vitals:    07/16/19 1106   BP: 124/50   BP Location: Right arm   Patient Position: Sitting   Cuff Size: Standard   Pulse: 62   Temp: (!) 95 9 °F (35 5 °C)   TempSrc: Tympanic   Weight: 62 1 kg (137 lb)   Height: 5' 3" (1 6 m)       Patient Active Problem List   Diagnosis    Benign prostatic hyperplasia with urinary frequency    Dyslipidemia    Esophageal reflux    Essential hypertension    Lower leg DVT (deep venous thromboembolism), acute, right (HCC)    Pancreatic cyst    Pulmonary embolism (HCC)    Pulmonary nodule seen on imaging study    Type 2 diabetes mellitus with stage 3 chronic kidney disease, without long-term current use of insulin (HCC)    Pericardial effusion with cardiac tamponade    Paroxysmal atrial fibrillation (HCC)    Stage 3 chronic kidney disease (HCC)    COPD (chronic obstructive pulmonary disease) (HCC)    Chronic anemia    Acute renal failure with tubular necrosis (HCC)    S/P pericardial window creation    Obrien's esophagus without dysplasia    Tubular adenoma    End stage renal disease ([facility] [facility] )       Past Surgical History:   Procedure Laterality Date    APPENDECTOMY  1953    CATARACT EXTRACTION Bilateral 2016?  COLONOSCOPY      COLONOSCOPY N/A 1/24/2019    Procedure: COLONOSCOPY with polypectomies;  Surgeon: Mendel Pillow, MD;  Location: [facility]; Service: Gastroenterology    ESOPHAGOGASTRODUODENOSCOPY N/A 1/24/2019    Procedure: ESOPHAGOGASTRODUODENOSCOPY (EGD) with bx;  Surgeon: Mendel Pillow, MD;  Location: [facility];   Service: Gastroenterology    [facility] EXCHANGE  4/18/2019    [facility] PLACEMENT  3/6/2019    PERICARDIAL WINDOW N/A 2/26/2019    Procedure: WINDOW PERICARDIAL;  Surgeon: Tiffani Payne MD;  Location: [facility];  Service: Thoracic    AK ANASTOMOSIS,AV,ANY SITE Left 7/3/2019    Procedure: CREATION FISTULA ARTERIOVENOUS (AV) left upper extremity brachial artery to axillary vein [place]-Mushtaq graft ;  Surgeon: Chaz Lopez MD;  Location: [facility] OR;  Service: Vascular    PROSTATE BIOPSY         Family History   Problem Relation Age of Onset    Diabetes Mother     Diabetes Father     Prostate cancer Brother     Diabetes Brother     Pulmonary embolism Sister        Social History     Socioeconomic History    Marital status: /Civil Union     Spouse name: Not on file    Number of children: Not on file    Years of education: Not on file    Highest education level: Not on file   Occupational History    Not on file   Social Needs    Financial resource strain: Not on file    Food insecurity:     Worry: Not on file     Inability: Not on file    Transportation needs:     Medical: Not on file     Non-medical: Not on file   Tobacco Use    Smoking status: Former Smoker     Packs/day: 0 50     Last attempt to quit:      Years since quittin 5    Smokeless tobacco: Never Used    Tobacco comment: current non-smoker, per [employer]   Substance and Sexual Activity    Alcohol use: No     Frequency: Never     Binge frequency: Never    Drug use: No    Sexual activity: Not Currently   Lifestyle    Physical activity:     Days per week: Not on file     Minutes per session: Not on file    Stress: Not on file   Relationships    Social connections:     Talks on phone: Not on file     Gets together: Not on file     Attends Confucianism service: Not on file     Active member of club or organization: Not on file     Attends meetings of clubs or organizations: Not on file     Relationship status: Not on file    Intimate partner violence:     Fear of current or ex partner: Not on file     Emotionally abused: Not on file     Physically abused: Not on file     Forced sexual activity: Not on file   Other Topics Concern    Not on file   Social History Narrative    Not on file       Allergies   Allergen Reactions    Bee Venom Facial Swelling    Ace Inhibitors          Current Outpatient Medications:     ALPRAZolam (XANAX) 0 25 mg tablet, Take 1 tablet (0 25 mg total) by mouth every 8 (eight) hours as needed for anxiety, Disp: 270 tablet, Rfl: 0    amLODIPine (NORVASC) 10 mg tablet, Take 10 mg by mouth every morning, Disp: , Rfl:     Blood Glucose Monitoring Suppl (TRUE METRIX METER) YASMEEN, by Does not apply route 2 (two) times a day, Disp: 1 Device, Rfl: 0    Cholecalciferol (VITAMIN D PO), Take 5,000 Units by mouth every morning , Disp: , Rfl:     DULERA 100-5 MCG/ACT inhaler, INHALE 2 PUFFS EVERY 12 (TWELVE) HOURS, Disp: 13 Inhaler, Rfl: 2    EPINEPHrine (EPIPEN) 0 3 mg/0 3 mL SOAJ, Inject as directed, Disp: , Rfl:     fexofenadine (ALLEGRA) 180 MG tablet, Take 1 tablet (180 mg total) by mouth daily For allergy congestion (Patient taking differently: Take 180 mg by mouth every morning For allergy congestion), Disp: 90 tablet, Rfl: 1    finasteride (PROSCAR) 5 mg tablet, Take 1 tablet (5 mg total) by mouth daily, Disp: 90 tablet, Rfl: 3    fluticasone (FLONASE) 50 mcg/act nasal spray, 2 sprays into each nostril daily for 30 days For allergies, Disp: 16 g, Rfl: 0    glucose blood (ONE TOUCH ULTRA TEST) test strip, Use daily as directed , Disp: 200 each, Rfl: 3    glucose blood (TRUE METRIX BLOOD GLUCOSE TEST) test strip, Test twice daily, Disp: 300 each, Rfl: 0    Lancets (ONETOUCH ULTRASOFT) lancets, USE DAILY AS DIRECTED , Disp: 100 each, Rfl: 3    losartan (COZAAR) 50 mg tablet, Take 25 mg by mouth every morning, Disp: , Rfl:     metoprolol tartrate (LOPRESSOR) 25 mg tablet, Take 1 tablet (25 mg total) by mouth every 12 (twelve) hours, Disp: 60 tablet, Rfl: 5    omeprazole (PriLOSEC) 40 MG capsule, Take 1 capsule (40 mg total) by mouth daily As directed (Patient taking differently: Take 40 mg by mouth every morning As directed), Disp: 90 capsule, Rfl: 3    PARoxetine (PAXIL) 30 mg tablet, TAKE 1 TABLET BY MOUTH EVERY DAY AS DIRECTED (Patient taking differently: TAKE 1 TABLET BY MOUTH EVERY morning AS DIRECTED), Disp: 90 tablet, Rfl: 0    rosuvastatin (CRESTOR) 40 MG tablet, Take 1 tablet (40 mg total) by mouth daily for 90 days (Patient taking differently: Take 40 mg by mouth every morning ), Disp: 90 tablet, Rfl: 3    oxyCODONE-acetaminophen (PERCOCET) 5-325 mg per tablet, Take 1 tablet by mouth every 6 (six) hours as needed for moderate painMax Daily Amount: 4 tablets (Patient not taking: Reported on 7/16/2019), Disp: 40 tablet, Rfl: 0

## 2019-07-16 NOTE — LETTER
July 16, 2019     Lisandra Salas, [address]  [address] [place] [address]    Patient: Chris Canada  YOB: 1935   Date of Visit: 7/16/2019       Dear Dr Jasper Reza: Thank you for referring Deon White to me for evaluation  Below are my notes for this consultation  If you have questions, please do not hesitate to call me  I look forward to following your patient along with you           Sincerely,        Klarissa Dyer MD        CC: No Recipients

## 2019-07-16 NOTE — PATIENT INSTRUCTIONS
Doing well approximately 2 weeks status post placement of left upper arm dialysis graft  He is having no steal symptoms some mild coolness of his hand but no motor or sensation problems  There is an excellent thrill and bruit in the graft staples were removed today and his wounds have healed nicely  Plan:  Can access his left upper arm dialysis graft on July 23rd and then plan for removal of his catheter as per protocol

## 2019-07-17 DIAGNOSIS — J30.9 ALLERGIC RHINITIS, UNSPECIFIED SEASONALITY, UNSPECIFIED TRIGGER: ICD-10-CM

## 2019-07-17 RX ORDER — FLUTICASONE PROPIONATE 50 MCG
SPRAY, SUSPENSION (ML) NASAL
Qty: 16 ML | Refills: 0 | Status: SHIPPED | OUTPATIENT
Start: 2019-07-17 | End: 2019-08-12 | Stop reason: SDUPTHER

## 2019-07-30 ENCOUNTER — HOSPITAL ENCOUNTER (OUTPATIENT)
Dept: INTERVENTIONAL RADIOLOGY/VASCULAR | Facility: HOSPITAL | Age: 84
Discharge: HOME | End: 2019-07-30
Admitting: RADIOLOGY
Payer: COMMERCIAL

## 2019-07-30 ENCOUNTER — TRANSCRIBE ORDERS (OUTPATIENT)
Dept: ADMINISTRATIVE | Facility: HOSPITAL | Age: 84
End: 2019-07-30

## 2019-07-30 ENCOUNTER — APPOINTMENT (OUTPATIENT)
Dept: LAB | Facility: HOSPITAL | Age: 84
End: 2019-07-30
Attending: INTERNAL MEDICINE
Payer: COMMERCIAL

## 2019-07-30 VITALS
WEIGHT: 137 LBS | HEIGHT: 63 IN | DIASTOLIC BLOOD PRESSURE: 65 MMHG | RESPIRATION RATE: 20 BRPM | TEMPERATURE: 97.2 F | HEART RATE: 58 BPM | SYSTOLIC BLOOD PRESSURE: 135 MMHG | BODY MASS INDEX: 24.27 KG/M2 | OXYGEN SATURATION: 96 %

## 2019-07-30 DIAGNOSIS — Q87.89: Primary | ICD-10-CM

## 2019-07-30 DIAGNOSIS — Q87.89: ICD-10-CM

## 2019-07-30 DIAGNOSIS — Z99.2 DIALYSIS PATIENT (HCC): ICD-10-CM

## 2019-07-30 LAB
ALBUMIN SERPL BCP-MCNC: 4 G/DL (ref 3.5–5.7)
ALP SERPL-CCNC: 48 U/L (ref 55–165)
ALT SERPL W P-5'-P-CCNC: 14 U/L (ref 7–52)
ANION GAP SERPL CALCULATED.3IONS-SCNC: 6 MMOL/L (ref 4–13)
AST SERPL W P-5'-P-CCNC: 13 U/L (ref 13–39)
BILIRUB SERPL-MCNC: 0.4 MG/DL (ref 0.2–1)
BUN SERPL-MCNC: 62 MG/DL (ref 7–25)
CALCIUM SERPL-MCNC: 9.9 MG/DL (ref 8.6–10.5)
CHLORIDE SERPL-SCNC: 103 MMOL/L (ref 98–107)
CO2 SERPL-SCNC: 27 MMOL/L (ref 21–31)
CREAT SERPL-MCNC: 3.93 MG/DL (ref 0.7–1.3)
ERYTHROCYTE [DISTWIDTH] IN BLOOD BY AUTOMATED COUNT: 15.3 % (ref 11.5–14.5)
GFR SERPL CREATININE-BSD FRML MDRD: 13 ML/MIN/1.73SQ M
GLUCOSE P FAST SERPL-MCNC: 238 MG/DL (ref 65–99)
HCT VFR BLD AUTO: 35.3 % (ref 42–47)
HGB BLD-MCNC: 11.8 G/DL (ref 14–18)
MCH RBC QN AUTO: 32.3 PG (ref 26–34)
MCHC RBC AUTO-ENTMCNC: 33.3 G/DL (ref 31–37)
MCV RBC AUTO: 97 FL (ref 81–99)
PLATELET # BLD AUTO: 140 THOUSANDS/UL (ref 149–390)
PMV BLD AUTO: 8.2 FL (ref 8.6–11.7)
POTASSIUM SERPL-SCNC: 4.7 MMOL/L (ref 3.5–5.5)
PROT SERPL-MCNC: 7 G/DL (ref 6.4–8.9)
RBC # BLD AUTO: 3.64 MILLION/UL (ref 4.3–5.9)
SODIUM SERPL-SCNC: 136 MMOL/L (ref 134–143)
WBC # BLD AUTO: 4.3 THOUSAND/UL (ref 4.8–10.8)

## 2019-07-30 PROCEDURE — 36589 REMOVAL TUNNELED CV CATH: CPT

## 2019-07-30 PROCEDURE — 85027 COMPLETE CBC AUTOMATED: CPT

## 2019-07-30 PROCEDURE — 36415 COLL VENOUS BLD VENIPUNCTURE: CPT

## 2019-07-30 PROCEDURE — 36589 REMOVAL TUNNELED CV CATH: CPT | Performed by: RADIOLOGY

## 2019-07-30 PROCEDURE — 80053 COMPREHEN METABOLIC PANEL: CPT

## 2019-07-30 RX ORDER — LIDOCAINE HYDROCHLORIDE 10 MG/ML
INJECTION, SOLUTION INFILTRATION; PERINEURAL CODE/TRAUMA/SEDATION MEDICATION
Status: COMPLETED | OUTPATIENT
Start: 2019-07-30 | End: 2019-07-30

## 2019-07-30 RX ADMIN — LIDOCAINE HYDROCHLORIDE 5 ML: 10 INJECTION, SOLUTION INFILTRATION; PERINEURAL at 08:40

## 2019-07-31 ENCOUNTER — TELEPHONE (OUTPATIENT)
Dept: INTERVENTIONAL RADIOLOGY/VASCULAR | Facility: HOSPITAL | Age: 84
End: 2019-07-31

## 2019-07-31 ENCOUNTER — TELEPHONE (OUTPATIENT)
Dept: FAMILY MEDICINE CLINIC | Facility: CLINIC | Age: 84
End: 2019-07-31

## 2019-07-31 NOTE — TELEPHONE ENCOUNTER
Bree Apodaca from [facility] called wants to know if patient is to continuing dialysis or just being monitored with labs  Patient and spouse are unsure  Last time patient had dialysis was last Tuesday  Please advise

## 2019-08-05 ENCOUNTER — APPOINTMENT (OUTPATIENT)
Dept: LAB | Facility: HOSPITAL | Age: 84
End: 2019-08-05
Attending: INTERNAL MEDICINE
Payer: COMMERCIAL

## 2019-08-05 DIAGNOSIS — Q87.89: ICD-10-CM

## 2019-08-05 LAB
ANION GAP SERPL CALCULATED.3IONS-SCNC: 7 MMOL/L (ref 4–13)
BUN SERPL-MCNC: 64 MG/DL (ref 7–25)
CALCIUM SERPL-MCNC: 10 MG/DL (ref 8.6–10.5)
CHLORIDE SERPL-SCNC: 108 MMOL/L (ref 98–107)
CO2 SERPL-SCNC: 27 MMOL/L (ref 21–31)
CREAT SERPL-MCNC: 3.76 MG/DL (ref 0.7–1.3)
GFR SERPL CREATININE-BSD FRML MDRD: 14 ML/MIN/1.73SQ M
GLUCOSE P FAST SERPL-MCNC: 140 MG/DL (ref 65–99)
PHOSPHATE SERPL-MCNC: 4.7 MG/DL (ref 3–5.5)
POTASSIUM SERPL-SCNC: 4.8 MMOL/L (ref 3.5–5.5)
SODIUM SERPL-SCNC: 142 MMOL/L (ref 134–143)

## 2019-08-05 PROCEDURE — 80048 BASIC METABOLIC PNL TOTAL CA: CPT

## 2019-08-05 PROCEDURE — 84100 ASSAY OF PHOSPHORUS: CPT

## 2019-08-05 PROCEDURE — 36415 COLL VENOUS BLD VENIPUNCTURE: CPT

## 2019-08-06 ENCOUNTER — TELEPHONE (OUTPATIENT)
Dept: UROLOGY | Facility: MEDICAL CENTER | Age: 84
End: 2019-08-06

## 2019-08-06 NOTE — TELEPHONE ENCOUNTER
Patient's wife called to check on any upcoming appointments  Advised wife did not see any upcoming appointments for Urology

## 2019-08-12 DIAGNOSIS — J30.9 ALLERGIC RHINITIS, UNSPECIFIED SEASONALITY, UNSPECIFIED TRIGGER: ICD-10-CM

## 2019-08-13 RX ORDER — FLUTICASONE PROPIONATE 50 MCG
SPRAY, SUSPENSION (ML) NASAL
Qty: 16 ML | Refills: 0 | Status: SHIPPED | OUTPATIENT
Start: 2019-08-13 | End: 2019-10-02 | Stop reason: SDUPTHER

## 2019-08-19 DIAGNOSIS — I10 ESSENTIAL HYPERTENSION: ICD-10-CM

## 2019-08-19 DIAGNOSIS — I10 ESSENTIAL HYPERTENSION: Primary | ICD-10-CM

## 2019-08-19 RX ORDER — AMLODIPINE BESYLATE 5 MG/1
TABLET ORAL
Qty: 90 TABLET | Refills: 3 | Status: SHIPPED | OUTPATIENT
Start: 2019-08-19 | End: 2019-10-17 | Stop reason: DRUGHIGH

## 2019-08-19 RX ORDER — AMLODIPINE BESYLATE 10 MG/1
10 TABLET ORAL EVERY MORNING
Qty: 90 TABLET | Refills: 3 | Status: SHIPPED | OUTPATIENT
Start: 2019-08-19 | End: 2020-05-18 | Stop reason: DRUGHIGH

## 2019-08-25 DIAGNOSIS — F41.1 GENERALIZED ANXIETY DISORDER: ICD-10-CM

## 2019-08-27 RX ORDER — PAROXETINE 30 MG/1
30 TABLET, FILM COATED ORAL EVERY MORNING
Qty: 90 TABLET | Refills: 1 | Status: SHIPPED | OUTPATIENT
Start: 2019-08-27 | End: 2020-03-04

## 2019-09-04 ENCOUNTER — TELEPHONE (OUTPATIENT)
Dept: NEPHROLOGY | Facility: CLINIC | Age: 84
End: 2019-09-04

## 2019-09-04 DIAGNOSIS — I48.0 PAROXYSMAL ATRIAL FIBRILLATION (HCC): Chronic | ICD-10-CM

## 2019-09-04 NOTE — TELEPHONE ENCOUNTER
Bobby called for her  Billy Lau and stated that he tried to go for BW this morning in [place] and was turned away due to not having any lab slips  He was last seen on 8/28 by Dr Dani Frost  If there are new orders that need to be completed prior to the next appt please let Julia know

## 2019-09-05 ENCOUNTER — APPOINTMENT (OUTPATIENT)
Dept: LAB | Facility: HOSPITAL | Age: 84
End: 2019-09-05
Attending: INTERNAL MEDICINE
Payer: COMMERCIAL

## 2019-09-05 ENCOUNTER — TELEPHONE (OUTPATIENT)
Dept: OTHER | Facility: OTHER | Age: 84
End: 2019-09-05

## 2019-09-05 ENCOUNTER — TRANSCRIBE ORDERS (OUTPATIENT)
Dept: LAB | Facility: HOSPITAL | Age: 84
End: 2019-09-05

## 2019-09-05 DIAGNOSIS — N18.1 STAGE 1 CHRONIC KIDNEY DISEASE: Primary | ICD-10-CM

## 2019-09-05 DIAGNOSIS — N18.1 STAGE 1 CHRONIC KIDNEY DISEASE: ICD-10-CM

## 2019-09-05 LAB
ALBUMIN SERPL BCP-MCNC: 4.4 G/DL (ref 3.5–5.7)
ALP SERPL-CCNC: 49 U/L (ref 55–165)
ALT SERPL W P-5'-P-CCNC: 11 U/L (ref 7–52)
ANION GAP SERPL CALCULATED.3IONS-SCNC: 7 MMOL/L (ref 4–13)
AST SERPL W P-5'-P-CCNC: 12 U/L (ref 13–39)
BILIRUB SERPL-MCNC: 0.6 MG/DL (ref 0.2–1)
BUN SERPL-MCNC: 71 MG/DL (ref 7–25)
CALCIUM SERPL-MCNC: 9.9 MG/DL (ref 8.6–10.5)
CHLORIDE SERPL-SCNC: 104 MMOL/L (ref 98–107)
CO2 SERPL-SCNC: 27 MMOL/L (ref 21–31)
CREAT SERPL-MCNC: 3.74 MG/DL (ref 0.7–1.3)
GFR SERPL CREATININE-BSD FRML MDRD: 14 ML/MIN/1.73SQ M
GLUCOSE SERPL-MCNC: 212 MG/DL (ref 65–99)
PHOSPHATE SERPL-MCNC: 4.1 MG/DL (ref 3–5.5)
POTASSIUM SERPL-SCNC: 4.5 MMOL/L (ref 3.5–5.5)
PROT SERPL-MCNC: 7.4 G/DL (ref 6.4–8.9)
SODIUM SERPL-SCNC: 138 MMOL/L (ref 134–143)

## 2019-09-05 PROCEDURE — 84100 ASSAY OF PHOSPHORUS: CPT

## 2019-09-05 PROCEDURE — 80053 COMPREHEN METABOLIC PANEL: CPT

## 2019-09-05 PROCEDURE — 36415 COLL VENOUS BLD VENIPUNCTURE: CPT

## 2019-09-05 NOTE — TELEPHONE ENCOUNTER
He has a slip written in dialysis today  Let him know that his GFR dropped to 15 and we are rechecking I one week

## 2019-09-06 DIAGNOSIS — J30.9 ALLERGIC RHINITIS, UNSPECIFIED SEASONALITY, UNSPECIFIED TRIGGER: ICD-10-CM

## 2019-09-06 RX ORDER — FLUTICASONE PROPIONATE 50 MCG
SPRAY, SUSPENSION (ML) NASAL
Qty: 16 ML | Refills: 0 | OUTPATIENT
Start: 2019-09-06

## 2019-09-06 NOTE — TELEPHONE ENCOUNTER
Called over the dialysis  They made patient aware that he needs to get bw completed next week  The script needs to be signed

## 2019-09-08 DIAGNOSIS — J44.9 CHRONIC OBSTRUCTIVE PULMONARY DISEASE, UNSPECIFIED COPD TYPE (HCC): ICD-10-CM

## 2019-09-12 ENCOUNTER — TRANSCRIBE ORDERS (OUTPATIENT)
Dept: LAB | Facility: HOSPITAL | Age: 84
End: 2019-09-12

## 2019-09-12 ENCOUNTER — LAB (OUTPATIENT)
Dept: LAB | Facility: HOSPITAL | Age: 84
End: 2019-09-12
Attending: INTERNAL MEDICINE
Payer: COMMERCIAL

## 2019-09-12 DIAGNOSIS — Z99.2 STAGE 5 CHRONIC KIDNEY DISEASE ON CHRONIC DIALYSIS (HCC): Primary | ICD-10-CM

## 2019-09-12 DIAGNOSIS — Z99.2 STAGE 5 CHRONIC KIDNEY DISEASE ON CHRONIC DIALYSIS (HCC): ICD-10-CM

## 2019-09-12 DIAGNOSIS — N18.6 STAGE 5 CHRONIC KIDNEY DISEASE ON CHRONIC DIALYSIS (HCC): Primary | ICD-10-CM

## 2019-09-12 DIAGNOSIS — N18.6 STAGE 5 CHRONIC KIDNEY DISEASE ON CHRONIC DIALYSIS (HCC): ICD-10-CM

## 2019-09-12 LAB
ALBUMIN SERPL BCP-MCNC: 4.1 G/DL (ref 3.5–5.7)
ALP SERPL-CCNC: 51 U/L (ref 55–165)
ALT SERPL W P-5'-P-CCNC: 10 U/L (ref 7–52)
ANION GAP SERPL CALCULATED.3IONS-SCNC: 6 MMOL/L (ref 4–13)
AST SERPL W P-5'-P-CCNC: 11 U/L (ref 13–39)
BILIRUB SERPL-MCNC: 0.5 MG/DL (ref 0.2–1)
BUN SERPL-MCNC: 57 MG/DL (ref 7–25)
CALCIUM SERPL-MCNC: 9.8 MG/DL (ref 8.6–10.5)
CHLORIDE SERPL-SCNC: 108 MMOL/L (ref 98–107)
CO2 SERPL-SCNC: 27 MMOL/L (ref 21–31)
CREAT SERPL-MCNC: 3.63 MG/DL (ref 0.7–1.3)
ERYTHROCYTE [DISTWIDTH] IN BLOOD BY AUTOMATED COUNT: 15.2 % (ref 11.5–14.5)
GFR SERPL CREATININE-BSD FRML MDRD: 14 ML/MIN/1.73SQ M
GLUCOSE P FAST SERPL-MCNC: 147 MG/DL (ref 65–99)
HCT VFR BLD AUTO: 36.3 % (ref 42–47)
HGB BLD-MCNC: 12 G/DL (ref 14–18)
MCH RBC QN AUTO: 32.8 PG (ref 26–34)
MCHC RBC AUTO-ENTMCNC: 33.2 G/DL (ref 31–37)
MCV RBC AUTO: 99 FL (ref 81–99)
PLATELET # BLD AUTO: 150 THOUSANDS/UL (ref 149–390)
PMV BLD AUTO: 9 FL (ref 8.6–11.7)
POTASSIUM SERPL-SCNC: 4.6 MMOL/L (ref 3.5–5.5)
PROT SERPL-MCNC: 7.1 G/DL (ref 6.4–8.9)
RBC # BLD AUTO: 3.67 MILLION/UL (ref 4.3–5.9)
SODIUM SERPL-SCNC: 141 MMOL/L (ref 134–143)
WBC # BLD AUTO: 3.7 THOUSAND/UL (ref 4.8–10.8)

## 2019-09-12 PROCEDURE — 80053 COMPREHEN METABOLIC PANEL: CPT

## 2019-09-12 PROCEDURE — 36415 COLL VENOUS BLD VENIPUNCTURE: CPT

## 2019-09-12 PROCEDURE — 85027 COMPLETE CBC AUTOMATED: CPT

## 2019-09-13 ENCOUNTER — TELEPHONE (OUTPATIENT)
Dept: NEPHROLOGY | Facility: CLINIC | Age: 84
End: 2019-09-13

## 2019-09-13 DIAGNOSIS — N18.5 STAGE 5 CHRONIC KIDNEY DISEASE (HCC): Primary | ICD-10-CM

## 2019-09-13 NOTE — TELEPHONE ENCOUNTER
----- Message from Harjit Shin DO sent at 9/12/2019 10:55 PM EDT -----  Blood work noted a low kidney fn that remains below level from May and June  Continue to avoid all NSAIDs, review with Dr Winter Jarrett at upcoming appt

## 2019-09-13 NOTE — RESULT ENCOUNTER NOTE
Blood work noted a low kidney fn that remains below level from May and June  Continue to avoid all NSAIDs, review with Dr Leah Vásquez at upcoming appt

## 2019-09-16 ENCOUNTER — OFFICE VISIT (OUTPATIENT)
Dept: FAMILY MEDICINE CLINIC | Facility: CLINIC | Age: 84
End: 2019-09-16
Payer: COMMERCIAL

## 2019-09-16 VITALS
WEIGHT: 136 LBS | SYSTOLIC BLOOD PRESSURE: 116 MMHG | HEIGHT: 63 IN | TEMPERATURE: 96.8 F | BODY MASS INDEX: 24.1 KG/M2 | DIASTOLIC BLOOD PRESSURE: 58 MMHG | HEART RATE: 62 BPM | OXYGEN SATURATION: 96 %

## 2019-09-16 DIAGNOSIS — N18.30 TYPE 2 DIABETES MELLITUS WITH STAGE 3 CHRONIC KIDNEY DISEASE, WITHOUT LONG-TERM CURRENT USE OF INSULIN (HCC): ICD-10-CM

## 2019-09-16 DIAGNOSIS — N18.30 STAGE 3 CHRONIC KIDNEY DISEASE (HCC): ICD-10-CM

## 2019-09-16 DIAGNOSIS — E11.22 TYPE 2 DIABETES MELLITUS WITH STAGE 3 CHRONIC KIDNEY DISEASE, WITHOUT LONG-TERM CURRENT USE OF INSULIN (HCC): ICD-10-CM

## 2019-09-16 DIAGNOSIS — Z23 IMMUNIZATION DUE: ICD-10-CM

## 2019-09-16 DIAGNOSIS — Z23 NEED FOR INFLUENZA VACCINATION: Primary | ICD-10-CM

## 2019-09-16 DIAGNOSIS — B02.29 POST HERPETIC NEURALGIA: ICD-10-CM

## 2019-09-16 PROBLEM — N18.6 END STAGE RENAL DISEASE (HCC): Status: RESOLVED | Noted: 2019-05-29 | Resolved: 2019-09-16

## 2019-09-16 LAB — SL AMB POCT HEMOGLOBIN AIC: 6.3 (ref ?–6.5)

## 2019-09-16 PROCEDURE — 83036 HEMOGLOBIN GLYCOSYLATED A1C: CPT | Performed by: FAMILY MEDICINE

## 2019-09-16 PROCEDURE — G0008 ADMIN INFLUENZA VIRUS VAC: HCPCS | Performed by: FAMILY MEDICINE

## 2019-09-16 PROCEDURE — 90662 IIV NO PRSV INCREASED AG IM: CPT | Performed by: FAMILY MEDICINE

## 2019-09-16 PROCEDURE — 99214 OFFICE O/P EST MOD 30 MIN: CPT | Performed by: FAMILY MEDICINE

## 2019-09-16 NOTE — PROGRESS NOTES
Assessment/Plan:  Patient appears to be doing well  We reviewed his medications  No changes will be made today  Return to office for recheck again in 3-4 months  Continue to follow with Nephrology for chronic renal failure  He would not like to take anything for pain relief at this time for the shingles  Recommend return to office for recheck again  No problem-specific Assessment & Plan notes found for this encounter  Diagnoses and all orders for this visit:    Need for influenza vaccination  -     influenza vaccine, [id number], high-dose, PF 0 5 mL (FLUZONE HIGH-DOSE)    Type 2 diabetes mellitus with stage 3 chronic kidney disease, without long-term current use of insulin ([facility])  -     POCT hemoglobin A1c    Stage 3 chronic kidney disease ([facility] [address] )    Post herpetic neuralgia    Immunization due  -     influenza vaccine, [id number], high-dose, PF 0 5 mL (FLUZONE HIGH-DOSE)          Subjective:      Patient ID: Chau Daugherty  is a 80 y o  male  Patient is generally feeling well  He still has fading shingles rash from May  He occasionally has neuropathy symptoms to the right hip area where shingles occurred  No fevers  He continues to follow with Nephrology regularly  He was recently taken off dialysis and has been off dialysis for 2 weeks  Last creatinine was checked a few days ago and he is due for recheck and creatinine level again this week  Denies any fatigue  He states he has been more active outside  No chest pain or shortness of breath  The following portions of the patient's history were reviewed and updated as appropriate: allergies, current medications, past family history, past medical history, past social history, past surgical history and problem list     Review of Systems   Constitutional: Negative  HENT: Negative  Eyes: Negative  Respiratory: Negative  Cardiovascular: Negative  Gastrointestinal: Negative  Endocrine: Negative  Genitourinary: Negative  Musculoskeletal: Negative  Skin: Negative  Allergic/Immunologic: Negative  Neurological: Negative  Hematological: Negative  Psychiatric/Behavioral: Negative  Objective:      /58 (BP Location: Right arm, Patient Position: Sitting, Cuff Size: Standard)   Pulse 62   Temp (!) 96 8 °F (36 °C) (Tympanic)   Ht 5' 3 19" (1 605 m)   Wt 61 7 kg (136 lb)   SpO2 96%   BMI 23 95 kg/m²          Physical Exam   Constitutional: He is oriented to person, place, and time  He appears well-developed and well-nourished  HENT:   Head: Normocephalic and atraumatic  Right Ear: External ear normal  Tympanic membrane is not erythematous and not bulging  Left Ear: External ear normal  Tympanic membrane is not erythematous and not bulging  Nose: Nose normal    Mouth/Throat: Oropharynx is clear and moist and mucous membranes are normal  No oral lesions  No oropharyngeal exudate  Eyes: Conjunctivae and EOM are normal  Right eye exhibits no discharge  Left eye exhibits no discharge  No scleral icterus  Neck: Normal range of motion  Neck supple  No thyromegaly present  Cardiovascular: Normal rate, regular rhythm and normal heart sounds  Exam reveals no gallop and no friction rub  Pulses are no weak pulses  No murmur heard  Pulses:       Dorsalis pedis pulses are 1+ on the right side, and 1+ on the left side  Posterior tibial pulses are 1+ on the right side, and 1+ on the left side  Pulmonary/Chest: Effort normal  No respiratory distress  He has no wheezes  He has no rales  He exhibits no tenderness  Abdominal: Soft  Bowel sounds are normal  He exhibits no distension and no mass  There is no tenderness  There is no rebound and no guarding  Musculoskeletal: Normal range of motion  He exhibits no edema, tenderness or deformity  Feet:   Right Foot:   Skin Integrity: Negative for ulcer, skin breakdown, erythema, warmth, callus or dry skin     Left Foot:   Skin Integrity: Negative for ulcer, skin breakdown, erythema, warmth, callus or dry skin  Lymphadenopathy:     He has no cervical adenopathy  Neurological: He is alert and oriented to person, place, and time  He has normal reflexes  No cranial nerve deficit  He exhibits normal muscle tone  Coordination normal    Skin: Skin is warm and dry  No rash noted  No erythema  No pallor  Psychiatric: He has a normal mood and affect  His behavior is normal    Vitals reviewed  Patient's shoes and socks removed  Right Foot/Ankle   Right Foot Inspection  Skin Exam: skin normal and skin intact no dry skin, no warmth, no callus, no erythema, no maceration, no abnormal color, no pre-ulcer, no ulcer and no callus                          Toe Exam: ROM and strength within normal limitsno swelling  Sensory   Vibration: intact  Proprioception: intact   Monofilament testing: intact  Vascular  Capillary refills: < 3 seconds  The right DP pulse is 1+  The right PT pulse is 1+  Right Toe  - Comprehensive Exam  Ecchymosis: none  Swelling: none   Tenderness: none         Left Foot/Ankle  Left Foot Inspection  Skin Exam: skin normal and skin intactno dry skin, no warmth, no erythema, no maceration, normal color, no pre-ulcer, no ulcer and no callus                         Toe Exam: ROM and strength within normal limitsno swelling                   Sensory   Vibration: intact  Proprioception: intact  Monofilament: intact  Vascular  Capillary refills: < 3 seconds  The left DP pulse is 1+  The left PT pulse is 1+  Left Toe  - Comprehensive Exam  Ecchymosis: none  Arch: normal  Swelling: none   Tenderness: none       Assign Risk Category:  No deformity present; No loss of protective sensation;  No weak pulses       Risk: 0

## 2019-09-23 ENCOUNTER — TELEPHONE (OUTPATIENT)
Dept: NEPHROLOGY | Facility: CLINIC | Age: 84
End: 2019-09-23

## 2019-09-23 DIAGNOSIS — N18.5 CKD STAGE 5 DUE TO TYPE 2 DIABETES MELLITUS (HCC): Primary | ICD-10-CM

## 2019-09-23 DIAGNOSIS — E11.22 CKD STAGE 5 DUE TO TYPE 2 DIABETES MELLITUS (HCC): Primary | ICD-10-CM

## 2019-09-23 NOTE — TELEPHONE ENCOUNTER
Patient's wife called; she's asking when he needs repeats labs since he hasn't had dialysis in some time  If you would like labs, please add the order and I will let her know  He does not have an appt with you until December

## 2019-09-26 ENCOUNTER — APPOINTMENT (OUTPATIENT)
Dept: LAB | Facility: HOSPITAL | Age: 84
End: 2019-09-26
Attending: INTERNAL MEDICINE
Payer: COMMERCIAL

## 2019-09-26 ENCOUNTER — TELEPHONE (OUTPATIENT)
Dept: NEPHROLOGY | Facility: CLINIC | Age: 84
End: 2019-09-26

## 2019-09-26 DIAGNOSIS — E11.22 CKD STAGE 5 DUE TO TYPE 2 DIABETES MELLITUS (HCC): ICD-10-CM

## 2019-09-26 DIAGNOSIS — N18.5 CKD STAGE 5 DUE TO TYPE 2 DIABETES MELLITUS (HCC): ICD-10-CM

## 2019-09-26 DIAGNOSIS — N18.5 STAGE 5 CHRONIC KIDNEY DISEASE (HCC): ICD-10-CM

## 2019-09-26 LAB
ALBUMIN SERPL BCP-MCNC: 4 G/DL (ref 3.5–5.7)
ALP SERPL-CCNC: 60 U/L (ref 55–165)
ALT SERPL W P-5'-P-CCNC: 12 U/L (ref 7–52)
ANION GAP SERPL CALCULATED.3IONS-SCNC: 5 MMOL/L (ref 4–13)
AST SERPL W P-5'-P-CCNC: 13 U/L (ref 13–39)
BASOPHILS # BLD AUTO: 0 THOUSANDS/ΜL (ref 0–0.1)
BASOPHILS NFR BLD AUTO: 0 % (ref 0–2)
BILIRUB SERPL-MCNC: 0.3 MG/DL (ref 0.2–1)
BUN SERPL-MCNC: 61 MG/DL (ref 7–25)
CALCIUM SERPL-MCNC: 9.7 MG/DL (ref 8.6–10.5)
CHLORIDE SERPL-SCNC: 108 MMOL/L (ref 98–107)
CO2 SERPL-SCNC: 28 MMOL/L (ref 21–31)
CREAT SERPL-MCNC: 3.42 MG/DL (ref 0.7–1.3)
EOSINOPHIL # BLD AUTO: 0.1 THOUSAND/ΜL (ref 0–0.61)
EOSINOPHIL NFR BLD AUTO: 2 % (ref 0–5)
ERYTHROCYTE [DISTWIDTH] IN BLOOD BY AUTOMATED COUNT: 14 % (ref 11.5–14.5)
GFR SERPL CREATININE-BSD FRML MDRD: 16 ML/MIN/1.73SQ M
GLUCOSE P FAST SERPL-MCNC: 151 MG/DL (ref 65–99)
HCT VFR BLD AUTO: 33.4 % (ref 42–47)
HGB BLD-MCNC: 11.3 G/DL (ref 14–18)
LYMPHOCYTES # BLD AUTO: 0.8 THOUSANDS/ΜL (ref 0.6–4.47)
LYMPHOCYTES NFR BLD AUTO: 16 % (ref 21–51)
MCH RBC QN AUTO: 32.9 PG (ref 26–34)
MCHC RBC AUTO-ENTMCNC: 33.8 G/DL (ref 31–37)
MCV RBC AUTO: 98 FL (ref 81–99)
MONOCYTES # BLD AUTO: 0.3 THOUSAND/ΜL (ref 0.17–1.22)
MONOCYTES NFR BLD AUTO: 6 % (ref 2–12)
NEUTROPHILS # BLD AUTO: 4 THOUSANDS/ΜL (ref 1.4–6.5)
NEUTS SEG NFR BLD AUTO: 76 % (ref 42–75)
PHOSPHATE SERPL-MCNC: 3.4 MG/DL (ref 3–5.5)
PLATELET # BLD AUTO: 167 THOUSANDS/UL (ref 149–390)
PMV BLD AUTO: 8.8 FL (ref 8.6–11.7)
POTASSIUM SERPL-SCNC: 5 MMOL/L (ref 3.5–5.5)
PROT SERPL-MCNC: 7 G/DL (ref 6.4–8.9)
RBC # BLD AUTO: 3.43 MILLION/UL (ref 4.3–5.9)
SODIUM SERPL-SCNC: 141 MMOL/L (ref 134–143)
WBC # BLD AUTO: 5.3 THOUSAND/UL (ref 4.8–10.8)

## 2019-09-26 PROCEDURE — 80053 COMPREHEN METABOLIC PANEL: CPT

## 2019-09-26 PROCEDURE — 85025 COMPLETE CBC W/AUTO DIFF WBC: CPT

## 2019-09-26 PROCEDURE — 36415 COLL VENOUS BLD VENIPUNCTURE: CPT

## 2019-09-26 PROCEDURE — 84100 ASSAY OF PHOSPHORUS: CPT

## 2019-09-27 ENCOUNTER — TELEPHONE (OUTPATIENT)
Dept: NEPHROLOGY | Facility: CLINIC | Age: 84
End: 2019-09-27

## 2019-09-27 DIAGNOSIS — N18.5 CKD STAGE 5 DUE TO TYPE 2 DIABETES MELLITUS (HCC): Primary | ICD-10-CM

## 2019-09-27 DIAGNOSIS — E11.22 CKD STAGE 5 DUE TO TYPE 2 DIABETES MELLITUS (HCC): Primary | ICD-10-CM

## 2019-10-02 DIAGNOSIS — J30.9 ALLERGIC RHINITIS, UNSPECIFIED SEASONALITY, UNSPECIFIED TRIGGER: ICD-10-CM

## 2019-10-03 ENCOUNTER — APPOINTMENT (OUTPATIENT)
Dept: LAB | Facility: HOSPITAL | Age: 84
End: 2019-10-03
Attending: INTERNAL MEDICINE
Payer: COMMERCIAL

## 2019-10-03 ENCOUNTER — TELEPHONE (OUTPATIENT)
Dept: NEPHROLOGY | Facility: CLINIC | Age: 84
End: 2019-10-03

## 2019-10-03 LAB
ANION GAP SERPL CALCULATED.3IONS-SCNC: 8 MMOL/L (ref 4–13)
BUN SERPL-MCNC: 61 MG/DL (ref 7–25)
CALCIUM SERPL-MCNC: 9.7 MG/DL (ref 8.6–10.5)
CHLORIDE SERPL-SCNC: 106 MMOL/L (ref 98–107)
CO2 SERPL-SCNC: 28 MMOL/L (ref 21–31)
CREAT SERPL-MCNC: 3.11 MG/DL (ref 0.7–1.3)
GFR SERPL CREATININE-BSD FRML MDRD: 17 ML/MIN/1.73SQ M
GLUCOSE P FAST SERPL-MCNC: 143 MG/DL (ref 65–99)
POTASSIUM SERPL-SCNC: 4.6 MMOL/L (ref 3.5–5.5)
SODIUM SERPL-SCNC: 142 MMOL/L (ref 134–143)

## 2019-10-03 PROCEDURE — 36415 COLL VENOUS BLD VENIPUNCTURE: CPT | Performed by: INTERNAL MEDICINE

## 2019-10-03 PROCEDURE — 80048 BASIC METABOLIC PNL TOTAL CA: CPT | Performed by: INTERNAL MEDICINE

## 2019-10-03 RX ORDER — FLUTICASONE PROPIONATE 50 MCG
SPRAY, SUSPENSION (ML) NASAL
Qty: 16 ML | Refills: 0 | Status: SHIPPED | OUTPATIENT
Start: 2019-10-03 | End: 2021-06-29

## 2019-10-03 NOTE — TELEPHONE ENCOUNTER
----- Message from Moo Leach DO sent at 10/3/2019  9:03 AM EDT -----  Kidney fn continues to improve slowly, now at 17 compared to 14 from 3 weeks ago

## 2019-10-10 ENCOUNTER — APPOINTMENT (OUTPATIENT)
Dept: LAB | Facility: HOSPITAL | Age: 84
End: 2019-10-10
Attending: INTERNAL MEDICINE
Payer: COMMERCIAL

## 2019-10-17 ENCOUNTER — TELEPHONE (OUTPATIENT)
Dept: NEPHROLOGY | Facility: CLINIC | Age: 84
End: 2019-10-17

## 2019-10-17 ENCOUNTER — OFFICE VISIT (OUTPATIENT)
Dept: CARDIOLOGY CLINIC | Facility: CLINIC | Age: 84
End: 2019-10-17
Payer: COMMERCIAL

## 2019-10-17 VITALS
SYSTOLIC BLOOD PRESSURE: 130 MMHG | HEIGHT: 64 IN | HEART RATE: 60 BPM | BODY MASS INDEX: 23.05 KG/M2 | DIASTOLIC BLOOD PRESSURE: 62 MMHG | WEIGHT: 135 LBS

## 2019-10-17 DIAGNOSIS — I31.4 PERICARDIAL EFFUSION WITH CARDIAC TAMPONADE: Chronic | ICD-10-CM

## 2019-10-17 DIAGNOSIS — E78.5 DYSLIPIDEMIA: ICD-10-CM

## 2019-10-17 DIAGNOSIS — I31.3 PERICARDIAL EFFUSION WITH CARDIAC TAMPONADE: Chronic | ICD-10-CM

## 2019-10-17 DIAGNOSIS — Z98.890 S/P PERICARDIAL WINDOW CREATION: ICD-10-CM

## 2019-10-17 DIAGNOSIS — I48.0 PAROXYSMAL ATRIAL FIBRILLATION (HCC): Primary | Chronic | ICD-10-CM

## 2019-10-17 PROCEDURE — 99213 OFFICE O/P EST LOW 20 MIN: CPT | Performed by: INTERNAL MEDICINE

## 2019-10-17 NOTE — TELEPHONE ENCOUNTER
----- Message from Jefry Langston DO sent at 10/16/2019 10:48 PM EDT -----  Renal function is low but stable, pls ask the patient to have next set of labs in 2 weeks

## 2019-10-17 NOTE — TELEPHONE ENCOUNTER
Left message with results, and told patient to have lab work done in 2 weeks  Also, told to call back if any questions

## 2019-10-17 NOTE — PROGRESS NOTES
Cardiology Follow Up    [place]   1935  [phone]  [facility]  [address] [place]  [address]  [phone]    1  Paroxysmal atrial fibrillation (HCC)     2  Pericardial effusion with cardiac tamponade     3  S/P pericardial window creation     4  Dyslipidemia         Interval History:   Cardiology follow-up  Patient doing well from a cardiac point of view denies any chest pain or dyspnea, he ambulates at a slow pace  Follow-up echocardiogram revealed minimal pericardial effusion  Left systolic function is low normal with stage II diastolic dysfunction and mild mitral insufficiency  He did undergo AV fistula in the left arm on 07/19, he has had intermittent episode of dialysis but he has not received any last 3 weeks  Appears to be rather stable      Patient Active Problem List   Diagnosis    Benign prostatic hyperplasia with urinary frequency    Dyslipidemia    Esophageal reflux    Essential hypertension    Lower leg DVT (deep venous thromboembolism), acute, right (HCC)    Pancreatic cyst    Pulmonary embolism (HCC)    Pulmonary nodule seen on imaging study    Type 2 diabetes mellitus with stage 3 chronic kidney disease, without long-term current use of insulin (HCC)    Pericardial effusion with cardiac tamponade    Paroxysmal atrial fibrillation (HCC)    Stage 3 chronic kidney disease (HCC)    COPD (chronic obstructive pulmonary disease) (HCC)    Chronic anemia    Acute renal failure with tubular necrosis (HCC)    S/P pericardial window creation    Obrien's esophagus without dysplasia    Tubular adenoma     Past Medical History:   Diagnosis Date    Anxiety     Obrien's esophagus     BPH (benign prostatic hyperplasia)     Cardiomegaly     Diabetes ([facility] [address] )     type 2  controlled; taken off oral meds 6/19    DVT (deep venous thrombosis) (HCC)     right leg    GERD (gastroesophageal reflux disease)     Hypercholesterolemia     Hypertension     Irregular heart beat     paroxsysmal a fib    Pancreatic cyst     Paroxysmal atrial fibrillation (HCC)     Pericardial effusion with cardiac tamponade     Pleural effusion     Renal disorder     dialysis started in 2019    Weight loss, non-intentional      Social History     Socioeconomic History    Marital status: /Civil Union     Spouse name: Not on file    Number of children: Not on file    Years of education: Not on file    Highest education level: Not on file   Occupational History    Not on file   Social Needs    Financial resource strain: Not on file    Food insecurity:     Worry: Not on file     Inability: Not on file    Transportation needs:     Medical: Not on file     Non-medical: Not on file   Tobacco Use    Smoking status: Former Smoker     Packs/day: 0 50     Last attempt to quit: 1969     Years since quittin 8    Smokeless tobacco: Never Used    Tobacco comment: current non-smoker, per [employer]   Substance and Sexual Activity    Alcohol use: No     Frequency: Never     Binge frequency: Never    Drug use: No    Sexual activity: Not Currently   Lifestyle    Physical activity:     Days per week: Not on file     Minutes per session: Not on file    Stress: Not on file   Relationships    Social connections:     Talks on phone: Not on file     Gets together: Not on file     Attends Hindu service: Not on file     Active member of club or organization: Not on file     Attends meetings of clubs or organizations: Not on file     Relationship status: Not on file    Intimate partner violence:     Fear of current or ex partner: Not on file     Emotionally abused: Not on file     Physically abused: Not on file     Forced sexual activity: Not on file   Other Topics Concern    Not on file   Social History Narrative    Not on file      Family History   Problem Relation Age of Onset    Diabetes Mother     Diabetes Father     Prostate cancer Brother     Diabetes Brother     Pulmonary embolism Sister      Past Surgical History:   Procedure Laterality Date    APPENDECTOMY  1953    CATARACT EXTRACTION Bilateral 2016?  COLONOSCOPY      COLONOSCOPY N/A 1/24/2019    Procedure: COLONOSCOPY with polypectomies;  Surgeon: Mekhi Alvarenga MD;  Location: [facility]; Service: Gastroenterology    ESOPHAGOGASTRODUODENOSCOPY N/A 1/24/2019    Procedure: ESOPHAGOGASTRODUODENOSCOPY (EGD) with bx;  Surgeon: Mekhi Alvarenga MD;  Location: [facility];   Service: Gastroenterology    [facility] EXCHANGE  4/18/2019    [facility] PLACEMENT  3/6/2019    [facility] REMOVAL  7/30/2019    PERICARDIAL WINDOW N/A 2/26/2019    Procedure: WINDOW PERICARDIAL;  Surgeon: Kevan Josue MD;  [facility];  Service: Thoracic    VT ANASTOMOSIS,AV,ANY SITE Left 7/3/2019    Procedure: CREATION FISTULA ARTERIOVENOUS (AV) left upper extremity brachial artery to axillary vein [place] graft ;  Surgeon: Demarco Duffy MD;  Location: [address] [place] [address] MAIN OR;  Service: Vascular    PROSTATE BIOPSY         Current Outpatient Medications:     ALPRAZolam (XANAX) 0 25 mg tablet, Take 1 tablet (0 25 mg total) by mouth every 8 (eight) hours as needed for anxiety, Disp: 270 tablet, Rfl: 0    amLODIPine (NORVASC) 10 mg tablet, Take 1 tablet (10 mg total) by mouth every morning, Disp: 90 tablet, Rfl: 3    Blood Glucose Monitoring Suppl (TRUE METRIX METER) YASMEEN, by Does not apply route 2 (two) times a day, Disp: 1 Device, Rfl: 0    Cholecalciferol (VITAMIN D PO), Take 5,000 Units by mouth every morning , Disp: , Rfl:     DULERA 100-5 MCG/ACT inhaler, INHALE 2 PUFFS EVERY 12 (TWELVE) HOURS, Disp: 13 Inhaler, Rfl: 2    EPINEPHrine (EPIPEN) 0 3 mg/0 3 mL SOAJ, Inject as directed, Disp: , Rfl:     fexofenadine (ALLEGRA) 180 MG tablet, Take 1 tablet (180 mg total) by mouth daily For allergy congestion (Patient taking differently: Take 180 mg by mouth every morning For allergy congestion), Disp: 90 tablet, Rfl: 1    finasteride (PROSCAR) 5 mg tablet, Take 1 tablet (5 mg total) by mouth daily, Disp: 90 tablet, Rfl: 3    fluticasone (FLONASE) 50 mcg/act nasal spray, USE 2 SPRAYS IN EACH NOSTRIL DAILY, Disp: 16 mL, Rfl: 0    glucose blood (ONE TOUCH ULTRA TEST) test strip, Use daily as directed , Disp: 200 each, Rfl: 3    glucose blood (TRUE METRIX BLOOD GLUCOSE TEST) test strip, Test twice daily, Disp: 300 each, Rfl: 0    Lancets (ONETOUCH ULTRASOFT) lancets, USE DAILY AS DIRECTED , Disp: 100 each, Rfl: 3    losartan (COZAAR) 50 mg tablet, Take 25 mg by mouth every morning, Disp: , Rfl:     metoprolol tartrate (LOPRESSOR) 25 mg tablet, TAKE 1 TABLET (25 MG TOTAL) BY MOUTH EVERY 12 (TWELVE) HOURS, Disp: 180 tablet, Rfl: 1    omeprazole (PriLOSEC) 40 MG capsule, Take 1 capsule (40 mg total) by mouth daily As directed (Patient taking differently: Take 40 mg by mouth every morning As directed), Disp: 90 capsule, Rfl: 3    oxyCODONE-acetaminophen (PERCOCET) 5-325 mg per tablet, Take 1 tablet by mouth every 6 (six) hours as needed for moderate painMax Daily Amount: 4 tablets, Disp: 40 tablet, Rfl: 0    PARoxetine (PAXIL) 30 mg tablet, Take 1 tablet (30 mg total) by mouth every morning As directed, Disp: 90 tablet, Rfl: 1    rosuvastatin (CRESTOR) 40 MG tablet, Take 1 tablet (40 mg total) by mouth daily for 90 days (Patient taking differently: Take 40 mg by mouth every morning ), Disp: 90 tablet, Rfl: 3  Allergies   Allergen Reactions    Bee Venom Facial Swelling    Ace Inhibitors        Labs:   Ancillary Orders on 10/04/2019   Component Date Value    Sodium 10/10/2019 143     Potassium 10/10/2019 4 5     Chloride 10/10/2019 110*    CO2 10/10/2019 28     ANION GAP 10/10/2019 5     BUN 10/10/2019 63*    Creatinine 10/10/2019 3 16*    Glucose, Fasting 10/10/2019 144*    Calcium 10/10/2019 9 8     eGFR 10/10/2019 17    Orders Only on 09/27/2019 Component Date Value    Sodium 10/03/2019 142     Potassium 10/03/2019 4 6     Chloride 10/03/2019 106     CO2 10/03/2019 28     ANION GAP 10/03/2019 8     BUN 10/03/2019 61*    Creatinine 10/03/2019 3 11*    Glucose, Fasting 10/03/2019 143*    Calcium 10/03/2019 9 7     eGFR 10/03/2019 17    Appointment on 09/26/2019   Component Date Value    Phosphorus 09/26/2019 3 4     WBC 09/26/2019 5 30     RBC 09/26/2019 3 43*    Hemoglobin 09/26/2019 11 3*    Hematocrit 09/26/2019 33 4*    MCV 09/26/2019 98     MCH 09/26/2019 32 9     MCHC 09/26/2019 33 8     RDW 09/26/2019 14 0     MPV 09/26/2019 8 8     Platelets 16/05/4789 167     Neutrophils Relative 09/26/2019 76*    Lymphocytes Relative 09/26/2019 16*    Monocytes Relative 09/26/2019 6     Eosinophils Relative 09/26/2019 2     Basophils Relative 09/26/2019 0     Neutrophils Absolute 09/26/2019 4 00     Lymphocytes Absolute 09/26/2019 0 80     Monocytes Absolute 09/26/2019 0 30     Eosinophils Absolute 09/26/2019 0 10     Basophils Absolute 09/26/2019 0 00     Sodium 09/26/2019 141     Potassium 09/26/2019 5 0     Chloride 09/26/2019 108*    CO2 09/26/2019 28     ANION GAP 09/26/2019 5     BUN 09/26/2019 61*    Creatinine 09/26/2019 3 42*    Glucose, Fasting 09/26/2019 151*    Calcium 09/26/2019 9 7     AST 09/26/2019 13     ALT 09/26/2019 12     Alkaline Phosphatase 09/26/2019 60     Total Protein 09/26/2019 7 0     Albumin 09/26/2019 4 0     Total Bilirubin 09/26/2019 0 30     eGFR 09/26/2019 16    Office Visit on 09/16/2019   Component Date Value    Hemoglobin A1C 09/16/2019 6 3    Lab on 09/12/2019   Component Date Value    WBC 09/12/2019 3 70*    RBC 09/12/2019 3 67*    Hemoglobin 09/12/2019 12 0*    Hematocrit 09/12/2019 36 3*    MCV 09/12/2019 99     MCH 09/12/2019 32 8     MCHC 09/12/2019 33 2     RDW 09/12/2019 15 2*    Platelets 28/50/6794 150     MPV 09/12/2019 9 0     Sodium 09/12/2019 141     Potassium 09/12/2019 4 6     Chloride 09/12/2019 108*    CO2 09/12/2019 27     ANION GAP 09/12/2019 6     BUN 09/12/2019 57*    Creatinine 09/12/2019 3 63*    Glucose, Fasting 09/12/2019 147*    Calcium 09/12/2019 9 8     AST 09/12/2019 11*    ALT 09/12/2019 10     Alkaline Phosphatase 09/12/2019 51*    Total Protein 09/12/2019 7 1     Albumin 09/12/2019 4 1     Total Bilirubin 09/12/2019 0 50     eGFR 09/12/2019 14    Appointment on 09/05/2019   Component Date Value    Sodium 09/05/2019 138     Potassium 09/05/2019 4 5     Chloride 09/05/2019 104     CO2 09/05/2019 27     ANION GAP 09/05/2019 7     BUN 09/05/2019 71*    Creatinine 09/05/2019 3 74*    Glucose 09/05/2019 212*    Calcium 09/05/2019 9 9     AST 09/05/2019 12*    ALT 09/05/2019 11     Alkaline Phosphatase 09/05/2019 49*    Total Protein 09/05/2019 7 4     Albumin 09/05/2019 4 4     Total Bilirubin 09/05/2019 0 60     eGFR 09/05/2019 14     Phosphorus 09/05/2019 4 1    Appointment on 08/05/2019   Component Date Value    Sodium 08/05/2019 142     Potassium 08/05/2019 4 8     Chloride 08/05/2019 108*    CO2 08/05/2019 27     ANION GAP 08/05/2019 7     BUN 08/05/2019 64*    Creatinine 08/05/2019 3 76*    Glucose, Fasting 08/05/2019 140*    Calcium 08/05/2019 10 0     eGFR 08/05/2019 14     Phosphorus 08/05/2019 4 7    Appointment on 07/30/2019   Component Date Value    WBC 07/30/2019 4 30*    RBC 07/30/2019 3 64*    Hemoglobin 07/30/2019 11 8*    Hematocrit 07/30/2019 35 3*    MCV 07/30/2019 97     MCH 07/30/2019 32 3     MCHC 07/30/2019 33 3     RDW 07/30/2019 15 3*    Platelets 93/44/7946 140*    MPV 07/30/2019 8 2*    Sodium 07/30/2019 136     Potassium 07/30/2019 4 7     Chloride 07/30/2019 103     CO2 07/30/2019 27     ANION GAP 07/30/2019 6     BUN 07/30/2019 62*    Creatinine 07/30/2019 3 93*    Glucose, Fasting 07/30/2019 238*    Calcium 07/30/2019 9 9     AST 07/30/2019 13     ALT 07/30/2019 14     Alkaline Phosphatase 07/30/2019 48*    Total Protein 07/30/2019 7 0     Albumin 07/30/2019 4 0     Total Bilirubin 07/30/2019 0 40     eGFR 07/30/2019 13    Admission on 07/03/2019, Discharged on 07/03/2019   Component Date Value    POC Glucose 07/03/2019 112     POC Glucose 07/03/2019 110    Admission on 06/28/2019, Discharged on 06/28/2019   Component Date Value    Sodium 06/28/2019 137     Potassium 06/28/2019 4 6     Chloride 06/28/2019 102     CO2 06/28/2019 30     ANION GAP 06/28/2019 5     BUN 06/28/2019 32*    Creatinine 06/28/2019 2 99*    Glucose 06/28/2019 124*    Glucose, Fasting 06/28/2019 124*    Calcium 06/28/2019 9 9     eGFR 06/28/2019 18    There may be more visits with results that are not included  Imaging: No results found  Review of Systems:  Review of Systems   Constitutional: Negative for activity change and fatigue  Eyes: Negative for visual disturbance  Respiratory: Negative for apnea, shortness of breath, wheezing and stridor  Cardiovascular: Negative for chest pain, palpitations and leg swelling  Neurological: Negative for dizziness and syncope  Hematological: Does not bruise/bleed easily  Psychiatric/Behavioral: Negative for sleep disturbance  Physical Exam:  Physical Exam   Constitutional: He appears well-developed  No distress  Neck: No JVD present  Cardiovascular: Normal rate, regular rhythm and intact distal pulses  Exam reveals no gallop and no friction rub  Murmur (Soft systolic ejection murmur) heard  Occasional extra systoles  Left arm AV fistula, possibly thrill   Pulmonary/Chest: Effort normal and breath sounds normal  No stridor  No respiratory distress  He has no wheezes  He has no rales  Musculoskeletal: He exhibits no edema  Skin: Skin is warm  Capillary refill takes less than 2 seconds  He is not diaphoretic  Psychiatric: He has a normal mood and affect         Discussion/Summary:  Chronic pericardial effusion  Admission earlier this year with dehydration follow-up echocardiogram revealed early tamponade, he underwent a pericardial window  No clinical recurrences, no echocardiographic recurrences  Likely uremic in nature  Now on intermittent hemodialysis  Stress test 2017 suggested no ischemia  This note was completed in part utilizing [employer] fluency direct voice recognition software  Grammatical errors, random word insertion, spelling mistakes, and incomplete sentences may be an occasional consequence of the system secondary to software limitations, ambient noise and hardware issues  At the time of dictation, efforts were made to edit, clarify and /or correct errors  Please read the chart carefully and recognize, using context, where substitutions have occurred  If you have any questions or concerns about the context, text or information contained within the body of this dictation, please contact myself, the provider, for further clarification

## 2019-10-18 ENCOUNTER — APPOINTMENT (OUTPATIENT)
Dept: LAB | Facility: HOSPITAL | Age: 84
End: 2019-10-18
Attending: INTERNAL MEDICINE
Payer: COMMERCIAL

## 2019-10-18 ENCOUNTER — TRANSCRIBE ORDERS (OUTPATIENT)
Dept: LAB | Facility: HOSPITAL | Age: 84
End: 2019-10-18

## 2019-10-25 ENCOUNTER — APPOINTMENT (OUTPATIENT)
Dept: LAB | Facility: HOSPITAL | Age: 84
End: 2019-10-25
Attending: INTERNAL MEDICINE
Payer: COMMERCIAL

## 2019-10-26 DIAGNOSIS — J30.9 ALLERGIC RHINITIS, UNSPECIFIED SEASONALITY, UNSPECIFIED TRIGGER: ICD-10-CM

## 2019-10-27 RX ORDER — FLUTICASONE PROPIONATE 50 MCG
SPRAY, SUSPENSION (ML) NASAL
Qty: 16 ML | Refills: 0 | OUTPATIENT
Start: 2019-10-27

## 2019-11-04 ENCOUNTER — TRANSITIONAL CARE MANAGEMENT (OUTPATIENT)
Dept: FAMILY MEDICINE CLINIC | Facility: CLINIC | Age: 84
End: 2019-11-04

## 2019-11-07 ENCOUNTER — OFFICE VISIT (OUTPATIENT)
Dept: FAMILY MEDICINE CLINIC | Facility: CLINIC | Age: 84
End: 2019-11-07
Payer: COMMERCIAL

## 2019-11-07 VITALS
WEIGHT: 138 LBS | HEIGHT: 63 IN | TEMPERATURE: 97 F | HEART RATE: 76 BPM | SYSTOLIC BLOOD PRESSURE: 116 MMHG | DIASTOLIC BLOOD PRESSURE: 54 MMHG | OXYGEN SATURATION: 99 % | BODY MASS INDEX: 24.45 KG/M2

## 2019-11-07 DIAGNOSIS — N18.30 STAGE 3 CHRONIC KIDNEY DISEASE (HCC): ICD-10-CM

## 2019-11-07 DIAGNOSIS — E11.22 TYPE 2 DIABETES MELLITUS WITH STAGE 3 CHRONIC KIDNEY DISEASE, WITHOUT LONG-TERM CURRENT USE OF INSULIN (HCC): ICD-10-CM

## 2019-11-07 DIAGNOSIS — Z91.81 HISTORY OF RECENT FALL: Primary | ICD-10-CM

## 2019-11-07 DIAGNOSIS — S62.101D CLOSED FRACTURE OF RIGHT WRIST WITH ROUTINE HEALING, SUBSEQUENT ENCOUNTER: ICD-10-CM

## 2019-11-07 DIAGNOSIS — I48.0 PAROXYSMAL ATRIAL FIBRILLATION (HCC): Chronic | ICD-10-CM

## 2019-11-07 DIAGNOSIS — J42 CHRONIC BRONCHITIS, UNSPECIFIED CHRONIC BRONCHITIS TYPE (HCC): ICD-10-CM

## 2019-11-07 DIAGNOSIS — N18.30 TYPE 2 DIABETES MELLITUS WITH STAGE 3 CHRONIC KIDNEY DISEASE, WITHOUT LONG-TERM CURRENT USE OF INSULIN (HCC): ICD-10-CM

## 2019-11-07 PROCEDURE — 99496 TRANSJ CARE MGMT HIGH F2F 7D: CPT | Performed by: FAMILY MEDICINE

## 2019-11-07 NOTE — PROGRESS NOTES
Assessment/Plan:  Hospital records reviewed  Recommend follow-up with orthopedic specialist as scheduled  Recommend return to office for re-evaluation here in 3-4 months  Continue current medications  Medications reviewed today with patient in office  No problem-specific Assessment & Plan notes found for this encounter  There are no diagnoses linked to this encounter  Subjective:      Patient ID: Brenda Farr  is a 80 y o  male  Patient is here for follow-up after hospitalization recently for a fall  He was discharged to home 3 days ago  He is feeling well and is due for follow-up with orthopedic specialist regarding right wrist fracture  Denies any hand pain  Denies dizziness or headache  He has a contusion to the right eye  Denies any visual acuity changes  As noted his fall was at home and he fell down basement steps  No loss of consciousness  Denies any chest pain or shortness of breath  No recent changes in medication  He does continue to follow with Nephrology for history of chronic renal failure  TCM Call (since 10/7/2019)     Date and time call was made  11/4/2019  9:36 AM    Patient was hospitialized at  [facility]    Date of Admission  10/31/19    Date of discharge  11/03/19    Diagnosis  FALL - BROKE WRIST, BRUISES    Disposition  Home    Current Symptoms  None      TCM Call (since 10/7/2019)     Post hospital issues  None; Reduced activity    Should patient be enrolled in anticoag monitoring? No    Scheduled for follow up?   Yes    Did you obtain your prescribed medications  Yes    Do you need help managing your prescriptions or medications  No    Is transportation to your appointment needed  No    I have advised the patient to call PCP with any new or worsening symptoms  LARA MCKEON, CMA    Living Arrangements  Spouse or Significiant other    Support System  Family    The type of support provided  Emotional; Physical    Do you have social support Yes, as much as I need    Are you recieving any outpatient services  No    Are you recieving home care services  Yes    Types of home care services  Nurse visit    Are you using any community resources  No    Have you fallen in the last 12 months  Yes    How many times  1        The following portions of the patient's history were reviewed and updated as appropriate: allergies, current medications, past family history, past medical history, past social history, past surgical history and problem list     Review of Systems   Constitutional: Negative  HENT: Negative  Eyes: Negative  Respiratory: Negative  Cardiovascular: Negative  Gastrointestinal: Negative  Endocrine: Negative  Genitourinary: Negative  Musculoskeletal: Negative  Skin: Negative  Allergic/Immunologic: Negative  Neurological: Negative  Hematological: Negative  Psychiatric/Behavioral: Negative  Objective:      /54 (BP Location: Right arm, Patient Position: Sitting, Cuff Size: Standard)   Pulse 76   Temp (!) 97 °F (36 1 °C) (Tympanic)   Ht 5' 3 19" (1 605 m)   Wt 62 6 kg (138 lb)   SpO2 99%   BMI 24 30 kg/m²          Physical Exam   Constitutional: He is oriented to person, place, and time  He appears well-developed and well-nourished  HENT:   Head: Normocephalic and atraumatic  Right Ear: External ear normal  Tympanic membrane is not erythematous and not bulging  Left Ear: External ear normal  Tympanic membrane is not erythematous and not bulging  Nose: Nose normal    Mouth/Throat: Oropharynx is clear and moist and mucous membranes are normal  No oral lesions  No oropharyngeal exudate  Eyes: Conjunctivae and EOM are normal  Right eye exhibits no discharge  Left eye exhibits no discharge  No scleral icterus  Neck: Normal range of motion  Neck supple  No thyromegaly present  Cardiovascular: Normal rate, regular rhythm and normal heart sounds   Exam reveals no gallop and no friction rub    No murmur heard  Pulmonary/Chest: Effort normal  No respiratory distress  He has no wheezes  He has no rales  He exhibits no tenderness  Abdominal: Soft  Bowel sounds are normal  He exhibits no distension and no mass  There is no tenderness  There is no rebound and no guarding  Musculoskeletal: Normal range of motion  He exhibits no edema, tenderness or deformity  Lymphadenopathy:     He has no cervical adenopathy  Neurological: He is alert and oriented to person, place, and time  He has normal reflexes  No cranial nerve deficit  He exhibits normal muscle tone  Coordination normal    Skin: Skin is warm and dry  No rash noted  No erythema  No pallor  Psychiatric: He has a normal mood and affect  His behavior is normal    Vitals reviewed

## 2019-11-09 DIAGNOSIS — R35.0 BENIGN PROSTATIC HYPERPLASIA WITH URINARY FREQUENCY: ICD-10-CM

## 2019-11-09 DIAGNOSIS — N40.1 BENIGN PROSTATIC HYPERPLASIA WITH URINARY FREQUENCY: ICD-10-CM

## 2019-11-10 RX ORDER — FINASTERIDE 5 MG/1
TABLET, FILM COATED ORAL
Qty: 90 TABLET | Refills: 3 | Status: SHIPPED | OUTPATIENT
Start: 2019-11-10 | End: 2020-10-30

## 2019-11-27 LAB
CREAT ?TM UR-SCNC: 45.9 UMOL/L
EXT MICROALBUMIN URINE RANDOM: 6.9
MICROALBUMIN/CREAT UR: 150.3 MG/G{CREAT}

## 2019-12-04 ENCOUNTER — OFFICE VISIT (OUTPATIENT)
Dept: NEPHROLOGY | Facility: CLINIC | Age: 84
End: 2019-12-04
Payer: COMMERCIAL

## 2019-12-04 VITALS
HEIGHT: 63 IN | WEIGHT: 132 LBS | HEART RATE: 56 BPM | SYSTOLIC BLOOD PRESSURE: 110 MMHG | DIASTOLIC BLOOD PRESSURE: 56 MMHG | BODY MASS INDEX: 23.39 KG/M2

## 2019-12-04 DIAGNOSIS — D63.1 ANEMIA DUE TO STAGE 5 CHRONIC KIDNEY DISEASE, NOT ON CHRONIC DIALYSIS (HCC): ICD-10-CM

## 2019-12-04 DIAGNOSIS — I10 ESSENTIAL HYPERTENSION: ICD-10-CM

## 2019-12-04 DIAGNOSIS — E11.9 TYPE 2 DIABETES MELLITUS WITHOUT COMPLICATION, WITHOUT LONG-TERM CURRENT USE OF INSULIN (HCC): ICD-10-CM

## 2019-12-04 DIAGNOSIS — N25.81 SECONDARY HYPERPARATHYROIDISM OF RENAL ORIGIN (HCC): ICD-10-CM

## 2019-12-04 DIAGNOSIS — J42 CHRONIC BRONCHITIS, UNSPECIFIED CHRONIC BRONCHITIS TYPE (HCC): ICD-10-CM

## 2019-12-04 DIAGNOSIS — K21.9 GASTROESOPHAGEAL REFLUX DISEASE WITHOUT ESOPHAGITIS: ICD-10-CM

## 2019-12-04 DIAGNOSIS — K22.70 BARRETT'S ESOPHAGUS WITHOUT DYSPLASIA: ICD-10-CM

## 2019-12-04 DIAGNOSIS — N18.5 STAGE 5 CHRONIC KIDNEY DISEASE (HCC): Primary | ICD-10-CM

## 2019-12-04 DIAGNOSIS — I15.0 RENOVASCULAR HYPERTENSION: ICD-10-CM

## 2019-12-04 DIAGNOSIS — N18.5 ANEMIA DUE TO STAGE 5 CHRONIC KIDNEY DISEASE, NOT ON CHRONIC DIALYSIS (HCC): ICD-10-CM

## 2019-12-04 DIAGNOSIS — R80.1 PERSISTENT PROTEINURIA: ICD-10-CM

## 2019-12-04 PROBLEM — N18.9 ANEMIA DUE TO CHRONIC KIDNEY DISEASE: Status: ACTIVE | Noted: 2019-12-04

## 2019-12-04 PROCEDURE — 99204 OFFICE O/P NEW MOD 45 MIN: CPT | Performed by: INTERNAL MEDICINE

## 2019-12-04 PROCEDURE — 3078F DIAST BP <80 MM HG: CPT | Performed by: INTERNAL MEDICINE

## 2019-12-04 PROCEDURE — 3074F SYST BP LT 130 MM HG: CPT | Performed by: INTERNAL MEDICINE

## 2019-12-04 NOTE — PROGRESS NOTES
[facility] [place], [place]    Name: Cheryl Cordova  YOB: 1935      Assessment/Plan:    Type 2 diabetes mellitus ([facility] [address] )    Lab Results   Component Value Date    HGBA1C 6 3 09/16/2019   blood sugar is controlled without oral agents         Problem List Items Addressed This Visit        Digestive    Esophageal reflux    Obrien's esophagus without dysplasia       Endocrine    Type 2 diabetes mellitus ([facility] [address] )       Lab Results   Component Value Date    HGBA1C 6 3 09/16/2019   blood sugar is controlled without oral agents         Secondary hyperparathyroidism of renal origin ([facility] [address] )       Respiratory    COPD (chronic obstructive pulmonary disease) ([facility] [address] )       Cardiovascular and Mediastinum    Essential hypertension    Renovascular hypertension       Genitourinary    Stage 5 chronic kidney disease ([facility] [address] ) - Primary       Other    Persistent proteinuria        Will check monthly labs to evaluate his kidney function, need for Procrit, vitamin-D metabolism and calcium  He is doing very well  His blood sugars are well controlled off agents    Subjective:      Patient ID: Cheryl Cordova  is a 80 y o  male  HPI  Has a history of ERIK with renal recovery   Was on dialysis for a period of time  He fell and broke his right wrist and was put into a cast  He is not taking anything for diabetes    Labs were done 11/27: BUN/Cr 63/3  24  GFR is 17 ml/min ( was 19)  Last hb 10 3    The following portions of the patient's history were reviewed and updated as appropriate: allergies, current medications, past family history, past medical history, past social history, past surgical history and problem list     Review of Systems   Constitutional: Negative for activity change, appetite change, fatigue and unexpected weight change  HENT: Negative for congestion, ear discharge, ear pain, trouble swallowing and voice change      Eyes: Negative for pain, discharge and visual disturbance  Respiratory: Negative for cough, chest tightness, shortness of breath and wheezing  Cardiovascular: Negative for chest pain, palpitations and leg swelling  Gastrointestinal: Negative for abdominal pain, blood in stool, constipation, diarrhea, nausea and vomiting  Endocrine: Negative for heat intolerance, polydipsia, polyphagia and polyuria  Genitourinary: Negative for decreased urine volume, difficulty urinating, frequency and urgency  Musculoskeletal: Negative for arthralgias, back pain and gait problem  Skin: Negative for color change and rash  Neurological: Negative for dizziness, weakness and headaches           Social History     Socioeconomic History    Marital status: /Civil Union     Spouse name: None    Number of children: None    Years of education: None    Highest education level: None   Occupational History    Occupation: Retired      Comment:     Social Needs    Financial resource strain: None    Food insecurity:     Worry: None     Inability: None    Transportation needs:     Medical: None     Non-medical: None   Tobacco Use    Smoking status: Former Smoker     Packs/day: 0 50     Last attempt to quit: 1969     Years since quittin 9    Smokeless tobacco: Never Used    Tobacco comment: current non-smoker, per [employer]   Substance and Sexual Activity    Alcohol use: No     Frequency: Never     Binge frequency: Never    Drug use: Never    Sexual activity: Not Currently   Lifestyle    Physical activity:     Days per week: None     Minutes per session: None    Stress: None   Relationships    Social connections:     Talks on phone: None     Gets together: None     Attends Sabianist service: None     Active member of club or organization: None     Attends meetings of clubs or organizations: None     Relationship status: None    Intimate partner violence:     Fear of current or ex partner: None     Emotionally abused: None Physically abused: None     Forced sexual activity: None   Other Topics Concern    None   Social History Narrative    Patient has never smoked - As per [employer]    Consumes on average 2 cups of regular coffee per day      Past Medical History:   Diagnosis Date    Anxiety     Obrien's esophagus     BPH (benign prostatic hyperplasia)     Cardiomegaly     Diabetes ([facility] [address] )     type 2  controlled; taken off oral meds 6/19    DVT (deep venous thrombosis) (HCC)     right leg    GERD (gastroesophageal reflux disease)     Hypercholesterolemia     Hypertension     Irregular heart beat     paroxsysmal a fib    Pancreatic cyst     Paroxysmal atrial fibrillation (HCC)     Pericardial effusion with cardiac tamponade     Pleural effusion     Renal disorder     dialysis started in 2019    Weight loss, non-intentional      Past Surgical History:   Procedure Laterality Date    APPENDECTOMY  1953    CATARACT EXTRACTION Bilateral 2016?  COLONOSCOPY      COLONOSCOPY N/A 1/24/2019    Procedure: COLONOSCOPY with polypectomies;  Surgeon: Fredy Savage MD;  Location: [facility]; Service: Gastroenterology    ESOPHAGOGASTRODUODENOSCOPY N/A 1/24/2019    Procedure: ESOPHAGOGASTRODUODENOSCOPY (EGD) with bx;  Surgeon: Fredy Savage MD;  Location: [facility];   Service: Gastroenterology    IR [address] EXCHANGE  4/18/2019    IR [address] PLACEMENT  3/6/2019    IR [address] REMOVAL  7/30/2019    PERICARDIAL WINDOW N/A 2/26/2019    Procedure: WINDOW PERICARDIAL;  Surgeon: Juan Patterson MD;  Location: [facility];  Service: Thoracic    SC ANASTOMOSIS,AV,ANY SITE Left 7/3/2019    Procedure: CREATION FISTULA ARTERIOVENOUS (AV) left upper extremity brachial artery to axillary vein [place] graft ;  Surgeon: Gilbert Najera MD;  Location: [address] [place] [address] MAIN OR;  Service: Vascular    PROSTATE BIOPSY         Current Outpatient Medications:     ALPRAZolam (XANAX) 0 25 mg tablet, Take 1 tablet (0 25 mg total) by mouth every 8 (eight) hours as needed for anxiety, Disp: 270 tablet, Rfl: 0    amLODIPine (NORVASC) 10 mg tablet, Take 1 tablet (10 mg total) by mouth every morning, Disp: 90 tablet, Rfl: 3    Blood Glucose Monitoring Suppl (TRUE METRIX METER) YASMEEN, by Does not apply route 2 (two) times a day, Disp: 1 Device, Rfl: 0    Cholecalciferol (VITAMIN D PO), Take 5,000 Units by mouth every morning , Disp: , Rfl:     DULERA 100-5 MCG/ACT inhaler, INHALE 2 PUFFS EVERY 12 (TWELVE) HOURS, Disp: 13 Inhaler, Rfl: 2    EPINEPHrine (EPIPEN) 0 3 mg/0 3 mL SOAJ, Inject as directed, Disp: , Rfl:     finasteride (PROSCAR) 5 mg tablet, TAKE 1 TABLET BY MOUTH EVERY DAY, Disp: 90 tablet, Rfl: 3    fluticasone (FLONASE) 50 mcg/act nasal spray, USE 2 SPRAYS IN EACH NOSTRIL DAILY, Disp: 16 mL, Rfl: 0    glucose blood (ONE TOUCH ULTRA TEST) test strip, Use daily as directed , Disp: 200 each, Rfl: 3    glucose blood (TRUE METRIX BLOOD GLUCOSE TEST) test strip, Test twice daily, Disp: 300 each, Rfl: 0    Lancets (ONETOUCH ULTRASOFT) lancets, USE DAILY AS DIRECTED , Disp: 100 each, Rfl: 3    losartan (COZAAR) 50 mg tablet, Take 25 mg by mouth every morning, Disp: , Rfl:     metoprolol tartrate (LOPRESSOR) 25 mg tablet, TAKE 1 TABLET (25 MG TOTAL) BY MOUTH EVERY 12 (TWELVE) HOURS, Disp: 180 tablet, Rfl: 1    omeprazole (PriLOSEC) 40 MG capsule, Take 1 capsule (40 mg total) by mouth daily As directed (Patient taking differently: Take 40 mg by mouth every morning As directed), Disp: 90 capsule, Rfl: 3    PARoxetine (PAXIL) 30 mg tablet, Take 1 tablet (30 mg total) by mouth every morning As directed, Disp: 90 tablet, Rfl: 1    fexofenadine (ALLEGRA) 180 MG tablet, Take 1 tablet (180 mg total) by mouth daily For allergy congestion (Patient not taking: Reported on 12/4/2019), Disp: 90 tablet, Rfl: 1    oxyCODONE-acetaminophen (PERCOCET) 5-325 mg per tablet, Take 1 tablet by mouth every 6 (six) hours as needed for moderate painMax Daily Amount: 4 tablets (Patient not taking: Reported on 11/7/2019), Disp: 40 tablet, Rfl: 0    rosuvastatin (CRESTOR) 40 MG tablet, Take 1 tablet (40 mg total) by mouth daily for 90 days (Patient taking differently: Take 40 mg by mouth every morning ), Disp: 90 tablet, Rfl: 3    Lab Results   Component Value Date     07/21/2016    SODIUM 141 10/25/2019    K 4 3 10/25/2019     10/25/2019    CO2 31 10/25/2019    ANIONGAP 5 07/07/2015    AGAP 4 10/25/2019    BUN 53 (H) 10/25/2019    CREATININE 3 33 (H) 10/25/2019    GLUC 212 (H) 09/05/2019    GLUF 135 (H) 10/25/2019    CALCIUM 9 9 10/25/2019    AST 13 09/26/2019    ALT 12 09/26/2019    ALKPHOS 60 09/26/2019    PROT 7 3 07/21/2016    TP 7 0 09/26/2019    BILITOT 0 4 07/21/2016    TBILI 0 30 09/26/2019    EGFR 16 10/25/2019     Lab Results   Component Value Date    WBC 5 30 09/26/2019    HGB 11 3 (L) 09/26/2019    HCT 33 4 (L) 09/26/2019    MCV 98 09/26/2019     09/26/2019     Lab Results   Component Value Date    CHOLESTEROL 119 02/14/2018    CHOLESTEROL 141 11/30/2016     Lab Results   Component Value Date    HDL 34 (L) 02/14/2018    HDL 34 (L) 11/30/2016    HDL 27 (L) 07/21/2016     Lab Results   Component Value Date    LDLCALC 68 02/14/2018    LDLCALC 78 11/30/2016     Lab Results   Component Value Date    TRIG 87 02/14/2018    TRIG 146 11/30/2016    TRIG 172 (H) 07/21/2016     No results found for: CHOLHDL  No results found for: [id number], TSH  Lab Results   Component Value Date    CALCIUM 9 9 10/25/2019    PHOS 3 4 09/26/2019     Lab Results   Component Value Date    SPEP  02/26/2019     No monoclonal bands noted  Reviewed by: Mane Garcia MD  ([id number])  **Electronic Signature**    UPEP  02/26/2019     The UPEP shows non-selective proteinuria   The UPEP shows a faint possible band  Immunofixation to be performed  Reviewed by: Loulou Sepulveda MD ([id number])  **Electronic Signature**     No results found for: ADRY MART        Objective:      /56 (BP Location: Right arm, Patient Position: Sitting, Cuff Size: Standard)   Pulse 56   Ht 5' 3" (1 6 m)   Wt 59 9 kg (132 lb)   BMI 23 38 kg/m²          Physical Exam   Constitutional: He is oriented to person, place, and time  He appears well-developed and well-nourished  No distress  HENT:   Head: Normocephalic  Right Ear: External ear normal    Left Ear: External ear normal    Nose: Nose normal    Mouth/Throat: Oropharynx is clear and moist    Eyes: Pupils are equal, round, and reactive to light  Conjunctivae are normal    Right infraorbital ecchymosis   Cardiovascular: Normal rate, regular rhythm and normal heart sounds  No murmur heard  Pulmonary/Chest: Effort normal and breath sounds normal  No respiratory distress  He has no wheezes  He has no rales  Abdominal: Soft  Bowel sounds are normal  He exhibits no distension  There is no tenderness  There is no rebound and no guarding  Musculoskeletal: Normal range of motion  He exhibits no edema  Neurological: He is alert and oriented to person, place, and time  Skin: Skin is warm and dry  Capillary refill takes less than 2 seconds  He is not diaphoretic  Psychiatric: He has a normal mood and affect

## 2019-12-04 NOTE — ASSESSMENT & PLAN NOTE
Lab Results   Component Value Date    HGBA1C 6 3 09/16/2019   blood sugar is controlled without oral agents

## 2019-12-07 DIAGNOSIS — J44.9 CHRONIC OBSTRUCTIVE PULMONARY DISEASE, UNSPECIFIED COPD TYPE (HCC): ICD-10-CM

## 2019-12-10 DIAGNOSIS — E11.9 TYPE 2 DIABETES MELLITUS WITHOUT COMPLICATION, WITHOUT LONG-TERM CURRENT USE OF INSULIN (HCC): Primary | ICD-10-CM

## 2019-12-10 RX ORDER — BLOOD-GLUCOSE METER
EACH MISCELLANEOUS 2 TIMES DAILY
Qty: 1 EACH | Refills: 0 | Status: SHIPPED | OUTPATIENT
Start: 2019-12-10

## 2019-12-10 RX ORDER — BLOOD-GLUCOSE METER
EACH MISCELLANEOUS
COMMUNITY
End: 2019-12-10 | Stop reason: SDUPTHER

## 2019-12-16 ENCOUNTER — OFFICE VISIT (OUTPATIENT)
Dept: FAMILY MEDICINE CLINIC | Facility: CLINIC | Age: 84
End: 2019-12-16
Payer: COMMERCIAL

## 2019-12-16 VITALS
TEMPERATURE: 97.2 F | HEIGHT: 63 IN | DIASTOLIC BLOOD PRESSURE: 52 MMHG | WEIGHT: 138 LBS | SYSTOLIC BLOOD PRESSURE: 100 MMHG | OXYGEN SATURATION: 98 % | BODY MASS INDEX: 24.45 KG/M2 | HEART RATE: 92 BPM

## 2019-12-16 DIAGNOSIS — E11.9 TYPE 2 DIABETES MELLITUS WITHOUT COMPLICATION, WITHOUT LONG-TERM CURRENT USE OF INSULIN (HCC): Primary | ICD-10-CM

## 2019-12-16 DIAGNOSIS — I26.99 PULMONARY EMBOLISM, OTHER, UNSPECIFIED CHRONICITY, UNSPECIFIED WHETHER ACUTE COR PULMONALE PRESENT (HCC): ICD-10-CM

## 2019-12-16 DIAGNOSIS — Z99.2 HEMODIALYSIS ACCESS, AV GRAFT (HCC): ICD-10-CM

## 2019-12-16 DIAGNOSIS — I48.0 PAROXYSMAL ATRIAL FIBRILLATION (HCC): Chronic | ICD-10-CM

## 2019-12-16 DIAGNOSIS — J42 CHRONIC BRONCHITIS, UNSPECIFIED CHRONIC BRONCHITIS TYPE (HCC): ICD-10-CM

## 2019-12-16 DIAGNOSIS — I42.9 CARDIOMYOPATHY, UNSPECIFIED TYPE (HCC): ICD-10-CM

## 2019-12-16 DIAGNOSIS — N18.30 STAGE 3 CHRONIC KIDNEY DISEASE (HCC): ICD-10-CM

## 2019-12-16 LAB
CREAT UR-MCNC: 91.6 MG/DL
MICROALBUMIN UR-MCNC: 97.9 MG/L (ref 0–20)
MICROALBUMIN/CREAT 24H UR: 107 MG/G CREATININE (ref 0–30)
SL AMB POCT HEMOGLOBIN AIC: 7 (ref ?–6.5)

## 2019-12-16 PROCEDURE — 82043 UR ALBUMIN QUANTITATIVE: CPT | Performed by: FAMILY MEDICINE

## 2019-12-16 PROCEDURE — 99214 OFFICE O/P EST MOD 30 MIN: CPT | Performed by: FAMILY MEDICINE

## 2019-12-16 PROCEDURE — 82570 ASSAY OF URINE CREATININE: CPT | Performed by: FAMILY MEDICINE

## 2019-12-16 PROCEDURE — 83036 HEMOGLOBIN GLYCOSYLATED A1C: CPT | Performed by: FAMILY MEDICINE

## 2019-12-16 PROCEDURE — 1036F TOBACCO NON-USER: CPT | Performed by: FAMILY MEDICINE

## 2019-12-16 PROCEDURE — 1160F RVW MEDS BY RX/DR IN RCRD: CPT | Performed by: FAMILY MEDICINE

## 2019-12-16 NOTE — PROGRESS NOTES
Assessment/Plan:    Type 2 diabetes mellitus ([place] [address] )    Lab Results   Component Value Date    HGBA1C 7 0 (A) 12/16/2019    diabetes is under good control  Continue current medication  Recommend recheck again in office in 3-6 months  He is currently off metformin  COPD (chronic obstructive pulmonary disease) ([facility])  Breathing is under good control  Cardiomyopathy ([place] [address] )  No chest pain or shortness of breath  He continues to monitor daily weights  Pulmonary embolism ([place] [address] )  History of pulmonary embolism without any evidence of recurrence  No shortness of breath today  Stage 3 chronic kidney disease ([place] [address] )  He is currently off dialysis and being monitor monthly by Nephrology  Hemodialysis access, AV graft [facility])  Patient still with graft in place  He is being followed by Nephrology and they are keeping this in temporarily to be sure that he no longer needs dialysis  He is having renal function monitored on a monthly basis  Diagnoses and all orders for this visit:    Type 2 diabetes mellitus without complication, without long-term current use of insulin ([facility])  -     Microalbumin / creatinine urine ratio  -     POCT hemoglobin A1c    Chronic bronchitis, unspecified chronic bronchitis type ([facility])    Paroxysmal atrial fibrillation ([facility])    Stage 3 chronic kidney disease ([facility])  -     Microalbumin / creatinine urine ratio  -     POCT hemoglobin A1c    Pulmonary embolism, other, unspecified chronicity, unspecified whether acute cor pulmonale present ([place] [address] )    Cardiomyopathy, unspecified type ([place] [address] )    Hemodialysis access, AV graft ([place] [address] )          Subjective:      Patient ID: Diane Peng  is a 80 y o  male  HPI    The following portions of the patient's history were reviewed and updated as appropriate: allergies, current medications, past family history, past medical history, past social history, past surgical history and problem list     Review of Systems   Constitutional: Negative  HENT: Negative  Eyes: Negative  Respiratory: Negative  Cardiovascular: Negative  Gastrointestinal: Negative  Endocrine: Negative  Genitourinary: Negative  Musculoskeletal: Negative  Skin: Negative  Allergic/Immunologic: Negative  Neurological: Negative  Hematological: Negative  Psychiatric/Behavioral: Negative  Objective:      /52 (BP Location: Right arm, Patient Position: Sitting, Cuff Size: Standard)   Pulse 92   Temp (!) 97 2 °F (36 2 °C) (Tympanic)   Ht 5' 2 99" (1 6 m)   Wt 62 6 kg (138 lb)   SpO2 98%   BMI 24 45 kg/m²          Physical Exam   Constitutional: He is oriented to person, place, and time  He appears well-developed and well-nourished  HENT:   Head: Normocephalic and atraumatic  Right Ear: External ear normal  Tympanic membrane is not erythematous and not bulging  Left Ear: External ear normal  Tympanic membrane is not erythematous and not bulging  Nose: Nose normal    Mouth/Throat: Oropharynx is clear and moist and mucous membranes are normal  No oral lesions  No oropharyngeal exudate  Eyes: Conjunctivae and EOM are normal  Right eye exhibits no discharge  Left eye exhibits no discharge  No scleral icterus  Neck: Normal range of motion  Neck supple  No thyromegaly present  Cardiovascular: Normal rate, regular rhythm and normal heart sounds  Exam reveals no gallop and no friction rub  Pulses are no weak pulses  No murmur heard  Pulses:       Dorsalis pedis pulses are 1+ on the right side, and 1+ on the left side  Posterior tibial pulses are 1+ on the right side, and 1+ on the left side  Pulmonary/Chest: Effort normal  No respiratory distress  He has no wheezes  He has no rales  He exhibits no tenderness  Abdominal: Soft  Bowel sounds are normal  He exhibits no distension and no mass  There is no tenderness  There is no rebound and no guarding  Musculoskeletal: Normal range of motion  He exhibits no edema, tenderness or deformity  Feet:   Right Foot:   Skin Integrity: Negative for ulcer, skin breakdown, erythema, warmth, callus or dry skin  Left Foot:   Skin Integrity: Negative for ulcer, skin breakdown, erythema, warmth, callus or dry skin  Lymphadenopathy:     He has no cervical adenopathy  Neurological: He is alert and oriented to person, place, and time  He has normal reflexes  No cranial nerve deficit  He exhibits normal muscle tone  Coordination normal    Skin: Skin is warm and dry  No rash noted  No erythema  No pallor  Psychiatric: He has a normal mood and affect  His behavior is normal    Vitals reviewed  Patient's shoes and socks removed  Right Foot/Ankle   Right Foot Inspection  Skin Exam: skin normal and skin intact no dry skin, no warmth, no callus, no erythema, no maceration, no abnormal color, no pre-ulcer, no ulcer and no callus                          Toe Exam: ROM and strength within normal limitsno swelling  Sensory   Vibration: intact  Proprioception: intact   Monofilament testing: intact  Vascular  Capillary refills: < 3 seconds  The right DP pulse is 1+  The right PT pulse is 1+  Right Toe  - Comprehensive Exam  Ecchymosis: none  Swelling: none   Tenderness: none         Left Foot/Ankle  Left Foot Inspection  Skin Exam: skin normal and skin intactno dry skin, no warmth, no erythema, no maceration, normal color, no pre-ulcer, no ulcer and no callus                         Toe Exam: ROM and strength within normal limitsno swelling                   Sensory   Vibration: intact  Proprioception: intact  Monofilament: intact  Vascular  Capillary refills: < 3 seconds  The left DP pulse is 1+  The left PT pulse is 1+  Left Toe  - Comprehensive Exam  Ecchymosis: none  Arch: normal  Swelling: none   Tenderness: none       Assign Risk Category:  No deformity present; No loss of protective sensation;  No weak pulses       Risk: 0

## 2019-12-16 NOTE — ASSESSMENT & PLAN NOTE
Lab Results   Component Value Date    HGBA1C 7 0 (A) 12/16/2019    diabetes is under good control  Continue current medication  Recommend recheck again in office in 3-6 months  He is currently off metformin

## 2019-12-16 NOTE — ASSESSMENT & PLAN NOTE
Patient still with graft in place  He is being followed by Nephrology and they are keeping this in temporarily to be sure that he no longer needs dialysis  He is having renal function monitored on a monthly basis

## 2019-12-23 ENCOUNTER — APPOINTMENT (OUTPATIENT)
Dept: LAB | Facility: HOSPITAL | Age: 84
End: 2019-12-23
Attending: INTERNAL MEDICINE
Payer: COMMERCIAL

## 2019-12-23 DIAGNOSIS — D63.1 ANEMIA DUE TO STAGE 5 CHRONIC KIDNEY DISEASE, NOT ON CHRONIC DIALYSIS (HCC): ICD-10-CM

## 2019-12-23 DIAGNOSIS — N25.81 SECONDARY HYPERPARATHYROIDISM OF RENAL ORIGIN (HCC): ICD-10-CM

## 2019-12-23 DIAGNOSIS — N18.5 ANEMIA DUE TO STAGE 5 CHRONIC KIDNEY DISEASE, NOT ON CHRONIC DIALYSIS (HCC): ICD-10-CM

## 2019-12-23 DIAGNOSIS — N18.5 STAGE 5 CHRONIC KIDNEY DISEASE (HCC): ICD-10-CM

## 2019-12-23 DIAGNOSIS — I15.0 RENOVASCULAR HYPERTENSION: ICD-10-CM

## 2019-12-23 LAB
ALBUMIN SERPL BCP-MCNC: 4.2 G/DL (ref 3.5–5.7)
ALP SERPL-CCNC: 51 U/L (ref 55–165)
ALT SERPL W P-5'-P-CCNC: 11 U/L (ref 7–52)
ANION GAP SERPL CALCULATED.3IONS-SCNC: 8 MMOL/L (ref 4–13)
AST SERPL W P-5'-P-CCNC: 10 U/L (ref 13–39)
BASOPHILS # BLD AUTO: 0 THOUSANDS/ΜL (ref 0–0.1)
BASOPHILS NFR BLD AUTO: 1 % (ref 0–2)
BILIRUB SERPL-MCNC: 0.4 MG/DL (ref 0.2–1)
BUN SERPL-MCNC: 58 MG/DL (ref 7–25)
CALCIUM SERPL-MCNC: 10 MG/DL (ref 8.6–10.5)
CHLORIDE SERPL-SCNC: 106 MMOL/L (ref 98–107)
CO2 SERPL-SCNC: 27 MMOL/L (ref 21–31)
CREAT SERPL-MCNC: 3.49 MG/DL (ref 0.7–1.3)
CREAT UR-MCNC: 70.1 MG/DL
EOSINOPHIL # BLD AUTO: 0.1 THOUSAND/ΜL (ref 0–0.61)
EOSINOPHIL NFR BLD AUTO: 3 % (ref 0–5)
ERYTHROCYTE [DISTWIDTH] IN BLOOD BY AUTOMATED COUNT: 13 % (ref 11.5–14.5)
GFR SERPL CREATININE-BSD FRML MDRD: 15 ML/MIN/1.73SQ M
GLUCOSE P FAST SERPL-MCNC: 126 MG/DL (ref 65–99)
HCT VFR BLD AUTO: 25.2 % (ref 42–47)
HGB BLD-MCNC: 8.7 G/DL (ref 14–18)
IRON SATN MFR SERPL: 39 %
IRON SERPL-MCNC: 102 UG/DL (ref 65–175)
LYMPHOCYTES # BLD AUTO: 0.7 THOUSANDS/ΜL (ref 0.6–4.47)
LYMPHOCYTES NFR BLD AUTO: 18 % (ref 21–51)
MCH RBC QN AUTO: 32.5 PG (ref 26–34)
MCHC RBC AUTO-ENTMCNC: 34.4 G/DL (ref 31–37)
MCV RBC AUTO: 95 FL (ref 81–99)
MICROALBUMIN UR-MCNC: 70.3 MG/L (ref 0–20)
MICROALBUMIN/CREAT 24H UR: 100 MG/G CREATININE (ref 0–30)
MONOCYTES # BLD AUTO: 0.3 THOUSAND/ΜL (ref 0.17–1.22)
MONOCYTES NFR BLD AUTO: 8 % (ref 2–12)
NEUTROPHILS # BLD AUTO: 2.9 THOUSANDS/ΜL (ref 1.4–6.5)
NEUTS SEG NFR BLD AUTO: 71 % (ref 42–75)
PLATELET # BLD AUTO: 187 THOUSANDS/UL (ref 149–390)
PMV BLD AUTO: 7.7 FL (ref 8.6–11.7)
POTASSIUM SERPL-SCNC: 4.3 MMOL/L (ref 3.5–5.5)
PROT SERPL-MCNC: 7.3 G/DL (ref 6.4–8.9)
PTH-INTACT SERPL-MCNC: 33.9 PG/ML (ref 18.4–80.1)
RBC # BLD AUTO: 2.67 MILLION/UL (ref 4.3–5.9)
SODIUM SERPL-SCNC: 141 MMOL/L (ref 134–143)
TIBC SERPL-MCNC: 263 UG/DL (ref 250–450)
WBC # BLD AUTO: 4.1 THOUSAND/UL (ref 4.8–10.8)

## 2019-12-23 PROCEDURE — 80053 COMPREHEN METABOLIC PANEL: CPT

## 2019-12-23 PROCEDURE — 85025 COMPLETE CBC W/AUTO DIFF WBC: CPT

## 2019-12-23 PROCEDURE — 36415 COLL VENOUS BLD VENIPUNCTURE: CPT

## 2019-12-23 PROCEDURE — 82043 UR ALBUMIN QUANTITATIVE: CPT | Performed by: FAMILY MEDICINE

## 2019-12-23 PROCEDURE — 83970 ASSAY OF PARATHORMONE: CPT

## 2019-12-23 PROCEDURE — 82570 ASSAY OF URINE CREATININE: CPT | Performed by: FAMILY MEDICINE

## 2019-12-23 PROCEDURE — 83550 IRON BINDING TEST: CPT

## 2019-12-23 PROCEDURE — 83540 ASSAY OF IRON: CPT

## 2019-12-23 PROCEDURE — 82652 VIT D 1 25-DIHYDROXY: CPT

## 2019-12-26 DIAGNOSIS — I10 ESSENTIAL HYPERTENSION: Primary | ICD-10-CM

## 2019-12-26 LAB — 1,25(OH)2D3 SERPL-MCNC: 45.5 PG/ML (ref 19.9–79.3)

## 2019-12-26 RX ORDER — LOSARTAN POTASSIUM 50 MG/1
50 TABLET ORAL EVERY MORNING
Qty: 90 TABLET | Refills: 1 | Status: SHIPPED | OUTPATIENT
Start: 2019-12-26 | End: 2020-03-24 | Stop reason: SDUPTHER

## 2019-12-30 ENCOUNTER — TELEPHONE (OUTPATIENT)
Dept: NEPHROLOGY | Facility: CLINIC | Age: 84
End: 2019-12-30

## 2019-12-30 NOTE — TELEPHONE ENCOUNTER
----- Message from Obi Santana MD sent at 12/23/2019  5:56 PM EST -----  Kidney function at 15% - keep checking labs every 2 weeks  Hemoglobin at 8 7  He needs Procrit  Will arrange

## 2020-01-02 NOTE — TELEPHONE ENCOUNTER
Paper filled out and faxed to S[address] [phone] @ [facility] to get patient set up for procrit injection

## 2020-01-06 ENCOUNTER — HOSPITAL ENCOUNTER (OUTPATIENT)
Dept: SURGERY | Facility: HOSPITAL | Age: 85
Discharge: HOME | End: 2020-01-06
Payer: COMMERCIAL

## 2020-01-06 VITALS
HEART RATE: 72 BPM | BODY MASS INDEX: 25.4 KG/M2 | RESPIRATION RATE: 20 BRPM | SYSTOLIC BLOOD PRESSURE: 110 MMHG | WEIGHT: 138 LBS | OXYGEN SATURATION: 97 % | TEMPERATURE: 97 F | HEIGHT: 62 IN | DIASTOLIC BLOOD PRESSURE: 62 MMHG

## 2020-01-06 PROCEDURE — 96372 THER/PROPH/DIAG INJ SC/IM: CPT

## 2020-01-06 RX ADMIN — ERYTHROPOIETIN 20000 UNITS: 20000 INJECTION, SOLUTION INTRAVENOUS; SUBCUTANEOUS at 10:16

## 2020-01-06 NOTE — DISCHARGE INSTRUCTIONS
Epoetin Jacob (By injection)   Epoetin Jacob (e-NAOMI-e-tin AL-fa)  Treats anemia  Brand Name(s): Epogen, Procrit   There may be other brand names for this medicine  When This Medicine Should Not Be Used: This medicine is not right for everyone  You should not receive it if you had an allergic reaction to epoetin jacob or if you had pure red cell aplasia after receiving epoetin jacob or similar medicines  How to Use This Medicine:   Injectable  · Your doctor will prescribe your dose and schedule  This medicine is given as a shot under your skin or into a vein through a dialysis port  · A nurse or other health provider will give you this medicine  · You may be taught how to give your medicine at home  Make sure you understand all instructions before giving yourself an injection  Do not use more medicine or use it more often than your doctor tells you to  · Do not shake the medicine  Do not use the medicine if it is cloudy or discolored, or has particles in it  · You will be shown the body areas where this shot can be given  Use a different body area each time you give yourself a shot  Keep track of where you give each shot to make sure you rotate body areas  · Use a new needle and syringe each time you inject your medicine  · This medicine should come with a Medication Guide  Ask your pharmacist for a copy if you do not have one  · Missed dose: Call your doctor or pharmacist for instructions  · If you store this medicine at home, keep it in the refrigerator  Do not freeze  · Throw away used needles in a hard, closed container that the needles cannot poke through  Keep this container away from children and pets  Drugs and Foods to Avoid:      Ask your doctor or pharmacist before using any other medicine, including over-the-counter medicines, vitamins, and herbal products  Warnings While Using This Medicine:   · Tell your doctor if you are pregnant or breastfeeding   This medicine contains benzyl alcohol, which can be harmful to infants and unborn babies  · Tell your doctor if you have heart failure, heart disease, high blood pressure, kidney disease, or history of cancer, blood clots, or seizures  Also tell your doctor if you are scheduled for any type of surgery  · This medicine may cause the following problems:   ¨ Serious heart and blood vessel problems, including heart attack, heart failure, stroke, and blood clots  ¨ For cancer patients, increased risk of the cancer worsening or coming back  ¨ Seizures  ¨ High blood pressure  · Your doctor will do lab tests at regular visits to check on the effects of this medicine  Keep all appointments  Your doctor will also monitor your blood pressure  · This medicine is made from donated human blood  All donated blood is tested for certain viruses  Although your risk for getting a virus from the medicine is very low, talk with your doctor if you have concerns  · Keep all medicine out of the reach of children  Never share your medicine with anyone    Possible Side Effects While Using This Medicine:   Call your doctor right away if you notice any of these side effects:  · Allergic reaction: Itching or hives, swelling in your face or hands, swelling or tingling in your mouth or throat, chest tightness, trouble breathing  · Chest pain that may spread, trouble breathing, nausea, unusual sweating, fainting  · Fever, chills, cough, stuffy or runny nose, sore throat  · Numbness or weakness on one side of your body, sudden or severe headache, problems with vision, speech, or walking  · Numbness, tingling, or burning pain in your hands, arms, legs, or feet  · Pain or swelling in your lower leg (calf)  · Seizures  · Rapid weight gain, swelling in your hands, ankles, or feet  · Unusual bleeding, bruising, tiredness, or weakness  If you notice these less serious side effects, talk with your doctor:   · Nausea, vomiting  · Muscle or joint pain  · Pain, itching, burning, or swelling where the shot is given  If you notice other side effects that you think are caused by this medicine, tell your doctor  Call your doctor for medical advice about side effects  You may report side effects to FDA at 6-096-YHF-9622  © 2017 [employer] Information is for End User's use only and may not be sold, redistributed or otherwise used for commercial purposes  The above information is an  only  It is not intended as medical advice for individual conditions or treatments  Talk to your doctor, nurse or pharmacist before following any medical regimen to see if it is safe and effective for you

## 2020-01-08 ENCOUNTER — TELEPHONE (OUTPATIENT)
Dept: NEPHROLOGY | Facility: CLINIC | Age: 85
End: 2020-01-08

## 2020-01-08 NOTE — TELEPHONE ENCOUNTER
Called and spoke with Timothy Duarte at [employer] and she submitted the prior auth for epogen  She states that we should hear something within the next 48 hours or so  Phone number [phone]- [phone]

## 2020-01-20 ENCOUNTER — HOSPITAL ENCOUNTER (OUTPATIENT)
Dept: SURGERY | Facility: HOSPITAL | Age: 85
Discharge: HOME | End: 2020-01-20
Payer: COMMERCIAL

## 2020-01-20 VITALS
OXYGEN SATURATION: 95 % | BODY MASS INDEX: 25.4 KG/M2 | TEMPERATURE: 97.3 F | RESPIRATION RATE: 18 BRPM | HEIGHT: 62 IN | HEART RATE: 64 BPM | SYSTOLIC BLOOD PRESSURE: 130 MMHG | DIASTOLIC BLOOD PRESSURE: 59 MMHG | WEIGHT: 138 LBS

## 2020-01-20 PROCEDURE — 96372 THER/PROPH/DIAG INJ SC/IM: CPT

## 2020-01-20 RX ADMIN — ERYTHROPOIETIN 20000 UNITS: 20000 INJECTION, SOLUTION INTRAVENOUS; SUBCUTANEOUS at 09:52

## 2020-01-28 ENCOUNTER — HOSPITAL ENCOUNTER (OUTPATIENT)
Dept: SURGERY | Facility: HOSPITAL | Age: 85
Discharge: HOME | End: 2020-01-28
Payer: COMMERCIAL

## 2020-01-28 VITALS
OXYGEN SATURATION: 98 % | SYSTOLIC BLOOD PRESSURE: 146 MMHG | TEMPERATURE: 97.5 F | DIASTOLIC BLOOD PRESSURE: 67 MMHG | RESPIRATION RATE: 18 BRPM | HEART RATE: 63 BPM

## 2020-01-28 PROCEDURE — 96372 THER/PROPH/DIAG INJ SC/IM: CPT

## 2020-01-28 RX ADMIN — ERYTHROPOIETIN 20000 UNITS: 20000 INJECTION, SOLUTION INTRAVENOUS; SUBCUTANEOUS at 08:38

## 2020-01-28 NOTE — DISCHARGE INSTRUCTIONS
Epoetin Jacob (By injection)   Epoetin Jacob (e-NAOMI-e-tin AL-fa)  Treats anemia  Brand Name(s): Epogen, Procrit   There may be other brand names for this medicine  When This Medicine Should Not Be Used: This medicine is not right for everyone  You should not receive it if you had an allergic reaction to epoetin jacob or if you had pure red cell aplasia after receiving epoetin jacob or similar medicines  How to Use This Medicine:   Injectable  · Your doctor will prescribe your dose and schedule  This medicine is given as a shot under your skin or into a vein through a dialysis port  · A nurse or other health provider will give you this medicine  · You may be taught how to give your medicine at home  Make sure you understand all instructions before giving yourself an injection  Do not use more medicine or use it more often than your doctor tells you to  · Do not shake the medicine  Do not use the medicine if it is cloudy or discolored, or has particles in it  · You will be shown the body areas where this shot can be given  Use a different body area each time you give yourself a shot  Keep track of where you give each shot to make sure you rotate body areas  · Use a new needle and syringe each time you inject your medicine  · This medicine should come with a Medication Guide  Ask your pharmacist for a copy if you do not have one  · Missed dose: Call your doctor or pharmacist for instructions  · If you store this medicine at home, keep it in the refrigerator  Do not freeze  · Throw away used needles in a hard, closed container that the needles cannot poke through  Keep this container away from children and pets  Drugs and Foods to Avoid:      Ask your doctor or pharmacist before using any other medicine, including over-the-counter medicines, vitamins, and herbal products  Warnings While Using This Medicine:   · Tell your doctor if you are pregnant or breastfeeding   This medicine contains benzyl alcohol, which can be harmful to infants and unborn babies  · Tell your doctor if you have heart failure, heart disease, high blood pressure, kidney disease, or history of cancer, blood clots, or seizures  Also tell your doctor if you are scheduled for any type of surgery  · This medicine may cause the following problems:   ¨ Serious heart and blood vessel problems, including heart attack, heart failure, stroke, and blood clots  ¨ For cancer patients, increased risk of the cancer worsening or coming back  ¨ Seizures  ¨ High blood pressure  · Your doctor will do lab tests at regular visits to check on the effects of this medicine  Keep all appointments  Your doctor will also monitor your blood pressure  · This medicine is made from donated human blood  All donated blood is tested for certain viruses  Although your risk for getting a virus from the medicine is very low, talk with your doctor if you have concerns  · Keep all medicine out of the reach of children  Never share your medicine with anyone    Possible Side Effects While Using This Medicine:   Call your doctor right away if you notice any of these side effects:  · Allergic reaction: Itching or hives, swelling in your face or hands, swelling or tingling in your mouth or throat, chest tightness, trouble breathing  · Chest pain that may spread, trouble breathing, nausea, unusual sweating, fainting  · Fever, chills, cough, stuffy or runny nose, sore throat  · Numbness or weakness on one side of your body, sudden or severe headache, problems with vision, speech, or walking  · Numbness, tingling, or burning pain in your hands, arms, legs, or feet  · Pain or swelling in your lower leg (calf)  · Seizures  · Rapid weight gain, swelling in your hands, ankles, or feet  · Unusual bleeding, bruising, tiredness, or weakness  If you notice these less serious side effects, talk with your doctor:   · Nausea, vomiting  · Muscle or joint pain  · Pain, itching, burning, or swelling where the shot is given  If you notice other side effects that you think are caused by this medicine, tell your doctor  Call your doctor for medical advice about side effects  You may report side effects to FDA at 9-260-PIP-0998  © 2017 [address] [employer] Information is for End User's use only and may not be sold, redistributed or otherwise used for commercial purposes  The above information is an  only  It is not intended as medical advice for individual conditions or treatments  Talk to your doctor, nurse or pharmacist before following any medical regimen to see if it is safe and effective for you

## 2020-02-03 ENCOUNTER — HOSPITAL ENCOUNTER (OUTPATIENT)
Dept: SURGERY | Facility: HOSPITAL | Age: 85
Discharge: HOME | End: 2020-02-03
Payer: COMMERCIAL

## 2020-02-03 VITALS
SYSTOLIC BLOOD PRESSURE: 156 MMHG | DIASTOLIC BLOOD PRESSURE: 72 MMHG | TEMPERATURE: 98.4 F | RESPIRATION RATE: 18 BRPM | HEART RATE: 56 BPM | OXYGEN SATURATION: 94 %

## 2020-02-03 PROCEDURE — 96372 THER/PROPH/DIAG INJ SC/IM: CPT

## 2020-02-03 RX ADMIN — ERYTHROPOIETIN 20000 UNITS: 20000 INJECTION, SOLUTION INTRAVENOUS; SUBCUTANEOUS at 09:47

## 2020-02-03 NOTE — DISCHARGE INSTRUCTIONS
Epoetin Jacob (By injection)   Epoetin Jacob (e-NAOMI-e-tin AL-fa)  Treats anemia  Brand Name(s): Epogen, Procrit   There may be other brand names for this medicine  When This Medicine Should Not Be Used: This medicine is not right for everyone  You should not receive it if you had an allergic reaction to epoetin jacob or if you had pure red cell aplasia after receiving epoetin jacob or similar medicines  How to Use This Medicine:   Injectable  · Your doctor will prescribe your dose and schedule  This medicine is given as a shot under your skin or into a vein through a dialysis port  · A nurse or other health provider will give you this medicine  · You may be taught how to give your medicine at home  Make sure you understand all instructions before giving yourself an injection  Do not use more medicine or use it more often than your doctor tells you to  · Do not shake the medicine  Do not use the medicine if it is cloudy or discolored, or has particles in it  · You will be shown the body areas where this shot can be given  Use a different body area each time you give yourself a shot  Keep track of where you give each shot to make sure you rotate body areas  · Use a new needle and syringe each time you inject your medicine  · This medicine should come with a Medication Guide  Ask your pharmacist for a copy if you do not have one  · Missed dose: Call your doctor or pharmacist for instructions  · If you store this medicine at home, keep it in the refrigerator  Do not freeze  · Throw away used needles in a hard, closed container that the needles cannot poke through  Keep this container away from children and pets  Drugs and Foods to Avoid:      Ask your doctor or pharmacist before using any other medicine, including over-the-counter medicines, vitamins, and herbal products  Warnings While Using This Medicine:   · Tell your doctor if you are pregnant or breastfeeding   This medicine contains benzyl alcohol, which can be harmful to infants and unborn babies  · Tell your doctor if you have heart failure, heart disease, high blood pressure, kidney disease, or history of cancer, blood clots, or seizures  Also tell your doctor if you are scheduled for any type of surgery  · This medicine may cause the following problems:   ¨ Serious heart and blood vessel problems, including heart attack, heart failure, stroke, and blood clots  ¨ For cancer patients, increased risk of the cancer worsening or coming back  ¨ Seizures  ¨ High blood pressure  · Your doctor will do lab tests at regular visits to check on the effects of this medicine  Keep all appointments  Your doctor will also monitor your blood pressure  · This medicine is made from donated human blood  All donated blood is tested for certain viruses  Although your risk for getting a virus from the medicine is very low, talk with your doctor if you have concerns  · Keep all medicine out of the reach of children  Never share your medicine with anyone    Possible Side Effects While Using This Medicine:   Call your doctor right away if you notice any of these side effects:  · Allergic reaction: Itching or hives, swelling in your face or hands, swelling or tingling in your mouth or throat, chest tightness, trouble breathing  · Chest pain that may spread, trouble breathing, nausea, unusual sweating, fainting  · Fever, chills, cough, stuffy or runny nose, sore throat  · Numbness or weakness on one side of your body, sudden or severe headache, problems with vision, speech, or walking  · Numbness, tingling, or burning pain in your hands, arms, legs, or feet  · Pain or swelling in your lower leg (calf)  · Seizures  · Rapid weight gain, swelling in your hands, ankles, or feet  · Unusual bleeding, bruising, tiredness, or weakness  If you notice these less serious side effects, talk with your doctor:   · Nausea, vomiting  · Muscle or joint pain  · Pain, itching, burning, or swelling where the shot is given  If you notice other side effects that you think are caused by this medicine, tell your doctor  Call your doctor for medical advice about side effects  You may report side effects to FDA at 5-108-TKB-5294  © 2017 [facility] Information is for End User's use only and may not be sold, redistributed or otherwise used for commercial purposes  The above information is an  only  It is not intended as medical advice for individual conditions or treatments  Talk to your doctor, nurse or pharmacist before following any medical regimen to see if it is safe and effective for you

## 2020-02-10 ENCOUNTER — HOSPITAL ENCOUNTER (OUTPATIENT)
Dept: SURGERY | Facility: HOSPITAL | Age: 85
Discharge: HOME | End: 2020-02-10
Payer: COMMERCIAL

## 2020-02-10 VITALS
SYSTOLIC BLOOD PRESSURE: 138 MMHG | HEART RATE: 56 BPM | TEMPERATURE: 97.4 F | RESPIRATION RATE: 18 BRPM | OXYGEN SATURATION: 94 % | DIASTOLIC BLOOD PRESSURE: 72 MMHG

## 2020-02-10 PROCEDURE — 96372 THER/PROPH/DIAG INJ SC/IM: CPT

## 2020-02-10 RX ADMIN — ERYTHROPOIETIN 20000 UNITS: 20000 INJECTION, SOLUTION INTRAVENOUS; SUBCUTANEOUS at 11:04

## 2020-02-17 ENCOUNTER — OFFICE VISIT (OUTPATIENT)
Dept: NEPHROLOGY | Facility: CLINIC | Age: 85
End: 2020-02-17
Payer: COMMERCIAL

## 2020-02-17 ENCOUNTER — HOSPITAL ENCOUNTER (OUTPATIENT)
Dept: SURGERY | Facility: HOSPITAL | Age: 85
Discharge: HOME | End: 2020-02-17
Payer: COMMERCIAL

## 2020-02-17 VITALS
WEIGHT: 138 LBS | HEIGHT: 62 IN | OXYGEN SATURATION: 98 % | TEMPERATURE: 97.4 F | DIASTOLIC BLOOD PRESSURE: 68 MMHG | HEART RATE: 52 BPM | BODY MASS INDEX: 25.4 KG/M2 | RESPIRATION RATE: 18 BRPM | SYSTOLIC BLOOD PRESSURE: 135 MMHG

## 2020-02-17 VITALS
SYSTOLIC BLOOD PRESSURE: 128 MMHG | HEART RATE: 54 BPM | WEIGHT: 139 LBS | OXYGEN SATURATION: 98 % | BODY MASS INDEX: 25.58 KG/M2 | HEIGHT: 62 IN | DIASTOLIC BLOOD PRESSURE: 58 MMHG

## 2020-02-17 DIAGNOSIS — E11.9 TYPE 2 DIABETES MELLITUS WITHOUT COMPLICATION, WITHOUT LONG-TERM CURRENT USE OF INSULIN (HCC): ICD-10-CM

## 2020-02-17 DIAGNOSIS — N18.5 ANEMIA DUE TO STAGE 5 CHRONIC KIDNEY DISEASE, NOT ON CHRONIC DIALYSIS (HCC): ICD-10-CM

## 2020-02-17 DIAGNOSIS — Z99.2 HEMODIALYSIS ACCESS, AV GRAFT (HCC): ICD-10-CM

## 2020-02-17 DIAGNOSIS — D63.1 ANEMIA DUE TO STAGE 5 CHRONIC KIDNEY DISEASE, NOT ON CHRONIC DIALYSIS (HCC): ICD-10-CM

## 2020-02-17 DIAGNOSIS — N18.5 STAGE 5 CHRONIC KIDNEY DISEASE (HCC): ICD-10-CM

## 2020-02-17 DIAGNOSIS — N25.81 SECONDARY HYPERPARATHYROIDISM OF RENAL ORIGIN (HCC): ICD-10-CM

## 2020-02-17 DIAGNOSIS — D64.9 CHRONIC ANEMIA: ICD-10-CM

## 2020-02-17 DIAGNOSIS — K22.70 BARRETT'S ESOPHAGUS WITHOUT DYSPLASIA: Primary | ICD-10-CM

## 2020-02-17 DIAGNOSIS — I42.9 CARDIOMYOPATHY, UNSPECIFIED TYPE (HCC): ICD-10-CM

## 2020-02-17 DIAGNOSIS — E78.5 DYSLIPIDEMIA: ICD-10-CM

## 2020-02-17 PROCEDURE — 99214 OFFICE O/P EST MOD 30 MIN: CPT | Performed by: INTERNAL MEDICINE

## 2020-02-17 PROCEDURE — 96372 THER/PROPH/DIAG INJ SC/IM: CPT

## 2020-02-17 RX ADMIN — ERYTHROPOIETIN 20000 UNITS: 20000 INJECTION, SOLUTION INTRAVENOUS; SUBCUTANEOUS at 10:05

## 2020-02-17 NOTE — PROGRESS NOTES
[address] [facility], [place]    Name: Solomon Mclaughlin  YOB: 1935      Assessment/Plan:  He has stage 5 chronic kidney disease with a GFR 50 mils per minute  Last lab work was December 23, 2019  He has been going for weekly EPO shots  He is voiding well and has no signs or symptoms of uremia  He has anemia of chronic kidney disease and is receiving EPO  Will check monthly labs  Diabetes is well controlled with a fasting sugar 120-126  A1c has been checked  Medications reviewed and no changes made   He has Obrien's esophagus and is taking Omeprazole daily  Monthly labs ordered will see him back in 2 months         Problem List Items Addressed This Visit        Digestive    Obrien's esophagus without dysplasia - Primary       Endocrine    Type 2 diabetes mellitus ([facility] [address] )    Secondary hyperparathyroidism of renal origin ([facility] [address] )       Cardiovascular and Mediastinum    Cardiomyopathy ([facility] [address] )       Genitourinary    Stage 5 chronic kidney disease ([facility] [address] )       Other    Dyslipidemia    Chronic anemia    Anemia due to chronic kidney disease    Hemodialysis access, AV graft ([facility])            Subjective:      Patient ID: Solomon Mclaughlin  is a 80 y o  male  HPI  History of dialysis requiring ERIK with renal recovery  He has been feeling well and voiding well  Diabetes 100-126 but today is 171    The following portions of the patient's history were reviewed and updated as appropriate: allergies, current medications, past family history, past medical history, past social history, past surgical history and problem list     Review of Systems   Constitutional: Negative  Negative for activity change, appetite change, fatigue and unexpected weight change  HENT: Positive for hearing loss  Negative for congestion, ear discharge, ear pain, trouble swallowing and voice change  Eyes: Negative for pain, discharge, redness and visual disturbance     Respiratory: Negative for cough, chest tightness, shortness of breath and wheezing  Cardiovascular: Negative for chest pain, palpitations and leg swelling  Gastrointestinal: Negative for abdominal distention, abdominal pain, blood in stool, constipation, diarrhea, nausea and vomiting  Endocrine: Negative for heat intolerance, polydipsia, polyphagia and polyuria  Genitourinary: Negative for decreased urine volume, difficulty urinating, frequency and urgency  Musculoskeletal: Positive for back pain  Negative for arthralgias and gait problem  Skin: Negative  Negative for color change and rash  Neurological: Negative for dizziness, weakness, light-headedness and headaches  Hematological: Does not bruise/bleed easily  Psychiatric/Behavioral: Negative            Social History     Socioeconomic History    Marital status: /Civil Union     Spouse name: None    Number of children: None    Years of education: None    Highest education level: None   Occupational History    Occupation: Retired      Comment:     Social Needs    Financial resource strain: None    Food insecurity:     Worry: None     Inability: None    Transportation needs:     Medical: None     Non-medical: None   Tobacco Use    Smoking status: Former Smoker     Packs/day: 0 50     Last attempt to quit: 1969     Years since quittin 1    Smokeless tobacco: Never Used    Tobacco comment: current non-smoker, per [employer]   Substance and Sexual Activity    Alcohol use: No     Frequency: Never     Binge frequency: Never    Drug use: Never    Sexual activity: Not Currently   Lifestyle    Physical activity:     Days per week: None     Minutes per session: None    Stress: None   Relationships    Social connections:     Talks on phone: None     Gets together: None     Attends Gnosticism service: None     Active member of club or organization: None     Attends meetings of clubs or organizations: None     Relationship status: None    Intimate partner violence:     Fear of current or ex partner: None     Emotionally abused: None     Physically abused: None     Forced sexual activity: None   Other Topics Concern    None   Social History Narrative    Patient has never smoked - As per [employer]    Consumes on average 2 cups of regular coffee per day      Past Medical History:   Diagnosis Date    Anxiety     Obrien's esophagus     BPH (benign prostatic hyperplasia)     Cardiomegaly     Diabetes ([facility] [address] )     type 2  controlled; taken off oral meds 6/19    DVT (deep venous thrombosis) (HCC)     right leg    GERD (gastroesophageal reflux disease)     Hypercholesterolemia     Hypertension     Irregular heart beat     paroxsysmal a fib    Pancreatic cyst     Paroxysmal atrial fibrillation (HCC)     Pericardial effusion with cardiac tamponade     Pleural effusion     Renal disorder     dialysis started in 2019    Weight loss, non-intentional      Past Surgical History:   Procedure Laterality Date    APPENDECTOMY  1953    CATARACT EXTRACTION Bilateral 2016?  COLONOSCOPY      COLONOSCOPY N/A 1/24/2019    Procedure: COLONOSCOPY with polypectomies;  Surgeon: Heath Silva MD;  Location: [facility]; Service: Gastroenterology    ESOPHAGOGASTRODUODENOSCOPY N/A 1/24/2019    Procedure: ESOPHAGOGASTRODUODENOSCOPY (EGD) with bx;  Surgeon: Heath Silva MD;  Location: [facility];   Service: Gastroenterology    [facility] EXCHANGE  4/18/2019    [facility] PLACEMENT  3/6/2019    [facility] REMOVAL  7/30/2019    PERICARDIAL WINDOW N/A 2/26/2019    Procedure: WINDOW PERICARDIAL;  Surgeon: Immanuel Currie MD;  Location: [facility];  Service: Thoracic    DE ANASTOMOSIS,AV,ANY SITE Left 7/3/2019    Procedure: CREATION FISTULA ARTERIOVENOUS (AV) left upper extremity brachial artery to axillary vein Tyndall-Mushtaq graft ;  Surgeon: Robby Pate MD;  Location: [facility] MA[facility] OR;  Service: Vascular    PROSTATE BIOPSY         Current Outpatient Medications:     ALPRAZolam (XANAX) 0 25 mg tablet, Take 1 tablet (0 25 mg total) by mouth every 8 (eight) hours as needed for anxiety, Disp: 270 tablet, Rfl: 0    amLODIPine (NORVASC) 10 mg tablet, Take 1 tablet (10 mg total) by mouth every morning, Disp: 90 tablet, Rfl: 3    Blood Glucose Monitoring Suppl (ONE TOUCH ULTRA 2) w/Device KIT, by Does not apply route 2 (two) times a day, Disp: 1 each, Rfl: 0    Blood Glucose Monitoring Suppl (TRUE METRIX METER) YASMEEN, by Does not apply route 2 (two) times a day, Disp: 1 Device, Rfl: 0    Cholecalciferol (VITAMIN D PO), Take 5,000 Units by mouth every morning , Disp: , Rfl:     DULERA 100-5 MCG/ACT inhaler, INHALE 2 PUFFS EVERY 12 (TWELVE) HOURS, Disp: 3 Inhaler, Rfl: 3    EPINEPHrine (EPIPEN) 0 3 mg/0 3 mL SOAJ, Inject as directed, Disp: , Rfl:     fexofenadine (ALLEGRA) 180 MG tablet, Take 1 tablet (180 mg total) by mouth daily For allergy congestion, Disp: 90 tablet, Rfl: 1    finasteride (PROSCAR) 5 mg tablet, TAKE 1 TABLET BY MOUTH EVERY DAY, Disp: 90 tablet, Rfl: 3    fluticasone (FLONASE) 50 mcg/act nasal spray, USE 2 SPRAYS IN EACH NOSTRIL DAILY, Disp: 16 mL, Rfl: 0    glucose blood (ONE TOUCH ULTRA TEST) test strip, Use daily as directed , Disp: 200 each, Rfl: 3    glucose blood (TRUE METRIX BLOOD GLUCOSE TEST) test strip, Test twice daily, Disp: 300 each, Rfl: 0    Lancets (ONETOUCH ULTRASOFT) lancets, USE DAILY AS DIRECTED , Disp: 100 each, Rfl: 3    losartan (COZAAR) 50 mg tablet, Take 1 tablet (50 mg total) by mouth every morning, Disp: 90 tablet, Rfl: 1    metoprolol tartrate (LOPRESSOR) 25 mg tablet, TAKE 1 TABLET (25 MG TOTAL) BY MOUTH EVERY 12 (TWELVE) HOURS, Disp: 180 tablet, Rfl: 1    omeprazole (PriLOSEC) 40 MG capsule, Take 1 capsule (40 mg total) by mouth daily As directed, Disp: 90 capsule, Rfl: 3    PARoxetine (PAXIL) 30 mg tablet, Take 1 tablet (30 mg total) by mouth every morning As directed, Disp: 90 tablet, Rfl: 1   rosuvastatin (CRESTOR) 40 MG tablet, Take 1 tablet (40 mg total) by mouth daily for 90 days (Patient taking differently: Take 40 mg by mouth every morning ), Disp: 90 tablet, Rfl: 3  No current facility-administered medications for this visit  Lab Results   Component Value Date     07/21/2016    SODIUM 141 12/23/2019    K 4 3 12/23/2019     12/23/2019    CO2 27 12/23/2019    ANIONGAP 5 07/07/2015    AGAP 8 12/23/2019    BUN 58 (H) 12/23/2019    CREATININE 3 49 (H) 12/23/2019    GLUC 212 (H) 09/05/2019    GLUF 126 (H) 12/23/2019    CALCIUM 10 0 12/23/2019    AST 10 (L) 12/23/2019    ALT 11 12/23/2019    ALKPHOS 51 (L) 12/23/2019    PROT 7 3 07/21/2016    TP 7 3 12/23/2019    BILITOT 0 4 07/21/2016    TBILI 0 40 12/23/2019    EGFR 15 12/23/2019     Lab Results   Component Value Date    WBC 4 10 (L) 12/23/2019    HGB 8 7 (L) 12/23/2019    HCT 25 2 (L) 12/23/2019    MCV 95 12/23/2019     12/23/2019     Lab Results   Component Value Date    CHOLESTEROL 119 02/14/2018    CHOLESTEROL 141 11/30/2016     Lab Results   Component Value Date    HDL 34 (L) 02/14/2018    HDL 34 (L) 11/30/2016    HDL 27 (L) 07/21/2016     Lab Results   Component Value Date    LDLCALC 68 02/14/2018    LDLCALC 78 11/30/2016     Lab Results   Component Value Date    TRIG 87 02/14/2018    TRIG 146 11/30/2016    TRIG 172 (H) 07/21/2016     No results found for: CHOLHDL  No results found for: [id number], TSH  Lab Results   Component Value Date    PTH 33 9 12/23/2019    CALCIUM 10 0 12/23/2019    PHOS 3 4 09/26/2019     Lab Results   Component Value Date    SPEP  02/26/2019     No monoclonal bands noted  Reviewed by: Jose Correa MD  ([id number])  **Electronic Signature**    UPEP  02/26/2019     The UPEP shows non-selective proteinuria   The UPEP shows a faint possible band  Immunofixation to be performed  Reviewed by: Frandy Sepulveda MD ([id number])  **Electronic Signature**     No results found for: Susu Barboza, [id number]        Objective:      /58 (BP Location: Left arm, Patient Position: Sitting, Cuff Size: Standard)   Pulse (!) 54   Ht 5' 2" (1 575 m)   Wt 63 kg (139 lb)   SpO2 98%   BMI 25 42 kg/m²          Physical Exam

## 2020-02-17 NOTE — PATIENT INSTRUCTIONS
Have labs monthly  Continue weekly injections  Will call you with your hemoglobin level to determine frequency of injections

## 2020-02-19 ENCOUNTER — LAB (OUTPATIENT)
Dept: LAB | Facility: CLINIC | Age: 85
End: 2020-02-19
Payer: COMMERCIAL

## 2020-02-19 DIAGNOSIS — N18.5 ANEMIA DUE TO STAGE 5 CHRONIC KIDNEY DISEASE, NOT ON CHRONIC DIALYSIS (HCC): ICD-10-CM

## 2020-02-19 DIAGNOSIS — D63.1 ANEMIA DUE TO STAGE 5 CHRONIC KIDNEY DISEASE, NOT ON CHRONIC DIALYSIS (HCC): ICD-10-CM

## 2020-02-19 DIAGNOSIS — E11.9 TYPE 2 DIABETES MELLITUS WITHOUT COMPLICATION, WITHOUT LONG-TERM CURRENT USE OF INSULIN (HCC): ICD-10-CM

## 2020-02-19 DIAGNOSIS — N18.5 STAGE 5 CHRONIC KIDNEY DISEASE (HCC): ICD-10-CM

## 2020-02-19 LAB
ALBUMIN SERPL BCP-MCNC: 4.1 G/DL (ref 3.5–5)
ALP SERPL-CCNC: 64 U/L (ref 46–116)
ALT SERPL W P-5'-P-CCNC: 18 U/L (ref 12–78)
ANION GAP SERPL CALCULATED.3IONS-SCNC: 8 MMOL/L (ref 4–13)
AST SERPL W P-5'-P-CCNC: 9 U/L (ref 5–45)
BASOPHILS # BLD AUTO: 0.01 THOUSANDS/ΜL (ref 0–0.1)
BASOPHILS NFR BLD AUTO: 0 % (ref 0–1)
BILIRUB SERPL-MCNC: 0.43 MG/DL (ref 0.2–1)
BUN SERPL-MCNC: 35 MG/DL (ref 5–25)
CALCIUM SERPL-MCNC: 9.8 MG/DL (ref 8.3–10.1)
CHLORIDE SERPL-SCNC: 107 MMOL/L (ref 100–108)
CO2 SERPL-SCNC: 26 MMOL/L (ref 21–32)
CREAT SERPL-MCNC: 3.03 MG/DL (ref 0.6–1.3)
EOSINOPHIL # BLD AUTO: 0.04 THOUSAND/ΜL (ref 0–0.61)
EOSINOPHIL NFR BLD AUTO: 1 % (ref 0–6)
ERYTHROCYTE [DISTWIDTH] IN BLOOD BY AUTOMATED COUNT: 13.8 % (ref 11.6–15.1)
GFR SERPL CREATININE-BSD FRML MDRD: 18 ML/MIN/1.73SQ M
GLUCOSE P FAST SERPL-MCNC: 119 MG/DL (ref 65–99)
HCT VFR BLD AUTO: 46.4 % (ref 36.5–49.3)
HGB BLD-MCNC: 14.7 G/DL (ref 12–17)
IMM GRANULOCYTES # BLD AUTO: 0.01 THOUSAND/UL (ref 0–0.2)
IMM GRANULOCYTES NFR BLD AUTO: 0 % (ref 0–2)
IRON SATN MFR SERPL: 21 %
IRON SERPL-MCNC: 64 UG/DL (ref 65–175)
LYMPHOCYTES # BLD AUTO: 0.96 THOUSANDS/ΜL (ref 0.6–4.47)
LYMPHOCYTES NFR BLD AUTO: 20 % (ref 14–44)
MCH RBC QN AUTO: 33.6 PG (ref 26.8–34.3)
MCHC RBC AUTO-ENTMCNC: 31.7 G/DL (ref 31.4–37.4)
MCV RBC AUTO: 106 FL (ref 82–98)
MONOCYTES # BLD AUTO: 0.37 THOUSAND/ΜL (ref 0.17–1.22)
MONOCYTES NFR BLD AUTO: 8 % (ref 4–12)
NEUTROPHILS # BLD AUTO: 3.37 THOUSANDS/ΜL (ref 1.85–7.62)
NEUTS SEG NFR BLD AUTO: 71 % (ref 43–75)
NRBC BLD AUTO-RTO: 0 /100 WBCS
PHOSPHATE SERPL-MCNC: 4.3 MG/DL (ref 2.3–4.1)
PLATELET # BLD AUTO: 162 THOUSANDS/UL (ref 149–390)
PMV BLD AUTO: 10.2 FL (ref 8.9–12.7)
POTASSIUM SERPL-SCNC: 3.9 MMOL/L (ref 3.5–5.3)
PROT SERPL-MCNC: 8.1 G/DL (ref 6.4–8.2)
RBC # BLD AUTO: 4.37 MILLION/UL (ref 3.88–5.62)
SODIUM SERPL-SCNC: 141 MMOL/L (ref 136–145)
TIBC SERPL-MCNC: 303 UG/DL (ref 250–450)
WBC # BLD AUTO: 4.76 THOUSAND/UL (ref 4.31–10.16)

## 2020-02-19 PROCEDURE — 36415 COLL VENOUS BLD VENIPUNCTURE: CPT

## 2020-02-19 PROCEDURE — 83540 ASSAY OF IRON: CPT

## 2020-02-19 PROCEDURE — 85025 COMPLETE CBC W/AUTO DIFF WBC: CPT

## 2020-02-19 PROCEDURE — 84100 ASSAY OF PHOSPHORUS: CPT

## 2020-02-19 PROCEDURE — 80053 COMPREHEN METABOLIC PANEL: CPT

## 2020-02-19 PROCEDURE — 83550 IRON BINDING TEST: CPT

## 2020-02-21 ENCOUNTER — APPOINTMENT (EMERGENCY)
Dept: CT IMAGING | Facility: HOSPITAL | Age: 85
End: 2020-02-21
Payer: COMMERCIAL

## 2020-02-21 ENCOUNTER — HOSPITAL ENCOUNTER (EMERGENCY)
Facility: HOSPITAL | Age: 85
Discharge: HOME | End: 2020-02-21
Attending: EMERGENCY MEDICINE
Payer: COMMERCIAL

## 2020-02-21 ENCOUNTER — TELEPHONE (OUTPATIENT)
Dept: FAMILY MEDICINE CLINIC | Facility: CLINIC | Age: 85
End: 2020-02-21

## 2020-02-21 VITALS
TEMPERATURE: 98 F | RESPIRATION RATE: 17 BRPM | WEIGHT: 138.45 LBS | OXYGEN SATURATION: 95 % | HEART RATE: 63 BPM | SYSTOLIC BLOOD PRESSURE: 153 MMHG | DIASTOLIC BLOOD PRESSURE: 72 MMHG | BODY MASS INDEX: 25.32 KG/M2

## 2020-02-21 DIAGNOSIS — G47.9 TROUBLE IN SLEEPING: ICD-10-CM

## 2020-02-21 DIAGNOSIS — R44.1 VISUAL HALLUCINATIONS: Primary | ICD-10-CM

## 2020-02-21 DIAGNOSIS — R44.0 AUDITORY HALLUCINATIONS: ICD-10-CM

## 2020-02-21 LAB
ALBUMIN SERPL BCP-MCNC: 4.1 G/DL (ref 3.5–5)
ALP SERPL-CCNC: 63 U/L (ref 46–116)
ALT SERPL W P-5'-P-CCNC: 15 U/L (ref 12–78)
ANION GAP SERPL CALCULATED.3IONS-SCNC: 10 MMOL/L (ref 4–13)
AST SERPL W P-5'-P-CCNC: 11 U/L (ref 5–45)
BACTERIA UR QL AUTO: ABNORMAL /HPF
BASOPHILS # BLD AUTO: 0.01 THOUSANDS/ΜL (ref 0–0.1)
BASOPHILS NFR BLD AUTO: 0 % (ref 0–1)
BILIRUB SERPL-MCNC: 0.38 MG/DL (ref 0.2–1)
BILIRUB UR QL STRIP: NEGATIVE
BUN SERPL-MCNC: 40 MG/DL (ref 5–25)
CALCIUM SERPL-MCNC: 9.7 MG/DL (ref 8.3–10.1)
CHLORIDE SERPL-SCNC: 102 MMOL/L (ref 100–108)
CLARITY UR: CLEAR
CO2 SERPL-SCNC: 26 MMOL/L (ref 21–32)
COLOR UR: YELLOW
CREAT SERPL-MCNC: 3.14 MG/DL (ref 0.6–1.3)
EOSINOPHIL # BLD AUTO: 0.09 THOUSAND/ΜL (ref 0–0.61)
EOSINOPHIL NFR BLD AUTO: 2 % (ref 0–6)
ERYTHROCYTE [DISTWIDTH] IN BLOOD BY AUTOMATED COUNT: 13.7 % (ref 11.6–15.1)
FINE GRAN CASTS URNS QL MICRO: ABNORMAL /LPF
GFR SERPL CREATININE-BSD FRML MDRD: 17 ML/MIN/1.73SQ M
GLUCOSE SERPL-MCNC: 138 MG/DL (ref 65–140)
GLUCOSE UR STRIP-MCNC: NEGATIVE MG/DL
HCT VFR BLD AUTO: 44.6 % (ref 36.5–49.3)
HGB BLD-MCNC: 14.1 G/DL (ref 12–17)
HGB UR QL STRIP.AUTO: ABNORMAL
IMM GRANULOCYTES # BLD AUTO: 0.02 THOUSAND/UL (ref 0–0.2)
IMM GRANULOCYTES NFR BLD AUTO: 0 % (ref 0–2)
KETONES UR STRIP-MCNC: NEGATIVE MG/DL
LEUKOCYTE ESTERASE UR QL STRIP: ABNORMAL
LYMPHOCYTES # BLD AUTO: 0.82 THOUSANDS/ΜL (ref 0.6–4.47)
LYMPHOCYTES NFR BLD AUTO: 17 % (ref 14–44)
MCH RBC QN AUTO: 33.7 PG (ref 26.8–34.3)
MCHC RBC AUTO-ENTMCNC: 31.6 G/DL (ref 31.4–37.4)
MCV RBC AUTO: 106 FL (ref 82–98)
MONOCYTES # BLD AUTO: 0.45 THOUSAND/ΜL (ref 0.17–1.22)
MONOCYTES NFR BLD AUTO: 9 % (ref 4–12)
NEUTROPHILS # BLD AUTO: 3.39 THOUSANDS/ΜL (ref 1.85–7.62)
NEUTS SEG NFR BLD AUTO: 72 % (ref 43–75)
NITRITE UR QL STRIP: NEGATIVE
NON-SQ EPI CELLS URNS QL MICRO: ABNORMAL /HPF
NRBC BLD AUTO-RTO: 0 /100 WBCS
PH UR STRIP.AUTO: 5.5 [PH] (ref 4.5–8)
PLATELET # BLD AUTO: 171 THOUSANDS/UL (ref 149–390)
PMV BLD AUTO: 10 FL (ref 8.9–12.7)
POTASSIUM SERPL-SCNC: 4.5 MMOL/L (ref 3.5–5.3)
PROT SERPL-MCNC: 8 G/DL (ref 6.4–8.2)
PROT UR STRIP-MCNC: >=300 MG/DL
RBC # BLD AUTO: 4.19 MILLION/UL (ref 3.88–5.62)
RBC #/AREA URNS AUTO: ABNORMAL /HPF
SODIUM SERPL-SCNC: 138 MMOL/L (ref 136–145)
SP GR UR STRIP.AUTO: 1.01 (ref 1–1.03)
TSH SERPL DL<=0.05 MIU/L-ACNC: 1.65 UIU/ML (ref 0.36–3.74)
UROBILINOGEN UR QL STRIP.AUTO: 0.2 E.U./DL
WBC # BLD AUTO: 4.78 THOUSAND/UL (ref 4.31–10.16)
WBC #/AREA URNS AUTO: ABNORMAL /HPF

## 2020-02-21 PROCEDURE — 99285 EMERGENCY DEPT VISIT HI MDM: CPT | Performed by: EMERGENCY MEDICINE

## 2020-02-21 PROCEDURE — 80053 COMPREHEN METABOLIC PANEL: CPT | Performed by: EMERGENCY MEDICINE

## 2020-02-21 PROCEDURE — 85025 COMPLETE CBC W/AUTO DIFF WBC: CPT | Performed by: EMERGENCY MEDICINE

## 2020-02-21 PROCEDURE — 81001 URINALYSIS AUTO W/SCOPE: CPT

## 2020-02-21 PROCEDURE — 36415 COLL VENOUS BLD VENIPUNCTURE: CPT | Performed by: EMERGENCY MEDICINE

## 2020-02-21 PROCEDURE — 99285 EMERGENCY DEPT VISIT HI MDM: CPT

## 2020-02-21 PROCEDURE — 70450 CT HEAD/BRAIN W/O DYE: CPT

## 2020-02-21 PROCEDURE — 84443 ASSAY THYROID STIM HORMONE: CPT | Performed by: EMERGENCY MEDICINE

## 2020-02-21 RX ORDER — ALPRAZOLAM 0.5 MG/1
0.5 TABLET ORAL ONCE
Status: DISCONTINUED | OUTPATIENT
Start: 2020-02-21 | End: 2020-02-21

## 2020-02-21 NOTE — ED PROVIDER NOTES
History  Chief Complaint   Patient presents with    Hallucinations     Began 2 days ago, seeing things and hearing music that is not real  Pt denies s/s of UTI  HPI   80-year-old gentleman presenting for visual/auditory hallucinations  He is accompanied by his wife, who lives with him, and his son  They became concerned when a few days ago patient started seeing small dogs outside in the yard and on the roof of their garage  Family actually climbed up to the roof of the garage to convince patient that there was no dog  Last night he said he saw a man in their kitchen shaving  He has also been hearing voices and music coming from the attic  Per family the symptoms are occurring almost exclusively in the evenings and at night  He has trouble sleeping because of them  He has not been having any new memory deficits or other unusual behaviors  His son thinks that he was moving a little bit slower today than usual, but he has not been having obvious difficulties with balance or been falling  His son does think he seemed angrier than usual last night with his family  Otherwise his personality has been at baseline  No new or recent changes in his medications  No headaches  No history of neurocognitive issues  Prior to Admission Medications   Prescriptions Last Dose Informant Patient Reported? Taking?    ALPRAZolam (XANAX) 0 25 mg tablet  Spouse/Significant Other No No   Sig: Take 1 tablet (0 25 mg total) by mouth every 8 (eight) hours as needed for anxiety   Blood Glucose Monitoring Suppl (ONE TOUCH ULTRA 2) w/Device KIT   No No   Sig: by Does not apply route 2 (two) times a day   Blood Glucose Monitoring Suppl (TRUE METRIX METER) YASMEEN  Spouse/Significant Other No No   Sig: by Does not apply route 2 (two) times a day   Cholecalciferol (VITAMIN D PO)  Spouse/Significant Other Yes No   Sig: Take 5,000 Units by mouth every morning    DULERA 100-5 MCG/ACT inhaler   No No   Sig: INHALE 2 PUFFS EVERY 12 (TWELVE) HOURS   EPINEPHrine (EPIPEN) 0 3 mg/0 3 mL SOAJ  Spouse/Significant Other Yes No   Sig: Inject as directed   Lancets (ONETOUCH ULTRASOFT) lancets  Spouse/Significant Other No No   Sig: USE DAILY AS DIRECTED  PARoxetine (PAXIL) 30 mg tablet  Spouse/Significant Other No No   Sig: Take 1 tablet (30 mg total) by mouth every morning As directed   amLODIPine (NORVASC) 10 mg tablet  Spouse/Significant Other No No   Sig: Take 1 tablet (10 mg total) by mouth every morning   fexofenadine (ALLEGRA) 180 MG tablet  Spouse/Significant Other No No   Sig: Take 1 tablet (180 mg total) by mouth daily For allergy congestion   finasteride (PROSCAR) 5 mg tablet   No No   Sig: TAKE 1 TABLET BY MOUTH EVERY DAY   fluticasone (FLONASE) 50 mcg/act nasal spray  Spouse/Significant Other No No   Sig: USE 2 SPRAYS IN EACH NOSTRIL DAILY   glucose blood (ONE TOUCH ULTRA TEST) test strip  Spouse/Significant Other No No   Sig: Use daily as directed     glucose blood (TRUE METRIX BLOOD GLUCOSE TEST) test strip  Spouse/Significant Other No No   Sig: Test twice daily   losartan (COZAAR) 50 mg tablet   No No   Sig: Take 1 tablet (50 mg total) by mouth every morning   metoprolol tartrate (LOPRESSOR) 25 mg tablet  Spouse/Significant Other No No   Sig: TAKE 1 TABLET (25 MG TOTAL) BY MOUTH EVERY 12 (TWELVE) HOURS   omeprazole (PriLOSEC) 40 MG capsule  Spouse/Significant Other No No   Sig: Take 1 capsule (40 mg total) by mouth daily As directed   rosuvastatin (CRESTOR) 40 MG tablet  Spouse/Significant Other No No   Sig: Take 1 tablet (40 mg total) by mouth daily for 90 days   Patient taking differently: Take 40 mg by mouth every morning       Facility-Administered Medications: None       Past Medical History:   Diagnosis Date    Anxiety     Obrien's esophagus     BPH (benign prostatic hyperplasia)     Cardiomegaly     Diabetes ([facility] [address] )     type 2  controlled; taken off oral meds 6/19    DVT (deep venous thrombosis) (HCC)     right leg    GERD (gastroesophageal reflux disease)     Hypercholesterolemia     Hypertension     Irregular heart beat     paroxsysmal a fib    Pancreatic cyst     Paroxysmal atrial fibrillation (HCC)     Pericardial effusion with cardiac tamponade     Pleural effusion     Renal disorder     dialysis started in 2019    Weight loss, non-intentional        Past Surgical History:   Procedure Laterality Date    APPENDECTOMY      CATARACT EXTRACTION Bilateral 2016?  COLONOSCOPY      COLONOSCOPY N/A 2019    Procedure: COLONOSCOPY with polypectomies;  Surgeon: Irish Reddy MD;  Location: [facility]; Service: Gastroenterology    ESOPHAGOGASTRODUODENOSCOPY N/A 2019    Procedure: ESOPHAGOGASTRODUODENOSCOPY (EGD) with bx;  Surgeon: Irish Reddy MD;  [facility]; Service: Gastroenterology    [address] EXCHANGE  2019    [address] PLACEMENT  3/6/2019    [address] REMOVAL  2019    PERICARDIAL WINDOW N/A 2019    Procedure: WINDOW PERICARDIAL;  Surgeon: Mecca Turner MD;  [facility]: [facility];  Service: Thoracic    CA ANASTOMOSIS,AV,ANY SITE Left 7/3/2019    Procedure: CREATION FISTULA ARTERIOVENOUS (AV) left upper extremity brachial artery to axillary vein Langley-Mushtaq graft ;  Surgeon: Lorna Palmer MD;  Location: [address] [place], [place] [address];  Service: Vascular    PROSTATE BIOPSY         Family History   Problem Relation Age of Onset    Diabetes Mother     Diabetes type II Mother     Diabetes Father     Diabetes type II Father     Heart attack Father     Prostate cancer Brother     Diabetes Brother     Pulmonary embolism Sister      I have reviewed and agree with the history as documented      Social History     Tobacco Use    Smoking status: Former Smoker     Packs/day: 0 50     Last attempt to quit: 1969     Years since quittin 1    Smokeless tobacco: Never Used    Tobacco comment: current non-smoker, per [employer]   Substance Use Topics    Alcohol use: No     Frequency: Never     Binge frequency: Never    Drug use: Never        Review of Systems   Constitutional: Negative for activity change, appetite change, chills and fever  Respiratory: Negative for cough and shortness of breath  Cardiovascular: Negative for chest pain  Gastrointestinal: Negative for abdominal pain, nausea and vomiting  Skin: Negative for pallor and rash  Neurological: Negative for dizziness and headaches  Psychiatric/Behavioral: Positive for hallucinations and sleep disturbance  Negative for agitation, behavioral problems, confusion and self-injury  The patient is not hyperactive  All other systems reviewed and are negative  Physical Exam  ED Triage Vitals   Temperature Pulse Respirations Blood Pressure SpO2   02/21/20 1623 02/21/20 1623 02/21/20 1623 02/21/20 1623 02/21/20 1623   98 °F (36 7 °C) 62 16 142/68 98 %      Temp Source Heart Rate Source Patient Position - Orthostatic VS BP Location FiO2 (%)   02/21/20 1623 02/21/20 1747 02/21/20 1623 02/21/20 1623 --   Oral Monitor Sitting Right arm       Pain Score       02/21/20 1623       No Pain             Orthostatic Vital Signs  Vitals:    02/21/20 1623 02/21/20 1747   BP: 142/68 153/72   Pulse: 62 63   Patient Position - Orthostatic VS: Sitting Lying       Physical Exam   Constitutional: He is oriented to person, place, and time  He appears well-developed and well-nourished  No distress  HENT:   Head: Normocephalic and atraumatic  Eyes: Pupils are equal, round, and reactive to light  Conjunctivae and EOM are normal  No scleral icterus  Questionable slight ptosis and prominence of the right eye  Neck: Normal range of motion  Neck supple  Cardiovascular: Normal rate and regular rhythm  Exam reveals no gallop and no friction rub  No murmur heard  Pulmonary/Chest: Breath sounds normal  He has no wheezes  He has no rales  Abdominal: Soft  He exhibits no distension  There is no tenderness  There is no rebound and no guarding  Musculoskeletal: Normal range of motion  He exhibits no edema or tenderness  Neurological: He is alert and oriented to person, place, and time  No cranial nerve deficit or sensory deficit  He exhibits normal muscle tone  There is grossly normal strength in bilateral upper and lower extremities  No sensory deficits  No facial droop  No dysmetria to finger-nose finger  No dysdiadochokinesia  Gait is not broad based or shuffling  There is no en bloc turning  No ataxia  Skin: Skin is warm and dry  He is not diaphoretic  No erythema  No pallor  Psychiatric: He has a normal mood and affect  His behavior is normal    Nursing note and vitals reviewed        ED Medications  Medications - No data to display    Diagnostic Studies  Results Reviewed     Procedure Component Value Units Date/Time    Urine Microscopic [[id number]]  (Abnormal) Collected:  02/21/20 1831    Lab Status:  Final result Specimen:  Urine, Clean Catch Updated:  02/21/20 1906     RBC, UA 0-1 /hpf      WBC, UA 2-4 /hpf      Epithelial Cells Occasional /hpf      Bacteria, UA Occasional /hpf      Fine granular casts 0-1 /lpf     POCT urinalysis dipstick [[id number]]  (Abnormal) Resulted:  02/21/20 1833    Lab Status:  Final result Updated:  02/21/20 1833    Urine Macroscopic, POC [[id number]]  (Abnormal) Collected:  02/21/20 1831    Lab Status:  Final result Specimen:  Urine Updated:  02/21/20 1832     Color, UA Yellow     Clarity, UA Clear     pH, UA 5 5     Leukocytes, UA Trace     Nitrite, UA Negative     Protein, UA >=300 mg/dl      Glucose, UA Negative mg/dl      Ketones, UA Negative mg/dl      Urobilinogen, UA 0 2 E U /dl      Bilirubin, UA Negative     Blood, UA Moderate     Specific [place], UA 1 015    Narrative:       CLINITEK RESULT    Comprehensive metabolic panel [[id number]]  (Abnormal) Collected:  02/21/20 1747    Lab Status:  Final result Specimen:  Blood from Arm, Right Updated:  02/21/20 1823     Sodium 138 mmol/L      Potassium 4 5 mmol/L      Chloride 102 mmol/L      CO2 26 mmol/L      ANION GAP 10 mmol/L      BUN 40 mg/dL      Creatinine 3 14 mg/dL      Glucose 138 mg/dL      Calcium 9 7 mg/dL      AST 11 U/L      ALT 15 U/L      Alkaline Phosphatase 63 U/L      Total Protein 8 0 g/dL      Albumin 4 1 g/dL      Total Bilirubin 0 38 mg/dL      eGFR 17 ml/min/1 73sq m     Narrative:       [place] guidelines for Chronic Kidney Disease (CKD):     Stage 1 with normal or high GFR (GFR > 90 mL/min/1 73 square meters)    Stage 2 Mild CKD (GFR = 60-89 mL/min/1 73 square meters)    Stage 3A Moderate CKD (GFR = 45-59 mL/min/1 73 square meters)    Stage 3B Moderate CKD (GFR = 30-44 mL/min/1 73 square meters)    Stage 4 Severe CKD (GFR = 15-29 mL/min/1 73 square meters)    Stage 5 End Stage CKD (GFR <15 mL/min/1 73 square meters)  Note: GFR calculation is accurate only with a steady state creatinine    TSH, 3rd generation with Free T4 reflex [[id number]]  (Normal) Collected:  02/21/20 1657    Lab Status:  Final result Specimen:  Blood from Arm, Right Updated:  02/21/20 1732     TSH 3RD GENERATON 1 652 uIU/mL     Narrative:       Patients undergoing fluorescein dye angiography may retain small amounts of fluorescein in the body for 48-72 hours post procedure  Samples containing fluorescein can produce falsely depressed TSH values  If the patient had this procedure,a specimen should be resubmitted post fluorescein clearance        CBC and differential [[id number]]  (Abnormal) Collected:  02/21/20 1657    Lab Status:  Final result Specimen:  Blood from Arm, Right Updated:  02/21/20 1702     WBC 4 78 Thousand/uL      RBC 4 19 Million/uL      Hemoglobin 14 1 g/dL      Hematocrit 44 6 %       fL      MCH 33 7 pg      MCHC 31 6 g/dL      RDW 13 7 %      MPV 10 0 fL      Platelets 357 Thousands/uL      nRBC 0 /100 WBCs      Neutrophils Relative 72 %      Immat GRANS % 0 %      Lymphocytes Relative 17 %      Monocytes Relative 9 %      Eosinophils Relative 2 %      Basophils Relative 0 %      Neutrophils Absolute 3 39 Thousands/µL      Immature Grans Absolute 0 02 Thousand/uL      Lymphocytes Absolute 0 82 Thousands/µL      Monocytes Absolute 0 45 Thousand/µL      Eosinophils Absolute 0 09 Thousand/µL      Basophils Absolute 0 01 Thousands/µL                  CT head without contrast   Final Result by Sharon Vuong DO (02/21 1732)      No acute intracranial abnormality  Workstation performed: [id number]               Procedures  Procedures      ED Course  ED Course as of Feb 22 0101 Fri Feb 21, 2020   1711 WBC: 4 78   1711 Hemoglobin: 14 1   1739 TSH 3RD GENERATON: 1 652   1824 Creatinine(!): 3 14          MDM  Number of Diagnoses or Management Options  Auditory hallucinations: new and requires workup  Trouble in sleeping: new and requires workup  Visual hallucinations: new and requires workup     Amount and/or Complexity of Data Reviewed  Clinical lab tests: ordered and reviewed  Tests in the radiology section of CPT®: ordered and reviewed  Tests in the medicine section of CPT®: ordered and reviewed  Decide to obtain previous medical records or to obtain history from someone other than the patient: yes  Obtain history from someone other than the patient: yes  Independent visualization of images, tracings, or specimens: yes    Patient Progress  Patient progress: stable    80year-old gentleman here with new onset of auditory and visual hallucinations  Patient has normal neurologic exam   Differential includes delirium, dementia, Parkinson disease, other neurocognitive disorder, psychiatric  Plan is CT head to evaluate for intracranial mass or other pathology, basic labs to screen for infection or signs of end-organ dysfunction, TSH, and urinalysis to screen for urinary tract infection  Patient's CT head is unremarkable    No significant laboratory derangements other than chronic renal failure, not significantly worse than patient's baseline  UA not suggestive of infection  Discussed with patient and family option for admission to hospital for expedited Neurology evaluation and further workup verses outpatient follow-up with PCP and Neurology  Based on shared decision making they would like to go home and will call their PCP and Neurology on Monday  They understand they can return to the emergency department any time if symptoms worsen or they feel patient is on longer safe at home  Disposition  Final diagnoses:   Visual hallucinations   Auditory hallucinations   Trouble in sleeping     Time reflects when diagnosis was documented in both MDM as applicable and the Disposition within this note     Time User Action Codes Description Comment    2/21/2020  7:19 PM Rentricea Schaumann Add [R44 1] Visual hallucinations     2/21/2020  7:20 PM Renelda Schaumann Add [R44 0] Auditory hallucinations     2/21/2020  7:20 PM Renelda Schaumann Add [G47 9] Trouble in sleeping       ED Disposition     ED Disposition Condition Date/Time Comment    Discharge Good Fri Feb 21, 2020  7:19 PM [place]  discharge to home/self care              Follow-up Information     Follow up With Specialties Details Why Contact Info Additional Information    Russell Mi DO Family Medicine Schedule an appointment as soon as possible for a visit   [address] [address] [address] [facility] Neurology Schedule an appointment as soon as possible for a visit   [address] [place] [phone]  [address] [place] [address] Neurology [facility], [address], [address]          Discharge Medication List as of 2/21/2020  7:24 PM      CONTINUE these medications which have NOT CHANGED    Details   ALPRAZolam (XANAX) 0 25 mg tablet Take 1 tablet (0 25 mg total) by mouth every 8 (eight) hours as needed for anxiety, Starting Tue 11/6/2018, Phone In      amLODIPine (NORVASC) 10 mg tablet Take 1 tablet (10 mg total) by mouth every morning, Starting Mon 8/19/2019, Normal      Blood Glucose Monitoring Suppl (ONE TOUCH ULTRA 2) w/Device KIT by Does not apply route 2 (two) times a day, Starting Tue 12/10/2019, Normal      Blood Glucose Monitoring Suppl (TRUE METRIX METER) YASMEEN by Does not apply route 2 (two) times a day, Starting Mon 4/29/2019, Normal      Cholecalciferol (VITAMIN D PO) Take 5,000 Units by mouth every morning , Historical Med      DULERA 100-5 MCG/ACT inhaler INHALE 2 PUFFS EVERY 12 (TWELVE) HOURS, Starting Sun 12/8/2019, Normal      EPINEPHrine (EPIPEN) 0 3 mg/0 3 mL SOAJ Inject as directed, Starting Thu 6/23/2011, Historical Med      fexofenadine (ALLEGRA) 180 MG tablet Take 1 tablet (180 mg total) by mouth daily For allergy congestion, Starting Mon 6/24/2019, Normal      finasteride (PROSCAR) 5 mg tablet TAKE 1 TABLET BY MOUTH EVERY DAY, Normal      fluticasone (FLONASE) 50 mcg/act nasal spray USE 2 SPRAYS IN EACH NOSTRIL DAILY, Normal      !! glucose blood (ONE TOUCH ULTRA TEST) test strip Use daily as directed , Normal      !! glucose blood (TRUE METRIX BLOOD GLUCOSE TEST) test strip Test twice daily, Normal      Lancets (ONETOUCH ULTRASOFT) lancets USE DAILY AS DIRECTED , Normal      losartan (COZAAR) 50 mg tablet Take 1 tablet (50 mg total) by mouth every morning, Starting Thu 12/26/2019, Normal      metoprolol tartrate (LOPRESSOR) 25 mg tablet TAKE 1 TABLET (25 MG TOTAL) BY MOUTH EVERY 12 (TWELVE) HOURS, Starting Wed 9/4/2019, Normal      omeprazole (PriLOSEC) 40 MG capsule Take 1 capsule (40 mg total) by mouth daily As directed, Starting Tue 5/7/2019, Normal      PARoxetine (PAXIL) 30 mg tablet Take 1 tablet (30 mg total) by mouth every morning As directed, Starting Tue 8/27/2019, Normal      rosuvastatin (CRESTOR) 40 MG tablet Take 1 tablet (40 mg total) by mouth daily for 90 days, Starting Fri 4/26/2019, Until Mon 2/17/2020, Normal       !! - Potential duplicate medications found  Please discuss with provider  No discharge procedures on file  ED Provider  Attending physically available and evaluated Solomon Mclaughlin I managed the patient along with the ED Attending      Electronically Signed by         Aurora Tomlinson MD  02/22/20 6305

## 2020-02-21 NOTE — TELEPHONE ENCOUNTER
FYI-Patient will be going to [facility] in [place] he is experiencing delusions (seeing things that are not around)

## 2020-02-21 NOTE — ED ATTENDING ATTESTATION
2/21/2020  I, Hira Jeffries MD, saw and evaluated the patient  I have discussed the patient with the resident/non-physician practitioner and agree with the resident's/non-physician practitioner's findings, Plan of Care, and MDM as documented in the resident's/non-physician practitioner's note, except where noted  All available labs and Radiology studies were reviewed  I was present for key portions of any procedure(s) performed by the resident/non-physician practitioner and I was immediately available to provide assistance  At this point I agree with the current assessment done in the Emergency Department  I have conducted an independent evaluation of this patient a history and physical is as follows:    81 YO male presents for evaluation of hallucinations  States this began 3 days prior with the Pt stating he saw a dog on the roof  States yesterday he saw someone in the kitchen  States he has been hearing voices and hearing music  States this is more at night  Son states balance did seem a little off, he has not fallen  States he seemed a little angrier than usual  Denies Hx of Parkinsons  Pt denies CP/SOB/F/C/N/V/D/C, no dysuria, burning on urination or blood in urine  Gen: Pt is in NAD  HEENT: Head is atraumatic, EOM's intact, neck has FROM  Chest: CTAB, non-tender  Heart: RRR  Abdomen: Soft, NT/ND  Musculoskeletal: FROM in all extremities  Skin: No rash, no ecchymosis  Neuro: Awake, alert, oriented x4; Cranial nerves II-XII intact  Psych: Normal affect    MDM - Pt with hallucinations, normal mentation  Will check urine for infection, CBC, electrolytes and TSH  CT head  ED Course     Did discuss results of ED testing with Pt and family, specifically no etiology for Pt's hallucinations  Resident and myself offered admission for further evaluation which Pt, wife and son have declined  Pt is AAOx4 with no neurologic deficits   Did recommend prompt follow up and return for worsening symptoms or confusion      Critical Care Time  Procedures

## 2020-02-22 NOTE — DISCHARGE INSTRUCTIONS
Medication to aid in sleep: melatonin (can be purchased at any pharmacy or store that sells vitamins/supplements)

## 2020-02-24 ENCOUNTER — HOSPITAL ENCOUNTER (OUTPATIENT)
Dept: SURGERY | Facility: HOSPITAL | Age: 85
Discharge: HOME | End: 2020-02-24
Payer: COMMERCIAL

## 2020-02-24 VITALS
DIASTOLIC BLOOD PRESSURE: 62 MMHG | SYSTOLIC BLOOD PRESSURE: 119 MMHG | HEIGHT: 62 IN | RESPIRATION RATE: 18 BRPM | OXYGEN SATURATION: 97 % | TEMPERATURE: 97.2 F | HEART RATE: 74 BPM | WEIGHT: 138 LBS | BODY MASS INDEX: 25.4 KG/M2

## 2020-02-24 DIAGNOSIS — I48.0 PAROXYSMAL ATRIAL FIBRILLATION (HCC): Chronic | ICD-10-CM

## 2020-02-24 DIAGNOSIS — F41.1 GENERALIZED ANXIETY DISORDER: ICD-10-CM

## 2020-02-24 PROCEDURE — 96372 THER/PROPH/DIAG INJ SC/IM: CPT

## 2020-02-24 RX ORDER — PAROXETINE 30 MG/1
30 TABLET, FILM COATED ORAL EVERY MORNING
Qty: 90 TABLET | Refills: 1 | OUTPATIENT
Start: 2020-02-24

## 2020-02-24 RX ADMIN — ERYTHROPOIETIN 20000 UNITS: 20000 INJECTION, SOLUTION INTRAVENOUS; SUBCUTANEOUS at 09:51

## 2020-02-24 NOTE — DISCHARGE INSTRUCTIONS
Epoetin Jacob (By injection)   Epoetin Jacob (e-NAOMI-e-tin AL-fa)  Treats anemia  Brand Name(s): Epogen, Procrit   There may be other brand names for this medicine  When This Medicine Should Not Be Used: This medicine is not right for everyone  You should not receive it if you had an allergic reaction to epoetin jacob or if you had pure red cell aplasia after receiving epoetin jacob or similar medicines  How to Use This Medicine:   Injectable  · Your doctor will prescribe your dose and schedule  This medicine is given as a shot under your skin or into a vein through a dialysis port  · A nurse or other health provider will give you this medicine  · You may be taught how to give your medicine at home  Make sure you understand all instructions before giving yourself an injection  Do not use more medicine or use it more often than your doctor tells you to  · Do not shake the medicine  Do not use the medicine if it is cloudy or discolored, or has particles in it  · You will be shown the body areas where this shot can be given  Use a different body area each time you give yourself a shot  Keep track of where you give each shot to make sure you rotate body areas  · Use a new needle and syringe each time you inject your medicine  · This medicine should come with a Medication Guide  Ask your pharmacist for a copy if you do not have one  · Missed dose: Call your doctor or pharmacist for instructions  · If you store this medicine at home, keep it in the refrigerator  Do not freeze  · Throw away used needles in a hard, closed container that the needles cannot poke through  Keep this container away from children and pets  Drugs and Foods to Avoid:      Ask your doctor or pharmacist before using any other medicine, including over-the-counter medicines, vitamins, and herbal products  Warnings While Using This Medicine:   · Tell your doctor if you are pregnant or breastfeeding   This medicine contains benzyl alcohol, which can be harmful to infants and unborn babies  · Tell your doctor if you have heart failure, heart disease, high blood pressure, kidney disease, or history of cancer, blood clots, or seizures  Also tell your doctor if you are scheduled for any type of surgery  · This medicine may cause the following problems:   ¨ Serious heart and blood vessel problems, including heart attack, heart failure, stroke, and blood clots  ¨ For cancer patients, increased risk of the cancer worsening or coming back  ¨ Seizures  ¨ High blood pressure  · Your doctor will do lab tests at regular visits to check on the effects of this medicine  Keep all appointments  Your doctor will also monitor your blood pressure  · This medicine is made from donated human blood  All donated blood is tested for certain viruses  Although your risk for getting a virus from the medicine is very low, talk with your doctor if you have concerns  · Keep all medicine out of the reach of children  Never share your medicine with anyone    Possible Side Effects While Using This Medicine:   Call your doctor right away if you notice any of these side effects:  · Allergic reaction: Itching or hives, swelling in your face or hands, swelling or tingling in your mouth or throat, chest tightness, trouble breathing  · Chest pain that may spread, trouble breathing, nausea, unusual sweating, fainting  · Fever, chills, cough, stuffy or runny nose, sore throat  · Numbness or weakness on one side of your body, sudden or severe headache, problems with vision, speech, or walking  · Numbness, tingling, or burning pain in your hands, arms, legs, or feet  · Pain or swelling in your lower leg (calf)  · Seizures  · Rapid weight gain, swelling in your hands, ankles, or feet  · Unusual bleeding, bruising, tiredness, or weakness  If you notice these less serious side effects, talk with your doctor:   · Nausea, vomiting  · Muscle or joint pain  · Pain, itching, burning, or swelling where the shot is given  If you notice other side effects that you think are caused by this medicine, tell your doctor  Call your doctor for medical advice about side effects  You may report side effects to FDA at 7-232-FND-7090  © 2017 [employer] Information is for End User's use only and may not be sold, redistributed or otherwise used for commercial purposes  The above information is an  only  It is not intended as medical advice for individual conditions or treatments  Talk to your doctor, nurse or pharmacist before following any medical regimen to see if it is safe and effective for you

## 2020-02-26 ENCOUNTER — TELEPHONE (OUTPATIENT)
Dept: FAMILY MEDICINE CLINIC | Facility: CLINIC | Age: 85
End: 2020-02-26

## 2020-02-26 ENCOUNTER — OFFICE VISIT (OUTPATIENT)
Dept: FAMILY MEDICINE CLINIC | Facility: CLINIC | Age: 85
End: 2020-02-26
Payer: COMMERCIAL

## 2020-02-26 VITALS
TEMPERATURE: 97.8 F | OXYGEN SATURATION: 99 % | HEIGHT: 63 IN | SYSTOLIC BLOOD PRESSURE: 120 MMHG | HEART RATE: 78 BPM | WEIGHT: 138 LBS | BODY MASS INDEX: 24.45 KG/M2 | DIASTOLIC BLOOD PRESSURE: 60 MMHG

## 2020-02-26 DIAGNOSIS — E11.22 TYPE 2 DIABETES MELLITUS WITH STAGE 3 CHRONIC KIDNEY DISEASE, WITHOUT LONG-TERM CURRENT USE OF INSULIN (HCC): ICD-10-CM

## 2020-02-26 DIAGNOSIS — I26.99 PULMONARY EMBOLISM, OTHER, UNSPECIFIED CHRONICITY, UNSPECIFIED WHETHER ACUTE COR PULMONALE PRESENT (HCC): ICD-10-CM

## 2020-02-26 DIAGNOSIS — N18.30 TYPE 2 DIABETES MELLITUS WITH STAGE 3 CHRONIC KIDNEY DISEASE, WITHOUT LONG-TERM CURRENT USE OF INSULIN (HCC): ICD-10-CM

## 2020-02-26 DIAGNOSIS — N18.30 STAGE 3 CHRONIC KIDNEY DISEASE (HCC): Primary | ICD-10-CM

## 2020-02-26 LAB
ALBUMIN SERPL BCP-MCNC: 3.7 G/DL (ref 3.5–5)
ALP SERPL-CCNC: 67 U/L (ref 46–116)
ALT SERPL W P-5'-P-CCNC: 15 U/L (ref 12–78)
ANION GAP SERPL CALCULATED.3IONS-SCNC: 9 MMOL/L (ref 4–13)
AST SERPL W P-5'-P-CCNC: 8 U/L (ref 5–45)
BASOPHILS # BLD AUTO: 0.01 THOUSANDS/ΜL (ref 0–0.1)
BASOPHILS NFR BLD AUTO: 0 % (ref 0–1)
BILIRUB SERPL-MCNC: 0.35 MG/DL (ref 0.2–1)
BUN SERPL-MCNC: 38 MG/DL (ref 5–25)
CALCIUM SERPL-MCNC: 9.3 MG/DL (ref 8.3–10.1)
CHLORIDE SERPL-SCNC: 107 MMOL/L (ref 100–108)
CO2 SERPL-SCNC: 22 MMOL/L (ref 21–32)
CREAT SERPL-MCNC: 3.43 MG/DL (ref 0.6–1.3)
EOSINOPHIL # BLD AUTO: 0.06 THOUSAND/ΜL (ref 0–0.61)
EOSINOPHIL NFR BLD AUTO: 1 % (ref 0–6)
ERYTHROCYTE [DISTWIDTH] IN BLOOD BY AUTOMATED COUNT: 13.7 % (ref 11.6–15.1)
GFR SERPL CREATININE-BSD FRML MDRD: 15 ML/MIN/1.73SQ M
GLUCOSE SERPL-MCNC: 218 MG/DL (ref 65–140)
HCT VFR BLD AUTO: 42.4 % (ref 36.5–49.3)
HGB BLD-MCNC: 13.7 G/DL (ref 12–17)
IMM GRANULOCYTES # BLD AUTO: 0.01 THOUSAND/UL (ref 0–0.2)
IMM GRANULOCYTES NFR BLD AUTO: 0 % (ref 0–2)
LYMPHOCYTES # BLD AUTO: 0.61 THOUSANDS/ΜL (ref 0.6–4.47)
LYMPHOCYTES NFR BLD AUTO: 13 % (ref 14–44)
MCH RBC QN AUTO: 33.5 PG (ref 26.8–34.3)
MCHC RBC AUTO-ENTMCNC: 32.3 G/DL (ref 31.4–37.4)
MCV RBC AUTO: 104 FL (ref 82–98)
MONOCYTES # BLD AUTO: 0.37 THOUSAND/ΜL (ref 0.17–1.22)
MONOCYTES NFR BLD AUTO: 8 % (ref 4–12)
NEUTROPHILS # BLD AUTO: 3.51 THOUSANDS/ΜL (ref 1.85–7.62)
NEUTS SEG NFR BLD AUTO: 78 % (ref 43–75)
NRBC BLD AUTO-RTO: 0 /100 WBCS
PLATELET # BLD AUTO: 184 THOUSANDS/UL (ref 149–390)
PMV BLD AUTO: 10.2 FL (ref 8.9–12.7)
POTASSIUM SERPL-SCNC: 4.4 MMOL/L (ref 3.5–5.3)
PROT SERPL-MCNC: 7.3 G/DL (ref 6.4–8.2)
RBC # BLD AUTO: 4.09 MILLION/UL (ref 3.88–5.62)
SODIUM SERPL-SCNC: 138 MMOL/L (ref 136–145)
WBC # BLD AUTO: 4.57 THOUSAND/UL (ref 4.31–10.16)

## 2020-02-26 PROCEDURE — 3008F BODY MASS INDEX DOCD: CPT | Performed by: FAMILY MEDICINE

## 2020-02-26 PROCEDURE — 1036F TOBACCO NON-USER: CPT | Performed by: FAMILY MEDICINE

## 2020-02-26 PROCEDURE — 99214 OFFICE O/P EST MOD 30 MIN: CPT | Performed by: FAMILY MEDICINE

## 2020-02-26 PROCEDURE — 36415 COLL VENOUS BLD VENIPUNCTURE: CPT | Performed by: FAMILY MEDICINE

## 2020-02-26 PROCEDURE — 85025 COMPLETE CBC W/AUTO DIFF WBC: CPT | Performed by: FAMILY MEDICINE

## 2020-02-26 PROCEDURE — 4040F PNEUMOC VAC/ADMIN/RCVD: CPT | Performed by: FAMILY MEDICINE

## 2020-02-26 PROCEDURE — 1160F RVW MEDS BY RX/DR IN RCRD: CPT | Performed by: FAMILY MEDICINE

## 2020-02-26 PROCEDURE — 80053 COMPREHEN METABOLIC PANEL: CPT | Performed by: FAMILY MEDICINE

## 2020-02-26 PROCEDURE — 3066F NEPHROPATHY DOC TX: CPT | Performed by: FAMILY MEDICINE

## 2020-02-26 NOTE — PROGRESS NOTES
BMI Counseling: Body mass index is 24 45 kg/m²  The BMI is above normal  Nutrition recommendations include reducing portion sizes

## 2020-02-26 NOTE — PROGRESS NOTES
Assessment/Plan:  Considering holding Epogen shots and following up with nephrologist   We will recheck his creatinine level today  He may need to restart dialysis as hallucinations may be a result of his advanced kidney disease  He will check his blood glucose readings next time he has any auditory or visual hallucinations  Consider neurology follow-up as well  Recommend recheck again in 3 months  Follow-up with nephrologist as scheduled  No problem-specific Assessment & Plan notes found for this encounter  Diagnoses and all orders for this visit:    Stage 3 chronic kidney disease ([facility] [address] )  -     CBC and differential  -     Comprehensive metabolic panel    Pulmonary embolism, other, unspecified chronicity, unspecified whether acute cor pulmonale present ([facility] [address] )    Type 2 diabetes mellitus with stage 3 chronic kidney disease, without long-term current use of insulin ([facility])          Subjective:      Patient ID: Charlei Byrd  is a 80 y o  male  Patient was recently seen in the emergency room for auditory and visual hallucinations for 1-2 weeks  These occur at nighttime  He is not currently taking any medication for diabetes and last A1c was under good control  Lab testing was fairly normal with the exception of elevated creatinine  He is off dialysis and is due to follow-up with nephrologist sometime over the next 1-2 weeks  He was getting Epogen shots and CBC looks normalized at this time  We did look at the possibility that Epogen may be causing hallucinations  There is some case report of a few cases of this  We did discuss this  The following portions of the patient's history were reviewed and updated as appropriate: allergies, current medications, past family history, past medical history, past social history, past surgical history and problem list     Review of Systems   Constitutional: Negative  HENT: Negative  Eyes: Negative  Respiratory: Negative  Cardiovascular: Negative  Gastrointestinal: Negative  Endocrine: Negative  Genitourinary: Negative  Musculoskeletal: Negative  Skin: Negative  Allergic/Immunologic: Negative  Neurological:        As noted in HPI   Hematological: Negative  Psychiatric/Behavioral: Negative  Objective:      /60 (BP Location: Right arm, Patient Position: Sitting, Cuff Size: Standard)   Pulse 78   Temp 97 8 °F (36 6 °C) (Tympanic)   Ht 5' 2 99" (1 6 m)   Wt 62 6 kg (138 lb)   SpO2 99%   BMI 24 45 kg/m²          Physical Exam   Constitutional: He is oriented to person, place, and time  He appears well-developed and well-nourished  HENT:   Head: Normocephalic and atraumatic  Right Ear: External ear normal  Tympanic membrane is not erythematous and not bulging  Left Ear: External ear normal  Tympanic membrane is not erythematous and not bulging  Nose: Nose normal    Mouth/Throat: Oropharynx is clear and moist and mucous membranes are normal  No oral lesions  No oropharyngeal exudate  Eyes: Conjunctivae and EOM are normal  Right eye exhibits no discharge  Left eye exhibits no discharge  No scleral icterus  Neck: Normal range of motion  Neck supple  No thyromegaly present  Cardiovascular: Normal rate, regular rhythm and normal heart sounds  Exam reveals no gallop and no friction rub  No murmur heard  Pulmonary/Chest: Effort normal  No respiratory distress  He has no wheezes  He has no rales  He exhibits no tenderness  Abdominal: Soft  Bowel sounds are normal  He exhibits no distension and no mass  There is no tenderness  There is no rebound and no guarding  Musculoskeletal: Normal range of motion  He exhibits no edema, tenderness or deformity  Lymphadenopathy:     He has no cervical adenopathy  Neurological: He is alert and oriented to person, place, and time  He has normal reflexes  No cranial nerve deficit  He exhibits normal muscle tone  Coordination normal    Skin: Skin is warm and dry   No rash noted  No erythema  No pallor  Psychiatric: He has a normal mood and affect  His behavior is normal    Vitals reviewed

## 2020-03-02 ENCOUNTER — OFFICE VISIT (OUTPATIENT)
Dept: NEPHROLOGY | Facility: CLINIC | Age: 85
End: 2020-03-02
Payer: COMMERCIAL

## 2020-03-02 ENCOUNTER — HOSPITAL ENCOUNTER (OUTPATIENT)
Dept: SURGERY | Facility: HOSPITAL | Age: 85
Discharge: HOME | End: 2020-03-02
Payer: COMMERCIAL

## 2020-03-02 VITALS
WEIGHT: 136 LBS | HEIGHT: 62 IN | HEART RATE: 70 BPM | SYSTOLIC BLOOD PRESSURE: 118 MMHG | BODY MASS INDEX: 25.03 KG/M2 | DIASTOLIC BLOOD PRESSURE: 72 MMHG | OXYGEN SATURATION: 98 %

## 2020-03-02 VITALS
DIASTOLIC BLOOD PRESSURE: 60 MMHG | RESPIRATION RATE: 18 BRPM | HEIGHT: 62 IN | SYSTOLIC BLOOD PRESSURE: 129 MMHG | HEART RATE: 99 BPM | WEIGHT: 138 LBS | BODY MASS INDEX: 25.4 KG/M2 | TEMPERATURE: 96.8 F | OXYGEN SATURATION: 96 %

## 2020-03-02 DIAGNOSIS — E11.9 TYPE 2 DIABETES MELLITUS WITHOUT COMPLICATION, WITHOUT LONG-TERM CURRENT USE OF INSULIN (HCC): ICD-10-CM

## 2020-03-02 DIAGNOSIS — K22.70 BARRETT'S ESOPHAGUS WITHOUT DYSPLASIA: Primary | ICD-10-CM

## 2020-03-02 DIAGNOSIS — N18.5 ANEMIA DUE TO STAGE 5 CHRONIC KIDNEY DISEASE, NOT ON CHRONIC DIALYSIS (HCC): ICD-10-CM

## 2020-03-02 DIAGNOSIS — N18.5 STAGE 5 CHRONIC KIDNEY DISEASE (HCC): ICD-10-CM

## 2020-03-02 DIAGNOSIS — Z98.890 S/P PERICARDIAL WINDOW CREATION: ICD-10-CM

## 2020-03-02 DIAGNOSIS — R80.1 PERSISTENT PROTEINURIA: ICD-10-CM

## 2020-03-02 DIAGNOSIS — D63.1 ANEMIA DUE TO STAGE 5 CHRONIC KIDNEY DISEASE, NOT ON CHRONIC DIALYSIS (HCC): ICD-10-CM

## 2020-03-02 DIAGNOSIS — I42.9 CARDIOMYOPATHY, UNSPECIFIED TYPE (HCC): ICD-10-CM

## 2020-03-02 DIAGNOSIS — N25.81 SECONDARY HYPERPARATHYROIDISM OF RENAL ORIGIN (HCC): ICD-10-CM

## 2020-03-02 DIAGNOSIS — J42 CHRONIC BRONCHITIS, UNSPECIFIED CHRONIC BRONCHITIS TYPE (HCC): ICD-10-CM

## 2020-03-02 DIAGNOSIS — K21.9 GASTROESOPHAGEAL REFLUX DISEASE WITHOUT ESOPHAGITIS: ICD-10-CM

## 2020-03-02 PROCEDURE — 99214 OFFICE O/P EST MOD 30 MIN: CPT | Performed by: INTERNAL MEDICINE

## 2020-03-02 PROCEDURE — 96372 THER/PROPH/DIAG INJ SC/IM: CPT

## 2020-03-02 RX ADMIN — ERYTHROPOIETIN 20000 UNITS: 20000 INJECTION, SOLUTION INTRAVENOUS; SUBCUTANEOUS at 09:55

## 2020-03-02 NOTE — PROGRESS NOTES
[address] [facility], [place]    Name: Queen Derik  YOB: 1935      Assessment/Plan:  Kidney function has improved to 15 mils per minute  He does not need dialysis  Will check lab work in 2 months to see where he is at from a renal point He is voiding well and he feels well    Anemia has been treated with Epogen and his hemoglobin is 13 7  He does not need further EPO injections  Will check a CBC and iron studies in 2 months     He had secondary hyperparathyroidism of renal origin, however, his PTH is within normal limits due to renal recovery  He  Has Obrien's esophagus and must stay on omeprazole chronically    He has type 2 diabetes mellitus with hyperglycemia under the supervision of his primary care physician Dr Ian Ernandez     Problem List Items Addressed This Visit        Digestive    Esophageal reflux    Obrien's esophagus without dysplasia - Primary       Endocrine    Type 2 diabetes mellitus ([facility] )    Secondary hyperparathyroidism of renal origin ([facility] [address] )       Respiratory    COPD (chronic obstructive pulmonary disease) ([facility] )       Cardiovascular and Mediastinum    Cardiomyopathy ([facility] [address] )       Genitourinary    Stage 5 chronic kidney disease ([facility] [address] )       Other    S/P pericardial window creation    Persistent proteinuria    Anemia due to chronic kidney disease            Subjective:      Patient ID: Queen Derik  is a 80 y o  male  HPI    The following portions of the patient's history were reviewed and updated as appropriate: allergies, current medications, past family history, past medical history, past social history, past surgical history and problem list     Review of Systems   Constitutional: Negative for activity change, appetite change, fatigue and unexpected weight change  HENT: Negative for congestion, ear discharge, ear pain, trouble swallowing and voice change  Eyes: Negative for pain, discharge and visual disturbance  Respiratory: Negative for cough, chest tightness, shortness of breath and wheezing  Cardiovascular: Negative for chest pain, palpitations and leg swelling  Gastrointestinal: Negative for abdominal pain, blood in stool, constipation, diarrhea, nausea and vomiting  Endocrine: Negative for heat intolerance, polydipsia, polyphagia and polyuria  Genitourinary: Negative for decreased urine volume, difficulty urinating, frequency and urgency  Musculoskeletal: Negative for arthralgias, back pain and gait problem  Skin: Negative for color change and rash  Neurological: Negative for dizziness, weakness and headaches           Social History     Socioeconomic History    Marital status: /Civil Union     Spouse name: None    Number of children: None    Years of education: None    Highest education level: None   Occupational History    Occupation: Retired      Comment:     Social Needs    Financial resource strain: None    Food insecurity:     Worry: None     Inability: None    Transportation needs:     Medical: None     Non-medical: None   Tobacco Use    Smoking status: Former Smoker     Packs/day: 0 50     Last attempt to quit: 1969     Years since quittin 2    Smokeless tobacco: Never Used    Tobacco comment: current non-smoker, per [employer]   Substance and Sexual Activity    Alcohol use: No     Frequency: Never     Binge frequency: Never    Drug use: Never    Sexual activity: Not Currently   Lifestyle    Physical activity:     Days per week: None     Minutes per session: None    Stress: None   Relationships    Social connections:     Talks on phone: None     Gets together: None     Attends Zoroastrianism service: None     Active member of club or organization: None     Attends meetings of clubs or organizations: None     Relationship status: None    Intimate partner violence:     Fear of current or ex partner: None     Emotionally abused: None     Physically abused: None     Forced sexual activity: None   Other Topics Concern    None   Social History Narrative    Patient has never smoked - As per [employer]    Consumes on average 2 cups of regular coffee per day      Past Medical History:   Diagnosis Date    Anxiety     Obrien's esophagus     BPH (benign prostatic hyperplasia)     Cardiomegaly     Diabetes ([facility] )     type 2  controlled; taken off oral meds 6/19    DVT (deep venous thrombosis) (HCC)     right leg    GERD (gastroesophageal reflux disease)     Hypercholesterolemia     Hypertension     Irregular heart beat     paroxsysmal a fib    Pancreatic cyst     Paroxysmal atrial fibrillation (HCC)     Pericardial effusion with cardiac tamponade     Pleural effusion     Renal disorder     dialysis started in 2019    Weight loss, non-intentional      Past Surgical History:   Procedure Laterality Date    APPENDECTOMY  1953    CATARACT EXTRACTION Bilateral 2016?  COLONOSCOPY      COLONOSCOPY N/A 1/24/2019    Procedure: COLONOSCOPY with polypectomies;  Surgeon: Stacia Mcgee MD;  Location: [facility]; Service: Gastroenterology    ESOPHAGOGASTRODUODENOSCOPY N/A 1/24/2019    Procedure: ESOPHAGOGASTRODUODENOSCOPY (EGD) with bx;  Surgeon: Stacia Mcgee MD;  Location: [facility];   Service: Gastroenterology    [facility] EXCHANGE  4/18/2019    [facility] PLACEMENT  3/6/2019    [facility] REMOVAL  7/30/2019    PERICARDIAL WINDOW N/A 2/26/2019    Procedure: WINDOW PERICARDIAL;  Surgeon: Heron Tafoya MD;  Location: [facility];  Service: Thoracic    RI ANASTOMOSIS,AV,ANY SITE Left 7/3/2019    Procedure: CREATION FISTULA ARTERIOVENOUS (AV) left upper extremity brachial artery to axillary vein [place] graft ;  Surgeon: Purvi Rodriguez MD;  Location: [address] [place] [address] MAIN OR;  Service: Vascular    PROSTATE BIOPSY         Current Outpatient Medications:     ALPRAZolam (XANAX) 0 25 mg tablet, Take 1 tablet (0 25 mg total) by mouth every 8 (eight) hours as needed for anxiety, Disp: 270 tablet, Rfl: 0    amLODIPine (NORVASC) 10 mg tablet, Take 1 tablet (10 mg total) by mouth every morning, Disp: 90 tablet, Rfl: 3    Blood Glucose Monitoring Suppl (ONE TOUCH ULTRA 2) w/Device KIT, by Does not apply route 2 (two) times a day, Disp: 1 each, Rfl: 0    Blood Glucose Monitoring Suppl (TRUE METRIX METER) YASMEEN, by Does not apply route 2 (two) times a day, Disp: 1 Device, Rfl: 0    Cholecalciferol (VITAMIN D PO), Take 5,000 Units by mouth every morning , Disp: , Rfl:     DULERA 100-5 MCG/ACT inhaler, INHALE 2 PUFFS EVERY 12 (TWELVE) HOURS, Disp: 3 Inhaler, Rfl: 3    finasteride (PROSCAR) 5 mg tablet, TAKE 1 TABLET BY MOUTH EVERY DAY, Disp: 90 tablet, Rfl: 3    fluticasone (FLONASE) 50 mcg/act nasal spray, USE 2 SPRAYS IN EACH NOSTRIL DAILY, Disp: 16 mL, Rfl: 0    glucose blood (ONE TOUCH ULTRA TEST) test strip, Use daily as directed , Disp: 200 each, Rfl: 3    glucose blood (TRUE METRIX BLOOD GLUCOSE TEST) test strip, Test twice daily, Disp: 300 each, Rfl: 0    Lancets (ONETOUCH ULTRASOFT) lancets, USE DAILY AS DIRECTED , Disp: 100 each, Rfl: 3    losartan (COZAAR) 50 mg tablet, Take 1 tablet (50 mg total) by mouth every morning, Disp: 90 tablet, Rfl: 1    metoprolol tartrate (LOPRESSOR) 25 mg tablet, TAKE 1 TABLET (25 MG TOTAL) BY MOUTH EVERY 12 (TWELVE) HOURS, Disp: 180 tablet, Rfl: 1    PARoxetine (PAXIL) 30 mg tablet, Take 1 tablet (30 mg total) by mouth every morning As directed, Disp: 90 tablet, Rfl: 1    rosuvastatin (CRESTOR) 40 MG tablet, Take 1 tablet (40 mg total) by mouth daily for 90 days (Patient taking differently: Take 40 mg by mouth every morning ), Disp: 90 tablet, Rfl: 3    EPINEPHrine (EPIPEN) 0 3 mg/0 3 mL SOAJ, Inject as directed, Disp: , Rfl:   No current facility-administered medications for this visit       Lab Results   Component Value Date     07/21/2016    SODIUM 138 02/26/2020    K 4 4 02/26/2020     02/26/2020    CO2 22 02/26/2020 ANIONGAP 5 07/07/2015    AGAP 9 02/26/2020    BUN 38 (H) 02/26/2020    CREATININE 3 43 (H) 02/26/2020    GLUC 218 (H) 02/26/2020    GLUF 119 (H) 02/19/2020    CALCIUM 9 3 02/26/2020    AST 8 02/26/2020    ALT 15 02/26/2020    ALKPHOS 67 02/26/2020    PROT 7 3 07/21/2016    TP 7 3 02/26/2020    BILITOT 0 4 07/21/2016    TBILI 0 35 02/26/2020    EGFR 15 02/26/2020     Lab Results   Component Value Date    WBC 4 57 02/26/2020    HGB 13 7 02/26/2020    HCT 42 4 02/26/2020     (H) 02/26/2020     02/26/2020     Lab Results   Component Value Date    CHOLESTEROL 119 02/14/2018    CHOLESTEROL 141 11/30/2016     Lab Results   Component Value Date    HDL 34 (L) 02/14/2018    HDL 34 (L) 11/30/2016    HDL 27 (L) 07/21/2016     Lab Results   Component Value Date    LDLCALC 68 02/14/2018    LDLCALC 78 11/30/2016     Lab Results   Component Value Date    TRIG 87 02/14/2018    TRIG 146 11/30/2016    TRIG 172 (H) 07/21/2016     No results found for: [place], [place]  Lab Results   Component Value Date    XRQ2LTFQXNNM 1 652 02/21/2020     Lab Results   Component Value Date    PTH 33 9 12/23/2019    CALCIUM 9 3 02/26/2020    PHOS 4 3 (H) 02/19/2020     Lab Results   Component Value Date    SPEP  02/26/2019     No monoclonal bands noted  Reviewed by: Katty Gomez MD  ([id number])  **Electronic Signature**    UPEP  02/26/2019     The UPEP shows non-selective proteinuria   The UPEP shows a faint possible band  Immunofixation to be performed  Reviewed by: Antolin Sepulveda MD ([id number])  **Electronic Signature**     No results found for: MEDINA [id number]        Objective:      /72   Pulse 70   Ht 5' 2" (1 575 m)   Wt 61 7 kg (136 lb)   SpO2 98%   BMI 24 87 kg/m²          Physical Exam   Constitutional: He is oriented to person, place, and time  He appears well-developed and well-nourished  No distress  HENT:   Head: Normocephalic     Right Ear: External ear normal    Left Ear: External ear normal    Nose: Nose normal  Mouth/Throat: Oropharynx is clear and moist    Eyes: Pupils are equal, round, and reactive to light  Conjunctivae are normal    Cardiovascular: Normal rate, regular rhythm and normal heart sounds  No murmur heard  Pulmonary/Chest: Effort normal and breath sounds normal  No respiratory distress  He has no wheezes  He has no rales  Abdominal: Soft  Bowel sounds are normal  He exhibits no distension  There is no tenderness  There is no rebound and no guarding  Musculoskeletal: Normal range of motion  He exhibits no edema  Neurological: He is alert and oriented to person, place, and time  Skin: Skin is warm and dry  Capillary refill takes less than 2 seconds  He is not diaphoretic  Psychiatric: He has a normal mood and affect

## 2020-03-03 DIAGNOSIS — I48.0 PAROXYSMAL ATRIAL FIBRILLATION (HCC): Chronic | ICD-10-CM

## 2020-03-03 DIAGNOSIS — F41.1 GENERALIZED ANXIETY DISORDER: ICD-10-CM

## 2020-03-04 LAB
LEFT EYE DIABETIC RETINOPATHY: NORMAL
RIGHT EYE DIABETIC RETINOPATHY: NORMAL

## 2020-03-04 RX ORDER — PAROXETINE 30 MG/1
30 TABLET, FILM COATED ORAL EVERY MORNING
Qty: 90 TABLET | Refills: 1 | Status: SHIPPED | OUTPATIENT
Start: 2020-03-04 | End: 2020-08-30

## 2020-03-24 ENCOUNTER — TELEPHONE (OUTPATIENT)
Dept: FAMILY MEDICINE CLINIC | Facility: CLINIC | Age: 85
End: 2020-03-24

## 2020-03-24 DIAGNOSIS — I10 ESSENTIAL HYPERTENSION: ICD-10-CM

## 2020-03-24 DIAGNOSIS — I10 ESSENTIAL HYPERTENSION: Primary | ICD-10-CM

## 2020-03-24 RX ORDER — LOSARTAN POTASSIUM 100 MG/1
50 TABLET ORAL DAILY
Qty: 45 TABLET | Refills: 0 | Status: CANCELLED | OUTPATIENT
Start: 2020-03-24

## 2020-03-24 RX ORDER — LOSARTAN POTASSIUM 100 MG/1
50 TABLET ORAL EVERY MORNING
Qty: 90 TABLET | Refills: 1 | Status: SHIPPED | OUTPATIENT
Start: 2020-03-24 | End: 2020-11-28

## 2020-04-06 DIAGNOSIS — F41.1 GENERALIZED ANXIETY DISORDER: ICD-10-CM

## 2020-04-06 RX ORDER — ALPRAZOLAM 0.25 MG/1
0.25 TABLET ORAL EVERY 8 HOURS PRN
Qty: 270 TABLET | Refills: 0 | Status: SHIPPED | OUTPATIENT
Start: 2020-04-06 | End: 2020-09-01 | Stop reason: SDUPTHER

## 2020-04-06 RX ORDER — ALPRAZOLAM 0.25 MG/1
0.25 TABLET ORAL EVERY 8 HOURS PRN
Qty: 270 TABLET | Refills: 0 | OUTPATIENT
Start: 2020-04-06 | End: 2020-04-06 | Stop reason: SDUPTHER

## 2020-04-25 ENCOUNTER — HOSPITAL ENCOUNTER (EMERGENCY)
Facility: HOSPITAL | Age: 85
Discharge: HOME | End: 2020-04-25
Attending: EMERGENCY MEDICINE | Admitting: EMERGENCY MEDICINE
Payer: COMMERCIAL

## 2020-04-25 ENCOUNTER — APPOINTMENT (EMERGENCY)
Dept: CT IMAGING | Facility: HOSPITAL | Age: 85
End: 2020-04-25
Payer: COMMERCIAL

## 2020-04-25 ENCOUNTER — APPOINTMENT (EMERGENCY)
Dept: RADIOLOGY | Facility: HOSPITAL | Age: 85
End: 2020-04-25
Payer: COMMERCIAL

## 2020-04-25 ENCOUNTER — NURSE TRIAGE (OUTPATIENT)
Dept: OTHER | Facility: OTHER | Age: 85
End: 2020-04-25

## 2020-04-25 VITALS
BODY MASS INDEX: 26.81 KG/M2 | DIASTOLIC BLOOD PRESSURE: 74 MMHG | HEART RATE: 60 BPM | TEMPERATURE: 97.7 F | RESPIRATION RATE: 18 BRPM | SYSTOLIC BLOOD PRESSURE: 136 MMHG | OXYGEN SATURATION: 96 % | WEIGHT: 146.61 LBS

## 2020-04-25 DIAGNOSIS — F32.A DEPRESSION: ICD-10-CM

## 2020-04-25 DIAGNOSIS — R41.0 CONFUSION: Primary | ICD-10-CM

## 2020-04-25 DIAGNOSIS — N18.9 CKD (CHRONIC KIDNEY DISEASE): ICD-10-CM

## 2020-04-25 LAB
ALBUMIN SERPL BCP-MCNC: 3.7 G/DL (ref 3.5–5)
ALP SERPL-CCNC: 52 U/L (ref 46–116)
ALT SERPL W P-5'-P-CCNC: 25 U/L (ref 12–78)
ANION GAP SERPL CALCULATED.3IONS-SCNC: 8 MMOL/L (ref 4–13)
AST SERPL W P-5'-P-CCNC: 13 U/L (ref 5–45)
ATRIAL RATE: 65 BPM
BACTERIA UR QL AUTO: ABNORMAL /HPF
BASOPHILS # BLD AUTO: 0.02 THOUSANDS/ΜL (ref 0–0.1)
BASOPHILS NFR BLD AUTO: 0 % (ref 0–1)
BILIRUB SERPL-MCNC: 0.56 MG/DL (ref 0.2–1)
BILIRUB UR QL STRIP: NEGATIVE
BUN SERPL-MCNC: 35 MG/DL (ref 5–25)
CALCIUM SERPL-MCNC: 9.5 MG/DL (ref 8.3–10.1)
CHLORIDE SERPL-SCNC: 102 MMOL/L (ref 100–108)
CLARITY UR: CLEAR
CO2 SERPL-SCNC: 27 MMOL/L (ref 21–32)
COLOR UR: YELLOW
COLOR, POC: YELLOW
CREAT SERPL-MCNC: 3.28 MG/DL (ref 0.6–1.3)
EOSINOPHIL # BLD AUTO: 0.1 THOUSAND/ΜL (ref 0–0.61)
EOSINOPHIL NFR BLD AUTO: 2 % (ref 0–6)
ERYTHROCYTE [DISTWIDTH] IN BLOOD BY AUTOMATED COUNT: 12.4 % (ref 11.6–15.1)
GFR SERPL CREATININE-BSD FRML MDRD: 16 ML/MIN/1.73SQ M
GLUCOSE SERPL-MCNC: 124 MG/DL (ref 65–140)
GLUCOSE UR STRIP-MCNC: NEGATIVE MG/DL
HCT VFR BLD AUTO: 44.3 % (ref 36.5–49.3)
HGB BLD-MCNC: 14.1 G/DL (ref 12–17)
HGB UR QL STRIP.AUTO: ABNORMAL
IMM GRANULOCYTES # BLD AUTO: 0.02 THOUSAND/UL (ref 0–0.2)
IMM GRANULOCYTES NFR BLD AUTO: 0 % (ref 0–2)
KETONES UR STRIP-MCNC: NEGATIVE MG/DL
LEUKOCYTE ESTERASE UR QL STRIP: NEGATIVE
LYMPHOCYTES # BLD AUTO: 0.99 THOUSANDS/ΜL (ref 0.6–4.47)
LYMPHOCYTES NFR BLD AUTO: 19 % (ref 14–44)
MCH RBC QN AUTO: 31.6 PG (ref 26.8–34.3)
MCHC RBC AUTO-ENTMCNC: 31.8 G/DL (ref 31.4–37.4)
MCV RBC AUTO: 99 FL (ref 82–98)
MONOCYTES # BLD AUTO: 0.39 THOUSAND/ΜL (ref 0.17–1.22)
MONOCYTES NFR BLD AUTO: 8 % (ref 4–12)
NEUTROPHILS # BLD AUTO: 3.68 THOUSANDS/ΜL (ref 1.85–7.62)
NEUTS SEG NFR BLD AUTO: 71 % (ref 43–75)
NITRITE UR QL STRIP: NEGATIVE
NON-SQ EPI CELLS URNS QL MICRO: ABNORMAL /HPF
NRBC BLD AUTO-RTO: 0 /100 WBCS
NT-PROBNP SERPL-MCNC: 1112 PG/ML
P AXIS: 61 DEGREES
PH UR STRIP.AUTO: 7 [PH] (ref 4.5–8)
PLATELET # BLD AUTO: 116 THOUSANDS/UL (ref 149–390)
PMV BLD AUTO: 10.2 FL (ref 8.9–12.7)
POTASSIUM SERPL-SCNC: 5.4 MMOL/L (ref 3.5–5.3)
PR INTERVAL: 184 MS
PROT SERPL-MCNC: 7.4 G/DL (ref 6.4–8.2)
PROT UR STRIP-MCNC: ABNORMAL MG/DL
QRS AXIS: -33 DEGREES
QRSD INTERVAL: 94 MS
QT INTERVAL: 420 MS
QTC INTERVAL: 436 MS
RBC # BLD AUTO: 4.46 MILLION/UL (ref 3.88–5.62)
RBC #/AREA URNS AUTO: ABNORMAL /HPF
SODIUM SERPL-SCNC: 137 MMOL/L (ref 136–145)
SP GR UR STRIP.AUTO: 1.01 (ref 1–1.03)
T WAVE AXIS: 25 DEGREES
TROPONIN I SERPL-MCNC: <0.02 NG/ML
UROBILINOGEN UR QL STRIP.AUTO: 0.2 E.U./DL
VENTRICULAR RATE: 65 BPM
WBC # BLD AUTO: 5.2 THOUSAND/UL (ref 4.31–10.16)
WBC #/AREA URNS AUTO: ABNORMAL /HPF

## 2020-04-25 PROCEDURE — 84484 ASSAY OF TROPONIN QUANT: CPT | Performed by: EMERGENCY MEDICINE

## 2020-04-25 PROCEDURE — 36415 COLL VENOUS BLD VENIPUNCTURE: CPT | Performed by: EMERGENCY MEDICINE

## 2020-04-25 PROCEDURE — 80053 COMPREHEN METABOLIC PANEL: CPT | Performed by: EMERGENCY MEDICINE

## 2020-04-25 PROCEDURE — 93005 ELECTROCARDIOGRAM TRACING: CPT

## 2020-04-25 PROCEDURE — 83880 ASSAY OF NATRIURETIC PEPTIDE: CPT | Performed by: EMERGENCY MEDICINE

## 2020-04-25 PROCEDURE — 93010 ELECTROCARDIOGRAM REPORT: CPT | Performed by: INTERNAL MEDICINE

## 2020-04-25 PROCEDURE — 99285 EMERGENCY DEPT VISIT HI MDM: CPT

## 2020-04-25 PROCEDURE — 71046 X-RAY EXAM CHEST 2 VIEWS: CPT

## 2020-04-25 PROCEDURE — 70450 CT HEAD/BRAIN W/O DYE: CPT

## 2020-04-25 PROCEDURE — 99285 EMERGENCY DEPT VISIT HI MDM: CPT | Performed by: EMERGENCY MEDICINE

## 2020-04-25 PROCEDURE — 85025 COMPLETE CBC W/AUTO DIFF WBC: CPT | Performed by: EMERGENCY MEDICINE

## 2020-04-25 PROCEDURE — 81001 URINALYSIS AUTO W/SCOPE: CPT

## 2020-04-29 ENCOUNTER — APPOINTMENT (OUTPATIENT)
Dept: LAB | Facility: CLINIC | Age: 85
End: 2020-04-29
Payer: COMMERCIAL

## 2020-04-29 DIAGNOSIS — N18.5 ANEMIA DUE TO STAGE 5 CHRONIC KIDNEY DISEASE, NOT ON CHRONIC DIALYSIS (HCC): ICD-10-CM

## 2020-04-29 DIAGNOSIS — N18.5 STAGE 5 CHRONIC KIDNEY DISEASE (HCC): ICD-10-CM

## 2020-04-29 DIAGNOSIS — D63.1 ANEMIA DUE TO STAGE 5 CHRONIC KIDNEY DISEASE, NOT ON CHRONIC DIALYSIS (HCC): ICD-10-CM

## 2020-04-29 LAB
ALBUMIN SERPL BCP-MCNC: 4 G/DL (ref 3.5–5)
ALP SERPL-CCNC: 60 U/L (ref 46–116)
ALT SERPL W P-5'-P-CCNC: 28 U/L (ref 12–78)
ANION GAP SERPL CALCULATED.3IONS-SCNC: 8 MMOL/L (ref 4–13)
AST SERPL W P-5'-P-CCNC: 11 U/L (ref 5–45)
BASOPHILS # BLD AUTO: 0.02 THOUSANDS/ΜL (ref 0–0.1)
BASOPHILS NFR BLD AUTO: 0 % (ref 0–1)
BILIRUB SERPL-MCNC: 0.41 MG/DL (ref 0.2–1)
BUN SERPL-MCNC: 50 MG/DL (ref 5–25)
CALCIUM SERPL-MCNC: 9.7 MG/DL (ref 8.3–10.1)
CHLORIDE SERPL-SCNC: 107 MMOL/L (ref 100–108)
CO2 SERPL-SCNC: 25 MMOL/L (ref 21–32)
CREAT SERPL-MCNC: 3.52 MG/DL (ref 0.6–1.3)
EOSINOPHIL # BLD AUTO: 0.1 THOUSAND/ΜL (ref 0–0.61)
EOSINOPHIL NFR BLD AUTO: 2 % (ref 0–6)
ERYTHROCYTE [DISTWIDTH] IN BLOOD BY AUTOMATED COUNT: 12.3 % (ref 11.6–15.1)
GFR SERPL CREATININE-BSD FRML MDRD: 15 ML/MIN/1.73SQ M
GLUCOSE P FAST SERPL-MCNC: 125 MG/DL (ref 65–99)
HCT VFR BLD AUTO: 43.7 % (ref 36.5–49.3)
HGB BLD-MCNC: 13.7 G/DL (ref 12–17)
IMM GRANULOCYTES # BLD AUTO: 0.03 THOUSAND/UL (ref 0–0.2)
IMM GRANULOCYTES NFR BLD AUTO: 1 % (ref 0–2)
IRON SATN MFR SERPL: 55 %
IRON SERPL-MCNC: 132 UG/DL (ref 65–175)
LYMPHOCYTES # BLD AUTO: 1.05 THOUSANDS/ΜL (ref 0.6–4.47)
LYMPHOCYTES NFR BLD AUTO: 18 % (ref 14–44)
MCH RBC QN AUTO: 30.6 PG (ref 26.8–34.3)
MCHC RBC AUTO-ENTMCNC: 31.4 G/DL (ref 31.4–37.4)
MCV RBC AUTO: 98 FL (ref 82–98)
MONOCYTES # BLD AUTO: 0.41 THOUSAND/ΜL (ref 0.17–1.22)
MONOCYTES NFR BLD AUTO: 7 % (ref 4–12)
NEUTROPHILS # BLD AUTO: 4.17 THOUSANDS/ΜL (ref 1.85–7.62)
NEUTS SEG NFR BLD AUTO: 72 % (ref 43–75)
NRBC BLD AUTO-RTO: 0 /100 WBCS
PLATELET # BLD AUTO: 128 THOUSANDS/UL (ref 149–390)
PMV BLD AUTO: 10.7 FL (ref 8.9–12.7)
POTASSIUM SERPL-SCNC: 4.9 MMOL/L (ref 3.5–5.3)
PROT SERPL-MCNC: 7.4 G/DL (ref 6.4–8.2)
RBC # BLD AUTO: 4.47 MILLION/UL (ref 3.88–5.62)
SODIUM SERPL-SCNC: 140 MMOL/L (ref 136–145)
TIBC SERPL-MCNC: 238 UG/DL (ref 250–450)
WBC # BLD AUTO: 5.78 THOUSAND/UL (ref 4.31–10.16)

## 2020-04-29 PROCEDURE — 83540 ASSAY OF IRON: CPT

## 2020-04-29 PROCEDURE — 83550 IRON BINDING TEST: CPT

## 2020-04-29 PROCEDURE — 80053 COMPREHEN METABOLIC PANEL: CPT

## 2020-04-29 PROCEDURE — 85025 COMPLETE CBC W/AUTO DIFF WBC: CPT

## 2020-04-29 PROCEDURE — 36415 COLL VENOUS BLD VENIPUNCTURE: CPT

## 2020-05-18 ENCOUNTER — OFFICE VISIT (OUTPATIENT)
Dept: NEPHROLOGY | Facility: CLINIC | Age: 85
End: 2020-05-18
Payer: COMMERCIAL

## 2020-05-18 VITALS
HEIGHT: 62 IN | DIASTOLIC BLOOD PRESSURE: 62 MMHG | OXYGEN SATURATION: 95 % | HEART RATE: 60 BPM | WEIGHT: 145 LBS | SYSTOLIC BLOOD PRESSURE: 108 MMHG | BODY MASS INDEX: 26.68 KG/M2

## 2020-05-18 DIAGNOSIS — N18.5 ANEMIA DUE TO STAGE 5 CHRONIC KIDNEY DISEASE, NOT ON CHRONIC DIALYSIS (HCC): ICD-10-CM

## 2020-05-18 DIAGNOSIS — E11.9 TYPE 2 DIABETES MELLITUS WITHOUT COMPLICATION, WITHOUT LONG-TERM CURRENT USE OF INSULIN (HCC): ICD-10-CM

## 2020-05-18 DIAGNOSIS — J42 CHRONIC BRONCHITIS, UNSPECIFIED CHRONIC BRONCHITIS TYPE (HCC): ICD-10-CM

## 2020-05-18 DIAGNOSIS — I15.0 RENOVASCULAR HYPERTENSION: ICD-10-CM

## 2020-05-18 DIAGNOSIS — N25.81 SECONDARY HYPERPARATHYROIDISM OF RENAL ORIGIN (HCC): ICD-10-CM

## 2020-05-18 DIAGNOSIS — D63.1 ANEMIA DUE TO STAGE 5 CHRONIC KIDNEY DISEASE, NOT ON CHRONIC DIALYSIS (HCC): ICD-10-CM

## 2020-05-18 DIAGNOSIS — I10 ESSENTIAL HYPERTENSION: ICD-10-CM

## 2020-05-18 DIAGNOSIS — E78.5 DYSLIPIDEMIA: ICD-10-CM

## 2020-05-18 DIAGNOSIS — N18.5 STAGE 5 CHRONIC KIDNEY DISEASE (HCC): Primary | ICD-10-CM

## 2020-05-18 PROCEDURE — 1160F RVW MEDS BY RX/DR IN RCRD: CPT | Performed by: INTERNAL MEDICINE

## 2020-05-18 PROCEDURE — 99214 OFFICE O/P EST MOD 30 MIN: CPT | Performed by: INTERNAL MEDICINE

## 2020-05-18 PROCEDURE — 3074F SYST BP LT 130 MM HG: CPT | Performed by: INTERNAL MEDICINE

## 2020-05-18 PROCEDURE — 3078F DIAST BP <80 MM HG: CPT | Performed by: INTERNAL MEDICINE

## 2020-05-18 RX ORDER — AMLODIPINE BESYLATE 5 MG/1
5 TABLET ORAL DAILY
Qty: 90 TABLET | Refills: 3 | Status: SHIPPED | OUTPATIENT
Start: 2020-05-18 | End: 2021-05-06

## 2020-06-16 DIAGNOSIS — E11.9 TYPE 2 DIABETES MELLITUS WITHOUT COMPLICATION, WITHOUT LONG-TERM CURRENT USE OF INSULIN (HCC): ICD-10-CM

## 2020-06-16 RX ORDER — ROSUVASTATIN CALCIUM 40 MG/1
40 TABLET, COATED ORAL EVERY MORNING
Qty: 90 TABLET | Refills: 3 | Status: SHIPPED | OUTPATIENT
Start: 2020-06-16 | End: 2021-06-08

## 2020-08-28 DIAGNOSIS — I48.0 PAROXYSMAL ATRIAL FIBRILLATION (HCC): Chronic | ICD-10-CM

## 2020-08-28 DIAGNOSIS — F41.1 GENERALIZED ANXIETY DISORDER: ICD-10-CM

## 2020-08-30 RX ORDER — PAROXETINE 30 MG/1
30 TABLET, FILM COATED ORAL EVERY MORNING
Qty: 90 TABLET | Refills: 1 | Status: SHIPPED | OUTPATIENT
Start: 2020-08-30 | End: 2021-01-12

## 2020-09-01 DIAGNOSIS — F41.1 GENERALIZED ANXIETY DISORDER: ICD-10-CM

## 2020-09-02 RX ORDER — ALPRAZOLAM 0.25 MG/1
0.25 TABLET ORAL EVERY 8 HOURS PRN
Qty: 270 TABLET | Refills: 0 | Status: SHIPPED | OUTPATIENT
Start: 2020-09-02 | End: 2021-02-08

## 2020-09-11 ENCOUNTER — TELEPHONE (OUTPATIENT)
Dept: FAMILY MEDICINE CLINIC | Facility: CLINIC | Age: 85
End: 2020-09-11

## 2020-09-17 ENCOUNTER — OFFICE VISIT (OUTPATIENT)
Dept: NEPHROLOGY | Facility: CLINIC | Age: 85
End: 2020-09-17
Payer: COMMERCIAL

## 2020-09-17 VITALS
HEIGHT: 62 IN | OXYGEN SATURATION: 99 % | WEIGHT: 147 LBS | BODY MASS INDEX: 27.05 KG/M2 | TEMPERATURE: 97.3 F | DIASTOLIC BLOOD PRESSURE: 60 MMHG | SYSTOLIC BLOOD PRESSURE: 126 MMHG | HEART RATE: 55 BPM

## 2020-09-17 DIAGNOSIS — E55.9 VITAMIN D DEFICIENCY: ICD-10-CM

## 2020-09-17 DIAGNOSIS — E11.9 TYPE 2 DIABETES MELLITUS WITHOUT COMPLICATION, WITHOUT LONG-TERM CURRENT USE OF INSULIN (HCC): ICD-10-CM

## 2020-09-17 DIAGNOSIS — N25.81 SECONDARY HYPERPARATHYROIDISM OF RENAL ORIGIN (HCC): ICD-10-CM

## 2020-09-17 DIAGNOSIS — J42 CHRONIC BRONCHITIS, UNSPECIFIED CHRONIC BRONCHITIS TYPE (HCC): ICD-10-CM

## 2020-09-17 DIAGNOSIS — N18.5 STAGE 5 CHRONIC KIDNEY DISEASE (HCC): Primary | ICD-10-CM

## 2020-09-17 DIAGNOSIS — I15.0 RENOVASCULAR HYPERTENSION: ICD-10-CM

## 2020-09-17 DIAGNOSIS — I48.0 PAROXYSMAL ATRIAL FIBRILLATION (HCC): Chronic | ICD-10-CM

## 2020-09-17 DIAGNOSIS — Z99.2 HEMODIALYSIS ACCESS, AV GRAFT (HCC): ICD-10-CM

## 2020-09-17 DIAGNOSIS — E78.5 HYPERLIPIDEMIA, UNSPECIFIED HYPERLIPIDEMIA TYPE: ICD-10-CM

## 2020-09-17 PROCEDURE — 99214 OFFICE O/P EST MOD 30 MIN: CPT | Performed by: INTERNAL MEDICINE

## 2020-09-17 NOTE — PROGRESS NOTES
[facility] [place], [place]    Name: Oj Rushing  YOB: 1935      Assessment/Plan:           Problem List Items Addressed This Visit        Endocrine    Type 2 diabetes mellitus ([facility] )       Lab Results   Component Value Date    HGBA1C 7 0 (A) 2019   check an A1c         Relevant Orders    Hemoglobin A1C    Microalbumin / creatinine urine ratio    Protein / creatinine ratio, urine    Secondary hyperparathyroidism of renal origin ([place] [id number] )    Relevant Orders    PTH, intact       Respiratory    COPD (chronic obstructive pulmonary disease) (HCC)       Cardiovascular and Mediastinum    Paroxysmal atrial fibrillation (HCC) (Chronic)     Rate controlled         Renovascular hypertension     Blood pressure is controlled            Genitourinary    Stage 5 chronic kidney disease ([facility] ) - Primary     Check labs  He has no s/s of uremia         Relevant Orders    CBC and differential    Comprehensive metabolic panel    Hemoglobin A1C    Lipid panel    PTH, intact    Vitamin D 25 hydroxy    Phosphorus    Microalbumin / creatinine urine ratio    Protein / creatinine ratio, urine    Urinalysis with microscopic    Uric acid       Other    Hemodialysis access, AV graft ([place] [id number] )      Other Visit Diagnoses     Vitamin D deficiency        Relevant Orders    Vitamin D 25 hydroxy    Hyperlipidemia, unspecified hyperlipidemia type        Relevant Orders    Lipid panel            Subjective:      Patient ID: Oj Rushing  is a 80 y o  male  HPI  He has a history of hemodialysis dependent acute renal failure with resolution  He was able to discontinue dialysis  He does have a history of diabetes mellitus, diabetic nephropathy, hypertension and secondary hyperparathyroidism of renal origin  He is treated with high-dose proton pump inhibitors of Obrien's esophagus  He was quite depressed  His son  3 months ago most probably of COVID    Shortly after that his wife fell on the patio and fractured her left hip  She had to have a nailing done and then she was placed in [address] [place] [address] rehab  She is coming home on Saturday but he is quite depressed  He also had fallen down his basement steps while caring a 50 lb bag of salt and fractured his right wrist a more splint  He did not have labs since April and does not check his sugar  He says he is eating and cooking for himself while his wife is away    The following portions of the patient's history were reviewed and updated as appropriate: allergies, current medications, past family history, past medical history, past social history, past surgical history and problem list     Review of Systems   Constitutional: Positive for activity change and fatigue  Negative for appetite change, chills and fever  HENT: Negative for hearing loss  Eyes: Negative for visual disturbance  Respiratory: Negative for cough, chest tightness and shortness of breath  Cardiovascular: Negative for chest pain, palpitations and leg swelling  Gastrointestinal: Negative for abdominal pain, blood in stool, constipation and diarrhea  Genitourinary: Negative for decreased urine volume, difficulty urinating, frequency and hematuria  Nocturia x1   Musculoskeletal: Positive for arthralgias  Skin: Negative for color change  Neurological: Negative for dizziness and light-headedness  Hematological: Does not bruise/bleed easily  Psychiatric/Behavioral: Positive for dysphoric mood           Social History     Socioeconomic History    Marital status: /Civil Union     Spouse name: None    Number of children: None    Years of education: None    Highest education level: None   Occupational History    Occupation: Retired      Comment:     Social Needs    Financial resource strain: None    Food insecurity     Worry: None     Inability: None    Transportation needs     Medical: None     Non-medical: None   Tobacco Use    Smoking status: Former Smoker     Packs/day: 0 50     Last attempt to quit: 1969     Years since quittin 7    Smokeless tobacco: Never Used    Tobacco comment: current non-smoker, per [employer]   Substance and Sexual Activity    Alcohol use: No     Frequency: Never     Binge frequency: Never    Drug use: Never    Sexual activity: Not Currently   Lifestyle    Physical activity     Days per week: None     Minutes per session: None    Stress: None   Relationships    Social connections     Talks on phone: None     Gets together: None     Attends Nondenominational service: None     Active member of club or organization: None     Attends meetings of clubs or organizations: None     Relationship status: None    Intimate partner violence     Fear of current or ex partner: None     Emotionally abused: None     Physically abused: None     Forced sexual activity: None   Other Topics Concern    None   Social History Narrative    Patient has never smoked - As per [employer]    Consumes on average 2 cups of regular coffee per day      Past Medical History:   Diagnosis Date    Anxiety     Obrien's esophagus     BPH (benign prostatic hyperplasia)     Cancer ([facility] )     pt states hx skin cancer, pt confused    Cardiomegaly     Diabetes ([facility] )     type 2  controlled; taken off oral meds     DVT (deep venous thrombosis) (HCC)     right leg    GERD (gastroesophageal reflux disease)     Hypercholesterolemia     Hypertension     Irregular heart beat     paroxsysmal a fib    Pancreatic cyst     Paroxysmal atrial fibrillation (HCC)     Pericardial effusion with cardiac tamponade     Pleural effusion     Renal disorder     dialysis started in 2019    Weight loss, non-intentional      Past Surgical History:   Procedure Laterality Date    APPENDECTOMY      CATARACT EXTRACTION Bilateral 2016?     COLONOSCOPY      COLONOSCOPY N/A 2019    Procedure: COLONOSCOPY with polypectomies;  Surgeon: Geovanna Cardenas MD;  Location: [facility]; Service: Gastroenterology    ESOPHAGOGASTRODUODENOSCOPY N/A 1/24/2019    Procedure: ESOPHAGOGASTRODUODENOSCOPY (EGD) with bx;  Surgeon: Brittani Rodriguez MD;  Location: [facility];   Service: Gastroenterology    IR TUNNELED DIALYSIS CATHETER CHECK/CHANGE/REPOSITION/ANGIOPLASTY  4/18/2019    IR TUNNELED DIALYSIS CATHETER PLACEMENT  3/6/2019    IR TUNNELED DIALYSIS CATHETER REMOVAL  7/30/2019    PERICARDIAL WINDOW N/A 2/26/2019    Procedure: WINDOW PERICARDIAL;  Surgeon: Yessica Casiano MD;  Location: [facility];  Service: Thoracic    HI ANASTOMOSIS,AV,ANY SITE Left 7/3/2019    Procedure: CREATION FISTULA ARTERIOVENOUS (AV) left upper extremity brachial artery to axillary vein [place] graft ;  Surgeon: Debra Ervin MD;  Location: [facility];  Service: Vascular    PROSTATE BIOPSY         Current Outpatient Medications:     ALPRAZolam (XANAX) 0 25 mg tablet, Take 1 tablet (0 25 mg total) by mouth every 8 (eight) hours as needed for anxiety, Disp: 270 tablet, Rfl: 0    amLODIPine (NORVASC) 5 mg tablet, Take 1 tablet (5 mg total) by mouth daily, Disp: 90 tablet, Rfl: 3    Blood Glucose Monitoring Suppl (ONE TOUCH ULTRA 2) w/Device KIT, by Does not apply route 2 (two) times a day, Disp: 1 each, Rfl: 0    Blood Glucose Monitoring Suppl (TRUE METRIX METER) YASMEEN, by Does not apply route 2 (two) times a day, Disp: 1 Device, Rfl: 0    Cholecalciferol (VITAMIN D PO), Take 5,000 Units by mouth every morning , Disp: , Rfl:     DULERA 100-5 MCG/ACT inhaler, INHALE 2 PUFFS EVERY 12 (TWELVE) HOURS, Disp: 3 Inhaler, Rfl: 3    finasteride (PROSCAR) 5 mg tablet, TAKE 1 TABLET BY MOUTH EVERY DAY, Disp: 90 tablet, Rfl: 3    fluticasone (FLONASE) 50 mcg/act nasal spray, USE 2 SPRAYS IN EACH NOSTRIL DAILY, Disp: 16 mL, Rfl: 0    glucose blood (ONE TOUCH ULTRA TEST) test strip, Use daily as directed , Disp: 200 each, Rfl: 3    glucose blood (TRUE METRIX BLOOD GLUCOSE TEST) test strip, Test twice daily, Disp: 300 each, Rfl: 0    Lancets (ONETOUCH ULTRASOFT) lancets, USE DAILY AS DIRECTED , Disp: 100 each, Rfl: 3    losartan (COZAAR) 100 MG tablet, Take 0 5 tablets (50 mg total) by mouth every morning, Disp: 90 tablet, Rfl: 1    metoprolol tartrate (LOPRESSOR) 25 mg tablet, TAKE 1 TABLET (25 MG TOTAL) BY MOUTH EVERY 12 (TWELVE) HOURS, Disp: 180 tablet, Rfl: 1    PARoxetine (PAXIL) 30 mg tablet, TAKE 1 TABLET (30 MG TOTAL) BY MOUTH EVERY MORNING AS DIRECTED, Disp: 90 tablet, Rfl: 1    rosuvastatin (CRESTOR) 40 MG tablet, Take 1 tablet (40 mg total) by mouth every morning, Disp: 90 tablet, Rfl: 3    Lab Results   Component Value Date     07/21/2016    SODIUM 140 04/29/2020    K 4 9 04/29/2020     04/29/2020    CO2 25 04/29/2020    ANIONGAP 5 07/07/2015    AGAP 8 04/29/2020    BUN 50 (H) 04/29/2020    CREATININE 3 52 (H) 04/29/2020    GLUC 124 04/25/2020    GLUF 125 (H) 04/29/2020    CALCIUM 9 7 04/29/2020    AST 11 04/29/2020    ALT 28 04/29/2020    ALKPHOS 60 04/29/2020    PROT 7 3 07/21/2016    TP 7 4 04/29/2020    BILITOT 0 4 07/21/2016    TBILI 0 41 04/29/2020    EGFR 15 04/29/2020     Lab Results   Component Value Date    WBC 5 78 04/29/2020    HGB 13 7 04/29/2020    HCT 43 7 04/29/2020    MCV 98 04/29/2020     (L) 04/29/2020     Lab Results   Component Value Date    CHOLESTEROL 119 02/14/2018    CHOLESTEROL 141 11/30/2016     Lab Results   Component Value Date    HDL 34 (L) 02/14/2018    HDL 34 (L) 11/30/2016    HDL 27 (L) 07/21/2016     Lab Results   Component Value Date    LDLCALC 68 02/14/2018    LDLCALC 78 11/30/2016     Lab Results   Component Value Date    TRIG 87 02/14/2018    TRIG 146 11/30/2016    TRIG 172 (H) 07/21/2016     No results found for: [place], [place]  Lab Results   Component Value Date    [id number] 1 652 02/21/2020     Lab Results   Component Value Date    PTH 33 9 12/23/2019    CALCIUM 9 7 04/29/2020    PHOS 4 3 (H) 02/19/2020     Lab Results   Component Value Date SPEP  02/26/2019     No monoclonal bands noted  Reviewed by: Oseas Garces MD  ([id number])  **Electronic Signature**    UPEP  02/26/2019     The UPEP shows non-selective proteinuria   The UPEP shows a faint possible band  Immunofixation to be performed  Reviewed by: Meron Sepulveda MD ([id number])  **Electronic Signature**     No results found for: MICROALBUR, [id number]        Objective:      /60 (BP Location: Right arm, Patient Position: Sitting, Cuff Size: Standard)   Pulse 55   Temp (!) 97 3 °F (36 3 °C) (Temporal)   Ht 5' 2" (1 575 m)   Wt 66 7 kg (147 lb)   SpO2 99%   BMI 26 89 kg/m²          Physical Exam  Constitutional:       Appearance: Normal appearance  He is normal weight  HENT:      Head: Normocephalic and atraumatic  Right Ear: External ear normal       Left Ear: External ear normal    Eyes:      Extraocular Movements: Extraocular movements intact  Pupils: Pupils are equal, round, and reactive to light  Neck:      Musculoskeletal: No neck rigidity  Cardiovascular:      Rate and Rhythm: Normal rate  Rhythm irregular  Pulmonary:      Effort: Pulmonary effort is normal  No respiratory distress  Breath sounds: Normal breath sounds  No wheezing or rales  Abdominal:      General: Bowel sounds are normal  There is no distension  Palpations: Abdomen is soft  Tenderness: There is no abdominal tenderness  Musculoskeletal:         General: Deformity present  Left lower leg: Edema present  Comments: Right wrist lateral aspect with bony nodularity   Lymphadenopathy:      Cervical: No cervical adenopathy  Neurological:      General: No focal deficit present  Mental Status: He is alert and oriented to person, place, and time  Mental status is at baseline  Psychiatric:         Behavior: Behavior normal          Thought Content:  Thought content normal          Judgment: Judgment normal       Comments: depressed

## 2020-09-18 ENCOUNTER — APPOINTMENT (OUTPATIENT)
Dept: LAB | Facility: CLINIC | Age: 85
End: 2020-09-18
Payer: COMMERCIAL

## 2020-09-18 ENCOUNTER — OFFICE VISIT (OUTPATIENT)
Dept: FAMILY MEDICINE CLINIC | Facility: CLINIC | Age: 85
End: 2020-09-18
Payer: COMMERCIAL

## 2020-09-18 VITALS
SYSTOLIC BLOOD PRESSURE: 126 MMHG | DIASTOLIC BLOOD PRESSURE: 60 MMHG | HEART RATE: 84 BPM | WEIGHT: 149 LBS | TEMPERATURE: 97.5 F | OXYGEN SATURATION: 92 % | HEIGHT: 63 IN | BODY MASS INDEX: 26.4 KG/M2

## 2020-09-18 DIAGNOSIS — E55.9 VITAMIN D DEFICIENCY: ICD-10-CM

## 2020-09-18 DIAGNOSIS — J42 CHRONIC BRONCHITIS, UNSPECIFIED CHRONIC BRONCHITIS TYPE (HCC): ICD-10-CM

## 2020-09-18 DIAGNOSIS — E78.5 HYPERLIPIDEMIA, UNSPECIFIED HYPERLIPIDEMIA TYPE: ICD-10-CM

## 2020-09-18 DIAGNOSIS — I42.9 CARDIOMYOPATHY, UNSPECIFIED TYPE (HCC): ICD-10-CM

## 2020-09-18 DIAGNOSIS — E11.9 TYPE 2 DIABETES MELLITUS WITHOUT COMPLICATION, WITHOUT LONG-TERM CURRENT USE OF INSULIN (HCC): ICD-10-CM

## 2020-09-18 DIAGNOSIS — N25.81 SECONDARY HYPERPARATHYROIDISM OF RENAL ORIGIN (HCC): ICD-10-CM

## 2020-09-18 DIAGNOSIS — Z00.00 MEDICARE ANNUAL WELLNESS VISIT, SUBSEQUENT: Primary | ICD-10-CM

## 2020-09-18 DIAGNOSIS — N18.5 STAGE 5 CHRONIC KIDNEY DISEASE (HCC): ICD-10-CM

## 2020-09-18 LAB
25(OH)D3 SERPL-MCNC: 62.4 NG/ML (ref 30–100)
ALBUMIN SERPL BCP-MCNC: 3.7 G/DL (ref 3.5–5)
ALP SERPL-CCNC: 58 U/L (ref 46–116)
ALT SERPL W P-5'-P-CCNC: 32 U/L (ref 12–78)
ANION GAP SERPL CALCULATED.3IONS-SCNC: 8 MMOL/L (ref 4–13)
AST SERPL W P-5'-P-CCNC: 17 U/L (ref 5–45)
BACTERIA UR QL AUTO: ABNORMAL /HPF
BASOPHILS # BLD AUTO: 0.02 THOUSANDS/ΜL (ref 0–0.1)
BASOPHILS NFR BLD AUTO: 0 % (ref 0–1)
BILIRUB SERPL-MCNC: 0.29 MG/DL (ref 0.2–1)
BILIRUB UR QL STRIP: NEGATIVE
BUN SERPL-MCNC: 43 MG/DL (ref 5–25)
CALCIUM SERPL-MCNC: 9.7 MG/DL (ref 8.3–10.1)
CHLORIDE SERPL-SCNC: 107 MMOL/L (ref 100–108)
CHOLEST SERPL-MCNC: 129 MG/DL (ref 50–200)
CLARITY UR: CLEAR
CO2 SERPL-SCNC: 25 MMOL/L (ref 21–32)
COLOR UR: YELLOW
CREAT SERPL-MCNC: 2.94 MG/DL (ref 0.6–1.3)
CREAT UR-MCNC: 78.6 MG/DL
CREAT UR-MCNC: 78.6 MG/DL
EOSINOPHIL # BLD AUTO: 0.12 THOUSAND/ΜL (ref 0–0.61)
EOSINOPHIL NFR BLD AUTO: 2 % (ref 0–6)
ERYTHROCYTE [DISTWIDTH] IN BLOOD BY AUTOMATED COUNT: 11.6 % (ref 11.6–15.1)
EST. AVERAGE GLUCOSE BLD GHB EST-MCNC: 154 MG/DL
GFR SERPL CREATININE-BSD FRML MDRD: 19 ML/MIN/1.73SQ M
GLUCOSE P FAST SERPL-MCNC: 178 MG/DL (ref 65–99)
GLUCOSE UR STRIP-MCNC: NEGATIVE MG/DL
HBA1C MFR BLD: 7 %
HCT VFR BLD AUTO: 29.9 % (ref 36.5–49.3)
HDLC SERPL-MCNC: 34 MG/DL
HGB BLD-MCNC: 9.7 G/DL (ref 12–17)
HGB UR QL STRIP.AUTO: ABNORMAL
HYALINE CASTS #/AREA URNS LPF: ABNORMAL /LPF
IMM GRANULOCYTES # BLD AUTO: 0.01 THOUSAND/UL (ref 0–0.2)
IMM GRANULOCYTES NFR BLD AUTO: 0 % (ref 0–2)
KETONES UR STRIP-MCNC: NEGATIVE MG/DL
LDLC SERPL CALC-MCNC: 66 MG/DL (ref 0–100)
LEUKOCYTE ESTERASE UR QL STRIP: NEGATIVE
LYMPHOCYTES # BLD AUTO: 0.76 THOUSANDS/ΜL (ref 0.6–4.47)
LYMPHOCYTES NFR BLD AUTO: 15 % (ref 14–44)
MCH RBC QN AUTO: 33.9 PG (ref 26.8–34.3)
MCHC RBC AUTO-ENTMCNC: 32.4 G/DL (ref 31.4–37.4)
MCV RBC AUTO: 105 FL (ref 82–98)
MICROALBUMIN UR-MCNC: 130 MG/L (ref 0–20)
MICROALBUMIN/CREAT 24H UR: 165 MG/G CREATININE (ref 0–30)
MONOCYTES # BLD AUTO: 0.4 THOUSAND/ΜL (ref 0.17–1.22)
MONOCYTES NFR BLD AUTO: 8 % (ref 4–12)
NEUTROPHILS # BLD AUTO: 3.61 THOUSANDS/ΜL (ref 1.85–7.62)
NEUTS SEG NFR BLD AUTO: 75 % (ref 43–75)
NITRITE UR QL STRIP: NEGATIVE
NON-SQ EPI CELLS URNS QL MICRO: ABNORMAL /HPF
NONHDLC SERPL-MCNC: 95 MG/DL
NRBC BLD AUTO-RTO: 0 /100 WBCS
PH UR STRIP.AUTO: 7 [PH]
PHOSPHATE SERPL-MCNC: 3.2 MG/DL (ref 2.3–4.1)
PLATELET # BLD AUTO: 166 THOUSANDS/UL (ref 149–390)
PMV BLD AUTO: 10.3 FL (ref 8.9–12.7)
POTASSIUM SERPL-SCNC: 4.8 MMOL/L (ref 3.5–5.3)
PROT SERPL-MCNC: 7.3 G/DL (ref 6.4–8.2)
PROT UR STRIP-MCNC: ABNORMAL MG/DL
PROT UR-MCNC: 120 MG/DL
PROT/CREAT UR: 1.53 MG/G{CREAT} (ref 0–0.1)
PTH-INTACT SERPL-MCNC: 32.3 PG/ML (ref 18.4–80.1)
RBC # BLD AUTO: 2.86 MILLION/UL (ref 3.88–5.62)
RBC #/AREA URNS AUTO: ABNORMAL /HPF
SODIUM SERPL-SCNC: 140 MMOL/L (ref 136–145)
SP GR UR STRIP.AUTO: 1.02 (ref 1–1.03)
TRIGL SERPL-MCNC: 144 MG/DL
URATE SERPL-MCNC: 4.4 MG/DL (ref 4.2–8)
UROBILINOGEN UR QL STRIP.AUTO: 0.2 E.U./DL
WBC # BLD AUTO: 4.92 THOUSAND/UL (ref 4.31–10.16)
WBC #/AREA URNS AUTO: ABNORMAL /HPF

## 2020-09-18 PROCEDURE — 84100 ASSAY OF PHOSPHORUS: CPT

## 2020-09-18 PROCEDURE — 83970 ASSAY OF PARATHORMONE: CPT

## 2020-09-18 PROCEDURE — 1170F FXNL STATUS ASSESSED: CPT | Performed by: FAMILY MEDICINE

## 2020-09-18 PROCEDURE — 82306 VITAMIN D 25 HYDROXY: CPT

## 2020-09-18 PROCEDURE — 3078F DIAST BP <80 MM HG: CPT | Performed by: FAMILY MEDICINE

## 2020-09-18 PROCEDURE — 1036F TOBACCO NON-USER: CPT | Performed by: FAMILY MEDICINE

## 2020-09-18 PROCEDURE — 84156 ASSAY OF PROTEIN URINE: CPT

## 2020-09-18 PROCEDURE — 80053 COMPREHEN METABOLIC PANEL: CPT

## 2020-09-18 PROCEDURE — 83036 HEMOGLOBIN GLYCOSYLATED A1C: CPT

## 2020-09-18 PROCEDURE — 84550 ASSAY OF BLOOD/URIC ACID: CPT

## 2020-09-18 PROCEDURE — 85025 COMPLETE CBC W/AUTO DIFF WBC: CPT

## 2020-09-18 PROCEDURE — 81001 URINALYSIS AUTO W/SCOPE: CPT

## 2020-09-18 PROCEDURE — 36415 COLL VENOUS BLD VENIPUNCTURE: CPT

## 2020-09-18 PROCEDURE — 3725F SCREEN DEPRESSION PERFORMED: CPT | Performed by: FAMILY MEDICINE

## 2020-09-18 PROCEDURE — 99214 OFFICE O/P EST MOD 30 MIN: CPT | Performed by: FAMILY MEDICINE

## 2020-09-18 PROCEDURE — 80061 LIPID PANEL: CPT

## 2020-09-18 PROCEDURE — 82570 ASSAY OF URINE CREATININE: CPT

## 2020-09-18 PROCEDURE — 82043 UR ALBUMIN QUANTITATIVE: CPT

## 2020-09-18 PROCEDURE — G0439 PPPS, SUBSEQ VISIT: HCPCS | Performed by: FAMILY MEDICINE

## 2020-09-18 PROCEDURE — 1125F AMNT PAIN NOTED PAIN PRSNT: CPT | Performed by: FAMILY MEDICINE

## 2020-09-18 PROCEDURE — 1160F RVW MEDS BY RX/DR IN RCRD: CPT | Performed by: FAMILY MEDICINE

## 2020-09-18 RX ORDER — ACETAMINOPHEN 325 MG/1
650 TABLET ORAL DAILY
COMMUNITY

## 2020-09-18 NOTE — PROGRESS NOTES
Assessment/Plan:  Medications reviewed with patient for 15 of the 25 minutes visit  He will call for any refills  Recommend lab testing be done sometime over the next 3-4 months  This was ordered by Cardiology  Breathing has been good  A1c reviewed  He is currently doing well  No problem-specific Assessment & Plan notes found for this encounter  Diagnoses and all orders for this visit:    Medicare annual wellness visit, subsequent    Type 2 diabetes mellitus without complication, without long-term current use of insulin (HCC)    Cardiomyopathy, unspecified type ([facility] )    Chronic bronchitis, unspecified chronic bronchitis type ([facility] )    Other orders  -     Cancel: POCT hemoglobin A1c  -     acetaminophen (TYLENOL) 325 mg tablet; Take 650 mg by mouth daily          Subjective:      Patient ID: Yasmani Millan  is a 80 y o  male  Patient is here for recheck on chronic conditions as well as Medicare physical   He is generally feeling well  He is due for flu vaccination  The following portions of the patient's history were reviewed and updated as appropriate: allergies, current medications, past family history, past medical history, past social history, past surgical history and problem list     Review of Systems   Constitutional: Negative  HENT: Negative  Eyes: Negative  Respiratory: Negative  Cardiovascular: Negative  Gastrointestinal: Negative  Endocrine: Negative  Genitourinary: Negative  Musculoskeletal: Negative  Skin: Negative  Allergic/Immunologic: Negative  Neurological: Negative  Hematological: Negative  Psychiatric/Behavioral: Negative  Objective:      /60 (BP Location: Right arm, Patient Position: Sitting, Cuff Size: Adult)   Pulse 84   Temp 97 5 °F (36 4 °C) (Temporal)   Ht 5' 3" (1 6 m)   Wt 67 6 kg (149 lb)   SpO2 92%   BMI 26 39 kg/m²          Physical Exam  Vitals signs reviewed     Constitutional:       Appearance: He is well-developed  HENT:      Head: Normocephalic and atraumatic  Right Ear: External ear normal  Tympanic membrane is not erythematous or bulging  Left Ear: External ear normal  Tympanic membrane is not erythematous or bulging  Nose: Nose normal       Mouth/Throat:      Mouth: No oral lesions  Pharynx: No oropharyngeal exudate  Eyes:      General: No scleral icterus  Right eye: No discharge  Left eye: No discharge  Conjunctiva/sclera: Conjunctivae normal    Neck:      Musculoskeletal: Normal range of motion and neck supple  Thyroid: No thyromegaly  Cardiovascular:      Rate and Rhythm: Normal rate and regular rhythm  Pulses: no weak pulses          Dorsalis pedis pulses are 1+ on the right side and 1+ on the left side  Posterior tibial pulses are 1+ on the right side and 1+ on the left side  Heart sounds: Normal heart sounds  No murmur  No friction rub  No gallop  Pulmonary:      Effort: Pulmonary effort is normal  No respiratory distress  Breath sounds: No wheezing or rales  Chest:      Chest wall: No tenderness  Abdominal:      General: Bowel sounds are normal  There is no distension  Palpations: Abdomen is soft  There is no mass  Tenderness: There is no abdominal tenderness  There is no guarding or rebound  Musculoskeletal: Normal range of motion  General: No tenderness or deformity  Feet:      Right foot:      Skin integrity: No ulcer, skin breakdown, erythema, warmth, callus or dry skin  Left foot:      Skin integrity: No ulcer, skin breakdown, erythema, warmth, callus or dry skin  Lymphadenopathy:      Cervical: No cervical adenopathy  Skin:     General: Skin is warm and dry  Coloration: Skin is not pale  Findings: No erythema or rash  Neurological:      Mental Status: He is alert and oriented to person, place, and time  Cranial Nerves: No cranial nerve deficit        Motor: No abnormal muscle tone  Coordination: Coordination normal       Deep Tendon Reflexes: Reflexes are normal and symmetric  Psychiatric:         Behavior: Behavior normal        Patient's shoes and socks removed  Right Foot/Ankle   Right Foot Inspection  Skin Exam: skin normal and skin intact no dry skin, no warmth, no callus, no erythema, no maceration, no abnormal color, no pre-ulcer, no ulcer and no callus                          Toe Exam: ROM and strength within normal limitsno swelling  Sensory   Vibration: intact  Proprioception: intact   Monofilament testing: intact  Vascular  Capillary refills: < 3 seconds  The right DP pulse is 1+  The right PT pulse is 1+  Right Toe  - Comprehensive Exam  Ecchymosis: none  Swelling: none   Tenderness: none         Left Foot/Ankle  Left Foot Inspection  Skin Exam: skin normal and skin intactno dry skin, no warmth, no erythema, no maceration, normal color, no pre-ulcer, no ulcer and no callus                         Toe Exam: ROM and strength within normal limitsno swelling                   Sensory   Vibration: intact  Proprioception: intact  Monofilament: intact  Vascular  Capillary refills: < 3 seconds  The left DP pulse is 1+  The left PT pulse is 1+  Left Toe  - Comprehensive Exam  Ecchymosis: none  Arch: normal  Swelling: none   Tenderness: none       Assign Risk Category:  No deformity present; No loss of protective sensation;  No weak pulses       Risk: 0

## 2020-09-18 NOTE — PROGRESS NOTES
Assessment and Plan:     Problem List Items Addressed This Visit        Endocrine    Type 2 diabetes mellitus ([facility] )      Other Visit Diagnoses     Medicare annual wellness visit, subsequent    -  Primary        BMI Counseling: Body mass index is 26 39 kg/m²  The BMI is above normal  Nutrition recommendations include decreasing portion sizes  Exercise recommendations include moderate physical activity 150 minutes/week  Depression Screening and Follow-up Plan: Patient's depression screening was positive with a PHQ-2 score of 1  Clincally patient does not have depression  No treatment is required  Falls Plan of Care: balance, strength, and gait training instructions were provided  Preventive health issues were discussed with patient, and age appropriate screening tests were ordered as noted in patient's After Visit Summary  Personalized health advice and appropriate referrals for health education or preventive services given if needed, as noted in patient's After Visit Summary       History of Present Illness:     Patient presents for Medicare Annual Wellness visit    Patient Care Team:  Joshua Mueller DO as PCP - MD Joshua Olivier Naomia Hensen, MD as Endoscopist  Anna Back MD (Nephrology)     Problem List:     Patient Active Problem List   Diagnosis    Benign prostatic hyperplasia with urinary frequency    Dyslipidemia    Esophageal reflux    Essential hypertension    Lower leg DVT (deep venous thromboembolism), acute, right ([facility] )    Pancreatic cyst    Pulmonary embolism ([facility] )    Pulmonary nodule seen on imaging study    Type 2 diabetes mellitus ([facility] )    Pericardial effusion with cardiac tamponade    Paroxysmal atrial fibrillation ([facility] )    Stage 3 chronic kidney disease ([facility] )    COPD (chronic obstructive pulmonary disease) ([facility] )    Chronic anemia    Acute renal failure with tubular necrosis ([facility] )    S/P pericardial window creation    Obrien's esophagus without dysplasia    Tubular adenoma    Stage 5 chronic kidney disease (HCC)    Renovascular hypertension    Persistent proteinuria    Anemia due to chronic kidney disease    Secondary hyperparathyroidism of renal origin ([place] [address] )    Hemodialysis access, AV graft ([place] [address] )    Cardiomyopathy ([place] [address] )    Type 2 diabetes mellitus with stage 3 chronic kidney disease, without long-term current use of insulin ([place] [address] )      Past Medical and Surgical History:     Past Medical History:   Diagnosis Date    Anxiety     Obrien's esophagus     BPH (benign prostatic hyperplasia)     Cancer ([place] [address] )     pt states hx skin cancer, pt confused    Cardiomegaly     Diabetes ([place] [address] )     type 2  controlled; taken off oral meds 6/19    DVT (deep venous thrombosis) (HCC)     right leg    GERD (gastroesophageal reflux disease)     Hypercholesterolemia     Hypertension     Irregular heart beat     paroxsysmal a fib    Pancreatic cyst     Paroxysmal atrial fibrillation (HCC)     Pericardial effusion with cardiac tamponade     Pleural effusion     Renal disorder     dialysis started in 2019    Weight loss, non-intentional      Past Surgical History:   Procedure Laterality Date    APPENDECTOMY  1953    CATARACT EXTRACTION Bilateral 2016?  COLONOSCOPY      COLONOSCOPY N/A 1/24/2019    Procedure: COLONOSCOPY with polypectomies;  Surgeon: Cindy Phillip MD;  Location: [facility]; Service: Gastroenterology    ESOPHAGOGASTRODUODENOSCOPY N/A 1/24/2019    Procedure: ESOPHAGOGASTRODUODENOSCOPY (EGD) with bx;  Surgeon: Cindy Phillip MD;  Location: [facility];   Service: Gastroenterology    IR TUNNELED DIALYSIS CATHETER CHECK/CHANGE/REPOSITION/ANGIOPLASTY  4/18/2019    IR TUNNELED DIALYSIS CATHETER PLACEMENT  3/6/2019    IR TUNNELED DIALYSIS CATHETER REMOVAL  7/30/2019    PERICARDIAL WINDOW N/A 2/26/2019    Procedure: WINDOW PERICARDIAL;  Surgeon: Any King MD;  Location: [facility];  Service: Thoracic    VA ANASTOMOSIS,AV,ANY SITE Left 7/3/2019    Procedure: CREATION FISTULA ARTERIOVENOUS (AV) left upper extremity brachial artery to axillary vein Winslow-Mushtaq graft ;  Surgeon: Jalil Saldaña MD;  Location[facility];  Service: Vascular    PROSTATE BIOPSY        Family History:     Family History   Problem Relation Age of Onset    Diabetes Mother     Diabetes type II Mother     Diabetes Father     Diabetes type II Father     Heart attack Father     Prostate cancer Brother     Diabetes Brother     Pulmonary embolism Sister       Social History:        Social History     Socioeconomic History    Marital status: /Civil Union     Spouse name: None    Number of children: None    Years of education: None    Highest education level: None   Occupational History    Occupation: Retired      Comment:     Social Needs    Financial resource strain: None    Food insecurity     Worry: None     Inability: None    Transportation needs     Medical: None     Non-medical: None   Tobacco Use    Smoking status: Former Smoker     Packs/day: 0 50     Last attempt to quit: 1969     Years since quittin 7    Smokeless tobacco: Never Used    Tobacco comment: current non-smoker, per [employer]   Substance and Sexual Activity    Alcohol use: No     Frequency: Never     Binge frequency: Never    Drug use: Never    Sexual activity: Not Currently   Lifestyle    Physical activity     Days per week: None     Minutes per session: None    Stress: None   Relationships    Social connections     Talks on phone: None     Gets together: None     Attends Mu-ism service: None     Active member of club or organization: None     Attends meetings of clubs or organizations: None     Relationship status: None    Intimate partner violence     Fear of current or ex partner: None     Emotionally abused: None     Physically abused: None     Forced sexual activity: None   Other Topics Concern    None   Social History Narrative    Patient has never smoked - As per [employer]    Consumes on average 2 cups of regular coffee per day       Medications and Allergies:     Current Outpatient Medications   Medication Sig Dispense Refill    acetaminophen (TYLENOL) 325 mg tablet Take 650 mg by mouth daily      ALPRAZolam (XANAX) 0 25 mg tablet Take 1 tablet (0 25 mg total) by mouth every 8 (eight) hours as needed for anxiety 270 tablet 0    amLODIPine (NORVASC) 5 mg tablet Take 1 tablet (5 mg total) by mouth daily 90 tablet 3    Cholecalciferol (VITAMIN D PO) Take 5,000 Units by mouth every morning       DULERA 100-5 MCG/ACT inhaler INHALE 2 PUFFS EVERY 12 (TWELVE) HOURS 3 Inhaler 3    finasteride (PROSCAR) 5 mg tablet TAKE 1 TABLET BY MOUTH EVERY DAY 90 tablet 3    fluticasone (FLONASE) 50 mcg/act nasal spray USE 2 SPRAYS IN EACH NOSTRIL DAILY 16 mL 0    losartan (COZAAR) 100 MG tablet Take 0 5 tablets (50 mg total) by mouth every morning 90 tablet 1    metoprolol tartrate (LOPRESSOR) 25 mg tablet TAKE 1 TABLET (25 MG TOTAL) BY MOUTH EVERY 12 (TWELVE) HOURS 180 tablet 1    PARoxetine (PAXIL) 30 mg tablet TAKE 1 TABLET (30 MG TOTAL) BY MOUTH EVERY MORNING AS DIRECTED 90 tablet 1    Blood Glucose Monitoring Suppl (ONE TOUCH ULTRA 2) w/Device KIT by Does not apply route 2 (two) times a day 1 each 0    Blood Glucose Monitoring Suppl (TRUE METRIX METER) YASMEEN by Does not apply route 2 (two) times a day 1 Device 0    glucose blood (ONE TOUCH ULTRA TEST) test strip Use daily as directed  200 each 3    glucose blood (TRUE METRIX BLOOD GLUCOSE TEST) test strip Test twice daily 300 each 0    Lancets (ONETOUCH ULTRASOFT) lancets USE DAILY AS DIRECTED  100 each 3    rosuvastatin (CRESTOR) 40 MG tablet Take 1 tablet (40 mg total) by mouth every morning 90 tablet 3     No current facility-administered medications for this visit        Allergies   Allergen Reactions    Bee Venom Facial Swelling    Ace Inhibitors Other (See Comments) Unknown reaction      Immunizations:     Immunization History   Administered Date(s) Administered    INFLUENZA 09/12/2013, 09/21/2018    Influenza Split High Dose Preservative Free IM 09/19/2012, 09/04/2014, 09/03/2015, 10/03/2016, 09/13/2017    Influenza TIV (IM) 10/06/2011    Influenza, high dose seasonal 0 7 mL 09/21/2018, 09/16/2019    Pneumococcal Conjugate 13-Valent 10/01/2015    Pneumococcal Polysaccharide PPV23 01/01/2005      Health Maintenance:         Topic Date Due    Hepatitis C Screening  Completed         Topic Date Due    DTaP,Tdap,and Td Vaccines (1 - Tdap) 01/23/1956    Influenza Vaccine  07/01/2020      Medicare Health Risk Assessment:     /60 (BP Location: Right arm, Patient Position: Sitting, Cuff Size: Adult)   Pulse 84   Temp 97 5 °F (36 4 °C) (Temporal)   Ht 5' 3" (1 6 m)   Wt 67 6 kg (149 lb)   SpO2 92%   BMI 26 39 kg/m²      Diann Dalton is here for his Subsequent Wellness visit  Health Risk Assessment:   Patient rates overall health as good  Patient feels that their physical health rating is same  Eyesight was rated as same  Hearing was rated as slightly worse  Patient feels that their emotional and mental health rating is slightly better  Pain experienced in the last 7 days has been none  Patient states that he has experienced weight loss or gain in last 6 months  Pt states that he gained wt due to dialysis    Depression Screening:   PHQ-2 Score: 1      Fall Risk Screening: In the past year, patient has experienced: history of falling in past year    Number of falls: 1  Injured during fall?: Yes    Feels unsteady when standing or walking?: No    Worried about falling?: No      Home Safety:  Patient does not have trouble with stairs inside or outside of their home  Patient has working smoke alarms and has no working carbon monoxide detector  Home safety hazards include: none  Nutrition:   Current diet is Regular and Limited junk food       Medications:   Patient is currently taking over-the-counter supplements  OTC medications include: see medication list  Patient is not able to manage medications  Wife or sister-in-law manages pts meds  Activities of Daily Living (ADLs)/Instrumental Activities of Daily Living (IADLs):   Walk and transfer into and out of bed and chair?: Yes  Dress and groom yourself?: Yes    Bathe or shower yourself?: Yes    Feed yourself? Yes  Do your laundry/housekeeping?: Yes  Manage your money, pay your bills and track your expenses?: Yes  Make your own meals?: Yes    Do your own shopping?: Yes    Previous Hospitalizations:   Any hospitalizations or ED visits within the last 12 months?: Yes    How many hospitalizations have you had in the last year?: 1-2    Hospitalization Comments: [facility]    Advance Care Planning:   Living will: No    Durable POA for healthcare:  Yes    Advanced directive: Yes    Advanced directive counseling given: Yes    Five wishes given: Yes    Patient declined ACP directive: No    End of Life Decisions reviewed with patient: Yes    Provider agrees with end of life decisions: Yes      Cognitive Screening:   Provider or family/friend/caregiver concerned regarding cognition?: No    PREVENTIVE SCREENINGS      Cardiovascular Screening:    General: Screening Not Indicated and History Lipid Disorder      Diabetes Screening:     General: Screening Not Indicated and History Diabetes      Colorectal Cancer Screening:     General: Screening Not Indicated      Prostate Cancer Screening:    General: Screening Not Indicated      Osteoporosis Screening:    General: Risks and Benefits Discussed      Abdominal Aortic Aneurysm (AAA) Screening:    Risk factors include: tobacco use        Lung Cancer Screening:     General: Screening Not Indicated      Hepatitis C Screening:    General: Screening Current      Arthur Romero DO

## 2020-09-18 NOTE — PATIENT INSTRUCTIONS
Medicare Preventive Visit Patient Instructions  Thank you for completing your Welcome to Medicare Visit or Medicare Annual Wellness Visit today  Your next wellness visit will be due in one year (9/18/2021)  The screening/preventive services that you may require over the next 5-10 years are detailed below  Some tests may not apply to you based off risk factors and/or age  Screening tests ordered at today's visit but not completed yet may show as past due  Also, please note that scanned in results may not display below  Preventive Screenings:  Service Recommendations Previous Testing/Comments   Colorectal Cancer Screening  · Colonoscopy    · Fecal Occult Blood Test (FOBT)/Fecal Immunochemical Test (FIT)  · Fecal DNA/Cologuard Test  · Flexible Sigmoidoscopy Age: 54-65 years old   Colonoscopy: every 10 years (May be performed more frequently if at higher risk)  OR  FOBT/FIT: every 1 year  OR  Cologuard: every 3 years  OR  Sigmoidoscopy: every 5 years  Screening may be recommended earlier than age 48 if at higher risk for colorectal cancer  Also, an individualized decision between you and your healthcare provider will decide whether screening between the ages of 74-80 would be appropriate   Colonoscopy: Not on file  FOBT/FIT: Not on file  Cologuard: Not on file  Sigmoidoscopy: Not on file    Screening Not Indicated     Prostate Cancer Screening Individualized decision between patient and health care provider in men between ages of 53-78   Medicare will cover every 12 months beginning on the day after your 50th birthday PSA: 8 9 ng/mL     Screening Not Indicated     Hepatitis C Screening Once for adults born between 1945 and 1965  More frequently in patients at high risk for Hepatitis C Hep C Antibody: 02/27/2019    Screening Current   Diabetes Screening 1-2 times per year if you're at risk for diabetes or have pre-diabetes Fasting glucose: 125 mg/dL   A1C: 7 0    Screening Not Indicated  History Diabetes   Cholesterol Screening Once every 5 years if you don't have a lipid disorder  May order more often based on risk factors  Lipid panel: 09/18/2020    Screening Not Indicated  History Lipid Disorder      Other Preventive Screenings Covered by Medicare:  1  Abdominal Aortic Aneurysm (AAA) Screening: covered once if your at risk  You're considered to be at risk if you have a family history of AAA or a male between the age of 73-68 who smoking at least 100 cigarettes in your lifetime  2  Lung Cancer Screening: covers low dose CT scan once per year if you meet all of the following conditions: (1) Age 50-69; (2) No signs or symptoms of lung cancer; (3) Current smoker or have quit smoking within the last 15 years; (4) You have a tobacco smoking history of at least 30 pack years (packs per day x number of years you smoked); (5) You get a written order from a healthcare provider  3  Glaucoma Screening: covered annually if you're considered high risk: (1) You have diabetes OR (2) Family history of glaucoma OR (3)  aged 48 and older OR (3)  American aged 72 and older  3  Osteoporosis Screening: covered every 2 years if you meet one of the following conditions: (1) Have a vertebral abnormality; (2) On glucocorticoid therapy for more than 3 months; (3) Have primary hyperparathyroidism; (4) On osteoporosis medications and need to assess response to drug therapy  5  HIV Screening: covered annually if you're between the age of 12-76  Also covered annually if you are younger than 13 and older than 72 with risk factors for HIV infection  For pregnant patients, it is covered up to 3 times per pregnancy      Immunizations:  Immunization Recommendations   Influenza Vaccine Annual influenza vaccination during flu season is recommended for all persons aged >= 6 months who do not have contraindications   Pneumococcal Vaccine (Prevnar and Pneumovax)  * Prevnar = PCV13  * Pneumovax = PPSV23 Adults 25-60 years old: 1-3 doses may be recommended based on certain risk factors  Adults 72 years old: Prevnar (PCV13) vaccine recommended followed by Pneumovax (PPSV23) vaccine  If already received PPSV23 since turning 65, then PCV13 recommended at least one year after PPSV23 dose  Hepatitis B Vaccine 3 dose series if at intermediate or high risk (ex: diabetes, end stage renal disease, liver disease)   Tetanus (Td) Vaccine - COST NOT COVERED BY MEDICARE PART B Following completion of primary series, a booster dose should be given every 10 years to maintain immunity against tetanus  Td may also be given as tetanus wound prophylaxis  Tdap Vaccine - COST NOT COVERED BY MEDICARE PART B Recommended at least once for all adults  For pregnant patients, recommended with each pregnancy  Shingles Vaccine (Shingrix) - COST NOT COVERED BY MEDICARE PART B  2 shot series recommended in those aged 48 and above     Health Maintenance Due:      Topic Date Due    Hepatitis C Screening  Completed     Immunizations Due:      Topic Date Due    DTaP,Tdap,and Td Vaccines (1 - Tdap) 01/23/1956    Influenza Vaccine  07/01/2020     Advance Directives   What are advance directives? Advance directives are legal documents that state your wishes and plans for medical care  These plans are made ahead of time in case you lose your ability to make decisions for yourself  Advance directives can apply to any medical decision, such as the treatments you want, and if you want to donate organs  What are the types of advance directives? There are many types of advance directives, and each state has rules about how to use them  You may choose a combination of any of the following:  · Living will: This is a written record of the treatment you want  You can also choose which treatments you do not want, which to limit, and which to stop at a certain time  This includes surgery, medicine, IV fluid, and tube feedings  · Durable power of  for healthcare [facility]):   This is a written record that states who you want to make healthcare choices for you when you are unable to make them for yourself  This person, called a proxy, is usually a family member or a friend  You may choose more than 1 proxy  · Do not resuscitate (DNR) order:  A DNR order is used in case your heart stops beating or you stop breathing  It is a request not to have certain forms of treatment, such as CPR  A DNR order may be included in other types of advance directives  · Medical directive: This covers the care that you want if you are in a coma, near death, or unable to make decisions for yourself  You can list the treatments you want for each condition  Treatment may include pain medicine, surgery, blood transfusions, dialysis, IV or tube feedings, and a ventilator (breathing machine)  · Values history: This document has questions about your views, beliefs, and how you feel and think about life  This information can help others choose the care that you would choose  Why are advance directives important? An advance directive helps you control your care  Although spoken wishes may be used, it is better to have your wishes written down  Spoken wishes can be misunderstood, or not followed  Treatments may be given even if you do not want them  An advance directive may make it easier for your family to make difficult choices about your care  Fall Prevention    Fall prevention  includes ways to make your home and other areas safer  It also includes ways you can move more carefully to prevent a fall  Health conditions that cause changes in your blood pressure, vision, or muscle strength and coordination may increase your risk for falls  Medicines may also increase your risk for falls if they make you dizzy, weak, or sleepy  Fall prevention tips:   · Stand or sit up slowly  · Use assistive devices as directed  · Wear shoes that fit well and have soles that   · Wear a personal alarm  · Stay active  · Manage your medical conditions  Home Safety Tips:  · Add items to prevent falls in the bathroom  · Keep paths clear  · Install bright lights in your home  · Keep items you use often on shelves within reach  · Paint or place reflective tape on the edges of your stairs  Weight Management   Why it is important to manage your weight:  Being overweight increases your risk of health conditions such as heart disease, high blood pressure, type 2 diabetes, and certain types of cancer  It can also increase your risk for osteoarthritis, sleep apnea, and other respiratory problems  Aim for a slow, steady weight loss  Even a small amount of weight loss can lower your risk of health problems  How to lose weight safely:  A safe and healthy way to lose weight is to eat fewer calories and get regular exercise  You can lose up about 1 pound a week by decreasing the number of calories you eat by 500 calories each day  Healthy meal plan for weight management:  A healthy meal plan includes a variety of foods, contains fewer calories, and helps you stay healthy  A healthy meal plan includes the following:  · Eat whole-grain foods more often  A healthy meal plan should contain fiber  Fiber is the part of grains, fruits, and vegetables that is not broken down by your body  Whole-grain foods are healthy and provide extra fiber in your diet  Some examples of whole-grain foods are whole-wheat breads and pastas, oatmeal, brown rice, and bulgur  · Eat a variety of vegetables every day  Include dark, leafy greens such as spinach, kale, jose greens, and mustard greens  Eat yellow and orange vegetables such as carrots, sweet potatoes, and winter squash  · Eat a variety of fruits every day  Choose fresh or canned fruit (canned in its own juice or light syrup) instead of juice  Fruit juice has very little or no fiber  · Eat low-fat dairy foods  Drink fat-free (skim) milk or 1% milk   Eat fat-free yogurt and low-fat cottage cheese  Try low-fat cheeses such as mozzarella and other reduced-fat cheeses  · Choose meat and other protein foods that are low in fat  Choose beans or other legumes such as split peas or lentils  Choose fish, skinless poultry (chicken or turkey), or lean cuts of red meat (beef or pork)  Before you cook meat or poultry, cut off any visible fat  · Use less fat and oil  Try baking foods instead of frying them  Add less fat, such as margarine, sour cream, regular salad dressing and mayonnaise to foods  Eat fewer high-fat foods  Some examples of high-fat foods include french fries, doughnuts, ice cream, and cakes  · Eat fewer sweets  Limit foods and drinks that are high in sugar  This includes candy, cookies, regular soda, and sweetened drinks  Exercise:  Exercise at least 30 minutes per day on most days of the week  Some examples of exercise include walking, biking, dancing, and swimming  You can also fit in more physical activity by taking the stairs instead of the elevator or parking farther away from stores  Ask your healthcare provider about the best exercise plan for you  © Copyright [employer] 2018 Information is for End User's use only and may not be sold, redistributed or otherwise used for commercial purposes   All illustrations and images included in CareNotes® are the copyrighted property of A D A M , [employer]  or [address] [place] [address]

## 2020-09-30 ENCOUNTER — CLINICAL SUPPORT (OUTPATIENT)
Dept: FAMILY MEDICINE CLINIC | Facility: CLINIC | Age: 85
End: 2020-09-30
Payer: COMMERCIAL

## 2020-09-30 DIAGNOSIS — Z23 FLU VACCINE NEED: Primary | ICD-10-CM

## 2020-09-30 PROCEDURE — 90662 IIV NO PRSV INCREASED AG IM: CPT

## 2020-09-30 PROCEDURE — G0008 ADMIN INFLUENZA VIRUS VAC: HCPCS

## 2020-10-30 ENCOUNTER — TELEPHONE (OUTPATIENT)
Dept: FAMILY MEDICINE CLINIC | Facility: CLINIC | Age: 85
End: 2020-10-30

## 2020-10-30 DIAGNOSIS — R35.0 BENIGN PROSTATIC HYPERPLASIA WITH URINARY FREQUENCY: ICD-10-CM

## 2020-10-30 DIAGNOSIS — N40.1 BENIGN PROSTATIC HYPERPLASIA WITH URINARY FREQUENCY: ICD-10-CM

## 2020-10-30 RX ORDER — FINASTERIDE 5 MG/1
TABLET, FILM COATED ORAL
Qty: 90 TABLET | Refills: 3 | Status: SHIPPED | OUTPATIENT
Start: 2020-10-30 | End: 2021-10-24

## 2020-11-06 ENCOUNTER — TELEPHONE (OUTPATIENT)
Dept: FAMILY MEDICINE CLINIC | Facility: CLINIC | Age: 85
End: 2020-11-06

## 2020-11-12 ENCOUNTER — OFFICE VISIT (OUTPATIENT)
Dept: FAMILY MEDICINE CLINIC | Facility: CLINIC | Age: 85
End: 2020-11-12
Payer: COMMERCIAL

## 2020-11-12 VITALS
DIASTOLIC BLOOD PRESSURE: 56 MMHG | BODY MASS INDEX: 26.39 KG/M2 | OXYGEN SATURATION: 98 % | HEART RATE: 67 BPM | TEMPERATURE: 96.7 F | WEIGHT: 149 LBS | SYSTOLIC BLOOD PRESSURE: 110 MMHG

## 2020-11-12 DIAGNOSIS — I10 ESSENTIAL HYPERTENSION: ICD-10-CM

## 2020-11-12 DIAGNOSIS — N18.30 TYPE 2 DIABETES MELLITUS WITH STAGE 3 CHRONIC KIDNEY DISEASE, WITHOUT LONG-TERM CURRENT USE OF INSULIN, UNSPECIFIED WHETHER STAGE 3A OR 3B CKD (HCC): ICD-10-CM

## 2020-11-12 DIAGNOSIS — I42.9 CARDIOMYOPATHY, UNSPECIFIED TYPE (HCC): ICD-10-CM

## 2020-11-12 DIAGNOSIS — E11.22 TYPE 2 DIABETES MELLITUS WITH STAGE 3 CHRONIC KIDNEY DISEASE, WITHOUT LONG-TERM CURRENT USE OF INSULIN, UNSPECIFIED WHETHER STAGE 3A OR 3B CKD (HCC): ICD-10-CM

## 2020-11-12 DIAGNOSIS — E11.9 TYPE 2 DIABETES MELLITUS WITHOUT COMPLICATION, WITHOUT LONG-TERM CURRENT USE OF INSULIN (HCC): Primary | ICD-10-CM

## 2020-11-12 DIAGNOSIS — I48.0 PAROXYSMAL ATRIAL FIBRILLATION (HCC): Chronic | ICD-10-CM

## 2020-11-12 DIAGNOSIS — J42 CHRONIC BRONCHITIS, UNSPECIFIED CHRONIC BRONCHITIS TYPE (HCC): ICD-10-CM

## 2020-11-12 PROCEDURE — 3074F SYST BP LT 130 MM HG: CPT | Performed by: FAMILY MEDICINE

## 2020-11-12 PROCEDURE — 1036F TOBACCO NON-USER: CPT | Performed by: FAMILY MEDICINE

## 2020-11-12 PROCEDURE — T1015 CLINIC SERVICE: HCPCS | Performed by: FAMILY MEDICINE

## 2020-11-12 PROCEDURE — 3078F DIAST BP <80 MM HG: CPT | Performed by: FAMILY MEDICINE

## 2020-11-12 PROCEDURE — 1160F RVW MEDS BY RX/DR IN RCRD: CPT | Performed by: FAMILY MEDICINE

## 2020-11-12 PROCEDURE — 99213 OFFICE O/P EST LOW 20 MIN: CPT | Performed by: FAMILY MEDICINE

## 2020-11-28 DIAGNOSIS — I10 ESSENTIAL HYPERTENSION: ICD-10-CM

## 2020-11-28 RX ORDER — LOSARTAN POTASSIUM 100 MG/1
50 TABLET ORAL EVERY MORNING
Qty: 45 TABLET | Refills: 3 | Status: SHIPPED | OUTPATIENT
Start: 2020-11-28 | End: 2021-09-14

## 2020-12-01 ENCOUNTER — LAB (OUTPATIENT)
Dept: LAB | Facility: CLINIC | Age: 85
End: 2020-12-01
Payer: COMMERCIAL

## 2020-12-01 LAB
ALBUMIN SERPL BCP-MCNC: 4.2 G/DL (ref 3.5–5)
ALP SERPL-CCNC: 56 U/L (ref 46–116)
ALT SERPL W P-5'-P-CCNC: 37 U/L (ref 12–78)
ANION GAP SERPL CALCULATED.3IONS-SCNC: 3 MMOL/L (ref 4–13)
AST SERPL W P-5'-P-CCNC: 16 U/L (ref 5–45)
BASOPHILS # BLD AUTO: 0.01 THOUSANDS/ΜL (ref 0–0.1)
BASOPHILS NFR BLD AUTO: 0 % (ref 0–1)
BILIRUB SERPL-MCNC: 0.37 MG/DL (ref 0.2–1)
BUN SERPL-MCNC: 44 MG/DL (ref 5–25)
CALCIUM SERPL-MCNC: 9.8 MG/DL (ref 8.3–10.1)
CHLORIDE SERPL-SCNC: 109 MMOL/L (ref 100–108)
CO2 SERPL-SCNC: 26 MMOL/L (ref 21–32)
CREAT SERPL-MCNC: 3.33 MG/DL (ref 0.6–1.3)
CREAT UR-MCNC: 111 MG/DL
EOSINOPHIL # BLD AUTO: 0.13 THOUSAND/ΜL (ref 0–0.61)
EOSINOPHIL NFR BLD AUTO: 2 % (ref 0–6)
ERYTHROCYTE [DISTWIDTH] IN BLOOD BY AUTOMATED COUNT: 12.2 % (ref 11.6–15.1)
EST. AVERAGE GLUCOSE BLD GHB EST-MCNC: 180 MG/DL
GFR SERPL CREATININE-BSD FRML MDRD: 16 ML/MIN/1.73SQ M
GLUCOSE P FAST SERPL-MCNC: 196 MG/DL (ref 65–99)
HBA1C MFR BLD: 7.9 %
HCT VFR BLD AUTO: 31.3 % (ref 36.5–49.3)
HGB BLD-MCNC: 10 G/DL (ref 12–17)
IMM GRANULOCYTES # BLD AUTO: 0.03 THOUSAND/UL (ref 0–0.2)
IMM GRANULOCYTES NFR BLD AUTO: 1 % (ref 0–2)
LYMPHOCYTES # BLD AUTO: 0.91 THOUSANDS/ΜL (ref 0.6–4.47)
LYMPHOCYTES NFR BLD AUTO: 15 % (ref 14–44)
MCH RBC QN AUTO: 33.1 PG (ref 26.8–34.3)
MCHC RBC AUTO-ENTMCNC: 31.9 G/DL (ref 31.4–37.4)
MCV RBC AUTO: 104 FL (ref 82–98)
MICROALBUMIN UR-MCNC: 138 MG/L (ref 0–20)
MICROALBUMIN/CREAT 24H UR: 124 MG/G CREATININE (ref 0–30)
MONOCYTES # BLD AUTO: 0.42 THOUSAND/ΜL (ref 0.17–1.22)
MONOCYTES NFR BLD AUTO: 7 % (ref 4–12)
NEUTROPHILS # BLD AUTO: 4.4 THOUSANDS/ΜL (ref 1.85–7.62)
NEUTS SEG NFR BLD AUTO: 75 % (ref 43–75)
NRBC BLD AUTO-RTO: 0 /100 WBCS
PLATELET # BLD AUTO: 147 THOUSANDS/UL (ref 149–390)
PMV BLD AUTO: 10.8 FL (ref 8.9–12.7)
POTASSIUM SERPL-SCNC: 5.2 MMOL/L (ref 3.5–5.3)
PROT SERPL-MCNC: 7.7 G/DL (ref 6.4–8.2)
RBC # BLD AUTO: 3.02 MILLION/UL (ref 3.88–5.62)
SODIUM SERPL-SCNC: 138 MMOL/L (ref 136–145)
WBC # BLD AUTO: 5.9 THOUSAND/UL (ref 4.31–10.16)

## 2020-12-01 PROCEDURE — 80053 COMPREHEN METABOLIC PANEL: CPT | Performed by: FAMILY MEDICINE

## 2020-12-01 PROCEDURE — 85025 COMPLETE CBC W/AUTO DIFF WBC: CPT | Performed by: FAMILY MEDICINE

## 2020-12-01 PROCEDURE — 83036 HEMOGLOBIN GLYCOSYLATED A1C: CPT | Performed by: FAMILY MEDICINE

## 2020-12-01 PROCEDURE — 36415 COLL VENOUS BLD VENIPUNCTURE: CPT | Performed by: FAMILY MEDICINE

## 2020-12-01 PROCEDURE — 82570 ASSAY OF URINE CREATININE: CPT | Performed by: FAMILY MEDICINE

## 2020-12-01 PROCEDURE — 82043 UR ALBUMIN QUANTITATIVE: CPT | Performed by: FAMILY MEDICINE

## 2020-12-15 DIAGNOSIS — J44.9 CHRONIC OBSTRUCTIVE PULMONARY DISEASE, UNSPECIFIED COPD TYPE (HCC): ICD-10-CM

## 2020-12-17 ENCOUNTER — TELEMEDICINE (OUTPATIENT)
Dept: NEPHROLOGY | Facility: CLINIC | Age: 85
End: 2020-12-17
Payer: COMMERCIAL

## 2020-12-17 DIAGNOSIS — N25.81 SECONDARY HYPERPARATHYROIDISM OF RENAL ORIGIN (HCC): ICD-10-CM

## 2020-12-17 DIAGNOSIS — N18.4 STAGE 4 CHRONIC KIDNEY DISEASE (HCC): ICD-10-CM

## 2020-12-17 DIAGNOSIS — N18.5 STAGE 5 CHRONIC KIDNEY DISEASE (HCC): Primary | ICD-10-CM

## 2020-12-17 DIAGNOSIS — I42.9 CARDIOMYOPATHY, UNSPECIFIED TYPE (HCC): ICD-10-CM

## 2020-12-17 DIAGNOSIS — E55.9 VITAMIN D DEFICIENCY: ICD-10-CM

## 2020-12-17 DIAGNOSIS — I10 ESSENTIAL HYPERTENSION: ICD-10-CM

## 2020-12-17 PROBLEM — N18.9 HISTORY OF ANEMIA DUE TO CKD: Status: ACTIVE | Noted: 2020-12-17

## 2020-12-17 PROBLEM — Z86.2 HISTORY OF ANEMIA DUE TO CKD: Status: ACTIVE | Noted: 2020-12-17

## 2020-12-17 PROCEDURE — 99214 OFFICE O/P EST MOD 30 MIN: CPT | Performed by: INTERNAL MEDICINE

## 2021-01-11 DIAGNOSIS — E11.22 TYPE 2 DIABETES MELLITUS WITH STAGE 3 CHRONIC KIDNEY DISEASE, WITHOUT LONG-TERM CURRENT USE OF INSULIN, UNSPECIFIED WHETHER STAGE 3A OR 3B CKD (HCC): Primary | ICD-10-CM

## 2021-01-11 DIAGNOSIS — N18.30 TYPE 2 DIABETES MELLITUS WITH STAGE 3 CHRONIC KIDNEY DISEASE, WITHOUT LONG-TERM CURRENT USE OF INSULIN, UNSPECIFIED WHETHER STAGE 3A OR 3B CKD (HCC): Primary | ICD-10-CM

## 2021-01-11 RX ORDER — BLOOD-GLUCOSE METER
EACH MISCELLANEOUS DAILY
Qty: 1 EACH | Refills: 0 | Status: SHIPPED | OUTPATIENT
Start: 2021-01-11

## 2021-01-11 RX ORDER — LANCETS
EACH MISCELLANEOUS
Qty: 100 EACH | Refills: 3 | Status: SHIPPED | OUTPATIENT
Start: 2021-01-11

## 2021-01-12 DIAGNOSIS — F41.1 GENERALIZED ANXIETY DISORDER: ICD-10-CM

## 2021-01-12 DIAGNOSIS — I48.0 PAROXYSMAL ATRIAL FIBRILLATION (HCC): Chronic | ICD-10-CM

## 2021-01-12 RX ORDER — PAROXETINE 30 MG/1
30 TABLET, FILM COATED ORAL EVERY MORNING
Qty: 90 TABLET | Refills: 1 | Status: SHIPPED | OUTPATIENT
Start: 2021-01-12 | End: 2021-07-29

## 2021-01-18 ENCOUNTER — LAB (OUTPATIENT)
Dept: LAB | Facility: CLINIC | Age: 86
End: 2021-01-18
Payer: COMMERCIAL

## 2021-01-18 DIAGNOSIS — E55.9 VITAMIN D DEFICIENCY: ICD-10-CM

## 2021-01-18 DIAGNOSIS — N18.4 STAGE 4 CHRONIC KIDNEY DISEASE (HCC): ICD-10-CM

## 2021-01-18 LAB
25(OH)D3 SERPL-MCNC: 37.9 NG/ML (ref 30–100)
ALBUMIN SERPL BCP-MCNC: 4.1 G/DL (ref 3.5–5)
ALP SERPL-CCNC: 55 U/L (ref 46–116)
ALT SERPL W P-5'-P-CCNC: 29 U/L (ref 12–78)
ANION GAP SERPL CALCULATED.3IONS-SCNC: 5 MMOL/L (ref 4–13)
AST SERPL W P-5'-P-CCNC: 13 U/L (ref 5–45)
BASOPHILS # BLD AUTO: 0.02 THOUSANDS/ΜL (ref 0–0.1)
BASOPHILS NFR BLD AUTO: 0 % (ref 0–1)
BILIRUB SERPL-MCNC: 0.46 MG/DL (ref 0.2–1)
BUN SERPL-MCNC: 41 MG/DL (ref 5–25)
CALCIUM SERPL-MCNC: 9.7 MG/DL (ref 8.3–10.1)
CHLORIDE SERPL-SCNC: 109 MMOL/L (ref 100–108)
CO2 SERPL-SCNC: 26 MMOL/L (ref 21–32)
CREAT SERPL-MCNC: 2.79 MG/DL (ref 0.6–1.3)
EOSINOPHIL # BLD AUTO: 0.17 THOUSAND/ΜL (ref 0–0.61)
EOSINOPHIL NFR BLD AUTO: 3 % (ref 0–6)
ERYTHROCYTE [DISTWIDTH] IN BLOOD BY AUTOMATED COUNT: 12.6 % (ref 11.6–15.1)
GFR SERPL CREATININE-BSD FRML MDRD: 20 ML/MIN/1.73SQ M
GLUCOSE P FAST SERPL-MCNC: 157 MG/DL (ref 65–99)
HCT VFR BLD AUTO: 31 % (ref 36.5–49.3)
HGB BLD-MCNC: 9.8 G/DL (ref 12–17)
IMM GRANULOCYTES # BLD AUTO: 0.02 THOUSAND/UL (ref 0–0.2)
IMM GRANULOCYTES NFR BLD AUTO: 0 % (ref 0–2)
LYMPHOCYTES # BLD AUTO: 0.99 THOUSANDS/ΜL (ref 0.6–4.47)
LYMPHOCYTES NFR BLD AUTO: 17 % (ref 14–44)
MCH RBC QN AUTO: 33 PG (ref 26.8–34.3)
MCHC RBC AUTO-ENTMCNC: 31.6 G/DL (ref 31.4–37.4)
MCV RBC AUTO: 104 FL (ref 82–98)
MONOCYTES # BLD AUTO: 0.38 THOUSAND/ΜL (ref 0.17–1.22)
MONOCYTES NFR BLD AUTO: 7 % (ref 4–12)
NEUTROPHILS # BLD AUTO: 4.19 THOUSANDS/ΜL (ref 1.85–7.62)
NEUTS SEG NFR BLD AUTO: 73 % (ref 43–75)
NRBC BLD AUTO-RTO: 0 /100 WBCS
PLATELET # BLD AUTO: 156 THOUSANDS/UL (ref 149–390)
PMV BLD AUTO: 10.7 FL (ref 8.9–12.7)
POTASSIUM SERPL-SCNC: 4.2 MMOL/L (ref 3.5–5.3)
PROT SERPL-MCNC: 7.4 G/DL (ref 6.4–8.2)
RBC # BLD AUTO: 2.97 MILLION/UL (ref 3.88–5.62)
SODIUM SERPL-SCNC: 140 MMOL/L (ref 136–145)
WBC # BLD AUTO: 5.77 THOUSAND/UL (ref 4.31–10.16)

## 2021-01-18 PROCEDURE — 82306 VITAMIN D 25 HYDROXY: CPT

## 2021-01-18 PROCEDURE — 80053 COMPREHEN METABOLIC PANEL: CPT

## 2021-01-18 PROCEDURE — 36415 COLL VENOUS BLD VENIPUNCTURE: CPT

## 2021-01-18 PROCEDURE — 85025 COMPLETE CBC W/AUTO DIFF WBC: CPT

## 2021-01-20 DIAGNOSIS — D50.8 IRON DEFICIENCY ANEMIA DUE TO DIETARY CAUSES: ICD-10-CM

## 2021-01-20 DIAGNOSIS — N18.4 CKD (CHRONIC KIDNEY DISEASE) STAGE 4, GFR 15-29 ML/MIN (HCC): Primary | ICD-10-CM

## 2021-02-03 ENCOUNTER — LAB (OUTPATIENT)
Dept: LAB | Facility: CLINIC | Age: 86
End: 2021-02-03
Payer: COMMERCIAL

## 2021-02-03 DIAGNOSIS — N18.4 CKD (CHRONIC KIDNEY DISEASE) STAGE 4, GFR 15-29 ML/MIN (HCC): ICD-10-CM

## 2021-02-03 DIAGNOSIS — D50.8 IRON DEFICIENCY ANEMIA DUE TO DIETARY CAUSES: ICD-10-CM

## 2021-02-03 PROCEDURE — 80048 BASIC METABOLIC PNL TOTAL CA: CPT

## 2021-02-03 PROCEDURE — 36415 COLL VENOUS BLD VENIPUNCTURE: CPT

## 2021-02-03 PROCEDURE — 85025 COMPLETE CBC W/AUTO DIFF WBC: CPT

## 2021-02-03 PROCEDURE — 83550 IRON BINDING TEST: CPT

## 2021-02-03 PROCEDURE — 83540 ASSAY OF IRON: CPT

## 2021-02-04 LAB
ANION GAP SERPL CALCULATED.3IONS-SCNC: 1 MMOL/L (ref 4–13)
BASOPHILS # BLD AUTO: 0.01 THOUSANDS/ΜL (ref 0–0.1)
BASOPHILS NFR BLD AUTO: 0 % (ref 0–1)
BUN SERPL-MCNC: 34 MG/DL (ref 5–25)
CALCIUM SERPL-MCNC: 9.7 MG/DL (ref 8.3–10.1)
CHLORIDE SERPL-SCNC: 113 MMOL/L (ref 100–108)
CO2 SERPL-SCNC: 29 MMOL/L (ref 21–32)
CREAT SERPL-MCNC: 2.97 MG/DL (ref 0.6–1.3)
EOSINOPHIL # BLD AUTO: 0.09 THOUSAND/ΜL (ref 0–0.61)
EOSINOPHIL NFR BLD AUTO: 2 % (ref 0–6)
ERYTHROCYTE [DISTWIDTH] IN BLOOD BY AUTOMATED COUNT: 12.1 % (ref 11.6–15.1)
GFR SERPL CREATININE-BSD FRML MDRD: 18 ML/MIN/1.73SQ M
GLUCOSE P FAST SERPL-MCNC: 178 MG/DL (ref 65–99)
HCT VFR BLD AUTO: 30.8 % (ref 36.5–49.3)
HGB BLD-MCNC: 9.7 G/DL (ref 12–17)
IMM GRANULOCYTES # BLD AUTO: 0.01 THOUSAND/UL (ref 0–0.2)
IMM GRANULOCYTES NFR BLD AUTO: 0 % (ref 0–2)
IRON SATN MFR SERPL: 36 %
IRON SERPL-MCNC: 94 UG/DL (ref 65–175)
LYMPHOCYTES # BLD AUTO: 0.99 THOUSANDS/ΜL (ref 0.6–4.47)
LYMPHOCYTES NFR BLD AUTO: 19 % (ref 14–44)
MCH RBC QN AUTO: 32.4 PG (ref 26.8–34.3)
MCHC RBC AUTO-ENTMCNC: 31.5 G/DL (ref 31.4–37.4)
MCV RBC AUTO: 103 FL (ref 82–98)
MONOCYTES # BLD AUTO: 0.4 THOUSAND/ΜL (ref 0.17–1.22)
MONOCYTES NFR BLD AUTO: 8 % (ref 4–12)
NEUTROPHILS # BLD AUTO: 3.75 THOUSANDS/ΜL (ref 1.85–7.62)
NEUTS SEG NFR BLD AUTO: 71 % (ref 43–75)
NRBC BLD AUTO-RTO: 0 /100 WBCS
PLATELET # BLD AUTO: 158 THOUSANDS/UL (ref 149–390)
PMV BLD AUTO: 10.7 FL (ref 8.9–12.7)
POTASSIUM SERPL-SCNC: 4.8 MMOL/L (ref 3.5–5.3)
RBC # BLD AUTO: 2.99 MILLION/UL (ref 3.88–5.62)
SODIUM SERPL-SCNC: 143 MMOL/L (ref 136–145)
TIBC SERPL-MCNC: 260 UG/DL (ref 250–450)
WBC # BLD AUTO: 5.25 THOUSAND/UL (ref 4.31–10.16)

## 2021-02-05 ENCOUNTER — TELEPHONE (OUTPATIENT)
Dept: NEPHROLOGY | Facility: CLINIC | Age: 86
End: 2021-02-05

## 2021-02-05 NOTE — TELEPHONE ENCOUNTER
----- Message from Alethea Canada, [address] sent at 2/5/2021  9:10 AM EST -----  Please call and let patient know kidney function and electrolytes stable and unchanged

## 2021-02-06 ENCOUNTER — IMMUNIZATIONS (OUTPATIENT)
Dept: FAMILY MEDICINE CLINIC | Facility: HOSPITAL | Age: 86
End: 2021-02-06

## 2021-02-06 DIAGNOSIS — Z23 ENCOUNTER FOR IMMUNIZATION: Primary | ICD-10-CM

## 2021-02-06 PROCEDURE — 0011A SARS-COV-2 / COVID-19 MRNA VACCINE (MODERNA) 100 MCG: CPT

## 2021-02-06 PROCEDURE — 91301 SARS-COV-2 / COVID-19 MRNA VACCINE (MODERNA) 100 MCG: CPT

## 2021-02-07 DIAGNOSIS — F41.1 GENERALIZED ANXIETY DISORDER: ICD-10-CM

## 2021-02-08 ENCOUNTER — OFFICE VISIT (OUTPATIENT)
Dept: NEPHROLOGY | Facility: CLINIC | Age: 86
End: 2021-02-08
Payer: COMMERCIAL

## 2021-02-08 VITALS
DIASTOLIC BLOOD PRESSURE: 72 MMHG | BODY MASS INDEX: 26.75 KG/M2 | OXYGEN SATURATION: 98 % | SYSTOLIC BLOOD PRESSURE: 118 MMHG | WEIGHT: 151 LBS | HEART RATE: 68 BPM | HEIGHT: 63 IN

## 2021-02-08 DIAGNOSIS — E11.22 TYPE 2 DIABETES MELLITUS WITH STAGE 3 CHRONIC KIDNEY DISEASE, WITHOUT LONG-TERM CURRENT USE OF INSULIN, UNSPECIFIED WHETHER STAGE 3A OR 3B CKD (HCC): ICD-10-CM

## 2021-02-08 DIAGNOSIS — N18.30 TYPE 2 DIABETES MELLITUS WITH STAGE 3 CHRONIC KIDNEY DISEASE, WITHOUT LONG-TERM CURRENT USE OF INSULIN, UNSPECIFIED WHETHER STAGE 3A OR 3B CKD (HCC): ICD-10-CM

## 2021-02-08 DIAGNOSIS — I10 ESSENTIAL HYPERTENSION: ICD-10-CM

## 2021-02-08 DIAGNOSIS — K22.70 BARRETT'S ESOPHAGUS WITHOUT DYSPLASIA: ICD-10-CM

## 2021-02-08 DIAGNOSIS — N18.5 STAGE 5 CHRONIC KIDNEY DISEASE (HCC): Primary | ICD-10-CM

## 2021-02-08 DIAGNOSIS — E11.9 TYPE 2 DIABETES MELLITUS WITHOUT COMPLICATION, WITHOUT LONG-TERM CURRENT USE OF INSULIN (HCC): ICD-10-CM

## 2021-02-08 DIAGNOSIS — N25.81 SECONDARY HYPERPARATHYROIDISM OF RENAL ORIGIN (HCC): ICD-10-CM

## 2021-02-08 DIAGNOSIS — J42 CHRONIC BRONCHITIS, UNSPECIFIED CHRONIC BRONCHITIS TYPE (HCC): ICD-10-CM

## 2021-02-08 PROCEDURE — 3078F DIAST BP <80 MM HG: CPT | Performed by: INTERNAL MEDICINE

## 2021-02-08 PROCEDURE — 3074F SYST BP LT 130 MM HG: CPT | Performed by: INTERNAL MEDICINE

## 2021-02-08 PROCEDURE — 1160F RVW MEDS BY RX/DR IN RCRD: CPT | Performed by: INTERNAL MEDICINE

## 2021-02-08 PROCEDURE — 1036F TOBACCO NON-USER: CPT | Performed by: INTERNAL MEDICINE

## 2021-02-08 PROCEDURE — 99214 OFFICE O/P EST MOD 30 MIN: CPT | Performed by: INTERNAL MEDICINE

## 2021-02-08 RX ORDER — ALPRAZOLAM 0.25 MG/1
0.25 TABLET ORAL EVERY 8 HOURS PRN
Qty: 270 TABLET | Refills: 0 | Status: SHIPPED | OUTPATIENT
Start: 2021-02-08 | End: 2021-06-30 | Stop reason: SDUPTHER

## 2021-02-08 NOTE — PROGRESS NOTES
[facility] [place], [place]    Name: Cara Romero  YOB: 1935      Assessment/Plan:           Problem List Items Addressed This Visit        Digestive    Obrien's esophagus without dysplasia     Continue PPI therapy            Endocrine    Type 2 diabetes mellitus ([facility] )    Secondary hyperparathyroidism of renal origin ([facility] )     Vitamin D level was fine at 40  PTH was low in September         Type 2 diabetes mellitus with stage 3 chronic kidney disease, without long-term current use of insulin ([facility])       Lab Results   Component Value Date    HGBA1C 7 9 (H) 12/01/2020   Not as well controlled but being monitored closely            Respiratory    COPD (chronic obstructive pulmonary disease) ([facility] )       Cardiovascular and Mediastinum    Essential hypertension     Stable            Genitourinary    Stage 5 chronic kidney disease ([place] [id number] ) - Primary     Lab Results   Component Value Date    EGFR 18 02/03/2021    EGFR 20 01/18/2021    EGFR 16 12/01/2020    CREATININE 2 97 (H) 02/03/2021    CREATININE 2 79 (H) 01/18/2021    CREATININE 3 33 (H) 12/01/2020   Kidney function ranging from 17-20 ml/min  Continue to monitor  Monthly and return to the office in 3 months                 Subjective:      Patient ID: Cara Rmoero  is a 80 y o  male  HPI [place] Fraire has a hsitory of acute kidney injury hemodialysis requiring with renal recovery  He has hypertension and diabetes  Sugars have been monitored    He does not check in the afternoon    He does not monitor BP at home    He has a left orbital ecchymosis as he fell while snow plowing    The following portions of the patient's history were reviewed and updated as appropriate: allergies, current medications, past family history, past medical history, past social history, past surgical history and problem list     Review of Systems   Constitutional: Negative for activity change, appetite change, chills, fatigue and fever    HENT: Positive for hearing loss  Eyes: Positive for redness  Negative for visual disturbance  Respiratory: Negative for cough, chest tightness and shortness of breath  Cardiovascular: Negative for chest pain, palpitations and leg swelling  Gastrointestinal: Negative for abdominal pain, blood in stool, constipation and diarrhea  Endocrine: Negative for polyuria  Genitourinary: Negative for decreased urine volume, difficulty urinating, frequency and hematuria  Musculoskeletal: Negative for arthralgias and back pain  Neurological: Negative for dizziness, light-headedness and headaches  Hematological: Does not bruise/bleed easily  Psychiatric/Behavioral: Negative for dysphoric mood           Social History     Socioeconomic History    Marital status: /Civil Union     Spouse name: None    Number of children: None    Years of education: None    Highest education level: None   Occupational History    Occupation: Retired      Comment:     Social Needs    Financial resource strain: None    Food insecurity     Worry: None     Inability: None    Transportation needs     Medical: None     Non-medical: None   Tobacco Use    Smoking status: Former Smoker     Packs/day: 0 50     Types: Cigars     Quit date:      Years since quittin 1    Smokeless tobacco: Never Used    Tobacco comment: current non-smoker   Substance and Sexual Activity    Alcohol use: No     Frequency: Never     Binge frequency: Never    Drug use: Never    Sexual activity: Not Currently   Lifestyle    Physical activity     Days per week: None     Minutes per session: None    Stress: None   Relationships    Social connections     Talks on phone: None     Gets together: None     Attends [employer] service: None     Active member of club or organization: None     Attends meetings of clubs or organizations: None     Relationship status: None    Intimate partner violence     Fear of current or ex partner: None     Emotionally abused: None     Physically abused: None     Forced sexual activity: None   Other Topics Concern    None   Social History Narrative    Patient has never smoked - As per [employer]    Consumes on average 2 cups of regular coffee per day      Past Medical History:   Diagnosis Date    Anxiety     Obrien's esophagus     BPH (benign prostatic hyperplasia)     Cancer ([facility] )     pt states hx skin cancer, pt confused    Cardiomegaly     Diabetes ([facility] )     type 2  controlled; taken off oral meds 6/19    DVT (deep venous thrombosis) (HCC)     right leg    GERD (gastroesophageal reflux disease)     Hypercholesterolemia     Hypertension     Irregular heart beat     paroxsysmal a fib    Pancreatic cyst     Paroxysmal atrial fibrillation (HCC)     Pericardial effusion with cardiac tamponade     Pleural effusion     Renal disorder     dialysis started in 2019    Weight loss, non-intentional      Past Surgical History:   Procedure Laterality Date    APPENDECTOMY  1953    CATARACT EXTRACTION Bilateral 2016?  COLONOSCOPY      COLONOSCOPY N/A 1/24/2019    Procedure: COLONOSCOPY with polypectomies;  Surgeon: Amalia Reyes MD;  Location: [facility]; Service: Gastroenterology    ESOPHAGOGASTRODUODENOSCOPY N/A 1/24/2019    Procedure: ESOPHAGOGASTRODUODENOSCOPY (EGD) with bx;  Surgeon: Amalia Reyes MD;  Location: [facility];   Service: Gastroenterology    IR TUNNELED DIALYSIS CATHETER CHECK/CHANGE/REPOSITION/ANGIOPLASTY  4/18/2019    IR TUNNELED DIALYSIS CATHETER PLACEMENT  3/6/2019    IR TUNNELED DIALYSIS CATHETER REMOVAL  7/30/2019    PERICARDIAL WINDOW N/A 2/26/2019    Procedure: WINDOW PERICARDIAL;  Surgeon: Kisha Clark MD;  Location: [facility];  Service: Thoracic    IN ANASTOMOSIS,AV,ANY SITE Left 7/3/2019    Procedure: CREATION FISTULA ARTERIOVENOUS (AV) left upper extremity brachial artery to axillary vein Sheppton-Mushtaq graft ;  Surgeon: Zuleyka Leiva MD;  Location: [facility] MA[facility] OR;  Service: Vascular    PROSTATE BIOPSY         Current Outpatient Medications:     acetaminophen (TYLENOL) 325 mg tablet, Take 650 mg by mouth daily, Disp: , Rfl:     ALPRAZolam (XANAX) 0 25 mg tablet, Take 1 tablet (0 25 mg total) by mouth every 8 (eight) hours as needed for anxiety, Disp: 270 tablet, Rfl: 0    amLODIPine (NORVASC) 5 mg tablet, Take 1 tablet (5 mg total) by mouth daily, Disp: 90 tablet, Rfl: 3    Blood Glucose Monitoring Suppl (ONE TOUCH ULTRA 2) w/Device KIT, by Does not apply route 2 (two) times a day, Disp: 1 each, Rfl: 0    Blood Glucose Monitoring Suppl (ONE TOUCH ULTRA 2) w/Device KIT, Use daily, Disp: 1 each, Rfl: 0    Blood Glucose Monitoring Suppl (TRUE METRIX METER) YASMEEN, by Does not apply route 2 (two) times a day, Disp: 1 Device, Rfl: 0    Cholecalciferol (VITAMIN D PO), Take 5,000 Units by mouth every morning , Disp: , Rfl:     Dulera 100-5 MCG/ACT inhaler, INHALE 2 PUFFS EVERY 12 (TWELVE) HOURS, Disp: 13 Inhaler, Rfl: 11    finasteride (PROSCAR) 5 mg tablet, TAKE 1 TABLET BY MOUTH EVERY DAY, Disp: 90 tablet, Rfl: 3    fluticasone (FLONASE) 50 mcg/act nasal spray, USE 2 SPRAYS IN EACH NOSTRIL DAILY, Disp: 16 mL, Rfl: 0    glucose blood (ONE TOUCH ULTRA TEST) test strip, Use daily as directed , Disp: 200 each, Rfl: 3    glucose blood (TRUE METRIX BLOOD GLUCOSE TEST) test strip, Test twice daily, Disp: 300 each, Rfl: 0    Lancets (ONETOUCH ULTRASOFT) lancets, USE DAILY AS DIRECTED , Disp: 100 each, Rfl: 3    Lancets (onetouch ultrasoft) lancets, Use daily as instructed, Disp: 100 each, Rfl: 3    losartan (COZAAR) 100 MG tablet, TAKE 0 5 TABLETS (50 MG TOTAL) BY MOUTH EVERY MORNING, Disp: 45 tablet, Rfl: 3    metoprolol tartrate (LOPRESSOR) 25 mg tablet, TAKE 1 TABLET (25 MG TOTAL) BY MOUTH EVERY 12 (TWELVE) HOURS, Disp: 180 tablet, Rfl: 1    PARoxetine (PAXIL) 30 mg tablet, TAKE 1 TABLET (30 MG TOTAL) BY MOUTH EVERY MORNING AS DIRECTED, Disp: 90 tablet, Rfl: 1    rosuvastatin (CRESTOR) 40 MG tablet, Take 1 tablet (40 mg total) by mouth every morning, Disp: 90 tablet, Rfl: 3    Lab Results   Component Value Date     07/21/2016    SODIUM 143 02/03/2021    K 4 8 02/03/2021     (H) 02/03/2021    CO2 29 02/03/2021    ANIONGAP 5 07/07/2015    AGAP 1 (L) 02/03/2021    BUN 34 (H) 02/03/2021    CREATININE 2 97 (H) 02/03/2021    GLUC 124 04/25/2020    GLUF 178 (H) 02/03/2021    CALCIUM 9 7 02/03/2021    AST 13 01/18/2021    ALT 29 01/18/2021    ALKPHOS 55 01/18/2021    PROT 7 3 07/21/2016    TP 7 4 01/18/2021    BILITOT 0 4 07/21/2016    TBILI 0 46 01/18/2021    EGFR 18 02/03/2021     Lab Results   Component Value Date    WBC 5 25 02/03/2021    HGB 9 7 (L) 02/03/2021    HCT 30 8 (L) 02/03/2021     (H) 02/03/2021     02/03/2021     Lab Results   Component Value Date    CHOLESTEROL 129 09/18/2020    CHOLESTEROL 119 02/14/2018    CHOLESTEROL 141 11/30/2016     Lab Results   Component Value Date    HDL 34 (L) 09/18/2020    HDL 34 (L) 02/14/2018    HDL 34 (L) 11/30/2016     Lab Results   Component Value Date    LDLCALC 66 09/18/2020    LDLCALC 68 02/14/2018    LDLCALC 78 11/30/2016     Lab Results   Component Value Date    TRIG 144 09/18/2020    TRIG 87 02/14/2018    TRIG 146 11/30/2016     No results found for: [place], [place]  Lab Results   Component Value Date    [id number] 1 652 02/21/2020     Lab Results   Component Value Date    PTH 32 3 09/18/2020    CALCIUM 9 7 02/03/2021    PHOS 3 2 09/18/2020     Lab Results   Component Value Date    SPEP  02/26/2019     No monoclonal bands noted  Reviewed by: Katty Brooke MD  ([id number])  **Electronic Signature**    UPEP  02/26/2019     The UPEP shows non-selective proteinuria   The UPEP shows a faint possible band  Immunofixation to be performed  Reviewed by: Mariana Sepulveda MD ([id number])  **Electronic Signature**     No results found for: MEDINA [id number]  12-1 creat 3 33 GFR of 16  Jan 18 creat 2 79 GFR 20  2-8 Creat 2 97 GFR 18      Objective:      /72   Pulse 68   Ht 5' 3" (1 6 m)   Wt 68 5 kg (151 lb)   SpO2 98%   BMI 26 75 kg/m²          Physical Exam  Constitutional:       Appearance: He is normal weight  HENT:      Head: Normocephalic  Right Ear: External ear normal       Left Ear: External ear normal    Eyes:      Pupils: Pupils are equal, round, and reactive to light  Comments: Left periorbital ecchymosis (black eye)   Neck:      Musculoskeletal: Normal range of motion and neck supple  No neck rigidity or muscular tenderness  Vascular: No carotid bruit  Cardiovascular:      Rate and Rhythm: Normal rate and regular rhythm  Heart sounds: No murmur  Pulmonary:      Effort: No respiratory distress  Breath sounds: Normal breath sounds  No rales  Abdominal:      General: Bowel sounds are normal  There is no distension  Palpations: Abdomen is soft  Tenderness: There is no abdominal tenderness  Musculoskeletal: Normal range of motion  General: No swelling or tenderness  Right lower leg: No edema  Left lower leg: No edema  Lymphadenopathy:      Cervical: No cervical adenopathy  Skin:     General: Skin is warm and dry  Neurological:      General: No focal deficit present  Mental Status: He is alert  Psychiatric:         Mood and Affect: Mood normal          Behavior: Behavior normal          Thought Content:  Thought content normal          Judgment: Judgment normal

## 2021-02-08 NOTE — ASSESSMENT & PLAN NOTE
Lab Results   Component Value Date    EGFR 18 02/03/2021    EGFR 20 01/18/2021    EGFR 16 12/01/2020    CREATININE 2 97 (H) 02/03/2021    CREATININE 2 79 (H) 01/18/2021    CREATININE 3 33 (H) 12/01/2020   Kidney function ranging from 17-20 ml/min  Continue to monitor  Monthly and return to the office in 3 months

## 2021-02-08 NOTE — ASSESSMENT & PLAN NOTE
Lab Results   Component Value Date    HGBA1C 7 9 (H) 12/01/2020   Not as well controlled but being monitored closely

## 2021-02-17 ENCOUNTER — LAB (OUTPATIENT)
Dept: LAB | Facility: CLINIC | Age: 86
End: 2021-02-17
Payer: COMMERCIAL

## 2021-02-17 LAB
ANION GAP SERPL CALCULATED.3IONS-SCNC: 5 MMOL/L (ref 4–13)
BUN SERPL-MCNC: 42 MG/DL (ref 5–25)
CALCIUM SERPL-MCNC: 10 MG/DL (ref 8.3–10.1)
CHLORIDE SERPL-SCNC: 113 MMOL/L (ref 100–108)
CO2 SERPL-SCNC: 27 MMOL/L (ref 21–32)
CREAT SERPL-MCNC: 3.34 MG/DL (ref 0.6–1.3)
GFR SERPL CREATININE-BSD FRML MDRD: 16 ML/MIN/1.73SQ M
GLUCOSE P FAST SERPL-MCNC: 178 MG/DL (ref 65–99)
POTASSIUM SERPL-SCNC: 5 MMOL/L (ref 3.5–5.3)
SODIUM SERPL-SCNC: 145 MMOL/L (ref 136–145)

## 2021-02-17 PROCEDURE — 80048 BASIC METABOLIC PNL TOTAL CA: CPT | Performed by: INTERNAL MEDICINE

## 2021-02-17 PROCEDURE — 36415 COLL VENOUS BLD VENIPUNCTURE: CPT | Performed by: INTERNAL MEDICINE

## 2021-03-04 ENCOUNTER — LAB (OUTPATIENT)
Dept: LAB | Facility: CLINIC | Age: 86
End: 2021-03-04
Payer: COMMERCIAL

## 2021-03-04 DIAGNOSIS — N18.5 STAGE 5 CHRONIC KIDNEY DISEASE (HCC): ICD-10-CM

## 2021-03-04 LAB
ANION GAP SERPL CALCULATED.3IONS-SCNC: 3 MMOL/L (ref 4–13)
BUN SERPL-MCNC: 38 MG/DL (ref 5–25)
CALCIUM SERPL-MCNC: 9.6 MG/DL (ref 8.3–10.1)
CHLORIDE SERPL-SCNC: 110 MMOL/L (ref 100–108)
CO2 SERPL-SCNC: 29 MMOL/L (ref 21–32)
CREAT SERPL-MCNC: 3.26 MG/DL (ref 0.6–1.3)
GFR SERPL CREATININE-BSD FRML MDRD: 16 ML/MIN/1.73SQ M
GLUCOSE P FAST SERPL-MCNC: 163 MG/DL (ref 65–99)
POTASSIUM SERPL-SCNC: 4.9 MMOL/L (ref 3.5–5.3)
SODIUM SERPL-SCNC: 142 MMOL/L (ref 136–145)

## 2021-03-04 PROCEDURE — 36415 COLL VENOUS BLD VENIPUNCTURE: CPT

## 2021-03-04 PROCEDURE — 80048 BASIC METABOLIC PNL TOTAL CA: CPT

## 2021-03-05 ENCOUNTER — IMMUNIZATIONS (OUTPATIENT)
Dept: FAMILY MEDICINE CLINIC | Facility: HOSPITAL | Age: 86
End: 2021-03-05

## 2021-03-05 DIAGNOSIS — Z23 ENCOUNTER FOR IMMUNIZATION: Primary | ICD-10-CM

## 2021-03-05 PROCEDURE — 0012A SARS-COV-2 / COVID-19 MRNA VACCINE (MODERNA) 100 MCG: CPT

## 2021-03-05 PROCEDURE — 91301 SARS-COV-2 / COVID-19 MRNA VACCINE (MODERNA) 100 MCG: CPT

## 2021-03-29 ENCOUNTER — APPOINTMENT (OUTPATIENT)
Dept: LAB | Facility: CLINIC | Age: 86
End: 2021-03-29
Payer: COMMERCIAL

## 2021-03-29 DIAGNOSIS — N18.5 STAGE 5 CHRONIC KIDNEY DISEASE (HCC): ICD-10-CM

## 2021-03-29 LAB
ANION GAP SERPL CALCULATED.3IONS-SCNC: 6 MMOL/L (ref 4–13)
BUN SERPL-MCNC: 42 MG/DL (ref 5–25)
CALCIUM SERPL-MCNC: 9.6 MG/DL (ref 8.3–10.1)
CHLORIDE SERPL-SCNC: 108 MMOL/L (ref 100–108)
CO2 SERPL-SCNC: 26 MMOL/L (ref 21–32)
CREAT SERPL-MCNC: 2.78 MG/DL (ref 0.6–1.3)
GFR SERPL CREATININE-BSD FRML MDRD: 20 ML/MIN/1.73SQ M
GLUCOSE P FAST SERPL-MCNC: 158 MG/DL (ref 65–99)
POTASSIUM SERPL-SCNC: 5.2 MMOL/L (ref 3.5–5.3)
SODIUM SERPL-SCNC: 140 MMOL/L (ref 136–145)

## 2021-03-29 PROCEDURE — 80048 BASIC METABOLIC PNL TOTAL CA: CPT

## 2021-03-29 PROCEDURE — 36415 COLL VENOUS BLD VENIPUNCTURE: CPT

## 2021-04-20 ENCOUNTER — TELEPHONE (OUTPATIENT)
Dept: NEPHROLOGY | Facility: CLINIC | Age: 86
End: 2021-04-20

## 2021-04-20 DIAGNOSIS — N18.5 STAGE 5 CHRONIC KIDNEY DISEASE (HCC): Primary | ICD-10-CM

## 2021-04-20 NOTE — TELEPHONE ENCOUNTER
Left message that script is in the computer  Told patient's wife to call back if she would rather the blood work script be mailed

## 2021-04-26 ENCOUNTER — APPOINTMENT (OUTPATIENT)
Dept: LAB | Facility: CLINIC | Age: 86
End: 2021-04-26
Payer: COMMERCIAL

## 2021-04-26 LAB
ALBUMIN SERPL BCP-MCNC: 3.6 G/DL (ref 3.5–5)
ALP SERPL-CCNC: 59 U/L (ref 46–116)
ALT SERPL W P-5'-P-CCNC: 43 U/L (ref 12–78)
ANION GAP SERPL CALCULATED.3IONS-SCNC: 7 MMOL/L (ref 4–13)
AST SERPL W P-5'-P-CCNC: 24 U/L (ref 5–45)
BASOPHILS # BLD AUTO: 0.01 THOUSANDS/ΜL (ref 0–0.1)
BASOPHILS NFR BLD AUTO: 0 % (ref 0–1)
BILIRUB SERPL-MCNC: 0.35 MG/DL (ref 0.2–1)
BUN SERPL-MCNC: 43 MG/DL (ref 5–25)
CALCIUM SERPL-MCNC: 9.6 MG/DL (ref 8.3–10.1)
CHLORIDE SERPL-SCNC: 109 MMOL/L (ref 100–108)
CO2 SERPL-SCNC: 25 MMOL/L (ref 21–32)
CREAT SERPL-MCNC: 2.82 MG/DL (ref 0.6–1.3)
EOSINOPHIL # BLD AUTO: 0.11 THOUSAND/ΜL (ref 0–0.61)
EOSINOPHIL NFR BLD AUTO: 2 % (ref 0–6)
ERYTHROCYTE [DISTWIDTH] IN BLOOD BY AUTOMATED COUNT: 12.2 % (ref 11.6–15.1)
GFR SERPL CREATININE-BSD FRML MDRD: 19 ML/MIN/1.73SQ M
GLUCOSE P FAST SERPL-MCNC: 177 MG/DL (ref 65–99)
HCT VFR BLD AUTO: 30.1 % (ref 36.5–49.3)
HGB BLD-MCNC: 9.5 G/DL (ref 12–17)
IMM GRANULOCYTES # BLD AUTO: 0.01 THOUSAND/UL (ref 0–0.2)
IMM GRANULOCYTES NFR BLD AUTO: 0 % (ref 0–2)
LYMPHOCYTES # BLD AUTO: 0.91 THOUSANDS/ΜL (ref 0.6–4.47)
LYMPHOCYTES NFR BLD AUTO: 19 % (ref 14–44)
MCH RBC QN AUTO: 32.4 PG (ref 26.8–34.3)
MCHC RBC AUTO-ENTMCNC: 31.6 G/DL (ref 31.4–37.4)
MCV RBC AUTO: 103 FL (ref 82–98)
MONOCYTES # BLD AUTO: 0.38 THOUSAND/ΜL (ref 0.17–1.22)
MONOCYTES NFR BLD AUTO: 8 % (ref 4–12)
NEUTROPHILS # BLD AUTO: 3.29 THOUSANDS/ΜL (ref 1.85–7.62)
NEUTS SEG NFR BLD AUTO: 71 % (ref 43–75)
NRBC BLD AUTO-RTO: 0 /100 WBCS
PHOSPHATE SERPL-MCNC: 3.3 MG/DL (ref 2.3–4.1)
PLATELET # BLD AUTO: 146 THOUSANDS/UL (ref 149–390)
PMV BLD AUTO: 10.4 FL (ref 8.9–12.7)
POTASSIUM SERPL-SCNC: 4.8 MMOL/L (ref 3.5–5.3)
PROT SERPL-MCNC: 7.2 G/DL (ref 6.4–8.2)
RBC # BLD AUTO: 2.93 MILLION/UL (ref 3.88–5.62)
SODIUM SERPL-SCNC: 141 MMOL/L (ref 136–145)
WBC # BLD AUTO: 4.71 THOUSAND/UL (ref 4.31–10.16)

## 2021-04-26 PROCEDURE — 84100 ASSAY OF PHOSPHORUS: CPT | Performed by: INTERNAL MEDICINE

## 2021-04-26 PROCEDURE — 80053 COMPREHEN METABOLIC PANEL: CPT | Performed by: INTERNAL MEDICINE

## 2021-04-26 PROCEDURE — 36415 COLL VENOUS BLD VENIPUNCTURE: CPT | Performed by: INTERNAL MEDICINE

## 2021-04-26 PROCEDURE — 85025 COMPLETE CBC W/AUTO DIFF WBC: CPT | Performed by: INTERNAL MEDICINE

## 2021-04-29 ENCOUNTER — OFFICE VISIT (OUTPATIENT)
Dept: FAMILY MEDICINE CLINIC | Facility: CLINIC | Age: 86
End: 2021-04-29
Payer: COMMERCIAL

## 2021-04-29 VITALS
OXYGEN SATURATION: 97 % | HEART RATE: 67 BPM | WEIGHT: 150.6 LBS | DIASTOLIC BLOOD PRESSURE: 60 MMHG | BODY MASS INDEX: 25.71 KG/M2 | SYSTOLIC BLOOD PRESSURE: 118 MMHG | TEMPERATURE: 97.4 F | HEIGHT: 64 IN

## 2021-04-29 DIAGNOSIS — E11.22 TYPE 2 DIABETES MELLITUS WITH STAGE 3 CHRONIC KIDNEY DISEASE, WITHOUT LONG-TERM CURRENT USE OF INSULIN, UNSPECIFIED WHETHER STAGE 3A OR 3B CKD (HCC): Primary | ICD-10-CM

## 2021-04-29 DIAGNOSIS — I26.99 PULMONARY EMBOLISM, OTHER, UNSPECIFIED CHRONICITY, UNSPECIFIED WHETHER ACUTE COR PULMONALE PRESENT (HCC): ICD-10-CM

## 2021-04-29 DIAGNOSIS — I42.9 CARDIOMYOPATHY, UNSPECIFIED TYPE (HCC): ICD-10-CM

## 2021-04-29 DIAGNOSIS — N18.30 TYPE 2 DIABETES MELLITUS WITH STAGE 3 CHRONIC KIDNEY DISEASE, WITHOUT LONG-TERM CURRENT USE OF INSULIN, UNSPECIFIED WHETHER STAGE 3A OR 3B CKD (HCC): Primary | ICD-10-CM

## 2021-04-29 DIAGNOSIS — S39.012A STRAIN OF LUMBAR REGION, INITIAL ENCOUNTER: ICD-10-CM

## 2021-04-29 DIAGNOSIS — I48.0 PAROXYSMAL ATRIAL FIBRILLATION (HCC): ICD-10-CM

## 2021-04-29 LAB — SL AMB POCT HEMOGLOBIN AIC: 8.8 (ref ?–6.5)

## 2021-04-29 PROCEDURE — 3078F DIAST BP <80 MM HG: CPT | Performed by: FAMILY MEDICINE

## 2021-04-29 PROCEDURE — 99214 OFFICE O/P EST MOD 30 MIN: CPT | Performed by: FAMILY MEDICINE

## 2021-04-29 PROCEDURE — 1160F RVW MEDS BY RX/DR IN RCRD: CPT | Performed by: FAMILY MEDICINE

## 2021-04-29 PROCEDURE — 3074F SYST BP LT 130 MM HG: CPT | Performed by: FAMILY MEDICINE

## 2021-04-29 PROCEDURE — 3725F SCREEN DEPRESSION PERFORMED: CPT | Performed by: FAMILY MEDICINE

## 2021-04-29 PROCEDURE — 83036 HEMOGLOBIN GLYCOSYLATED A1C: CPT | Performed by: FAMILY MEDICINE

## 2021-04-29 PROCEDURE — 1036F TOBACCO NON-USER: CPT | Performed by: FAMILY MEDICINE

## 2021-04-29 RX ORDER — TRAMADOL HYDROCHLORIDE 50 MG/1
50 TABLET ORAL EVERY 6 HOURS PRN
Qty: 30 TABLET | Refills: 1 | Status: SHIPPED | OUTPATIENT
Start: 2021-04-29

## 2021-04-29 NOTE — PROGRESS NOTES
Assessment/Plan:  Considering chronic back pain recommended tramadol  Anti-inflammatories are not recommended considering his chronic renal history  Recommend recheck in office again in 3-6 months for recheck on diabetes  Medications reviewed  He will call with any new persisting or worsening symptoms in the interim  Time spent counseling regarding treatment options and coordinating care as well as documentation was 25 minutes  1  Type 2 diabetes mellitus with stage 3 chronic kidney disease, without long-term current use of insulin, unspecified whether stage 3a or 3b CKD (HCC)  -     POCT hemoglobin A1c    2  Strain of lumbar region, initial encounter  -     XR spine lumbar 2 or 3 views injury; Future; Expected date: 04/29/2021  -     Ambulatory referral to Physical Therapy; Future  -     traMADol (ULTRAM) 50 mg tablet; Take 1 tablet (50 mg total) by mouth every 6 (six) hours as needed for moderate pain    3  Pulmonary embolism, other, unspecified chronicity, unspecified whether acute cor pulmonale present ([facility])    4  Cardiomyopathy, unspecified type ([facility] )    5  Paroxysmal atrial fibrillation ([facility])          Subjective:      Patient ID: Deborah Weaver  is a 80 y o  male  Patient with chronic medical history including history of chronic renal disease and diabetes  He continues to follow with Nephrology  He has gone through dialysis and nephrology continues to follow  Patient with chronic low back pain intermittently over the last 1-2 weeks  Denies radiculopathy symptoms  No bowel or bladder incontinence symptoms  Back pain is mild-to-moderate  He is currently not taking any medication for this  The following portions of the patient's history were reviewed and updated as appropriate: allergies, current medications, past family history, past medical history, past social history, past surgical history, and problem list     Review of Systems   Musculoskeletal: Positive for back pain  Objective:      /60 (BP Location: Left arm, Patient Position: Sitting, Cuff Size: Standard)   Pulse 67   Temp (!) 97 4 °F (36 3 °C) (Temporal)   Ht 5' 4" (1 626 m)   Wt 68 3 kg (150 lb 9 6 oz)   SpO2 97%   BMI 25 85 kg/m²          Physical Exam  Vitals signs reviewed  Constitutional:       Appearance: He is well-developed  HENT:      Head: Normocephalic and atraumatic  Right Ear: External ear normal  Tympanic membrane is not erythematous or bulging  Left Ear: External ear normal  Tympanic membrane is not erythematous or bulging  Nose: Nose normal       Mouth/Throat:      Mouth: No oral lesions  Pharynx: No oropharyngeal exudate  Eyes:      General: No scleral icterus  Right eye: No discharge  Left eye: No discharge  Conjunctiva/sclera: Conjunctivae normal    Neck:      Musculoskeletal: Normal range of motion and neck supple  Thyroid: No thyromegaly  Cardiovascular:      Rate and Rhythm: Normal rate and regular rhythm  Heart sounds: Normal heart sounds  No murmur  No friction rub  No gallop  Pulmonary:      Effort: Pulmonary effort is normal  No respiratory distress  Breath sounds: No wheezing or rales  Chest:      Chest wall: No tenderness  Abdominal:      General: Bowel sounds are normal  There is no distension  Palpations: Abdomen is soft  There is no mass  Tenderness: There is no abdominal tenderness  There is no guarding or rebound  Musculoskeletal: Normal range of motion  General: No tenderness or deformity  Lymphadenopathy:      Cervical: No cervical adenopathy  Skin:     General: Skin is warm and dry  Coloration: Skin is not pale  Findings: No erythema or rash  Neurological:      Mental Status: He is alert and oriented to person, place, and time  Cranial Nerves: No cranial nerve deficit  Motor: No abnormal muscle tone        Coordination: Coordination normal       Deep Tendon Reflexes: Reflexes are normal and symmetric     Psychiatric:         Behavior: Behavior normal

## 2021-04-29 NOTE — PROGRESS NOTES
BMI Counseling: Body mass index is 25 85 kg/m²  The BMI is above normal  Nutrition recommendations include reducing portion sizes

## 2021-04-30 ENCOUNTER — APPOINTMENT (OUTPATIENT)
Dept: RADIOLOGY | Facility: MEDICAL CENTER | Age: 86
End: 2021-04-30
Payer: COMMERCIAL

## 2021-04-30 DIAGNOSIS — S39.012A STRAIN OF LUMBAR REGION, INITIAL ENCOUNTER: ICD-10-CM

## 2021-04-30 PROCEDURE — 72100 X-RAY EXAM L-S SPINE 2/3 VWS: CPT

## 2021-05-03 ENCOUNTER — TELEPHONE (OUTPATIENT)
Dept: FAMILY MEDICINE CLINIC | Facility: CLINIC | Age: 86
End: 2021-05-03

## 2021-05-06 DIAGNOSIS — I10 ESSENTIAL HYPERTENSION: ICD-10-CM

## 2021-05-06 RX ORDER — AMLODIPINE BESYLATE 5 MG/1
TABLET ORAL
Qty: 90 TABLET | Refills: 3 | Status: SHIPPED | OUTPATIENT
Start: 2021-05-06 | End: 2022-01-20 | Stop reason: SDUPTHER

## 2021-05-24 ENCOUNTER — APPOINTMENT (OUTPATIENT)
Dept: LAB | Facility: CLINIC | Age: 86
End: 2021-05-24
Payer: COMMERCIAL

## 2021-05-25 PROBLEM — N18.30 STAGE 3 CHRONIC KIDNEY DISEASE (HCC): Status: RESOLVED | Noted: 2019-02-25 | Resolved: 2021-05-25

## 2021-05-25 PROBLEM — N18.5 STAGE 5 CHRONIC KIDNEY DISEASE (HCC): Status: RESOLVED | Noted: 2019-12-04 | Resolved: 2021-05-25

## 2021-05-25 PROBLEM — N17.0 ACUTE RENAL FAILURE WITH TUBULAR NECROSIS (HCC): Status: RESOLVED | Noted: 2019-03-03 | Resolved: 2021-05-25

## 2021-05-25 NOTE — PROGRESS NOTES
Nephrology   Office Follow-Up  Gypsy Young  80 y o  male MRN: [id number]    Encounter: [id number]        [address] [place] [address]    Gypsy Young  was seen in the [facility] office today  All diagnoses and orders for visit:     1  Stage 4 chronic kidney disease ([facility] [address] )  · Most recent sCr baseline appears to be around 2 8-3 3 mg/dL with most recent sCr 3 01 mg/dL and eGFR 18 mil/min  Previously hemodialysis dependent up until about 2 years ago  No indication for renal replacement therapy  He will continue to avoid nephrotoxins and NSAIDs  RTO 3 months with Dr Lauren Brady  · Of note, Merle Reid would be agreeable to dialysis again if he were to need it  · Remains on angiotensin receptor blocker  Potassium is borderline  2  Hemodialysis access, AV graft (HCC)  · Left upper arm brachial [place]-cassidy graft created 7/3/2019 by Dr Christiano Robins  There is no bruit or thrill and it probably is clotted  If renal function worsens, recommend duplex  3  Anemia due to stage 4 chronic kidney disease (HCC)  · Hgb below goal  Check iron panel and maximize then consider ABRAHAN  Goal hemoglobin between 10-11 g/dL  -     Iron Panel (Includes Ferritin, Iron Sat%, Iron, and TIBC); Future; Expected date: 08/26/2021  4  Secondary hyperparathyroidism of renal origin ([facility] )  · No need for binder or activated vitamin D agent at this time    -     Phosphorus; Future; Expected date: 08/26/2021   -     PTH, intact; Future; Expected date: 08/26/2021  5  Type 2 diabetes mellitus without complication, without long-term current use of insulin ([facility])  · Hemoglobin A1C increased to 8 8%  discussed importance of better blood sugar control, especially to control progression of renal disease  Remains on ARB  6  Allergy to ACE inhibitors  7  Essential hypertension  · Blood pressure is appropriate   Continue current regimen     HPI: Gypsy Young  is a 80 y o  male with an active problem list significant for CKD 4, history of HD dependent s/p renal recovery, hypertension, T2DM, chronic back pain cardiomyopathy, PAF, history of pericardial effusion s/p pericardial window, who is here for scheduled follow-up  Patient is stable from a renal standpoint  Focus on better blood sugar control to prevent further worsening renal function  Blood work to be done in about 3 months then return to office with primary nephrologist, sooner if necessary  The medical record, including Care Everywhere and media tabs were reviewed  ROS:   Review of Systems   Constitutional: Negative  HENT: Negative  Eyes: Negative  Respiratory: Negative  Cardiovascular: Negative  Gastrointestinal: Negative  Endocrine: Negative  Genitourinary: Negative  Musculoskeletal: Negative  Allergic/Immunologic: Negative  Neurological: Negative  Hematological: Negative  Psychiatric/Behavioral: Negative  All other systems reviewed and are negative        Allergies: Bee venom and Ace inhibitors    Medications:   Current Outpatient Medications:     acetaminophen (TYLENOL) 325 mg tablet, Take 650 mg by mouth daily, Disp: , Rfl:     ALPRAZolam (XANAX) 0 25 mg tablet, TAKE 1 TABLET (0 25 MG TOTAL) BY MOUTH EVERY 8 (EIGHT) HOURS AS NEEDED FOR ANXIETY, Disp: 270 tablet, Rfl: 0    amLODIPine (NORVASC) 5 mg tablet, TAKE 1 TABLET BY MOUTH EVERY DAY, Disp: 90 tablet, Rfl: 3    Blood Glucose Monitoring Suppl (ONE TOUCH ULTRA 2) w/Device KIT, by Does not apply route 2 (two) times a day, Disp: 1 each, Rfl: 0    Blood Glucose Monitoring Suppl (ONE TOUCH ULTRA 2) w/Device KIT, Use daily, Disp: 1 each, Rfl: 0    Blood Glucose Monitoring Suppl (TRUE METRIX METER) YASMEEN, by Does not apply route 2 (two) times a day, Disp: 1 Device, Rfl: 0    Cholecalciferol (VITAMIN D PO), Take 5,000 Units by mouth every morning , Disp: , Rfl:     Dulera 100-5 MCG/ACT inhaler, INHALE 2 PUFFS EVERY 12 (TWELVE) HOURS, Disp: 13 Inhaler, Rfl: 11    finasteride (PROSCAR) 5 mg tablet, TAKE 1 TABLET BY MOUTH EVERY DAY, Disp: 90 tablet, Rfl: 3    fluticasone (FLONASE) 50 mcg/act nasal spray, USE 2 SPRAYS IN EACH NOSTRIL DAILY, Disp: 16 mL, Rfl: 0    glucose blood (ONE TOUCH ULTRA TEST) test strip, Use daily as directed , Disp: 200 each, Rfl: 3    glucose blood (TRUE METRIX BLOOD GLUCOSE TEST) test strip, Test twice daily, Disp: 300 each, Rfl: 0    Lancets (ONETOUCH ULTRASOFT) lancets, USE DAILY AS DIRECTED , Disp: 100 each, Rfl: 3    Lancets (onetouch ultrasoft) lancets, Use daily as instructed, Disp: 100 each, Rfl: 3    losartan (COZAAR) 100 MG tablet, TAKE 0 5 TABLETS (50 MG TOTAL) BY MOUTH EVERY MORNING, Disp: 45 tablet, Rfl: 3    metoprolol tartrate (LOPRESSOR) 25 mg tablet, TAKE 1 TABLET (25 MG TOTAL) BY MOUTH EVERY 12 (TWELVE) HOURS (Patient taking differently: Take 25 mg by mouth daily ), Disp: 180 tablet, Rfl: 1    PARoxetine (PAXIL) 30 mg tablet, TAKE 1 TABLET (30 MG TOTAL) BY MOUTH EVERY MORNING AS DIRECTED, Disp: 90 tablet, Rfl: 1    rosuvastatin (CRESTOR) 40 MG tablet, Take 1 tablet (40 mg total) by mouth every morning, Disp: 90 tablet, Rfl: 3    traMADol (ULTRAM) 50 mg tablet, Take 1 tablet (50 mg total) by mouth every 6 (six) hours as needed for moderate pain, Disp: 30 tablet, Rfl: 1    Past Medical History:   Diagnosis Date    Anxiety     Obrien's esophagus     BPH (benign prostatic hyperplasia)     Cancer (HCC)     pt states hx skin cancer, pt confused    Cardiomegaly     Diabetes (HCC)     type 2  controlled; taken off oral meds 6/19    DVT (deep venous thrombosis) (HCC)     right leg    GERD (gastroesophageal reflux disease)     Hypercholesterolemia     Hypertension     Irregular heart beat     paroxsysmal a fib    Pancreatic cyst     Paroxysmal atrial fibrillation (HCC)     Pericardial effusion with cardiac tamponade     Pleural effusion     Renal disorder     dialysis started in 2019    Weight loss, non-intentional      Past Surgical History:   Procedure Laterality Date    APPENDECTOMY  1953    CATARACT EXTRACTION Bilateral 2016?  COLONOSCOPY      COLONOSCOPY N/A 1/24/2019    Procedure: COLONOSCOPY with polypectomies;  Surgeon: Theresa Ng MD;  [facility]; Service: Gastroenterology    ESOPHAGOGASTRODUODENOSCOPY N/A 1/24/2019    Procedure: ESOPHAGOGASTRODUODENOSCOPY (EGD) with bx;  Surgeon: Theresa Ng MD;  [facility]: [facility]; Service: Gastroenterology    IR TUNNELED DIALYSIS CATHETER CHECK/CHANGE/REPOSITION/ANGIOPLASTY  4/18/2019    IR TUNNELED DIALYSIS CATHETER PLACEMENT  3/6/2019    IR TUNNELED DIALYSIS CATHETER REMOVAL  7/30/2019    PERICARDIAL WINDOW N/A 2/26/2019    Procedure: WINDOW PERICARDIAL;  Surgeon: Lalita Fields MD;  [facility]: [facility];  Service: Thoracic    ND ANASTOMOSIS,AV,ANY SITE Left 7/3/2019    Procedure: CREATION FISTULA ARTERIOVENOUS (AV) left upper extremity brachial artery to axillary vein [place]-Mushtaq graft ;  Surgeon: Aubree Bell MD;  Location: [facility] MAIN OR;  Service: Vascular    PROSTATE BIOPSY       Family History   Problem Relation Age of Onset    Diabetes Mother     Diabetes type II Mother     Diabetes Father     Diabetes type II Father     Heart attack Father     Prostate cancer Brother     Diabetes Brother     Pulmonary embolism Sister       reports that he quit smoking about 52 years ago  His smoking use included cigars  He smoked 0 50 packs per day  He has never used smokeless tobacco  He reports that he does not drink alcohol or use drugs  Physical Exam:   Vitals:    05/26/21 1014   BP: 126/74   Pulse: 81   SpO2: 95%   Weight: 68 9 kg (151 lb 12 8 oz)   Height: 5' 4" (1 626 m)     Body mass index is 26 06 kg/m²      General: conscious, cooperative, in no acute distress, appears stated age  Eyes: conjunctivae pale, anicteric sclerae  ENT: lips and mucous membranes moist  Neck: supple, no JVD, no masses  Chest:  essentially clear breath sounds bilaterally, no crackles, ronchus or wheezings  CVS: S1 & S2, normal rate, regular rhythm  Abdomen: soft, non-tender, non-distended, normoactive bowel sounds, rounded  Extremities: no edema of both legs  Skin: no rash   Neuro: awake, alert, oriented, hard of hearing      Diagnostic Data:  Lab: I have personally reviewed pertinent lab results  ,   CBC:  Results from last 7 days   Lab Units 05/24/21  0718   WBC Thousand/uL 5 09   HEMOGLOBIN g/dL 10 0*   HEMATOCRIT % 30 7*   PLATELETS Thousands/uL 159      CMP: No results found for: SODIUM, K, CL, CO2, ANIONGAP, BUN, CREATININE, GLUCOSE, CALCIUM, AST, ALT, ALKPHOS, PROT, BILITOT, EGFR,   PT/INR: No results found for: PT, INR,   Magnesium: No components found for: MAG,  Phosphorous: No results found for: PHOS    Patient Instructions   Blood work to be done in August      Portions of the record may have been created with voice recognition software  Occasional wrong word or "sound a like" substitutions may have occurred due to the inherent limitations of voice recognition software  Read the chart carefully and recognize, using context, where substitutions have occurred  If you have any questions, please contact the dictating provider

## 2021-05-26 ENCOUNTER — OFFICE VISIT (OUTPATIENT)
Dept: NEPHROLOGY | Facility: CLINIC | Age: 86
End: 2021-05-26
Payer: COMMERCIAL

## 2021-05-26 VITALS
OXYGEN SATURATION: 95 % | HEART RATE: 81 BPM | SYSTOLIC BLOOD PRESSURE: 126 MMHG | BODY MASS INDEX: 25.92 KG/M2 | HEIGHT: 64 IN | DIASTOLIC BLOOD PRESSURE: 74 MMHG | WEIGHT: 151.8 LBS

## 2021-05-26 DIAGNOSIS — E83.9 CHRONIC KIDNEY DISEASE-MINERAL AND BONE DISORDER: ICD-10-CM

## 2021-05-26 DIAGNOSIS — N18.4 STAGE 4 CHRONIC KIDNEY DISEASE (HCC): Primary | ICD-10-CM

## 2021-05-26 DIAGNOSIS — I10 ESSENTIAL HYPERTENSION: ICD-10-CM

## 2021-05-26 DIAGNOSIS — Z88.8 ALLERGY TO ACE INHIBITORS: ICD-10-CM

## 2021-05-26 DIAGNOSIS — E11.9 TYPE 2 DIABETES MELLITUS WITHOUT COMPLICATION, WITHOUT LONG-TERM CURRENT USE OF INSULIN (HCC): ICD-10-CM

## 2021-05-26 DIAGNOSIS — Z86.2 HISTORY OF ANEMIA DUE TO CKD: ICD-10-CM

## 2021-05-26 DIAGNOSIS — Z99.2 HEMODIALYSIS ACCESS, AV GRAFT (HCC): ICD-10-CM

## 2021-05-26 DIAGNOSIS — N18.9 HISTORY OF ANEMIA DUE TO CKD: ICD-10-CM

## 2021-05-26 DIAGNOSIS — D63.1 ANEMIA DUE TO STAGE 4 CHRONIC KIDNEY DISEASE (HCC): ICD-10-CM

## 2021-05-26 DIAGNOSIS — N25.81 SECONDARY HYPERPARATHYROIDISM OF RENAL ORIGIN (HCC): ICD-10-CM

## 2021-05-26 DIAGNOSIS — M89.9 CHRONIC KIDNEY DISEASE-MINERAL AND BONE DISORDER: ICD-10-CM

## 2021-05-26 DIAGNOSIS — N18.9 CHRONIC KIDNEY DISEASE-MINERAL AND BONE DISORDER: ICD-10-CM

## 2021-05-26 DIAGNOSIS — N18.4 ANEMIA DUE TO STAGE 4 CHRONIC KIDNEY DISEASE (HCC): ICD-10-CM

## 2021-05-26 PROCEDURE — 3074F SYST BP LT 130 MM HG: CPT | Performed by: NURSE PRACTITIONER

## 2021-05-26 PROCEDURE — 1036F TOBACCO NON-USER: CPT | Performed by: NURSE PRACTITIONER

## 2021-05-26 PROCEDURE — 3078F DIAST BP <80 MM HG: CPT | Performed by: NURSE PRACTITIONER

## 2021-05-26 PROCEDURE — 99214 OFFICE O/P EST MOD 30 MIN: CPT | Performed by: NURSE PRACTITIONER

## 2021-05-26 PROCEDURE — 1160F RVW MEDS BY RX/DR IN RCRD: CPT | Performed by: NURSE PRACTITIONER

## 2021-06-06 DIAGNOSIS — E11.9 TYPE 2 DIABETES MELLITUS WITHOUT COMPLICATION, WITHOUT LONG-TERM CURRENT USE OF INSULIN (HCC): ICD-10-CM

## 2021-06-08 RX ORDER — ROSUVASTATIN CALCIUM 40 MG/1
TABLET, COATED ORAL
Qty: 90 TABLET | Refills: 3 | Status: SHIPPED | OUTPATIENT
Start: 2021-06-08 | End: 2022-03-09

## 2021-06-29 DIAGNOSIS — J30.9 ALLERGIC RHINITIS, UNSPECIFIED SEASONALITY, UNSPECIFIED TRIGGER: ICD-10-CM

## 2021-06-29 RX ORDER — FLUTICASONE PROPIONATE 50 MCG
SPRAY, SUSPENSION (ML) NASAL
Qty: 16 ML | Refills: 0 | Status: SHIPPED | OUTPATIENT
Start: 2021-06-29

## 2021-06-30 DIAGNOSIS — F41.1 GENERALIZED ANXIETY DISORDER: ICD-10-CM

## 2021-06-30 RX ORDER — ALPRAZOLAM 0.25 MG/1
0.25 TABLET ORAL EVERY 8 HOURS PRN
Qty: 270 TABLET | Refills: 0 | Status: SHIPPED | OUTPATIENT
Start: 2021-06-30 | End: 2021-11-04 | Stop reason: SDUPTHER

## 2021-07-28 DIAGNOSIS — I48.0 PAROXYSMAL ATRIAL FIBRILLATION (HCC): Chronic | ICD-10-CM

## 2021-07-28 DIAGNOSIS — F41.1 GENERALIZED ANXIETY DISORDER: ICD-10-CM

## 2021-07-29 RX ORDER — PAROXETINE 30 MG/1
30 TABLET, FILM COATED ORAL EVERY MORNING
Qty: 90 TABLET | Refills: 1 | Status: SHIPPED | OUTPATIENT
Start: 2021-07-29 | End: 2021-12-08

## 2021-08-10 ENCOUNTER — OFFICE VISIT (OUTPATIENT)
Dept: CARDIOLOGY CLINIC | Facility: CLINIC | Age: 86
End: 2021-08-10
Payer: COMMERCIAL

## 2021-08-10 VITALS
HEIGHT: 64 IN | DIASTOLIC BLOOD PRESSURE: 60 MMHG | HEART RATE: 65 BPM | WEIGHT: 152.8 LBS | BODY MASS INDEX: 26.09 KG/M2 | SYSTOLIC BLOOD PRESSURE: 118 MMHG

## 2021-08-10 DIAGNOSIS — Z98.890 S/P PERICARDIAL WINDOW CREATION: ICD-10-CM

## 2021-08-10 DIAGNOSIS — I31.4 PERICARDIAL EFFUSION WITH CARDIAC TAMPONADE: Chronic | ICD-10-CM

## 2021-08-10 DIAGNOSIS — I31.3 PERICARDIAL EFFUSION WITH CARDIAC TAMPONADE: Chronic | ICD-10-CM

## 2021-08-10 DIAGNOSIS — I48.0 PAROXYSMAL ATRIAL FIBRILLATION (HCC): Primary | Chronic | ICD-10-CM

## 2021-08-10 PROCEDURE — 93000 ELECTROCARDIOGRAM COMPLETE: CPT | Performed by: INTERNAL MEDICINE

## 2021-08-10 PROCEDURE — 99213 OFFICE O/P EST LOW 20 MIN: CPT | Performed by: INTERNAL MEDICINE

## 2021-08-10 RX ORDER — OMEPRAZOLE 40 MG/1
40 CAPSULE, DELAYED RELEASE ORAL DAILY
COMMUNITY

## 2021-08-10 NOTE — PROGRESS NOTES
Cardiology Follow Up    Chris Canada   1935  [id number]  [facility]  [address]  [address]  [phone]    1  Paroxysmal atrial fibrillation (HCC)  POCT ECG   2  Pericardial effusion with cardiac tamponade  POCT ECG   3  S/P pericardial window creation  POCT ECG       Interval History:  Cardiology follow-up  Patient has not been seen in almost 2 years  Patient continues to do well from a cardiac point of view, he ambulates slow pace  Denies any syncope or presyncope denies any chest pain, no dyspnea orthopnea PND  His compliant with his low-sodium diet, blood pressures been well control, he has been off dialysis for close to a year  Denies any bleeding diathesis    Denies any CVA    Patient Active Problem List   Diagnosis    Benign prostatic hyperplasia with urinary frequency    Dyslipidemia    Esophageal reflux    Essential hypertension    Lower leg DVT (deep venous thromboembolism), acute, right (HCC)    Pancreatic cyst    Pulmonary embolism (HCC)    Pulmonary nodule seen on imaging study    Type 2 diabetes mellitus (HCC)    Pericardial effusion with cardiac tamponade    Paroxysmal atrial fibrillation (HCC)    COPD (chronic obstructive pulmonary disease) (HCC)    Chronic anemia    S/P pericardial window creation    Obrien's esophagus without dysplasia    Tubular adenoma    Renovascular hypertension    Persistent proteinuria    Anemia due to chronic kidney disease    Secondary hyperparathyroidism of renal origin ([facility] [address] )    Hemodialysis access, AV graft ([facility] [address] )    Cardiomyopathy ([facility] [address] )    Type 2 diabetes mellitus with stage 3 chronic kidney disease, without long-term current use of insulin (HCC)    Stage 4 chronic kidney disease (HCC)    History of anemia due to CKD    Allergy to ACE inhibitors     Past Medical History:   Diagnosis Date    Anxiety     Obrien's esophagus  BPH (benign prostatic hyperplasia)     Cancer (HCC)     pt states hx skin cancer, pt confused    Cardiomegaly     Diabetes ([facility] [address] )     type 2  controlled; taken off oral meds     DVT (deep venous thrombosis) (HCC)     right leg    GERD (gastroesophageal reflux disease)     Hypercholesterolemia     Hypertension     Irregular heart beat     paroxsysmal a fib    Pancreatic cyst     Paroxysmal atrial fibrillation (HCC)     Pericardial effusion with cardiac tamponade     Pleural effusion     Renal disorder     dialysis started in 2019    Weight loss, non-intentional      Social History     Socioeconomic History    Marital status: /Civil Union     Spouse name: Not on file    Number of children: Not on file    Years of education: Not on file    Highest education level: Not on file   Occupational History    Occupation: Retired      Comment:     Tobacco Use    Smoking status: Former Smoker     Packs/day: 0 50     Types: Cigars     Quit date:      Years since quittin 6    Smokeless tobacco: Never Used    Tobacco comment: current non-smoker   Vaping Use    Vaping Use: Never used   Substance and Sexual Activity    Alcohol use: No    Drug use: Never    Sexual activity: Not Currently   Other Topics Concern    Not on file   Social History Narrative    Patient has never smoked - As per [employer]    Consumes on average 2 cups of regular coffee per day      Social Determinants of Health     Financial Resource Strain:     Difficulty of Paying Living Expenses:    Food Insecurity:     Worried About Running Out of Food in the Last Year:     Ran Out of Food in the Last Year:    Transportation Needs:     Lack of Transportation (Medical):      Lack of Transportation (Non-Medical):    Physical Activity:     Days of Exercise per Week:     Minutes of Exercise per Session:    Stress:     Feeling of Stress :    Social Connections:     Frequency of Communication with Friends and Family:     Frequency of Social Gatherings with Friends and Family:     Attends [employer] Services:     Active Member of Clubs or Organizations:     Attends Club or Organization Meetings:     Marital Status:    Intimate Partner Violence:     Fear of Current or Ex-Partner:     Emotionally Abused:     Physically Abused:     Sexually Abused:       Family History   Problem Relation Age of Onset    Diabetes Mother     Diabetes type II Mother     Diabetes Father     Diabetes type II Father     Heart attack Father     Prostate cancer Brother     Diabetes Brother     Pulmonary embolism Sister      Past Surgical History:   Procedure Laterality Date    APPENDECTOMY  1953    CATARACT EXTRACTION Bilateral 2016?  COLONOSCOPY      COLONOSCOPY N/A 1/24/2019    Procedure: COLONOSCOPY with polypectomies;  Surgeon: Ahsan Salgado MD;  [facility]; Service: Gastroenterology    ESOPHAGOGASTRODUODENOSCOPY N/A 1/24/2019    Procedure: ESOPHAGOGASTRODUODENOSCOPY (EGD) with bx;  Surgeon: Ahsan Salgado MD;  [facility];   Service: Gastroenterology    IR TUNNELED DIALYSIS CATHETER CHECK/CHANGE/REPOSITION/ANGIOPLASTY  4/18/2019    IR TUNNELED DIALYSIS CATHETER PLACEMENT  3/6/2019    IR TUNNELED DIALYSIS CATHETER REMOVAL  7/30/2019    PERICARDIAL WINDOW N/A 2/26/2019    Procedure: WINDOW PERICARDIAL;  Surgeon: James Braswell MD;  [facility];  Service: Thoracic    AK ANASTOMOSIS,AV,ANY SITE Left 7/3/2019    Procedure: CREATION FISTULA ARTERIOVENOUS (AV) left upper extremity brachial artery to axillary vein [place]-Mushtaq graft ;  Surgeon: Shasta Lincoln MD;  Location: [address] [place] [address] MAIN OR;  Service: Vascular    PROSTATE BIOPSY         Current Outpatient Medications:     acetaminophen (TYLENOL) 325 mg tablet, Take 650 mg by mouth daily, Disp: , Rfl:     ALPRAZolam (XANAX) 0 25 mg tablet, Take 1 tablet (0 25 mg total) by mouth every 8 (eight) hours as needed for anxiety, Disp: 270 tablet, Rfl: 0   amLODIPine (NORVASC) 5 mg tablet, TAKE 1 TABLET BY MOUTH EVERY DAY, Disp: 90 tablet, Rfl: 3    Blood Glucose Monitoring Suppl (ONE TOUCH ULTRA 2) w/Device KIT, by Does not apply route 2 (two) times a day, Disp: 1 each, Rfl: 0    Blood Glucose Monitoring Suppl (ONE TOUCH ULTRA 2) w/Device KIT, Use daily, Disp: 1 each, Rfl: 0    Blood Glucose Monitoring Suppl (TRUE METRIX METER) YASMEEN, by Does not apply route 2 (two) times a day, Disp: 1 Device, Rfl: 0    glucose blood (ONE TOUCH ULTRA TEST) test strip, Use daily as directed , Disp: 200 each, Rfl: 3    Lancets (ONETOUCH ULTRASOFT) lancets, USE DAILY AS DIRECTED , Disp: 100 each, Rfl: 3    Lancets (onetouch ultrasoft) lancets, Use daily as instructed, Disp: 100 each, Rfl: 3    losartan (COZAAR) 100 MG tablet, TAKE 0 5 TABLETS (50 MG TOTAL) BY MOUTH EVERY MORNING, Disp: 45 tablet, Rfl: 3    PARoxetine (PAXIL) 30 mg tablet, TAKE 1 TABLET (30 MG TOTAL) BY MOUTH EVERY MORNING AS DIRECTED, Disp: 90 tablet, Rfl: 1    Cholecalciferol (VITAMIN D PO), Take 5,000 Units by mouth every morning , Disp: , Rfl:     Dulera 100-5 MCG/ACT inhaler, INHALE 2 PUFFS EVERY 12 (TWELVE) HOURS, Disp: 13 Inhaler, Rfl: 11    finasteride (PROSCAR) 5 mg tablet, TAKE 1 TABLET BY MOUTH EVERY DAY, Disp: 90 tablet, Rfl: 3    fluticasone (FLONASE) 50 mcg/act nasal spray, USE 2 SPRAYS IN EACH NOSTRIL DAILY, Disp: 16 mL, Rfl: 0    glucose blood (TRUE METRIX BLOOD GLUCOSE TEST) test strip, Test twice daily, Disp: 300 each, Rfl: 0    metoprolol tartrate (LOPRESSOR) 25 mg tablet, Take 1 tablet (25 mg total) by mouth daily, Disp: 90 tablet, Rfl: 1    rosuvastatin (CRESTOR) 40 MG tablet, TAKE 1 TABLET BY MOUTH EVERY DAY IN THE MORNING, Disp: 90 tablet, Rfl: 3    traMADol (ULTRAM) 50 mg tablet, Take 1 tablet (50 mg total) by mouth every 6 (six) hours as needed for moderate pain, Disp: 30 tablet, Rfl: 1  Allergies   Allergen Reactions    Bee Venom Facial Swelling    Ace Inhibitors Other (See Comments)     Unknown reaction       Labs:   Ancillary Orders on 05/14/2021   Component Date Value    Sodium 05/24/2021 143     Potassium 05/24/2021 4 7     Chloride 05/24/2021 109*    CO2 05/24/2021 28     ANION GAP 05/24/2021 6     BUN 05/24/2021 40*    Creatinine 05/24/2021 3 01*    Glucose, Fasting 05/24/2021 145*    Calcium 05/24/2021 9 4     AST 05/24/2021 19     ALT 05/24/2021 43     Alkaline Phosphatase 05/24/2021 55     Total Protein 05/24/2021 7 2     Albumin 05/24/2021 3 7     Total Bilirubin 05/24/2021 0 35     eGFR 05/24/2021 18     Phosphorus 05/24/2021 3 4     WBC 05/24/2021 5 09     RBC 05/24/2021 3 01*    Hemoglobin 05/24/2021 10 0*    Hematocrit 05/24/2021 30 7*    MCV 05/24/2021 102*    MCH 05/24/2021 33 2     MCHC 05/24/2021 32 6     RDW 05/24/2021 12 1     MPV 05/24/2021 10 2     Platelets 27/91/6941 159     nRBC 05/24/2021 0     Neutrophils Relative 05/24/2021 72     Immat GRANS % 05/24/2021 0     Lymphocytes Relative 05/24/2021 17     Monocytes Relative 05/24/2021 8     Eosinophils Relative 05/24/2021 3     Basophils Relative 05/24/2021 0     Neutrophils Absolute 05/24/2021 3 64     Immature Grans Absolute 05/24/2021 0 01     Lymphocytes Absolute 05/24/2021 0 85     Monocytes Absolute 05/24/2021 0 43     Eosinophils Absolute 05/24/2021 0 14     Basophils Absolute 05/24/2021 0 02    Office Visit on 04/29/2021   Component Date Value    Hemoglobin A1C 04/29/2021 8 8*   Orders Only on 04/20/2021   Component Date Value    Sodium 04/26/2021 141     Potassium 04/26/2021 4 8     Chloride 04/26/2021 109*    CO2 04/26/2021 25     ANION GAP 04/26/2021 7     BUN 04/26/2021 43*    Creatinine 04/26/2021 2 82*    Glucose, Fasting 04/26/2021 177*    Calcium 04/26/2021 9 6     AST 04/26/2021 24     ALT 04/26/2021 43     Alkaline Phosphatase 04/26/2021 59     Total Protein 04/26/2021 7 2     Albumin 04/26/2021 3 6     Total Bilirubin 04/26/2021 0 35     eGFR 04/26/2021 19     Phosphorus 04/26/2021 3 3     WBC 04/26/2021 4 71     RBC 04/26/2021 2 93*    Hemoglobin 04/26/2021 9 5*    Hematocrit 04/26/2021 30 1*    MCV 04/26/2021 103*    MCH 04/26/2021 32 4     MCHC 04/26/2021 31 6     RDW 04/26/2021 12 2     MPV 04/26/2021 10 4     Platelets 27/06/3720 146*    nRBC 04/26/2021 0     Neutrophils Relative 04/26/2021 71     Immat GRANS % 04/26/2021 0     Lymphocytes Relative 04/26/2021 19     Monocytes Relative 04/26/2021 8     Eosinophils Relative 04/26/2021 2     Basophils Relative 04/26/2021 0     Neutrophils Absolute 04/26/2021 3 29     Immature Grans Absolute 04/26/2021 0 01     Lymphocytes Absolute 04/26/2021 0 91     Monocytes Absolute 04/26/2021 0 38     Eosinophils Absolute 04/26/2021 0 11     Basophils Absolute 04/26/2021 0 01    Appointment on 03/29/2021   Component Date Value    Sodium 03/29/2021 140     Potassium 03/29/2021 5 2     Chloride 03/29/2021 108     CO2 03/29/2021 26     ANION GAP 03/29/2021 6     BUN 03/29/2021 42*    Creatinine 03/29/2021 2 78*    Glucose, Fasting 03/29/2021 158*    Calcium 03/29/2021 9 6     eGFR 03/29/2021 20    Lab on 03/04/2021   Component Date Value    Sodium 03/04/2021 142     Potassium 03/04/2021 4 9     Chloride 03/04/2021 110*    CO2 03/04/2021 29     ANION GAP 03/04/2021 3*    BUN 03/04/2021 38*    Creatinine 03/04/2021 3 26*    Glucose, Fasting 03/04/2021 163*    Calcium 03/04/2021 9 6     eGFR 03/04/2021 16      Imaging: No results found  Review of Systems:  Review of Systems   Respiratory: Negative for apnea, shortness of breath, wheezing and stridor  Cardiovascular: Negative for chest pain, palpitations and leg swelling  Neurological: Negative for syncope  Psychiatric/Behavioral: Positive for decreased concentration  Negative for confusion  Physical Exam:  Physical Exam  Vitals reviewed  Constitutional:       General: He is not in acute distress  Appearance: Normal appearance  He is normal weight  He is not ill-appearing, toxic-appearing or diaphoretic  Neck:      Vascular: No carotid bruit  Cardiovascular:      Rate and Rhythm: Normal rate and regular rhythm  Pulses: Normal pulses  Heart sounds: Normal heart sounds  No murmur heard  No friction rub  No gallop  Comments: No pulsus paradoxus  Pulmonary:      Effort: Pulmonary effort is normal  No respiratory distress  Breath sounds: Normal breath sounds  No stridor  No wheezing, rhonchi or rales  Musculoskeletal:      Right lower leg: No edema  Left lower leg: No edema  Skin:     General: Skin is warm  Neurological:      Mental Status: He is alert  Psychiatric:         Mood and Affect: Mood normal          Discussion/Summary:  Pericardial effusion, likely uremic, early tamponade physiology in 2019, underwent pericardial window  Old echo revealed minimal pericardial effusion overall normal left systolic function with stage II diastolic function and mild mitral insufficiency  Pharmacological stress test 2017 suggested no ischemia  Will repeat an echocardiogram     Paroxysmal atrial fibrillation, no clinical documentation  Not a candidate for aggressive intervention of full anticoagulation  He does have advanced chronic kidney disease, GFR in the teens, he was transiently on hemodialysis, he did receive a AV fistula 2 years ago  This note was completed in part utilizing [employer] direct voice recognition software  Grammatical errors, random word insertion, spelling mistakes, and incomplete sentences may be an occasional consequence of the system secondary to software limitations, ambient noise and hardware issues  At the time of dictation, efforts were made to edit, clarify and /or correct errors  Please read the chart carefully and recognize, using context, where substitutions have occurred    If you have any questions or concerns about the context, text or information contained within the body of this dictation, please contact myself, the provider, for further clarification

## 2021-08-26 ENCOUNTER — APPOINTMENT (OUTPATIENT)
Dept: LAB | Facility: CLINIC | Age: 86
End: 2021-08-26
Payer: COMMERCIAL

## 2021-08-26 DIAGNOSIS — N18.9 CHRONIC KIDNEY DISEASE-MINERAL AND BONE DISORDER: ICD-10-CM

## 2021-08-26 DIAGNOSIS — N25.81 SECONDARY HYPERPARATHYROIDISM OF RENAL ORIGIN (HCC): ICD-10-CM

## 2021-08-26 DIAGNOSIS — N18.4 ANEMIA DUE TO STAGE 4 CHRONIC KIDNEY DISEASE (HCC): ICD-10-CM

## 2021-08-26 DIAGNOSIS — M89.9 CHRONIC KIDNEY DISEASE-MINERAL AND BONE DISORDER: ICD-10-CM

## 2021-08-26 DIAGNOSIS — D63.1 ANEMIA DUE TO STAGE 4 CHRONIC KIDNEY DISEASE (HCC): ICD-10-CM

## 2021-08-26 DIAGNOSIS — E83.9 CHRONIC KIDNEY DISEASE-MINERAL AND BONE DISORDER: ICD-10-CM

## 2021-08-26 LAB
BACTERIA UR QL AUTO: ABNORMAL /HPF
BILIRUB UR QL STRIP: NEGATIVE
CLARITY UR: CLEAR
COLOR UR: YELLOW
CREAT UR-MCNC: 75.6 MG/DL
FERRITIN SERPL-MCNC: 465 NG/ML (ref 8–388)
GLUCOSE UR STRIP-MCNC: NEGATIVE MG/DL
HGB UR QL STRIP.AUTO: ABNORMAL
HYALINE CASTS #/AREA URNS LPF: ABNORMAL /LPF
IRON SATN MFR SERPL: 36 %
IRON SERPL-MCNC: 89 UG/DL (ref 65–175)
KETONES UR STRIP-MCNC: NEGATIVE MG/DL
LEUKOCYTE ESTERASE UR QL STRIP: NEGATIVE
MICROALBUMIN UR-MCNC: 134 MG/L (ref 0–20)
MICROALBUMIN/CREAT 24H UR: 177 MG/G CREATININE (ref 0–30)
NITRITE UR QL STRIP: NEGATIVE
NON-SQ EPI CELLS URNS QL MICRO: ABNORMAL /HPF
PH UR STRIP.AUTO: 6.5 [PH]
PROT UR STRIP-MCNC: ABNORMAL MG/DL
PTH-INTACT SERPL-MCNC: 46.7 PG/ML (ref 18.4–80.1)
RBC #/AREA URNS AUTO: ABNORMAL /HPF
SP GR UR STRIP.AUTO: 1.02 (ref 1–1.03)
TIBC SERPL-MCNC: 250 UG/DL (ref 250–450)
UROBILINOGEN UR QL STRIP.AUTO: 0.2 E.U./DL
WBC #/AREA URNS AUTO: ABNORMAL /HPF

## 2021-08-26 PROCEDURE — 82570 ASSAY OF URINE CREATININE: CPT

## 2021-08-26 PROCEDURE — 82043 UR ALBUMIN QUANTITATIVE: CPT

## 2021-08-26 PROCEDURE — 83970 ASSAY OF PARATHORMONE: CPT

## 2021-08-26 PROCEDURE — 81001 URINALYSIS AUTO W/SCOPE: CPT

## 2021-08-26 PROCEDURE — 83550 IRON BINDING TEST: CPT

## 2021-08-26 PROCEDURE — 83540 ASSAY OF IRON: CPT

## 2021-08-26 PROCEDURE — 82728 ASSAY OF FERRITIN: CPT

## 2021-09-14 ENCOUNTER — OFFICE VISIT (OUTPATIENT)
Dept: NEPHROLOGY | Facility: CLINIC | Age: 86
End: 2021-09-14
Payer: COMMERCIAL

## 2021-09-14 VITALS
WEIGHT: 151.8 LBS | OXYGEN SATURATION: 98 % | HEIGHT: 64 IN | DIASTOLIC BLOOD PRESSURE: 60 MMHG | BODY MASS INDEX: 25.92 KG/M2 | HEART RATE: 68 BPM | SYSTOLIC BLOOD PRESSURE: 105 MMHG

## 2021-09-14 DIAGNOSIS — R80.1 PERSISTENT PROTEINURIA: ICD-10-CM

## 2021-09-14 DIAGNOSIS — I48.0 PAROXYSMAL ATRIAL FIBRILLATION (HCC): Chronic | ICD-10-CM

## 2021-09-14 DIAGNOSIS — N18.5 STAGE 5 CHRONIC KIDNEY DISEASE (HCC): ICD-10-CM

## 2021-09-14 DIAGNOSIS — E55.9 VITAMIN D DEFICIENCY: ICD-10-CM

## 2021-09-14 DIAGNOSIS — E78.5 HYPERLIPIDEMIA, UNSPECIFIED HYPERLIPIDEMIA TYPE: ICD-10-CM

## 2021-09-14 DIAGNOSIS — I10 ESSENTIAL HYPERTENSION: ICD-10-CM

## 2021-09-14 DIAGNOSIS — I15.0 RENOVASCULAR HYPERTENSION: Primary | ICD-10-CM

## 2021-09-14 DIAGNOSIS — N25.81 SECONDARY HYPERPARATHYROIDISM OF RENAL ORIGIN (HCC): ICD-10-CM

## 2021-09-14 PROCEDURE — 99214 OFFICE O/P EST MOD 30 MIN: CPT | Performed by: INTERNAL MEDICINE

## 2021-09-14 RX ORDER — LOSARTAN POTASSIUM 25 MG/1
25 TABLET ORAL EVERY MORNING
Qty: 90 TABLET | Refills: 3 | Status: SHIPPED | OUTPATIENT
Start: 2021-09-14 | End: 2022-06-20

## 2021-09-14 NOTE — ASSESSMENT & PLAN NOTE
Lab Results   Component Value Date    HGBA1C 8 8 (A) 04/29/2021   Somewhat uncontrolled  Will be seeing Dr Allen ball

## 2021-09-14 NOTE — PROGRESS NOTES
[address] [facility], [place]    Name: Rozina Vera  YOB: 1935      Assessment/Plan:           Problem List Items Addressed This Visit        Endocrine    Secondary hyperparathyroidism of renal origin ([address] )     PTH WNL due to regain of renal funciton            Cardiovascular and Mediastinum    Paroxysmal atrial fibrillation (HCC) (Chronic)     Sounds regular  Is taking metoprolol 25mg every 12 h         Essential hypertension    Relevant Medications    losartan (COZAAR) 25 mg tablet    Renovascular hypertension - Primary     Blood pressure is too low  Will stop amlodipine 5mg and decrease losartan to 25mg daily         Relevant Medications    losartan (COZAAR) 25 mg tablet       Genitourinary    Stage 5 chronic kidney disease (HCC)     Lab Results   Component Value Date    EGFR 19 08/26/2021    EGFR 18 05/24/2021    EGFR 19 04/26/2021    CREATININE 2 89 (H) 08/26/2021    CREATININE 3 01 (H) 05/24/2021    CREATININE 2 82 (H) 04/26/2021   GFR has improved to 19% - continue to monitor            Other    Persistent proteinuria     Continue losartan at 25mg daily                 Subjective:      Patient ID: Rozina Vera  is a 80 y o  male  HPI  Has a history of CKD 5- was on dialysis and had renal recovery  Has a history of hypertension and DM -2  I reviewed the medications with him and his wife takes care of them      The following portions of the patient's history were reviewed and updated as appropriate: allergies, current medications, past family history, past medical history, past social history, past surgical history and problem list     Review of Systems   Constitutional: Negative  HENT: Positive for hearing loss  Eyes: Negative for visual disturbance  Respiratory: Negative for cough and shortness of breath  Cardiovascular: Negative for chest pain, palpitations and leg swelling     Gastrointestinal: Negative for abdominal pain, blood in stool, constipation and diarrhea  Genitourinary: Negative for difficulty urinating, dysuria and frequency  Musculoskeletal: Positive for arthralgias  Neurological: Positive for weakness  Psychiatric/Behavioral: Negative for dysphoric mood  Social History     Socioeconomic History    Marital status: /Civil Union     Spouse name: None    Number of children: None    Years of education: None    Highest education level: None   Occupational History    Occupation: Retired      Comment:     Tobacco Use    Smoking status: Former Smoker     Packs/day: 0 50     Types: Cigars     Quit date:      Years since quittin 7    Smokeless tobacco: Never Used    Tobacco comment: current non-smoker   Vaping Use    Vaping Use: Never used   Substance and Sexual Activity    Alcohol use: No    Drug use: Never    Sexual activity: Not Currently   Other Topics Concern    None   Social History Narrative    Patient has never smoked - As per [employer]    Consumes on average 2 cups of regular coffee per day      Social Determinants of Health     Financial Resource Strain:     Difficulty of Paying Living Expenses:    Food Insecurity:     Worried About Running Out of Food in the Last Year:     [address] in the Last Year:    Transportation Needs:     Lack of Transportation (Medical):      Lack of Transportation (Non-Medical):    Physical Activity:     Days of Exercise per Week:     Minutes of Exercise per Session:    Stress:     Feeling of Stress :    Social Connections:     Frequency of Communication with Friends and Family:     Frequency of Social Gatherings with Friends and Family:     Attends [employer] Services:     Active Member of Clubs or Organizations:     Attends Club or Organization Meetings:     Marital Status:    Intimate Partner Violence:     Fear of Current or Ex-Partner:     Emotionally Abused:     Physically Abused:     Sexually Abused:      Past Medical History:   Diagnosis Date    Anxiety     Obrien's esophagus     BPH (benign prostatic hyperplasia)     Cancer ([facility] [address] )     pt states hx skin cancer, pt confused    Cardiomegaly     Diabetes ([facility] [address] )     type 2  controlled; taken off oral meds 6/19    DVT (deep venous thrombosis) (HCC)     right leg    GERD (gastroesophageal reflux disease)     Hypercholesterolemia     Hypertension     Irregular heart beat     paroxsysmal a fib    Pancreatic cyst     Paroxysmal atrial fibrillation (HCC)     Pericardial effusion with cardiac tamponade     Pleural effusion     Renal disorder     dialysis started in 2019    Weight loss, non-intentional      Past Surgical History:   Procedure Laterality Date    APPENDECTOMY  1953    CATARACT EXTRACTION Bilateral 2016?  COLONOSCOPY      COLONOSCOPY N/A 1/24/2019    Procedure: COLONOSCOPY with polypectomies;  Surgeon: Panfilo Piedra MD;  Location: [facility]; Service: Gastroenterology    ESOPHAGOGASTRODUODENOSCOPY N/A 1/24/2019    Procedure: ESOPHAGOGASTRODUODENOSCOPY (EGD) with bx;  Surgeon: Panfilo Piedra MD;  Location: [facility];   Service: Gastroenterology    IR TUNNELED DIALYSIS CATHETER CHECK/CHANGE/REPOSITION/ANGIOPLASTY  4/18/2019    IR TUNNELED DIALYSIS CATHETER PLACEMENT  3/6/2019    IR TUNNELED DIALYSIS CATHETER REMOVAL  7/30/2019    PERICARDIAL WINDOW N/A 2/26/2019    Procedure: WINDOW PERICARDIAL;  Surgeon: Krishna Patel MD;  Location: [facility];  Service: Thoracic    SD ANASTOMOSIS,AV,ANY SITE Left 7/3/2019    Procedure: CREATION FISTULA ARTERIOVENOUS (AV) left upper extremity brachial artery to axillary vein Hardtner-Mushtaq graft ;  Surgeon: Renae Babinski, MD;  Location: [facility];  Service: Vascular    PROSTATE BIOPSY         Current Outpatient Medications:     acetaminophen (TYLENOL) 325 mg tablet, Take 650 mg by mouth daily, Disp: , Rfl:     ALPRAZolam (XANAX) 0 25 mg tablet, Take 1 tablet (0 25 mg total) by mouth every 8 (eight) hours as needed for anxiety, Disp: 270 tablet, Rfl: 0    amLODIPine (NORVASC) 5 mg tablet, TAKE 1 TABLET BY MOUTH EVERY DAY, Disp: 90 tablet, Rfl: 3    Blood Glucose Monitoring Suppl (ONE TOUCH ULTRA 2) w/Device KIT, by Does not apply route 2 (two) times a day, Disp: 1 each, Rfl: 0    Blood Glucose Monitoring Suppl (ONE TOUCH ULTRA 2) w/Device KIT, Use daily, Disp: 1 each, Rfl: 0    Blood Glucose Monitoring Suppl (TRUE METRIX METER) YASMEEN, by Does not apply route 2 (two) times a day, Disp: 1 Device, Rfl: 0    Cholecalciferol (VITAMIN D PO), Take 5,000 Units by mouth every morning , Disp: , Rfl:     Dulera 100-5 MCG/ACT inhaler, INHALE 2 PUFFS EVERY 12 (TWELVE) HOURS, Disp: 13 Inhaler, Rfl: 11    finasteride (PROSCAR) 5 mg tablet, TAKE 1 TABLET BY MOUTH EVERY DAY, Disp: 90 tablet, Rfl: 3    fluticasone (FLONASE) 50 mcg/act nasal spray, USE 2 SPRAYS IN EACH NOSTRIL DAILY, Disp: 16 mL, Rfl: 0    glucose blood (ONE TOUCH ULTRA TEST) test strip, Use daily as directed , Disp: 200 each, Rfl: 3    glucose blood (TRUE METRIX BLOOD GLUCOSE TEST) test strip, Test twice daily, Disp: 300 each, Rfl: 0    Lancets (ONETOUCH ULTRASOFT) lancets, USE DAILY AS DIRECTED , Disp: 100 each, Rfl: 3    Lancets (onetouch ultrasoft) lancets, Use daily as instructed, Disp: 100 each, Rfl: 3    losartan (COZAAR) 25 mg tablet, Take 1 tablet (25 mg total) by mouth every morning, Disp: 90 tablet, Rfl: 3    metoprolol tartrate (LOPRESSOR) 25 mg tablet, Take 1 tablet (25 mg total) by mouth daily, Disp: 90 tablet, Rfl: 1    PARoxetine (PAXIL) 30 mg tablet, TAKE 1 TABLET (30 MG TOTAL) BY MOUTH EVERY MORNING AS DIRECTED, Disp: 90 tablet, Rfl: 1    rosuvastatin (CRESTOR) 40 MG tablet, TAKE 1 TABLET BY MOUTH EVERY DAY IN THE MORNING, Disp: 90 tablet, Rfl: 3    omeprazole (PriLOSEC) 40 MG capsule, Take 40 mg by mouth daily (Patient not taking: Reported on 9/14/2021), Disp: , Rfl:     traMADol (ULTRAM) 50 mg tablet, Take 1 tablet (50 mg total) by mouth every 6 (six) hours as needed for moderate pain (Patient not taking: Reported on 8/10/2021), Disp: 30 tablet, Rfl: 1    Lab Results   Component Value Date     07/21/2016    SODIUM 141 08/26/2021    K 4 6 08/26/2021     (H) 08/26/2021    CO2 22 08/26/2021    ANIONGAP 5 07/07/2015    AGAP 7 08/26/2021    BUN 48 (H) 08/26/2021    CREATININE 2 89 (H) 08/26/2021    GLUC 124 04/25/2020    GLUF 179 (H) 08/26/2021    CALCIUM 9 5 08/26/2021    AST 18 08/26/2021    ALT 42 08/26/2021    ALKPHOS 59 08/26/2021    PROT 7 3 07/21/2016    TP 7 4 08/26/2021    BILITOT 0 4 07/21/2016    TBILI 0 35 08/26/2021    EGFR 19 08/26/2021     Lab Results   Component Value Date    WBC 5 19 08/26/2021    HGB 10 1 (L) 08/26/2021    HCT 30 8 (L) 08/26/2021    MCV 99 (H) 08/26/2021     08/26/2021     Lab Results   Component Value Date    CHOLESTEROL 129 09/18/2020    CHOLESTEROL 119 02/14/2018    CHOLESTEROL 141 11/30/2016     Lab Results   Component Value Date    HDL 34 (L) 09/18/2020    HDL 34 (L) 02/14/2018    HDL 34 (L) 11/30/2016     Lab Results   Component Value Date    LDLCALC 66 09/18/2020    LDLCALC 68 02/14/2018    LDLCALC 78 11/30/2016     Lab Results   Component Value Date    TRIG 144 09/18/2020    TRIG 87 02/14/2018    TRIG 146 11/30/2016     No results found for: [place], [place]  Lab Results   Component Value Date    AFW6AMGXNISU 1 652 02/21/2020     Lab Results   Component Value Date    PTH 46 7 08/26/2021    CALCIUM 9 5 08/26/2021    PHOS 3 1 08/26/2021     Lab Results   Component Value Date    SPEP  02/26/2019     No monoclonal bands noted  Reviewed by: Luigi Dow MD  ([id number])  **Electronic Signature**    UPEP  02/26/2019     The UPEP shows non-selective proteinuria   The UPEP shows a faint possible band  Immunofixation to be performed  Reviewed by: Erica Sepulveda MD ([id number])  **Electronic Signature**     No results found for: [employer]        Objective:      /60   Pulse 68   Ht 5' 4" (1 626 m)   Wt 68 9 kg (151 lb 12 8 oz)   SpO2 98%   BMI 26 06 kg/m²   Repeat BP right arm 100/60       Physical Exam  Vitals reviewed  Constitutional:       General: He is not in acute distress  Appearance: He is normal weight  He is not toxic-appearing  HENT:      Head: Normocephalic and atraumatic  Right Ear: External ear normal       Left Ear: External ear normal    Eyes:      Pupils: Pupils are equal, round, and reactive to light  Neck:      Vascular: No carotid bruit  Cardiovascular:      Rate and Rhythm: Normal rate and regular rhythm  Pulmonary:      Effort: Pulmonary effort is normal       Breath sounds: Normal breath sounds  Abdominal:      General: Bowel sounds are normal  There is no distension  Palpations: Abdomen is soft  Tenderness: There is no abdominal tenderness  There is no guarding  Musculoskeletal:      Cervical back: Normal range of motion  Right lower leg: No edema  Left lower leg: No edema  Comments: Non functional fistula LUE   Lymphadenopathy:      Cervical: No cervical adenopathy  Neurological:      General: No focal deficit present  Mental Status: He is alert and oriented to person, place, and time  Mental status is at baseline        Gait: Gait abnormal    Psychiatric:         Mood and Affect: Mood normal          Behavior: Behavior normal

## 2021-09-14 NOTE — PATIENT INSTRUCTIONS
Blood pressure is too low at 100/60   Stop amlodipine 5mg [place]  Decrease losartan to 25mg daily - I sent a new prescription to [facility]  Aim for a blood pressure of 120-130/- 70-80    Call me with the readings - the visiting nurse will check his blood pressure    Get a flu shot

## 2021-09-14 NOTE — ASSESSMENT & PLAN NOTE
Lab Results   Component Value Date    EGFR 19 08/26/2021    EGFR 18 05/24/2021    EGFR 19 04/26/2021    CREATININE 2 89 (H) 08/26/2021    CREATININE 3 01 (H) 05/24/2021    CREATININE 2 82 (H) 04/26/2021   GFR has improved to 19% - continue to monitor

## 2021-09-16 ENCOUNTER — HOSPITAL ENCOUNTER (OUTPATIENT)
Dept: NON INVASIVE DIAGNOSTICS | Facility: CLINIC | Age: 86
Discharge: HOME | End: 2021-09-16
Payer: COMMERCIAL

## 2021-09-16 DIAGNOSIS — I31.3 PERICARDIAL EFFUSION WITH CARDIAC TAMPONADE: Chronic | ICD-10-CM

## 2021-09-16 DIAGNOSIS — Z98.890 S/P PERICARDIAL WINDOW CREATION: ICD-10-CM

## 2021-09-16 DIAGNOSIS — I31.4 PERICARDIAL EFFUSION WITH CARDIAC TAMPONADE: Chronic | ICD-10-CM

## 2021-09-16 PROCEDURE — 93306 TTE W/DOPPLER COMPLETE: CPT

## 2021-09-16 PROCEDURE — 93306 TTE W/DOPPLER COMPLETE: CPT | Performed by: INTERNAL MEDICINE

## 2021-09-20 ENCOUNTER — OFFICE VISIT (OUTPATIENT)
Dept: FAMILY MEDICINE CLINIC | Facility: CLINIC | Age: 86
End: 2021-09-20
Payer: COMMERCIAL

## 2021-09-20 VITALS
DIASTOLIC BLOOD PRESSURE: 64 MMHG | SYSTOLIC BLOOD PRESSURE: 138 MMHG | BODY MASS INDEX: 25.71 KG/M2 | HEIGHT: 64 IN | WEIGHT: 150.6 LBS | TEMPERATURE: 97.9 F | OXYGEN SATURATION: 95 % | HEART RATE: 61 BPM

## 2021-09-20 DIAGNOSIS — E11.22 TYPE 2 DIABETES MELLITUS WITH STAGE 3 CHRONIC KIDNEY DISEASE, WITHOUT LONG-TERM CURRENT USE OF INSULIN, UNSPECIFIED WHETHER STAGE 3A OR 3B CKD (HCC): ICD-10-CM

## 2021-09-20 DIAGNOSIS — J42 CHRONIC BRONCHITIS, UNSPECIFIED CHRONIC BRONCHITIS TYPE (HCC): ICD-10-CM

## 2021-09-20 DIAGNOSIS — E11.9 TYPE 2 DIABETES MELLITUS WITHOUT COMPLICATION, WITHOUT LONG-TERM CURRENT USE OF INSULIN (HCC): ICD-10-CM

## 2021-09-20 DIAGNOSIS — Z00.00 MEDICARE ANNUAL WELLNESS VISIT, SUBSEQUENT: Primary | ICD-10-CM

## 2021-09-20 DIAGNOSIS — N18.30 TYPE 2 DIABETES MELLITUS WITH STAGE 3 CHRONIC KIDNEY DISEASE, WITHOUT LONG-TERM CURRENT USE OF INSULIN, UNSPECIFIED WHETHER STAGE 3A OR 3B CKD (HCC): ICD-10-CM

## 2021-09-20 DIAGNOSIS — I10 ESSENTIAL HYPERTENSION: ICD-10-CM

## 2021-09-20 DIAGNOSIS — I48.0 PAROXYSMAL ATRIAL FIBRILLATION (HCC): Chronic | ICD-10-CM

## 2021-09-20 LAB — SL AMB POCT HEMOGLOBIN AIC: 9.8 (ref ?–6.5)

## 2021-09-20 PROCEDURE — G0439 PPPS, SUBSEQ VISIT: HCPCS | Performed by: FAMILY MEDICINE

## 2021-09-20 PROCEDURE — 1160F RVW MEDS BY RX/DR IN RCRD: CPT | Performed by: FAMILY MEDICINE

## 2021-09-20 PROCEDURE — 99214 OFFICE O/P EST MOD 30 MIN: CPT | Performed by: FAMILY MEDICINE

## 2021-09-20 PROCEDURE — 83036 HEMOGLOBIN GLYCOSYLATED A1C: CPT | Performed by: FAMILY MEDICINE

## 2021-09-20 PROCEDURE — 1125F AMNT PAIN NOTED PAIN PRSNT: CPT | Performed by: FAMILY MEDICINE

## 2021-09-20 PROCEDURE — 90662 IIV NO PRSV INCREASED AG IM: CPT

## 2021-09-20 PROCEDURE — 3078F DIAST BP <80 MM HG: CPT | Performed by: FAMILY MEDICINE

## 2021-09-20 PROCEDURE — G0008 ADMIN INFLUENZA VIRUS VAC: HCPCS

## 2021-09-20 PROCEDURE — 1036F TOBACCO NON-USER: CPT | Performed by: FAMILY MEDICINE

## 2021-09-20 PROCEDURE — 1170F FXNL STATUS ASSESSED: CPT | Performed by: FAMILY MEDICINE

## 2021-09-20 PROCEDURE — 3288F FALL RISK ASSESSMENT DOCD: CPT | Performed by: FAMILY MEDICINE

## 2021-09-20 PROCEDURE — 3725F SCREEN DEPRESSION PERFORMED: CPT | Performed by: FAMILY MEDICINE

## 2021-09-20 PROCEDURE — 3075F SYST BP GE 130 - 139MM HG: CPT | Performed by: FAMILY MEDICINE

## 2021-09-20 NOTE — PATIENT INSTRUCTIONS
Medicare Preventive Visit Patient Instructions  Thank you for completing your Welcome to Medicare Visit or Medicare Annual Wellness Visit today  Your next wellness visit will be due in one year (9/21/2022)  The screening/preventive services that you may require over the next 5-10 years are detailed below  Some tests may not apply to you based off risk factors and/or age  Screening tests ordered at today's visit but not completed yet may show as past due  Also, please note that scanned in results may not display below  Preventive Screenings:  Service Recommendations Previous Testing/Comments   Colorectal Cancer Screening  · Colonoscopy    · Fecal Occult Blood Test (FOBT)/Fecal Immunochemical Test (FIT)  · Fecal DNA/Cologuard Test  · Flexible Sigmoidoscopy Age: 54-65 years old   Colonoscopy: every 10 years (May be performed more frequently if at higher risk)  OR  FOBT/FIT: every 1 year  OR  Cologuard: every 3 years  OR  Sigmoidoscopy: every 5 years  Screening may be recommended earlier than age 48 if at higher risk for colorectal cancer  Also, an individualized decision between you and your healthcare provider will decide whether screening between the ages of 74-80 would be appropriate   Colonoscopy: Not on file  FOBT/FIT: Not on file  Cologuard: Not on file  Sigmoidoscopy: Not on file    Screening Not Indicated     Prostate Cancer Screening Individualized decision between patient and health care provider in men between ages of 53-78   Medicare will cover every 12 months beginning on the day after your 50th birthday PSA: 8 9 ng/mL     Screening Not Indicated     Hepatitis C Screening Once for adults born between 1945 and 1965  More frequently in patients at high risk for Hepatitis C Hep C Antibody: 02/27/2019    Screening Current   Diabetes Screening 1-2 times per year if you're at risk for diabetes or have pre-diabetes Fasting glucose: 179 mg/dL   A1C: 8 8    Screening Not Indicated  History Diabetes   Cholesterol Screening Once every 5 years if you don't have a lipid disorder  May order more often based on risk factors  Lipid panel: 09/18/2020    Screening Not Indicated  History Lipid Disorder      Other Preventive Screenings Covered by Medicare:  1  Abdominal Aortic Aneurysm (AAA) Screening: covered once if your at risk  You're considered to be at risk if you have a family history of AAA or a male between the age of 73-68 who smoking at least 100 cigarettes in your lifetime  2  Lung Cancer Screening: covers low dose CT scan once per year if you meet all of the following conditions: (1) Age 50-69; (2) No signs or symptoms of lung cancer; (3) Current smoker or have quit smoking within the last 15 years; (4) You have a tobacco smoking history of at least 30 pack years (packs per day x number of years you smoked); (5) You get a written order from a healthcare provider  3  Glaucoma Screening: covered annually if you're considered high risk: (1) You have diabetes OR (2) Family history of glaucoma OR (3)  aged 48 and older OR (3)  American aged 72 and older  3  Osteoporosis Screening: covered every 2 years if you meet one of the following conditions: (1) Have a vertebral abnormality; (2) On glucocorticoid therapy for more than 3 months; (3) Have primary hyperparathyroidism; (4) On osteoporosis medications and need to assess response to drug therapy  5  HIV Screening: covered annually if you're between the age of 12-76  Also covered annually if you are younger than 13 and older than 72 with risk factors for HIV infection  For pregnant patients, it is covered up to 3 times per pregnancy      Immunizations:  Immunization Recommendations   Influenza Vaccine Annual influenza vaccination during flu season is recommended for all persons aged >= 6 months who do not have contraindications   Pneumococcal Vaccine (Prevnar and Pneumovax)  * Prevnar = PCV13  * Pneumovax = PPSV23 Adults 25-60 years old: 1-3 doses may be recommended based on certain risk factors  Adults 72 years old: Prevnar (PCV13) vaccine recommended followed by Pneumovax (PPSV23) vaccine  If already received PPSV23 since turning 65, then PCV13 recommended at least one year after PPSV23 dose  Hepatitis B Vaccine 3 dose series if at intermediate or high risk (ex: diabetes, end stage renal disease, liver disease)   Tetanus (Td) Vaccine - COST NOT COVERED BY MEDICARE PART B Following completion of primary series, a booster dose should be given every 10 years to maintain immunity against tetanus  Td may also be given as tetanus wound prophylaxis  Tdap Vaccine - COST NOT COVERED BY MEDICARE PART B Recommended at least once for all adults  For pregnant patients, recommended with each pregnancy  Shingles Vaccine (Shingrix) - COST NOT COVERED BY MEDICARE PART B  2 shot series recommended in those aged 48 and above     Health Maintenance Due:      Topic Date Due    Hepatitis C Screening  Completed     Immunizations Due:      Topic Date Due    DTaP,Tdap,and Td Vaccines (1 - Tdap) Never done    Influenza Vaccine (1) 09/01/2021     Advance Directives   What are advance directives? Advance directives are legal documents that state your wishes and plans for medical care  These plans are made ahead of time in case you lose your ability to make decisions for yourself  Advance directives can apply to any medical decision, such as the treatments you want, and if you want to donate organs  What are the types of advance directives? There are many types of advance directives, and each state has rules about how to use them  You may choose a combination of any of the following:  · Living will: This is a written record of the treatment you want  You can also choose which treatments you do not want, which to limit, and which to stop at a certain time  This includes surgery, medicine, IV fluid, and tube feedings  · Durable power of  for healthcare [place] [facility]):   This is a written record that states who you want to make healthcare choices for you when you are unable to make them for yourself  This person, called a proxy, is usually a family member or a friend  You may choose more than 1 proxy  · Do not resuscitate (DNR) order:  A DNR order is used in case your heart stops beating or you stop breathing  It is a request not to have certain forms of treatment, such as CPR  A DNR order may be included in other types of advance directives  · Medical directive: This covers the care that you want if you are in a coma, near death, or unable to make decisions for yourself  You can list the treatments you want for each condition  Treatment may include pain medicine, surgery, blood transfusions, dialysis, IV or tube feedings, and a ventilator (breathing machine)  · Values history: This document has questions about your views, beliefs, and how you feel and think about life  This information can help others choose the care that you would choose  Why are advance directives important? An advance directive helps you control your care  Although spoken wishes may be used, it is better to have your wishes written down  Spoken wishes can be misunderstood, or not followed  Treatments may be given even if you do not want them  An advance directive may make it easier for your family to make difficult choices about your care  Fall Prevention    Fall prevention  includes ways to make your home and other areas safer  It also includes ways you can move more carefully to prevent a fall  Health conditions that cause changes in your blood pressure, vision, or muscle strength and coordination may increase your risk for falls  Medicines may also increase your risk for falls if they make you dizzy, weak, or sleepy  Fall prevention tips:   · Stand or sit up slowly  · Use assistive devices as directed  · Wear shoes that fit well and have soles that   · Wear a personal alarm      · Stay active  · Manage your medical conditions  Home Safety Tips:  · Add items to prevent falls in the bathroom  · Keep paths clear  · Install bright lights in your home  · Keep items you use often on shelves within reach  · Paint or place reflective tape on the edges of your stairs  Weight Management   Why it is important to manage your weight:  Being overweight increases your risk of health conditions such as heart disease, high blood pressure, type 2 diabetes, and certain types of cancer  It can also increase your risk for osteoarthritis, sleep apnea, and other respiratory problems  Aim for a slow, steady weight loss  Even a small amount of weight loss can lower your risk of health problems  How to lose weight safely:  A safe and healthy way to lose weight is to eat fewer calories and get regular exercise  You can lose up about 1 pound a week by decreasing the number of calories you eat by 500 calories each day  Healthy meal plan for weight management:  A healthy meal plan includes a variety of foods, contains fewer calories, and helps you stay healthy  A healthy meal plan includes the following:  · Eat whole-grain foods more often  A healthy meal plan should contain fiber  Fiber is the part of grains, fruits, and vegetables that is not broken down by your body  Whole-grain foods are healthy and provide extra fiber in your diet  Some examples of whole-grain foods are whole-wheat breads and pastas, oatmeal, brown rice, and bulgur  · Eat a variety of vegetables every day  Include dark, leafy greens such as spinach, kale, jose greens, and mustard greens  Eat yellow and orange vegetables such as carrots, sweet potatoes, and winter squash  · Eat a variety of fruits every day  Choose fresh or canned fruit (canned in its own juice or light syrup) instead of juice  Fruit juice has very little or no fiber  · Eat low-fat dairy foods  Drink fat-free (skim) milk or 1% milk   Eat fat-free yogurt and low-fat cottage cheese  Try low-fat cheeses such as mozzarella and other reduced-fat cheeses  · Choose meat and other protein foods that are low in fat  Choose beans or other legumes such as split peas or lentils  Choose fish, skinless poultry (chicken or turkey), or lean cuts of red meat (beef or pork)  Before you cook meat or poultry, cut off any visible fat  · Use less fat and oil  Try baking foods instead of frying them  Add less fat, such as margarine, sour cream, regular salad dressing and mayonnaise to foods  Eat fewer high-fat foods  Some examples of high-fat foods include french fries, doughnuts, ice cream, and cakes  · Eat fewer sweets  Limit foods and drinks that are high in sugar  This includes candy, cookies, regular soda, and sweetened drinks  Exercise:  Exercise at least 30 minutes per day on most days of the week  Some examples of exercise include walking, biking, dancing, and swimming  You can also fit in more physical activity by taking the stairs instead of the elevator or parking farther away from stores  Ask your healthcare provider about the best exercise plan for you  © Copyright [employer] 2018 Information is for End User's use only and may not be sold, redistributed or otherwise used for commercial purposes   All illustrations and images included in CareNotes® are the copyrighted property of A D A M , Inc  or [address] [place] [address] [employer]

## 2021-09-20 NOTE — PROGRESS NOTES
Assessment/Plan:     1  Medicare annual wellness visit, subsequent    2  Type 2 diabetes mellitus without complication, without long-term current use of insulin (HCC)  Assessment & Plan:    Lab Results   Component Value Date    HGBA1C 9 8 (A) 09/20/2021   Patient's A1c is elevated  Recommend starting Rudi Schirmer as this should be safe considering his chronic renal disease  Recommend recheck again in 6 months  Orders:  -     POCT hemoglobin A1c  -     linaGLIPtin 5 MG TABS; Take 5 mg by mouth daily    3  Type 2 diabetes mellitus with stage 3 chronic kidney disease, without long-term current use of insulin, unspecified whether stage 3a or 3b CKD ([place] [address] )  -     influenza vaccine, high-dose, PF 0 7 mL (FLUZONE HIGH-DOSE)  -     linaGLIPtin 5 MG TABS; Take 5 mg by mouth daily    4  Paroxysmal atrial fibrillation (HCC)    5  Chronic bronchitis, unspecified chronic bronchitis type ([place] [address] )    6  Essential hypertension        Subjective:      Patient ID: Dedra Baumgarten  is a 80 y o  male  Patient here for recheck on chronic medical conditions  He has history of uncontrolled diabetes  Diabetes had been previously well controlled but he is currently off all diabetes medications secondary to renal disease  he continues to follow with Nephrology  The following portions of the patient's history were reviewed and updated as appropriate: allergies, current medications, past family history, past medical history, past social history, past surgical history, and problem list     Review of Systems   Constitutional: Negative  HENT: Negative  Eyes: Negative  Respiratory: Negative  Cardiovascular: Negative  Gastrointestinal: Negative  Endocrine: Negative  Genitourinary: Negative  Musculoskeletal: Negative  Skin: Negative  Allergic/Immunologic: Negative  Neurological: Negative  Hematological: Negative  Psychiatric/Behavioral: Negative            Objective:      /64 (BP Location: Left arm, Patient Position: Sitting, Cuff Size: Standard)   Pulse 61   Temp 97 9 °F (36 6 °C) (Temporal)   Ht 5' 4" (1 626 m)   Wt 68 3 kg (150 lb 9 6 oz)   SpO2 95%   BMI 25 85 kg/m²          Physical Exam  Vitals reviewed  Constitutional:       Appearance: He is well-developed  HENT:      Head: Normocephalic and atraumatic  Right Ear: External ear normal  Tympanic membrane is not erythematous or bulging  Left Ear: External ear normal  Tympanic membrane is not erythematous or bulging  Nose: Nose normal       Mouth/Throat:      Mouth: No oral lesions  Pharynx: No oropharyngeal exudate  Eyes:      General: No scleral icterus  Right eye: No discharge  Left eye: No discharge  Conjunctiva/sclera: Conjunctivae normal    Neck:      Thyroid: No thyromegaly  Cardiovascular:      Rate and Rhythm: Normal rate and regular rhythm  Pulses: no weak pulses          Dorsalis pedis pulses are 1+ on the right side and 1+ on the left side  Posterior tibial pulses are 1+ on the right side and 1+ on the left side  Heart sounds: Normal heart sounds  No murmur heard  No friction rub  No gallop  Pulmonary:      Effort: Pulmonary effort is normal  No respiratory distress  Breath sounds: No wheezing or rales  Chest:      Chest wall: No tenderness  Abdominal:      General: Bowel sounds are normal  There is no distension  Palpations: Abdomen is soft  There is no mass  Tenderness: There is no abdominal tenderness  There is no guarding or rebound  Musculoskeletal:         General: No tenderness or deformity  Normal range of motion  Cervical back: Normal range of motion and neck supple  Feet:      Right foot:      Skin integrity: No ulcer, skin breakdown, erythema, warmth, callus or dry skin  Left foot:      Skin integrity: No ulcer, skin breakdown, erythema, warmth, callus or dry skin     Lymphadenopathy:      Cervical: No cervical adenopathy  Skin:     General: Skin is warm and dry  Coloration: Skin is not pale  Findings: No erythema or rash  Neurological:      Mental Status: He is alert and oriented to person, place, and time  Cranial Nerves: No cranial nerve deficit  Motor: No abnormal muscle tone  Coordination: Coordination normal       Deep Tendon Reflexes: Reflexes are normal and symmetric  Psychiatric:         Behavior: Behavior normal        Patient's shoes and socks removed  Right Foot/Ankle   Right Foot Inspection  Skin Exam: skin normal and skin intact no dry skin, no warmth, no callus, no erythema, no maceration, no abnormal color, no pre-ulcer, no ulcer and no callus                          Toe Exam: ROM and strength within normal limitsno swelling  Sensory   Vibration: intact  Proprioception: intact   Monofilament testing: intact  Vascular  Capillary refills: < 3 seconds  The right DP pulse is 1+  The right PT pulse is 1+  Right Toe  - Comprehensive Exam  Ecchymosis: none  Swelling: none   Tenderness: none         Left Foot/Ankle  Left Foot Inspection  Skin Exam: skin normal and skin intactno dry skin, no warmth, no erythema, no maceration, normal color, no pre-ulcer, no ulcer and no callus                         Toe Exam: ROM and strength within normal limitsno swelling                   Sensory   Vibration: intact  Proprioception: intact  Monofilament: intact  Vascular  Capillary refills: < 3 seconds  The left DP pulse is 1+  The left PT pulse is 1+  Left Toe  - Comprehensive Exam  Ecchymosis: none  Arch: normal  Swelling: none   Tenderness: none       Assign Risk Category:  No deformity present; No loss of protective sensation;  No weak pulses       Risk: 0

## 2021-09-20 NOTE — PROGRESS NOTES
Assessment and Plan:     Problem List Items Addressed This Visit        Endocrine    Type 2 diabetes mellitus ([facility] [address] )       Lab Results   Component Value Date    HGBA1C 9 8 (A) 09/20/2021   Patient's A1c is elevated  Recommend starting Mittie Christa as this should be safe considering his chronic renal disease  Recommend recheck again in 6 months  Relevant Medications    linaGLIPtin 5 MG TABS    Other Relevant Orders    POCT hemoglobin A1c (Completed)    influenza vaccine, high-dose, PF 0 7 mL (FLUZONE HIGH-DOSE)    Type 2 diabetes mellitus with stage 3 chronic kidney disease, without long-term current use of insulin (HCC)    Relevant Medications    linaGLIPtin 5 MG TABS    Other Relevant Orders    influenza vaccine, high-dose, PF 0 7 mL (FLUZONE HIGH-DOSE)       Respiratory    COPD (chronic obstructive pulmonary disease) (HCC)       Cardiovascular and Mediastinum    Paroxysmal atrial fibrillation (HCC) (Chronic)    Essential hypertension      Other Visit Diagnoses     Medicare annual wellness visit, subsequent    -  Primary           Preventive health issues were discussed with patient, and age appropriate screening tests were ordered as noted in patient's After Visit Summary  Personalized health advice and appropriate referrals for health education or preventive services given if needed, as noted in patient's After Visit Summary       History of Present Illness:     Patient presents for Medicare Annual Wellness visit    Patient Care Team:  Lien Oliveros DO as PCP - General  Lien Oliveros DO as PCP - PCP-[facility]  MD Lien Romero Malon Holding, MD as Endoscopist  Elizabeth Rodney MD (Nephrology)     Problem List:     Patient Active Problem List   Diagnosis    Benign prostatic hyperplasia with urinary frequency    Dyslipidemia    Esophageal reflux    Essential hypertension    Lower leg DVT (deep venous thromboembolism), acute, right ([facility] [address] )    Pancreatic cyst    Pulmonary embolism (HCC)    Pulmonary nodule seen on imaging study    Type 2 diabetes mellitus (HCC)    Pericardial effusion with cardiac tamponade    Paroxysmal atrial fibrillation (HCC)    COPD (chronic obstructive pulmonary disease) (HCC)    Chronic anemia    S/P pericardial window creation    Obrien's esophagus without dysplasia    Tubular adenoma    Stage 5 chronic kidney disease (HCC)    Renovascular hypertension    Persistent proteinuria    Anemia due to chronic kidney disease    Secondary hyperparathyroidism of renal origin ([place] [address] )    Hemodialysis access, AV graft ([place] [address] )    Cardiomyopathy ([place] [address] )    Type 2 diabetes mellitus with stage 3 chronic kidney disease, without long-term current use of insulin (HCC)    Stage 4 chronic kidney disease ([place] [address] )    History of anemia due to CKD    Allergy to ACE inhibitors      Past Medical and Surgical History:     Past Medical History:   Diagnosis Date    Anxiety     Obrien's esophagus     BPH (benign prostatic hyperplasia)     Cancer ([place] [address] )     pt states hx skin cancer, pt confused    Cardiomegaly     Diabetes ([place] [address] )     type 2  controlled; taken off oral meds 6/19    DVT (deep venous thrombosis) (HCC)     right leg    GERD (gastroesophageal reflux disease)     Hypercholesterolemia     Hypertension     Irregular heart beat     paroxsysmal a fib    Pancreatic cyst     Paroxysmal atrial fibrillation (HCC)     Pericardial effusion with cardiac tamponade     Pleural effusion     Renal disorder     dialysis started in 2019    Weight loss, non-intentional      Past Surgical History:   Procedure Laterality Date    APPENDECTOMY  1953    CATARACT EXTRACTION Bilateral 2016?  COLONOSCOPY      COLONOSCOPY N/A 1/24/2019    Procedure: COLONOSCOPY with polypectomies;  Surgeon: Estephania Kahn MD;  Location: [facility];   Service: Gastroenterology    ESOPHAGOGASTRODUODENOSCOPY N/A 1/24/2019    Procedure: ESOPHAGOGASTRODUODENOSCOPY (EGD) with bx;  Surgeon: Ankur Aguilar MD;  Location: [facility];   Service: Gastroenterology    IR TUNNELED DIALYSIS CATHETER CHECK/CHANGE/REPOSITION/ANGIOPLASTY  2019    IR TUNNELED DIALYSIS CATHETER PLACEMENT  3/6/2019    IR TUNNELED DIALYSIS CATHETER REMOVAL  2019    PERICARDIAL WINDOW N/A 2019    Procedure: WINDOW PERICARDIAL;  Surgeon: Jack Arreaga MD;  Location: [facility];  Service: Thoracic    [place] ANASTOMOSIS,AV,ANY SITE Left 7/3/2019    Procedure: CREATION FISTULA ARTERIOVENOUS (AV) left upper extremity brachial artery to axillary vein Linden-Mushtaq graft ;  Surgeon: Eric Johnson MD;  Location: [address] [place] [address] MAIN OR;  Service: Vascular    PROSTATE BIOPSY        Family History:     Family History   Problem Relation Age of Onset    Diabetes Mother     Diabetes type II Mother     Diabetes Father     Diabetes type II Father     Heart attack Father     Prostate cancer Brother     Diabetes Brother     Pulmonary embolism Sister       Social History:     Social History     Socioeconomic History    Marital status: /Civil Union     Spouse name: None    Number of children: None    Years of education: None    Highest education level: None   Occupational History    Occupation: Retired      Comment:     Tobacco Use    Smoking status: Former Smoker     Packs/day: 0 50     Types: Cigars     Quit date:      Years since quittin 7    Smokeless tobacco: Never Used    Tobacco comment: current non-smoker   Vaping Use    Vaping Use: Never used   Substance and Sexual Activity    Alcohol use: No    Drug use: Never    Sexual activity: Not Currently   Other Topics Concern    None   Social History Narrative    Patient has never smoked - As per [employer]    Consumes on average 2 cups of regular coffee per day      Social Determinants of Health     Financial Resource Strain:     Difficulty of Paying Living Expenses:    Food Insecurity:     Worried About Running Out of Food in the Last Year:     Ran Out of Food in the Last Year:    Transportation Needs:     Lack of Transportation (Medical):  Lack of Transportation (Non-Medical):    Physical Activity:     Days of Exercise per Week:     Minutes of Exercise per Session:    Stress:     Feeling of Stress :    Social Connections:     Frequency of Communication with Friends and Family:     Frequency of Social Gatherings with Friends and Family:     Attends [employer] Services:     Active Member of Clubs or Organizations:     Attends Club or Organization Meetings:     Marital Status:    Intimate Partner Violence:     Fear of Current or Ex-Partner:     Emotionally Abused:     Physically Abused:     Sexually Abused:       Medications and Allergies:     Current Outpatient Medications   Medication Sig Dispense Refill    acetaminophen (TYLENOL) 325 mg tablet Take 650 mg by mouth daily      ALPRAZolam (XANAX) 0 25 mg tablet Take 1 tablet (0 25 mg total) by mouth every 8 (eight) hours as needed for anxiety 270 tablet 0    amLODIPine (NORVASC) 5 mg tablet TAKE 1 TABLET BY MOUTH EVERY DAY 90 tablet 3    Blood Glucose Monitoring Suppl (ONE TOUCH ULTRA 2) w/Device KIT by Does not apply route 2 (two) times a day 1 each 0    Blood Glucose Monitoring Suppl (ONE TOUCH ULTRA 2) w/Device KIT Use daily 1 each 0    Blood Glucose Monitoring Suppl (TRUE METRIX METER) YASMEEN by Does not apply route 2 (two) times a day 1 Device 0    Cholecalciferol (VITAMIN D PO) Take 5,000 Units by mouth every morning       Dulera 100-5 MCG/ACT inhaler INHALE 2 PUFFS EVERY 12 (TWELVE) HOURS 13 Inhaler 11    finasteride (PROSCAR) 5 mg tablet TAKE 1 TABLET BY MOUTH EVERY DAY 90 tablet 3    fluticasone (FLONASE) 50 mcg/act nasal spray USE 2 SPRAYS IN EACH NOSTRIL DAILY 16 mL 0    glucose blood (ONE TOUCH ULTRA TEST) test strip Use daily as directed   200 each 3    glucose blood (TRUE METRIX BLOOD GLUCOSE TEST) test strip Test twice daily 300 each 0    Lancets (ONETOUCH ULTRASOFT) lancets USE DAILY AS DIRECTED  100 each 3    Lancets (onetouch ultrasoft) lancets Use daily as instructed 100 each 3    losartan (COZAAR) 25 mg tablet Take 1 tablet (25 mg total) by mouth every morning 90 tablet 3    metoprolol tartrate (LOPRESSOR) 25 mg tablet Take 1 tablet (25 mg total) by mouth daily 90 tablet 1    PARoxetine (PAXIL) 30 mg tablet TAKE 1 TABLET (30 MG TOTAL) BY MOUTH EVERY MORNING AS DIRECTED 90 tablet 1    rosuvastatin (CRESTOR) 40 MG tablet TAKE 1 TABLET BY MOUTH EVERY DAY IN THE MORNING 90 tablet 3    linaGLIPtin 5 MG TABS Take 5 mg by mouth daily 90 tablet 1    omeprazole (PriLOSEC) 40 MG capsule Take 40 mg by mouth daily (Patient not taking: Reported on 9/14/2021)      traMADol (ULTRAM) 50 mg tablet Take 1 tablet (50 mg total) by mouth every 6 (six) hours as needed for moderate pain (Patient not taking: Reported on 8/10/2021) 30 tablet 1     No current facility-administered medications for this visit       Allergies   Allergen Reactions    Bee Venom Facial Swelling    Ace Inhibitors Other (See Comments)     Unknown reaction      Immunizations:     Immunization History   Administered Date(s) Administered    INFLUENZA 09/12/2013, 09/21/2018    Influenza Split High Dose Preservative Free IM 09/19/2012, 09/04/2014, 09/03/2015, 10/03/2016, 09/13/2017    Influenza, high dose seasonal 0 7 mL 09/21/2018, 09/16/2019, 09/30/2020    Influenza, seasonal, injectable 10/06/2011    Pneumococcal Conjugate 13-Valent 10/01/2015    Pneumococcal Polysaccharide PPV23 01/01/2005    SARS-CoV-2 / COVID-19 mRNA IM ([facility]ac[facility]) 02/06/2021, 03/05/2021      Health Maintenance:         Topic Date Due    Hepatitis C Screening  Completed         Topic Date Due    DTaP,Tdap,and Td Vaccines (1 - Tdap) Never done    Influenza Vaccine (1) 09/01/2021      Medicare Health Risk Assessment:     /64 (BP Location: Left arm, Patient Position: Sitting, Cuff Size: Standard)   Pulse 61   Temp 97 9 °F (36 6 °C) (Temporal)   Ht 5' 4" (1 626 m)   Wt 68 3 kg (150 lb 9 6 oz)   SpO2 95%   BMI 25 85 kg/m²      Mark Lundberg is here for his Subsequent Wellness visit  Health Risk Assessment:   Patient rates overall health as good  Patient feels that their physical health rating is same  Patient is satisfied with their life  Eyesight was rated as same  Hearing was rated as same  Patient feels that their emotional and mental health rating is same  Patients states they are never, rarely angry  Patient states they are sometimes unusually tired/fatigued  Pain experienced in the last 7 days has been some  Patient's pain rating has been 10/10  Patient states that he has experienced no weight loss or gain in last 6 months  Depression Screening:   PHQ-2 Score: 0      Fall Risk Screening: In the past year, patient has experienced: history of falling in past year    Number of falls: 1  Injured during fall?: No    Feels unsteady when standing or walking?: No    Worried about falling?: No      Home Safety:  Patient does not have trouble with stairs inside or outside of their home  Patient has working smoke alarms and has no working carbon monoxide detector  Home safety hazards include: none  Nutrition:   Current diet is Regular  Medications:   Patient is currently taking over-the-counter supplements  OTC medications include: see medication list  Patient is able to manage medications  Activities of Daily Living (ADLs)/Instrumental Activities of Daily Living (IADLs):   Walk and transfer into and out of bed and chair?: Yes  Dress and groom yourself?: Yes    Bathe or shower yourself?: Yes    Feed yourself?  Yes  Do your laundry/housekeeping?: Yes  Manage your money, pay your bills and track your expenses?: Yes  Make your own meals?: Yes    Do your own shopping?: Yes    Previous Hospitalizations:   Any hospitalizations or ED visits within the last 12 months?: Yes    How many hospitalizations have you had in the last year?: 1-2    Advance Care Planning:   Living will: Yes    Durable POA for healthcare:  Yes    Advanced directive: Yes    Advanced directive counseling given: Yes    Five wishes given: Yes    Patient declined ACP directive: No    End of Life Decisions reviewed with patient: Yes    Provider agrees with end of life decisions: Yes      PREVENTIVE SCREENINGS      Cardiovascular Screening:    General: Screening Not Indicated and History Lipid Disorder      Diabetes Screening:     General: Screening Not Indicated and History Diabetes      Colorectal Cancer Screening:     General: Screening Not Indicated      Prostate Cancer Screening:    General: Screening Not Indicated      Abdominal Aortic Aneurysm (AAA) Screening:    Risk factors include: tobacco use        Lung Cancer Screening:     General: Screening Not Indicated      Hepatitis C Screening:    General: Screening Current    Screening, Brief Intervention, and Referral to Treatment (SBIRT)    Screening  Typical number of drinks in a day: 0      Isidoro Springer, DO

## 2021-09-20 NOTE — ASSESSMENT & PLAN NOTE
Lab Results   Component Value Date    HGBA1C 9 8 (A) 09/20/2021   Patient's A1c is elevated  Recommend starting Francenia Sandhoff as this should be safe considering his chronic renal disease  Recommend recheck again in 6 months

## 2021-10-14 DIAGNOSIS — I48.0 PAROXYSMAL ATRIAL FIBRILLATION (HCC): Chronic | ICD-10-CM

## 2021-10-23 DIAGNOSIS — N40.1 BENIGN PROSTATIC HYPERPLASIA WITH URINARY FREQUENCY: ICD-10-CM

## 2021-10-23 DIAGNOSIS — R35.0 BENIGN PROSTATIC HYPERPLASIA WITH URINARY FREQUENCY: ICD-10-CM

## 2021-10-24 RX ORDER — FINASTERIDE 5 MG/1
TABLET, FILM COATED ORAL
Qty: 90 TABLET | Refills: 3 | Status: SHIPPED | OUTPATIENT
Start: 2021-10-24

## 2021-10-25 DIAGNOSIS — I10 ESSENTIAL HYPERTENSION: ICD-10-CM

## 2021-10-25 DIAGNOSIS — I48.0 PAROXYSMAL ATRIAL FIBRILLATION (HCC): Chronic | ICD-10-CM

## 2021-10-25 RX ORDER — LOSARTAN POTASSIUM 100 MG/1
50 TABLET ORAL EVERY MORNING
Qty: 45 TABLET | Refills: 3 | Status: SHIPPED | OUTPATIENT
Start: 2021-10-25 | End: 2022-06-20

## 2021-11-04 DIAGNOSIS — F41.1 GENERALIZED ANXIETY DISORDER: ICD-10-CM

## 2021-11-09 RX ORDER — ALPRAZOLAM 0.25 MG/1
0.25 TABLET ORAL EVERY 8 HOURS PRN
Qty: 270 TABLET | Refills: 0 | Status: SHIPPED | OUTPATIENT
Start: 2021-11-09 | End: 2022-03-07

## 2021-11-17 DIAGNOSIS — Z79.4 TYPE 2 DIABETES MELLITUS WITHOUT COMPLICATION, WITH LONG-TERM CURRENT USE OF INSULIN (HCC): ICD-10-CM

## 2021-11-17 DIAGNOSIS — E11.9 TYPE 2 DIABETES MELLITUS WITHOUT COMPLICATION, WITH LONG-TERM CURRENT USE OF INSULIN (HCC): ICD-10-CM

## 2021-11-17 RX ORDER — BLOOD SUGAR DIAGNOSTIC
STRIP MISCELLANEOUS
Qty: 200 EACH | Refills: 3 | Status: SHIPPED | OUTPATIENT
Start: 2021-11-17

## 2021-12-07 ENCOUNTER — OFFICE VISIT (OUTPATIENT)
Dept: NEPHROLOGY | Facility: CLINIC | Age: 86
End: 2021-12-07
Payer: COMMERCIAL

## 2021-12-07 VITALS
HEART RATE: 55 BPM | BODY MASS INDEX: 25.92 KG/M2 | DIASTOLIC BLOOD PRESSURE: 60 MMHG | SYSTOLIC BLOOD PRESSURE: 110 MMHG | WEIGHT: 151.8 LBS | OXYGEN SATURATION: 98 % | HEIGHT: 64 IN

## 2021-12-07 DIAGNOSIS — R80.1 PERSISTENT PROTEINURIA: ICD-10-CM

## 2021-12-07 DIAGNOSIS — K22.70 BARRETT'S ESOPHAGUS WITHOUT DYSPLASIA: ICD-10-CM

## 2021-12-07 DIAGNOSIS — E55.9 VITAMIN D DEFICIENCY: ICD-10-CM

## 2021-12-07 DIAGNOSIS — I10 ESSENTIAL HYPERTENSION: ICD-10-CM

## 2021-12-07 DIAGNOSIS — E78.5 HYPERLIPIDEMIA, UNSPECIFIED HYPERLIPIDEMIA TYPE: ICD-10-CM

## 2021-12-07 DIAGNOSIS — E11.00 TYPE 2 DIABETES MELLITUS WITH HYPEROSMOLARITY WITHOUT COMA, WITHOUT LONG-TERM CURRENT USE OF INSULIN (HCC): ICD-10-CM

## 2021-12-07 DIAGNOSIS — I48.0 PAROXYSMAL ATRIAL FIBRILLATION (HCC): Chronic | ICD-10-CM

## 2021-12-07 DIAGNOSIS — N25.81 SECONDARY HYPERPARATHYROIDISM OF RENAL ORIGIN (HCC): ICD-10-CM

## 2021-12-07 DIAGNOSIS — N18.5 STAGE 5 CHRONIC KIDNEY DISEASE (HCC): Primary | ICD-10-CM

## 2021-12-07 PROCEDURE — 1036F TOBACCO NON-USER: CPT | Performed by: INTERNAL MEDICINE

## 2021-12-07 PROCEDURE — 99214 OFFICE O/P EST MOD 30 MIN: CPT | Performed by: INTERNAL MEDICINE

## 2021-12-07 PROCEDURE — 1160F RVW MEDS BY RX/DR IN RCRD: CPT | Performed by: INTERNAL MEDICINE

## 2021-12-08 DIAGNOSIS — F41.1 GENERALIZED ANXIETY DISORDER: ICD-10-CM

## 2021-12-08 RX ORDER — PAROXETINE 30 MG/1
30 TABLET, FILM COATED ORAL EVERY MORNING
Qty: 90 TABLET | Refills: 1 | Status: SHIPPED | OUTPATIENT
Start: 2021-12-08 | End: 2022-04-15

## 2021-12-14 ENCOUNTER — APPOINTMENT (OUTPATIENT)
Dept: LAB | Facility: CLINIC | Age: 86
End: 2021-12-14
Payer: COMMERCIAL

## 2021-12-14 DIAGNOSIS — E78.5 HYPERLIPIDEMIA, UNSPECIFIED HYPERLIPIDEMIA TYPE: ICD-10-CM

## 2021-12-14 DIAGNOSIS — N18.5 STAGE 5 CHRONIC KIDNEY DISEASE (HCC): ICD-10-CM

## 2021-12-14 DIAGNOSIS — E55.9 VITAMIN D DEFICIENCY: ICD-10-CM

## 2021-12-14 DIAGNOSIS — I15.0 RENOVASCULAR HYPERTENSION: ICD-10-CM

## 2021-12-14 DIAGNOSIS — R80.1 PERSISTENT PROTEINURIA: ICD-10-CM

## 2021-12-14 LAB
25(OH)D3 SERPL-MCNC: 56.8 NG/ML (ref 30–100)
ALBUMIN SERPL BCP-MCNC: 4.1 G/DL (ref 3.5–5)
ALP SERPL-CCNC: 56 U/L (ref 46–116)
ALT SERPL W P-5'-P-CCNC: 29 U/L (ref 12–78)
ANION GAP SERPL CALCULATED.3IONS-SCNC: 7 MMOL/L (ref 4–13)
AST SERPL W P-5'-P-CCNC: 13 U/L (ref 5–45)
BACTERIA UR QL AUTO: ABNORMAL /HPF
BASOPHILS # BLD AUTO: 0.01 THOUSANDS/ΜL (ref 0–0.1)
BASOPHILS NFR BLD AUTO: 0 % (ref 0–1)
BILIRUB SERPL-MCNC: 0.44 MG/DL (ref 0.2–1)
BILIRUB UR QL STRIP: NEGATIVE
BUN SERPL-MCNC: 54 MG/DL (ref 5–25)
CALCIUM SERPL-MCNC: 10.7 MG/DL (ref 8.3–10.1)
CHLORIDE SERPL-SCNC: 109 MMOL/L (ref 100–108)
CLARITY UR: CLEAR
CO2 SERPL-SCNC: 23 MMOL/L (ref 21–32)
COLOR UR: YELLOW
CREAT SERPL-MCNC: 3.28 MG/DL (ref 0.6–1.3)
CREAT UR-MCNC: 81 MG/DL
EOSINOPHIL # BLD AUTO: 0.11 THOUSAND/ΜL (ref 0–0.61)
EOSINOPHIL NFR BLD AUTO: 2 % (ref 0–6)
ERYTHROCYTE [DISTWIDTH] IN BLOOD BY AUTOMATED COUNT: 12.5 % (ref 11.6–15.1)
GFR SERPL CREATININE-BSD FRML MDRD: 16 ML/MIN/1.73SQ M
GLUCOSE P FAST SERPL-MCNC: 117 MG/DL (ref 65–99)
GLUCOSE UR STRIP-MCNC: NEGATIVE MG/DL
HCT VFR BLD AUTO: 30.8 % (ref 36.5–49.3)
HGB BLD-MCNC: 10.1 G/DL (ref 12–17)
HGB UR QL STRIP.AUTO: ABNORMAL
HYALINE CASTS #/AREA URNS LPF: ABNORMAL /LPF
IMM GRANULOCYTES # BLD AUTO: 0.01 THOUSAND/UL (ref 0–0.2)
IMM GRANULOCYTES NFR BLD AUTO: 0 % (ref 0–2)
KETONES UR STRIP-MCNC: NEGATIVE MG/DL
LDLC SERPL DIRECT ASSAY-MCNC: 63 MG/DL (ref 0–100)
LEUKOCYTE ESTERASE UR QL STRIP: NEGATIVE
LYMPHOCYTES # BLD AUTO: 1.02 THOUSANDS/ΜL (ref 0.6–4.47)
LYMPHOCYTES NFR BLD AUTO: 19 % (ref 14–44)
MCH RBC QN AUTO: 32.7 PG (ref 26.8–34.3)
MCHC RBC AUTO-ENTMCNC: 32.8 G/DL (ref 31.4–37.4)
MCV RBC AUTO: 100 FL (ref 82–98)
MICROALBUMIN UR-MCNC: 120 MG/L (ref 0–20)
MICROALBUMIN/CREAT 24H UR: 148 MG/G CREATININE (ref 0–30)
MONOCYTES # BLD AUTO: 0.53 THOUSAND/ΜL (ref 0.17–1.22)
MONOCYTES NFR BLD AUTO: 10 % (ref 4–12)
NEUTROPHILS # BLD AUTO: 3.65 THOUSANDS/ΜL (ref 1.85–7.62)
NEUTS SEG NFR BLD AUTO: 69 % (ref 43–75)
NITRITE UR QL STRIP: NEGATIVE
NON-SQ EPI CELLS URNS QL MICRO: ABNORMAL /HPF
NRBC BLD AUTO-RTO: 0 /100 WBCS
PH UR STRIP.AUTO: 6 [PH]
PLATELET # BLD AUTO: 158 THOUSANDS/UL (ref 149–390)
PMV BLD AUTO: 10.1 FL (ref 8.9–12.7)
POTASSIUM SERPL-SCNC: 4.8 MMOL/L (ref 3.5–5.3)
PROT SERPL-MCNC: 7.9 G/DL (ref 6.4–8.2)
PROT UR STRIP-MCNC: ABNORMAL MG/DL
RBC # BLD AUTO: 3.09 MILLION/UL (ref 3.88–5.62)
RBC #/AREA URNS AUTO: ABNORMAL /HPF
SODIUM SERPL-SCNC: 139 MMOL/L (ref 136–145)
SP GR UR STRIP.AUTO: 1.01 (ref 1–1.03)
UROBILINOGEN UR QL STRIP.AUTO: 0.2 E.U./DL
WBC # BLD AUTO: 5.33 THOUSAND/UL (ref 4.31–10.16)
WBC #/AREA URNS AUTO: ABNORMAL /HPF

## 2021-12-14 PROCEDURE — 82570 ASSAY OF URINE CREATININE: CPT

## 2021-12-14 PROCEDURE — 82043 UR ALBUMIN QUANTITATIVE: CPT

## 2021-12-14 PROCEDURE — 82306 VITAMIN D 25 HYDROXY: CPT

## 2021-12-14 PROCEDURE — 36415 COLL VENOUS BLD VENIPUNCTURE: CPT

## 2021-12-14 PROCEDURE — 80053 COMPREHEN METABOLIC PANEL: CPT

## 2021-12-14 PROCEDURE — 85025 COMPLETE CBC W/AUTO DIFF WBC: CPT

## 2021-12-14 PROCEDURE — 83721 ASSAY OF BLOOD LIPOPROTEIN: CPT

## 2021-12-14 PROCEDURE — 81001 URINALYSIS AUTO W/SCOPE: CPT

## 2021-12-27 DIAGNOSIS — N18.5 STAGE 5 CHRONIC KIDNEY DISEASE (HCC): Primary | ICD-10-CM

## 2022-01-01 ENCOUNTER — HOSPITAL ENCOUNTER (INPATIENT)
Facility: HOSPITAL | Age: 87
LOS: 7 days | End: 2022-12-13
Attending: EMERGENCY MEDICINE | Admitting: INTERNAL MEDICINE

## 2022-01-01 ENCOUNTER — APPOINTMENT (EMERGENCY)
Dept: CT IMAGING | Facility: HOSPITAL | Age: 87
End: 2022-01-01

## 2022-01-01 ENCOUNTER — APPOINTMENT (INPATIENT)
Dept: RADIOLOGY | Facility: HOSPITAL | Age: 87
End: 2022-01-01

## 2022-01-01 ENCOUNTER — APPOINTMENT (INPATIENT)
Dept: DIALYSIS | Facility: HOSPITAL | Age: 87
End: 2022-01-01

## 2022-01-01 ENCOUNTER — TELEPHONE (OUTPATIENT)
Dept: FAMILY MEDICINE CLINIC | Facility: CLINIC | Age: 87
End: 2022-01-01

## 2022-01-01 ENCOUNTER — HOME HEALTH ADMISSION (OUTPATIENT)
Dept: HOME HEALTH SERVICES | Facility: HOME HEALTHCARE | Age: 87
End: 2022-01-01

## 2022-01-01 ENCOUNTER — APPOINTMENT (INPATIENT)
Dept: NON INVASIVE DIAGNOSTICS | Facility: HOSPITAL | Age: 87
End: 2022-01-01

## 2022-01-01 ENCOUNTER — APPOINTMENT (EMERGENCY)
Dept: RADIOLOGY | Facility: HOSPITAL | Age: 87
End: 2022-01-01

## 2022-01-01 ENCOUNTER — APPOINTMENT (INPATIENT)
Dept: RADIOLOGY | Facility: HOSPITAL | Age: 87
End: 2022-01-01
Attending: STUDENT IN AN ORGANIZED HEALTH CARE EDUCATION/TRAINING PROGRAM

## 2022-01-01 VITALS
TEMPERATURE: 99.7 F | BODY MASS INDEX: 29.49 KG/M2 | WEIGHT: 160.27 LBS | HEIGHT: 62 IN | HEART RATE: 106 BPM | DIASTOLIC BLOOD PRESSURE: 81 MMHG | RESPIRATION RATE: 9 BRPM | OXYGEN SATURATION: 28 % | SYSTOLIC BLOOD PRESSURE: 161 MMHG

## 2022-01-01 DIAGNOSIS — R77.8 ELEVATED TROPONIN: ICD-10-CM

## 2022-01-01 DIAGNOSIS — E87.5 HYPERKALEMIA: Primary | ICD-10-CM

## 2022-01-01 DIAGNOSIS — N17.9 AKI (ACUTE KIDNEY INJURY) (HCC): ICD-10-CM

## 2022-01-01 DIAGNOSIS — J96.01 ACUTE RESPIRATORY FAILURE WITH HYPOXIA (HCC): ICD-10-CM

## 2022-01-01 DIAGNOSIS — N18.9 ACUTE KIDNEY INJURY SUPERIMPOSED ON CHRONIC KIDNEY DISEASE (HCC): ICD-10-CM

## 2022-01-01 DIAGNOSIS — N18.5 CKD (CHRONIC KIDNEY DISEASE) STAGE 5, GFR LESS THAN 15 ML/MIN (HCC): ICD-10-CM

## 2022-01-01 DIAGNOSIS — R41.82 ALTERED MENTAL STATUS: ICD-10-CM

## 2022-01-01 DIAGNOSIS — N17.9 ARF (ACUTE RENAL FAILURE) (HCC): ICD-10-CM

## 2022-01-01 DIAGNOSIS — N17.9 ACUTE KIDNEY INJURY SUPERIMPOSED ON CHRONIC KIDNEY DISEASE (HCC): ICD-10-CM

## 2022-01-01 DIAGNOSIS — N18.4 STAGE 4 CHRONIC KIDNEY DISEASE (HCC): Primary | ICD-10-CM

## 2022-01-01 DIAGNOSIS — T82.590A MALFUNCTION OF ARTERIOVENOUS GRAFT, INITIAL ENCOUNTER (HCC): ICD-10-CM

## 2022-01-01 LAB
2HR DELTA HS TROPONIN: -1 NG/L
4HR DELTA HS TROPONIN: -3 NG/L
ABO GROUP BLD BPU: NORMAL
ABO GROUP BLD BPU: NORMAL
ABO GROUP BLD: NORMAL
ABO GROUP BLD: NORMAL
ALBUMIN SERPL BCP-MCNC: 2.2 G/DL (ref 3.5–5)
ALBUMIN SERPL BCP-MCNC: 2.2 G/DL (ref 3.5–5)
ALBUMIN SERPL BCP-MCNC: 3.5 G/DL (ref 3.5–5)
ALP SERPL-CCNC: 124 U/L (ref 46–116)
ALP SERPL-CCNC: 64 U/L (ref 46–116)
ALP SERPL-CCNC: 73 U/L (ref 46–116)
ALT SERPL W P-5'-P-CCNC: 30 U/L (ref 12–78)
ALT SERPL W P-5'-P-CCNC: 40 U/L (ref 12–78)
ALT SERPL W P-5'-P-CCNC: 60 U/L (ref 12–78)
AMMONIA PLAS-SCNC: 28 UMOL/L (ref 11–35)
ANION GAP SERPL CALCULATED.3IONS-SCNC: 11 MMOL/L (ref 4–13)
ANION GAP SERPL CALCULATED.3IONS-SCNC: 11 MMOL/L (ref 4–13)
ANION GAP SERPL CALCULATED.3IONS-SCNC: 12 MMOL/L (ref 4–13)
ANION GAP SERPL CALCULATED.3IONS-SCNC: 14 MMOL/L (ref 4–13)
ANION GAP SERPL CALCULATED.3IONS-SCNC: 15 MMOL/L (ref 4–13)
ANION GAP SERPL CALCULATED.3IONS-SCNC: 17 MMOL/L (ref 4–13)
ANION GAP SERPL CALCULATED.3IONS-SCNC: 23 MMOL/L (ref 4–13)
ANION GAP SERPL CALCULATED.3IONS-SCNC: 23 MMOL/L (ref 4–13)
ANION GAP SERPL CALCULATED.3IONS-SCNC: 24 MMOL/L (ref 4–13)
ANION GAP SERPL CALCULATED.3IONS-SCNC: 24 MMOL/L (ref 4–13)
ANION GAP SERPL CALCULATED.3IONS-SCNC: 25 MMOL/L (ref 4–13)
ANION GAP SERPL CALCULATED.3IONS-SCNC: 28 MMOL/L (ref 4–13)
ANISOCYTOSIS BLD QL SMEAR: PRESENT
APTT PPP: 31 SECONDS (ref 23–37)
ARTERIAL PATENCY WRIST A: YES
AST SERPL W P-5'-P-CCNC: 101 U/L (ref 5–45)
AST SERPL W P-5'-P-CCNC: 19 U/L (ref 5–45)
AST SERPL W P-5'-P-CCNC: 69 U/L (ref 5–45)
ATRIAL RATE: 14 BPM
ATRIAL RATE: 150 BPM
ATRIAL RATE: 156 BPM
ATRIAL RATE: 162 BPM
ATRIAL RATE: 86 BPM
ATRIAL RATE: 90 BPM
BACTERIA UR QL AUTO: ABNORMAL /HPF
BACTERIA UR QL AUTO: ABNORMAL /HPF
BASE EX.OXY STD BLDV CALC-SCNC: 58.9 % (ref 60–80)
BASE EXCESS BLDA CALC-SCNC: -1.1 MMOL/L
BASE EXCESS BLDA CALC-SCNC: -1.9 MMOL/L
BASE EXCESS BLDA CALC-SCNC: -2.6 MMOL/L
BASE EXCESS BLDA CALC-SCNC: -3 MMOL/L
BASE EXCESS BLDV CALC-SCNC: 0.1 MMOL/L
BASOPHILS # BLD AUTO: 0.02 THOUSANDS/ÂΜL (ref 0–0.1)
BASOPHILS # BLD AUTO: 0.03 THOUSANDS/ÂΜL (ref 0–0.1)
BASOPHILS # BLD AUTO: 0.03 THOUSANDS/ÂΜL (ref 0–0.1)
BASOPHILS # BLD MANUAL: 0 THOUSAND/UL (ref 0–0.1)
BASOPHILS NFR BLD AUTO: 0 % (ref 0–1)
BASOPHILS NFR MAR MANUAL: 0 % (ref 0–1)
BILIRUB DIRECT SERPL-MCNC: 0.47 MG/DL (ref 0–0.2)
BILIRUB SERPL-MCNC: 0.36 MG/DL (ref 0.2–1)
BILIRUB SERPL-MCNC: 0.7 MG/DL (ref 0.2–1)
BILIRUB SERPL-MCNC: 0.83 MG/DL (ref 0.2–1)
BILIRUB UR QL STRIP: NEGATIVE
BILIRUB UR QL STRIP: NEGATIVE
BLD GP AB SCN SERPL QL: NEGATIVE
BLD GP AB SCN SERPL QL: NEGATIVE
BPU ID: NORMAL
BPU ID: NORMAL
BUN SERPL-MCNC: 101 MG/DL (ref 5–25)
BUN SERPL-MCNC: 102 MG/DL (ref 5–25)
BUN SERPL-MCNC: 106 MG/DL (ref 5–25)
BUN SERPL-MCNC: 124 MG/DL (ref 5–25)
BUN SERPL-MCNC: 147 MG/DL (ref 5–25)
BUN SERPL-MCNC: 216 MG/DL (ref 5–25)
BUN SERPL-MCNC: 226 MG/DL (ref 5–25)
BUN SERPL-MCNC: 226 MG/DL (ref 5–25)
BUN SERPL-MCNC: 229 MG/DL (ref 5–25)
BUN SERPL-MCNC: 250 MG/DL (ref 5–25)
BUN SERPL-MCNC: 306 MG/DL (ref 5–25)
BUN SERPL-MCNC: 45 MG/DL (ref 5–25)
BUN SERPL-MCNC: 56 MG/DL (ref 5–25)
BUN SERPL-MCNC: 65 MG/DL (ref 5–25)
BUN SERPL-MCNC: 65 MG/DL (ref 5–25)
BUN SERPL-MCNC: 67 MG/DL (ref 5–25)
BUN SERPL-MCNC: 70 MG/DL (ref 5–25)
CA-I BLD-SCNC: 0.87 MMOL/L (ref 1.12–1.32)
CA-I BLD-SCNC: 0.92 MMOL/L (ref 1.12–1.32)
CA-I BLD-SCNC: 0.96 MMOL/L (ref 1.12–1.32)
CA-I BLD-SCNC: 1.01 MMOL/L (ref 1.12–1.32)
CA-I BLD-SCNC: 1.03 MMOL/L (ref 1.12–1.32)
CA-I BLD-SCNC: 1.03 MMOL/L (ref 1.12–1.32)
CA-I BLD-SCNC: 1.04 MMOL/L (ref 1.12–1.32)
CA-I BLD-SCNC: 1.07 MMOL/L (ref 1.12–1.32)
CA-I BLD-SCNC: 1.09 MMOL/L (ref 1.12–1.32)
CALCIUM ALBUM COR SERPL-MCNC: 9.2 MG/DL (ref 8.3–10.1)
CALCIUM SERPL-MCNC: 7.3 MG/DL (ref 8.3–10.1)
CALCIUM SERPL-MCNC: 7.3 MG/DL (ref 8.3–10.1)
CALCIUM SERPL-MCNC: 7.8 MG/DL (ref 8.3–10.1)
CALCIUM SERPL-MCNC: 7.8 MG/DL (ref 8.3–10.1)
CALCIUM SERPL-MCNC: 8 MG/DL (ref 8.3–10.1)
CALCIUM SERPL-MCNC: 8.1 MG/DL (ref 8.3–10.1)
CALCIUM SERPL-MCNC: 8.2 MG/DL (ref 8.3–10.1)
CALCIUM SERPL-MCNC: 8.2 MG/DL (ref 8.3–10.1)
CALCIUM SERPL-MCNC: 8.3 MG/DL (ref 8.3–10.1)
CALCIUM SERPL-MCNC: 8.5 MG/DL (ref 8.3–10.1)
CALCIUM SERPL-MCNC: 8.5 MG/DL (ref 8.3–10.1)
CALCIUM SERPL-MCNC: 8.7 MG/DL (ref 8.3–10.1)
CALCIUM SERPL-MCNC: 8.8 MG/DL (ref 8.3–10.1)
CALCIUM SERPL-MCNC: <5 MG/DL (ref 8.3–10.1)
CARDIAC TROPONIN I PNL SERPL HS: 72 NG/L
CARDIAC TROPONIN I PNL SERPL HS: 74 NG/L
CARDIAC TROPONIN I PNL SERPL HS: 75 NG/L
CHLORIDE SERPL-SCNC: 101 MMOL/L (ref 96–108)
CHLORIDE SERPL-SCNC: 101 MMOL/L (ref 96–108)
CHLORIDE SERPL-SCNC: 102 MMOL/L (ref 96–108)
CHLORIDE SERPL-SCNC: 104 MMOL/L (ref 96–108)
CHLORIDE SERPL-SCNC: 96 MMOL/L (ref 96–108)
CHLORIDE SERPL-SCNC: 96 MMOL/L (ref 96–108)
CHLORIDE SERPL-SCNC: 97 MMOL/L (ref 96–108)
CHLORIDE SERPL-SCNC: 97 MMOL/L (ref 96–108)
CHLORIDE SERPL-SCNC: 98 MMOL/L (ref 96–108)
CHLORIDE SERPL-SCNC: 99 MMOL/L (ref 96–108)
CHLORIDE UR-SCNC: 46 MMOL/L
CK MB SERPL-MCNC: 4.3 NG/ML (ref 0–5)
CK MB SERPL-MCNC: <1 % (ref 0–2.5)
CK SERPL-CCNC: 671 U/L (ref 39–308)
CLARITY UR: ABNORMAL
CLARITY UR: CLEAR
CO2 SERPL-SCNC: 10 MMOL/L (ref 21–32)
CO2 SERPL-SCNC: 10 MMOL/L (ref 21–32)
CO2 SERPL-SCNC: 14 MMOL/L (ref 21–32)
CO2 SERPL-SCNC: 15 MMOL/L (ref 21–32)
CO2 SERPL-SCNC: 18 MMOL/L (ref 21–32)
CO2 SERPL-SCNC: 20 MMOL/L (ref 21–32)
CO2 SERPL-SCNC: 23 MMOL/L (ref 21–32)
CO2 SERPL-SCNC: 23 MMOL/L (ref 21–32)
CO2 SERPL-SCNC: 24 MMOL/L (ref 21–32)
CO2 SERPL-SCNC: 26 MMOL/L (ref 21–32)
CO2 SERPL-SCNC: 26 MMOL/L (ref 21–32)
CO2 SERPL-SCNC: 27 MMOL/L (ref 21–32)
CO2 SERPL-SCNC: 28 MMOL/L (ref 21–32)
CO2 SERPL-SCNC: 28 MMOL/L (ref 21–32)
COLOR UR: YELLOW
COLOR UR: YELLOW
CREAT SERPL-MCNC: 10.91 MG/DL (ref 0.6–1.3)
CREAT SERPL-MCNC: 15.85 MG/DL (ref 0.6–1.3)
CREAT SERPL-MCNC: 15.91 MG/DL (ref 0.6–1.3)
CREAT SERPL-MCNC: 16.29 MG/DL (ref 0.6–1.3)
CREAT SERPL-MCNC: 16.41 MG/DL (ref 0.6–1.3)
CREAT SERPL-MCNC: 16.46 MG/DL (ref 0.6–1.3)
CREAT SERPL-MCNC: 17.52 MG/DL (ref 0.6–1.3)
CREAT SERPL-MCNC: 4.4 MG/DL (ref 0.6–1.3)
CREAT SERPL-MCNC: 4.67 MG/DL (ref 0.6–1.3)
CREAT SERPL-MCNC: 5.55 MG/DL (ref 0.6–1.3)
CREAT SERPL-MCNC: 5.56 MG/DL (ref 0.6–1.3)
CREAT SERPL-MCNC: 5.9 MG/DL (ref 0.6–1.3)
CREAT SERPL-MCNC: 7.49 MG/DL (ref 0.6–1.3)
CREAT SERPL-MCNC: 7.54 MG/DL (ref 0.6–1.3)
CREAT SERPL-MCNC: 7.77 MG/DL (ref 0.6–1.3)
CREAT SERPL-MCNC: 9.1 MG/DL (ref 0.6–1.3)
CREAT SERPL-MCNC: <0.15 MG/DL (ref 0.6–1.3)
CREAT UR-MCNC: 89.4 MG/DL
CROSSMATCH: NORMAL
CROSSMATCH: NORMAL
DIGOXIN SERPL-MCNC: 1.9 NG/ML (ref 0.8–2)
DIGOXIN SERPL-MCNC: 2.3 NG/ML (ref 0.8–2)
EOSINOPHIL # BLD AUTO: 0 THOUSAND/ÂΜL (ref 0–0.61)
EOSINOPHIL # BLD AUTO: 0.01 THOUSAND/ÂΜL (ref 0–0.61)
EOSINOPHIL # BLD MANUAL: 0 THOUSAND/UL (ref 0–0.4)
EOSINOPHIL NFR BLD AUTO: 0 % (ref 0–6)
EOSINOPHIL NFR BLD MANUAL: 0 % (ref 0–6)
ERYTHROCYTE [DISTWIDTH] IN BLOOD BY AUTOMATED COUNT: 12.6 % (ref 11.6–15.1)
ERYTHROCYTE [DISTWIDTH] IN BLOOD BY AUTOMATED COUNT: 12.8 % (ref 11.6–15.1)
ERYTHROCYTE [DISTWIDTH] IN BLOOD BY AUTOMATED COUNT: 13.2 % (ref 11.6–15.1)
ERYTHROCYTE [DISTWIDTH] IN BLOOD BY AUTOMATED COUNT: 13.4 % (ref 11.6–15.1)
ERYTHROCYTE [DISTWIDTH] IN BLOOD BY AUTOMATED COUNT: 13.5 % (ref 11.6–15.1)
ERYTHROCYTE [DISTWIDTH] IN BLOOD BY AUTOMATED COUNT: 13.5 % (ref 11.6–15.1)
ERYTHROCYTE [DISTWIDTH] IN BLOOD BY AUTOMATED COUNT: 13.6 % (ref 11.6–15.1)
ERYTHROCYTE [DISTWIDTH] IN BLOOD BY AUTOMATED COUNT: 15.3 % (ref 11.6–15.1)
FERRITIN SERPL-MCNC: 2641 NG/ML (ref 8–388)
FLUAV RNA RESP QL NAA+PROBE: NEGATIVE
FLUBV RNA RESP QL NAA+PROBE: NEGATIVE
GFR SERPL CREATININE-BSD FRML MDRD: 10 ML/MIN/1.73SQ M
GFR SERPL CREATININE-BSD FRML MDRD: 11 ML/MIN/1.73SQ M
GFR SERPL CREATININE-BSD FRML MDRD: 2 ML/MIN/1.73SQ M
GFR SERPL CREATININE-BSD FRML MDRD: 3 ML/MIN/1.73SQ M
GFR SERPL CREATININE-BSD FRML MDRD: 4 ML/MIN/1.73SQ M
GFR SERPL CREATININE-BSD FRML MDRD: 5 ML/MIN/1.73SQ M
GFR SERPL CREATININE-BSD FRML MDRD: 7 ML/MIN/1.73SQ M
GFR SERPL CREATININE-BSD FRML MDRD: 8 ML/MIN/1.73SQ M
GFR SERPL CREATININE-BSD FRML MDRD: 8 ML/MIN/1.73SQ M
GLUCOSE SERPL-MCNC: 106 MG/DL (ref 65–140)
GLUCOSE SERPL-MCNC: 109 MG/DL (ref 65–140)
GLUCOSE SERPL-MCNC: 110 MG/DL (ref 65–140)
GLUCOSE SERPL-MCNC: 110 MG/DL (ref 65–140)
GLUCOSE SERPL-MCNC: 118 MG/DL (ref 65–140)
GLUCOSE SERPL-MCNC: 119 MG/DL (ref 65–140)
GLUCOSE SERPL-MCNC: 119 MG/DL (ref 65–140)
GLUCOSE SERPL-MCNC: 120 MG/DL (ref 65–140)
GLUCOSE SERPL-MCNC: 122 MG/DL (ref 65–140)
GLUCOSE SERPL-MCNC: 122 MG/DL (ref 65–140)
GLUCOSE SERPL-MCNC: 124 MG/DL (ref 65–140)
GLUCOSE SERPL-MCNC: 126 MG/DL (ref 65–140)
GLUCOSE SERPL-MCNC: 127 MG/DL (ref 65–140)
GLUCOSE SERPL-MCNC: 133 MG/DL (ref 65–140)
GLUCOSE SERPL-MCNC: 135 MG/DL (ref 65–140)
GLUCOSE SERPL-MCNC: 136 MG/DL (ref 65–140)
GLUCOSE SERPL-MCNC: 137 MG/DL (ref 65–140)
GLUCOSE SERPL-MCNC: 138 MG/DL (ref 65–140)
GLUCOSE SERPL-MCNC: 139 MG/DL (ref 65–140)
GLUCOSE SERPL-MCNC: 139 MG/DL (ref 65–140)
GLUCOSE SERPL-MCNC: 140 MG/DL (ref 65–140)
GLUCOSE SERPL-MCNC: 141 MG/DL (ref 65–140)
GLUCOSE SERPL-MCNC: 142 MG/DL (ref 65–140)
GLUCOSE SERPL-MCNC: 152 MG/DL (ref 65–140)
GLUCOSE SERPL-MCNC: 153 MG/DL (ref 65–140)
GLUCOSE SERPL-MCNC: 153 MG/DL (ref 65–140)
GLUCOSE SERPL-MCNC: 155 MG/DL (ref 65–140)
GLUCOSE SERPL-MCNC: 159 MG/DL (ref 65–140)
GLUCOSE SERPL-MCNC: 161 MG/DL (ref 65–140)
GLUCOSE SERPL-MCNC: 161 MG/DL (ref 65–140)
GLUCOSE SERPL-MCNC: 164 MG/DL (ref 65–140)
GLUCOSE SERPL-MCNC: 165 MG/DL (ref 65–140)
GLUCOSE SERPL-MCNC: 166 MG/DL (ref 65–140)
GLUCOSE SERPL-MCNC: 167 MG/DL (ref 65–140)
GLUCOSE SERPL-MCNC: 169 MG/DL (ref 65–140)
GLUCOSE SERPL-MCNC: 170 MG/DL (ref 65–140)
GLUCOSE SERPL-MCNC: 171 MG/DL (ref 65–140)
GLUCOSE SERPL-MCNC: 172 MG/DL (ref 65–140)
GLUCOSE SERPL-MCNC: 173 MG/DL (ref 65–140)
GLUCOSE SERPL-MCNC: 180 MG/DL (ref 65–140)
GLUCOSE SERPL-MCNC: 182 MG/DL (ref 65–140)
GLUCOSE SERPL-MCNC: 185 MG/DL (ref 65–140)
GLUCOSE SERPL-MCNC: 185 MG/DL (ref 65–140)
GLUCOSE SERPL-MCNC: 186 MG/DL (ref 65–140)
GLUCOSE SERPL-MCNC: 199 MG/DL (ref 65–140)
GLUCOSE SERPL-MCNC: 211 MG/DL (ref 65–140)
GLUCOSE SERPL-MCNC: 214 MG/DL (ref 65–140)
GLUCOSE SERPL-MCNC: 223 MG/DL (ref 65–140)
GLUCOSE SERPL-MCNC: 228 MG/DL (ref 65–140)
GLUCOSE SERPL-MCNC: 235 MG/DL (ref 65–140)
GLUCOSE SERPL-MCNC: 240 MG/DL (ref 65–140)
GLUCOSE SERPL-MCNC: 260 MG/DL (ref 65–140)
GLUCOSE SERPL-MCNC: 266 MG/DL (ref 65–140)
GLUCOSE SERPL-MCNC: 301 MG/DL (ref 65–140)
GLUCOSE SERPL-MCNC: 305 MG/DL (ref 65–140)
GLUCOSE SERPL-MCNC: 310 MG/DL (ref 65–140)
GLUCOSE SERPL-MCNC: 78 MG/DL (ref 65–140)
GLUCOSE SERPL-MCNC: 89 MG/DL (ref 65–140)
GLUCOSE SERPL-MCNC: 90 MG/DL (ref 65–140)
GLUCOSE UR STRIP-MCNC: ABNORMAL MG/DL
GLUCOSE UR STRIP-MCNC: ABNORMAL MG/DL
HBV CORE AB SER QL: NORMAL
HBV CORE IGM SER QL: NORMAL
HBV SURFACE AB SER-ACNC: <3.1 MIU/ML
HBV SURFACE AG SER QL: NORMAL
HCO3 BLDA-SCNC: 21.1 MMOL/L (ref 22–28)
HCO3 BLDA-SCNC: 21.4 MMOL/L (ref 22–28)
HCO3 BLDA-SCNC: 21.4 MMOL/L (ref 22–28)
HCO3 BLDA-SCNC: 23.6 MMOL/L (ref 22–28)
HCO3 BLDV-SCNC: 25.4 MMOL/L (ref 24–30)
HCT VFR BLD AUTO: 20.1 % (ref 36.5–49.3)
HCT VFR BLD AUTO: 21.5 % (ref 36.5–49.3)
HCT VFR BLD AUTO: 22.8 % (ref 36.5–49.3)
HCT VFR BLD AUTO: 24 % (ref 36.5–49.3)
HCT VFR BLD AUTO: 26.1 % (ref 36.5–49.3)
HCT VFR BLD AUTO: 26.5 % (ref 36.5–49.3)
HCT VFR BLD AUTO: 26.9 % (ref 36.5–49.3)
HCT VFR BLD AUTO: 27.4 % (ref 36.5–49.3)
HCT VFR BLD AUTO: 28.2 % (ref 36.5–49.3)
HCT VFR BLD AUTO: 30.3 % (ref 36.5–49.3)
HCV AB SER QL: NORMAL
HEMOCCULT STL QL: NEGATIVE
HGB BLD-MCNC: 10 G/DL (ref 12–17)
HGB BLD-MCNC: 7 G/DL (ref 12–17)
HGB BLD-MCNC: 7 G/DL (ref 12–17)
HGB BLD-MCNC: 7.8 G/DL (ref 12–17)
HGB BLD-MCNC: 8.1 G/DL (ref 12–17)
HGB BLD-MCNC: 8.4 G/DL (ref 12–17)
HGB BLD-MCNC: 8.7 G/DL (ref 12–17)
HGB BLD-MCNC: 8.9 G/DL (ref 12–17)
HGB BLD-MCNC: 9.3 G/DL (ref 12–17)
HGB BLD-MCNC: 9.7 G/DL (ref 12–17)
HGB UR QL STRIP.AUTO: ABNORMAL
HGB UR QL STRIP.AUTO: ABNORMAL
HOROWITZ INDEX BLDA+IHG-RTO: 80 MM[HG]
IMM GRANULOCYTES # BLD AUTO: 0.06 THOUSAND/UL (ref 0–0.2)
IMM GRANULOCYTES # BLD AUTO: 0.1 THOUSAND/UL (ref 0–0.2)
IMM GRANULOCYTES # BLD AUTO: 0.14 THOUSAND/UL (ref 0–0.2)
IMM GRANULOCYTES # BLD AUTO: 0.18 THOUSAND/UL (ref 0–0.2)
IMM GRANULOCYTES # BLD AUTO: 0.21 THOUSAND/UL (ref 0–0.2)
IMM GRANULOCYTES NFR BLD AUTO: 1 % (ref 0–2)
IMM GRANULOCYTES NFR BLD AUTO: 2 % (ref 0–2)
INR PPP: 1.17 (ref 0.84–1.19)
IPAP: 16
IRON SATN MFR SERPL: 56 % (ref 20–50)
IRON SERPL-MCNC: 127 UG/DL (ref 65–175)
KETONES UR STRIP-MCNC: NEGATIVE MG/DL
KETONES UR STRIP-MCNC: NEGATIVE MG/DL
LACTATE SERPL-SCNC: 2.1 MMOL/L (ref 0.5–2)
LACTATE SERPL-SCNC: 2.6 MMOL/L (ref 0.5–2)
LACTATE SERPL-SCNC: <0.3 MMOL/L (ref 0.5–2)
LEUKOCYTE ESTERASE UR QL STRIP: NEGATIVE
LEUKOCYTE ESTERASE UR QL STRIP: NEGATIVE
LG PLATELETS BLD QL SMEAR: PRESENT
LYMPHOCYTES # BLD AUTO: 0.19 THOUSANDS/ÂΜL (ref 0.6–4.47)
LYMPHOCYTES # BLD AUTO: 0.31 THOUSANDS/ÂΜL (ref 0.6–4.47)
LYMPHOCYTES # BLD AUTO: 0.33 THOUSANDS/ÂΜL (ref 0.6–4.47)
LYMPHOCYTES # BLD AUTO: 0.47 THOUSAND/UL (ref 0.6–4.47)
LYMPHOCYTES # BLD AUTO: 0.47 THOUSANDS/ÂΜL (ref 0.6–4.47)
LYMPHOCYTES # BLD AUTO: 0.53 THOUSANDS/ÂΜL (ref 0.6–4.47)
LYMPHOCYTES # BLD AUTO: 5 % (ref 14–44)
LYMPHOCYTES NFR BLD AUTO: 2 % (ref 14–44)
LYMPHOCYTES NFR BLD AUTO: 3 % (ref 14–44)
LYMPHOCYTES NFR BLD AUTO: 4 % (ref 14–44)
MACROCYTES BLD QL AUTO: PRESENT
MAGNESIUM SERPL-MCNC: 1.9 MG/DL (ref 1.6–2.6)
MAGNESIUM SERPL-MCNC: 1.9 MG/DL (ref 1.6–2.6)
MAGNESIUM SERPL-MCNC: 2 MG/DL (ref 1.6–2.6)
MAGNESIUM SERPL-MCNC: 2.1 MG/DL (ref 1.6–2.6)
MAGNESIUM SERPL-MCNC: 2.2 MG/DL (ref 1.6–2.6)
MAGNESIUM SERPL-MCNC: 2.2 MG/DL (ref 1.6–2.6)
MAGNESIUM SERPL-MCNC: 2.4 MG/DL (ref 1.6–2.6)
MAGNESIUM SERPL-MCNC: <0.2 MG/DL (ref 1.6–2.6)
MCH RBC QN AUTO: 31.2 PG (ref 26.8–34.3)
MCH RBC QN AUTO: 31.2 PG (ref 26.8–34.3)
MCH RBC QN AUTO: 31.3 PG (ref 26.8–34.3)
MCH RBC QN AUTO: 31.6 PG (ref 26.8–34.3)
MCH RBC QN AUTO: 31.7 PG (ref 26.8–34.3)
MCH RBC QN AUTO: 31.7 PG (ref 26.8–34.3)
MCH RBC QN AUTO: 32 PG (ref 26.8–34.3)
MCH RBC QN AUTO: 32.1 PG (ref 26.8–34.3)
MCH RBC QN AUTO: 32.1 PG (ref 26.8–34.3)
MCH RBC QN AUTO: 32.2 PG (ref 26.8–34.3)
MCHC RBC AUTO-ENTMCNC: 31.8 G/DL (ref 31.4–37.4)
MCHC RBC AUTO-ENTMCNC: 32.2 G/DL (ref 31.4–37.4)
MCHC RBC AUTO-ENTMCNC: 32.6 G/DL (ref 31.4–37.4)
MCHC RBC AUTO-ENTMCNC: 33 G/DL (ref 31.4–37.4)
MCHC RBC AUTO-ENTMCNC: 33.1 G/DL (ref 31.4–37.4)
MCHC RBC AUTO-ENTMCNC: 33.8 G/DL (ref 31.4–37.4)
MCHC RBC AUTO-ENTMCNC: 34.2 G/DL (ref 31.4–37.4)
MCHC RBC AUTO-ENTMCNC: 34.4 G/DL (ref 31.4–37.4)
MCHC RBC AUTO-ENTMCNC: 34.8 G/DL (ref 31.4–37.4)
MCHC RBC AUTO-ENTMCNC: 35.1 G/DL (ref 31.4–37.4)
MCV RBC AUTO: 91 FL (ref 82–98)
MCV RBC AUTO: 92 FL (ref 82–98)
MCV RBC AUTO: 93 FL (ref 82–98)
MCV RBC AUTO: 96 FL (ref 82–98)
MCV RBC AUTO: 97 FL (ref 82–98)
MCV RBC AUTO: 97 FL (ref 82–98)
MCV RBC AUTO: 98 FL (ref 82–98)
MCV RBC AUTO: 99 FL (ref 82–98)
MONOCYTES # BLD AUTO: 0.19 THOUSAND/UL (ref 0–1.22)
MONOCYTES # BLD AUTO: 0.54 THOUSAND/ÂΜL (ref 0.17–1.22)
MONOCYTES # BLD AUTO: 0.73 THOUSAND/ÂΜL (ref 0.17–1.22)
MONOCYTES # BLD AUTO: 0.81 THOUSAND/ÂΜL (ref 0.17–1.22)
MONOCYTES # BLD AUTO: 1.16 THOUSAND/ÂΜL (ref 0.17–1.22)
MONOCYTES # BLD AUTO: 1.43 THOUSAND/ÂΜL (ref 0.17–1.22)
MONOCYTES NFR BLD AUTO: 10 % (ref 4–12)
MONOCYTES NFR BLD AUTO: 4 % (ref 4–12)
MONOCYTES NFR BLD AUTO: 6 % (ref 4–12)
MONOCYTES NFR BLD AUTO: 8 % (ref 4–12)
MONOCYTES NFR BLD AUTO: 8 % (ref 4–12)
MONOCYTES NFR BLD: 2 % (ref 4–12)
MRSA NOSE QL CULT: NORMAL
MRSA NOSE QL CULT: NORMAL
NEUTROPHILS # BLD AUTO: 11.41 THOUSANDS/ÂΜL (ref 1.85–7.62)
NEUTROPHILS # BLD AUTO: 11.8 THOUSANDS/ÂΜL (ref 1.85–7.62)
NEUTROPHILS # BLD AUTO: 11.9 THOUSANDS/ÂΜL (ref 1.85–7.62)
NEUTROPHILS # BLD AUTO: 12.26 THOUSANDS/ÂΜL (ref 1.85–7.62)
NEUTROPHILS # BLD AUTO: 8.66 THOUSANDS/ÂΜL (ref 1.85–7.62)
NEUTROPHILS # BLD MANUAL: 8.73 THOUSAND/UL (ref 1.85–7.62)
NEUTS BAND NFR BLD MANUAL: 1 % (ref 0–8)
NEUTS SEG NFR BLD AUTO: 85 % (ref 43–75)
NEUTS SEG NFR BLD AUTO: 87 % (ref 43–75)
NEUTS SEG NFR BLD AUTO: 89 % (ref 43–75)
NEUTS SEG NFR BLD AUTO: 90 % (ref 43–75)
NEUTS SEG NFR BLD AUTO: 92 % (ref 43–75)
NEUTS SEG NFR BLD AUTO: 92 % (ref 43–75)
NITRITE UR QL STRIP: NEGATIVE
NITRITE UR QL STRIP: NEGATIVE
NON VENT- BIPAP: ABNORMAL
NON-SQ EPI CELLS URNS QL MICRO: ABNORMAL /HPF
NON-SQ EPI CELLS URNS QL MICRO: ABNORMAL /HPF
NRBC BLD AUTO-RTO: 0 /100 WBCS
O2 CT BLDA-SCNC: 11.7 ML/DL (ref 16–23)
O2 CT BLDA-SCNC: 11.9 ML/DL (ref 16–23)
O2 CT BLDA-SCNC: 13.1 ML/DL (ref 16–23)
O2 CT BLDA-SCNC: 14.5 ML/DL (ref 16–23)
O2 CT BLDV-SCNC: 7.6 ML/DL
OXYHGB MFR BLDA: 83 % (ref 94–97)
OXYHGB MFR BLDA: 97.8 % (ref 94–97)
OXYHGB MFR BLDA: 98 % (ref 94–97)
OXYHGB MFR BLDA: 98.5 % (ref 94–97)
P AXIS: 128 DEGREES
P AXIS: 93 DEGREES
PCO2 BLDA: 31.4 MM HG (ref 36–44)
PCO2 BLDA: 33.6 MM HG (ref 36–44)
PCO2 BLDA: 33.8 MM HG (ref 36–44)
PCO2 BLDA: 39.2 MM HG (ref 36–44)
PCO2 BLDV: 44.6 MM HG (ref 42–50)
PEEP MAX SETTING VENT: 8 CM[H2O]
PEEP RESPIRATORY: 6 CM[H2O]
PH BLDA: 7.4 [PH] (ref 7.35–7.45)
PH BLDA: 7.41 [PH] (ref 7.35–7.45)
PH BLDA: 7.42 [PH] (ref 7.35–7.45)
PH BLDA: 7.45 [PH] (ref 7.35–7.45)
PH BLDV: 7.37 [PH] (ref 7.3–7.4)
PH UR STRIP.AUTO: 5 [PH]
PH UR STRIP.AUTO: 6.5 [PH]
PHOSPHATE SERPL-MCNC: 10.1 MG/DL (ref 2.3–4.1)
PHOSPHATE SERPL-MCNC: 3.8 MG/DL (ref 2.3–4.1)
PHOSPHATE SERPL-MCNC: 4.3 MG/DL (ref 2.3–4.1)
PHOSPHATE SERPL-MCNC: 4.4 MG/DL (ref 2.3–4.1)
PHOSPHATE SERPL-MCNC: 4.5 MG/DL (ref 2.3–4.1)
PHOSPHATE SERPL-MCNC: 4.8 MG/DL (ref 2.3–4.1)
PHOSPHATE SERPL-MCNC: 4.8 MG/DL (ref 2.3–4.1)
PHOSPHATE SERPL-MCNC: 5 MG/DL (ref 2.3–4.1)
PHOSPHATE SERPL-MCNC: 5.4 MG/DL (ref 2.3–4.1)
PHOSPHATE SERPL-MCNC: 5.7 MG/DL (ref 2.3–4.1)
PHOSPHATE SERPL-MCNC: 6.6 MG/DL (ref 2.3–4.1)
PHOSPHATE SERPL-MCNC: 8.7 MG/DL (ref 2.3–4.1)
PHOSPHATE SERPL-MCNC: 8.7 MG/DL (ref 2.3–4.1)
PLATELET # BLD AUTO: 106 THOUSANDS/UL (ref 149–390)
PLATELET # BLD AUTO: 131 THOUSANDS/UL (ref 149–390)
PLATELET # BLD AUTO: 140 THOUSANDS/UL (ref 149–390)
PLATELET # BLD AUTO: 192 THOUSANDS/UL (ref 149–390)
PLATELET # BLD AUTO: 60 THOUSANDS/UL (ref 149–390)
PLATELET # BLD AUTO: 63 THOUSANDS/UL (ref 149–390)
PLATELET # BLD AUTO: 71 THOUSANDS/UL (ref 149–390)
PLATELET # BLD AUTO: 76 THOUSANDS/UL (ref 149–390)
PLATELET # BLD AUTO: 87 THOUSANDS/UL (ref 149–390)
PLATELET # BLD AUTO: 91 THOUSANDS/UL (ref 149–390)
PLATELET BLD QL SMEAR: ABNORMAL
PMV BLD AUTO: 10 FL (ref 8.9–12.7)
PMV BLD AUTO: 10.7 FL (ref 8.9–12.7)
PMV BLD AUTO: 10.8 FL (ref 8.9–12.7)
PMV BLD AUTO: 11.2 FL (ref 8.9–12.7)
PMV BLD AUTO: 11.3 FL (ref 8.9–12.7)
PMV BLD AUTO: 11.7 FL (ref 8.9–12.7)
PMV BLD AUTO: 9.3 FL (ref 8.9–12.7)
PMV BLD AUTO: 9.4 FL (ref 8.9–12.7)
PMV BLD AUTO: 9.7 FL (ref 8.9–12.7)
PMV BLD AUTO: 9.9 FL (ref 8.9–12.7)
PO2 BLDA: 136.4 MM HG (ref 75–129)
PO2 BLDA: 150 MM HG (ref 75–129)
PO2 BLDA: 160.7 MM HG (ref 75–129)
PO2 BLDA: 48.8 MM HG (ref 75–129)
PO2 BLDV: 32.5 MM HG (ref 35–45)
POTASSIUM SERPL-SCNC: 3.6 MMOL/L (ref 3.5–5.3)
POTASSIUM SERPL-SCNC: 3.8 MMOL/L (ref 3.5–5.3)
POTASSIUM SERPL-SCNC: 3.9 MMOL/L (ref 3.5–5.3)
POTASSIUM SERPL-SCNC: 4 MMOL/L (ref 3.5–5.3)
POTASSIUM SERPL-SCNC: 4.1 MMOL/L (ref 3.5–5.3)
POTASSIUM SERPL-SCNC: 4.1 MMOL/L (ref 3.5–5.3)
POTASSIUM SERPL-SCNC: 4.4 MMOL/L (ref 3.5–5.3)
POTASSIUM SERPL-SCNC: 4.4 MMOL/L (ref 3.5–5.3)
POTASSIUM SERPL-SCNC: 4.5 MMOL/L (ref 3.5–5.3)
POTASSIUM SERPL-SCNC: 4.6 MMOL/L (ref 3.5–5.3)
POTASSIUM SERPL-SCNC: 4.6 MMOL/L (ref 3.5–5.3)
POTASSIUM SERPL-SCNC: 4.9 MMOL/L (ref 3.5–5.3)
POTASSIUM SERPL-SCNC: 4.9 MMOL/L (ref 3.5–5.3)
POTASSIUM SERPL-SCNC: 5.1 MMOL/L (ref 3.5–5.3)
POTASSIUM SERPL-SCNC: 5.7 MMOL/L (ref 3.5–5.3)
POTASSIUM SERPL-SCNC: 6.8 MMOL/L (ref 3.5–5.3)
POTASSIUM SERPL-SCNC: 7 MMOL/L (ref 3.5–5.3)
PR INTERVAL: 188 MS
PR INTERVAL: 407 MS
PR INTERVAL: 416 MS
PR INTERVAL: 428 MS
PROCALCITONIN SERPL-MCNC: 58.86 NG/ML
PROCALCITONIN SERPL-MCNC: 66.62 NG/ML
PROT SERPL-MCNC: 4.8 G/DL (ref 6.4–8.4)
PROT SERPL-MCNC: 5.5 G/DL (ref 6.4–8.4)
PROT SERPL-MCNC: 7.8 G/DL (ref 6.4–8.4)
PROT UR STRIP-MCNC: ABNORMAL MG/DL
PROT UR STRIP-MCNC: ABNORMAL MG/DL
PROTHROMBIN TIME: 14.9 SECONDS (ref 11.6–14.5)
QRS AXIS: -24 DEGREES
QRS AXIS: -32 DEGREES
QRS AXIS: -35 DEGREES
QRS AXIS: -5 DEGREES
QRS AXIS: 10 DEGREES
QRS AXIS: 6 DEGREES
QRSD INTERVAL: 79 MS
QRSD INTERVAL: 83 MS
QRSD INTERVAL: 84 MS
QRSD INTERVAL: 86 MS
QRSD INTERVAL: 88 MS
QRSD INTERVAL: 94 MS
QT INTERVAL: 271 MS
QT INTERVAL: 283 MS
QT INTERVAL: 308 MS
QT INTERVAL: 360 MS
QTC INTERVAL: 433 MS
QTC INTERVAL: 437 MS
QTC INTERVAL: 440 MS
QTC INTERVAL: 447 MS
QTC INTERVAL: 450 MS
QTC INTERVAL: 506 MS
RBC # BLD AUTO: 2.19 MILLION/UL (ref 3.88–5.62)
RBC # BLD AUTO: 2.21 MILLION/UL (ref 3.88–5.62)
RBC # BLD AUTO: 2.47 MILLION/UL (ref 3.88–5.62)
RBC # BLD AUTO: 2.6 MILLION/UL (ref 3.88–5.62)
RBC # BLD AUTO: 2.69 MILLION/UL (ref 3.88–5.62)
RBC # BLD AUTO: 2.78 MILLION/UL (ref 3.88–5.62)
RBC # BLD AUTO: 2.81 MILLION/UL (ref 3.88–5.62)
RBC # BLD AUTO: 2.89 MILLION/UL (ref 3.88–5.62)
RBC # BLD AUTO: 3.02 MILLION/UL (ref 3.88–5.62)
RBC # BLD AUTO: 3.12 MILLION/UL (ref 3.88–5.62)
RBC #/AREA URNS AUTO: ABNORMAL /HPF
RBC #/AREA URNS AUTO: ABNORMAL /HPF
RBC MORPH BLD: PRESENT
RH BLD: POSITIVE
RH BLD: POSITIVE
RSV RNA RESP QL NAA+PROBE: NEGATIVE
SARS-COV-2 RNA RESP QL NAA+PROBE: NEGATIVE
SODIUM 24H UR-SCNC: 39 MOL/L
SODIUM SERPL-SCNC: 136 MMOL/L (ref 135–147)
SODIUM SERPL-SCNC: 137 MMOL/L (ref 135–147)
SODIUM SERPL-SCNC: 137 MMOL/L (ref 135–147)
SODIUM SERPL-SCNC: 138 MMOL/L (ref 135–147)
SODIUM SERPL-SCNC: 139 MMOL/L (ref 135–147)
SODIUM SERPL-SCNC: 140 MMOL/L (ref 135–147)
SODIUM SERPL-SCNC: 140 MMOL/L (ref 135–147)
SODIUM SERPL-SCNC: 141 MMOL/L (ref 135–147)
SODIUM SERPL-SCNC: 141 MMOL/L (ref 135–147)
SODIUM SERPL-SCNC: 142 MMOL/L (ref 135–147)
SODIUM SERPL-SCNC: 142 MMOL/L (ref 135–147)
SP GR UR STRIP.AUTO: 1.02 (ref 1–1.03)
SP GR UR STRIP.AUTO: 1.02 (ref 1–1.03)
SPECIMEN EXPIRATION DATE: NORMAL
SPECIMEN EXPIRATION DATE: NORMAL
SPECIMEN SOURCE: ABNORMAL
SPHEROCYTES BLD QL SMEAR: PRESENT
STOMATOCYTES BLD QL SMEAR: PRESENT
T WAVE AXIS: 204 DEGREES
T WAVE AXIS: 46 DEGREES
T WAVE AXIS: 75 DEGREES
T WAVE AXIS: 8 DEGREES
T WAVE AXIS: 8 DEGREES
T WAVE AXIS: 96 DEGREES
TIBC SERPL-MCNC: 226 UG/DL (ref 250–450)
TSH SERPL DL<=0.05 MIU/L-ACNC: 0.68 UIU/ML (ref 0.45–4.5)
UNIT DISPENSE STATUS: NORMAL
UNIT DISPENSE STATUS: NORMAL
UNIT PRODUCT CODE: NORMAL
UNIT PRODUCT CODE: NORMAL
UNIT PRODUCT VOLUME: 350 ML
UNIT PRODUCT VOLUME: 350 ML
UNIT RH: NORMAL
UNIT RH: NORMAL
UROBILINOGEN UR QL STRIP.AUTO: 0.2 E.U./DL
UROBILINOGEN UR QL STRIP.AUTO: 0.2 E.U./DL
UUN 24H UR-MCNC: 535 MG/DL
VANCOMYCIN TROUGH SERPL-MCNC: 23.7 UG/ML (ref 10–20)
VENT AC: 14
VENT BIPAP FIO2: 45 %
VENT BIPAP FIO2: 60 %
VENT BIPAP FIO2: 80 %
VENT- AC: AC
VENTRICULAR RATE: 150 BPM
VENTRICULAR RATE: 156 BPM
VENTRICULAR RATE: 162 BPM
VENTRICULAR RATE: 87 BPM
VENTRICULAR RATE: 90 BPM
VENTRICULAR RATE: 94 BPM
VT SETTING VENT: 400 ML
WBC # BLD AUTO: 12.38 THOUSAND/UL (ref 4.31–10.16)
WBC # BLD AUTO: 12.86 THOUSAND/UL (ref 4.31–10.16)
WBC # BLD AUTO: 13.12 THOUSAND/UL (ref 4.31–10.16)
WBC # BLD AUTO: 13.21 THOUSAND/UL (ref 4.31–10.16)
WBC # BLD AUTO: 13.79 THOUSAND/UL (ref 4.31–10.16)
WBC # BLD AUTO: 14.4 THOUSAND/UL (ref 4.31–10.16)
WBC # BLD AUTO: 14.76 THOUSAND/UL (ref 4.31–10.16)
WBC # BLD AUTO: 23.62 THOUSAND/UL (ref 4.31–10.16)
WBC # BLD AUTO: 9.39 THOUSAND/UL (ref 4.31–10.16)
WBC # BLD AUTO: 9.66 THOUSAND/UL (ref 4.31–10.16)
WBC #/AREA URNS AUTO: ABNORMAL /HPF
WBC #/AREA URNS AUTO: ABNORMAL /HPF

## 2022-01-01 PROCEDURE — 5A1D90Z PERFORMANCE OF URINARY FILTRATION, CONTINUOUS, GREATER THAN 18 HOURS PER DAY: ICD-10-PCS | Performed by: INTERNAL MEDICINE

## 2022-01-01 PROCEDURE — 4A133B1 MONITORING OF ARTERIAL PRESSURE, PERIPHERAL, PERCUTANEOUS APPROACH: ICD-10-PCS | Performed by: PHYSICIAN ASSISTANT

## 2022-01-01 PROCEDURE — 4A133J1 MONITORING OF ARTERIAL PULSE, PERIPHERAL, PERCUTANEOUS APPROACH: ICD-10-PCS | Performed by: PHYSICIAN ASSISTANT

## 2022-01-01 PROCEDURE — 5A1D70Z PERFORMANCE OF URINARY FILTRATION, INTERMITTENT, LESS THAN 6 HOURS PER DAY: ICD-10-PCS | Performed by: INTERNAL MEDICINE

## 2022-01-01 PROCEDURE — 03HY32Z INSERTION OF MONITORING DEVICE INTO UPPER ARTERY, PERCUTANEOUS APPROACH: ICD-10-PCS | Performed by: PHYSICIAN ASSISTANT

## 2022-01-01 PROCEDURE — 5A1945Z RESPIRATORY VENTILATION, 24-96 CONSECUTIVE HOURS: ICD-10-PCS | Performed by: INTERNAL MEDICINE

## 2022-01-01 PROCEDURE — 02WY33Z REVISION OF INFUSION DEVICE IN GREAT VESSEL, PERCUTANEOUS APPROACH: ICD-10-PCS

## 2022-01-01 PROCEDURE — 02HV33Z INSERTION OF INFUSION DEVICE INTO SUPERIOR VENA CAVA, PERCUTANEOUS APPROACH: ICD-10-PCS | Performed by: INTERNAL MEDICINE

## 2022-01-01 PROCEDURE — 0T9B70Z DRAINAGE OF BLADDER WITH DRAINAGE DEVICE, VIA NATURAL OR ARTIFICIAL OPENING: ICD-10-PCS | Performed by: EMERGENCY MEDICINE

## 2022-01-01 PROCEDURE — 0BH18EZ INSERTION OF ENDOTRACHEAL AIRWAY INTO TRACHEA, VIA NATURAL OR ARTIFICIAL OPENING ENDOSCOPIC: ICD-10-PCS | Performed by: INTERNAL MEDICINE

## 2022-01-01 PROCEDURE — 30233N1 TRANSFUSION OF NONAUTOLOGOUS RED BLOOD CELLS INTO PERIPHERAL VEIN, PERCUTANEOUS APPROACH: ICD-10-PCS

## 2022-01-01 PROCEDURE — 02H633Z INSERTION OF INFUSION DEVICE INTO RIGHT ATRIUM, PERCUTANEOUS APPROACH: ICD-10-PCS | Performed by: RADIOLOGY

## 2022-01-01 RX ORDER — HALOPERIDOL 5 MG/ML
2 INJECTION INTRAMUSCULAR ONCE
Status: DISCONTINUED | OUTPATIENT
Start: 2022-01-01 | End: 2022-01-01

## 2022-01-01 RX ORDER — METOPROLOL TARTRATE 5 MG/5ML
10 INJECTION INTRAVENOUS ONCE
Status: COMPLETED | OUTPATIENT
Start: 2022-01-01 | End: 2022-01-01

## 2022-01-01 RX ORDER — CALCIUM GLUCONATE 20 MG/ML
2 INJECTION, SOLUTION INTRAVENOUS ONCE
Status: COMPLETED | OUTPATIENT
Start: 2022-01-01 | End: 2022-01-01

## 2022-01-01 RX ORDER — SODIUM CHLORIDE FOR INHALATION 0.9 %
VIAL, NEBULIZER (ML) INHALATION
Status: COMPLETED
Start: 2022-01-01 | End: 2022-01-01

## 2022-01-01 RX ORDER — HEPARIN SODIUM 10000 [USP'U]/100ML
500 INJECTION, SOLUTION INTRAVENOUS CONTINUOUS
Status: DISCONTINUED | OUTPATIENT
Start: 2022-01-01 | End: 2022-01-01

## 2022-01-01 RX ORDER — HYDROMORPHONE HCL/PF 1 MG/ML
0.3 SYRINGE (ML) INJECTION EVERY 2 HOUR PRN
Status: DISCONTINUED | OUTPATIENT
Start: 2022-01-01 | End: 2022-01-01 | Stop reason: HOSPADM

## 2022-01-01 RX ORDER — FENTANYL CITRATE 50 UG/ML
INJECTION, SOLUTION INTRAMUSCULAR; INTRAVENOUS
Status: COMPLETED
Start: 2022-01-01 | End: 2022-01-01

## 2022-01-01 RX ORDER — MIDAZOLAM HYDROCHLORIDE 2 MG/2ML
2 INJECTION, SOLUTION INTRAMUSCULAR; INTRAVENOUS ONCE
Status: COMPLETED | OUTPATIENT
Start: 2022-01-01 | End: 2022-01-01

## 2022-01-01 RX ORDER — SODIUM CHLORIDE, SODIUM GLUCONATE, SODIUM ACETATE, POTASSIUM CHLORIDE, MAGNESIUM CHLORIDE, SODIUM PHOSPHATE, DIBASIC, AND POTASSIUM PHOSPHATE .53; .5; .37; .037; .03; .012; .00082 G/100ML; G/100ML; G/100ML; G/100ML; G/100ML; G/100ML; G/100ML
1000 INJECTION, SOLUTION INTRAVENOUS ONCE
Status: COMPLETED | OUTPATIENT
Start: 2022-01-01 | End: 2022-01-01

## 2022-01-01 RX ORDER — INSULIN LISPRO 100 [IU]/ML
1-5 INJECTION, SOLUTION INTRAVENOUS; SUBCUTANEOUS EVERY 6 HOURS SCHEDULED
Status: DISCONTINUED | OUTPATIENT
Start: 2022-01-01 | End: 2022-01-01

## 2022-01-01 RX ORDER — METOPROLOL TARTRATE 5 MG/5ML
2.5 INJECTION INTRAVENOUS ONCE
Status: COMPLETED | OUTPATIENT
Start: 2022-01-01 | End: 2022-01-01

## 2022-01-01 RX ORDER — METOPROLOL TARTRATE 5 MG/5ML
5 INJECTION INTRAVENOUS EVERY 6 HOURS
Status: DISCONTINUED | OUTPATIENT
Start: 2022-01-01 | End: 2022-01-01

## 2022-01-01 RX ORDER — MIDAZOLAM HYDROCHLORIDE 2 MG/2ML
INJECTION, SOLUTION INTRAMUSCULAR; INTRAVENOUS
Status: COMPLETED
Start: 2022-01-01 | End: 2022-01-01

## 2022-01-01 RX ORDER — ACETAMINOPHEN 160 MG/5ML
650 SUSPENSION, ORAL (FINAL DOSE FORM) ORAL EVERY 4 HOURS PRN
Status: DISCONTINUED | OUTPATIENT
Start: 2022-01-01 | End: 2022-01-01 | Stop reason: HOSPADM

## 2022-01-01 RX ORDER — ASPIRIN 300 MG/1
300 SUPPOSITORY RECTAL ONCE
Status: COMPLETED | OUTPATIENT
Start: 2022-01-01 | End: 2022-01-01

## 2022-01-01 RX ORDER — SODIUM POLYSTYRENE SULFONATE 4.1 MEQ/G
15 POWDER, FOR SUSPENSION ORAL; RECTAL ONCE
Status: DISCONTINUED | OUTPATIENT
Start: 2022-01-01 | End: 2022-01-01

## 2022-01-01 RX ORDER — METOPROLOL TARTRATE 5 MG/5ML
5 INJECTION INTRAVENOUS ONCE
Status: COMPLETED | OUTPATIENT
Start: 2022-01-01 | End: 2022-01-01

## 2022-01-01 RX ORDER — FENTANYL CITRATE 50 UG/ML
50 INJECTION, SOLUTION INTRAMUSCULAR; INTRAVENOUS
Status: DISCONTINUED | OUTPATIENT
Start: 2022-01-01 | End: 2022-01-01

## 2022-01-01 RX ORDER — DEXTROSE MONOHYDRATE 25 G/50ML
25 INJECTION, SOLUTION INTRAVENOUS ONCE
Status: DISCONTINUED | OUTPATIENT
Start: 2022-01-01 | End: 2022-01-01

## 2022-01-01 RX ORDER — DIGOXIN 0.25 MG/ML
125 INJECTION INTRAMUSCULAR; INTRAVENOUS EVERY OTHER DAY
Status: DISCONTINUED | OUTPATIENT
Start: 2022-01-01 | End: 2022-01-01

## 2022-01-01 RX ORDER — ALBUMIN (HUMAN) 12.5 G/50ML
25 SOLUTION INTRAVENOUS ONCE
Status: DISCONTINUED | OUTPATIENT
Start: 2022-01-01 | End: 2022-01-01

## 2022-01-01 RX ORDER — METOPROLOL TARTRATE 5 MG/5ML
5 INJECTION INTRAVENOUS EVERY 4 HOURS
Status: DISCONTINUED | OUTPATIENT
Start: 2022-01-01 | End: 2022-01-01

## 2022-01-01 RX ORDER — FENTANYL CITRATE 50 UG/ML
50 INJECTION, SOLUTION INTRAMUSCULAR; INTRAVENOUS ONCE
Status: COMPLETED | OUTPATIENT
Start: 2022-01-01 | End: 2022-01-01

## 2022-01-01 RX ORDER — DIGOXIN 0.25 MG/ML
500 INJECTION INTRAMUSCULAR; INTRAVENOUS ONCE
Status: COMPLETED | OUTPATIENT
Start: 2022-01-01 | End: 2022-01-01

## 2022-01-01 RX ORDER — LORAZEPAM 2 MG/ML
1 INJECTION INTRAMUSCULAR
Status: DISCONTINUED | OUTPATIENT
Start: 2022-01-01 | End: 2022-01-01 | Stop reason: HOSPADM

## 2022-01-01 RX ORDER — PROPOFOL 10 MG/ML
5-50 INJECTION, EMULSION INTRAVENOUS
Status: DISCONTINUED | OUTPATIENT
Start: 2022-01-01 | End: 2022-01-01

## 2022-01-01 RX ORDER — DIGOXIN 0.25 MG/ML
250 INJECTION INTRAMUSCULAR; INTRAVENOUS ONCE
Status: COMPLETED | OUTPATIENT
Start: 2022-01-01 | End: 2022-01-01

## 2022-01-01 RX ORDER — CALCIUM GLUCONATE 20 MG/ML
1 INJECTION, SOLUTION INTRAVENOUS ONCE
Status: COMPLETED | OUTPATIENT
Start: 2022-01-01 | End: 2022-01-01

## 2022-01-01 RX ORDER — MAGNESIUM SULFATE 1 G/100ML
1 INJECTION INTRAVENOUS ONCE
Status: COMPLETED | OUTPATIENT
Start: 2022-01-01 | End: 2022-01-01

## 2022-01-01 RX ORDER — SODIUM CHLORIDE, SODIUM GLUCONATE, SODIUM ACETATE, POTASSIUM CHLORIDE, MAGNESIUM CHLORIDE, SODIUM PHOSPHATE, DIBASIC, AND POTASSIUM PHOSPHATE .53; .5; .37; .037; .03; .012; .00082 G/100ML; G/100ML; G/100ML; G/100ML; G/100ML; G/100ML; G/100ML
1000 INJECTION, SOLUTION INTRAVENOUS ONCE
Status: DISCONTINUED | OUTPATIENT
Start: 2022-01-01 | End: 2022-01-01

## 2022-01-01 RX ORDER — FUROSEMIDE 10 MG/ML
40 INJECTION INTRAMUSCULAR; INTRAVENOUS ONCE
Status: COMPLETED | OUTPATIENT
Start: 2022-01-01 | End: 2022-01-01

## 2022-01-01 RX ORDER — SODIUM CHLORIDE, SODIUM GLUCONATE, SODIUM ACETATE, POTASSIUM CHLORIDE, MAGNESIUM CHLORIDE, SODIUM PHOSPHATE, DIBASIC, AND POTASSIUM PHOSPHATE .53; .5; .37; .037; .03; .012; .00082 G/100ML; G/100ML; G/100ML; G/100ML; G/100ML; G/100ML; G/100ML
100 INJECTION, SOLUTION INTRAVENOUS CONTINUOUS
Status: DISCONTINUED | OUTPATIENT
Start: 2022-01-01 | End: 2022-01-01

## 2022-01-01 RX ORDER — GLYCOPYRROLATE 0.2 MG/ML
0.1 INJECTION INTRAMUSCULAR; INTRAVENOUS EVERY 4 HOURS PRN
Status: DISCONTINUED | OUTPATIENT
Start: 2022-01-01 | End: 2022-01-01 | Stop reason: HOSPADM

## 2022-01-01 RX ORDER — ALBUTEROL SULFATE 2.5 MG/3ML
10 SOLUTION RESPIRATORY (INHALATION) ONCE
Status: COMPLETED | OUTPATIENT
Start: 2022-01-01 | End: 2022-01-01

## 2022-01-01 RX ORDER — DEXMEDETOMIDINE HYDROCHLORIDE 4 UG/ML
.1-.7 INJECTION, SOLUTION INTRAVENOUS
Status: DISCONTINUED | OUTPATIENT
Start: 2022-01-01 | End: 2022-01-01

## 2022-01-01 RX ORDER — CHLORHEXIDINE GLUCONATE 0.12 MG/ML
15 RINSE ORAL EVERY 12 HOURS SCHEDULED
Status: DISCONTINUED | OUTPATIENT
Start: 2022-01-01 | End: 2022-01-01 | Stop reason: HOSPADM

## 2022-01-01 RX ORDER — LEVALBUTEROL 1.25 MG/.5ML
1.25 SOLUTION, CONCENTRATE RESPIRATORY (INHALATION) EVERY 6 HOURS PRN
Status: DISCONTINUED | OUTPATIENT
Start: 2022-01-01 | End: 2022-01-01

## 2022-01-01 RX ORDER — LEVALBUTEROL 1.25 MG/.5ML
1.25 SOLUTION, CONCENTRATE RESPIRATORY (INHALATION)
Status: DISCONTINUED | OUTPATIENT
Start: 2022-01-01 | End: 2022-01-01 | Stop reason: HOSPADM

## 2022-01-01 RX ORDER — ALBUMIN, HUMAN INJ 5% 5 %
12.5 SOLUTION INTRAVENOUS ONCE
Status: DISCONTINUED | OUTPATIENT
Start: 2022-01-01 | End: 2022-01-01

## 2022-01-01 RX ORDER — METOPROLOL TARTRATE 5 MG/5ML
10 INJECTION INTRAVENOUS EVERY 6 HOURS
Status: DISCONTINUED | OUTPATIENT
Start: 2022-01-01 | End: 2022-01-01

## 2022-01-01 RX ORDER — SODIUM CHLORIDE, SODIUM GLUCONATE, SODIUM ACETATE, POTASSIUM CHLORIDE, MAGNESIUM CHLORIDE, SODIUM PHOSPHATE, DIBASIC, AND POTASSIUM PHOSPHATE .53; .5; .37; .037; .03; .012; .00082 G/100ML; G/100ML; G/100ML; G/100ML; G/100ML; G/100ML; G/100ML
500 INJECTION, SOLUTION INTRAVENOUS ONCE
Status: COMPLETED | OUTPATIENT
Start: 2022-01-01 | End: 2022-01-01

## 2022-01-01 RX ORDER — FENTANYL CITRATE-0.9 % NACL/PF 10 MCG/ML
50 PLASTIC BAG, INJECTION (ML) INTRAVENOUS CONTINUOUS
Status: DISCONTINUED | OUTPATIENT
Start: 2022-01-01 | End: 2022-01-01 | Stop reason: HOSPADM

## 2022-01-01 RX ORDER — OLANZAPINE 10 MG/1
2.5 INJECTION, POWDER, LYOPHILIZED, FOR SOLUTION INTRAMUSCULAR ONCE
Status: DISCONTINUED | OUTPATIENT
Start: 2022-01-01 | End: 2022-01-01

## 2022-01-01 RX ORDER — ALBUTEROL SULFATE 2.5 MG/3ML
5 SOLUTION RESPIRATORY (INHALATION) ONCE
Status: COMPLETED | OUTPATIENT
Start: 2022-01-01 | End: 2022-01-01

## 2022-01-01 RX ORDER — ALBUMIN (HUMAN) 12.5 G/50ML
50 SOLUTION INTRAVENOUS ONCE
Status: COMPLETED | OUTPATIENT
Start: 2022-01-01 | End: 2022-01-01

## 2022-01-01 RX ORDER — SODIUM CHLORIDE FOR INHALATION 0.9 %
VIAL, NEBULIZER (ML) INHALATION
Status: DISCONTINUED
Start: 2022-01-01 | End: 2022-01-01 | Stop reason: HOSPADM

## 2022-01-01 RX ORDER — CALCIUM GLUCONATE 20 MG/ML
1 INJECTION, SOLUTION INTRAVENOUS ONCE
Status: DISCONTINUED | OUTPATIENT
Start: 2022-01-01 | End: 2022-01-01

## 2022-01-01 RX ORDER — LEVALBUTEROL 1.25 MG/.5ML
1.25 SOLUTION, CONCENTRATE RESPIRATORY (INHALATION) EVERY 6 HOURS
Status: DISCONTINUED | OUTPATIENT
Start: 2022-01-01 | End: 2022-01-01

## 2022-01-01 RX ORDER — HEPARIN SODIUM 5000 [USP'U]/ML
5000 INJECTION, SOLUTION INTRAVENOUS; SUBCUTANEOUS EVERY 8 HOURS SCHEDULED
Status: DISCONTINUED | OUTPATIENT
Start: 2022-01-01 | End: 2022-01-01 | Stop reason: HOSPADM

## 2022-01-01 RX ORDER — DEXTROSE MONOHYDRATE 25 G/50ML
25 INJECTION, SOLUTION INTRAVENOUS ONCE
Status: COMPLETED | OUTPATIENT
Start: 2022-01-01 | End: 2022-01-01

## 2022-01-01 RX ORDER — METOPROLOL TARTRATE 5 MG/5ML
5 INJECTION INTRAVENOUS EVERY 12 HOURS
Status: DISCONTINUED | OUTPATIENT
Start: 2022-01-01 | End: 2022-01-01

## 2022-01-01 RX ORDER — MIDODRINE HYDROCHLORIDE 5 MG/1
10 TABLET ORAL
Status: DISCONTINUED | OUTPATIENT
Start: 2022-01-01 | End: 2022-01-01

## 2022-01-01 RX ORDER — FUROSEMIDE 10 MG/ML
40 INJECTION INTRAMUSCULAR; INTRAVENOUS ONCE
Status: DISCONTINUED | OUTPATIENT
Start: 2022-01-01 | End: 2022-01-01

## 2022-01-01 RX ORDER — POTASSIUM CHLORIDE 14.9 MG/ML
20 INJECTION INTRAVENOUS
Status: COMPLETED | OUTPATIENT
Start: 2022-01-01 | End: 2022-01-01

## 2022-01-01 RX ADMIN — IPRATROPIUM BROMIDE 0.5 MG: 0.5 SOLUTION RESPIRATORY (INHALATION) at 15:36

## 2022-01-01 RX ADMIN — HEPARIN SODIUM 5000 UNITS: 5000 INJECTION INTRAVENOUS; SUBCUTANEOUS at 13:37

## 2022-01-01 RX ADMIN — CALCIUM GLUCONATE 1 G: 20 INJECTION, SOLUTION INTRAVENOUS at 15:04

## 2022-01-01 RX ADMIN — HEPARIN SODIUM 5000 UNITS: 5000 INJECTION INTRAVENOUS; SUBCUTANEOUS at 06:22

## 2022-01-01 RX ADMIN — Medication 5 MCG/MIN: at 15:41

## 2022-01-01 RX ADMIN — LEVALBUTEROL 1.25 MG: 1.25 SOLUTION, CONCENTRATE RESPIRATORY (INHALATION) at 07:25

## 2022-01-01 RX ADMIN — METOROPROLOL TARTRATE 5 MG: 5 INJECTION, SOLUTION INTRAVENOUS at 02:29

## 2022-01-01 RX ADMIN — FENTANYL CITRATE 50 MCG: 50 INJECTION INTRAMUSCULAR; INTRAVENOUS at 14:00

## 2022-01-01 RX ADMIN — POTASSIUM CHLORIDE 20 MEQ: 14.9 INJECTION, SOLUTION INTRAVENOUS at 09:10

## 2022-01-01 RX ADMIN — DIGOXIN 500 MCG: 0.25 INJECTION INTRAMUSCULAR; INTRAVENOUS at 11:41

## 2022-01-01 RX ADMIN — SODIUM BICARBONATE 150 ML/HR: 84 INJECTION INTRAVENOUS at 05:55

## 2022-01-01 RX ADMIN — METOROPROLOL TARTRATE 5 MG: 5 INJECTION, SOLUTION INTRAVENOUS at 10:13

## 2022-01-01 RX ADMIN — IPRATROPIUM BROMIDE 0.5 MG: 0.5 SOLUTION RESPIRATORY (INHALATION) at 13:07

## 2022-01-01 RX ADMIN — INSULIN LISPRO 2 UNITS: 100 INJECTION, SOLUTION INTRAVENOUS; SUBCUTANEOUS at 18:50

## 2022-01-01 RX ADMIN — LEVALBUTEROL 1.25 MG: 1.25 SOLUTION, CONCENTRATE RESPIRATORY (INHALATION) at 07:24

## 2022-01-01 RX ADMIN — INSULIN LISPRO 2 UNITS: 100 INJECTION, SOLUTION INTRAVENOUS; SUBCUTANEOUS at 00:37

## 2022-01-01 RX ADMIN — ALBUTEROL SULFATE 5 MG: 2.5 SOLUTION RESPIRATORY (INHALATION) at 14:37

## 2022-01-01 RX ADMIN — METOROPROLOL TARTRATE 5 MG: 5 INJECTION, SOLUTION INTRAVENOUS at 02:13

## 2022-01-01 RX ADMIN — IPRATROPIUM BROMIDE 0.5 MG: 0.5 SOLUTION RESPIRATORY (INHALATION) at 08:14

## 2022-01-01 RX ADMIN — HEPARIN SODIUM 5000 UNITS: 5000 INJECTION INTRAVENOUS; SUBCUTANEOUS at 13:46

## 2022-01-01 RX ADMIN — DIGOXIN 125 MCG: 0.25 INJECTION INTRAMUSCULAR; INTRAVENOUS at 08:49

## 2022-01-01 RX ADMIN — IPRATROPIUM BROMIDE 0.5 MG: 0.5 SOLUTION RESPIRATORY (INHALATION) at 19:21

## 2022-01-01 RX ADMIN — CALCIUM GLUCONATE 2 G: 20 INJECTION, SOLUTION INTRAVENOUS at 14:07

## 2022-01-01 RX ADMIN — IPRATROPIUM BROMIDE 0.5 MG: 0.5 SOLUTION RESPIRATORY (INHALATION) at 07:24

## 2022-01-01 RX ADMIN — ASPIRIN 300 MG: 300 SUPPOSITORY RECTAL at 14:31

## 2022-01-01 RX ADMIN — HEPARIN SODIUM 5000 UNITS: 5000 INJECTION INTRAVENOUS; SUBCUTANEOUS at 06:11

## 2022-01-01 RX ADMIN — IPRATROPIUM BROMIDE 0.5 MG: 0.5 SOLUTION RESPIRATORY (INHALATION) at 19:28

## 2022-01-01 RX ADMIN — SODIUM CHLORIDE, SODIUM LACTATE, POTASSIUM CHLORIDE, AND CALCIUM CHLORIDE 1000 ML: 600; 310; 30; 20 INJECTION, SOLUTION INTRAVENOUS at 16:49

## 2022-01-01 RX ADMIN — METOPROLOL TARTRATE 5 MG: 1 INJECTION, SOLUTION INTRAVENOUS at 11:25

## 2022-01-01 RX ADMIN — SODIUM BICARBONATE 150 ML/HR: 84 INJECTION INTRAVENOUS at 23:26

## 2022-01-01 RX ADMIN — HEPARIN SODIUM 5000 UNITS: 5000 INJECTION INTRAVENOUS; SUBCUTANEOUS at 22:48

## 2022-01-01 RX ADMIN — INSULIN LISPRO 1 UNITS: 100 INJECTION, SOLUTION INTRAVENOUS; SUBCUTANEOUS at 00:39

## 2022-01-01 RX ADMIN — Medication 10 MCG/MIN: at 01:39

## 2022-01-01 RX ADMIN — HEPARIN SODIUM 5000 UNITS: 5000 INJECTION INTRAVENOUS; SUBCUTANEOUS at 06:01

## 2022-01-01 RX ADMIN — LEVALBUTEROL 1.25 MG: 1.25 SOLUTION, CONCENTRATE RESPIRATORY (INHALATION) at 00:17

## 2022-01-01 RX ADMIN — DEXTROSE MONOHYDRATE 25 ML: 25 INJECTION, SOLUTION INTRAVENOUS at 15:00

## 2022-01-01 RX ADMIN — METOROPROLOL TARTRATE 5 MG: 5 INJECTION, SOLUTION INTRAVENOUS at 08:51

## 2022-01-01 RX ADMIN — ACETAMINOPHEN 650 MG: 650 SUSPENSION ORAL at 16:31

## 2022-01-01 RX ADMIN — HYDROMORPHONE HYDROCHLORIDE 0.3 MG: 1 INJECTION, SOLUTION INTRAMUSCULAR; INTRAVENOUS; SUBCUTANEOUS at 16:18

## 2022-01-01 RX ADMIN — METOROPROLOL TARTRATE 5 MG: 5 INJECTION, SOLUTION INTRAVENOUS at 22:33

## 2022-01-01 RX ADMIN — SODIUM CHLORIDE 500 ML: 0.9 INJECTION, SOLUTION INTRAVENOUS at 06:12

## 2022-01-01 RX ADMIN — HEPARIN SODIUM 5000 UNITS: 5000 INJECTION INTRAVENOUS; SUBCUTANEOUS at 13:35

## 2022-01-01 RX ADMIN — DIGOXIN 250 MCG: 0.25 INJECTION INTRAMUSCULAR; INTRAVENOUS at 17:43

## 2022-01-01 RX ADMIN — Medication 20 MCG/MIN: at 15:30

## 2022-01-01 RX ADMIN — FUROSEMIDE 120 MG: 10 INJECTION, SOLUTION INTRAVENOUS at 17:56

## 2022-01-01 RX ADMIN — CALCIUM GLUCONATE 1 G: 20 INJECTION, SOLUTION INTRAVENOUS at 17:17

## 2022-01-01 RX ADMIN — CALCIUM GLUCONATE 1 G: 20 INJECTION, SOLUTION INTRAVENOUS at 00:21

## 2022-01-01 RX ADMIN — LEVALBUTEROL 1.25 MG: 1.25 SOLUTION, CONCENTRATE RESPIRATORY (INHALATION) at 03:02

## 2022-01-01 RX ADMIN — SODIUM CHLORIDE, SODIUM GLUCONATE, SODIUM ACETATE, POTASSIUM CHLORIDE, MAGNESIUM CHLORIDE, SODIUM PHOSPHATE, DIBASIC, AND POTASSIUM PHOSPHATE 500 ML: .53; .5; .37; .037; .03; .012; .00082 INJECTION, SOLUTION INTRAVENOUS at 08:15

## 2022-01-01 RX ADMIN — PROPOFOL 5 MCG/KG/MIN: 10 INJECTION, EMULSION INTRAVENOUS at 12:17

## 2022-01-01 RX ADMIN — ISODIUM CHLORIDE 3 ML: 0.03 SOLUTION RESPIRATORY (INHALATION) at 00:17

## 2022-01-01 RX ADMIN — FUROSEMIDE 40 MG: 10 INJECTION, SOLUTION INTRAVENOUS at 00:27

## 2022-01-01 RX ADMIN — AMIODARONE HYDROCHLORIDE 1 MG/MIN: 50 INJECTION, SOLUTION INTRAVENOUS at 15:14

## 2022-01-01 RX ADMIN — SODIUM CHLORIDE, SODIUM GLUCONATE, SODIUM ACETATE, POTASSIUM CHLORIDE AND MAGNESIUM CHLORIDE 1000 ML: 526; 502; 368; 37; 30 INJECTION, SOLUTION INTRAVENOUS at 10:28

## 2022-01-01 RX ADMIN — HEPARIN SODIUM 5000 UNITS: 5000 INJECTION INTRAVENOUS; SUBCUTANEOUS at 22:37

## 2022-01-01 RX ADMIN — CHLORHEXIDINE GLUCONATE 0.12% ORAL RINSE 15 ML: 1.2 LIQUID ORAL at 08:48

## 2022-01-01 RX ADMIN — CEFEPIME HYDROCHLORIDE 2000 MG: 2 INJECTION, POWDER, FOR SOLUTION INTRAVENOUS at 11:06

## 2022-01-01 RX ADMIN — PHENYLEPHRINE HYDROCHLORIDE 10 MCG/MIN: 10 INJECTION INTRAVENOUS at 10:27

## 2022-01-01 RX ADMIN — POTASSIUM CHLORIDE 20 MEQ: 14.9 INJECTION, SOLUTION INTRAVENOUS at 11:26

## 2022-01-01 RX ADMIN — DEXTROSE MONOHYDRATE 25 ML: 25 INJECTION, SOLUTION INTRAVENOUS at 17:14

## 2022-01-01 RX ADMIN — SODIUM CHLORIDE, SODIUM GLUCONATE, SODIUM ACETATE, POTASSIUM CHLORIDE, MAGNESIUM CHLORIDE, SODIUM PHOSPHATE, DIBASIC, AND POTASSIUM PHOSPHATE 1000 ML: .53; .5; .37; .037; .03; .012; .00082 INJECTION, SOLUTION INTRAVENOUS at 14:39

## 2022-01-01 RX ADMIN — METOPROLOL TARTRATE 2.5 MG: 1 INJECTION, SOLUTION INTRAVENOUS at 09:40

## 2022-01-01 RX ADMIN — MIDAZOLAM 2 MG: 1 INJECTION INTRAMUSCULAR; INTRAVENOUS at 14:15

## 2022-01-01 RX ADMIN — METOROPROLOL TARTRATE 5 MG: 5 INJECTION, SOLUTION INTRAVENOUS at 01:41

## 2022-01-01 RX ADMIN — HEPARIN SODIUM 5000 UNITS: 5000 INJECTION INTRAVENOUS; SUBCUTANEOUS at 05:03

## 2022-01-01 RX ADMIN — SODIUM CHLORIDE: 9 INJECTION INTRAMUSCULAR; INTRAVENOUS; SUBCUTANEOUS at 17:16

## 2022-01-01 RX ADMIN — HEPARIN SODIUM 5000 UNITS: 5000 INJECTION INTRAVENOUS; SUBCUTANEOUS at 22:02

## 2022-01-01 RX ADMIN — SODIUM CHLORIDE, SODIUM LACTATE, POTASSIUM CHLORIDE, AND CALCIUM CHLORIDE 200 ML: .6; .31; .03; .02 INJECTION, SOLUTION INTRAVENOUS at 21:40

## 2022-01-01 RX ADMIN — IPRATROPIUM BROMIDE 0.5 MG: 0.5 SOLUTION RESPIRATORY (INHALATION) at 07:14

## 2022-01-01 RX ADMIN — VANCOMYCIN HYDROCHLORIDE 1750 MG: 1 INJECTION, POWDER, LYOPHILIZED, FOR SOLUTION INTRAVENOUS at 11:27

## 2022-01-01 RX ADMIN — CALCIUM GLUCONATE 1 G: 20 INJECTION, SOLUTION INTRAVENOUS at 07:27

## 2022-01-01 RX ADMIN — LEVALBUTEROL 1.25 MG: 1.25 SOLUTION, CONCENTRATE RESPIRATORY (INHALATION) at 04:55

## 2022-01-01 RX ADMIN — DEXMEDETOMIDINE HYDROCHLORIDE 0.7 MCG/KG/HR: 400 INJECTION INTRAVENOUS at 01:40

## 2022-01-01 RX ADMIN — CALCIUM GLUCONATE 2 G: 20 INJECTION, SOLUTION INTRAVENOUS at 15:36

## 2022-01-01 RX ADMIN — ISODIUM CHLORIDE 3 ML: 0.03 SOLUTION RESPIRATORY (INHALATION) at 04:55

## 2022-01-01 RX ADMIN — SODIUM BICARBONATE 150 ML/HR: 84 INJECTION INTRAVENOUS at 07:39

## 2022-01-01 RX ADMIN — IPRATROPIUM BROMIDE 0.5 MG: 0.5 SOLUTION RESPIRATORY (INHALATION) at 13:41

## 2022-01-01 RX ADMIN — VANCOMYCIN HYDROCHLORIDE 750 MG: 750 INJECTION, SOLUTION INTRAVENOUS at 23:25

## 2022-01-01 RX ADMIN — HEPARIN SODIUM 5000 UNITS: 5000 INJECTION INTRAVENOUS; SUBCUTANEOUS at 14:18

## 2022-01-01 RX ADMIN — Medication 20000 ML: at 20:31

## 2022-01-01 RX ADMIN — METOROPROLOL TARTRATE 5 MG: 5 INJECTION, SOLUTION INTRAVENOUS at 14:51

## 2022-01-01 RX ADMIN — METOROPROLOL TARTRATE 5 MG: 5 INJECTION, SOLUTION INTRAVENOUS at 18:08

## 2022-01-01 RX ADMIN — LEVALBUTEROL 1.25 MG: 1.25 SOLUTION, CONCENTRATE RESPIRATORY (INHALATION) at 13:52

## 2022-01-01 RX ADMIN — SODIUM CHLORIDE 1000 ML: 0.9 INJECTION, SOLUTION INTRAVENOUS at 12:35

## 2022-01-01 RX ADMIN — DIGOXIN 250 MCG: 0.25 INJECTION INTRAMUSCULAR; INTRAVENOUS at 21:46

## 2022-01-01 RX ADMIN — IPRATROPIUM BROMIDE 0.5 MG: 0.5 SOLUTION RESPIRATORY (INHALATION) at 19:40

## 2022-01-01 RX ADMIN — AMIODARONE HYDROCHLORIDE 150 MG: 50 INJECTION, SOLUTION INTRAVENOUS at 14:54

## 2022-01-01 RX ADMIN — METOROPROLOL TARTRATE 5 MG: 5 INJECTION, SOLUTION INTRAVENOUS at 13:47

## 2022-01-01 RX ADMIN — METOPROLOL TARTRATE 10 MG: 1 INJECTION, SOLUTION INTRAVENOUS at 17:46

## 2022-01-01 RX ADMIN — CEFEPIME HYDROCHLORIDE 2000 MG: 2 INJECTION, POWDER, FOR SOLUTION INTRAVENOUS at 11:27

## 2022-01-01 RX ADMIN — MAGNESIUM SULFATE IN DEXTROSE 1 G: 10 INJECTION, SOLUTION INTRAVENOUS at 02:05

## 2022-01-01 RX ADMIN — HEPARIN SODIUM 5000 UNITS: 5000 INJECTION INTRAVENOUS; SUBCUTANEOUS at 13:48

## 2022-01-01 RX ADMIN — HEPARIN SODIUM 5000 UNITS: 5000 INJECTION INTRAVENOUS; SUBCUTANEOUS at 13:50

## 2022-01-01 RX ADMIN — IPRATROPIUM BROMIDE 0.5 MG: 0.5 SOLUTION RESPIRATORY (INHALATION) at 12:11

## 2022-01-01 RX ADMIN — Medication 50 MCG/HR: at 21:53

## 2022-01-01 RX ADMIN — FUROSEMIDE 40 MG: 10 INJECTION, SOLUTION INTRAVENOUS at 18:29

## 2022-01-01 RX ADMIN — METOPROLOL TARTRATE 5 MG: 1 INJECTION, SOLUTION INTRAVENOUS at 06:18

## 2022-01-01 RX ADMIN — METOROPROLOL TARTRATE 5 MG: 5 INJECTION, SOLUTION INTRAVENOUS at 22:02

## 2022-01-01 RX ADMIN — LORAZEPAM 1 MG: 2 INJECTION INTRAMUSCULAR; INTRAVENOUS at 16:25

## 2022-01-01 RX ADMIN — Medication 50 MCG/HR: at 08:09

## 2022-01-01 RX ADMIN — MIDAZOLAM HYDROCHLORIDE 2 MG: 2 INJECTION, SOLUTION INTRAMUSCULAR; INTRAVENOUS at 14:15

## 2022-01-01 RX ADMIN — SODIUM CHLORIDE, SODIUM LACTATE, POTASSIUM CHLORIDE, AND CALCIUM CHLORIDE 500 ML: .6; .31; .03; .02 INJECTION, SOLUTION INTRAVENOUS at 19:14

## 2022-01-01 RX ADMIN — LEVALBUTEROL 1.25 MG: 1.25 SOLUTION, CONCENTRATE RESPIRATORY (INHALATION) at 07:14

## 2022-01-01 RX ADMIN — INSULIN LISPRO 1 UNITS: 100 INJECTION, SOLUTION INTRAVENOUS; SUBCUTANEOUS at 12:42

## 2022-01-01 RX ADMIN — HEPARIN SODIUM 5000 UNITS: 5000 INJECTION INTRAVENOUS; SUBCUTANEOUS at 14:37

## 2022-01-01 RX ADMIN — HEPARIN SODIUM 5000 UNITS: 5000 INJECTION INTRAVENOUS; SUBCUTANEOUS at 21:58

## 2022-01-01 RX ADMIN — HEPARIN SODIUM 5000 UNITS: 5000 INJECTION INTRAVENOUS; SUBCUTANEOUS at 06:16

## 2022-01-01 RX ADMIN — IPRATROPIUM BROMIDE 0.5 MG: 0.5 SOLUTION RESPIRATORY (INHALATION) at 03:02

## 2022-01-01 RX ADMIN — CALCIUM GLUCONATE 2 G: 20 INJECTION, SOLUTION INTRAVENOUS at 00:27

## 2022-01-01 RX ADMIN — LEVALBUTEROL 1.25 MG: 1.25 SOLUTION, CONCENTRATE RESPIRATORY (INHALATION) at 13:07

## 2022-01-01 RX ADMIN — ALBUTEROL SULFATE 10 MG: 2.5 SOLUTION RESPIRATORY (INHALATION) at 17:18

## 2022-01-01 RX ADMIN — INSULIN LISPRO 1 UNITS: 100 INJECTION, SOLUTION INTRAVENOUS; SUBCUTANEOUS at 13:50

## 2022-01-01 RX ADMIN — SODIUM CHLORIDE 6 UNITS/HR: 9 INJECTION, SOLUTION INTRAVENOUS at 23:26

## 2022-01-01 RX ADMIN — CALCIUM GLUCONATE 2 G: 20 INJECTION, SOLUTION INTRAVENOUS at 08:09

## 2022-01-01 RX ADMIN — MIDODRINE HYDROCHLORIDE 10 MG: 5 TABLET ORAL at 08:09

## 2022-01-01 RX ADMIN — IPRATROPIUM BROMIDE 0.5 MG: 0.5 SOLUTION RESPIRATORY (INHALATION) at 08:57

## 2022-01-01 RX ADMIN — CHLORHEXIDINE GLUCONATE 0.12% ORAL RINSE 15 ML: 1.2 LIQUID ORAL at 00:28

## 2022-01-01 RX ADMIN — PHENYLEPHRINE HYDROCHLORIDE 50 MCG/MIN: 10 INJECTION INTRAVENOUS at 21:27

## 2022-01-01 RX ADMIN — INSULIN HUMAN 10 UNITS: 100 INJECTION, SOLUTION PARENTERAL at 15:02

## 2022-01-01 RX ADMIN — LEVALBUTEROL 1.25 MG: 1.25 SOLUTION, CONCENTRATE RESPIRATORY (INHALATION) at 19:28

## 2022-01-01 RX ADMIN — Medication 20000 ML: at 08:09

## 2022-01-01 RX ADMIN — IPRATROPIUM BROMIDE 0.5 MG: 0.5 SOLUTION RESPIRATORY (INHALATION) at 07:25

## 2022-01-01 RX ADMIN — INSULIN LISPRO 1 UNITS: 100 INJECTION, SOLUTION INTRAVENOUS; SUBCUTANEOUS at 06:14

## 2022-01-01 RX ADMIN — LEVALBUTEROL 1.25 MG: 1.25 SOLUTION, CONCENTRATE RESPIRATORY (INHALATION) at 01:58

## 2022-01-01 RX ADMIN — CALCIUM GLUCONATE 2 G: 20 INJECTION, SOLUTION INTRAVENOUS at 16:12

## 2022-01-01 RX ADMIN — DIGOXIN 125 MCG: 0.25 INJECTION INTRAMUSCULAR; INTRAVENOUS at 08:51

## 2022-01-01 RX ADMIN — HEPARIN SODIUM 5000 UNITS: 5000 INJECTION INTRAVENOUS; SUBCUTANEOUS at 21:46

## 2022-01-01 RX ADMIN — PHENYLEPHRINE HYDROCHLORIDE 25 MCG/MIN: 10 INJECTION INTRAVENOUS at 15:14

## 2022-01-01 RX ADMIN — IPRATROPIUM BROMIDE 0.5 MG: 0.5 SOLUTION RESPIRATORY (INHALATION) at 13:52

## 2022-01-01 RX ADMIN — INSULIN LISPRO 2 UNITS: 100 INJECTION, SOLUTION INTRAVENOUS; SUBCUTANEOUS at 18:29

## 2022-01-01 RX ADMIN — LEVALBUTEROL 1.25 MG: 1.25 SOLUTION, CONCENTRATE RESPIRATORY (INHALATION) at 19:22

## 2022-01-01 RX ADMIN — INSULIN LISPRO 1 UNITS: 100 INJECTION, SOLUTION INTRAVENOUS; SUBCUTANEOUS at 06:09

## 2022-01-01 RX ADMIN — LEVALBUTEROL 1.25 MG: 1.25 SOLUTION, CONCENTRATE RESPIRATORY (INHALATION) at 13:41

## 2022-01-01 RX ADMIN — AMIODARONE HYDROCHLORIDE 0.5 MG/MIN: 50 INJECTION, SOLUTION INTRAVENOUS at 13:53

## 2022-01-01 RX ADMIN — ALBUMIN (HUMAN) 50 G: 0.25 INJECTION, SOLUTION INTRAVENOUS at 19:54

## 2022-01-01 RX ADMIN — INSULIN LISPRO 1 UNITS: 100 INJECTION, SOLUTION INTRAVENOUS; SUBCUTANEOUS at 00:45

## 2022-01-01 RX ADMIN — IPRATROPIUM BROMIDE 0.5 MG: 0.5 SOLUTION RESPIRATORY (INHALATION) at 01:58

## 2022-01-01 RX ADMIN — INSULIN LISPRO 1 UNITS: 100 INJECTION, SOLUTION INTRAVENOUS; SUBCUTANEOUS at 00:17

## 2022-01-01 RX ADMIN — ACETAMINOPHEN 650 MG: 650 SUSPENSION ORAL at 11:06

## 2022-01-01 RX ADMIN — HEPARIN SODIUM 5000 UNITS: 5000 INJECTION INTRAVENOUS; SUBCUTANEOUS at 22:33

## 2022-01-01 RX ADMIN — HEPARIN SODIUM 5000 UNITS: 5000 INJECTION INTRAVENOUS; SUBCUTANEOUS at 06:10

## 2022-01-01 RX ADMIN — INSULIN LISPRO 1 UNITS: 100 INJECTION, SOLUTION INTRAVENOUS; SUBCUTANEOUS at 11:47

## 2022-01-01 RX ADMIN — METOROPROLOL TARTRATE 5 MG: 5 INJECTION, SOLUTION INTRAVENOUS at 06:15

## 2022-01-01 RX ADMIN — CALCIUM GLUCONATE 2 G: 20 INJECTION, SOLUTION INTRAVENOUS at 05:44

## 2022-01-01 RX ADMIN — LEVALBUTEROL 1.25 MG: 1.25 SOLUTION, CONCENTRATE RESPIRATORY (INHALATION) at 13:36

## 2022-01-01 RX ADMIN — FENTANYL CITRATE 50 MCG: 50 INJECTION, SOLUTION INTRAMUSCULAR; INTRAVENOUS at 14:00

## 2022-01-01 RX ADMIN — CEFEPIME HYDROCHLORIDE 2000 MG: 2 INJECTION, POWDER, FOR SOLUTION INTRAVENOUS at 22:48

## 2022-01-01 RX ADMIN — CALCIUM GLUCONATE 1 G: 20 INJECTION, SOLUTION INTRAVENOUS at 17:28

## 2022-01-01 RX ADMIN — LEVALBUTEROL 1.25 MG: 1.25 SOLUTION, CONCENTRATE RESPIRATORY (INHALATION) at 08:14

## 2022-01-01 RX ADMIN — Medication 20000 ML: at 15:41

## 2022-01-01 RX ADMIN — IPRATROPIUM BROMIDE 0.5 MG: 0.5 SOLUTION RESPIRATORY (INHALATION) at 13:36

## 2022-01-01 RX ADMIN — DEXMEDETOMIDINE HYDROCHLORIDE 0.1 MCG/KG/HR: 400 INJECTION INTRAVENOUS at 09:43

## 2022-01-01 RX ADMIN — SODIUM BICARBONATE 150 ML/HR: 84 INJECTION INTRAVENOUS at 15:21

## 2022-01-01 RX ADMIN — INSULIN LISPRO 1 UNITS: 100 INJECTION, SOLUTION INTRAVENOUS; SUBCUTANEOUS at 00:47

## 2022-01-01 RX ADMIN — MAGNESIUM SULFATE HEPTAHYDRATE 1 G: 1 INJECTION, SOLUTION INTRAVENOUS at 18:07

## 2022-01-01 RX ADMIN — CALCIUM GLUCONATE 2 G: 20 INJECTION, SOLUTION INTRAVENOUS at 00:52

## 2022-01-01 RX ADMIN — FENTANYL CITRATE 50 MCG: 50 INJECTION INTRAMUSCULAR; INTRAVENOUS at 04:58

## 2022-01-01 RX ADMIN — SODIUM CHLORIDE, SODIUM GLUCONATE, SODIUM ACETATE, POTASSIUM CHLORIDE, MAGNESIUM CHLORIDE, SODIUM PHOSPHATE, DIBASIC, AND POTASSIUM PHOSPHATE 100 ML/HR: .53; .5; .37; .037; .03; .012; .00082 INJECTION, SOLUTION INTRAVENOUS at 14:56

## 2022-01-01 RX ADMIN — LEVALBUTEROL 1.25 MG: 1.25 SOLUTION, CONCENTRATE RESPIRATORY (INHALATION) at 19:21

## 2022-01-01 RX ADMIN — SODIUM BICARBONATE 150 ML/HR: 84 INJECTION INTRAVENOUS at 14:37

## 2022-01-01 RX ADMIN — SODIUM CHLORIDE, SODIUM GLUCONATE, SODIUM ACETATE, POTASSIUM CHLORIDE AND MAGNESIUM CHLORIDE 1000 ML: 526; 502; 368; 37; 30 INJECTION, SOLUTION INTRAVENOUS at 17:08

## 2022-01-01 RX ADMIN — IPRATROPIUM BROMIDE 0.5 MG: 0.5 SOLUTION RESPIRATORY (INHALATION) at 19:22

## 2022-01-01 RX ADMIN — DEXMEDETOMIDINE HYDROCHLORIDE 0.7 MCG/KG/HR: 400 INJECTION INTRAVENOUS at 17:13

## 2022-01-19 NOTE — PROGRESS NOTES
Assessment/Plan:  Discussed treatment options with patient and wife  Patient will try Zofran 4 milligrams 1 q 8 hours p r n  Nausea and continue alprazolam 0 25 milligrams 1 q 8 hours p r n  Celestina Alen Patient encouraged to increase fluids and add Ensure protein and calorie supplement drink  Try to eat small frequent meals  Return to the office in 1 week or call sooner p r n  Celestina Proenedelia Patient to follow up with Gastroenterology in 2 weeks as scheduled  Diagnoses and all orders for this visit:    Gastroesophageal reflux disease without esophagitis  -     ondansetron (ZOFRAN) 4 mg tablet; Take 1 tablet (4 mg total) by mouth every 8 (eight) hours as needed for nausea or vomiting    Weight loss  -     ondansetron (ZOFRAN) 4 mg tablet; Take 1 tablet (4 mg total) by mouth every 8 (eight) hours as needed for nausea or vomiting    Anxiety disorder, unspecified type          Subjective:      Patient ID: Cinda Cowden  is a 80 y o  male  Patient complains of nausea and decreased appetite  He denies abdominal pain or heartburn  Patient's symptoms have been going on for 4 months and he has been seen by [facility] and recently had EGD and colonoscopy couple weeks ago  He has a follow-up appointment with [facility] on 03/04/2019  He admits to chronic weight loss  He denies change in bowel habits  He denies constipation or diarrhea and denies melena or hematochezia  Patient denies vomiting  He has been tolerating fluids fairly well  Patient admits to chronic weight loss  He has treated with alprazolam with some relief  Patient is wife states his symptoms are worse with increased anxiety and stress  Nausea   This is a chronic problem  The current episode started more than 1 month ago  The problem occurs daily  The problem has been unchanged  Associated symptoms include fatigue and nausea   Pertinent negatives include no abdominal pain, anorexia, change in bowel habit, chest pain, chills, diaphoresis, fever, headaches, myalgias, sore throat, urinary symptoms, vomiting or weakness  The symptoms are aggravated by stress  Treatments tried: alprazolam  The treatment provided mild relief  The following portions of the patient's history were reviewed and updated as appropriate: allergies, current medications, past family history, past medical history, past social history, past surgical history and problem list     Review of Systems   Constitutional: Positive for fatigue  Negative for chills, diaphoresis and fever  HENT: Negative for sore throat  Cardiovascular: Negative for chest pain  Gastrointestinal: Positive for nausea  Negative for abdominal pain, anorexia, change in bowel habit and vomiting  Musculoskeletal: Negative for myalgias  Neurological: Negative for weakness and headaches  Objective:      /60   Pulse 92   Temp (!) 96 6 °F (35 9 °C)   Resp 16   Ht 5' 2" (1 575 m)   Wt 58 5 kg (129 lb)   BMI 23 59 kg/m²          Physical Exam   Constitutional: He is oriented to person, place, and time  He appears well-developed and well-nourished  Non-toxic appearance  He does not appear ill  No distress  HENT:   Head: Normocephalic  Mouth/Throat: Oropharynx is clear and moist    Eyes: No scleral icterus  Negative scleral icterus   Cardiovascular: Normal rate and regular rhythm  Pulmonary/Chest: Effort normal and breath sounds normal    Abdominal: Soft  Normal appearance and bowel sounds are normal  There is tenderness in the epigastric area  There is no rebound and no guarding  Mild epigastric tenderness  Neurological: He is alert and oriented to person, place, and time  Skin: Skin is warm and dry  Psychiatric: He has a normal mood and affect  Negative

## 2022-01-20 DIAGNOSIS — E11.9 TYPE 2 DIABETES MELLITUS WITHOUT COMPLICATION, WITHOUT LONG-TERM CURRENT USE OF INSULIN (HCC): ICD-10-CM

## 2022-01-20 DIAGNOSIS — I10 ESSENTIAL HYPERTENSION: ICD-10-CM

## 2022-01-20 DIAGNOSIS — J44.9 CHRONIC OBSTRUCTIVE PULMONARY DISEASE, UNSPECIFIED COPD TYPE (HCC): ICD-10-CM

## 2022-01-20 DIAGNOSIS — N18.30 TYPE 2 DIABETES MELLITUS WITH STAGE 3 CHRONIC KIDNEY DISEASE, WITHOUT LONG-TERM CURRENT USE OF INSULIN, UNSPECIFIED WHETHER STAGE 3A OR 3B CKD (HCC): ICD-10-CM

## 2022-01-20 DIAGNOSIS — E11.22 TYPE 2 DIABETES MELLITUS WITH STAGE 3 CHRONIC KIDNEY DISEASE, WITHOUT LONG-TERM CURRENT USE OF INSULIN, UNSPECIFIED WHETHER STAGE 3A OR 3B CKD (HCC): ICD-10-CM

## 2022-01-20 RX ORDER — LINAGLIPTIN 5 MG/1
TABLET, FILM COATED ORAL
Qty: 90 TABLET | Refills: 1 | Status: SHIPPED | OUTPATIENT
Start: 2022-01-20 | End: 2022-06-20

## 2022-01-21 RX ORDER — AMLODIPINE BESYLATE 5 MG/1
5 TABLET ORAL DAILY
Qty: 90 TABLET | Refills: 3 | Status: SHIPPED | OUTPATIENT
Start: 2022-01-21

## 2022-01-21 NOTE — ED NOTES
Patient given warm meal tray, no complaints at this time       Eleln Alejo RN  03/07/19 3[address]el

## 2022-01-25 ENCOUNTER — OFFICE VISIT (OUTPATIENT)
Dept: FAMILY MEDICINE CLINIC | Facility: CLINIC | Age: 87
End: 2022-01-25
Payer: COMMERCIAL

## 2022-01-25 VITALS
HEART RATE: 79 BPM | WEIGHT: 151 LBS | SYSTOLIC BLOOD PRESSURE: 110 MMHG | DIASTOLIC BLOOD PRESSURE: 70 MMHG | TEMPERATURE: 97.1 F | BODY MASS INDEX: 25.78 KG/M2 | OXYGEN SATURATION: 94 % | HEIGHT: 64 IN

## 2022-01-25 DIAGNOSIS — I42.9 CARDIOMYOPATHY, UNSPECIFIED TYPE (HCC): ICD-10-CM

## 2022-01-25 DIAGNOSIS — N18.30 TYPE 2 DIABETES MELLITUS WITH STAGE 3 CHRONIC KIDNEY DISEASE, WITHOUT LONG-TERM CURRENT USE OF INSULIN, UNSPECIFIED WHETHER STAGE 3A OR 3B CKD (HCC): ICD-10-CM

## 2022-01-25 DIAGNOSIS — N25.81 SECONDARY HYPERPARATHYROIDISM OF RENAL ORIGIN (HCC): ICD-10-CM

## 2022-01-25 DIAGNOSIS — E11.9 TYPE 2 DIABETES MELLITUS WITHOUT COMPLICATION, WITHOUT LONG-TERM CURRENT USE OF INSULIN (HCC): Primary | ICD-10-CM

## 2022-01-25 DIAGNOSIS — I48.0 PAROXYSMAL ATRIAL FIBRILLATION (HCC): ICD-10-CM

## 2022-01-25 DIAGNOSIS — I26.99 PULMONARY EMBOLISM, OTHER, UNSPECIFIED CHRONICITY, UNSPECIFIED WHETHER ACUTE COR PULMONALE PRESENT (HCC): ICD-10-CM

## 2022-01-25 DIAGNOSIS — N18.5 STAGE 5 CHRONIC KIDNEY DISEASE (HCC): ICD-10-CM

## 2022-01-25 DIAGNOSIS — Z99.2 DEPENDENCE ON RENAL DIALYSIS (HCC): ICD-10-CM

## 2022-01-25 DIAGNOSIS — J42 CHRONIC BRONCHITIS, UNSPECIFIED CHRONIC BRONCHITIS TYPE (HCC): ICD-10-CM

## 2022-01-25 DIAGNOSIS — J44.9 CHRONIC OBSTRUCTIVE PULMONARY DISEASE, UNSPECIFIED COPD TYPE (HCC): ICD-10-CM

## 2022-01-25 DIAGNOSIS — I10 ESSENTIAL HYPERTENSION: ICD-10-CM

## 2022-01-25 DIAGNOSIS — E11.22 TYPE 2 DIABETES MELLITUS WITH STAGE 3 CHRONIC KIDNEY DISEASE, WITHOUT LONG-TERM CURRENT USE OF INSULIN, UNSPECIFIED WHETHER STAGE 3A OR 3B CKD (HCC): ICD-10-CM

## 2022-01-25 LAB — SL AMB POCT HEMOGLOBIN AIC: 7.5 (ref ?–6.5)

## 2022-01-25 PROCEDURE — 1160F RVW MEDS BY RX/DR IN RCRD: CPT | Performed by: FAMILY MEDICINE

## 2022-01-25 PROCEDURE — 99214 OFFICE O/P EST MOD 30 MIN: CPT | Performed by: FAMILY MEDICINE

## 2022-01-25 PROCEDURE — 1036F TOBACCO NON-USER: CPT | Performed by: FAMILY MEDICINE

## 2022-01-25 PROCEDURE — 83036 HEMOGLOBIN GLYCOSYLATED A1C: CPT | Performed by: FAMILY MEDICINE

## 2022-01-25 NOTE — PROGRESS NOTES
Assessment/Plan:  Patient appears to be doing well on chronic conditions  A1c is well controlled  We reviewed medications  Continue current medications  No changes made today  He was on metformin previously but this was held secondary to his chronic kidney disease  Continue to monitor creatinine levels and urine microalbumin  Recheck again in 4 months  Continue follow-up with Nephrology  1  Type 2 diabetes mellitus without complication, without long-term current use of insulin ([facility])  -     POCT hemoglobin A1c    2  Stage 5 chronic kidney disease ([facility] )    3  Type 2 diabetes mellitus with stage 3 chronic kidney disease, without long-term current use of insulin, unspecified whether stage 3a or 3b CKD ([facility] )    4  Chronic bronchitis, unspecified chronic bronchitis type ([place] [address] )    5  Cardiomyopathy, unspecified type ([place] [address] )    6  Essential hypertension          Subjective:      Patient ID: Joanna Matias  is a 80 y o  male  HPI         The following portions of the patient's history were reviewed and updated as appropriate: allergies, current medications, past family history, past medical history, past social history, past surgical history, and problem list     Review of Systems   Constitutional: Negative  HENT: Negative  Eyes: Negative  Respiratory: Negative  Cardiovascular: Negative  Gastrointestinal: Negative  Endocrine: Negative  Genitourinary: Negative  Musculoskeletal: Negative  Skin: Negative  Allergic/Immunologic: Negative  Neurological: Negative  Hematological: Negative  Psychiatric/Behavioral: Negative  Objective:      /70 (BP Location: Right arm, Patient Position: Sitting, Cuff Size: Standard)   Pulse 79   Temp (!) 97 1 °F (36 2 °C) (Skin)   Ht 5' 4" (1 626 m)   Wt 68 5 kg (151 lb)   SpO2 94%   BMI 25 92 kg/m²          Physical Exam  Vitals reviewed  Constitutional:       Appearance: He is well-developed     HENT:      Head: Normocephalic and atraumatic  Right Ear: External ear normal  Tympanic membrane is not erythematous or bulging  Left Ear: External ear normal  Tympanic membrane is not erythematous or bulging  Nose: Nose normal       Mouth/Throat:      Mouth: No oral lesions  Pharynx: No oropharyngeal exudate  Eyes:      General: No scleral icterus  Right eye: No discharge  Left eye: No discharge  Conjunctiva/sclera: Conjunctivae normal    Neck:      Thyroid: No thyromegaly  Cardiovascular:      Rate and Rhythm: Normal rate and regular rhythm  Pulses: no weak pulses          Dorsalis pedis pulses are 1+ on the right side and 1+ on the left side  Posterior tibial pulses are 1+ on the right side and 1+ on the left side  Heart sounds: Normal heart sounds  No murmur heard  No friction rub  No gallop  Pulmonary:      Effort: Pulmonary effort is normal  No respiratory distress  Breath sounds: No wheezing or rales  Chest:      Chest wall: No tenderness  Abdominal:      General: Bowel sounds are normal  There is no distension  Palpations: Abdomen is soft  There is no mass  Tenderness: There is no abdominal tenderness  There is no guarding or rebound  Musculoskeletal:         General: No tenderness or deformity  Normal range of motion  Cervical back: Normal range of motion and neck supple  Feet:      Right foot:      Skin integrity: No ulcer, skin breakdown, erythema, warmth, callus or dry skin  Left foot:      Skin integrity: No ulcer, skin breakdown, erythema, warmth, callus or dry skin  Lymphadenopathy:      Cervical: No cervical adenopathy  Skin:     General: Skin is warm and dry  Coloration: Skin is not pale  Findings: No erythema or rash  Neurological:      Mental Status: He is alert and oriented to person, place, and time  Cranial Nerves: No cranial nerve deficit  Motor: No abnormal muscle tone        Coordination: Coordination normal       Deep Tendon Reflexes: Reflexes are normal and symmetric  Psychiatric:         Behavior: Behavior normal        Patient's shoes and socks removed  Right Foot/Ankle   Right Foot Inspection  Skin Exam: skin normal and skin intact  No dry skin, no warmth, no callus, no erythema, no maceration, no abnormal color, no pre-ulcer, no ulcer and no callus  Toe Exam: ROM and strength within normal limits  No swelling    Sensory   Vibration: intact  Proprioception: intact  Monofilament testing: intact    Vascular  Capillary refills: < 3 seconds  The right DP pulse is 1+  The right PT pulse is 1+  Right Toe  - Comprehensive Exam  Ecchymosis: none  Swelling: none   Tenderness: none         Left Foot/Ankle  Left Foot Inspection  Skin Exam: skin normal and skin intact  No dry skin, no warmth, no erythema, no maceration, normal color, no pre-ulcer, no ulcer and no callus  Toe Exam: ROM and strength within normal limits  No swelling  Sensory   Vibration: intact  Proprioception: intact  Monofilament testing: intact    Vascular  Capillary refills: < 3 seconds  The left DP pulse is 1+  The left PT pulse is 1+       Left Toe  - Comprehensive Exam  Ecchymosis: none  Arch: normal  Swelling: none   Tenderness: none           Assign Risk Category  No deformity present  No loss of protective sensation  No weak pulses  Risk: 0

## 2022-01-25 NOTE — PATIENT INSTRUCTIONS
10% - bad control"> 10% - bad control,Hemoglobin A1c (HbA1c) greater than 10% indicating poor diabetic control,Haemoglobin A1c greater than 10% indicating poor diabetic control">   Diabetes Mellitus Type 2 in Adults, Ambulatory Care   GENERAL INFORMATION:   Diabetes mellitus type 2  is a disease that affects how your body uses glucose (sugar)  Insulin helps move sugar out of the blood so it can be used for energy  Normally, when the blood sugar level increases, the pancreas makes more insulin  Type 2 diabetes develops because either the body cannot make enough insulin, or it cannot use the insulin correctly  After many years, your pancreas may stop making insulin  Common symptoms include the following:   · More hunger or thirst than usual     · Frequent urination     · Weight loss without trying     · Blurred vision  Seek immediate care for the following symptoms:   · Severe abdominal pain, or pain that spreads to your back  You may also be vomiting  · Trouble staying awake or focusing    · Shaking or sweating    · Blurred or double vision    · Breath has a fruity, sweet smell    · Breathing is deep and labored, or rapid and shallow    · Heartbeat is fast and weak  Treatment for diabetes mellitus type 2  includes keeping your blood sugar at a normal level  You must eat the right foods, and exercise regularly  You may also need medicine if you cannot control your blood sugar level with nutrition and exercise  Manage diabetes mellitus type 2:   · Check your blood sugar level  You will be taught how to check a small drop of blood in a glucose monitor  Ask your healthcare provider when and how often to check during the day  Ask your healthcare provider what your blood sugar levels should be when you check them  · Keep track of carbohydrates (sugar and starchy foods)  Your blood sugar level can get too high if you eat too many carbohydrates   Your dietitian will help you plan meals and snacks that have the right amount of carbohydrates  · Eat low-fat foods  Some examples are skinless chicken and low-fat milk  · Eat less sodium (salt)  Some examples of high-sodium foods to limit are soy sauce, potato chips, and soup  Do not add salt to food you cook  Limit your use of table salt  · Eat high-fiber foods  Foods that are a good source of fiber include vegetables, whole grain bread, and beans  · Limit alcohol  Alcohol affects your blood sugar level and can make it harder to manage your diabetes  Women should limit alcohol to 1 drink a day  Men should limit alcohol to 2 drinks a day  A drink of alcohol is 12 ounces of beer, 5 ounces of wine, or 1½ ounces of liquor  · Get regular exercise  Exercise can help keep your blood sugar level steady, decrease your risk of heart disease, and help you lose weight  Exercise for at least 30 minutes, 5 days a week  Include muscle strengthening activities 2 days each week  Work with your healthcare provider to create an exercise plan  · Check your feet each day  for injuries or open sores  Ask your healthcare provider for activities you can do if you have an open sore  · Quit smoking  If you smoke, it is never too late to quit  Smoking can worsen the problems that may occur with diabetes  Ask your healthcare provider for information about how to stop smoking if you are having trouble quitting  · Ask about your weight:  Ask healthcare providers if you need to lose weight, and how much to lose  Ask them to help you with a weight loss program  Even a 10 to 15 pound weight loss can help you manage your blood sugar level  · Carry medical alert identification  Wear medical alert jewelry or carry a card that says you have diabetes  Ask your healthcare provider where to get these items  · Ask about vaccines  Diabetes puts you at risk of serious illness if you get the flu, pneumonia, or hepatitis   Ask your healthcare provider if you should get a flu, pneumonia, or hepatitis B vaccine, and when to get the vaccine  Follow up with your healthcare provider as directed:  Write down your questions so you remember to ask them during your visits  CARE AGREEMENT:   You have the right to help plan your care  Learn about your health condition and how it may be treated  Discuss treatment options with your caregivers to decide what care you want to receive  You always have the right to refuse treatment  The above information is an  only  It is not intended as medical advice for individual conditions or treatments  Talk to your doctor, nurse or pharmacist before following any medical regimen to see if it is safe and effective for you  © 2014 [address] is for End User's use only and may not be sold, redistributed or otherwise used for commercial purposes  All illustrations and images included in CareNotes® are the copyrighted property of A D A M [employer]  or [employer]

## 2022-01-26 LAB
ALBUMIN/CREAT UR: 148 MCG/MG CREAT
CREAT UR-MCNC: 163 MG/DL (ref 20–320)
MICROALBUMIN UR-MCNC: 24.1 MG/DL

## 2022-03-07 DIAGNOSIS — F41.1 GENERALIZED ANXIETY DISORDER: ICD-10-CM

## 2022-03-07 RX ORDER — ALPRAZOLAM 0.25 MG/1
TABLET ORAL
Qty: 270 TABLET | Refills: 0 | Status: SHIPPED | OUTPATIENT
Start: 2022-03-07 | End: 2022-07-13

## 2022-03-08 DIAGNOSIS — E11.9 TYPE 2 DIABETES MELLITUS WITHOUT COMPLICATION, WITHOUT LONG-TERM CURRENT USE OF INSULIN (HCC): ICD-10-CM

## 2022-03-09 RX ORDER — ROSUVASTATIN CALCIUM 40 MG/1
TABLET, COATED ORAL
Qty: 90 TABLET | Refills: 0 | Status: SHIPPED | OUTPATIENT
Start: 2022-03-09 | End: 2022-06-20

## 2022-03-28 ENCOUNTER — APPOINTMENT (OUTPATIENT)
Dept: LAB | Facility: CLINIC | Age: 87
End: 2022-03-28
Payer: COMMERCIAL

## 2022-03-28 DIAGNOSIS — N18.5 STAGE 5 CHRONIC KIDNEY DISEASE (HCC): ICD-10-CM

## 2022-03-28 LAB
ALBUMIN SERPL BCP-MCNC: 4 G/DL (ref 3.5–5)
ALP SERPL-CCNC: 52 U/L (ref 46–116)
ALT SERPL W P-5'-P-CCNC: 25 U/L (ref 12–78)
ANION GAP SERPL CALCULATED.3IONS-SCNC: 9 MMOL/L (ref 4–13)
AST SERPL W P-5'-P-CCNC: 12 U/L (ref 5–45)
BILIRUB SERPL-MCNC: 0.36 MG/DL (ref 0.2–1)
BUN SERPL-MCNC: 45 MG/DL (ref 5–25)
CALCIUM SERPL-MCNC: 9.7 MG/DL (ref 8.3–10.1)
CHLORIDE SERPL-SCNC: 109 MMOL/L (ref 100–108)
CO2 SERPL-SCNC: 21 MMOL/L (ref 21–32)
CREAT SERPL-MCNC: 3.16 MG/DL (ref 0.6–1.3)
GFR SERPL CREATININE-BSD FRML MDRD: 16 ML/MIN/1.73SQ M
GLUCOSE P FAST SERPL-MCNC: 135 MG/DL (ref 65–99)
POTASSIUM SERPL-SCNC: 4.5 MMOL/L (ref 3.5–5.3)
PROT SERPL-MCNC: 7.6 G/DL (ref 6.4–8.2)
SODIUM SERPL-SCNC: 139 MMOL/L (ref 136–145)

## 2022-03-28 PROCEDURE — 36415 COLL VENOUS BLD VENIPUNCTURE: CPT

## 2022-03-28 PROCEDURE — 80053 COMPREHEN METABOLIC PANEL: CPT

## 2022-03-30 ENCOUNTER — TELEPHONE (OUTPATIENT)
Dept: NEPHROLOGY | Facility: CLINIC | Age: 87
End: 2022-03-30

## 2022-03-30 NOTE — TELEPHONE ENCOUNTER
Appointment Confirmation   Person confirmed appointment with  If not patient, name of the person Spouse    Date and time of appointment 3/31/22 at 9:30 am    Patient acknowledged and will be at appointment? yes    Did you advise the patient that they will need a urine sample if they are a new patient?  N/A    Did you advise the patient to bring their current medications for verification? (including any OTC) Yes    Additional Information

## 2022-03-31 ENCOUNTER — TELEPHONE (OUTPATIENT)
Dept: FAMILY MEDICINE CLINIC | Facility: CLINIC | Age: 87
End: 2022-03-31

## 2022-03-31 ENCOUNTER — OFFICE VISIT (OUTPATIENT)
Dept: NEPHROLOGY | Facility: CLINIC | Age: 87
End: 2022-03-31
Payer: COMMERCIAL

## 2022-03-31 VITALS
BODY MASS INDEX: 25.75 KG/M2 | DIASTOLIC BLOOD PRESSURE: 60 MMHG | SYSTOLIC BLOOD PRESSURE: 128 MMHG | WEIGHT: 150 LBS | HEART RATE: 58 BPM | OXYGEN SATURATION: 95 %

## 2022-03-31 DIAGNOSIS — I15.0 RENOVASCULAR HYPERTENSION: ICD-10-CM

## 2022-03-31 DIAGNOSIS — K92.1 BLACK STOOLS: ICD-10-CM

## 2022-03-31 DIAGNOSIS — E11.22 TYPE 2 DIABETES MELLITUS WITH STAGE 5 CHRONIC KIDNEY DISEASE NOT ON CHRONIC DIALYSIS, WITHOUT LONG-TERM CURRENT USE OF INSULIN (HCC): ICD-10-CM

## 2022-03-31 DIAGNOSIS — N18.5 TYPE 2 DIABETES MELLITUS WITH STAGE 5 CHRONIC KIDNEY DISEASE NOT ON CHRONIC DIALYSIS, WITHOUT LONG-TERM CURRENT USE OF INSULIN (HCC): ICD-10-CM

## 2022-03-31 DIAGNOSIS — E78.5 DYSLIPIDEMIA: ICD-10-CM

## 2022-03-31 DIAGNOSIS — N18.5 STAGE 5 CHRONIC KIDNEY DISEASE (HCC): Primary | ICD-10-CM

## 2022-03-31 DIAGNOSIS — E55.9 VITAMIN D DEFICIENCY: ICD-10-CM

## 2022-03-31 DIAGNOSIS — N25.81 SECONDARY HYPERPARATHYROIDISM OF RENAL ORIGIN (HCC): ICD-10-CM

## 2022-03-31 PROCEDURE — 1160F RVW MEDS BY RX/DR IN RCRD: CPT | Performed by: INTERNAL MEDICINE

## 2022-03-31 PROCEDURE — 99214 OFFICE O/P EST MOD 30 MIN: CPT | Performed by: INTERNAL MEDICINE

## 2022-03-31 PROCEDURE — 1036F TOBACCO NON-USER: CPT | Performed by: INTERNAL MEDICINE

## 2022-03-31 NOTE — PROGRESS NOTES
[address] [facility] [place], [place]    Name: Steve Mendez  YOB: 1935      Assessment/Plan:           Problem List Items Addressed This Visit        Endocrine    Type 2 diabetes mellitus ([facility] )       Lab Results   Component Value Date    HGBA1C 7 5 (A) 01/25/2022    currently taking Tradjenta 5 mg daily         Secondary hyperparathyroidism of renal origin ([facility] )      PTH is well controlled and he does take vitamin-D         Relevant Orders    Comprehensive metabolic panel    Lipid panel    CBC and differential    PTH, intact    Vitamin D 25 hydroxy    Iron Saturation %    Urinalysis with microscopic       Cardiovascular and Mediastinum    Renovascular hypertension      Blood pressure is well controlled            Genitourinary    Stage 5 chronic kidney disease ([facility] ) - Primary     Lab Results   Component Value Date    EGFR 16 03/28/2022    EGFR 16 12/14/2021    EGFR 19 08/26/2021    CREATININE 3 16 (H) 03/28/2022    CREATININE 3 28 (H) 12/14/2021    CREATININE 2 89 (H) 08/26/2021   He has stable kidney afunction with a baseline creatinine of 3 1-3 2 mg/dl (eGFR 16 ml/min) No s/s of uremia which we reviewed today         Relevant Orders    Comprehensive metabolic panel    Lipid panel    CBC and differential    PTH, intact    Vitamin D 25 hydroxy    Iron Saturation %    Urinalysis with microscopic       Other    Dyslipidemia      Aim for an LDL less than 70 to decrease cardiovascular risk factors associated with chronic kidney disease         Black stools      He noted black stools approximately 2 months ago  I reviewed his medications  He is not taking iron supplements  I have asked them to call his PCP to have his stool checked         Relevant Orders    Iron Saturation %      Other Visit Diagnoses     Vitamin D deficiency        Relevant Orders    Vitamin D 25 hydroxy            Subjective:      Patient ID: Steve Mendez  is a 80 y o  male  referred by  Frankie Louis    HPI He has a history of hemodialysis requiring acute kidney injury with renal recovery  He has long history of hypertension, stage 5 chronic kidney disease, type 2 diabetes mellitus on oral agents and secondary hyperparathyroidism of renal origin  He started on Tradjenta for his diabetes  He has generally been feeling well    The following portions of the patient's history were reviewed and updated as appropriate: allergies, current medications, past family history, past medical history, past social history, past surgical history and problem list     Review of Systems   Constitutional: Negative for fatigue  HENT: Positive for hearing loss  Eyes: Negative for visual disturbance  Respiratory: Negative for cough and shortness of breath  Cardiovascular: Negative for chest pain, palpitations and leg swelling  Gastrointestinal: Negative for abdominal pain, blood in stool and constipation  He says his stool is "coal black"  He noted it for two months and has  No pain with bowel movements   Genitourinary: Negative for difficulty urinating, dysuria, frequency, hematuria and urgency  Musculoskeletal: Negative for arthralgias and back pain  Neurological: Negative for dizziness, light-headedness and headaches  Hematological: Does not bruise/bleed easily  Psychiatric/Behavioral: Negative for dysphoric mood           Social History     Socioeconomic History    Marital status: /Civil Union     Spouse name: None    Number of children: None    Years of education: None    Highest education level: None   Occupational History    Occupation: Retired      Comment:     Tobacco Use    Smoking status: Former Smoker     Packs/day: 0 50     Types: Cigars     Quit date:      Years since quittin 2    Smokeless tobacco: Never Used    Tobacco comment: current non-smoker   Vaping Use    Vaping Use: Never used   Substance and Sexual Activity    Alcohol use: No    Drug use: Never    Sexual activity: Not Currently   Other Topics Concern    None   Social History Narrative    Patient has never smoked - As per [employer]    Consumes on average 2 cups of regular coffee per day      Social Determinants of Health     Financial Resource Strain: Not on file   Food Insecurity: Not on file   Transportation Needs: Not on file   Physical Activity: Not on file   Stress: Not on file   Social Connections: Not on file   Intimate Partner Violence: Not on file   Housing Stability: Not on file     Past Medical History:   Diagnosis Date    Anxiety     Obrien's esophagus     BPH (benign prostatic hyperplasia)     Cancer ([facility] )     pt states hx skin cancer, pt confused    Cardiomegaly     Diabetes ([facility] )     type 2  controlled; taken off oral meds 6/19    DVT (deep venous thrombosis) (HCC)     right leg    GERD (gastroesophageal reflux disease)     Hypercholesterolemia     Hypertension     Irregular heart beat     paroxsysmal a fib    Pancreatic cyst     Paroxysmal atrial fibrillation (HCC)     Pericardial effusion with cardiac tamponade     Pleural effusion     Renal disorder     dialysis started in 2019    Weight loss, non-intentional      Past Surgical History:   Procedure Laterality Date    APPENDECTOMY  1953    CATARACT EXTRACTION Bilateral 2016?  COLONOSCOPY      COLONOSCOPY N/A 1/24/2019    Procedure: COLONOSCOPY with polypectomies;  Surgeon: Arron Chen MD;  Location: [facility]; Service: Gastroenterology    ESOPHAGOGASTRODUODENOSCOPY N/A 1/24/2019    Procedure: ESOPHAGOGASTRODUODENOSCOPY (EGD) with bx;  Surgeon: Arron Chen MD;  Location: [facility];   Service: Gastroenterology    IR TUNNELED DIALYSIS CATHETER CHECK/CHANGE/REPOSITION/ANGIOPLASTY  4/18/2019    IR TUNNELED DIALYSIS CATHETER PLACEMENT  3/6/2019    IR TUNNELED DIALYSIS CATHETER REMOVAL  7/30/2019    PERICARDIAL WINDOW N/A 2/26/2019    Procedure: WINDOW PERICARDIAL;  Surgeon: Chelsie Jones Courtney Patel MD;  Location: [facility];  Service: Thoracic    MN ANASTOMOSIS,AV,ANY SITE Left 7/3/2019    Procedure: CREATION FISTULA ARTERIOVENOUS (AV) left upper extremity brachial artery to axillary vein Armona-Mushtaq graft ;  Surgeon: Les Moffett MD;  Location: [address] [place] [address] MAIN OR;  Service: Vascular    PROSTATE BIOPSY         Current Outpatient Medications:     acetaminophen (TYLENOL) 325 mg tablet, Take 650 mg by mouth daily, Disp: , Rfl:     ALPRAZolam (XANAX) 0 25 mg tablet, TAKE 1 TABLET BY MOUTH EVERY 8 HOURS AS NEEDED FOR ANXIETY , Disp: 270 tablet, Rfl: 0    amLODIPine (NORVASC) 5 mg tablet, Take 1 tablet (5 mg total) by mouth daily, Disp: 90 tablet, Rfl: 3    Blood Glucose Monitoring Suppl (ONE TOUCH ULTRA 2) w/Device KIT, by Does not apply route 2 (two) times a day, Disp: 1 each, Rfl: 0    Blood Glucose Monitoring Suppl (ONE TOUCH ULTRA 2) w/Device KIT, Use daily, Disp: 1 each, Rfl: 0    Blood Glucose Monitoring Suppl (TRUE METRIX METER) YASMEEN, by Does not apply route 2 (two) times a day, Disp: 1 Device, Rfl: 0    Cholecalciferol (VITAMIN D PO), Take 5,000 Units by mouth every morning , Disp: , Rfl:     Dulera 100-5 MCG/ACT inhaler, INHALE 2 PUFFS BY MOUTH EVERY 12 HOURS , Disp: 13 g, Rfl: 11    finasteride (PROSCAR) 5 mg tablet, TAKE 1 TABLET BY MOUTH EVERY DAY, Disp: 90 tablet, Rfl: 3    fluticasone (FLONASE) 50 mcg/act nasal spray, USE 2 SPRAYS IN EACH NOSTRIL DAILY, Disp: 16 mL, Rfl: 0    glucose blood (OneTouch Ultra) test strip, Test once daily as directed, DX E11 9 type 2 diabetes w/out complication, Disp: 104 each, Rfl: 3    glucose blood (TRUE METRIX BLOOD GLUCOSE TEST) test strip, Test twice daily, Disp: 300 each, Rfl: 0    Lancets (ONETOUCH ULTRASOFT) lancets, USE DAILY AS DIRECTED , Disp: 100 each, Rfl: 3    Lancets (onetouch ultrasoft) lancets, Use daily as instructed, Disp: 100 each, Rfl: 3    losartan (COZAAR) 100 MG tablet, TAKE 0 5 TABLETS (50 MG TOTAL) BY MOUTH EVERY MORNING, Disp: 45 tablet, Rfl: 3    losartan (COZAAR) 25 mg tablet, Take 1 tablet (25 mg total) by mouth every morning, Disp: 90 tablet, Rfl: 3    metoprolol tartrate (LOPRESSOR) 25 mg tablet, Take 1 tablet (25 mg total) by mouth daily, Disp: 90 tablet, Rfl: 3    omeprazole (PriLOSEC) 40 MG capsule, Take 40 mg by mouth daily  , Disp: , Rfl:     PARoxetine (PAXIL) 30 mg tablet, TAKE 1 TABLET (30 MG TOTAL) BY MOUTH EVERY MORNING AS DIRECTED, Disp: 90 tablet, Rfl: 1    rosuvastatin (CRESTOR) 40 MG tablet, TAKE 1 TABLET BY MOUTH EVERY DAY IN THE MORNING, Disp: 90 tablet, Rfl: 0    Tradjenta 5 MG TABS, TAKE 1 TABLET BY MOUTH DAILY, Disp: 90 tablet, Rfl: 1    traMADol (ULTRAM) 50 mg tablet, Take 1 tablet (50 mg total) by mouth every 6 (six) hours as needed for moderate pain (Patient not taking: Reported on 3/31/2022 ), Disp: 30 tablet, Rfl: 1    Lab Results   Component Value Date     07/21/2016    SODIUM 139 03/28/2022    K 4 5 03/28/2022     (H) 03/28/2022    CO2 21 03/28/2022    ANIONGAP 5 07/07/2015    AGAP 9 03/28/2022    BUN 45 (H) 03/28/2022    CREATININE 3 16 (H) 03/28/2022    GLUC 124 04/25/2020    GLUF 135 (H) 03/28/2022    CALCIUM 9 7 03/28/2022    AST 12 03/28/2022    ALT 25 03/28/2022    ALKPHOS 52 03/28/2022    PROT 7 3 07/21/2016    TP 7 6 03/28/2022    BILITOT 0 4 07/21/2016    TBILI 0 36 03/28/2022    EGFR 16 03/28/2022     Lab Results   Component Value Date    WBC 5 33 12/14/2021    HGB 10 1 (L) 12/14/2021    HCT 30 8 (L) 12/14/2021     (H) 12/14/2021     12/14/2021     Lab Results   Component Value Date    CHOLESTEROL 129 09/18/2020    CHOLESTEROL 119 02/14/2018    CHOLESTEROL 141 11/30/2016     Lab Results   Component Value Date    HDL 34 (L) 09/18/2020    HDL 34 (L) 02/14/2018    HDL 34 (L) 11/30/2016     Lab Results   Component Value Date    LDLCALC 66 09/18/2020    LDLCALC 68 02/14/2018    LDLCALC 78 11/30/2016     Lab Results   Component Value Date    TRIG 144 09/18/2020    TRIG 87 02/14/2018    TRIG 146 11/30/2016     No results found for: [place], [place]  Lab Results   Component Value Date    [id number] 1 652 02/21/2020     Lab Results   Component Value Date    PTH 46 7 08/26/2021    CALCIUM 9 7 03/28/2022    PHOS 3 1 08/26/2021     Lab Results   Component Value Date    SPEP  02/26/2019     No monoclonal bands noted  Reviewed by: Nicole Victoria MD  ([id number])  **Electronic Signature**    UPEP  02/26/2019     The UPEP shows non-selective proteinuria   The UPEP shows a faint possible band  Immunofixation to be performed  Reviewed by: Addy Sepulveda MD ([id number])  **Electronic Signature**     No results found for: FELIBERTO [id number]        Objective:      /60   Pulse 58   Wt 68 kg (150 lb)   SpO2 95%   BMI 25 75 kg/m²          Physical Exam  Vitals reviewed  Constitutional:       General: He is not in acute distress  Appearance: Normal appearance  He is normal weight  He is not toxic-appearing  HENT:      Head: Normocephalic and atraumatic  Right Ear: External ear normal       Left Ear: External ear normal    Eyes:      Extraocular Movements: Extraocular movements intact  Pupils: Pupils are equal, round, and reactive to light  Neck:      Vascular: No carotid bruit  Cardiovascular:      Rate and Rhythm: Normal rate and regular rhythm  Pulmonary:      Effort: Pulmonary effort is normal  No respiratory distress  Breath sounds: Normal breath sounds  No wheezing, rhonchi or rales  Abdominal:      General: Bowel sounds are normal       Palpations: Abdomen is soft  Musculoskeletal:         General: Normal range of motion  Cervical back: Normal range of motion  Right lower leg: No edema  Left lower leg: No edema  Lymphadenopathy:      Cervical: No cervical adenopathy  Skin:     General: Skin is warm and dry  Neurological:      General: No focal deficit present  Mental Status: He is alert and oriented to person, place, and time   Mental status is at baseline  Psychiatric:         Mood and Affect: Mood normal          Behavior: Behavior normal          Thought Content:  Thought content normal          Judgment: Judgment normal

## 2022-03-31 NOTE — TELEPHONE ENCOUNTER
Patient called stating he was just to see his nephrologist this morning and told her about his stools  He states he has had black stools for the past 2 months  He denied constipation, diarrhea, abdominal pain or fevers  She advised he call you when they got home to let you know about this  Please advise  Thanks!

## 2022-03-31 NOTE — ASSESSMENT & PLAN NOTE
Aim for an LDL less than 70 to decrease cardiovascular risk factors associated with chronic kidney disease

## 2022-03-31 NOTE — ASSESSMENT & PLAN NOTE
Lab Results   Component Value Date    HGBA1C 7 5 (A) 01/25/2022    currently taking Tradjenta 5 mg daily

## 2022-03-31 NOTE — ASSESSMENT & PLAN NOTE
He noted black stools approximately 2 months ago  I reviewed his medications  He is not taking iron supplements    I have asked them to call his PCP to have his stool checked

## 2022-03-31 NOTE — ASSESSMENT & PLAN NOTE
Lab Results   Component Value Date    EGFR 16 03/28/2022    EGFR 16 12/14/2021    EGFR 19 08/26/2021    CREATININE 3 16 (H) 03/28/2022    CREATININE 3 28 (H) 12/14/2021    CREATININE 2 89 (H) 08/26/2021   He has stable kidney afunction with a baseline creatinine of 3 1-3 2 mg/dl (eGFR 16 ml/min) No s/s of uremia which we reviewed today

## 2022-04-01 ENCOUNTER — OFFICE VISIT (OUTPATIENT)
Dept: FAMILY MEDICINE CLINIC | Facility: CLINIC | Age: 87
End: 2022-04-01
Payer: COMMERCIAL

## 2022-04-01 VITALS
OXYGEN SATURATION: 96 % | DIASTOLIC BLOOD PRESSURE: 60 MMHG | HEART RATE: 68 BPM | SYSTOLIC BLOOD PRESSURE: 126 MMHG | WEIGHT: 151.6 LBS | TEMPERATURE: 97.6 F | HEIGHT: 64 IN | BODY MASS INDEX: 25.88 KG/M2

## 2022-04-01 DIAGNOSIS — R19.5 CHANGE IN STOOL: ICD-10-CM

## 2022-04-01 DIAGNOSIS — J42 CHRONIC BRONCHITIS, UNSPECIFIED CHRONIC BRONCHITIS TYPE (HCC): ICD-10-CM

## 2022-04-01 DIAGNOSIS — N18.5 STAGE 5 CHRONIC KIDNEY DISEASE (HCC): ICD-10-CM

## 2022-04-01 DIAGNOSIS — N18.5 TYPE 2 DIABETES MELLITUS WITH STAGE 5 CHRONIC KIDNEY DISEASE NOT ON CHRONIC DIALYSIS, WITHOUT LONG-TERM CURRENT USE OF INSULIN (HCC): Primary | ICD-10-CM

## 2022-04-01 DIAGNOSIS — E11.22 TYPE 2 DIABETES MELLITUS WITH STAGE 5 CHRONIC KIDNEY DISEASE NOT ON CHRONIC DIALYSIS, WITHOUT LONG-TERM CURRENT USE OF INSULIN (HCC): Primary | ICD-10-CM

## 2022-04-01 PROCEDURE — 1160F RVW MEDS BY RX/DR IN RCRD: CPT | Performed by: FAMILY MEDICINE

## 2022-04-01 PROCEDURE — 3725F SCREEN DEPRESSION PERFORMED: CPT | Performed by: FAMILY MEDICINE

## 2022-04-01 PROCEDURE — 99214 OFFICE O/P EST MOD 30 MIN: CPT | Performed by: FAMILY MEDICINE

## 2022-04-01 PROCEDURE — 1036F TOBACCO NON-USER: CPT | Performed by: FAMILY MEDICINE

## 2022-04-01 NOTE — PROGRESS NOTES
BMI Counseling: Body mass index is 26 02 kg/m²  The BMI is above normal  Nutrition recommendations include reducing portion sizes  Assessment/Plan:  Stool testing is negative for blood  Likely iron is causing stool to be black  We discussed following up with gastroenterology  Deferred by patient  Follow-up with Neurology  1  Type 2 diabetes mellitus with stage 5 chronic kidney disease not on chronic dialysis, without long-term current use of insulin ([address] )    2  Chronic bronchitis, unspecified chronic bronchitis type ([address] )  -     XR chest pa & lateral; Future; Expected date: 04/01/2022    3  Stage 5 chronic kidney disease (HCC)    4  Change in stool          Subjective:      Patient ID: Charlie Byrd  is a 80 y o  male  Patient with chronic medical issues and COPD and history of anemia has had dark stools for several months reported by patient  He also states that he believes he has a port still in his left arm he will check with nephrologist on this  He has a chest x-ray pending  He is no longer doing dialysis  He does take an iron supplement  The following portions of the patient's history were reviewed and updated as appropriate: allergies, current medications, past family history, past medical history, past social history, past surgical history, and problem list     Review of Systems      Objective:      /60 (BP Location: Left arm, Patient Position: Sitting, Cuff Size: Standard)   Pulse 68   Temp 97 6 °F (36 4 °C) (Temporal)   Ht 5' 4" (1 626 m)   Wt 68 8 kg (151 lb 9 6 oz)   SpO2 96%   BMI 26 02 kg/m²          Physical Exam  Vitals reviewed  Constitutional:       Appearance: He is well-developed  HENT:      Head: Normocephalic and atraumatic  Right Ear: External ear normal  Tympanic membrane is not erythematous or bulging  Left Ear: External ear normal  Tympanic membrane is not erythematous or bulging        Nose: Nose normal       Mouth/Throat:      Mouth: No oral lesions  Pharynx: No oropharyngeal exudate  Eyes:      General: No scleral icterus  Right eye: No discharge  Left eye: No discharge  Conjunctiva/sclera: Conjunctivae normal    Neck:      Thyroid: No thyromegaly  Cardiovascular:      Rate and Rhythm: Normal rate and regular rhythm  Heart sounds: Normal heart sounds  No murmur heard  No friction rub  No gallop  Pulmonary:      Effort: Pulmonary effort is normal  No respiratory distress  Breath sounds: No wheezing or rales  Chest:      Chest wall: No tenderness  Abdominal:      General: Bowel sounds are normal  There is no distension  Palpations: Abdomen is soft  There is no mass  Tenderness: There is no abdominal tenderness  There is no guarding or rebound  Genitourinary:     Rectum: Normal       Comments: Stool is negative for blood/Hemoccult negative peer  Musculoskeletal:         General: No tenderness or deformity  Normal range of motion  Cervical back: Normal range of motion and neck supple  Lymphadenopathy:      Cervical: No cervical adenopathy  Skin:     General: Skin is warm and dry  Coloration: Skin is not pale  Findings: No erythema or rash  Neurological:      Mental Status: He is alert and oriented to person, place, and time  Cranial Nerves: No cranial nerve deficit  Motor: No abnormal muscle tone  Coordination: Coordination normal       Deep Tendon Reflexes: Reflexes are normal and symmetric     Psychiatric:         Behavior: Behavior normal

## 2022-04-15 DIAGNOSIS — F41.1 GENERALIZED ANXIETY DISORDER: ICD-10-CM

## 2022-04-15 RX ORDER — PAROXETINE 30 MG/1
30 TABLET, FILM COATED ORAL EVERY MORNING
Qty: 90 TABLET | Refills: 1 | Status: SHIPPED | OUTPATIENT
Start: 2022-04-15

## 2022-04-18 ENCOUNTER — HOSPITAL ENCOUNTER (OUTPATIENT)
Dept: RADIOLOGY | Facility: HOSPITAL | Age: 87
Discharge: HOME | End: 2022-04-18
Payer: COMMERCIAL

## 2022-04-18 DIAGNOSIS — J42 CHRONIC BRONCHITIS, UNSPECIFIED CHRONIC BRONCHITIS TYPE (HCC): ICD-10-CM

## 2022-04-18 PROCEDURE — 71046 X-RAY EXAM CHEST 2 VIEWS: CPT

## 2022-04-20 ENCOUNTER — TELEPHONE (OUTPATIENT)
Dept: FAMILY MEDICINE CLINIC | Facility: CLINIC | Age: 87
End: 2022-04-20

## 2022-04-20 NOTE — TELEPHONE ENCOUNTER
Britni Le from [facility] called regarding the chest x-ray from 4/18/2022 with abnormal findings: Worsening mass like opacity in the right mid and lower lung    Please advise  Thank you

## 2022-05-08 ENCOUNTER — HOSPITAL ENCOUNTER (OUTPATIENT)
Dept: CT IMAGING | Facility: HOSPITAL | Age: 87
Discharge: HOME | End: 2022-05-08
Payer: COMMERCIAL

## 2022-05-08 DIAGNOSIS — R93.89 ABNORMAL CHEST X-RAY: ICD-10-CM

## 2022-05-08 DIAGNOSIS — I31.3 PERICARDIAL EFFUSION WITH CARDIAC TAMPONADE: ICD-10-CM

## 2022-05-08 DIAGNOSIS — I31.4 PERICARDIAL EFFUSION WITH CARDIAC TAMPONADE: ICD-10-CM

## 2022-05-08 DIAGNOSIS — J18.1 LUNG CONSOLIDATION (HCC): ICD-10-CM

## 2022-05-08 PROCEDURE — 71250 CT THORAX DX C-: CPT

## 2022-05-08 PROCEDURE — G1004 CDSM NDSC: HCPCS

## 2022-05-17 ENCOUNTER — RA CDI HCC (OUTPATIENT)
Dept: OTHER | Facility: HOSPITAL | Age: 87
End: 2022-05-17

## 2022-05-17 NOTE — PROGRESS NOTES
[facility]  coding opportunities       Chart reviewed, no opportunity found: CHART REVIEWED, NO OPPORTUNITY FOUND        Patients Insurance     Medicare Insurance: [employer] [facility] Advantage

## 2022-05-23 ENCOUNTER — APPOINTMENT (OUTPATIENT)
Dept: LAB | Facility: CLINIC | Age: 87
End: 2022-05-23
Payer: COMMERCIAL

## 2022-05-23 ENCOUNTER — CONSULT (OUTPATIENT)
Dept: PULMONOLOGY | Facility: CLINIC | Age: 87
End: 2022-05-23
Payer: COMMERCIAL

## 2022-05-23 VITALS
OXYGEN SATURATION: 95 % | SYSTOLIC BLOOD PRESSURE: 126 MMHG | BODY MASS INDEX: 26.22 KG/M2 | HEART RATE: 66 BPM | RESPIRATION RATE: 18 BRPM | WEIGHT: 153.6 LBS | DIASTOLIC BLOOD PRESSURE: 70 MMHG | HEIGHT: 64 IN | TEMPERATURE: 98 F

## 2022-05-23 DIAGNOSIS — R93.89 ABNORMAL CT OF THE CHEST: Primary | ICD-10-CM

## 2022-05-23 DIAGNOSIS — R91.8 PULMONARY NODULES: ICD-10-CM

## 2022-05-23 DIAGNOSIS — R59.0 LYMPHADENOPATHY, MEDIASTINAL: ICD-10-CM

## 2022-05-23 PROCEDURE — 86225 DNA ANTIBODY NATIVE: CPT

## 2022-05-23 PROCEDURE — 86235 NUCLEAR ANTIGEN ANTIBODY: CPT

## 2022-05-23 PROCEDURE — 99204 OFFICE O/P NEW MOD 45 MIN: CPT | Performed by: INTERNAL MEDICINE

## 2022-05-23 PROCEDURE — 94618 PULMONARY STRESS TESTING: CPT | Performed by: INTERNAL MEDICINE

## 2022-05-23 NOTE — PROGRESS NOTES
Pulmonary Consultation   [place]ley  80 y o  male MRN: [id number]  5/23/2022      Assessment:  Abnormal CT chest  · Findings: mild crazy paving/GGOs, numerous 1-3 millimeter nodules around the fissures/sub pleural with upper lobe predominance mild calcified lymphadenopathy and pleural plaques  · DDx sarcoidosis, silica/metal dust inhalation from working at a [employer] vs asbestos exposure/pneumoconiosis specially with the presence of pleural plaques vs lymphatic spread of cancer/mesothelioma however no history of cancer, or lymphoproliferative disease, no significant smoking history    Plan:   · 6 minute walk test in the office today showed no exertional hypoxemia   · Discussed extensively with the patient/his wife about potential etiologies and benefits from next step that includes monitoring lung functions/progression vs intervention for diagnosis that carries risks  · For now, will check complete pulmonary function test for restriction/obstruction, DLCO  · Check rheumatologic serology (ANCA, CHACHO/11 biomarkers, RF, CCP, and HP panel) to rule out connective tissue disease process, no signs or symptoms of active rheumatologic process as of now    Small bilateral pleural effusion   · Does not appear to be amenable for thoracentesis  · Likely dCHF related    Return in about 3 months (around 8/23/2022)  History of Present Illness     New Consult for:  Abnormal CT chest/interstitial lung disease      Background  80 y o  male with a h/o PE/DVT, reflux, HTN, pericardial effusion/cardiac tamponade s/p pericardial window, paroxysmal AFib, former cigar use  Patient presented today for initial evaluation by Pulmonary  Denies any prior history of lung disorders, asthma COPD  Noted that he is on [facility], he is not sure why he is on it stated that was given to him 2 years ago      Most recently in April, he underwent a chest x-ray for a a chronic bronchitis that was abnormal, reported to have a lung masslike pleural based opacity at the RML/peripheral lower lungs  CT chest showed crazy paving pattern at RUL/LLL/ground-glass opacities that appear to be mild  Multiple 1-3 millimeter nodules more in the upper lobes [place] are subpleural and perifissural location  Noted few spots of pleural thickening/calcified pleural plaques the upper lobes  He was referred to Pulmonary for further evaluation  At baseline, able to walk at the surface level for long distances and climbing 1 flight of stairs without significant respiratory symptoms  Used to smoke cigars but never inhaled something quit several years ago  Denies any wheezing, cough, dyspnea at rest, orthopnea  No history of asthma/COPD or recurrent pneumonia  Social/exposure history  Born and raised in [place] currently resides in [facility] [place] [facility] [address] alone with his wife, independent in ADLs including driving  Smoked cigars quit at least 15 years ago   Nonalcoholic   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[employer], 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   Reported a lot of exposure to asbestos, metal fumes and dusts   Worked for 36 years, retired in the [address] [place] [address]  Was able to do all his job duties, including after jail without respiratory limitations  No exposure to hazardous materials in the neighborhood or in the house  No exposure to birds    Review of Systems  As per hpi, all other systems reviewed and were negative  Family history: Negative for lung cancer or other lung disorders    Studies:  Imaging and other studies: I have personally reviewed pertinent films in PACS  CT chest 05/08/2022-compared to 2015, new multiple small nodules at the RUL and lower lobes, GGOs/linear opacities crazy paving  Neck sarcoidosis, lymphatic carcinomatosis, silicosis, coal workers' pneumoconiosis  Small bilateral pleural effusion  Localized pleural thickening at the lateral aspect of the right side    Chronic right apical calcified plaque  LLL subsegmental atelectasis  Calcified mediastinal/hilar lymph nodes     Pulmonary function testin minute walk test 2022-baseline SpO2 94 percent on room air, heart rate 66, able to walk 252 meters without stop, lowest SpO2 92 percent/maximal heart rate 83    EKG, Pathology, and Other Studies: I have personally reviewed pertinent reports  TTE 2021-EF 50%, lower limit normal   Grade 1 diastolic dysfunction  No wall motion abnormalities  LA mildly dilated  Upper limit normal PA SP, TAPSE 1 72      Historical Information   Past Medical History:   Diagnosis Date    Anxiety     Obrien's esophagus     BPH (benign prostatic hyperplasia)     Cancer ([facility] )     pt states hx skin cancer, pt confused    Cardiomegaly     Diabetes ([facility] )     type 2  controlled; taken off oral meds     DVT (deep venous thrombosis) (HCC)     right leg    GERD (gastroesophageal reflux disease)     Hypercholesterolemia     Hypertension     Irregular heart beat     paroxsysmal a fib    Pancreatic cyst     Paroxysmal atrial fibrillation (HCC)     Pericardial effusion with cardiac tamponade     Pleural effusion     Renal disorder     dialysis started in     Weight loss, non-intentional      Past Surgical History:   Procedure Laterality Date    APPENDECTOMY      CATARACT EXTRACTION Bilateral ?  COLONOSCOPY      COLONOSCOPY N/A 2019    Procedure: COLONOSCOPY with polypectomies;  Surgeon: Jose Antonio Barksdale MD;  Location: [facility]; Service: Gastroenterology    ESOPHAGOGASTRODUODENOSCOPY N/A 2019    Procedure: ESOPHAGOGASTRODUODENOSCOPY (EGD) with bx;  Surgeon: Jose Antonio Barksdale MD;  [facility]: [facility];   Service: Gastroenterology    IR TUNNELED DIALYSIS CATHETER CHECK/CHANGE/REPOSITION/ANGIOPLASTY  2019    IR TUNNELED DIALYSIS CATHETER PLACEMENT  3/6/2019    IR TUNNELED DIALYSIS CATHETER REMOVAL  2019    PERICARDIAL WINDOW N/A 2019    Procedure: WINDOW PERICARDIAL;  Surgeon: Ernie Baig MD;  Location: [facility];  Service: Thoracic    CA ANASTOMOSIS,AV,ANY SITE Left 7/3/2019    Procedure: CREATION FISTULA ARTERIOVENOUS (AV) left upper extremity brachial artery to axillary vein [place] graft ;  Surgeon: Cem Moreno MD;  Location: [address] MAIN OR;  Service: Vascular    PROSTATE BIOPSY       Family History   Problem Relation Age of Onset    Diabetes Mother     Diabetes type II Mother     Diabetes Father     Diabetes type II Father     Heart attack Father     Prostate cancer Brother     Diabetes Brother     Pulmonary embolism Sister          Medications/Allergies: Reviewed    Vitals: Blood pressure 126/70, pulse 66, temperature 98 °F (36 7 °C), temperature source Tympanic, resp  rate 18, height 5' 4" (1 626 m), weight 69 7 kg (153 lb 9 6 oz), SpO2 95 %  Body mass index is 26 37 kg/m²  Oxygen Therapy  SpO2: 95 %  Oxygen Therapy: None (Room air)      Physical Exam  Body mass index is 26 37 kg/m²     Gen: not in acute distress,   Neck/Eyes: supple, no adenopathy, PERRL  Ear: normal appearance, + significant hearing impairment  Nose:  normal nasal mucosa, no drainage  Mouth:  unremarkable/normal appearance of lips, teeth and gums  Oropharynx: mucosa is moist, no focal lesions or erythema  Salivary glands: soft nontender  Chest: normal respiratory efforts, diminished but clear breath sounds bilaterally, very fine crackles at the far bases  CV: RRR, no murmurs appreciated, trace above ankle edema  Abdomen: soft, non tender  Extremities:  No arthritic deformities, no digital clubbing  Skin: unremarkable  Neuro: AAO X3, no focal motor deficit         Labs:  Lab Results   Component Value Date    WBC 5 33 12/14/2021    HGB 10 1 (L) 12/14/2021    HCT 30 8 (L) 12/14/2021     (H) 12/14/2021     12/14/2021     Lab Results   Component Value Date    GLUCOSE 130 07/07/2015    CALCIUM 9 7 03/28/2022     07/21/2016    K 4 5 03/28/2022    CO2 21 03/28/2022     (H) 03/28/2022    BUN 45 (H) 03/28/2022    CREATININE 3 16 (H) 03/28/2022     No results found for: IGE  Lab Results   Component Value Date    ALT 25 03/28/2022    AST 12 03/28/2022    ALKPHOS 52 03/28/2022    BILITOT 0 4 07/21/2016           Portions of the record may have been created with voice recognition software  Occasional wrong word or "sound a like" substitutions may have occurred due to the inherent limitations of voice recognition software  Read the chart carefully and recognize, using context, where substitutions have occurred    Sherryle Pa, M D [facility]

## 2022-05-24 ENCOUNTER — TELEPHONE (OUTPATIENT)
Dept: ADMINISTRATIVE | Facility: OTHER | Age: 87
End: 2022-05-24

## 2022-05-24 NOTE — TELEPHONE ENCOUNTER
Upon review of the In Basket request and the patient's chart, initial outreach has been made via fax, please see Contacts section for details        Eye 1    Thank you  Micahel Lobo

## 2022-05-24 NOTE — LETTER
Diabetic Eye Exam Form    Date Requested: 22  Patient: Doron Bingham  Patient : 1935   Referring Provider: Gus Licona DO    DIABETIC Eye Exam Date _______________________________    Type of Exam MUST be documented for Diabetic Eye Exams  Please CHECK ONE  Retinal Exam       Dilated Retinal Exam       OCT       Optomap-Iris Exam      Fundus Photography     Left Eye - Please check Retinopathy AND Type or No Retinopathy      Exam did show retinopathy    Exam did not show retinopathy         Mild     Proliferative           Moderate    Severe            None         Right Eye - Please check Retinopathy AND Type or No Retinopathy     Exam did show retinopathy    Exam did not show retinopathy         Mild     Proliferative        Moderate    Severe        None       Comments __2022________________________________________________________    Practice Providing Exam ______________________________________________    Exam Performed By (print name) _______________________________________      Provider Signature ___________________________________________________    These reports are needed for  compliance  Please fax this completed form and a copy of the Diabetic Eye Exam report to our office located at [place] [address] as soon as possible via [phone] attention Anson Brochure: Phone [phone]  We thank you for your assistance in treating our mutual patient

## 2022-05-24 NOTE — TELEPHONE ENCOUNTER
----- Message from Soraya Rodriguez sent at 5/24/2022 10:01 AM EDT -----  Regarding: Request Quality  05/24/22 10:01 AM    Hello, our patient Marah Castillo  has had Diabetic Eye Exam completed/performed  Please assist in updating the patient chart by making an External outreach to [facility] located in [facility]  The date of service is 2022      Thank you,  Anna Sim MA  [facility]

## 2022-05-24 NOTE — LETTER
Diabetic Eye Exam Form    Date Requested: 22  Patient: Eren Carter  Patient : 1935   Referring Provider: Patrizia Davila DO    DIABETIC Eye Exam Date _______________________________    Type of Exam MUST be documented for Diabetic Eye Exams  Please CHECK ONE  Retinal Exam       Dilated Retinal Exam       OCT       Optomap-Iris Exam      Fundus Photography     Left Eye - Please check Retinopathy AND Type or No Retinopathy      Exam did show retinopathy    Exam did not show retinopathy         Mild     Proliferative           Moderate    Severe            None         Right Eye - Please check Retinopathy AND Type or No Retinopathy     Exam did show retinopathy    Exam did not show retinopathy         Mild     Proliferative        Moderate    Severe        None       Comments ___2022_______________________________________________________    Practice Providing Exam ______________________________________________    Exam Performed By (print name) _______________________________________      Provider Signature ___________________________________________________    These reports are needed for  compliance  Please fax this completed form and a copy of the Diabetic Eye Exam report to our office located at [place] [address] as soon as possible via [phone] gloria Martinez: Phone [phone]  We thank you for your assistance in treating our mutual patient

## 2022-05-25 ENCOUNTER — OFFICE VISIT (OUTPATIENT)
Dept: FAMILY MEDICINE CLINIC | Facility: CLINIC | Age: 87
End: 2022-05-25
Payer: COMMERCIAL

## 2022-05-25 VITALS
SYSTOLIC BLOOD PRESSURE: 122 MMHG | DIASTOLIC BLOOD PRESSURE: 64 MMHG | TEMPERATURE: 97.6 F | BODY MASS INDEX: 25.13 KG/M2 | OXYGEN SATURATION: 97 % | WEIGHT: 147.2 LBS | HEIGHT: 64 IN | RESPIRATION RATE: 16 BRPM | HEART RATE: 64 BPM

## 2022-05-25 DIAGNOSIS — E11.22 TYPE 2 DIABETES MELLITUS WITH STAGE 5 CHRONIC KIDNEY DISEASE NOT ON CHRONIC DIALYSIS, WITHOUT LONG-TERM CURRENT USE OF INSULIN (HCC): Primary | ICD-10-CM

## 2022-05-25 DIAGNOSIS — J42 CHRONIC BRONCHITIS, UNSPECIFIED CHRONIC BRONCHITIS TYPE (HCC): ICD-10-CM

## 2022-05-25 DIAGNOSIS — I42.9 CARDIOMYOPATHY, UNSPECIFIED TYPE (HCC): ICD-10-CM

## 2022-05-25 DIAGNOSIS — I10 ESSENTIAL HYPERTENSION: ICD-10-CM

## 2022-05-25 DIAGNOSIS — N18.5 TYPE 2 DIABETES MELLITUS WITH STAGE 5 CHRONIC KIDNEY DISEASE NOT ON CHRONIC DIALYSIS, WITHOUT LONG-TERM CURRENT USE OF INSULIN (HCC): Primary | ICD-10-CM

## 2022-05-25 DIAGNOSIS — I48.0 PAROXYSMAL ATRIAL FIBRILLATION (HCC): Chronic | ICD-10-CM

## 2022-05-25 LAB — SL AMB POCT HEMOGLOBIN AIC: 6.8 (ref ?–6.5)

## 2022-05-25 PROCEDURE — 1101F PT FALLS ASSESS-DOCD LE1/YR: CPT | Performed by: FAMILY MEDICINE

## 2022-05-25 PROCEDURE — 1160F RVW MEDS BY RX/DR IN RCRD: CPT | Performed by: FAMILY MEDICINE

## 2022-05-25 PROCEDURE — 3725F SCREEN DEPRESSION PERFORMED: CPT | Performed by: FAMILY MEDICINE

## 2022-05-25 PROCEDURE — 3288F FALL RISK ASSESSMENT DOCD: CPT | Performed by: FAMILY MEDICINE

## 2022-05-25 PROCEDURE — 99214 OFFICE O/P EST MOD 30 MIN: CPT | Performed by: FAMILY MEDICINE

## 2022-05-25 PROCEDURE — 83036 HEMOGLOBIN GLYCOSYLATED A1C: CPT | Performed by: FAMILY MEDICINE

## 2022-05-25 PROCEDURE — 1036F TOBACCO NON-USER: CPT | Performed by: FAMILY MEDICINE

## 2022-05-25 NOTE — PROGRESS NOTES
Assessment/Plan:  Patient seems to be doing well  Medications reviewed  Continue to follow-up with Nephrology and pulmonology  He is scheduled to see them again in August   He will call with any new persisting or worsening symptoms  We will see him again in 4-6 months for diabetes recheck  A1c is well controlled  1  Type 2 diabetes mellitus with stage 5 chronic kidney disease not on chronic dialysis, without long-term current use of insulin (HCC)  -     POCT hemoglobin A1c    2  Essential hypertension    3  Cardiomyopathy, unspecified type ([address] )    4  Chronic bronchitis, unspecified chronic bronchitis type (HCC)    5  Paroxysmal atrial fibrillation (HCC)          Subjective:      Patient ID: Sukhwinder Park  is a 80 y o  male  Patient is seen today for evaluation of chronic conditions  He continues to follow with Nephrology and pulmonology  COPD is generally stable  Denies any chest pain or shortness of breath  He does not need refills on medication currently  Sleeping at night well  Diabetes    Hypertension             The following portions of the patient's history were reviewed and updated as appropriate: allergies, current medications, past family history, past medical history, past social history, past surgical history, and problem list     Review of Systems   Constitutional: Negative  HENT: Negative  Eyes: Negative  Respiratory: Negative  Cardiovascular: Negative  Gastrointestinal: Negative  Endocrine: Negative  Genitourinary: Negative  Musculoskeletal: Negative  Skin: Negative  Allergic/Immunologic: Negative  Neurological: Negative  Hematological: Negative  Psychiatric/Behavioral: Negative            Objective:      /64 (BP Location: Left arm, Patient Position: Sitting, Cuff Size: Standard)   Pulse 64   Temp 97 6 °F (36 4 °C) (Temporal)   Resp 16   Ht 5' 4" (1 626 m)   Wt 66 8 kg (147 lb 3 2 oz)   SpO2 97%   BMI 25 27 kg/m² Physical Exam  Vitals reviewed  Constitutional:       Appearance: He is well-developed  HENT:      Head: Normocephalic and atraumatic  Right Ear: External ear normal  Tympanic membrane is not erythematous or bulging  Left Ear: External ear normal  Tympanic membrane is not erythematous or bulging  Nose: Nose normal       Mouth/Throat:      Mouth: No oral lesions  Pharynx: No oropharyngeal exudate  Eyes:      General: No scleral icterus  Right eye: No discharge  Left eye: No discharge  Conjunctiva/sclera: Conjunctivae normal    Neck:      Thyroid: No thyromegaly  Cardiovascular:      Rate and Rhythm: Normal rate and regular rhythm  Heart sounds: Normal heart sounds  No murmur heard  No friction rub  No gallop  Pulmonary:      Effort: Pulmonary effort is normal  No respiratory distress  Breath sounds: No wheezing or rales  Chest:      Chest wall: No tenderness  Abdominal:      General: Bowel sounds are normal  There is no distension  Palpations: Abdomen is soft  There is no mass  Tenderness: There is no abdominal tenderness  There is no guarding or rebound  Musculoskeletal:         General: No tenderness or deformity  Normal range of motion  Cervical back: Normal range of motion and neck supple  Lymphadenopathy:      Cervical: No cervical adenopathy  Skin:     General: Skin is warm and dry  Coloration: Skin is not pale  Findings: No erythema or rash  Neurological:      Mental Status: He is alert and oriented to person, place, and time  Cranial Nerves: No cranial nerve deficit  Motor: No abnormal muscle tone  Coordination: Coordination normal       Deep Tendon Reflexes: Reflexes are normal and symmetric     Psychiatric:         Behavior: Behavior normal

## 2022-05-31 NOTE — TELEPHONE ENCOUNTER
As a follow-up, a second attempt has been made for outreach via telephone call, please see Contacts section for details       Att x2 eye    Thank you  Gopal Choudhary

## 2022-06-07 NOTE — TELEPHONE ENCOUNTER
As a final attempt, a third outreach has been made via fax  Please see Contacts section for details  This encounter will be closed and completed by end of day  Should we receive the requested information because of previous outreach attempts, the requested patient's chart will be updated appropriately       Thank you  Kameron Giles

## 2022-06-08 ENCOUNTER — HOSPITAL ENCOUNTER (OUTPATIENT)
Dept: PULMONOLOGY | Facility: HOSPITAL | Age: 87
Discharge: HOME | End: 2022-06-08
Attending: INTERNAL MEDICINE
Payer: COMMERCIAL

## 2022-06-08 DIAGNOSIS — R93.89 ABNORMAL CT OF THE CHEST: ICD-10-CM

## 2022-06-08 PROCEDURE — 94060 EVALUATION OF WHEEZING: CPT

## 2022-06-08 PROCEDURE — 94726 PLETHYSMOGRAPHY LUNG VOLUMES: CPT | Performed by: INTERNAL MEDICINE

## 2022-06-08 PROCEDURE — 94729 DIFFUSING CAPACITY: CPT

## 2022-06-08 PROCEDURE — 94729 DIFFUSING CAPACITY: CPT | Performed by: INTERNAL MEDICINE

## 2022-06-08 PROCEDURE — 94760 N-INVAS EAR/PLS OXIMETRY 1: CPT

## 2022-06-08 PROCEDURE — 94726 PLETHYSMOGRAPHY LUNG VOLUMES: CPT

## 2022-06-08 PROCEDURE — 94060 EVALUATION OF WHEEZING: CPT | Performed by: INTERNAL MEDICINE

## 2022-06-08 RX ORDER — ALBUTEROL SULFATE 2.5 MG/3ML
2.5 SOLUTION RESPIRATORY (INHALATION) ONCE AS NEEDED
Status: COMPLETED | OUTPATIENT
Start: 2022-06-08 | End: 2022-06-08

## 2022-06-08 RX ADMIN — ALBUTEROL SULFATE 2.5 MG: 2.5 SOLUTION RESPIRATORY (INHALATION) at 10:22

## 2022-06-09 NOTE — TELEPHONE ENCOUNTER
Upon review of the In Basket request we have found as a result of outreach that patient did not have the requested item(s) completed  Any additional questions or concerns should be emailed to the Practice Liaisons via [email or web address] email, please do not reply via In Basket      Thank you  Rosario Galeano

## 2022-06-20 DIAGNOSIS — N18.30 TYPE 2 DIABETES MELLITUS WITH STAGE 3 CHRONIC KIDNEY DISEASE, WITHOUT LONG-TERM CURRENT USE OF INSULIN, UNSPECIFIED WHETHER STAGE 3A OR 3B CKD (HCC): ICD-10-CM

## 2022-06-20 DIAGNOSIS — E11.9 TYPE 2 DIABETES MELLITUS WITHOUT COMPLICATION, WITHOUT LONG-TERM CURRENT USE OF INSULIN (HCC): ICD-10-CM

## 2022-06-20 DIAGNOSIS — E11.22 TYPE 2 DIABETES MELLITUS WITH STAGE 3 CHRONIC KIDNEY DISEASE, WITHOUT LONG-TERM CURRENT USE OF INSULIN, UNSPECIFIED WHETHER STAGE 3A OR 3B CKD (HCC): ICD-10-CM

## 2022-06-20 DIAGNOSIS — I10 ESSENTIAL HYPERTENSION: ICD-10-CM

## 2022-06-20 DIAGNOSIS — J44.9 CHRONIC OBSTRUCTIVE PULMONARY DISEASE, UNSPECIFIED COPD TYPE (HCC): ICD-10-CM

## 2022-06-20 RX ORDER — ROSUVASTATIN CALCIUM 40 MG/1
TABLET, COATED ORAL
Qty: 90 TABLET | Refills: 0 | Status: SHIPPED | OUTPATIENT
Start: 2022-06-20

## 2022-06-20 RX ORDER — LOSARTAN POTASSIUM 25 MG/1
TABLET ORAL
Qty: 90 TABLET | Refills: 3 | Status: SHIPPED | OUTPATIENT
Start: 2022-06-20

## 2022-06-20 RX ORDER — LINAGLIPTIN 5 MG/1
TABLET, FILM COATED ORAL
Qty: 90 TABLET | Refills: 1 | Status: SHIPPED | OUTPATIENT
Start: 2022-06-20

## 2022-07-11 ENCOUNTER — OFFICE VISIT (OUTPATIENT)
Dept: URGENT CARE | Facility: CLINIC | Age: 87
End: 2022-07-11
Payer: COMMERCIAL

## 2022-07-11 VITALS
WEIGHT: 152 LBS | HEART RATE: 92 BPM | TEMPERATURE: 97.7 F | BODY MASS INDEX: 25.95 KG/M2 | RESPIRATION RATE: 18 BRPM | HEIGHT: 64 IN | DIASTOLIC BLOOD PRESSURE: 58 MMHG | SYSTOLIC BLOOD PRESSURE: 116 MMHG | OXYGEN SATURATION: 94 %

## 2022-07-11 DIAGNOSIS — T63.441A BEE STING, ACCIDENTAL OR UNINTENTIONAL, INITIAL ENCOUNTER: Primary | ICD-10-CM

## 2022-07-11 PROCEDURE — 99213 OFFICE O/P EST LOW 20 MIN: CPT

## 2022-07-11 NOTE — PROGRESS NOTES
[facility]        NAME: Donis Mauro  is a 80 y o  male  : 1935    MRN: [id number]  DATE: 2022  TIME: 1:35 PM    Assessment and Plan   Bee sting, accidental or unintentional, initial encounter [T63 441A]  1  Bee sting, accidental or unintentional, initial encounter           Patient Instructions     Patient Instructions   Recommend applying cool compress to the right ear and finger  Tylenol as needed  May take 1/2 tablet of Benadryl as needed, but caution as this medication may cause drowsiness  If you develop any new or worsening symptoms such as chest pain, shortness a breath, facial swelling, tongue or neck swelling, proceed to ER or call 911  Follow-up with PCP  Follow up with PCP in 3-5 days  Proceed to  ER if symptoms worsen  Chief Complaint     Chief Complaint   Patient presents with    Insect Bite     Bee sting in left 5th finger and right ear lobe this morning  11:30am  Sites hurt but are not swollen  No SOB  [place] reactions [place]  History of Present Illness       HPI   Donis Mauro  is a 80 y o  male who presents today with his wife for evaluation of a bee sting to his left pinky finger and right ear that occurred approximately 3 hours ago at home  Unknown type of bee  Patient is concerned as approximately 20 years ago he had an anaphylactic reaction to a bee sting  Patient has had no recent allergic reactions to any stings  He denies any facial swelling, difficulty swallowing or breathing, chest pain, nausea, or vomiting  He did not take any medications prior to arrival   He otherwise feels well today  Review of Systems   Review of Systems   Constitutional: Negative for chills and fever  HENT: Negative for ear pain, facial swelling and trouble swallowing  Respiratory: Negative for cough and shortness of breath  Cardiovascular: Negative for chest pain and palpitations     Gastrointestinal: Negative for nausea and vomiting  Musculoskeletal: Negative  Skin: Positive for color change and wound  Negative for rash           Current Medications       Current Outpatient Medications:     acetaminophen (TYLENOL) 325 mg tablet, Take 650 mg by mouth daily, Disp: , Rfl:     ALPRAZolam (XANAX) 0 25 mg tablet, TAKE 1 TABLET BY MOUTH EVERY 8 HOURS AS NEEDED FOR ANXIETY , Disp: 270 tablet, Rfl: 0    amLODIPine (NORVASC) 5 mg tablet, Take 1 tablet (5 mg total) by mouth daily, Disp: 90 tablet, Rfl: 3    Blood Glucose Monitoring Suppl (ONE TOUCH ULTRA 2) w/Device KIT, by Does not apply route 2 (two) times a day, Disp: 1 each, Rfl: 0    Blood Glucose Monitoring Suppl (ONE TOUCH ULTRA 2) w/Device KIT, Use daily, Disp: 1 each, Rfl: 0    Blood Glucose Monitoring Suppl (TRUE METRIX METER) YASMEEN, by Does not apply route 2 (two) times a day, Disp: 1 Device, Rfl: 0    Cholecalciferol (VITAMIN D PO), Take 5,000 Units by mouth every morning , Disp: , Rfl:     finasteride (PROSCAR) 5 mg tablet, TAKE 1 TABLET BY MOUTH EVERY DAY, Disp: 90 tablet, Rfl: 3    fluticasone (FLONASE) 50 mcg/act nasal spray, USE 2 SPRAYS IN EACH NOSTRIL DAILY, Disp: 16 mL, Rfl: 0    glucose blood (OneTouch Ultra) test strip, Test once daily as directed, DX E11 9 type 2 diabetes w/out complication, Disp: 503 each, Rfl: 3    glucose blood (TRUE METRIX BLOOD GLUCOSE TEST) test strip, Test twice daily, Disp: 300 each, Rfl: 0    Lancets (ONETOUCH ULTRASOFT) lancets, USE DAILY AS DIRECTED , Disp: 100 each, Rfl: 3    Lancets (onetouch ultrasoft) lancets, Use daily as instructed, Disp: 100 each, Rfl: 3    losartan (COZAAR) 25 mg tablet, TAKE 1 TABLET BY MOUTH EVERY DAY IN THE MORNING, Disp: 90 tablet, Rfl: 3    metoprolol tartrate (LOPRESSOR) 25 mg tablet, Take 1 tablet (25 mg total) by mouth daily, Disp: 90 tablet, Rfl: 3    mometasone-formoterol (Dulera) 100-5 MCG/ACT inhaler, Inhale 2 puffs every 12 (twelve) hours Rinse mouth after use , Disp: 13 g, Rfl: 11   omeprazole (PriLOSEC) 40 MG capsule, Take 40 mg by mouth daily  , Disp: , Rfl:     PARoxetine (PAXIL) 30 mg tablet, TAKE 1 TABLET (30 MG TOTAL) BY MOUTH EVERY MORNING AS DIRECTED, Disp: 90 tablet, Rfl: 1    rosuvastatin (CRESTOR) 40 MG tablet, TAKE 1 TABLET BY MOUTH EVERY DAY IN THE MORNING, Disp: 90 tablet, Rfl: 0    Tradjenta 5 MG TABS, TAKE 1 TABLET BY MOUTH EVERY DAY (Patient not taking: Reported on 7/11/2022), Disp: 90 tablet, Rfl: 1    traMADol (ULTRAM) 50 mg tablet, Take 1 tablet (50 mg total) by mouth every 6 (six) hours as needed for moderate pain (Patient not taking: Reported on 7/11/2022), Disp: 30 tablet, Rfl: 1    Current Allergies     Allergies as of 07/11/2022 - Reviewed 07/11/2022   Allergen Reaction Noted    Bee venom Facial Swelling     Ace inhibitors Other (See Comments)             The following portions of the patient's history were reviewed and updated as appropriate: allergies, current medications, past family history, past medical history, past social history, past surgical history and problem list      Past Medical History:   Diagnosis Date    Anxiety     Obrien's esophagus     BPH (benign prostatic hyperplasia)     Cancer ([facility] [address] )     pt states hx skin cancer, pt confused    Cardiomegaly     Diabetes ([facility] [address] )     type 2  controlled; taken off oral meds 6/19    DVT (deep venous thrombosis) (HCC)     right leg    GERD (gastroesophageal reflux disease)     Hypercholesterolemia     Hypertension     Irregular heart beat     paroxsysmal a fib    Pancreatic cyst     Paroxysmal atrial fibrillation (HCC)     Pericardial effusion with cardiac tamponade     Pleural effusion     Renal disorder     dialysis started in 2019    Weight loss, non-intentional        Past Surgical History:   Procedure Laterality Date    APPENDECTOMY  1953    CATARACT EXTRACTION Bilateral 2016?     COLONOSCOPY      COLONOSCOPY N/A 1/24/2019    Procedure: COLONOSCOPY with polypectomies;  Surgeon: Laura Bruno Enriqueta Ontiveros MD;  Location: [facility]; Service: Gastroenterology    ESOPHAGOGASTRODUODENOSCOPY N/A 1/24/2019    Procedure: ESOPHAGOGASTRODUODENOSCOPY (EGD) with bx;  Surgeon: Des Cooper MD;  Location: [facility]; Service: Gastroenterology    IR TUNNELED DIALYSIS CATHETER CHECK/CHANGE/REPOSITION/ANGIOPLASTY  4/18/2019    IR TUNNELED DIALYSIS CATHETER PLACEMENT  3/6/2019    IR TUNNELED DIALYSIS CATHETER REMOVAL  7/30/2019    PERICARDIAL WINDOW N/A 2/26/2019    Procedure: WINDOW PERICARDIAL;  Surgeon: Deshaun Stahl MD;  Location: [facility];  Service: Thoracic    MO ANASTOMOSIS,AV,ANY SITE Left 7/3/2019    Procedure: CREATION FISTULA ARTERIOVENOUS (AV) left upper extremity brachial artery to axillary vein Larimer-Mushtaq graft ;  Surgeon: Kian Kingston MD;  Location: [address] [place] [address] MAIN OR;  Service: Vascular    PROSTATE BIOPSY         Family History   Problem Relation Age of Onset    Diabetes Mother     Diabetes type II Mother     Diabetes Father     Diabetes type II Father     Heart attack Father     Pulmonary embolism Sister     Prostate cancer Brother     Diabetes Brother          Medications have been verified  Objective   /58   Pulse 92   Temp 97 7 °F (36 5 °C)   Resp 18   Ht 5' 4" (1 626 m)   Wt 68 9 kg (152 lb)   SpO2 94%   BMI 26 09 kg/m²        Physical Exam     Physical Exam  Vitals and nursing note reviewed  Constitutional:       General: He is not in acute distress  Appearance: Normal appearance  He is well-developed  Comments: Patient sitting on exam table in NAD  HENT:      Head: Normocephalic and atraumatic  Right Ear: Tympanic membrane and ear canal normal       Left Ear: Tympanic membrane and ear canal normal       Ears:        Comments: Pt wearing b/l hearing aids  Mouth/Throat:      Lips: Pink  Mouth: Mucous membranes are moist  No angioedema  Pharynx: Oropharynx is clear  No oropharyngeal exudate or posterior oropharyngeal erythema  Cardiovascular:      Rate and Rhythm: Normal rate and regular rhythm  Heart sounds: Normal heart sounds, S1 normal and S2 normal    Pulmonary:      Effort: Pulmonary effort is normal       Breath sounds: Normal breath sounds  No wheezing, rhonchi or rales  Musculoskeletal:        Hands:    Lymphadenopathy:      Cervical: No cervical adenopathy  Skin:     General: Skin is warm and dry  Capillary Refill: Capillary refill takes less than 2 seconds  Findings: Erythema and wound present  Psychiatric:         Behavior: Behavior is cooperative

## 2022-07-11 NOTE — PATIENT INSTRUCTIONS
Recommend applying cool compress to the right ear and finger  Tylenol as needed  May take 1/2 tablet of Benadryl as needed, but caution as this medication may cause drowsiness  If you develop any new or worsening symptoms such as chest pain, shortness a breath, facial swelling, tongue or neck swelling, proceed to ER or call 911  Follow-up with PCP

## 2022-07-13 DIAGNOSIS — F41.1 GENERALIZED ANXIETY DISORDER: ICD-10-CM

## 2022-07-13 RX ORDER — ALPRAZOLAM 0.25 MG/1
TABLET ORAL
Qty: 270 TABLET | Refills: 0 | Status: SHIPPED | OUTPATIENT
Start: 2022-07-13 | End: 2022-09-07 | Stop reason: SDUPTHER

## 2022-07-13 NOTE — TELEPHONE ENCOUNTER
Pt's wife, Gonzales Bloom, [place] on refill line requesting refills on his meds  She only gave rx #s (not names of meds)       Please call pt or Gonzales Bloom to get names of meds needed

## 2022-07-13 NOTE — TELEPHONE ENCOUNTER
Protocol Fail  Psychiatry:  Anxiolytics/Hypnotics Failed 07/13/2022 05:00 PM   Protocol Details  This refill cannot be delegated    Valid encounter within last 6 months

## 2022-07-14 ENCOUNTER — RA CDI HCC (OUTPATIENT)
Dept: OTHER | Facility: HOSPITAL | Age: 87
End: 2022-07-14

## 2022-07-14 NOTE — PROGRESS NOTES
[facility]  coding opportunities       Chart reviewed, no opportunity found: CHART REVIEWED, NO OPPORTUNITY FOUND        Patients Insurance     Medicare Insurance: [employer]vantage        CQDOC - CKD    Z99 2 NOT BILLED ON 4/1/22- PT NOT ON DIALYSIS RIGHT NOW - DX CODE IN PL

## 2022-07-26 ENCOUNTER — TELEPHONE (OUTPATIENT)
Dept: NEPHROLOGY | Facility: CLINIC | Age: 87
End: 2022-07-26

## 2022-07-27 ENCOUNTER — APPOINTMENT (OUTPATIENT)
Dept: LAB | Facility: CLINIC | Age: 87
End: 2022-07-27
Payer: COMMERCIAL

## 2022-07-27 DIAGNOSIS — K92.1 BLACK STOOLS: ICD-10-CM

## 2022-07-27 DIAGNOSIS — N18.5 STAGE 5 CHRONIC KIDNEY DISEASE (HCC): ICD-10-CM

## 2022-07-27 DIAGNOSIS — E55.9 VITAMIN D DEFICIENCY: ICD-10-CM

## 2022-07-27 DIAGNOSIS — N25.81 SECONDARY HYPERPARATHYROIDISM OF RENAL ORIGIN (HCC): ICD-10-CM

## 2022-07-27 LAB
25(OH)D3 SERPL-MCNC: 41.8 NG/ML (ref 30–100)
ALBUMIN SERPL BCP-MCNC: 3.8 G/DL (ref 3.5–5)
ALP SERPL-CCNC: 61 U/L (ref 46–116)
ALT SERPL W P-5'-P-CCNC: 26 U/L (ref 12–78)
ANION GAP SERPL CALCULATED.3IONS-SCNC: 5 MMOL/L (ref 4–13)
AST SERPL W P-5'-P-CCNC: 17 U/L (ref 5–45)
BACTERIA UR QL AUTO: ABNORMAL /HPF
BASOPHILS # BLD AUTO: 0.02 THOUSANDS/ΜL (ref 0–0.1)
BASOPHILS NFR BLD AUTO: 0 % (ref 0–1)
BILIRUB SERPL-MCNC: 0.34 MG/DL (ref 0.2–1)
BILIRUB UR QL STRIP: NEGATIVE
BUN SERPL-MCNC: 45 MG/DL (ref 5–25)
CALCIUM SERPL-MCNC: 9.6 MG/DL (ref 8.3–10.1)
CHLORIDE SERPL-SCNC: 111 MMOL/L (ref 96–108)
CHOLEST SERPL-MCNC: 134 MG/DL
CLARITY UR: CLEAR
CO2 SERPL-SCNC: 25 MMOL/L (ref 21–32)
COLOR UR: ABNORMAL
CREAT SERPL-MCNC: 2.92 MG/DL (ref 0.6–1.3)
EOSINOPHIL # BLD AUTO: 0.12 THOUSAND/ΜL (ref 0–0.61)
EOSINOPHIL NFR BLD AUTO: 2 % (ref 0–6)
ERYTHROCYTE [DISTWIDTH] IN BLOOD BY AUTOMATED COUNT: 12.4 % (ref 11.6–15.1)
GFR SERPL CREATININE-BSD FRML MDRD: 18 ML/MIN/1.73SQ M
GLUCOSE P FAST SERPL-MCNC: 136 MG/DL (ref 65–99)
GLUCOSE UR STRIP-MCNC: NEGATIVE MG/DL
HCT VFR BLD AUTO: 30.9 % (ref 36.5–49.3)
HDLC SERPL-MCNC: 28 MG/DL
HGB BLD-MCNC: 10 G/DL (ref 12–17)
HGB UR QL STRIP.AUTO: NEGATIVE
IMM GRANULOCYTES # BLD AUTO: 0.01 THOUSAND/UL (ref 0–0.2)
IMM GRANULOCYTES NFR BLD AUTO: 0 % (ref 0–2)
IRON SATN MFR SERPL: 29 % (ref 20–50)
IRON SERPL-MCNC: 77 UG/DL (ref 65–175)
KETONES UR STRIP-MCNC: NEGATIVE MG/DL
LDLC SERPL CALC-MCNC: 67 MG/DL (ref 0–100)
LEUKOCYTE ESTERASE UR QL STRIP: NEGATIVE
LYMPHOCYTES # BLD AUTO: 0.79 THOUSANDS/ΜL (ref 0.6–4.47)
LYMPHOCYTES NFR BLD AUTO: 16 % (ref 14–44)
MCH RBC QN AUTO: 32.5 PG (ref 26.8–34.3)
MCHC RBC AUTO-ENTMCNC: 32.4 G/DL (ref 31.4–37.4)
MCV RBC AUTO: 100 FL (ref 82–98)
MONOCYTES # BLD AUTO: 0.5 THOUSAND/ΜL (ref 0.17–1.22)
MONOCYTES NFR BLD AUTO: 10 % (ref 4–12)
NEUTROPHILS # BLD AUTO: 3.46 THOUSANDS/ΜL (ref 1.85–7.62)
NEUTS SEG NFR BLD AUTO: 72 % (ref 43–75)
NITRITE UR QL STRIP: NEGATIVE
NON-SQ EPI CELLS URNS QL MICRO: ABNORMAL /HPF
NONHDLC SERPL-MCNC: 106 MG/DL
NRBC BLD AUTO-RTO: 0 /100 WBCS
PH UR STRIP.AUTO: 7 [PH]
PLATELET # BLD AUTO: 160 THOUSANDS/UL (ref 149–390)
PMV BLD AUTO: 10.4 FL (ref 8.9–12.7)
POTASSIUM SERPL-SCNC: 4.7 MMOL/L (ref 3.5–5.3)
PROT SERPL-MCNC: 7.9 G/DL (ref 6.4–8.4)
PROT UR STRIP-MCNC: ABNORMAL MG/DL
PTH-INTACT SERPL-MCNC: 64.9 PG/ML (ref 18.4–80.1)
RBC # BLD AUTO: 3.08 MILLION/UL (ref 3.88–5.62)
RBC #/AREA URNS AUTO: ABNORMAL /HPF
SODIUM SERPL-SCNC: 141 MMOL/L (ref 135–147)
SP GR UR STRIP.AUTO: 1.01 (ref 1–1.03)
TIBC SERPL-MCNC: 269 UG/DL (ref 250–450)
TRIGL SERPL-MCNC: 197 MG/DL
UROBILINOGEN UR STRIP-ACNC: <2 MG/DL
WBC # BLD AUTO: 4.9 THOUSAND/UL (ref 4.31–10.16)
WBC #/AREA URNS AUTO: ABNORMAL /HPF

## 2022-07-27 PROCEDURE — 80053 COMPREHEN METABOLIC PANEL: CPT

## 2022-07-27 PROCEDURE — 81001 URINALYSIS AUTO W/SCOPE: CPT | Performed by: INTERNAL MEDICINE

## 2022-07-27 PROCEDURE — 80061 LIPID PANEL: CPT

## 2022-07-27 PROCEDURE — 83550 IRON BINDING TEST: CPT

## 2022-07-27 PROCEDURE — 83970 ASSAY OF PARATHORMONE: CPT

## 2022-07-27 PROCEDURE — 82306 VITAMIN D 25 HYDROXY: CPT

## 2022-07-27 PROCEDURE — 36415 COLL VENOUS BLD VENIPUNCTURE: CPT

## 2022-07-27 PROCEDURE — 85025 COMPLETE CBC W/AUTO DIFF WBC: CPT

## 2022-07-27 PROCEDURE — 83540 ASSAY OF IRON: CPT

## 2022-08-01 ENCOUNTER — OFFICE VISIT (OUTPATIENT)
Dept: NEPHROLOGY | Facility: CLINIC | Age: 87
End: 2022-08-01
Payer: COMMERCIAL

## 2022-08-01 VITALS
SYSTOLIC BLOOD PRESSURE: 124 MMHG | DIASTOLIC BLOOD PRESSURE: 60 MMHG | BODY MASS INDEX: 25.44 KG/M2 | OXYGEN SATURATION: 98 % | HEIGHT: 64 IN | HEART RATE: 76 BPM | WEIGHT: 149 LBS

## 2022-08-01 DIAGNOSIS — E78.5 DYSLIPIDEMIA: ICD-10-CM

## 2022-08-01 DIAGNOSIS — K22.70 BARRETT'S ESOPHAGUS WITHOUT DYSPLASIA: ICD-10-CM

## 2022-08-01 DIAGNOSIS — N18.4 STAGE 4 CHRONIC KIDNEY DISEASE (HCC): Primary | ICD-10-CM

## 2022-08-01 DIAGNOSIS — Z88.8 ALLERGY TO ACE INHIBITORS: ICD-10-CM

## 2022-08-01 DIAGNOSIS — D63.1 ANEMIA DUE TO STAGE 4 CHRONIC KIDNEY DISEASE (HCC): ICD-10-CM

## 2022-08-01 DIAGNOSIS — N18.4 ANEMIA DUE TO STAGE 4 CHRONIC KIDNEY DISEASE (HCC): ICD-10-CM

## 2022-08-01 DIAGNOSIS — N25.81 SECONDARY HYPERPARATHYROIDISM OF RENAL ORIGIN (HCC): ICD-10-CM

## 2022-08-01 DIAGNOSIS — Z99.2 HEMODIALYSIS ACCESS, AV GRAFT (HCC): ICD-10-CM

## 2022-08-01 DIAGNOSIS — R80.1 PERSISTENT PROTEINURIA: ICD-10-CM

## 2022-08-01 DIAGNOSIS — I10 ESSENTIAL HYPERTENSION: ICD-10-CM

## 2022-08-01 PROCEDURE — 99214 OFFICE O/P EST MOD 30 MIN: CPT | Performed by: PHYSICIAN ASSISTANT

## 2022-08-01 PROCEDURE — 1160F RVW MEDS BY RX/DR IN RCRD: CPT | Performed by: PHYSICIAN ASSISTANT

## 2022-08-01 RX ORDER — QUINIDINE GLUCONATE 324 MG
240 TABLET, EXTENDED RELEASE ORAL EVERY OTHER DAY
Qty: 15 TABLET | Refills: 0 | Status: SHIPPED | OUTPATIENT
Start: 2022-08-01 | End: 2022-08-23

## 2022-08-01 NOTE — ASSESSMENT & PLAN NOTE
Hemoglobin is at 10, iron saturation 29%  Will start iron supplementation every other day  If patient develops constipation, may take MiraLax and Colace daily if needed  No indication for ABRAHAN

## 2022-08-01 NOTE — ASSESSMENT & PLAN NOTE
Lab Results   Component Value Date    EGFR 18 07/27/2022    EGFR 16 03/28/2022    EGFR 16 12/14/2021    CREATININE 2 92 (H) 07/27/2022    CREATININE 3 16 (H) 03/28/2022    CREATININE 3 28 (H) 12/14/2021   Renal function has improved to creatinine of 2 9 mg/dL with estimated GFR 18 mL/min  Patient is on an ARB  Patient was advised to avoid nephrotoxins  Continue management of healthy weight, diabetes and hypertension  Please renally dose medication for a creatinine clearance of 15 mL/min  Continue to monitor renal function, anemia and bone-mineral parameters

## 2022-08-01 NOTE — ASSESSMENT & PLAN NOTE
LDL is at goal   Continue rosuvastatin 40 mg daily  Triglycerides are a bit elevated  Recommended avoidance of high fructose corn syrup

## 2022-08-01 NOTE — ASSESSMENT & PLAN NOTE
PTH acceptable for renal function  Vitamin-D level is appropriate  Continue cholecalciferol 5000 units daily  No indication for activated vitamin-D agent at this time

## 2022-08-01 NOTE — PROGRESS NOTES
Assessment & Plan:    1  Stage 4 chronic kidney disease [facility])  Assessment & Plan:  Lab Results   Component Value Date    EGFR 18 07/27/2022    EGFR 16 03/28/2022    EGFR 16 12/14/2021    CREATININE 2 92 (H) 07/27/2022    CREATININE 3 16 (H) 03/28/2022    CREATININE 3 28 (H) 12/14/2021   Renal function has improved to creatinine of 2 9 mg/dL with estimated GFR 18 mL/min  Patient is on an ARB  Patient was advised to avoid nephrotoxins  Continue management of healthy weight, diabetes and hypertension  Please renally dose medication for a creatinine clearance of 15 mL/min  Continue to monitor renal function, anemia and bone-mineral parameters  2  Anemia due to stage 4 chronic kidney disease (HCC)  Assessment & Plan:  Hemoglobin is at 10, iron saturation 29%  Will start iron supplementation every other day  If patient develops constipation, may take MiraLax and Colace daily if needed  No indication for ABRAHAN  3  Secondary hyperparathyroidism of renal origin [facility])  Assessment & Plan:  PTH acceptable for renal function  Vitamin-D level is appropriate  Continue cholecalciferol 5000 units daily  No indication for activated vitamin-D agent at this time  4  Essential hypertension  Assessment & Plan:  Blood pressure at goal  Continue low sodium diet, amlodipine, losartan and metoprolol tartrate  5  Hemodialysis access, AV graft (HCC)    6  Persistent proteinuria  Assessment & Plan:  Continue losartan      7  Allergy to ACE inhibitors    8  Obrien's esophagus without dysplasia  Assessment & Plan: Will continue the PPI  9  Dyslipidemia  Assessment & Plan:    LDL is at goal   Continue rosuvastatin 40 mg daily  Triglycerides are a bit elevated  Recommended avoidance of high fructose corn syrup  The benefits, risks and alternatives to the treatment plan were discussed at this visit  Patient was advised of common adverse effects of any medical therapies prescribed   All questions were answered and discussed with the patient and any accompanying family members or caretakers  Subjective:      Patient ID: Alba Salinas  is a 80 y o  male seen in the [place] office  Patient was seen by Dr Eze Olivarez on 03/31/2022 for evaluation of stage 5 chronic kidney disease, which time patient was without any signs or symptoms of uremia  HPI     Today, patient presents for routine follow-up without acute complaints  Patient denies any increasing fatigue, weight loss, anorexia, nausea, vomiting, dysgeusia, metallic taste to food, increasing lower extremity edema nor decreased urine output  Blood pressure is 124/60 HR 76   Patient does not monitor blood pressures at home  Denies headaches, lightheadedness, dizziness  Patient reports adherence with antihypertensive regimen and denies adverse effects:  Amlodipine 5 mg daily, losartan 25 mg daily, metoprolol tartrate 25 mg daily  Patient denies lower extremity swelling  He is not on a diuretic  Discussed and reviewed the results of labs performed 07/27/2022 which revealed renal function has improved to creatinine of 2 9 mg/dL with estimated GFR 18 mL/min, which is compared with prior peak creatinine 3 2 mg/dL with estimated GFR 16 mL/min on 12/14/2021  History is obtained from patient  The following portions of the patient's history were reviewed and updated as appropriate: allergies, current medications, past family history, past medical history, past social history, past surgical history, and problem list     Review of Systems   Constitutional: Negative for activity change, chills, diaphoresis, fatigue and fever  HENT: Negative for mouth sores and trouble swallowing  Respiratory: Negative for apnea, cough, chest tightness, shortness of breath and wheezing  Cardiovascular: Negative for chest pain, palpitations and leg swelling     Gastrointestinal: Negative for abdominal distention, abdominal pain, blood in stool, constipation, diarrhea and nausea  Genitourinary: Negative for decreased urine volume, difficulty urinating, dysuria, enuresis, frequency, hematuria and urgency  Musculoskeletal: Negative for arthralgias, back pain and joint swelling  Skin: Negative for pallor, rash and wound  Neurological: Positive for numbness ("at night")  Negative for dizziness, seizures, light-headedness and headaches  Hematological: Does not bruise/bleed easily  Psychiatric/Behavioral: Negative for agitation, behavioral problems and confusion  The patient is not nervous/anxious  Objective:      /60 (BP Location: Right arm, Patient Position: Sitting, Cuff Size: Standard)   Pulse 76   Ht 5' 4" (1 626 m)   Wt 67 6 kg (149 lb)   SpO2 98%   BMI 25 58 kg/m²          Physical Exam  Vitals and nursing note reviewed  Constitutional:       General: He is awake  He is not in acute distress  Appearance: Normal appearance  He is well-developed and normal weight  He is not ill-appearing, toxic-appearing or diaphoretic  HENT:      Head: Normocephalic and atraumatic  Jaw: There is normal jaw occlusion  Nose: Nose normal       Mouth/Throat:      Mouth: Mucous membranes are moist       Pharynx: Oropharynx is clear  No oropharyngeal exudate or posterior oropharyngeal erythema  Eyes:      General: Lids are normal  Vision grossly intact  Gaze aligned appropriately  No scleral icterus  Right eye: No discharge  Left eye: No discharge  Extraocular Movements: Extraocular movements intact  Conjunctiva/sclera: Conjunctivae normal       Pupils: Pupils are equal, round, and reactive to light  Neck:      Thyroid: No thyroid mass or thyromegaly  Trachea: Trachea and phonation normal    Cardiovascular:      Rate and Rhythm: Normal rate and regular rhythm  Heart sounds: Normal heart sounds, S1 normal and S2 normal  No murmur heard  No friction rub  No gallop     Pulmonary:      Effort: Pulmonary effort is normal  No respiratory distress  Breath sounds: Normal breath sounds  No stridor  No wheezing, rhonchi or rales  Abdominal:      General: Abdomen is flat  Bowel sounds are normal  There is no distension  Palpations: Abdomen is soft  There is no mass  Tenderness: There is no abdominal tenderness  There is no guarding  Hernia: No hernia is present  Musculoskeletal:         General: Normal range of motion  Cervical back: Normal range of motion and neck supple  No rigidity or tenderness  Right lower leg: No edema  Left lower leg: No edema  Lymphadenopathy:      Cervical: No cervical adenopathy  Skin:     General: Skin is warm and dry  Coloration: Skin is not jaundiced  Findings: No bruising  Nails: There is no clubbing  Neurological:      General: No focal deficit present  Mental Status: He is alert and oriented to person, place, and time  Mental status is at baseline  Psychiatric:         Attention and Perception: Attention and perception normal          Mood and Affect: Mood and affect normal          Speech: Speech normal          Behavior: Behavior normal  Behavior is cooperative  Thought Content:  Thought content normal          Judgment: Judgment normal              Lab Results   Component Value Date     07/21/2016    SODIUM 141 07/27/2022    K 4 7 07/27/2022     (H) 07/27/2022    CO2 25 07/27/2022    ANIONGAP 5 07/07/2015    AGAP 5 07/27/2022    BUN 45 (H) 07/27/2022    CREATININE 2 92 (H) 07/27/2022    GLUC 124 04/25/2020    GLUF 136 (H) 07/27/2022    CALCIUM 9 6 07/27/2022    AST 17 07/27/2022    ALT 26 07/27/2022    ALKPHOS 61 07/27/2022    PROT 7 3 07/21/2016    TP 7 9 07/27/2022    BILITOT 0 4 07/21/2016    TBILI 0 34 07/27/2022    EGFR 18 07/27/2022      Lab Results   Component Value Date    CREATININE 2 92 (H) 07/27/2022    CREATININE 3 16 (H) 03/28/2022    CREATININE 3 28 (H) 12/14/2021    CREATININE 2 89 (H) 08/26/2021 CREATININE 3 01 (H) 05/24/2021    CREATININE 2 82 (H) 04/26/2021    CREATININE 2 78 (H) 03/29/2021    CREATININE 3 26 (H) 03/04/2021    CREATININE 3 34 (H) 02/17/2021    CREATININE 2 97 (H) 02/03/2021    CREATININE 2 79 (H) 01/18/2021    CREATININE 3 33 (H) 12/01/2020    CREATININE 2 94 (H) 09/18/2020    CREATININE 3 52 (H) 04/29/2020    CREATININE 3 28 (H) 04/25/2020      Lab Results   Component Value Date    COLORU Light Yellow 07/27/2022    CLARITYU Clear 07/27/2022    SPECGRAV 1 014 07/27/2022    PHUR 7 0 07/27/2022    LEUKOCYTESUR Negative 07/27/2022    NITRITE Negative 07/27/2022    PROTEIN UA 50 (1+) (A) 07/27/2022    GLUCOSEU Negative 07/27/2022    KETONESU Negative 07/27/2022    UROBILINOGEN <2 0 07/27/2022    BILIRUBINUR Negative 07/27/2022    BLOODU Negative 07/27/2022    RBCUA None Seen 07/27/2022    WBCUA None Seen 07/27/2022    EPIS Occasional 07/27/2022    BACTERIA None Seen 07/27/2022      No results found for: LABPROT  No results found for: Orion Garcia  Lab Results   Component Value Date    WBC 4 90 07/27/2022    HGB 10 0 (L) 07/27/2022    HCT 30 9 (L) 07/27/2022     (H) 07/27/2022     07/27/2022      Lab Results   Component Value Date    HGB 10 0 (L) 07/27/2022    HGB 10 1 (L) 12/14/2021    HGB 10 1 (L) 08/26/2021    HGB 10 0 (L) 05/24/2021    HGB 9 5 (L) 04/26/2021      Lab Results   Component Value Date    IRON 77 07/27/2022    TIBC 269 07/27/2022    FERRITIN 465 (H) 08/26/2021      No results found for: PTHCALCIUM, [id number], PHOSPHORUS   Lab Results   Component Value Date    CHOLESTEROL 134 07/27/2022    HDL 28 (L) 07/27/2022    LDLCALC 67 07/27/2022    TRIG 197 (H) 07/27/2022      Lab Results   Component Value Date    URICACID 4 4 09/18/2020      Lab Results   Component Value Date    HGBA1C 6 8 (A) 05/25/2022      No results found for: Calvin Agustín   Lab Results   Component Value Date    CHACHO Negative 02/26/2019      Lab Results   Component Value Date    PROT 7 3 07/21/2016    UPEP  02/26/2019     The UPEP shows non-selective proteinuria   The UPEP shows a faint possible band  Immunofixation to be performed  Reviewed by: Hanh Sepulveda MD ([id number])  **Electronic Signature**        Portions of the record may have been created with voice recognition software  Occasional wrong word or "sound a like" substitutions may have occurred due to the inherent limitations of voice recognition software  Read the chart carefully and recognize, using context, where substitutions have occurred  If you have any questions, please contact the dictating provider

## 2022-08-01 NOTE — PATIENT INSTRUCTIONS
For the prevention of kidney injury: If you are sick and not eating well or drinking well OR vomiting or having diarrhea, temporarily hold your ACE-inhibitor / ARB / diuretic (LOSARTAN) while sick and call office for further instructions  Otherwise, please take medications as prescribed  Avoid high fructose corn syrup  Will start iron supplementation every other day  If patient develops constipation, may take MiraLax and Colace daily if needed

## 2022-08-23 DIAGNOSIS — D63.1 ANEMIA DUE TO STAGE 4 CHRONIC KIDNEY DISEASE (HCC): ICD-10-CM

## 2022-08-23 DIAGNOSIS — N18.4 ANEMIA DUE TO STAGE 4 CHRONIC KIDNEY DISEASE (HCC): ICD-10-CM

## 2022-08-23 RX ORDER — DIAPER,BRIEF,INFANT-TODD,DISP
EACH MISCELLANEOUS
Qty: 45 TABLET | Refills: 1 | Status: SHIPPED | OUTPATIENT
Start: 2022-08-23

## 2022-08-29 ENCOUNTER — OFFICE VISIT (OUTPATIENT)
Dept: PULMONOLOGY | Facility: CLINIC | Age: 87
End: 2022-08-29
Payer: COMMERCIAL

## 2022-08-29 VITALS
TEMPERATURE: 98.2 F | RESPIRATION RATE: 17 BRPM | HEART RATE: 60 BPM | HEIGHT: 64 IN | DIASTOLIC BLOOD PRESSURE: 54 MMHG | OXYGEN SATURATION: 96 % | BODY MASS INDEX: 26.02 KG/M2 | SYSTOLIC BLOOD PRESSURE: 102 MMHG | WEIGHT: 152.4 LBS

## 2022-08-29 DIAGNOSIS — J84.9 ILD (INTERSTITIAL LUNG DISEASE) (HCC): Primary | ICD-10-CM

## 2022-08-29 DIAGNOSIS — R91.1 PULMONARY NODULE SEEN ON IMAGING STUDY: ICD-10-CM

## 2022-08-29 PROCEDURE — 99214 OFFICE O/P EST MOD 30 MIN: CPT | Performed by: INTERNAL MEDICINE

## 2022-08-29 NOTE — PROGRESS NOTES
Pulmonary Follow Up Note   Yasmani Millan  80 y o  male MRN: [id number]  8/29/2022    Assessment:  Interstitial lung disease  · DDx sarcoidosis, silica/metal dust inhalation from working at a foundry/[employer] vs asbestos exposure/pneumoconiosis specially with the presence of pleural plaques vs lymphatic spread of cancer/mesothelioma however no history of cancer, or lymphoproliferative disease, no significant smoking history  · Noted mild crazy paving/GGOs and multiple 1-3 mm nodules/upper lobe predominance with mild calcified lymphadenopathy/pleural plaques  · 6 minute walk test without exertional hypoxemia at last visit   · Negative rheumatologic serology, no hypercalcemia  · PFT with a moderate restriction, TLC of 58% 3 44 L, FVC 1 87 L/63%    Plan:   · Reviewed the PFT/lab results  · For now, will follow-up HRCT/PFT 6 minute walk in 6 months (approximately December) if noted progression/decline in TLC will discuss further about specific treatment or anti fibrotic      Return in about 15 weeks (around 12/12/2022)  History of Present Illness     Follow up for: Abnormal CT chest/interstitial lung disease    Background  80 y o  male with a h/o PE/DVT, reflux, HTN, pericardial effusion/cardiac tamponade s/p pericardial window, paroxysmal AFib, former cigar use        In 4/2022 he underwent a chest x-ray for a a chronic bronchitis that was abnormal, reported to have a lung masslike pleural based opacity at the RML/peripheral lower lungs  CT chest showed crazy paving pattern at RUL/LLL/ground-glass opacities that appear to be mild  Multiple 1-3 mm nodules more in the upper lobes subpleural and perifissural location  Noted few spots of pleural thickening/calcified pleural plaques the upper lobes  He was referred to Pulmonary for further evaluation      At baseline, able to walk at the surface level for long distances and climbing 1 flight of stairs without significant respiratory symptoms    Used to smoke cigars but never inhaled something quit several years ago  Denies any wheezing, cough, dyspnea at rest, orthopnea  No history of asthma/COPD or recurrent pneumonia     " Social/exposure history  Born and raised in [facility] currently resides in [facility] [address] with his wife, independent in ADLs including driving  Smoked cigars quit at least 15 years ago   Nonalcoholic   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[employer], 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   Reported a lot of exposure to asbestos, metal fumes and dusts   Worked for 36 years, retired in the [address] [place] [address]  Was able to do all his job duties, including after penitentiary without respiratory limitations  No exposure to hazardous materials in the neighborhood or in the house  No exposure to birds"      5/23 visit-PFT, rheumatologic serology all negative  No exertional hypoxemia on 6 minute walk test    Interval History  Presented today accompanied by his wife  Since last seen, no new symptoms  Continues to be active/independent in ADLs  Able to walk on the surface level at a slow pace for long distances and climbing 1 flight of stairs  No dyspnea, cough, wheezing, or chest pain  No change in lifestyle      Review of Systems  As per hpi, all other systems reviewed and were negative    Studies:  Imaging and other studies: I have personally reviewed pertinent films in PACS  CT chest 05/08/2022-compared to 2015, new multiple small nodules at the RUL and lower lobes, GGOs/linear opacities crazy paving  Neck sarcoidosis, lymphatic carcinomatosis, silicosis, coal workers' pneumoconiosis  Small bilateral pleural effusion  Localized pleural thickening at the lateral aspect of the right side    Chronic right apical calcified plaque  LLL subsegmental atelectasis  Calcified mediastinal/hilar lymph nodes      Pulmonary function testing:     PFT 06/08/2022-Ratio:  81%, FEV1 1 51 L/68%, FVC 1 87 L/63% TLC 58%, RV 61%, DLCO 46%    6 minute walk test 05/23/2022-baseline SpO2 94 percent on room air, heart rate 66, able to walk 252 meters without stop, lowest SpO2 92 percent/maximal heart rate 83     EKG, Pathology, and Other Studies: I have personally reviewed pertinent reports  TTE 09/16/2021-EF 50%, lower limit normal   Grade 1 diastolic dysfunction  No wall motion abnormalities  LA mildly dilated  Upper limit normal PA SP, TAPSE 1 72       Past medical, surgical, social and family histories reviewed  Medications/Allergies: Reviewed      Vitals: Blood pressure 102/54, pulse 60, temperature 98 2 °F (36 8 °C), resp  rate 17, height 5' 4" (1 626 m), weight 69 1 kg (152 lb 6 4 oz), SpO2 96 %  Body mass index is 26 16 kg/m²  Oxygen Therapy  SpO2: 96 %      Physical Exam  Body mass index is 26 16 kg/m²     Gen: not in acute distress,   Neck/Eyes: supple, mild to moderate thoracic kyphosis, PERRL  Ear: normal appearance, moderate hearing impairment  Nose:  normal nasal mucosa, no drainage  Mouth:  unremarkable/normal appearance of lips, teeth and gums  Oropharynx: mucosa is moist, no focal lesions or erythema  Salivary glands: soft nontender  Chest: normal respiratory efforts, fine basilar crackle at the left, otherwise clear breath sounds bilaterally  CV: RRR, no murmurs appreciated, no edema  Abdomen: soft, non tender  Extremities:  No observed deformity, no digital clubbing   Skin: unremarkable  Neuro: AAO X3, no focal motor deficit        Labs:  Lab Results   Component Value Date    WBC 4 90 07/27/2022    HGB 10 0 (L) 07/27/2022    HCT 30 9 (L) 07/27/2022     (H) 07/27/2022     07/27/2022     Lab Results   Component Value Date    GLUCOSE 130 07/07/2015    CALCIUM 9 6 07/27/2022     07/21/2016    K 4 7 07/27/2022    CO2 25 07/27/2022     (H) 07/27/2022    BUN 45 (H) 07/27/2022    CREATININE 2 92 (H) 07/27/2022     No results found for: IGE  Lab Results   Component Value Date    ALT 26 07/27/2022    AST 17 07/27/2022    ALKPHOS 61 07/27/2022    BILITOT 0 4 07/21/2016           Portions of the record may have been created with voice recognition software  Occasional wrong word or "sound a like" substitutions may have occurred due to the inherent limitations of voice recognition software  Read the chart carefully and recognize, using context, where substitutions have occurred    [facility]

## 2022-09-07 ENCOUNTER — OFFICE VISIT (OUTPATIENT)
Dept: FAMILY MEDICINE CLINIC | Facility: CLINIC | Age: 87
End: 2022-09-07
Payer: COMMERCIAL

## 2022-09-07 VITALS
HEART RATE: 106 BPM | OXYGEN SATURATION: 98 % | HEIGHT: 64 IN | TEMPERATURE: 96.8 F | BODY MASS INDEX: 25.54 KG/M2 | WEIGHT: 149.6 LBS | SYSTOLIC BLOOD PRESSURE: 110 MMHG | DIASTOLIC BLOOD PRESSURE: 68 MMHG

## 2022-09-07 DIAGNOSIS — K22.70 BARRETT'S ESOPHAGUS WITHOUT DYSPLASIA: ICD-10-CM

## 2022-09-07 DIAGNOSIS — E11.22 TYPE 2 DIABETES MELLITUS WITH STAGE 5 CHRONIC KIDNEY DISEASE NOT ON CHRONIC DIALYSIS, WITHOUT LONG-TERM CURRENT USE OF INSULIN (HCC): ICD-10-CM

## 2022-09-07 DIAGNOSIS — F41.1 GENERALIZED ANXIETY DISORDER: ICD-10-CM

## 2022-09-07 DIAGNOSIS — N18.30 TYPE 2 DIABETES MELLITUS WITH STAGE 3 CHRONIC KIDNEY DISEASE, WITHOUT LONG-TERM CURRENT USE OF INSULIN, UNSPECIFIED WHETHER STAGE 3A OR 3B CKD (HCC): ICD-10-CM

## 2022-09-07 DIAGNOSIS — R10.31 RIGHT LOWER QUADRANT ABDOMINAL PAIN: Primary | ICD-10-CM

## 2022-09-07 DIAGNOSIS — E11.22 TYPE 2 DIABETES MELLITUS WITH STAGE 3 CHRONIC KIDNEY DISEASE, WITHOUT LONG-TERM CURRENT USE OF INSULIN, UNSPECIFIED WHETHER STAGE 3A OR 3B CKD (HCC): ICD-10-CM

## 2022-09-07 DIAGNOSIS — N18.5 TYPE 2 DIABETES MELLITUS WITH STAGE 5 CHRONIC KIDNEY DISEASE NOT ON CHRONIC DIALYSIS, WITHOUT LONG-TERM CURRENT USE OF INSULIN (HCC): ICD-10-CM

## 2022-09-07 LAB — SL AMB POCT HEMOGLOBIN AIC: 6.7 (ref ?–6.5)

## 2022-09-07 PROCEDURE — 99214 OFFICE O/P EST MOD 30 MIN: CPT | Performed by: FAMILY MEDICINE

## 2022-09-07 PROCEDURE — 83036 HEMOGLOBIN GLYCOSYLATED A1C: CPT | Performed by: FAMILY MEDICINE

## 2022-09-07 PROCEDURE — 1160F RVW MEDS BY RX/DR IN RCRD: CPT | Performed by: FAMILY MEDICINE

## 2022-09-07 RX ORDER — PAROXETINE 30 MG/1
30 TABLET, FILM COATED ORAL EVERY MORNING
Qty: 90 TABLET | Refills: 1 | Status: SHIPPED | OUTPATIENT
Start: 2022-09-07

## 2022-09-07 RX ORDER — ALPRAZOLAM 0.25 MG/1
0.25 TABLET ORAL EVERY 8 HOURS PRN
Qty: 270 TABLET | Refills: 0 | Status: SHIPPED | OUTPATIENT
Start: 2022-09-07

## 2022-09-07 NOTE — PROGRESS NOTES
Assessment/Plan:  Patient has previous abnormal CT scan of the chest that is currently being followed by pulmonology  Because of his age and right lower quadrant pain over the last 1 week recommended CT scan  He will monitor for fevers  Consider follow-up with lab testing or ER evaluation if symptoms persist or worsen  We will see him again in 3 months for recheck on diabetes  Refill on Xanax and Paxil done today  Time spent reviewing treatment plan and coordinating care and diagnosis as well as documentation was 30 minutes  1  Right lower quadrant abdominal pain  -     CT abdomen pelvis wo contrast; Future; Expected date: 09/07/2022    2  Obrien's esophagus without dysplasia    3  Type 2 diabetes mellitus with stage 3 chronic kidney disease, without long-term current use of insulin, unspecified whether stage 3a or 3b CKD (HCC)  -     POCT hemoglobin A1c    4  Generalized anxiety disorder  -     ALPRAZolam (XANAX) 0 25 mg tablet; Take 1 tablet (0 25 mg total) by mouth every 8 (eight) hours as needed for anxiety  -     PARoxetine (PAXIL) 30 mg tablet; Take 1 tablet (30 mg total) by mouth every morning As directed    5  Type 2 diabetes mellitus with stage 5 chronic kidney disease not on chronic dialysis, without long-term current use of insulin (HCC)          Subjective:      Patient ID: Diana Jones  is a 80 y o  male  Patient here for recheck on chronic conditions  He does have intermittent right lower quadrant abdominal discomfort intermittently over the last 1 week  Symptoms come and go  He denies any fevers  No stool changes  Pain is fairly well localized to the right lower quadrant and last for minutes to hours at a time  Last episode was last night             The following portions of the patient's history were reviewed and updated as appropriate: allergies, current medications, past family history, past medical history, past social history, past surgical history, and problem list     Review of Systems   Constitutional: Negative  HENT: Negative  Eyes: Negative  Respiratory: Negative  Cardiovascular: Negative  Gastrointestinal: Positive for abdominal pain  Endocrine: Negative  Genitourinary: Negative  Musculoskeletal: Negative  Skin: Negative  Allergic/Immunologic: Negative  Neurological: Negative  Hematological: Negative  Psychiatric/Behavioral: Negative  Objective:      /68 (BP Location: Left arm, Patient Position: Sitting, Cuff Size: Standard)   Pulse (!) 106   Temp (!) 96 8 °F (36 °C) (Temporal)   Ht 5' 4" (1 626 m)   Wt 67 9 kg (149 lb 9 6 oz)   SpO2 98%   BMI 25 68 kg/m²          Physical Exam  Vitals reviewed  Constitutional:       Appearance: He is well-developed  HENT:      Head: Normocephalic and atraumatic  Right Ear: External ear normal  Tympanic membrane is not erythematous or bulging  Left Ear: External ear normal  Tympanic membrane is not erythematous or bulging  Nose: Nose normal       Mouth/Throat:      Mouth: No oral lesions  Pharynx: No oropharyngeal exudate  Eyes:      General: No scleral icterus  Right eye: No discharge  Left eye: No discharge  Conjunctiva/sclera: Conjunctivae normal    Neck:      Thyroid: No thyromegaly  Cardiovascular:      Rate and Rhythm: Normal rate and regular rhythm  Heart sounds: Normal heart sounds  No murmur heard  No friction rub  No gallop  Pulmonary:      Effort: Pulmonary effort is normal  No respiratory distress  Breath sounds: No wheezing or rales  Chest:      Chest wall: No tenderness  Abdominal:      General: Bowel sounds are normal  There is no distension  Palpations: Abdomen is soft  There is no mass  Tenderness: There is no abdominal tenderness  There is no guarding or rebound  Musculoskeletal:         General: No tenderness or deformity  Normal range of motion        Cervical back: Normal range of motion and neck supple  Lymphadenopathy:      Cervical: No cervical adenopathy  Skin:     General: Skin is warm and dry  Coloration: Skin is not pale  Findings: No erythema or rash  Neurological:      Mental Status: He is alert and oriented to person, place, and time  Cranial Nerves: No cranial nerve deficit  Motor: No abnormal muscle tone  Coordination: Coordination normal       Deep Tendon Reflexes: Reflexes are normal and symmetric     Psychiatric:         Behavior: Behavior normal

## 2022-09-14 ENCOUNTER — HOSPITAL ENCOUNTER (OUTPATIENT)
Dept: RADIOLOGY | Facility: IMAGING CENTER | Age: 87
Discharge: HOME | End: 2022-09-14
Payer: COMMERCIAL

## 2022-09-14 DIAGNOSIS — R10.31 RIGHT LOWER QUADRANT ABDOMINAL PAIN: ICD-10-CM

## 2022-09-14 PROCEDURE — 74176 CT ABD & PELVIS W/O CONTRAST: CPT

## 2022-09-22 DIAGNOSIS — R35.0 BENIGN PROSTATIC HYPERPLASIA WITH URINARY FREQUENCY: ICD-10-CM

## 2022-09-22 DIAGNOSIS — N40.1 BENIGN PROSTATIC HYPERPLASIA WITH URINARY FREQUENCY: ICD-10-CM

## 2022-09-22 RX ORDER — FINASTERIDE 5 MG/1
TABLET, FILM COATED ORAL
Qty: 90 TABLET | Refills: 3 | Status: SHIPPED | OUTPATIENT
Start: 2022-09-22

## 2022-10-18 DIAGNOSIS — E11.9 TYPE 2 DIABETES MELLITUS WITHOUT COMPLICATION, WITHOUT LONG-TERM CURRENT USE OF INSULIN (HCC): ICD-10-CM

## 2022-10-18 RX ORDER — ROSUVASTATIN CALCIUM 40 MG/1
TABLET, COATED ORAL
Qty: 90 TABLET | Refills: 0 | Status: SHIPPED | OUTPATIENT
Start: 2022-10-18

## 2022-11-13 DIAGNOSIS — E11.9 TYPE 2 DIABETES MELLITUS WITHOUT COMPLICATION, WITHOUT LONG-TERM CURRENT USE OF INSULIN (HCC): ICD-10-CM

## 2022-11-13 DIAGNOSIS — I48.0 PAROXYSMAL ATRIAL FIBRILLATION (HCC): Chronic | ICD-10-CM

## 2022-11-13 DIAGNOSIS — I10 ESSENTIAL HYPERTENSION: ICD-10-CM

## 2022-11-14 RX ORDER — ROSUVASTATIN CALCIUM 40 MG/1
TABLET, COATED ORAL
Qty: 90 TABLET | Refills: 0 | Status: SHIPPED | OUTPATIENT
Start: 2022-11-14

## 2022-11-14 RX ORDER — AMLODIPINE BESYLATE 5 MG/1
5 TABLET ORAL DAILY
Qty: 90 TABLET | Refills: 3 | Status: SHIPPED | OUTPATIENT
Start: 2022-11-14

## 2022-11-28 NOTE — TELEPHONE ENCOUNTER
abdirashid Duran 73 nephrology dept needs Insurance referral [facility] his appt is scheduled there for December 7, 2022

## 2022-11-28 NOTE — TELEPHONE ENCOUNTER
I believe this question is for clinical staff?  Please let me know if you need anything from providers

## 2022-11-29 ENCOUNTER — OFFICE VISIT (OUTPATIENT)
Dept: FAMILY MEDICINE CLINIC | Facility: CLINIC | Age: 87
End: 2022-11-29

## 2022-11-29 ENCOUNTER — TELEPHONE (OUTPATIENT)
Dept: NEPHROLOGY | Facility: CLINIC | Age: 87
End: 2022-11-29

## 2022-11-29 VITALS
SYSTOLIC BLOOD PRESSURE: 124 MMHG | BODY MASS INDEX: 26.91 KG/M2 | HEART RATE: 107 BPM | WEIGHT: 146.2 LBS | DIASTOLIC BLOOD PRESSURE: 72 MMHG | OXYGEN SATURATION: 83 % | HEIGHT: 62 IN | TEMPERATURE: 97.6 F

## 2022-11-29 DIAGNOSIS — J42 CHRONIC BRONCHITIS, UNSPECIFIED CHRONIC BRONCHITIS TYPE (HCC): ICD-10-CM

## 2022-11-29 DIAGNOSIS — N18.4 STAGE 4 CHRONIC KIDNEY DISEASE (HCC): ICD-10-CM

## 2022-11-29 DIAGNOSIS — Z00.00 MEDICARE ANNUAL WELLNESS VISIT, SUBSEQUENT: Primary | ICD-10-CM

## 2022-11-29 DIAGNOSIS — E78.5 DYSLIPIDEMIA: ICD-10-CM

## 2022-11-29 RX ORDER — PREDNISONE 10 MG/1
TABLET ORAL
Qty: 33 TABLET | Refills: 0 | Status: ON HOLD | OUTPATIENT
Start: 2022-11-29

## 2022-11-29 NOTE — PROGRESS NOTES
Assessment and Plan:     Problem List Items Addressed This Visit        Respiratory    COPD (chronic obstructive pulmonary disease) ([facility] )    Relevant Medications    predniSONE 10 mg tablet    Other Relevant Orders    Referral to [address] [place] [address]       Genitourinary    Stage 4 chronic kidney disease ([facility] )       Other    Dyslipidemia   Other Visit Diagnoses     Medicare annual wellness visit, subsequent    -  Primary           Preventive health issues were discussed with patient, and age appropriate screening tests were ordered as noted in patient's After Visit Summary  Personalized health advice and appropriate referrals for health education or preventive services given if needed, as noted in patient's After Visit Summary       History of Present Illness:     Patient presents for a Medicare Wellness Visit    HPI   Patient Care Team:  Eloy Vela DO as PCP - General  Eloy Vela DO as PCP - PCP-[facility]  MD Eloy Atkins Daralene Current, MD as Endoscopist  Yadiel Montgomery MD (Nephrology)  Dano Henao MD (Pulmonary Disease)     Review of Systems:     Review of Systems     Problem List:     Patient Active Problem List   Diagnosis   • Benign prostatic hyperplasia with urinary frequency   • Dyslipidemia   • Esophageal reflux   • Essential hypertension   • Lower leg DVT (deep venous thromboembolism), acute, right [facility])   • Pancreatic cyst   • Pulmonary embolism ([facility] )   • Pulmonary nodule seen on imaging study   • Type 2 diabetes mellitus ([facility] )   • Pericardial effusion with cardiac tamponade   • Paroxysmal atrial fibrillation ([facility] )   • COPD (chronic obstructive pulmonary disease) ([facility] )   • Chronic anemia   • S/P pericardial window creation   • Obrien's esophagus without dysplasia   • Tubular adenoma   • Stage 5 chronic kidney disease ([facility] )   • Renovascular hypertension   • Persistent proteinuria   • Anemia due to chronic kidney disease   • Secondary hyperparathyroidism of renal origin ([place] [address] )   • Hemodialysis access, AV graft ([place] [address] )   • Cardiomyopathy ([place] [address] )   • Type 2 diabetes mellitus with stage 3 chronic kidney disease, without long-term current use of insulin (HCC)   • Stage 4 chronic kidney disease ([place] [address] )   • History of anemia due to CKD   • Allergy to ACE inhibitors   • Black stools   • Abnormal CT of the chest   • ILD (interstitial lung disease) ([place] [address] )      Past Medical and Surgical History:     Past Medical History:   Diagnosis Date   • Anxiety    • Obrien's esophagus    • BPH (benign prostatic hyperplasia)    • Cancer ([place] [address] )     pt states hx skin cancer, pt confused   • Cardiomegaly    • Diabetes ([place] [address] )     type 2  controlled; taken off oral meds 6/19   • DVT (deep venous thrombosis) ([facility])     right leg   • GERD (gastroesophageal reflux disease)    • Hypercholesterolemia    • Hypertension    • Irregular heart beat     paroxsysmal a fib   • Pancreatic cyst    • Paroxysmal atrial fibrillation (HCC)    • Pericardial effusion with cardiac tamponade    • Pleural effusion    • Renal disorder     dialysis started in 2019   • Weight loss, non-intentional      Past Surgical History:   Procedure Laterality Date   • APPENDECTOMY  1953   • CATARACT EXTRACTION Bilateral 2016? • COLONOSCOPY     • COLONOSCOPY N/A 1/24/2019    Procedure: COLONOSCOPY with polypectomies;  Surgeon: Jose Antonio Barksdale MD;  Location: [facility]; Service: Gastroenterology   • ESOPHAGOGASTRODUODENOSCOPY N/A 1/24/2019    Procedure: ESOPHAGOGASTRODUODENOSCOPY (EGD) with bx;  Surgeon: Jose Antonio Barksdale MD;  Location: [facility];   Service: Gastroenterology   • IR TUNNELED DIALYSIS CATHETER CHECK/CHANGE/REPOSITION/ANGIOPLASTY  4/18/2019   • IR TUNNELED DIALYSIS CATHETER PLACEMENT  3/6/2019   • IR TUNNELED DIALYSIS CATHETER REMOVAL  7/30/2019   • PERICARDIAL WINDOW N/A 2/26/2019    Procedure: WINDOW PERICARDIAL;  Surgeon: Zeinab Avitia MD;  Location: [facility];  Service: Thoracic   • WA ANASTOMOSIS,AV,ANY SITE Left 7/3/2019    Procedure: CREATION FISTULA ARTERIOVENOUS (AV) left upper extremity brachial artery to axillary vein Whitefield-Mushtaq graft ;  Surgeon: Terese Spring MD;  Location: [address] [place] [address] OR;  Service: Vascular   • PROSTATE BIOPSY        Family History:     Family History   Problem Relation Age of Onset   • Diabetes Mother    • Diabetes type II Mother    • Diabetes Father    • Diabetes type II Father    • Heart attack Father    • Pulmonary embolism Sister    • Prostate cancer Brother    • Diabetes Brother       Social History:     Social History     Socioeconomic History   • Marital status: /Civil Union     Spouse name: None   • Number of children: None   • Years of education: None   • Highest education level: None   Occupational History   • Occupation: Retired      Comment:     Tobacco Use   • Smoking status: Former     Packs/day: 0 50     Types: Cigars, Cigarettes     Quit date:      Years since quittin 9   • Smokeless tobacco: Never   • Tobacco comments:     current non-smoker   Vaping Use   • Vaping Use: Never used   Substance and Sexual Activity   • Alcohol use: No   • Drug use: Never   • Sexual activity: Not Currently   Other Topics Concern   • None   Social History Narrative    Patient has never smoked - As per [employer]    Consumes on average 2 cups of regular coffee per day      Social Determinants of Health     Financial Resource Strain: Not on file   Food Insecurity: Not on file   Transportation Needs: Not on file   Physical Activity: Not on file   Stress: Not on file   Social Connections: Not on file   Intimate Partner Violence: Not on file   Housing Stability: Not on file      Medications and Allergies:     Current Outpatient Medications   Medication Sig Dispense Refill   • ALPRAZolam (XANAX) 0 25 mg tablet Take 1 tablet (0 25 mg total) by mouth every 8 (eight) hours as needed for anxiety 270 tablet 0   • amLODIPine (NORVASC) 5 mg tablet TAKE 1 TABLET (5 MG TOTAL) BY MOUTH DAILY  90 tablet 3   • Blood Glucose Monitoring Suppl (ONE TOUCH ULTRA 2) w/Device KIT by Does not apply route 2 (two) times a day 1 each 0   • Blood Glucose Monitoring Suppl (ONE TOUCH ULTRA 2) w/Device KIT Use daily 1 each 0   • Blood Glucose Monitoring Suppl (TRUE METRIX METER) YASMEEN by Does not apply route 2 (two) times a day 1 Device 0   • finasteride (PROSCAR) 5 mg tablet TAKE 1 TABLET BY MOUTH EVERY DAY 90 tablet 3   • glucose blood (OneTouch Ultra) test strip Test once daily as directed, DX E11 9 type 2 diabetes w/out complication 274 each 3   • glucose blood (TRUE METRIX BLOOD GLUCOSE TEST) test strip Test twice daily 300 each 0   • Lancets (ONETOUCH ULTRASOFT) lancets USE DAILY AS DIRECTED  100 each 3   • Lancets (onetouch ultrasoft) lancets Use daily as instructed 100 each 3   • losartan (COZAAR) 25 mg tablet TAKE 1 TABLET BY MOUTH EVERY DAY IN THE MORNING 90 tablet 3   • metoprolol tartrate (LOPRESSOR) 25 mg tablet TAKE 1 TABLET BY MOUTH EVERY DAY 90 tablet 3   • PARoxetine (PAXIL) 30 mg tablet Take 1 tablet (30 mg total) by mouth every morning As directed 90 tablet 1   • predniSONE 10 mg tablet 6 tablets daily, all at one time, with food  Then on day #4 decrease by 1 pill each day until finished   33 tablet 0   • rosuvastatin (CRESTOR) 40 MG tablet TAKE 1 TABLET BY MOUTH EVERY DAY IN THE MORNING 90 tablet 0   • acetaminophen (TYLENOL) 325 mg tablet Take 650 mg by mouth daily (Patient not taking: Reported on 11/29/2022)     • Cholecalciferol (VITAMIN D PO) Take 5,000 Units by mouth every morning  (Patient not taking: Reported on 11/29/2022)     • [employer] Iron 240 (27 Fe) MG tablet TAKE 1 TABLET (240 MG TOTAL) BY MOUTH EVERY OTHER DAY (Patient not taking: Reported on 11/29/2022) 45 tablet 1   • fluticasone (FLONASE) 50 mcg/act nasal spray USE 2 SPRAYS IN EACH NOSTRIL DAILY (Patient not taking: Reported on 8/29/2022) 16 mL 0   • mometasone-formoterol (Dulera) 100-5 MCG/ACT inhaler Inhale 2 puffs every 12 (twelve) hours Rinse mouth after use  (Patient not taking: Reported on 11/29/2022) 13 g 11   • omeprazole (PriLOSEC) 40 MG capsule Take 40 mg by mouth daily   (Patient not taking: Reported on 11/29/2022)     • Tradjenta 5 MG TABS TAKE 1 TABLET BY MOUTH EVERY DAY (Patient not taking: Reported on 7/11/2022) 90 tablet 1   • traMADol (ULTRAM) 50 mg tablet Take 1 tablet (50 mg total) by mouth every 6 (six) hours as needed for moderate pain (Patient not taking: Reported on 7/11/2022) 30 tablet 1     No current facility-administered medications for this visit  Allergies   Allergen Reactions   • Bee Venom Facial Swelling   • Ace Inhibitors Other (See Comments)     Unknown reaction      Immunizations:     Immunization History   Administered Date(s) Administered   • COVID-19 MODERNA VACC 0 5 ML IM 02/06/2021, 03/05/2021   • INFLUENZA 09/12/2013, 09/21/2018   • Influenza Split High Dose Preservative Free IM 09/19/2012, 09/04/2014, 09/03/2015, 10/03/2016, 09/13/2017   • Influenza, high dose seasonal 0 7 mL 09/21/2018, 09/16/2019, 09/30/2020, 09/20/2021   • Influenza, seasonal, injectable 10/06/2011   • Pneumococcal Conjugate 13-Valent 10/01/2015   • Pneumococcal Polysaccharide PPV23 01/01/2005      Health Maintenance:         Topic Date Due   • Hepatitis C Screening  Completed         Topic Date Due   • Hepatitis B Vaccine (1 of 3 - 3-dose series) Never done   • COVID-19 Vaccine (3 - Booster for Moderna series) 08/05/2021   • Influenza Vaccine (1) 09/01/2022      Medicare Screening Tests and Risk Assessments:     Olamide Espinosa is here for his Subsequent Wellness visit  Health Risk Assessment:   Patient rates overall health as fair  Patient feels that their physical health rating is slightly worse  Patient is satisfied with their life  Eyesight was rated as same  Hearing was rated as same  Patient feels that their emotional and mental health rating is slightly worse  Patients states they are never, rarely angry   Patient states they are never, rarely unusually tired/fatigued  Pain experienced in the last 7 days has been none  Patient states that he has experienced no weight loss or gain in last 6 months  Fall Risk Screening: In the past year, patient has experienced: no history of falling in past year      Home Safety:  Patient does not have trouble with stairs inside or outside of their home  Patient has working smoke alarms and has working carbon monoxide detector  Home safety hazards include: none  Nutrition:   Current diet is Regular  Medications:   Patient is not currently taking any over-the-counter supplements  Patient is able to manage medications  Activities of Daily Living (ADLs)/Instrumental Activities of Daily Living (IADLs):   Walk and transfer into and out of bed and chair?: Yes  Dress and groom yourself?: Yes    Feed yourself? Yes  Do your laundry/housekeeping?: Yes  Manage your money, pay your bills and track your expenses?: Yes  Make your own meals?: No    Do your own shopping?: Yes    Previous Hospitalizations:   Any hospitalizations or ED visits within the last 12 months?: No      Advance Care Planning:   Living will: Yes    Durable POA for healthcare:  Yes    Advanced directive: Yes    Advanced directive counseling given: Yes    Five wishes given: Yes    Patient declined ACP directive: No    End of Life Decisions reviewed with patient: Yes    Provider agrees with end of life decisions: Yes      Cognitive Screening:   Provider or family/friend/caregiver concerned regarding cognition?: No    PREVENTIVE SCREENINGS      Cardiovascular Screening:    General: Screening Not Indicated and History Lipid Disorder      Diabetes Screening:     General: Screening Not Indicated and History Diabetes      Colorectal Cancer Screening:     General: Screening Not Indicated      Prostate Cancer Screening:    General: Screening Not Indicated      Osteoporosis Screening:    General: Screening Not Indicated Abdominal Aortic Aneurysm (AAA) Screening:    Risk factors include: tobacco use        Lung Cancer Screening:     General: Screening Not Indicated      Hepatitis C Screening:    General: Screening Current    No results found       Physical Exam:     /72 (BP Location: Left arm, Patient Position: Sitting, Cuff Size: Standard)   Pulse (!) 107   Temp 97 6 °F (36 4 °C) (Temporal)   Ht 5' 2" (1 575 m)   Wt 66 3 kg (146 lb 3 2 oz)   SpO2 (!) 83%   BMI 26 74 kg/m²     Physical Exam     Elisabeth Alpers, DO

## 2022-11-29 NOTE — PROGRESS NOTES
Assessment/Plan:  Recommend starting prednisone  Recommend home health nurse and  visit  They will monitor for any persisting or worsening symptoms  Time spent counseling reviewing treatment plan and coordinating care and documentation was 40 minutes  Recommend recheck again 4 months  Sooner if needed  1  Medicare annual wellness visit, subsequent    2  Chronic bronchitis, unspecified chronic bronchitis type (HCC)  -     predniSONE 10 mg tablet; 6 tablets daily, all at one time, with food  Then on day #4 decrease by 1 pill each day until finished  -     Referral to [address] [place] [address]; Future    3  Dyslipidemia    4  Stage 4 chronic kidney disease (HCC)          Subjective:      Patient ID: Cara Romero  is a 80 y o  male  Patient here with son and daughter-in-law  Patient has history of COPD and likely has had a recent exacerbation  His wife was recently admitted to the hospital for RSV and influenza  He likely had the same symptoms a week ago with some cough  Wife is still in the hospital   Patient has not had a formal flu or RSV tests that has come back positive  The following portions of the patient's history were reviewed and updated as appropriate: allergies, current medications, past family history, past medical history, past social history, past surgical history, and problem list     Review of Systems   Constitutional: Negative  HENT: Negative  Eyes: Negative  Respiratory: Negative  Cardiovascular: Negative  Gastrointestinal: Negative  Endocrine: Negative  Genitourinary: Negative  Musculoskeletal: Negative  Skin: Negative  Allergic/Immunologic: Negative  Neurological: Negative  Hematological: Negative  Psychiatric/Behavioral: Negative            Objective:      /72 (BP Location: Left arm, Patient Position: Sitting, Cuff Size: Standard)   Pulse (!) 107   Temp 97 6 °F (36 4 °C) (Temporal)   Ht 5' 2" (1 575 m) Wt 66 3 kg (146 lb 3 2 oz)   SpO2 (!) 83%   BMI 26 74 kg/m²          Physical Exam  Vitals reviewed  Constitutional:       Appearance: He is well-developed and well-nourished  HENT:      Head: Normocephalic and atraumatic  Right Ear: External ear normal  Tympanic membrane is not erythematous or bulging  Left Ear: External ear normal  Tympanic membrane is not erythematous or bulging  Nose: Nose normal       Mouth/Throat:      Mouth: Oropharynx is clear and moist and mucous membranes are normal  No oral lesions  Pharynx: No oropharyngeal exudate  Eyes:      General: No scleral icterus  Right eye: No discharge  Left eye: No discharge  Extraocular Movements: EOM normal       Conjunctiva/sclera: Conjunctivae normal    Neck:      Thyroid: No thyromegaly  Cardiovascular:      Rate and Rhythm: Normal rate and regular rhythm  Heart sounds: Normal heart sounds  No murmur heard  No friction rub  No gallop  Pulmonary:      Effort: Pulmonary effort is normal  No respiratory distress  Breath sounds: No wheezing or rales  Comments: Reduced breath sounds at bilateral bases  Chest:      Chest wall: No tenderness  Abdominal:      General: Bowel sounds are normal  There is no distension  Palpations: Abdomen is soft  There is no mass  Tenderness: There is no abdominal tenderness  There is no guarding or rebound  Musculoskeletal:         General: No tenderness, deformity or edema  Normal range of motion  Cervical back: Normal range of motion and neck supple  Lymphadenopathy:      Cervical: No cervical adenopathy  Skin:     General: Skin is warm and dry  Coloration: Skin is not pale  Findings: No erythema or rash  Neurological:      Mental Status: He is alert and oriented to person, place, and time  Cranial Nerves: No cranial nerve deficit  Motor: No abnormal muscle tone        Coordination: Coordination normal       Deep Tendon Reflexes: Reflexes are normal and symmetric     Psychiatric:         Mood and Affect: Mood and affect normal          Behavior: Behavior normal

## 2022-11-30 ENCOUNTER — HOME CARE VISIT (OUTPATIENT)
Dept: HOME HEALTH SERVICES | Facility: HOME HEALTHCARE | Age: 87
End: 2022-11-30

## 2022-12-01 NOTE — CASE COMMUNICATION
[facility] has admitted your patient to [facility] service with the following disciplines:      SN  This report is informational only, no responses is needed  Primary focus of home health care    PULMONARY  Patient stated goals of care  Venita Faria REMAIN AT [address] STRONGER  Anticipated visit pattern and next visit date    2W3  9E0  NEXT VISIT FRIDAY  See medication list - meds in home differ from AVS  MISSING OMEPRAZOLE  Significant cl inical findings  Venita Faria PT REPORTED FEELING DEPRESSED DUE TO WIFE BEING IN THE REHAB  Venita Faria PT LIVES ALONE RIGHT NOW WITH OCCASIONAL HELP FROM FAMILY  FAMILY REPORTED PT IS NOT EATING WELL AND UNABLE TO MANAGE HIS MEDICATIONS  Venita Faria PT IS WEAK, HAVE DECREASE MOBILITY AND ENDURANCE   Potential barriers to goal achievement  Venita Faria PT IS HARD OF HEARING      Thank you for allowing us to participate in the care of your patient

## 2022-12-02 ENCOUNTER — HOME CARE VISIT (OUTPATIENT)
Dept: HOME HEALTH SERVICES | Facility: HOME HEALTHCARE | Age: 87
End: 2022-12-02

## 2022-12-02 ENCOUNTER — TELEPHONE (OUTPATIENT)
Dept: FAMILY MEDICINE CLINIC | Facility: CLINIC | Age: 87
End: 2022-12-02

## 2022-12-02 VITALS
RESPIRATION RATE: 18 BRPM | SYSTOLIC BLOOD PRESSURE: 126 MMHG | TEMPERATURE: 97.2 F | DIASTOLIC BLOOD PRESSURE: 72 MMHG | HEART RATE: 71 BPM | OXYGEN SATURATION: 94 %

## 2022-12-02 NOTE — TELEPHONE ENCOUNTER
visiting nurse called asking for a verbal order from dr Enma George for a home health aide and  to come out to assist mr beltran as he is unable to do certain things for himself and needs assistance  Spoke with dr Enma George and he gave the ok

## 2022-12-03 VITALS
SYSTOLIC BLOOD PRESSURE: 118 MMHG | DIASTOLIC BLOOD PRESSURE: 68 MMHG | OXYGEN SATURATION: 95 % | HEART RATE: 72 BPM | TEMPERATURE: 97.2 F | RESPIRATION RATE: 18 BRPM

## 2022-12-04 NOTE — CASE COMMUNICATION
Hi Dr John Salomon,    I called your office on 12 2 and reported that pt is living alone with occasional help from sister in law and brother in law  Pt is very depressed because wife is not home and currently in a rehab facility  Pt have poor appetite and having problem managing medications  SN and sister in law set up medication planner but pt still missed some meds  Pt is needing assistance on food preparation and needing supervision  on eating to make sure he eats  He is weaker  than the last time i saw him on wednesday    SN ask for a verbal order for MSW and Home health aide as pt may benefit for this  SN spoke with Rajani Hdz at your office and i inform all of this       Thank you

## 2022-12-05 NOTE — TELEPHONE ENCOUNTER
Spoke with nephrology and informed them that availity said insurance referral was not required and tried calling insurance and kept gettign put on hold them hung up on

## 2022-12-06 ENCOUNTER — HOME CARE VISIT (OUTPATIENT)
Dept: HOME HEALTH SERVICES | Facility: HOME HEALTHCARE | Age: 87
End: 2022-12-06

## 2022-12-06 PROBLEM — E11.9 DM TYPE 2 (DIABETES MELLITUS, TYPE 2) (HCC): Status: ACTIVE | Noted: 2020-02-26

## 2022-12-06 PROBLEM — E87.29 HIGH ANION GAP METABOLIC ACIDOSIS: Status: ACTIVE | Noted: 2022-01-01

## 2022-12-06 PROBLEM — R41.82 AMS (ALTERED MENTAL STATUS): Status: ACTIVE | Noted: 2022-01-01

## 2022-12-06 PROBLEM — N18.5 CKD (CHRONIC KIDNEY DISEASE) STAGE 5, GFR LESS THAN 15 ML/MIN (HCC): Status: ACTIVE | Noted: 2020-12-17

## 2022-12-06 NOTE — PROGRESS NOTES
Progress Note -   Triage Asssessment   Jeffery Chu  80 y o  male MRN: [id number]    Time Called ( Time): 243PM  Date Called: 12/06/22  Room#: ED 25  Person requesting evaluation: Abel Connors     Situation:    Jeffery Chu  is a [de-identified] 7M male who presents to the emergency department with his home care nurse reporting 3 days of altered mental status  Ports with an ERIK hyperkalemia 7 0 creatinine 17 52  He was previously on dialysis in the outpatient setting but had renal recovery per chart review  Patient is unable to give further history at this time as he is altered  Patient had head CT that was negative for any intracranial abnormalities  Interventions:   He was evaluated at bedside with the hospitalist will be admitting the patient  He does not have any focal deficits at this time, he mental status is likey caused by his uremia since his BUN is 217  His urine is negative for any infection, lactic acid negative  He was receiving hyperkalemia meds and was suggested that a repeat BMP be drawn  Nephrology is already following the patient if he would need dialysis  Patient already has an AV fistula  He is hemodynamically stable at this time         Triage Assessment:     Physical Exam  Vitals reviewed  HENT:      Head: Normocephalic and atraumatic  Eyes:      General: Gaze aligned appropriately  Pupils: Pupils are equal, round, and reactive to light  Cardiovascular:      Rate and Rhythm: Normal rate and regular rhythm  Pulmonary:      Effort: Pulmonary effort is normal    Abdominal:      General: Abdomen is flat  Palpations: Abdomen is soft  Musculoskeletal:      Right lower leg: No edema  Left lower leg: No edema  Neurological:      Mental Status: He is alert        Comments: Patient is moving all 4 extremities to command, equal but weak  throughout gaze aligned appropriately, pupils equal and reactive, no abnormal tone or tremors, patient having some slurred speech and stuttering seems to have been going on for the past 3 days per EM report since the patient has been altered           Recommendations discussed with hospitalist Dr Duarte Coelho  and DePope

## 2022-12-06 NOTE — ASSESSMENT & PLAN NOTE
Lab Results   Component Value Date    EGFR 2 12/06/2022    EGFR 2 12/06/2022    EGFR 18 07/27/2022    CREATININE 15 91 (H) 12/06/2022    CREATININE 17 52 (H) 12/06/2022    CREATININE 2 92 (H) 07/27/2022   Not currently on HD  Baseline 2 8-3 3  Hx of ERIK transitioned to CKD w/ renal recovery   Has AV Fistula  POA K- 7  S/p calcium gluconate, albuterol, D5/Insulin     Plan-  Appreciate Nephrology recommendations- medical management, possible CVVH tonight?   Albuterol, D5/Insulin, Calcium Gluconate

## 2022-12-06 NOTE — ED PROVIDER NOTES
History  Chief Complaint   Patient presents with   • Altered Mental Status     Pt arrives via ems from home  Home care nurse called ems because pt seems altered for the past 3 days not eating or drinking not getting out of his recliner  Wife has been in the hospital for about a week   Pt denies pain but said this started after his flu shot  Pt is oriented to self  HPI   Patient is an 72-year-old male who presents to the ED via EMS from home for evaluation of altered mental status  Patient has a home care nurse who noted patient was not eating, drinking, or getting out of recliner and called 911  Upon initial assessment, patient is altered, intermittently following commands, unable to provide history  Information taken from EMS and RN  Prior to Admission Medications   Prescriptions Last Dose Informant Patient Reported? Taking? ALPRAZolam (XANAX) 0 25 mg tablet   No No   Sig: Take 1 tablet (0 25 mg total) by mouth every 8 (eight) hours as needed for anxiety   Blood Glucose Monitoring Suppl (ONE TOUCH ULTRA 2) w/Device KIT   No No   Sig: by Does not apply route 2 (two) times a day   Blood Glucose Monitoring Suppl (ONE TOUCH ULTRA 2) w/Device KIT   No No   Sig: Use daily   Blood Glucose Monitoring Suppl (TRUE METRIX METER) YASMEEN   No No   Sig: by Does not apply route 2 (two) times a day   [employer] Iron 240 (27 Fe) MG tablet   No No   Sig: TAKE 1 TABLET (240 MG TOTAL) BY MOUTH EVERY OTHER DAY   Patient not taking: Reported on 11/29/2022   Cholecalciferol (VITAMIN D PO)   Yes No   Sig: Take 5,000 Units by mouth every morning    Patient not taking: Reported on 11/29/2022   Lancets (ONETOUCH ULTRASOFT) lancets   No No   Sig: USE DAILY AS DIRECTED     Lancets (onetouch ultrasoft) lancets   No No   Sig: Use daily as instructed   PARoxetine (PAXIL) 30 mg tablet   No Yes   Sig: Take 1 tablet (30 mg total) by mouth every morning As directed   Tradjenta 5 MG TABS   No No   Sig: TAKE 1 TABLET BY MOUTH EVERY DAY   Patient not taking: Reported on 2022   acetaminophen (TYLENOL) 325 mg tablet   Yes No   Sig: Take 650 mg by mouth daily   Patient not taking: Reported on 2022   amLODIPine (NORVASC) 5 mg tablet   No Yes   Sig: TAKE 1 TABLET (5 MG TOTAL) BY MOUTH DAILY  finasteride (PROSCAR) 5 mg tablet   No Yes   Sig: TAKE 1 TABLET BY MOUTH EVERY DAY   fluticasone (FLONASE) 50 mcg/act nasal spray   No No   Sig: USE 2 SPRAYS IN EACH NOSTRIL DAILY   Patient not taking: Reported on 2022   glucose blood (OneTouch Ultra) test strip   No No   Sig: Test once daily as directed, DX E11 9 type 2 diabetes w/out complication   glucose blood (TRUE METRIX BLOOD GLUCOSE TEST) test strip   No No   Sig: Test twice daily   losartan (COZAAR) 25 mg tablet   No Yes   Sig: TAKE 1 TABLET BY MOUTH EVERY DAY IN THE MORNING   metoprolol tartrate (LOPRESSOR) 25 mg tablet   No Yes   Sig: TAKE 1 TABLET BY MOUTH EVERY DAY   mometasone-formoterol (Dulera) 100-5 MCG/ACT inhaler   No No   Sig: Inhale 2 puffs every 12 (twelve) hours Rinse mouth after use  Patient not taking: Reported on 2022   omeprazole (PriLOSEC) 40 MG capsule   Yes No   Sig: Take 40 mg by mouth daily     Patient not taking: Reported on 2022   predniSONE 10 mg tablet   No No   Si tablets daily, all at one time, with food  Then on day #4 decrease by 1 pill each day until finished     rosuvastatin (CRESTOR) 40 MG tablet   No Yes   Sig: TAKE 1 TABLET BY MOUTH EVERY DAY IN THE MORNING   traMADol (ULTRAM) 50 mg tablet   No No   Sig: Take 1 tablet (50 mg total) by mouth every 6 (six) hours as needed for moderate pain   Patient not taking: Reported on 2022      Facility-Administered Medications: None       Past Medical History:   Diagnosis Date   • Anxiety    • Obrien's esophagus    • BPH (benign prostatic hyperplasia)    • Cancer ([facility] [address] )     pt states hx skin cancer, pt confused   • Cardiomegaly    • Diabetes ([facility] [address] )     type 2  controlled; taken off oral meds    • DVT (deep venous thrombosis) (HCC)     right leg   • GERD (gastroesophageal reflux disease)    • Hypercholesterolemia    • Hypertension    • Irregular heart beat     paroxsysmal a fib   • Pancreatic cyst    • Paroxysmal atrial fibrillation (HCC)    • Pericardial effusion with cardiac tamponade    • Pleural effusion    • Renal disorder     dialysis started in 2019   • Weight loss, non-intentional        Past Surgical History:   Procedure Laterality Date   • APPENDECTOMY  1953   • CATARACT EXTRACTION Bilateral 2016? • COLONOSCOPY     • COLONOSCOPY N/A 1/24/2019    Procedure: COLONOSCOPY with polypectomies;  Surgeon: Carmen Berman MD;  Location: [facility]; Service: Gastroenterology   • ESOPHAGOGASTRODUODENOSCOPY N/A 1/24/2019    Procedure: ESOPHAGOGASTRODUODENOSCOPY (EGD) with bx;  Surgeon: Carmen Berman MD;  Location: [facility]; Service: Gastroenterology   • IR TUNNELED DIALYSIS CATHETER CHECK/CHANGE/REPOSITION/ANGIOPLASTY  4/18/2019   • IR TUNNELED DIALYSIS CATHETER PLACEMENT  3/6/2019   • IR TUNNELED DIALYSIS CATHETER REMOVAL  7/30/2019   • PERICARDIAL WINDOW N/A 2/26/2019    Procedure: WINDOW PERICARDIAL;  Surgeon: Kavitha Villatoro MD;  Location: [facility];  Service: Thoracic   • MN ANASTOMOSIS,AV,ANY SITE Left 7/3/2019    Procedure: CREATION FISTULA ARTERIOVENOUS (AV) left upper extremity brachial artery to axillary vein [place]-Mushtaq graft ;  Surgeon: Sondra Goldman MD;  Location: [facility] MAIN OR;  Service: Vascular   • PROSTATE BIOPSY         Family History   Problem Relation Age of Onset   • Diabetes Mother    • Diabetes type II Mother    • Diabetes Father    • Diabetes type II Father    • Heart attack Father    • Pulmonary embolism Sister    • Prostate cancer Brother    • Diabetes Brother      I have reviewed and agree with the history as documented      E-Cigarette/Vaping   • E-Cigarette Use Never User      E-Cigarette/Vaping Substances   • Nicotine No    • THC No    • CBD No    • Flavoring No    • Other No    • Unknown No      Social History     Tobacco Use   • Smoking status: Former     Packs/day: 0 50     Types: Cigars, Cigarettes     Quit date: 56     Years since quittin 9   • Smokeless tobacco: Never   • Tobacco comments:     current non-smoker   Vaping Use   • Vaping Use: Never used   Substance Use Topics   • Alcohol use: No   • Drug use: Never        Review of Systems   Unable to perform ROS: Mental status change       Physical Exam  ED Triage Vitals   Temperature Pulse Respirations Blood Pressure SpO2   22 1223 22 1223 22 1223 22 1209 22 1225   97 5 °F (36 4 °C) 88 19 145/66 99 %      Temp src Heart Rate Source Patient Position - Orthostatic VS BP Location FiO2 (%)   -- 22 1526 22 1209 22 1209 --    Monitor Lying Left arm       Pain Score       22 1526       No Pain             Orthostatic Vital Signs  Vitals:    22 1209 22 1223 22 1526 22 1545   BP: 145/66  137/60 144/85   Pulse:  88 90 86   Patient Position - Orthostatic VS: Lying  Lying Lying       Physical Exam  Vitals and nursing note reviewed  Constitutional:       General: He is not in acute distress  Appearance: He is well-developed  He is ill-appearing  HENT:      Head: Normocephalic and atraumatic  Comments: No obvious hematoma or trauma     Mouth/Throat:      Mouth: Mucous membranes are dry  Pharynx: Oropharynx is clear  Eyes:      General: No scleral icterus  Extraocular Movements: Extraocular movements intact  Conjunctiva/sclera: Conjunctivae normal       Pupils: Pupils are equal, round, and reactive to light  Cardiovascular:      Rate and Rhythm: Normal rate and regular rhythm  Pulses: Normal pulses  Heart sounds: Normal heart sounds  No murmur heard  Pulmonary:      Effort: Pulmonary effort is normal  No respiratory distress  Breath sounds: Normal breath sounds  No wheezing, rhonchi or rales     Abdominal:      Palpations: Abdomen is soft  Tenderness: There is no abdominal tenderness  Musculoskeletal:         General: No swelling or signs of injury  Normal range of motion  Cervical back: Neck supple  Skin:     General: Skin is warm and dry  Capillary Refill: Capillary refill takes less than 2 seconds  Coloration: Skin is not cyanotic  Findings: No bruising (no evidence of obvious trauma or injury to the body)  Neurological:      Mental Status: He is alert  Cranial Nerves: No facial asymmetry  Comments: Oriented to person only, unable to perform complete and reliable neuro exam due to patient intermittently following commands         ED Medications  Medications   sodium polystyrene (KAYEXALATE) powder 15 g (15 g Oral Not Given 12/6/22 1520)   sodium bicarbonate 150 mEq in dextrose 5 % 1,000 mL infusion (150 mL/hr Intravenous New Bag 12/6/22 1521)   sodium chloride 0 9 % bolus 1,000 mL (0 mL Intravenous Stopped 12/6/22 1543)   aspirin rectal suppository 300 mg (300 mg Rectal Given 12/6/22 1431)   insulin regular (HumuLIN R,NovoLIN R) injection 10 Units (10 Units Intravenous Given 12/6/22 1502)   dextrose 50 % IV solution 25 mL (25 mL Intravenous Given 12/6/22 1500)   albuterol inhalation solution 5 mg (5 mg Nebulization Given 12/6/22 1437)   calcium gluconate 1 g in sodium chloride 0 9% 50 mL (premix) (0 g Intravenous Stopped 12/6/22 1543)       Diagnostic Studies  Results Reviewed     Procedure Component Value Units Date/Time    Basic metabolic panel [[id number]] Collected: 12/06/22 1603    Lab Status:  In process Specimen: Blood from Arm, Right Updated: 12/06/22 1604    Urine Microscopic [[id number]]  (Abnormal) Collected: 12/06/22 1506    Lab Status: Final result Specimen: Urine, Suprapubic catheter Updated: 12/06/22 1531     RBC, UA 2-4 /hpf      WBC, UA 4-10 /hpf      Epithelial Cells Occasional /hpf      Bacteria, UA None Seen /hpf     HS Troponin I 2hr [[id number]]  (Abnormal) Collected: 12/06/22 1455    Lab Status: Final result Specimen: Blood from Arm, Left Updated: 12/06/22 1528     hs TnI 2hr 74 ng/L      Delta 2hr hsTnI -1 ng/L     UA w Reflex to Microscopic w Reflex to Culture [[id number]]  (Abnormal) Collected: 12/06/22 1506    Lab Status: Final result Specimen: Urine, Suprapubic catheter Updated: 12/06/22 1519     Color, UA Yellow     Clarity, UA Clear     Specific [place], UA 1 020     pH, UA 5 0     Leukocytes, UA Negative     Nitrite, UA Negative     Protein, UA 30 (1+) mg/dl      Glucose,  (1/10%) mg/dl      Ketones, UA Negative mg/dl      Urobilinogen, UA 0 2 E U /dl      Bilirubin, UA Negative     Occult Blood, UA Moderate    HS Troponin I 4hr [[id number]]     Lab Status: No result Specimen: Blood     CKMB [[id number]]  (Normal) Collected: 12/06/22 1233    Lab Status: Final result Specimen: Blood from Arm, Right Updated: 12/06/22 1414     CK-MB Index <1 0 %      CK-MB 4 3 ng/mL     Lactic acid [[id number]]  (Abnormal) Collected: 12/06/22 1233    Lab Status: Final result Specimen: Blood from Arm, Right Updated: 12/06/22 1400     LACTIC ACID <0 3 mmol/L     Narrative:      Result may be elevated if tourniquet was used during collection      Comprehensive metabolic panel [[id number]]  (Abnormal) Collected: 12/06/22 1233    Lab Status: Final result Specimen: Blood from Arm, Right Updated: 12/06/22 1354     Sodium 136 mmol/L      Potassium 7 0 mmol/L      Chloride 98 mmol/L      CO2 10 mmol/L      ANION GAP 28 mmol/L       mg/dL      Creatinine 17 52 mg/dL      Glucose 186 mg/dL      Calcium 8 8 mg/dL      AST 19 U/L      ALT 30 U/L      Alkaline Phosphatase 73 U/L      Total Protein 7 8 g/dL      Albumin 3 5 g/dL      Total Bilirubin 0 36 mg/dL      eGFR 2 ml/min/1 73sq m     Narrative:      [facility] guidelines for Chronic Kidney Disease (CKD):   •  Stage 1 with normal or high GFR (GFR > 90 mL/min/1 73 square meters)  •  Stage 2 Mild CKD (GFR = 60-89 mL/min/1 73 square meters)  •  Stage 3A Moderate CKD (GFR = 45-59 mL/min/1 73 square meters)  •  Stage 3B Moderate CKD (GFR = 30-44 mL/min/1 73 square meters)  •  Stage 4 Severe CKD (GFR = 15-29 mL/min/1 73 square meters)  •  Stage 5 End Stage CKD (GFR <15 mL/min/1 73 square meters)  Note: GFR calculation is accurate only with a steady state creatinine    FLU/RSV/COVID - if FLU/RSV clinically relevant [[id number]]  (Normal) Collected: 12/06/22 1233    Lab Status: Final result Specimen: Nares from Nasopharyngeal Swab Updated: 12/06/22 1346     SARS-CoV-2 Negative     INFLUENZA A PCR Negative     INFLUENZA B PCR Negative     RSV PCR Negative    Narrative:      FOR PEDIATRIC PATIENTS - copy/paste COVID Guidelines URL to browser: [email or web address]  [employer]    SARS-CoV-2 assay is a Nucleic Acid Amplification assay intended for the  qualitative detection of nucleic acid from SARS-CoV-2 in nasopharyngeal  swabs  Results are for the presumptive identification of SARS-CoV-2 RNA  Positive results are indicative of infection with SARS-CoV-2, the virus  causing COVID-19, but do not rule out bacterial infection or co-infection  with other viruses  Laboratories within the [place] and its  territories are required to report all positive results to the appropriate  public health authorities  Negative results do not preclude SARS-CoV-2  infection and should not be used as the sole basis for treatment or other  patient management decisions  Negative results must be combined with  clinical observations, patient history, and epidemiological information  This test has not been FDA cleared or approved  This test has been authorized by FDA under an Emergency Use Authorization  (EUA)   This test is only authorized for the duration of time the  declaration that circumstances exist justifying the authorization of the  emergency use of an in vitro diagnostic tests for detection of SARS-CoV-2  virus and/or diagnosis of COVID-19 infection under section 564(b)(1) of  the Act, 21 U  S C  871ZWQ-8(H)(8), unless the authorization is terminated  or revoked sooner  The test has been validated but independent review by FDA  and CLIA is pending  Test performed using [employer] GeneXpert: This RT-PCR assay targets N2,  a region unique to SARS-CoV-2  A conserved region in the E-gene was chosen  for pan-Sarbecovirus detection which includes SARS-CoV-2  According to CMS-2020-01-R, this platform meets the definition of high-throughput technology  TSH [[id number]]  (Normal) Collected: 12/06/22 1233    Lab Status: Final result Specimen: Blood from Arm, Right Updated: 12/06/22 1339     TSH 3RD GENERATON 0 680 uIU/mL     Narrative:      Patients undergoing fluorescein dye angiography may retain small amounts of fluorescein in the body for 48-72 hours post procedure  Samples containing fluorescein can produce falsely depressed TSH values  If the patient had this procedure,a specimen should be resubmitted post fluorescein clearance        CK Total with Reflex CKMB [[id number]]  (Abnormal) Collected: 12/06/22 1233    Lab Status: Final result Specimen: Blood from Arm, Right Updated: 12/06/22 1339     Total  U/L     HS Troponin 0hr (reflex protocol) [[id number]]  (Abnormal) Collected: 12/06/22 1233    Lab Status: Final result Specimen: Blood from Arm, Right Updated: 12/06/22 1309     hs TnI 0hr 75 ng/L     Ammonia [[id number]]  (Normal) Collected: 12/06/22 1233    Lab Status: Final result Specimen: Blood from Arm, Right Updated: 12/06/22 1301     Ammonia 28 umol/L     CBC and differential [[id number]]  (Abnormal) Collected: 12/06/22 1233    Lab Status: Final result Specimen: Blood from Arm, Right Updated: 12/06/22 1239     WBC 13 79 Thousand/uL      RBC 3 12 Million/uL      Hemoglobin 10 0 g/dL      Hematocrit 30 3 %      MCV 97 fL      MCH 32 1 pg      MCHC 33 0 g/dL      RDW 13 2 %      MPV 9 3 fL Platelets 473 Thousands/uL      nRBC 0 /100 WBCs      Neutrophils Relative 87 %      Immat GRANS % 1 %      Lymphocytes Relative 4 %      Monocytes Relative 8 %      Eosinophils Relative 0 %      Basophils Relative 0 %      Neutrophils Absolute 11 90 Thousands/µL      Immature Grans Absolute 0 18 Thousand/uL      Lymphocytes Absolute 0 53 Thousands/µL      Monocytes Absolute 1 16 Thousand/µL      Eosinophils Absolute 0 00 Thousand/µL      Basophils Absolute 0 02 Thousands/µL     Fingerstick Glucose (POCT) [[id number]]  (Abnormal) Collected: 12/06/22 1216    Lab Status: Final result Updated: 12/06/22 1236     POC Glucose 171 mg/dl                  XR chest 2 views   ED Interpretation by Sol Rodriguez MD (12/06 1318)   Primary reviewed  No acute abnormality       Final Result by Clayton Farley MD (12/06 1603)      Chronic scar in the left lower lobe with no definite acute disease  Workstation performed: [id number]         CT head without contrast   Final Result by Lashell Lang DO (12/06 1323)      No acute intracranial abnormality  Workstation performed: [id number]               Procedures  Procedures      ED Course  ED Course as of 12/06/22 1627   Tue Dec 06, 2022   1309 Ammonia: 28   1309 WBC(!): 13 79  Elevated compared to prior   1309 Hemoglobin(!): 10 0  Stable compared to prior   1309 POC Glucose(!): 171   1312 hs TnI 0hr(!): 75   1328 CT head without contrast  No acute intracranial abnormality  1423 Potassium(!!): 7 0  Noted: Calcium, Albuterol, Insulin, D50, Kayexelate, Bicarb gtt, Singh Cath ordered   1432 LACTIC ACID(!): <0 3   1432 FLU/RSV/COVID - if FLU/RSV clinically relevant  All negative   1601 Spoke with CC, if repeat BMP is okay will go hospitalist SD2                               SBIRT 20yo+    Flowsheet Row Most Recent Value   SBIRT (25 yo +)    In order to provide better care to our patients, we are screening all of our patients for alcohol and drug use  Would it be okay to ask you these screening questions? No Filed at: 12/06/2022 1509                [facility]  Number of Diagnoses or Management Options  Diagnosis management comments: Patient is an 15-year-old male who presents to the ED via EMS from home for evaluation of altered mental status  Records reviewed  Patient with a prior ERIK requiring dialysis with renal recovery    CBC, CMP, TSH, lactic acid, ammonia, Trop, CK, COVID/flu/RSV, UA, CT head, EKG, chest x-ray ordered  Patient treated with normal saline 1 L bolus  See ED course for additional details  Care signed out to resident Dr Adonis Stephenson pending BMP and final disposition  Disposition  Final diagnoses:   Hyperkalemia   ARF (acute renal failure) ([facility])   Altered mental status   Elevated troponin     Time reflects when diagnosis was documented in both MDM as applicable and the Disposition within this note     Time User Action Codes Description Comment    12/6/2022  2:19 PM Dondra Dapper Add [E87 5] Hyperkalemia     12/6/2022  2:19 PM DePope, Pauloria Seal Add [N17 9] ARF (acute renal failure) ([facility] [address] )     12/6/2022  3:04 PM DePJennifer reyes Add [R41 82] Altered mental status     12/6/2022  3:05 PM DePope, Lenoria Seal Add [R77 8] Elevated troponin       ED Disposition     ED Disposition   Admit    Condition   Stable    Date/Time   Tue Dec 6, 2022  4:27 PM    Comment   Case was discussed with ESTEFANI and the patient's admission status was agreed to be Admission Status: inpatient status to the service of Dr Duarte Coelho  Follow-up Information    None         Patient's Medications   Discharge Prescriptions    No medications on file     No discharge procedures on file  PDMP Review       Value Time User    PDMP Reviewed  Yes 7/13/2022  8:33 PM Clara Mcnair MD           ED Provider  Attending physically available and evaluated Real Crawford    LULY managed the patient along with the ED Attending      Electronically Signed by         Radha Vuong Kole, DO  12/06/22 0559

## 2022-12-06 NOTE — CONSULTS
Consultation - Nephrology   Pawan Roman  80 y o  male MRN: [id number]  Unit/Bed#: ED 25 Encounter: [id number]    ASSESSMENT/PLAN:  Severe ERIK (POA) on CKD IV: Suspected prerenal etiology with volume depletion versus progression of chronic disease   -Presents with a creatinine of 17 52   -Baseline creatinine mid 2's to low 3's    -Follows with Dr Cheikh Pathak  History of acute kidney injury requiring hemodialysis  -Received 1 L of saline   -Started on bicarbonate drip at 150 cc/hr  -CK level 671   -Checking urinalysis  -Continue to hold ARB  -Inserting Singh catheter   -Recommend avoiding nephrotoxins, hypotension, IV contrast   -Strict I/O's   -Patient is at high risk of needing dialysis, unable to consent at this time  His wife is currently in the hospital and is only point of contact  -will continue to trend labs  Access: Left upper extremity graft, placed in 2019   -would place pink bracelet on left arm  Hyperkalemia: Presents with potassium of 7 0 and nonhemolyzed specimen  No evidence of urinary retention    -Receiving medical management with albuterol, insulin, dextrose, calcium gluconate and Kayexalate    -Will start on bicarbonate drip    -continue to tend BMP  May repeat medical management if potassium remains elevated on repeat labs  Anion gap acidosis: In the setting of severe acute renal failure   -Check lactic acid   -Started on bicarbonate drip @ 150 cc/hr       Hypertension: currently acceptable    -home medications: Amlodipine 5 mg daily, losartan 25 mg daily, metoprolol 25 mg daily  -current medications: none    -avoid hypotension or high fluctuation in BP    -recommend holding parameters for SBP <130 mmHg  Other: Diabetes       HISTORY OF PRESENT ILLNESS:  Requesting Physician: Gauri Zavala MD  Reason for Consult: Severe ERIK (POA) on CKD IV    Pawan Roman  is a 80y o  year old male who was admitted to [address] after presenting with altered mental status with decreased oral intake  A renal consultation is requested today for assistance in the management of severe ERIK, hyperkalemia  The patient is alert but currently not answering my questions  We will have to defer further HPI  All information obtained from patient's chart  Patient's wife is currently in the hospital   Patient has home nurses who reports patient with altered status for the past 3 days, not eating, drinking, or getting out of his recliner  PAST MEDICAL HISTORY:  Past Medical History:   Diagnosis Date   • Anxiety    • Obrien's esophagus    • BPH (benign prostatic hyperplasia)    • Cancer ([facility] )     pt states hx skin cancer, pt confused   • Cardiomegaly    • Diabetes ([place] [address] )     type 2  controlled; taken off oral meds 6/19   • DVT (deep venous thrombosis) (HCC)     right leg   • GERD (gastroesophageal reflux disease)    • Hypercholesterolemia    • Hypertension    • Irregular heart beat     paroxsysmal a fib   • Pancreatic cyst    • Paroxysmal atrial fibrillation (HCC)    • Pericardial effusion with cardiac tamponade    • Pleural effusion    • Renal disorder     dialysis started in 2019   • Weight loss, non-intentional        PAST SURGICAL HISTORY:  Past Surgical History:   Procedure Laterality Date   • APPENDECTOMY  1953   • CATARACT EXTRACTION Bilateral 2016? • COLONOSCOPY     • COLONOSCOPY N/A 1/24/2019    Procedure: COLONOSCOPY with polypectomies;  Surgeon: Elysia Jeffries MD;  Location: [facility]; Service: Gastroenterology   • ESOPHAGOGASTRODUODENOSCOPY N/A 1/24/2019    Procedure: ESOPHAGOGASTRODUODENOSCOPY (EGD) with bx;  Surgeon: Elysia Jeffries MD;  Location: [facility];   Service: Gastroenterology   • IR TUNNELED DIALYSIS CATHETER CHECK/CHANGE/REPOSITION/ANGIOPLASTY  4/18/2019   • IR TUNNELED DIALYSIS CATHETER PLACEMENT  3/6/2019   • IR TUNNELED DIALYSIS CATHETER REMOVAL  7/30/2019   • PERICARDIAL WINDOW N/A 2/26/2019    Procedure: WINDOW PERICARDIAL;  Surgeon: Malika Rodriguez MD;  Location: [facility]; Service: Thoracic   • WI ANASTOMOSIS,AV,ANY SITE Left 7/3/2019    Procedure: CREATION FISTULA ARTERIOVENOUS (AV) left upper extremity brachial artery to axillary vein [place] graft ;  Surgeon: Dannielle Vega MD;  Location: [address] [place] [address] MAIN OR;  Service: Vascular   • PROSTATE BIOPSY         ALLERGIES:  Allergies   Allergen Reactions   • Bee Venom Facial Swelling   • Ace Inhibitors Other (See Comments)     Unknown reaction       SOCIAL HISTORY:  Social History     Substance and Sexual Activity   Alcohol Use No     Social History     Substance and Sexual Activity   Drug Use Never     Social History     Tobacco Use   Smoking Status Former   • Packs/day: 0 50   • Types: Cigars, Cigarettes   • Quit date:    • Years since quittin 9   Smokeless Tobacco Never   Tobacco Comments    current non-smoker       FAMILY HISTORY:  Family History   Problem Relation Age of Onset   • Diabetes Mother    • Diabetes type II Mother    • Diabetes Father    • Diabetes type II Father    • Heart attack Father    • Pulmonary embolism Sister    • Prostate cancer Brother    • Diabetes Brother        MEDICATIONS:    Current Facility-Administered Medications:   •  albuterol inhalation solution 5 mg, 5 mg, Nebulization, Once, [employer], DO  •  aspirin rectal suppository 300 mg, 300 mg, Rectal, Once, Camila Mendosa MD  •  calcium gluconate 1 g in sodium chloride 0 9% 50 mL (premix), 1 g, Intravenous, Once, [employer], DO  •  dextrose 50 % IV solution 25 mL, 25 mL, Intravenous, Once, Jennifer Sharif, DO  •  insulin regular (HumuLIN R,NovoLIN R) injection 10 Units, 10 Units, Intravenous, Once, [employer] Depope, DO  •  sodium bicarbonate 150 mEq in dextrose 5 % 1,000 mL infusion, 125 mL/hr, Intravenous, Continuous, Jennifer Sharif, DO  •  sodium chloride 0 9 % bolus 1,000 mL, 1,000 mL, Intravenous, Once, [employer] Dep[employer], DO, Last Rate: 500 mL/hr at 22 1235, 1,000 mL at 22 1235  •  sodium polystyrene (KAYEXALATE) powder 15 g, 15 g, Oral, Once, Betty Sharif, DO    Current Outpatient Medications:   •  acetaminophen (TYLENOL) 325 mg tablet, Take 650 mg by mouth daily (Patient not taking: Reported on 11/29/2022), Disp: , Rfl:   •  ALPRAZolam (XANAX) 0 25 mg tablet, Take 1 tablet (0 25 mg total) by mouth every 8 (eight) hours as needed for anxiety, Disp: 270 tablet, Rfl: 0  •  amLODIPine (NORVASC) 5 mg tablet, TAKE 1 TABLET (5 MG TOTAL) BY MOUTH DAILY  , Disp: 90 tablet, Rfl: 3  •  Blood Glucose Monitoring Suppl (ONE TOUCH ULTRA 2) w/Device KIT, by Does not apply route 2 (two) times a day, Disp: 1 each, Rfl: 0  •  Blood Glucose Monitoring Suppl (ONE TOUCH ULTRA 2) w/Device KIT, Use daily, Disp: 1 each, Rfl: 0  •  Blood Glucose Monitoring Suppl (TRUE METRIX METER) YASMEEN, by Does not apply route 2 (two) times a day, Disp: 1 Device, Rfl: 0  •  Cholecalciferol (VITAMIN D PO), Take 5,000 Units by mouth every morning  (Patient not taking: Reported on 11/29/2022), Disp: , Rfl:   •  [employer] Iron 240 (27 Fe) MG tablet, TAKE 1 TABLET (240 MG TOTAL) BY MOUTH EVERY OTHER DAY (Patient not taking: Reported on 11/29/2022), Disp: 45 tablet, Rfl: 1  •  finasteride (PROSCAR) 5 mg tablet, TAKE 1 TABLET BY MOUTH EVERY DAY, Disp: 90 tablet, Rfl: 3  •  fluticasone (FLONASE) 50 mcg/act nasal spray, USE 2 SPRAYS IN EACH NOSTRIL DAILY (Patient not taking: Reported on 8/29/2022), Disp: 16 mL, Rfl: 0  •  glucose blood (OneTouch Ultra) test strip, Test once daily as directed, DX E11 9 type 2 diabetes w/out complication, Disp: 744 each, Rfl: 3  •  glucose blood (TRUE METRIX BLOOD GLUCOSE TEST) test strip, Test twice daily, Disp: 300 each, Rfl: 0  •  Lancets (ONETOUCH ULTRASOFT) lancets, USE DAILY AS DIRECTED , Disp: 100 each, Rfl: 3  •  Lancets (onetouch ultrasoft) lancets, Use daily as instructed, Disp: 100 each, Rfl: 3  •  losartan (COZAAR) 25 mg tablet, TAKE 1 TABLET BY MOUTH EVERY DAY IN THE MORNING, Disp: 90 tablet, Rfl: 3  •  metoprolol tartrate (LOPRESSOR) 25 mg tablet, TAKE 1 TABLET BY MOUTH EVERY DAY, Disp: 90 tablet, Rfl: 3  •  mometasone-formoterol (Dulera) 100-5 MCG/ACT inhaler, Inhale 2 puffs every 12 (twelve) hours Rinse mouth after use  (Patient not taking: Reported on 11/29/2022), Disp: 13 g, Rfl: 11  •  omeprazole (PriLOSEC) 40 MG capsule, Take 40 mg by mouth daily   (Patient not taking: Reported on 11/29/2022), Disp: , Rfl:   •  PARoxetine (PAXIL) 30 mg tablet, Take 1 tablet (30 mg total) by mouth every morning As directed, Disp: 90 tablet, Rfl: 1  •  predniSONE 10 mg tablet, 6 tablets daily, all at one time, with food  Then on day #4 decrease by 1 pill each day until finished , Disp: 33 tablet, Rfl: 0  •  rosuvastatin (CRESTOR) 40 MG tablet, TAKE 1 TABLET BY MOUTH EVERY DAY IN THE MORNING, Disp: 90 tablet, Rfl: 0  •  Tradjenta 5 MG TABS, TAKE 1 TABLET BY MOUTH EVERY DAY (Patient not taking: Reported on 7/11/2022), Disp: 90 tablet, Rfl: 1  •  traMADol (ULTRAM) 50 mg tablet, Take 1 tablet (50 mg total) by mouth every 6 (six) hours as needed for moderate pain (Patient not taking: Reported on 7/11/2022), Disp: 30 tablet, Rfl: 1    REVIEW OF SYSTEMS:  Unable to obtain further ROS due to patient's current mental status  General: No fevers, chills         PHYSICAL EXAM:  Current Weight:    First Weight:    Vitals:    12/06/22 1209 12/06/22 1223 12/06/22 1225   BP: 145/66     BP Location: Left arm     Pulse:  88    Resp:  19    Temp:  97 5 °F (36 4 °C)    SpO2:   99%     No intake or output data in the 24 hours ending 12/06/22 1426  General: NAD  Skin: warm, dry, intact, no rash  HEENT: Moist mucous membranes, sclera anicteric, normocephalic, atraumatic  Neck: No apparent JVD appreciated  Chest:lung sounds clear B/L  CVS:Regular rate and rhythm, no murmer   Abdomen: Soft, round, non-tender, +BS  Extremities: trace B/L LE edema present  Neuro: alert   Psych: appropriate mood and affect     Invasive Devices:      Lab Results: Results from last 7 days   Lab Units 12/06/22  1233   WBC Thousand/uL 13 79*   HEMOGLOBIN g/dL 10 0*   HEMATOCRIT % 30 3*   PLATELETS Thousands/uL 192   SODIUM mmol/L 136   POTASSIUM mmol/L 7 0*   CHLORIDE mmol/L 98   CO2 mmol/L 10*   BUN mg/dL 216*   CREATININE mg/dL 17 52*   CALCIUM mg/dL 8 8   ALK PHOS U/L 73   ALT U/L 30   AST U/L 19

## 2022-12-06 NOTE — ED PROCEDURE NOTE
PROCEDURE  CriticalCare Time  Performed by: Maribeth Rojas MD  Authorized by: Maribeth Rojas MD     Critical care provider statement:     Critical care time (minutes):  35    Critical care time was exclusive of:  Separately billable procedures and treating other patients and teaching time    Critical care was necessary to treat or prevent imminent or life-threatening deterioration of the following conditions:  Metabolic crisis    Critical care was time spent personally by me on the following activities:  Blood draw for specimens, obtaining history from patient or surrogate, development of treatment plan with patient or surrogate, discussions with consultants, evaluation of patient's response to treatment, examination of patient, interpretation of cardiac output measurements, ordering and performing treatments and interventions, ordering and review of laboratory studies, ordering and review of radiographic studies, re-evaluation of patient's condition and review of old Joselo Pickard MD  12/06/22 7355

## 2022-12-06 NOTE — QUICK NOTE
Patient's left upper extremity AVG is pulsatile to touch without audible bruit  Singh has minimal to no urine output  In the context of severe ERIK, acidosis, hyperkalemia -  Recommend the following:    Continue IVF  Telemetry ordered  Will provide 120mg IV lasix now while continuing IVF for kaliuresis  Repeat medical management of hyperkalemia already ordered  F/u repeat BMP at 7pm   Suspect patient will need ICU care with potential IHD intiiation  CT abdomen pending - ensure no obstruction  Have d/w Dr Vincenzo Duverney

## 2022-12-06 NOTE — H&P
[address] [facility]  H&P- Kaleb Cary  1935, 80 y o  male MRN: [id number]  Unit/Bed#: ED 25 Encounter: [id number]  Primary Care Provider: Angi Restrepo DO   Date and time admitted to hospital: 12/6/2022 12:01 PM    AMS (altered mental status)  Assessment & Plan  Home health aid visits throughout the week   Noticed pt was increasingly lethargic  Status, confusion, decreased p o  intake over the past 3 days  Wife is hospitalized, primary caregiver, has been home alone past 3 days   CT Head- Neg   BUN- 229  UA- Neg     Plan-   Serial Neuro Checks  Appreciate Nephrology recommendations- Hold off on HD for now        CKD (chronic kidney disease) stage 5, GFR less than 15 ml/min [facility])  Assessment & Plan  Lab Results   Component Value Date    EGFR 2 12/06/2022    EGFR 2 12/06/2022    EGFR 18 07/27/2022    CREATININE 15 91 (H) 12/06/2022    CREATININE 17 52 (H) 12/06/2022    CREATININE 2 92 (H) 07/27/2022   Not currently on HD  Baseline 2 8-3 3  Hx of ERIK transitioned to CKD w/ renal recovery   Has AV Fistula  POA K- 7  S/p calcium gluconate, albuterol, D5/Insulin     Plan-  Appreciate Nephrology recommendations- medical management, possible CVVH tonight?   Albuterol, D5/Insulin, Calcium Gluconate         High anion gap metabolic acidosis  Assessment & Plan  See CKD and AMS Plan       DM type 2 (diabetes mellitus, type 2) ([facility])  Assessment & Plan  Lab Results   Component Value Date    HGBA1C 6 7 (A) 09/07/2022       Recent Labs     12/06/22  1216   POCGLU 171*       Blood Sugar Average: Last 72 hrs:  (P) 171   Home medications include Tradjenta     Plan-  ISS    COPD (chronic obstructive pulmonary disease) ([facility] [address] )  Assessment & Plan  Home medications include Dulera and Flonase  Was on Prednisone taper however likely did not finish    Plan-  Continue home medications when pt is more alert and oriented       ERIK (acute kidney injury) ([facility] [address] )  Assessment & Plan  See CKD Plan       H&P Exam - Critical Marcel Millan  80 y o  male MRN: [id number]  Unit/Bed#: ED 25 Encounter: [id number]      ----------------------------------------------------------------------------------------------------------  Chief Complaint: AMS    History of Present Illness   HX and PE limited by: Zach Stephen  is a 80 y o  male  With a PMH of CKD stage 5 not currently on HD, DM II, COPD, HTN who presents with AMS  Pt has a home health aid who visits throughout the week and noted that the patient has been increasingly more confused  She reported that the patient wife who is his primary caregiver has been in the hospital for about a week and the patient has been home alone and has not eaten or drank anything in the past few days  History obtained from [facility]   --------------------------------------------------------------------------------------------      Disposition: Continue Critical Care   Code Status: Level 1 - Full Code  ----------------------------------------------------------------------------------------------------------  Review of Systems   Unable to perform ROS: Mental status change       Review of systems was unable to be performed secondary to AMS    Physical Exam  Vitals and nursing note reviewed  Constitutional:       General: He is not in acute distress  Appearance: He is well-developed  He is not ill-appearing  HENT:      Head: Normocephalic and atraumatic  Mouth/Throat:      Comments: Dry mucous membranes  Eyes:      Extraocular Movements: Extraocular movements intact  Conjunctiva/sclera: Conjunctivae normal       Pupils: Pupils are equal, round, and reactive to light  Cardiovascular:      Rate and Rhythm: Normal rate and regular rhythm  Heart sounds: Normal heart sounds  No murmur heard  Pulmonary:      Effort: Pulmonary effort is normal  No respiratory distress  Breath sounds: Normal breath sounds  No wheezing or rhonchi  Chest:      Chest wall: No tenderness     Abdominal: Palpations: Abdomen is soft  Tenderness: There is no abdominal tenderness  Musculoskeletal:         General: No swelling  Normal range of motion  Cervical back: Normal range of motion and neck supple  Skin:     General: Skin is warm and dry  Capillary Refill: Capillary refill takes less than 2 seconds  Neurological:      Mental Status: He is alert  He is disoriented and confused  GCS: GCS eye subscore is 4  GCS verbal subscore is 2  GCS motor subscore is 6  Cranial Nerves: No cranial nerve deficit  Sensory: No sensory deficit  Motor: Weakness present  Comments: Responds to name, follows commands  Speech incomprehensible  Strength/ Motor intact    Psychiatric:         Mood and Affect: Mood normal        ----------------------------------------------------------------------------------------------------------  Vitals:   Vitals:    12/06/22 1225 12/06/22 1526 12/06/22 1545 12/06/22 1722   BP:  137/60 144/85 151/73   BP Location:  Right arm Right arm Right arm   Pulse:  90 86 88   Resp:  20 22 18   Temp:       SpO2: 99% 95% 94% 94%     Temp  Min: 97 5 °F (36 4 °C)  Max: 97 5 °F (36 4 °C)        There is no height or weight on file to calculate BMI    N/A    Laboratory and Diagnostics:  Results from last 7 days   Lab Units 12/06/22  1233   WBC Thousand/uL 13 79*   HEMOGLOBIN g/dL 10 0*   HEMATOCRIT % 30 3*   PLATELETS Thousands/uL 192   NEUTROS PCT % 87*   MONOS PCT % 8     Results from last 7 days   Lab Units 12/06/22  1603 12/06/22  1233   SODIUM mmol/L 136 136   POTASSIUM mmol/L 6 8* 7 0*   CHLORIDE mmol/L 102 98   CO2 mmol/L 10* 10*   ANION GAP mmol/L 24* 28*   BUN mg/dL 229* 216*   CREATININE mg/dL 15 91* 17 52*   CALCIUM mg/dL 8 0* 8 8   GLUCOSE RANDOM mg/dL 235* 186*   ALT U/L  --  30   AST U/L  --  19   ALK PHOS U/L  --  73   ALBUMIN g/dL  --  3 5   TOTAL BILIRUBIN mg/dL  --  0 36                   Results from last 7 days   Lab Units 12/06/22  1233   LACTIC ACID mmol/L <0 3*     ABG:    VBG:          Micro:        EKG: Sinus Rhythm, non specific T wave abnormalities   Imaging: I have personally reviewed pertinent reports  Historical Information   Past Medical History:   Diagnosis Date   • Anxiety    • Obrien's esophagus    • BPH (benign prostatic hyperplasia)    • Cancer ([facility] )     pt states hx skin cancer, pt confused   • Cardiomegaly    • Diabetes ([facility] )     type 2  controlled; taken off oral meds 6/19   • DVT (deep venous thrombosis) (HCC)     right leg   • GERD (gastroesophageal reflux disease)    • Hypercholesterolemia    • Hypertension    • Irregular heart beat     paroxsysmal a fib   • Pancreatic cyst    • Paroxysmal atrial fibrillation (HCC)    • Pericardial effusion with cardiac tamponade    • Pleural effusion    • Renal disorder     dialysis started in 2019   • Weight loss, non-intentional      Past Surgical History:   Procedure Laterality Date   • APPENDECTOMY  1953   • CATARACT EXTRACTION Bilateral 2016? • COLONOSCOPY     • COLONOSCOPY N/A 1/24/2019    Procedure: COLONOSCOPY with polypectomies;  Surgeon: Makeda Back MD;  Location: [facility]; Service: Gastroenterology   • ESOPHAGOGASTRODUODENOSCOPY N/A 1/24/2019    Procedure: ESOPHAGOGASTRODUODENOSCOPY (EGD) with bx;  Surgeon: Makeda Back MD;  Location: [facility];   Service: Gastroenterology   • IR TUNNELED DIALYSIS CATHETER CHECK/CHANGE/REPOSITION/ANGIOPLASTY  4/18/2019   • IR TUNNELED DIALYSIS CATHETER PLACEMENT  3/6/2019   • IR TUNNELED DIALYSIS CATHETER REMOVAL  7/30/2019   • PERICARDIAL WINDOW N/A 2/26/2019    Procedure: WINDOW PERICARDIAL;  Surgeon: Inocencio Wang MD;  Location: [facility];  Service: Thoracic   • DC ANASTOMOSIS,AV,ANY SITE Left 7/3/2019    Procedure: CREATION FISTULA ARTERIOVENOUS (AV) left upper extremity brachial artery to axillary vein Childwold-Mushtaq graft ;  Surgeon: Zenon Barbosa MD;  Location: [facility];  Service: Vascular   • PROSTATE BIOPSY       Social History   Social History     Substance and Sexual Activity   Alcohol Use No     Social History     Substance and Sexual Activity   Drug Use Never     Social History     Tobacco Use   Smoking Status Former   • Packs/day: 0 50   • Types: Cigars, Cigarettes   • Quit date:    • Years since quittin 9   Smokeless Tobacco Never   Tobacco Comments    current non-smoker     Exercise History: unable to obtain   Family History:   Family History   Problem Relation Age of Onset   • Diabetes Mother    • Diabetes type II Mother    • Diabetes Father    • Diabetes type II Father    • Heart attack Father    • Pulmonary embolism Sister    • Prostate cancer Brother    • Diabetes Brother      I have reviewed this patient's family history and commented on sigificant items within the HPI      Medications:  Current Facility-Administered Medications   Medication Dose Route Frequency   • furosemide (LASIX) 120 mg in dextrose 5 % 50 mL IVPB  120 mg Intravenous Once   • insulin lispro (HumaLOG) 100 units/mL subcutaneous injection 1-5 Units  1-5 Units Subcutaneous Q6H [address]   • sodium bicarbonate 150 mEq in dextrose 5 % 1,000 mL infusion  150 mL/hr Intravenous Continuous     Home medications:  Prior to Admission Medications   Prescriptions Last Dose Informant Patient Reported? Taking?    ALPRAZolam (XANAX) 0 25 mg tablet   No No   Sig: Take 1 tablet (0 25 mg total) by mouth every 8 (eight) hours as needed for anxiety   Blood Glucose Monitoring Suppl (ONE TOUCH ULTRA 2) w/Device KIT   No No   Sig: by Does not apply route 2 (two) times a day   Blood Glucose Monitoring Suppl (ONE TOUCH ULTRA 2) w/Device KIT   No No   Sig: Use daily   Blood Glucose Monitoring Suppl (TRUE METRIX METER) YASMEEN   No No   Sig: by Does not apply route 2 (two) times a day   [employer] Iron 240 (27 Fe) MG tablet   No No   Sig: TAKE 1 TABLET (240 MG TOTAL) BY MOUTH EVERY OTHER DAY   Patient not taking: Reported on 2022   Cholecalciferol (VITAMIN D PO)   Yes No   Sig: Take 5,000 Units by mouth every morning    Patient not taking: Reported on 2022   Lancets (ONETOUCH ULTRASOFT) lancets   No No   Sig: USE DAILY AS DIRECTED  Lancets (onetouch ultrasoft) lancets   No No   Sig: Use daily as instructed   PARoxetine (PAXIL) 30 mg tablet   No Yes   Sig: Take 1 tablet (30 mg total) by mouth every morning As directed   Tradjenta 5 MG TABS   No No   Sig: TAKE 1 TABLET BY MOUTH EVERY DAY   Patient not taking: Reported on 2022   acetaminophen (TYLENOL) 325 mg tablet   Yes No   Sig: Take 650 mg by mouth daily   Patient not taking: Reported on 2022   amLODIPine (NORVASC) 5 mg tablet   No Yes   Sig: TAKE 1 TABLET (5 MG TOTAL) BY MOUTH DAILY  finasteride (PROSCAR) 5 mg tablet   No Yes   Sig: TAKE 1 TABLET BY MOUTH EVERY DAY   fluticasone (FLONASE) 50 mcg/act nasal spray   No No   Sig: USE 2 SPRAYS IN EACH NOSTRIL DAILY   Patient not taking: Reported on 2022   glucose blood (OneTouch Ultra) test strip   No No   Sig: Test once daily as directed, DX E11 9 type 2 diabetes w/out complication   glucose blood (TRUE METRIX BLOOD GLUCOSE TEST) test strip   No No   Sig: Test twice daily   losartan (COZAAR) 25 mg tablet   No Yes   Sig: TAKE 1 TABLET BY MOUTH EVERY DAY IN THE MORNING   metoprolol tartrate (LOPRESSOR) 25 mg tablet   No Yes   Sig: TAKE 1 TABLET BY MOUTH EVERY DAY   mometasone-formoterol (Dulera) 100-5 MCG/ACT inhaler   No No   Sig: Inhale 2 puffs every 12 (twelve) hours Rinse mouth after use  Patient not taking: Reported on 2022   omeprazole (PriLOSEC) 40 MG capsule   Yes No   Sig: Take 40 mg by mouth daily     Patient not taking: Reported on 2022   predniSONE 10 mg tablet   No No   Si tablets daily, all at one time, with food  Then on day #4 decrease by 1 pill each day until finished     rosuvastatin (CRESTOR) 40 MG tablet   No Yes   Sig: TAKE 1 TABLET BY MOUTH EVERY DAY IN THE MORNING   traMADol (ULTRAM) 50 mg tablet   No No   Sig: Take 1 tablet (50 mg total) by mouth every 6 (six) hours as needed for moderate pain   Patient not taking: Reported on 7/11/2022      Facility-Administered Medications: None     Allergies: Allergies   Allergen Reactions   • Bee Venom Facial Swelling   • Ace Inhibitors Other (See Comments)     Unknown reaction     ------------------------------------------------------------------------------------------------------------  Advance Directive and Living Will:      Power of : Yes  POLST:    ------------------------------------------------------------------------------------------------------------  Anticipated Length of Stay is > 2 midnights    Care Time Delivered:   Refer to attending attestation       Jerry Galeano MD        Portions of the record may have been created with voice recognition software  Occasional wrong word or "sound a like" substitutions may have occurred due to the inherent limitations of voice recognition software    Read the chart carefully and recognize, using context, where substitutions have occurred

## 2022-12-06 NOTE — ASSESSMENT & PLAN NOTE
Lab Results   Component Value Date    HGBA1C 6 7 (A) 09/07/2022       Recent Labs     12/06/22  1216   POCGLU 171*       Blood Sugar Average: Last 72 hrs:  (P) 171   Home medications include [id number]

## 2022-12-06 NOTE — ASSESSMENT & PLAN NOTE
Home health aid visits throughout the week   Noticed pt was increasingly lethargic  Status, confusion, decreased p o  intake over the past 3 days  Wife is hospitalized, primary caregiver, has been home alone past 3 days   CT Head- Neg   BUN- 229  UA- Neg     Plan-   Serial Neuro Checks  Appreciate Nephrology recommendations- Hold off on HD for now

## 2022-12-06 NOTE — ED NOTES
Per nephrology NP, catheter moved down approx 2mm at this time        Valentino Mueller RN  12/06/22 9727

## 2022-12-06 NOTE — Clinical Note
Case was discussed with Critical Care and the patient's admission status was agreed to be Admission Status: inpatient status to the service of Dr Vincenzo Duverney

## 2022-12-06 NOTE — ASSESSMENT & PLAN NOTE
Home medications include Dulera and Flonase  Was on Prednisone taper however likely did not finish    Plan-  Continue home medications when pt is more alert and oriented

## 2022-12-06 NOTE — ED ATTENDING ATTESTATION
12/6/2022  ISol MD, saw and evaluated the patient  I have discussed the patient with the resident/non-physician practitioner and agree with the resident's/non-physician practitioner's findings, Plan of Care, and MDM as documented in the resident's/non-physician practitioner's note, except where noted  All available labs and Radiology studies were reviewed  I was present for key portions of any procedure(s) performed by the resident/non-physician practitioner and I was immediately available to provide assistance  At this point I agree with the current assessment done in the Emergency Department  I have conducted an independent evaluation of this patient a history and physical is as follows:  26-year-old male presents for evaluation of worsening altered mental status, confusion, decreased p o  intake over the past 3 days  Patient is altered and unable to provide any meaningful history  Unable to perform review of systems secondary to altered mental status  On exam no distress, HEENT unremarkable except for dry mucous membranes, lungs normal, cardiac look abnormal, no external signs of trauma, unable perform neuro exam secondary to altered mental status    Assuming his medical-altered mental status-we will do CT head to rule out acute sinus failure, cardiac/metabolic/endocrine/infectious work-up, admit  ED Course  ED Course as of 12/06/22 1312   Tue Dec 06, 2022   1311 hs TnI 0hr(!): 75  Will give rectal asa         Critical Care Time  Procedures

## 2022-12-07 ENCOUNTER — HOME CARE VISIT (OUTPATIENT)
Dept: HOME HEALTH SERVICES | Facility: HOME HEALTHCARE | Age: 87
End: 2022-12-07

## 2022-12-07 PROBLEM — E87.5 HYPERKALEMIA: Status: RESOLVED | Noted: 2019-02-23 | Resolved: 2022-01-01

## 2022-12-07 PROBLEM — R79.89 AZOTEMIA: Status: ACTIVE | Noted: 2022-12-07

## 2022-12-07 NOTE — ASSESSMENT & PLAN NOTE
Lab Results   Component Value Date    HGBA1C 6 7 (A) 09/07/2022       Recent Labs     12/07/22  0003 12/07/22  0205 12/07/22  0405 12/07/22  0620   POCGLU 301* 266* 240* 223*       Blood Sugar Average: Last 72 hrs:  (P) 254   Home medications include Tradjenta     Plan-  SSI  Hypoglycemia protocol  accucheck q6h  NPO at this time

## 2022-12-07 NOTE — WOUND OSTOMY CARE
Consult Note - Wound   Tona Tariq  80 y o  male MRN: [id number]  Unit/Bed#: ICU 02 Encounter: [id number]      History and Present Illness:  Patient is seen for skin assessment  Patient is a moderate assist to turn  Patient had an urinary catheter in place and has unknown bowel continence status  Patient was awake, alert, and nonconversant during exam       Assessment Findings:   1  POA DTI on right buttock        2  IAD with partial thickness skin loss on perirectum    Heels intact      See flowsheets for details    Skin care plans:  1-Calazime to sacrum, buttocks TID and PRN  2-Hydraguard to bilateral heel BID and PRN  3-Elevate heels to offload pressure  4-Ehob cushion when out of bed  5-Turn/repoisiton q2h or when medically stable for pressure re-distribution on skin  6-Moisturize skin daily with skin nourishing cream      Call or tigertext with any questions  Wound Care will continue to follow    Wound 12/06/22 Pressure Injury Coccyx (Active)   Wound Image   12/07/22 1216   Wound Description Clean;Dry; Intact; Light purple 12/07/22 1216   Pressure Injury Stage DTPI 12/07/22 1216   Helen-wound Assessment Intact; Clean;Dry 12/07/22 1216   Wound Length (cm) 0 5 cm 12/07/22 1216   Wound Width (cm) 0 5 cm 12/07/22 1216   Wound Surface Area (cm^2) 0 25 cm^2 12/07/22 1216   Treatments Cleansed 12/07/22 1216   Dressing Protective barrier 12/07/22 1216       Wound 12/06/22 MASD Rectum (Active)   Wound Description Clean;Bleeding;Fragile;Pink 12/07/22 1218   Pressure Injury Stage O 12/07/22 1218   Helen-wound Assessment Clean;Dry; Intact 12/07/22 1218   Wound Length (cm) 1 5 cm 12/07/22 1218   Wound Width (cm) 0 5 cm 12/07/22 1218   Wound Surface Area (cm^2) 0 75 cm^2 12/07/22 1218   Drainage Amount Scant 12/07/22 1218   Drainage Description Serosanguineous 12/07/22 1218   Non-staged Wound Description Partial thickness 12/07/22 1218   Treatments Cleansed 12/07/22 1218   Dressing Protective barrier 12/07/22 4011

## 2022-12-07 NOTE — DISCHARGE INSTR - OTHER ORDERS
Skin care plans:  1-Calazime to sacrum, buttocks TID and PRN  2-Hydraguard to bilateral heel BID and PRN  3-Elevate heels to offload pressure  4-Ehob cushion when out of bed  5-Turn/repoisiton q2h or when medically stable for pressure re-distribution on skin    6-Moisturize skin daily with skin nourishing cream

## 2022-12-07 NOTE — PROGRESS NOTES
[facility]  Progress Note - Jeffery Chu  1935, 80 y o  male MRN: [id number]  Unit/Bed#: ICU 02 Encounter: [id number]  Primary Care Provider: Joshua Mueller DO   Date and time admitted to hospital: 12/6/2022 12:01 PM    * ERIK (acute kidney injury) [facility])  Assessment & Plan  See CKD Plan       CKD (chronic kidney disease) stage 5, GFR less than 15 ml/min [facility])  Assessment & Plan  Lab Results   Component Value Date    EGFR 2 12/07/2022    EGFR 2 12/06/2022    EGFR 2 12/06/2022    CREATININE 16 46 (H) 12/07/2022    CREATININE 16 41 (H) 12/06/2022    CREATININE 15 91 (H) 12/06/2022   Not currently on HD  Baseline 2 8-3 3  Hx of ERIK transitioned to CKD w/ renal recovery   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  No bruit auscultated on exam     He appears to be euvolemic  Patient made just under a liter of urine in past 24 hrs  No emergent need for dialysis at this time  Potassium 5 1, bicarb 15, AG 25, creatinine 16 5, phosphorus 10 1  S/p calcium gluconate, albuterol, D5/Insulin     Plan-  Appreciate Nephrology recommendations- medical management at this time  Holding off on HD/CVVH for now  Albuterol, D5/Insulin, Calcium Gluconate   Trend bmp         AMS (altered mental status)  Assessment & Plan  Home health aid visits throughout the week   Noticed pt was increasingly lethargic  Status, confusion, decreased p o  intake over the past 3 days  Wife is hospitalized, per primary caregiver, he has been home alone past 3 days   CT Head- Neg   BUN- 229  UA- Neg   Most likely metabolic encephalopathy secondary to renal failure    Plan-   Serial Neuro Checks  Appreciate Nephrology recommendations- Hold off on HD for now        COPD (chronic obstructive pulmonary disease) ([facility] [address] )  Assessment & Plan  Home medications include Dulera and Flonase  Was on Prednisone taper however likely did not finish  In no MOSHE  Saturating well on room air          Plan-  Continue home medications when pt is more alert and oriented       High anion gap metabolic acidosis  Assessment & Plan  See CKD and AMS Plan       DM type 2 (diabetes mellitus, type 2) [facility])  Assessment & Plan  Lab Results   Component Value Date    HGBA1C 6 7 (A) 2022       Recent Labs     22  0003 22  0205 22  0405 22  0620   POCGLU 301* 266* 240* 223*       Blood Sugar Average: Last 72 hrs:  (P) 254   Home medications include Tradjenta     Plan-  SSI  Hypoglycemia protocol  accucheck q6h  NPO at this time      Hyperkalemia  Assessment & Plan  Secondary to renal failure  Potassium trending down with use of albuterol and insulin and bicarb drips    Plan:  Continue current management      ----------------------------------------------------------------------------------------  HPI/24hr events: No overnight events reported  Patient appropriate for transfer out of the ICU today?: No  Disposition: Continue Critical Care   Code Status: Level 1 - Full Code  ---------------------------------------------------------------------------------------  SUBJECTIVE  Patient continues to be in an altered mental state  No subjective data able to be collected      ---------------------------------------------------------------------------------------  OBJECTIVE    Vitals   Vitals:    2215 22 0622 0645 22 0718   BP: 94/50 104/66 129/66    BP Location:       Pulse: (!) 108 (!) 110 104    Resp: 21 (!) 32 (!) 29    Temp:    98 1 °F (36 7 °C)   TempSrc:    Oral   SpO2: 98% 98% 98%    Weight:         Temp (24hrs), Av 8 °F (36 6 °C), Min:97 5 °F (36 4 °C), Max:98 2 °F (36 8 °C)  Current: Temperature: 98 1 °F (36 7 °C)          Respiratory:  SpO2: SpO2: 98 %       Invasive/non-invasive ventilation settings   Respiratory    Lab Data (Last 4 hours)    None         O2/Vent Data (Last 4 hours)    None                Physical Exam  Vitals and nursing note reviewed     Constitutional:       General: He is not in acute distress  Appearance: He is well-developed  He is not ill-appearing  HENT:      Head: Normocephalic and atraumatic  Mouth/Throat:      Mouth: Mucous membranes are dry  Pharynx: Oropharynx is clear  No oropharyngeal exudate  Comments: Dry mucous membranes  Eyes:      General: No scleral icterus  Extraocular Movements: Extraocular movements intact  Conjunctiva/sclera: Conjunctivae normal       Pupils: Pupils are equal, round, and reactive to light  Cardiovascular:      Rate and Rhythm: Normal rate and regular rhythm  Pulses: Normal pulses  Heart sounds: Normal heart sounds  No murmur heard  Pulmonary:      Effort: Pulmonary effort is normal  No respiratory distress  Breath sounds: Normal breath sounds  No wheezing or rhonchi  Chest:      Chest wall: No tenderness  Abdominal:      General: Abdomen is flat  Bowel sounds are normal  There is no distension  Palpations: Abdomen is soft  Tenderness: There is no abdominal tenderness  Musculoskeletal:         General: No swelling  Normal range of motion  Cervical back: Normal range of motion and neck supple  Right lower leg: No edema  Left lower leg: No edema  Skin:     General: Skin is warm and dry  Capillary Refill: Capillary refill takes less than 2 seconds  Neurological:      Mental Status: He is alert  He is disoriented and confused  GCS: GCS eye subscore is 4  GCS verbal subscore is 2  GCS motor subscore is 6  Cranial Nerves: No cranial nerve deficit  Sensory: No sensory deficit  Motor: No weakness        Comments: Responds to name, follows commands intermittently  Speech incomprehensible  Strength/ Motor intact    Psychiatric:         Mood and Affect: Mood normal              Laboratory and Diagnostics:  Results from last 7 days   Lab Units 12/07/22  0228 12/06/22  1233   WBC Thousand/uL 12 38* 13 79*   HEMOGLOBIN g/dL 7 8* 10 0*   HEMATOCRIT % 22 8* 30 3*   PLATELETS Thousands/uL 140* 192   NEUTROS PCT % 92* 87*   MONOS PCT % 4 8     Results from last 7 days   Lab Units 12/07/22  0212 12/06/22  2052 12/06/22  1603 12/06/22  1233   SODIUM mmol/L 137 136 136 136   POTASSIUM mmol/L 5 1 5 7* 6 8* 7 0*   CHLORIDE mmol/L 97 98 102 98   CO2 mmol/L 15* 14* 10* 10*   ANION GAP mmol/L 25* 24* 24* 28*   BUN mg/dL 226* 226* 229* 216*   CREATININE mg/dL 16 46* 16 41* 15 91* 17 52*   CALCIUM mg/dL 8 5 8 7 8 0* 8 8   GLUCOSE RANDOM mg/dL 266* 310* 235* 186*   ALT U/L  --   --   --  30   AST U/L  --   --   --  19   ALK PHOS U/L  --   --   --  73   ALBUMIN g/dL  --   --   --  3 5   TOTAL BILIRUBIN mg/dL  --   --   --  0 36     Results from last 7 days   Lab Units 12/07/22  0212   PHOSPHORUS mg/dL 10 1*               Results from last 7 days   Lab Units 12/06/22  1233   LACTIC ACID mmol/L <0 3*     ABG:    VBG:          Micro        EKG: Reviewed  Imaging: I have personally reviewed pertinent reports  Intake and Output  I/O       12/05 0701 12/06 0700 12/06 0701 12/07 0700 12/07 0701  12/08 0700    I V  (mL/kg)  2232 8 (34)     Total Intake(mL/kg)  2232 8 (34)     Urine (mL/kg/hr)  960     Total Output  960     Net  +1272 8                  Height and Weights         Body mass index is 26 45 kg/m²  Weight (last 2 days)     Date/Time Weight    12/07/22 0553 65 6 (144 62)    12/06/22 2217 65 6 (144 62)            Nutrition       Diet Orders   (From admission, onward)             Start     Ordered    12/06/22 1724  Diet NPO  Diet effective now        References:    Nutrtion Support Algorithm Enteral vs  Parenteral   Question Answer Comment   Diet Type NPO    RD to adjust diet per protocol?  Yes        12/06/22 1724                  Active Medications  Scheduled Meds:  Current Facility-Administered Medications   Medication Dose Route Frequency Provider Last Rate   • insulin regular (HumuLIN R,NovoLIN R) infusion  0 3-21 Units/hr Intravenous Titrated Katmarco antonio Polo, CRNP 5 Units/hr (12/07/22 7165)   • sodium bicarbonate infusion  150 mL/hr Intravenous Continuous SUJATA Nguyen 150 mL/hr (12/07/22 6703)     Continuous Infusions:  insulin regular (HumuLIN R,NovoLIN R) infusion, 0 3-21 Units/hr, Last Rate: 5 Units/hr (12/07/22 5018)  sodium bicarbonate infusion, 150 mL/hr, Last Rate: 150 mL/hr (12/07/22 0637)      PRN Meds:        Invasive Devices Review  Invasive Devices     Peripheral Intravenous Line  Duration           Peripheral IV 12/06/22 Left Wrist <1 day    Peripheral IV 12/06/22 Right;Upper Arm <1 day    Peripheral IV 12/06/22 Right;Ventral (anterior) Forearm <1 day          Line  Duration           Hemodialysis AV Fistula 07/03/19 Left Upper arm 1252 days          Drain  Duration           Urethral Catheter 16 Fr  <1 day                Rationale for remaining devices: Acute renal failure  ---------------------------------------------------------------------------------------  Advance Directive and Living Will:      Power of : Yes  POLST:    ---------------------------------------------------------------------------------------  Care Time Delivered:   No Critical Care time spent       Alejandro Sherman MD      Portions of the record may have been created with voice recognition software  Occasional wrong word or "sound a like" substitutions may have occurred due to the inherent limitations of voice recognition software    Read the chart carefully and recognize, using context, where substitutions have occurred

## 2022-12-07 NOTE — PROGRESS NOTES
NEPHROLOGY PROGRESS NOTE   Gonzalo Perales  80 y o  male MRN: [id number]  Unit/Bed#: ICU 02 Encounter: [id number]  Reason for Consult: Severe acute kidney injury (POA) on CKD IV    ASSESSMENT/PLAN:  Severe ERIK (POA) on CKD IV:  With severe azotemia, suspected prerenal etiology with volume depletion versus progression of chronic disease   -Presents with a creatinine of 17 52   -Baseline creatinine mid 2's to low 3's  -Creatinine slightly improved to 16 46    -Follows with Dr Ondina Zuniga  History of acute kidney injury requiring hemodialysis  -Received 1 L of saline   -Currently on bicarbonate drip   -Received 120 mg of IV Lasix to assist with kaliuresis  -CK level 671   -UA: Glucose, moderate blood, +1 protein, 4-10 WBCs  -CT without hydro, right renal cyst   -Continue to hold ARB  -Currently with indwelling Medeiros catheter   -Recommend avoiding nephrotoxins, hypotension, IV contrast   -Strict I/O's   -Plan to initiate on CRRT today due to severe azotemia with increasing BUN  Will need dialysis line placed  Discussed with critical care team   Verbal consent obtained from granddaughter, Kwaku Danielson  -will continue to trend labs       Access: Left upper extremity graft, placed in 2019  Not functioning    -Place pink bracelet on left arm    -Will need dialysis line placed by critical care team      Hyperkalemia: Presents with potassium of 7 0 and nonhemolyzed specimen  No evidence of urinary retention    - Medical management with albuterol, insulin, dextrose, calcium gluconate and Kayexalate (repeated with elevated K on repeat labs 12/06/22)  -continues on bicarbonate drip    -S/P medeiros insertion, no evidence of obstruction on imaging    -Plan to initiate on CRRT today      Anion gap acidosis: In the setting of severe acute renal failure   -Lactic acid level low    -Continues on bicarbonate drip   -Plan to initiate on CRRT  Hyperphosphatemia:  In the setting of severe acute kidney injury   -Recommend low phosphorus diet when not NPO  -Will place on phosphorus binders when not NPO and able to take oral medication   -Continue to monitor and trend with renal function      Hypertension: BP is fluctuating, hypotensive at times  -home medications: Amlodipine 5 mg daily, losartan 25 mg daily, metoprolol 25 mg daily  -current medications: none  Holding antihypertensives   -avoid hypotension or high fluctuation in BP    -recommend holding parameters for SBP <130 mmHg  Altered mental status: Suspect metabolic encephalopathy versus renal failure  -CT head negative  Anemia:  -Continue to monitor and transfuse as needed for hemoglobin less than 7 0      Other: Diabetes, COPD  Disposition: Requiring additional stay due to medical needs  SUBJECTIVE:  The patient opens eyes to speech  He is currently not answering questions  Spoke with his granddaughter Delilah Ni on the telephone, who agrees with starting dialysis  She has also discussed this with his other granddaughter      OBJECTIVE:  Current Weight: Weight - Scale: 65 6 kg (144 lb 10 oz)  Vitals:    12/07/22 0615 12/07/22 0630 12/07/22 0645 12/07/22 0718   BP: 94/50 104/66 129/66    BP Location:       Pulse: (!) 108 (!) 110 104    Resp: 21 (!) 32 (!) 29    Temp:    98 1 °F (36 7 °C)   TempSrc:    Oral   SpO2: 98% 98% 98%    Weight:           Intake/Output Summary (Last 24 hours) at 12/7/2022 0859  Last data filed at 12/7/2022 0600  Gross per 24 hour   Intake 2232 76 ml   Output 960 ml   Net 1272 76 ml     General: Ill appearance  Skin: warm, dry, intact, no rash  HEENT: Moist mucous membranes, sclera anicteric, normocephalic, atraumatic  Neck: No apparent JVD appreciated  Chest: lung sounds clear B/L, on RA   CVS:Regular rate and rhythm, no murmer   Abdomen: Soft, round, non-tender, +BS, Singh catheter with yellow urine  Extremities: Trace B/L LE edema present, nonfunctioning left upper extremity graft  Neuro: alert, lethargic  Psych: appropriate mood and affect     Medications:    Current Facility-Administered Medications:   •  insulin regular (HumuLIN R,NovoLIN R) 1 Units/mL in sodium chloride 0 9 % 100 mL infusion, 0 3-21 Units/hr, Intravenous, Titrated, Canda Line, CRNP, Last Rate: 8 mL/hr at 12/07/22 0809, 8 Units/hr at 12/07/22 0809  •  sodium bicarbonate 150 mEq in dextrose 5 % 1,000 mL infusion, 150 mL/hr, Intravenous, Continuous, Omero Toney, CRNP, Last Rate: 150 mL/hr at 12/07/22 0555, 150 mL/hr at 12/07/22 0555    Laboratory Results:  Results from last 7 days   Lab Units 12/07/22  0807 12/07/22  0228 12/07/22  0212 12/06/22  2052 12/06/22  1603 12/06/22  1233   WBC Thousand/uL 13 21* 12 38*  --   --   --  13 79*   HEMOGLOBIN g/dL 8 1* 7 8*  --   --   --  10 0*   HEMATOCRIT % 24 0* 22 8*  --   --   --  30 3*   PLATELETS Thousands/uL 131* 140*  --   --   --  192   SODIUM mmol/L  --   --  137 136 136 136   POTASSIUM mmol/L  --   --  5 1 5 7* 6 8* 7 0*   CHLORIDE mmol/L  --   --  97 98 102 98   CO2 mmol/L  --   --  15* 14* 10* 10*   BUN mg/dL  --   --  226* 226* 229* 216*   CREATININE mg/dL  --   --  16 46* 16 41* 15 91* 17 52*   CALCIUM mg/dL  --   --  8 5 8 7 8 0* 8 8   PHOSPHORUS mg/dL  --   --  10 1*  --   --   --    ALK PHOS U/L  --   --   --   --   --  73   ALT U/L  --   --   --   --   --  30   AST U/L  --   --   --   --   --  19

## 2022-12-07 NOTE — PLAN OF CARE
Problem: Potential for Falls  Goal: Patient will remain free of falls  Description: INTERVENTIONS:  - Educate patient/family on patient safety including physical limitations  - Instruct patient to call for assistance with activity   - Consult OT/PT to assist with strengthening/mobility   - Keep Call bell within reach  - Keep bed low and locked with side rails adjusted as appropriate  - Keep care items and personal belongings within reach  - Initiate and maintain comfort rounds  - Make Fall Risk Sign visible to staff  - Apply yellow socks and bracelet for high fall risk patients  - Consider moving patient to room near nurses station  Outcome: Progressing     Problem: MOBILITY - ADULT  Goal: Maintain or return to baseline ADL function  Description: INTERVENTIONS:  -  Assess patient's ability to carry out ADLs; assess patient's baseline for ADL function and identify physical deficits which impact ability to perform ADLs (bathing, care of mouth/teeth, toileting, grooming, dressing, etc )  - Assess/evaluate cause of self-care deficits   - Assess range of motion  - Assess patient's mobility; develop plan if impaired  - Assess patient's need for assistive devices and provide as appropriate  - Encourage maximum independence but intervene and supervise when necessary  - Involve family in performance of ADLs  - Assess for home care needs following discharge   - Consider OT consult to assist with ADL evaluation and planning for discharge  - Provide patient education as appropriate  Outcome: Progressing  Goal: Maintains/Returns to pre admission functional level  Description: INTERVENTIONS:  - Perform BMAT or MOVE assessment daily    - Set and communicate daily mobility goal to care team and patient/family/caregiver     - Collaborate with rehabilitation services on mobility goals if consulted  - Out of bed for toileting  - Record patient progress and toleration of activity level   Outcome: Progressing     Problem: Prexisting or High Potential for Compromised Skin Integrity  Goal: Skin integrity is maintained or improved  Description: INTERVENTIONS:  - Identify patients at risk for skin breakdown  - Assess and monitor skin integrity  - Assess and monitor nutrition and hydration status  - Monitor labs   - Assess for incontinence   - Turn and reposition patient  - Assist with mobility/ambulation  - Relieve pressure over bony prominences  - Avoid friction and shearing  - Provide appropriate hygiene as needed including keeping skin clean and dry  - Evaluate need for skin moisturizer/barrier cream  - Collaborate with interdisciplinary team   - Patient/family teaching  - Consider wound care consult   Outcome: Progressing     Problem: PAIN - ADULT  Goal: Verbalizes/displays adequate comfort level or baseline comfort level  Description: Interventions:  - Encourage patient to monitor pain and request assistance  - Assess pain using appropriate pain scale  - Administer analgesics based on type and severity of pain and evaluate response  - Implement non-pharmacological measures as appropriate and evaluate response  - Consider cultural and social influences on pain and pain management  - Notify physician/advanced practitioner if interventions unsuccessful or patient reports new pain  Outcome: Progressing     Problem: INFECTION - ADULT  Goal: Absence or prevention of progression during hospitalization  Description: INTERVENTIONS:  - Assess and monitor for signs and symptoms of infection  - Monitor lab/diagnostic results  - Monitor all insertion sites, i e  indwelling lines, tubes, and drains  - Monitor endotracheal if appropriate and nasal secretions for changes in amount and color  - [place] appropriate cooling/warming therapies per order  - Administer medications as ordered  - Instruct and encourage patient and family to use good hand hygiene technique  - Identify and instruct in appropriate isolation precautions for identified infection/condition  Outcome: Progressing  Goal: Absence of fever/infection during neutropenic period  Description: INTERVENTIONS:  - Monitor WBC    Outcome: Progressing     Problem: SAFETY ADULT  Goal: Patient will remain free of falls  Description: INTERVENTIONS:  - Educate patient/family on patient safety including physical limitations  - Instruct patient to call for assistance with activity   - Consult OT/PT to assist with strengthening/mobility   - Keep Call bell within reach  - Keep bed low and locked with side rails adjusted as appropriate  - Keep care items and personal belongings within reach  - Initiate and maintain comfort rounds  - Make Fall Risk Sign visible to staff  - Apply yellow socks and bracelet for high fall risk patients  - Consider moving patient to room near nurses station  Outcome: Progressing  Goal: Maintain or return to baseline ADL function  Description: INTERVENTIONS:  -  Assess patient's ability to carry out ADLs; assess patient's baseline for ADL function and identify physical deficits which impact ability to perform ADLs (bathing, care of mouth/teeth, toileting, grooming, dressing, etc )  - Assess/evaluate cause of self-care deficits   - Assess range of motion  - Assess patient's mobility; develop plan if impaired  - Assess patient's need for assistive devices and provide as appropriate  - Encourage maximum independence but intervene and supervise when necessary  - Involve family in performance of ADLs  - Assess for home care needs following discharge   - Consider OT consult to assist with ADL evaluation and planning for discharge  - Provide patient education as appropriate  Outcome: Progressing  Goal: Maintains/Returns to pre admission functional level  Description: INTERVENTIONS:  - Perform BMAT or MOVE assessment daily    - Set and communicate daily mobility goal to care team and patient/family/caregiver     - Collaborate with rehabilitation services on mobility goals if consulted  - Out of bed for toileting  - Record patient progress and toleration of activity level   Outcome: Progressing     Problem: DISCHARGE PLANNING  Goal: Discharge to home or other facility with appropriate resources  Description: INTERVENTIONS:  - Identify barriers to discharge w/patient and caregiver  - Arrange for needed discharge resources and transportation as appropriate  - Identify discharge learning needs (meds, wound care, etc )  - Arrange for interpretive services to assist at discharge as needed  - Refer to Case Management Department for coordinating discharge planning if the patient needs post-hospital services based on physician/advanced practitioner order or complex needs related to functional status, cognitive ability, or social support system  Outcome: Progressing     Problem: Knowledge Deficit  Goal: Patient/family/caregiver demonstrates understanding of disease process, treatment plan, medications, and discharge instructions  Description: Complete learning assessment and assess knowledge base    Interventions:  - Provide teaching at level of understanding  - Provide teaching via preferred learning methods  Outcome: Progressing

## 2022-12-07 NOTE — ASSESSMENT & PLAN NOTE
Secondary to renal failure  Potassium trending down with use of albuterol and insulin and bicarb drips    Plan:  Continue current management

## 2022-12-07 NOTE — ASSESSMENT & PLAN NOTE
Home medications include Dulera and Flonase  Was on Prednisone taper however likely did not finish  In no MOSHE  Saturating well on room air          Plan-  Continue home medications when pt is more alert and oriented

## 2022-12-07 NOTE — CASE MANAGEMENT
Case Management Assessment & Discharge Planning Note    Patient name [facility]    Location ICU ICU  MRN [id number]  : 1935 Date 2022       Current Admission Date: 2022  Current Admission Diagnosis:ERIK (acute kidney injury) [facility])   Patient Active Problem List    Diagnosis Date Noted   • Azotemia 2022   • High anion gap metabolic acidosis    • AMS (altered mental status) 2022   • ILD (interstitial lung disease) ([facility] ) 2022   • Abnormal CT of the chest    • Black stools 2022   • Allergy to ACE inhibitors 2021   • CKD (chronic kidney disease) stage 5, GFR less than 15 ml/min ([facility] ) 2020   • History of anemia due to CKD 2020   • DM type 2 (diabetes mellitus, type 2) ([facility] ) 2020   • Cardiomyopathy ([facility] ) 2019   • Stage 5 chronic kidney disease ([facility] ) 2019   • Renovascular hypertension 2019   • Persistent proteinuria 2019   • Anemia due to chronic kidney disease 2019   • Secondary hyperparathyroidism of renal origin ([facility] ) 2019   • Hemodialysis access, AV graft ([facility] ) 10/30/2019   • Obrien's esophagus without dysplasia 2019   • Tubular adenoma 2019   • S/P pericardial window creation 2019   • Chronic anemia 2019   • COPD (chronic obstructive pulmonary disease) ([facility] ) 2019   • ERIK (acute kidney injury) ([facility] ) 2019   • Pericardial effusion with cardiac tamponade 2018   • Paroxysmal atrial fibrillation ([facility] ) 2018   • Benign prostatic hyperplasia with urinary frequency 2018   • Lower leg DVT (deep venous thromboembolism), acute, right ([facility] ) 10/03/2016   • Pancreatic cyst 2013   • Pulmonary nodule seen on imaging study 2013   • Esophageal reflux 2012   • Dyslipidemia 2012   • Essential hypertension 2012   • Pulmonary embolism ([facility] ) 2012   • Type 2 diabetes mellitus ([facility] ) 2012      LOS (days): 1  Geometric Mean LOS (GMLOS) (days): 4 30  Days to GMLOS:3 3     OBJECTIVE:    Risk of Unplanned Readmission Score: 25 78         Current admission status: Inpatient  Referral Reason: Other (disposition planning)    Preferred Pharmacy:   [facility][id number]- [id number] [address] [place] [address]  Phone: [phone] Fax: [phone]    Primary Care Provider: Nadya Salgado DO    Primary Insurance: Avinash Alonso [facility]  Secondary Insurance:     ASSESSMENT:  [address]   Primary Phone: [phone] (Mobile)               Advance Directives  Does patient have a [address] [facility] [address]?: No  Was patient offered paperwork?: Yes (5 Wishes)  Does patient currently have a Health Care decision maker?: Yes, please see Health Care Proxy section  Does patient have Advance Directives?: No  Was patient offered paperwork?: Yes (5 Wishes)  Primary Contact: Patient does not have a mediacl POA, but has a financial POA - split between nitin Vaibhav Client and Arnie Dickson  Vaibhav Client stated that next of kin is patient's wife, however she is somewhat confused and currently in [address] [place] at "[employer] Insurance  Next after wife is his two children, however his son is  and his daughter Bev Rutledge) has previously been indicated as an individual the patient and his wife do NOT want updated on medical information (per granddaughter Vaibhav Client)  Readmission Root Cause  30 Day Readmission: No    Patient Information  Admitted from[de-identified] Home  Mental Status: Confused  During Assessment patient was accompanied by: Other-Comment (Granddaughter)  Assessment information provided by[de-identified] Other - please comment (Granddaughter)  Primary Caregiver: Self  Support Systems: Spouse/significant other, Family members  [place] of Residence: [address] do you live in?: [address] entry access options   Select all that apply : Ramp  Type of Current Residence: [place]  In the last 12 months, was there a time when you were not able to pay the mortgage or rent on time?: No  In the last 12 months, how many places have you lived?: 1  In the last 12 months, was there a time when you did not have a steady place to sleep or slept in a shelter (including now)?: No  Homeless/housing insecurity resource given?: N/A  Living Arrangements: Lives w/ Spouse/significant other  Is patient a ?: No    Activities of Daily Living Prior to Admission  Functional Status: Independent  Completes ADLs independently?: Yes  Ambulates independently?: Yes  Does patient use assisted devices?: No  Does patient currently own DME?: No  Does patient have a history of Outpatient Therapy (PT/OT)?: No  Does the patient have a history of Short-Term Rehab?: No  Does patient have a history of HHC?: Yes ([facility])  Does patient currently have Fransiscou ?: Yes    Current Home Health Care  Type of Current Home Care Services: Nurse visit  Current Home Health Agency[de-identified] [address] [place] [address] Provider[de-identified] PCP    Patient Information Continued  Income Source: SSI/SSD  Does patient have prescription coverage?: Yes  Within the past 12 months, you worried that your food would run out before you got the money to buy more : Never true  Within the past 12 months, the food you bought just didn't last and you didn't have money to get more : Never true  Food insecurity resource given?: N/A  Does patient receive dialysis treatments?: No  Does patient have a history of substance abuse?: No  Does patient have a history of Mental Health Diagnosis?: Yes (Anxiety)         Means of Transportation  [facility] Family transport  In the past 12 months, has lack of transportation kept you from medical appointments or from getting medications?: No  In the past 12 months, has lack of transportation kept you from meetings, work, or from getting things needed for daily living?: No  Was application for public transport provided?: N/A        DISCHARGE DETAILS:    Discharge planning discussed with[de-identified] Patient's mesfin Underwoodts of Choice: Yes  Comments - Freedom of Choice: James Amandeep would like patient to go to STR if recommended - if so, preference would be [facility] as that is where pt's wife is currently staying for STR  CM contacted family/caregiver?: Yes             Contacts  Patient Contacts: Faith Wilkerson (granddaughter)  Relationship to Patient[de-identified] Family  Contact Method: Phone  Phone Number: [phone]  Reason/Outcome: Emergency Contact, Discharge [address]         Is the patient interested in [address] at discharge?: Yes  [address] requested[de-identified] [address] Name[de-identified] [facility] Provider[de-identified] PCP  Home Health Services Needed[de-identified] COPD Management, Diabetes Management  Homebound Criteria Met[de-identified] Requires the Assistance of Another Person for Safe Ambulation or to Leave the Home  Supporting Clincal Findings[de-identified] Limited Endurance         Other Referral/Resources/Interventions Provided:  Interventions: HHC  Referral Comments: RUTH referral sent for [facility]

## 2022-12-07 NOTE — ACP (ADVANCE CARE PLANNING)
Spoke with the patient's granddaughter regarding his condition and how he would most likely benefit from dialysis  Patient's granddaughter has given verbal consent for temporary HD catheter placement and hemodialysis  Granddaughter has understanding of the risks of these procedures and treatments

## 2022-12-07 NOTE — ASSESSMENT & PLAN NOTE
Lab Results   Component Value Date    EGFR 2 12/07/2022    EGFR 2 12/06/2022    EGFR 2 12/06/2022    CREATININE 16 46 (H) 12/07/2022    CREATININE 16 41 (H) 12/06/2022    CREATININE 15 91 (H) 12/06/2022   Not currently on HD  Baseline 2 8-3 3  Hx of ERIK transitioned to CKD w/ renal recovery   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  No bruit auscultated on exam     He appears to be euvolemic  Patient made just under a liter of urine in past 24 hrs  No emergent need for dialysis at this time  Potassium 5 1, bicarb 15, AG 25, creatinine 16 5, phosphorus 10 1  S/p calcium gluconate, albuterol, D5/Insulin     Plan-  Appreciate Nephrology recommendations- medical management at this time   Holding off on HD/CVVH for now  Albuterol, D5/Insulin, Calcium Gluconate   Trend bmp

## 2022-12-07 NOTE — CASE MANAGEMENT
Case Management Progress Note    Patient name Diana Jones  Location ICU 02/ICU 02 MRN [id number]  : 1935 Date 2022       LOS (days): 1  Geometric Mean LOS (GMLOS) (days):   Days to GMLOS:        OBJECTIVE:        Current admission status: Inpatient  Preferred Pharmacy:   [id number]- [id number] [address]  [address] [place] [address] St  Phone: [phone] Fax: [phone]    Primary Care Provider: Marcus Gillespie DO    Primary Insurance: Raul Lloyd [facility] REP  Secondary Insurance:     PROGRESS NOTE:  CM left  for granddaughter Kelly Lee requesting call back for assistance with assessment  CM also attempted to call grandchild Bonney Soulier; however, phone went straight to

## 2022-12-07 NOTE — UTILIZATION REVIEW
Initial Clinical Review    Admission: Date/Time/Statement:   Admission Orders (From admission, onward)     Ordered        12/06/22 1716  INPATIENT ADMISSION  Once                      Orders Placed This Encounter   Procedures   • INPATIENT ADMISSION     Standing Status:   Standing     Number of Occurrences:   1     Order Specific Question:   Level of Care     Answer:   Critical Care [15]     Order Specific Question:   Estimated length of stay     Answer:   More than 2 Midnights     Order Specific Question:   Certification     Answer:   I certify that inpatient services are medically necessary for this patient for a duration of greater than two midnights  See H&P and MD Progress Notes for additional information about the patient's course of treatment  ED Arrival Information     Expected   -    Arrival   12/6/2022 12:01    Acuity   Emergent            Means of arrival   Ambulance    Escorted by   [facility]    Admission type   Emergency            Arrival complaint   altered mental status           Chief Complaint   Patient presents with   • Altered Mental Status     Pt arrives via ems from home  Home care nurse called ems because pt seems altered for the past 3 days not eating or drinking not getting out of his recliner  Wife has been in the hospital for about a week   Pt denies pain but said this started after his flu shot  Pt is oriented to self  Initial Presentation: 80 y o  male presents to ED via  EMS  From home at   recommendation with altered mental status  VN noticed patient  Becoming increasingly  More confused  Wife is primary caregiver and currently  Hospitalized, patient has been home alone, has not eaten or  Drank anything in past few days  All history from    PMH  Is  CKD  Stage 5, not currently on  HD, DM2, HTN and COPD  Labs reveal creatinine  17 52 with baseline  2 8 - 3 3  Ct head negative,  BUN   229  U/A  Negative     Admit  Ip ICU LOC  With Altered mental status, ERIK,  High anion gap metabolic acidosis and plan is  Serial neuro checks, monitor labs, nephrology consult, possible  CVVH, S/P Ca  Gluconate,  D5/insulin, continue home meds  Nephrology consult  ( 12/6)    ERIK on top of  CKD  Stage 4, probably prerenal   Has  Required  Dialysis in the past, had an  AVF in place  Unable to consent  For  RRT due to mental status  Continue to monitor electrolytes  Date:    12/7       Day 2:   Consent obtained from granddaughter for  Temporary HD  Catheter and dialysis  Uncertain  Where wife is,  Not sure  What facility  Mental status slightly improved, still not coherent or able to give any consents  Critically  Ill        ED Triage Vitals   Temperature Pulse Respirations Blood Pressure SpO2   12/06/22 1223 12/06/22 1223 12/06/22 1223 12/06/22 1209 12/06/22 1225   97 5 °F (36 4 °C) 88 19 145/66 99 %      Temp Source Heart Rate Source Patient Position - Orthostatic VS BP Location FiO2 (%)   12/06/22 2217 12/06/22 1526 12/06/22 1209 12/06/22 1209 --   Axillary Monitor Lying Left arm       Pain Score       12/06/22 1526       No Pain          Wt Readings from Last 1 Encounters:   12/07/22 65 6 kg (144 lb 10 oz)     Additional Vital Signs:   12/07/22 0315 -- 102 17 100/49 Abnormal  70 97 % -- --   12/07/22 0307 98 2 °F (36 8 °C) -- -- -- -- -- -- --   12/07/22 0300 -- 106 Abnormal  18 95/48 Abnormal  68 98 % -- --   12/07/22 0245 -- 104 21 99/47 Abnormal  67 97 % -- --   12/07/22 0230 -- 106 Abnormal  18 109/45 Abnormal  60 Abnormal  97 % -- --   12/07/22 0215 -- 98 13 92/52 58 Abnormal  97 % -- --   12/07/22 0200 -- 98 13 98/48 Abnormal  63 Abnormal  97 % -- --   12/07/22 0145 -- 100 11 Abnormal  99/46 Abnormal  60 Abnormal  97 % -- --   12/07/22 0130 -- 100 13 100/45 Abnormal  68 97 % -- --   12/07/22 0115 -- 98 10 Abnormal  100/48 Abnormal  65 98 % -- --   12/07/22 0100 -- 98 14 107/46 Abnormal  68 98 % -- --   12/07/22 0045 -- 104 14 98/50 76 97 % -- --   12/07/22 0030 -- 98 11 Abnormal  105/45 Abnormal  64 Abnormal  98 % -- --   12/07/22 0015 -- 96 13 104/46 Abnormal  66 97 % -- --   12/07/22 0000 97 9 °F (36 6 °C) 100 13 99/47 Abnormal  59 Abnormal  98 % None (Room air) Lying   12/06/22 2345 -- 100 16 92/47 Abnormal  69 97 % -- --   12/06/22 2330 -- 98 12 94/48 Abnormal  60 Abnormal  98 % -- --   12/06/22 2315 -- 100 12 99/50 61 Abnormal  98 % -- --   12/06/22 2300 -- 102 13 105/47 Abnormal  55 Abnormal  97 % -- --   12/06/22 2245 -- 96 10 Abnormal  96/48 Abnormal  66 97 % -- --   12/06/22 2230 -- 98 11 Abnormal  114/52 59 Abnormal  97 % -- --   12/06/22 2217 97 5 °F (36 4 °C) 102 9 Abnormal  112/57 92 95 % None (Room air) Lying   12/06/22 2100 -- 83 22 114/61 -- 99 % None (Room air) Lying   12/06/22 1930 -- 80 18 133/71 -- 98 % None (Room air) Lying   12/06/22 1900 -- 80 20 116/58 83 94 % None (Room air) Lying   12/06/22 1830 -- 108 Abnormal  22 116/63 82 92 % None (Room air) Sitting   12/06/22 1800 -- 112 Abnormal  22 117/58 80 92 % None (Room air) Lying   12/06/22 1722 -- 88 18 151/73 -- 94 % Face tent Lying   12/06/22 1545 -- 86 22 144/85 107 94 % None (Room air) Lying   12/06/22 1526 -- 90 20 137/60 86 95 % None (Room air) Lying   12/06/22 1225 -- -- -- -- -- 99 % -- --   12/06/22 1223 97 5 °F (36 4 °C) 88 19 -- -- -- --        Pertinent Labs/Diagnostic Test Results:   EKG    SR    Non specific T wave abnormalities  CT abdomen pelvis wo contrast   Final Result by Jermain Cowart MD (12/06 9244)   No hydronephrosis seen   Enlarged prostate    Bladder catheter is seen, however 70 mL of residual urine is seen  The tip of the bladder catheter is tenting the dome of bladder, image 87 series 602, consider mild retraction      The study was marked in EPIC for immediate notification  Workstation performed: [id number]         XR chest 2 views   ED Interpretation by Vernon Gomez MD (12/06 8418)   Primary reviewed    No acute abnormality       Final Result by Rashida Buchanan MD (12/06 1603)      Chronic scar in the left lower lobe with no definite acute disease  Workstation performed: [id number]         CT head without contrast   Final Result by Lashell Lang DO (12/06 1323)      No acute intracranial abnormality                    Workstation performed: [id number]           Results from last 7 days   Lab Units 12/06/22  1233   SARS-COV-2  Negative     Results from last 7 days   Lab Units 12/07/22  0807 12/07/22  0228 12/06/22  1233   WBC Thousand/uL 13 21* 12 38* 13 79*   HEMOGLOBIN g/dL 8 1* 7 8* 10 0*   HEMATOCRIT % 24 0* 22 8* 30 3*   PLATELETS Thousands/uL 131* 140* 192   NEUTROS ABS Thousands/µL  --  11 41* 11 90*         Results from last 7 days   Lab Units 12/07/22  0807 12/07/22  0212 12/06/22  2052 12/06/22  1603 12/06/22  1233   SODIUM mmol/L 138 137 136 136 136   POTASSIUM mmol/L 4 5 5 1 5 7* 6 8* 7 0*   CHLORIDE mmol/L 97 97 98 102 98   CO2 mmol/L 18* 15* 14* 10* 10*   ANION GAP mmol/L 23* 25* 24* 24* 28*   BUN mg/dL 306* 226* 226* 229* 216*   CREATININE mg/dL 16 29* 16 46* 16 41* 15 91* 17 52*   EGFR ml/min/1 73sq m 2 2 2 2 2   CALCIUM mg/dL 8 2* 8 5 8 7 8 0* 8 8   MAGNESIUM mg/dL 2 4  --   --   --   --    PHOSPHORUS mg/dL 8 7* 10 1*  --   --   --      Results from last 7 days   Lab Units 12/06/22  1233   AST U/L 19   ALT U/L 30   ALK PHOS U/L 73   TOTAL PROTEIN g/dL 7 8   ALBUMIN g/dL 3 5   TOTAL BILIRUBIN mg/dL 0 36   AMMONIA umol/L 28     Results from last 7 days   Lab Units 12/07/22  1014 12/07/22  0805 12/07/22  0620 12/07/22  0405 12/07/22  0205 12/07/22  0003 12/06/22  2215 12/06/22  2047 12/06/22  1801 12/06/22  1216   POC GLUCOSE mg/dl 167* 214* 223* 240* 266* 301* 305* 266* 260* 171*     Results from last 7 days   Lab Units 12/07/22  0807 12/07/22  0212 12/06/22  2052 12/06/22  1603 12/06/22  1233   GLUCOSE RANDOM mg/dL 185* 266* 310* 235* 186*               Results from last 7 days   Lab Units 12/06/22  1233 CK TOTAL U/L 671*   CK MB INDEX % <1 0   CK MB ng/mL 4 3     Results from last 7 days   Lab Units 12/06/22  1643 12/06/22  1455 12/06/22  1233   HS TNI 0HR ng/L  --   --  75*   HS TNI 2HR ng/L  --  74*  --    HSTNI D2 ng/L  --  -1  --    HS TNI 4HR ng/L 72*  --   --    HSTNI D4 ng/L -3  --   --              Results from last 7 days   Lab Units 12/06/22  1233   TSH 3RD GENERATON uIU/mL 0 680         Results from last 7 days   Lab Units 12/06/22  1233   LACTIC ACID mmol/L <0 3*           Results from last 7 days   Lab Units 12/06/22  1646 12/06/22  1506   CLARITY UA   --  Clear   COLOR UA   --  Yellow   SPEC GRAV UA   --  1 020   PH UA   --  5 0   GLUCOSE UA mg/dl  --  100 (1/10%)*   KETONES UA mg/dl  --  Negative   BLOOD UA   --  Moderate*   PROTEIN UA mg/dl  --  30 (1+)*   NITRITE UA   --  Negative   BILIRUBIN UA   --  Negative   UROBILINOGEN UA E U /dl  --  0 2   LEUKOCYTES UA   --  Negative   WBC UA /hpf  --  4-10*   RBC UA /hpf  --  2-4   BACTERIA UA /hpf  --  None Seen   EPITHELIAL CELLS WET PREP /hpf  --  Occasional   SODIUM UR  39  --    CREATININE UR mg/dL 89 4  --      Results from last 7 days   Lab Units 12/06/22  1233   INFLUENZA A PCR  Negative   INFLUENZA B PCR  Negative   RSV PCR  Negative             ED Treatment:   Medication Administration from 12/06/2022 1201 to 12/06/2022 2204       Date/Time Order Dose Route Action Comments     12/06/2022 1543 EST sodium chloride 0 9 % bolus 1,000 mL 0 mL Intravenous Stopped --     12/06/2022 1235 EST sodium chloride 0 9 % bolus 1,000 mL 1,000 mL Intravenous New Bag --     12/06/2022 1431 EST aspirin rectal suppository 300 mg 300 mg Rectal Given --     12/06/2022 1502 EST insulin regular (HumuLIN R,NovoLIN R) injection 10 Units 10 Units Intravenous Given --     12/06/2022 1500 EST dextrose 50 % IV solution 25 mL 25 mL Intravenous Given --     12/06/2022 1437 EST albuterol inhalation solution 5 mg 5 mg Nebulization Given --     12/06/2022 1543 EST calcium gluconate 1 g in sodium chloride 0 9% 50 mL (premix) 0 g Intravenous Stopped --     12/06/2022 1504 EST calcium gluconate 1 g in sodium chloride 0 9% 50 mL (premix) 1 g Intravenous New Bag --     12/06/2022 1520 EST sodium polystyrene (KAYEXALATE) powder 15 g 15 g Oral Not Given Patient failed dysphagia, patient is not awake/oriented     12/06/2022 1521 EST sodium bicarbonate 150 mEq in dextrose 5 % 1,000 mL infusion 150 mL/hr Intravenous New Bag --     12/06/2022 1718 EST albuterol inhalation solution 10 mg 10 mg Nebulization Given --     12/06/2022 1750 EST calcium gluconate 1 g in sodium chloride 0 9% 50 mL (premix) 0 g Intravenous Stopped --     12/06/2022 1717 EST calcium gluconate 1 g in sodium chloride 0 9% 50 mL (premix) 1 g Intravenous New Bag --     12/06/2022 1714 EST dextrose 50 % IV solution 25 mL 25 mL Intravenous Given --     12/06/2022 1716 EST insulin regular (HumuLIN R,NovoLIN R) 10 Units in sodium chloride 0 9 % 2 9 mL IV syringe -- Intravenous Given --     12/06/2022 1842 EST furosemide (LASIX) 120 mg in dextrose 5 % 50 mL IVPB 0 mg Intravenous Stopped --     12/06/2022 1756 EST furosemide (LASIX) 120 mg in dextrose 5 % 50 mL IVPB 120 mg Intravenous New Bag --     12/06/2022 1850 EST insulin lispro (HumaLOG) 100 units/mL subcutaneous injection 1-5 Units 2 Units Subcutaneous Given bs 260     12/06/2022 1819 EST calcium gluconate 1 g in sodium chloride 0 9% 50 mL (premix) -- Intravenous Canceled Entry --        Present on Admission:  • CKD (chronic kidney disease) stage 5, GFR less than 15 ml/min (HCC)  • Renovascular hypertension  • Anemia due to chronic kidney disease  • COPD (chronic obstructive pulmonary disease) ([email or web address] )      Admitting Diagnosis: Hyperkalemia [E87 5]  Altered mental status [R41 82]  Altered mental state [R41 82]  ARF (acute renal failure) ([facility] ) [N17 9]  Elevated troponin [R77 8]  Age/Sex: 80 y o  male  Admission Orders:  Scheduled Medications:  heparin (porcine), 5,000 Units, Subcutaneous, Q8H [address]      Continuous IV Infusions:  insulin regular (HumuLIN R,NovoLIN R) infusion, 0 3-21 Units/hr, Intravenous, Titrated  sodium bicarbonate infusion, 150 mL/hr, Intravenous, Continuous      PRN Meds:    48 hr tele  Fall precautions  Dysphagia eval  Neuro checks Q 4 hrs       IP CONSULT TO NEPHROLOGY  IP CONSULT TO CASE MANAGEMENT    Network Utilization Review Department  ATTENTION: Please call with any questions or concerns to [phone] and carefully listen to the prompts so that you are directed to the right person  All voicemails are confidential   Ravi Mcintosh all requests for admission clinical reviews, approved or denied determinations and any other requests to dedicated fax number below belonging to the campus where the patient is receiving treatment   List of dedicated fax numbers for the Facilities:  [address] DENIALS (Administrative/Medical Necessity) [phone]   [address] (Maternity/NICU/Pediatrics) [phone]   [address] [phone]   [facility] [address] [phone]   [address] [address] [address] [place] [address] Malick Leroy [facility] 28 [phone]   [address] [facility] [place] [address] [facility] 134 815 [facility] [phone]

## 2022-12-07 NOTE — PLAN OF CARE
Problem: Potential for Falls  Goal: Patient will remain free of falls  Description: INTERVENTIONS:  - Educate patient/family on patient safety including physical limitations  - Instruct patient to call for assistance with activity   - Consult OT/PT to assist with strengthening/mobility   - Keep Call bell within reach  - Keep bed low and locked with side rails adjusted as appropriate  - Keep care items and personal belongings within reach  - Initiate and maintain comfort rounds  - Make Fall Risk Sign visible to staff  - Offer Toileting every 2 Hours, in advance of need  - Initiate/Maintain bed alarm  - Obtain necessary fall risk management equipment:   - Apply yellow socks and bracelet for high fall risk patients  - Consider moving patient to room near nurses station  Outcome: Not Progressing     Problem: MOBILITY - ADULT  Goal: Maintain or return to baseline ADL function  Description: INTERVENTIONS:  - Educate patient/family on patient safety including physical limitations  - Instruct patient to call for assistance with activity   - Consult OT/PT to assist with strengthening/mobility   - Keep Call bell within reach  - Keep bed low and locked with side rails adjusted as appropriate  - Keep care items and personal belongings within reach  - Initiate and maintain comfort rounds  - Make Fall Risk Sign visible to staff  - Offer Toileting every 2 Hours, in advance of need  - Initiate/Maintain bed alarm  - Obtain necessary fall risk management equipment:   - Apply yellow socks and bracelet for high fall risk patients  - Consider moving patient to room near nurses station  Outcome: Not Progressing  Goal: Maintains/Returns to pre admission functional level  Description: INTERVENTIONS:  - Educate patient/family on patient safety including physical limitations  - Instruct patient to call for assistance with activity   - Consult OT/PT to assist with strengthening/mobility   - Keep Call bell within reach  - Keep bed low and locked with side rails adjusted as appropriate  - Keep care items and personal belongings within reach  - Initiate and maintain comfort rounds  - Make Fall Risk Sign visible to staff  - Offer Toileting every 2 Hours, in advance of need  - Initiate/Maintain bed alarm  - Obtain necessary fall risk management equipment:   - Consider moving patient to room near nurses station  Outcome: Not Progressing     Problem: Prexisting or High Potential for Compromised Skin Integrity  Goal: Skin integrity is maintained or improved  Description: INTERVENTIONS:  - Identify patients at risk for skin breakdown  - Assess and monitor skin integrity  - Assess and monitor nutrition and hydration status  - Monitor labs   - Assess for incontinence   - Turn and reposition patient  - Assist with mobility/ambulation  - Relieve pressure over bony prominences  - Avoid friction and shearing  - Provide appropriate hygiene as needed including keeping skin clean and dry  - Evaluate need for skin moisturizer/barrier cream  - Collaborate with interdisciplinary team   - Patient/family teaching  - Consider wound care consult   Outcome: Progressing     Problem: PAIN - ADULT  Goal: Verbalizes/displays adequate comfort level or baseline comfort level  Description: Interventions:  - Encourage patient to monitor pain and request assistance  - Assess pain using appropriate pain scale  - Administer analgesics based on type and severity of pain and evaluate response  - Implement non-pharmacological measures as appropriate and evaluate response  - Consider cultural and social influences on pain and pain management  - Notify physician/advanced practitioner if interventions unsuccessful or patient reports new pain  Outcome: Progressing     Problem: INFECTION - ADULT  Goal: Absence or prevention of progression during hospitalization  Description: INTERVENTIONS:  - Assess and monitor for signs and symptoms of infection  - Monitor lab/diagnostic results  - Monitor all insertion sites, i e  indwelling lines, tubes, and drains  - Monitor endotracheal if appropriate and nasal secretions for changes in amount and color  - [place] appropriate cooling/warming therapies per order  - Administer medications as ordered  - Instruct and encourage patient and family to use good hand hygiene technique  - Identify and instruct in appropriate isolation precautions for identified infection/condition  Outcome: Progressing  Goal: Absence of fever/infection during neutropenic period  Description: INTERVENTIONS:  - Monitor WBC    Outcome: Progressing     Problem: SAFETY ADULT  Goal: Patient will remain free of falls  Description: INTERVENTIONS:  - Educate patient/family on patient safety including physical limitations  - Instruct patient to call for assistance with activity   - Consult OT/PT to assist with strengthening/mobility   - Keep Call bell within reach  - Keep bed low and locked with side rails adjusted as appropriate  - Keep care items and personal belongings within reach  - Initiate and maintain comfort rounds  - Make Fall Risk Sign visible to staff  - Offer Toileting every 2 Hours, in advance of need  - Initiate/Maintain bed alarm  - Obtain necessary fall risk management equipment:   - Apply yellow socks and bracelet for high fall risk patients  - Consider moving patient to room near nurses station  Outcome: Not Progressing  Goal: Maintain or return to baseline ADL function  Description: INTERVENTIONS:  - Educate patient/family on patient safety including physical limitations  - Instruct patient to call for assistance with activity   - Consult OT/PT to assist with strengthening/mobility   - Keep Call bell within reach  - Keep bed low and locked with side rails adjusted as appropriate  - Keep care items and personal belongings within reach  - Initiate and maintain comfort rounds  - Make Fall Risk Sign visible to staff  - Offer Toileting every 2 Hours, in advance of need  - Initiate/Maintain bed alarm  - Obtain necessary fall risk management equipment:   - Apply yellow socks and bracelet for high fall risk patients  - Consider moving patient to room near nurses station  Outcome: Not Progressing  Goal: Maintains/Returns to pre admission functional level  Description: INTERVENTIONS:  - Educate patient/family on patient safety including physical limitations  - Instruct patient to call for assistance with activity   - Consult OT/PT to assist with strengthening/mobility   - Keep Call bell within reach  - Keep bed low and locked with side rails adjusted as appropriate  - Keep care items and personal belongings within reach  - Initiate and maintain comfort rounds  - Make Fall Risk Sign visible to staff  - Offer Toileting every 2 Hours, in advance of need  - Initiate/Maintain bed alarm  - Obtain necessary fall risk management equipment:   - Consider moving patient to room near nurses station  Outcome: Not Progressing     Problem: DISCHARGE PLANNING  Goal: Discharge to home or other facility with appropriate resources  Description: INTERVENTIONS:  - Identify barriers to discharge w/patient and caregiver  - Arrange for needed discharge resources and transportation as appropriate  - Identify discharge learning needs (meds, wound care, etc )  - Arrange for interpretive services to assist at discharge as needed  - Refer to Case Management Department for coordinating discharge planning if the patient needs post-hospital services based on physician/advanced practitioner order or complex needs related to functional status, cognitive ability, or social support system  Outcome: Progressing     Problem: Knowledge Deficit  Goal: Patient/family/caregiver demonstrates understanding of disease process, treatment plan, medications, and discharge instructions  Description: Complete learning assessment and assess knowledge base    Interventions:  - Provide teaching at level of understanding  - Provide teaching via preferred learning methods  Outcome: Not Progressing

## 2022-12-07 NOTE — ED NOTES
Per ICU, they are not ready for patient/report yet  Will call back when ready        Angie Saleh RN  12/06/22 2022

## 2022-12-07 NOTE — PROCEDURES
Temporary HD Catheter    Date/Time: 12/7/2022 2:44 PM  Performed by: Bernabe Arnold MD  Authorized by: Bernabe Arnold MD     Patient location:  Bedside  Other Assisting Provider: Yes (comment) GRETA Robertson )    Consent:     Consent obtained:  Verbal    Consent given by:  Healthcare agent    Risks discussed:  Arterial puncture, bleeding, incorrect placement and infection  Universal protocol:     Procedure explained and questions answered to patient or proxy's satisfaction: yes      Relevant documents present and verified: yes      Test results available and properly labeled: yes      Radiology Images displayed and confirmed  If images not available, report reviewed: yes      Required blood products, implants, devices, and special equipment available: no      Site/side marked: no      Immediately prior to procedure, a time out was called: no      Patient identity confirmed:  Hospital-assigned identification number  Pre-procedure details:     Hand hygiene: Hand hygiene performed prior to insertion      Sterile barrier technique: All elements of maximal sterile technique followed      Skin preparation:  2% chlorhexidine    Skin preparation agent: Skin preparation agent completely dried prior to procedure    Indications:     Central line indications: dialysis    Sedation:     Sedation type: midalozam 2 mg, fentanyl 50 mcg    Procedure details:     Location:  Right internal jugular    Vessel type: vein      Laterality:  Right    Approach: percutaneous technique used      Patient position:  Trendelenburg    Catheter type:  Triple lumen    Catheter size:  12 5 Fr    Landmarks identified: yes      Ultrasound guidance: yes      Ultrasound image availability:  Video obtained    Sterile ultrasound techniques: Sterile gel and sterile probe covers were used      Number of attempts:  2    Successful placement: yes    Post-procedure details:     Post-procedure:  Dressing applied and line sutured    Assessment:  Blood return through all ports, no pneumothorax on x-ray, placement verified by x-ray and free fluid flow    Post-procedure complications: none      Patient tolerance of procedure:   Tolerated well, no immediate complications    Observer: Yes      Observer name:  GRETA Fagan

## 2022-12-07 NOTE — ASSESSMENT & PLAN NOTE
Home health aid visits throughout the week   Noticed pt was increasingly lethargic  Status, confusion, decreased p o  intake over the past 3 days  Wife is hospitalized, per primary caregiver, he has been home alone past 3 days   CT Head- Neg   BUN- 229  UA- Neg   Most likely metabolic encephalopathy secondary to renal failure    Plan-   Serial Neuro Checks  Appreciate Nephrology recommendations- Hold off on HD for now

## 2022-12-08 NOTE — ASSESSMENT & PLAN NOTE
Home health aid visits throughout the week   Noticed pt was increasingly lethargic  Status, confusion, decreased p o  intake over the past 3 days  Wife is hospitalized, per primary caregiver, he has been home alone past 3 days   CT Head- Neg   BUN- 229 and trending down  UA- Neg   Most likely metabolic encephalopathy secondary to renal failure  Improving with CVVH    Plan-   Serial Neuro Checks  Appreciate Nephrology recommendations  Continue CVVH  Reach out to granddaughter to establish patient's baseline

## 2022-12-08 NOTE — ASSESSMENT & PLAN NOTE
Lab Results   Component Value Date    HGBA1C 6 7 (A) 09/07/2022       Recent Labs     12/08/22  0012 12/08/22  0208 12/08/22  0423 12/08/22  0621   POCGLU 165* 142* 139 133       Blood Sugar Average: Last 72 hrs:  (P) 189 55   Home medications include Tradjenta     Plan-  Continue insulin drip  Hypoglycemia protocol  accucheck q6h  Swallow study to assess ability to tolerate oral diet

## 2022-12-08 NOTE — ASSESSMENT & PLAN NOTE
Lab Results   Component Value Date    EGFR 4 12/08/2022    EGFR 3 12/07/2022    EGFR 2 12/07/2022    CREATININE 9 10 (H) 12/08/2022    CREATININE 10 91 (H) 12/07/2022    CREATININE 15 85 (H) 12/07/2022   Not currently on HD  Baseline 2 8-3 3  Hx of ERIK transitioned to CKD w/ renal recovery   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  No bruit auscultated on exam     CVVH started  Patient noted to be extremely volume depleted while placing triple-lumen catheter    Given multiple boluses of IV fluid   Potassium trending down, anion gap closing, creatinine improving  Currently on insulin and bicarb drip    Plan-  Appreciate Nephrology recommendations  CVVH  Albuterol, D5/Insulin, Calcium Gluconate   Trend bmp

## 2022-12-08 NOTE — PLAN OF CARE
Problem: SAFETY,RESTRAINT: NV/NON-SELF DESTRUCTIVE BEHAVIOR  Goal: Remains free of harm/injury (restraint for non violent/non self-detsructive behavior)  Description: INTERVENTIONS:  - Instruct patient/family regarding restraint use   - Assess and monitor physiologic and psychological status   - Provide interventions and comfort measures to meet assessed patient needs   - Identify and implement measures to help patient regain control  - Assess readiness for release of restraint   Outcome: Progressing     Problem: SAFETY,RESTRAINT: NV/NON-SELF DESTRUCTIVE BEHAVIOR  Goal: Returns to optimal restraint-free functioning  Description: INTERVENTIONS:  - Assess the patient's behavior and symptoms that indicate continued need for restraint  - Identify and implement measures to help patient regain control  - Assess readiness for release of restraint   Outcome: Progressing     Problem: Knowledge Deficit  Goal: Patient/family/caregiver demonstrates understanding of disease process, treatment plan, medications, and discharge instructions  Description: Complete learning assessment and assess knowledge base    Interventions:  - Provide teaching at level of understanding  - Provide teaching via preferred learning methods  12/8/2022 0847 by Zeke Tesfaye RN  Outcome: Progressing  12/8/2022 0847 by Zeke Tesfaye RN  Outcome: Progressing     Problem: SAFETY ADULT  Goal: Maintain or return to baseline ADL function  Description: INTERVENTIONS:  - Educate patient/family on patient safety including physical limitations  - Instruct patient to call for assistance with activity   - Consult OT/PT to assist with strengthening/mobility   - Keep Call bell within reach  - Keep bed low and locked with side rails adjusted as appropriate  - Keep care items and personal belongings within reach  - Initiate and maintain comfort rounds  - Make Fall Risk Sign visible to staff  - Offer Toileting every 2 Hours, in advance of need  - Initiate/Maintain bed alarm  - Obtain necessary fall risk management equipment:   - Consider moving patient to room near nurses station  12/8/2022 0847 by Rosio Ding RN  Outcome: Progressing  12/8/2022 0847 by Rosio Ding RN  Outcome: Progressing     Problem: PAIN - ADULT  Goal: Verbalizes/displays adequate comfort level or baseline comfort level  Description: Interventions:  - Encourage patient to monitor pain and request assistance  - Assess pain using appropriate pain scale  - Administer analgesics based on type and severity of pain and evaluate response  - Implement non-pharmacological measures as appropriate and evaluate response  - Consider cultural and social influences on pain and pain management  - Notify physician/advanced practitioner if interventions unsuccessful or patient reports new pain  12/8/2022 0847 by Rosio Ding RN  Outcome: Progressing  12/8/2022 0847 by Rosio Ding RN  Outcome: Progressing

## 2022-12-08 NOTE — SPEECH THERAPY NOTE
Speech Language/Pathology  Speech/Language Pathology  Assessment    Patient Name: Chelsea Serrano  Today's Date: 12/8/2022     Problem List  Principal Problem:    ERIK (acute kidney injury) ([facility] )  Active Problems:    Paroxysmal atrial fibrillation (HCC)    COPD (chronic obstructive pulmonary disease) ([facility] )    Renovascular hypertension    Anemia due to chronic kidney disease    DM type 2 (diabetes mellitus, type 2) (HCC)    CKD (chronic kidney disease) stage 5, GFR less than 15 ml/min (HCC)    High anion gap metabolic acidosis    AMS (altered mental status)    Azotemia    Past Medical History  Past Medical History:   Diagnosis Date   • Anxiety    • Obrien's esophagus    • BPH (benign prostatic hyperplasia)    • Cancer ([place] [address] )     pt states hx skin cancer, pt confused   • Cardiomegaly    • Diabetes ([place] [address] )     type 2  controlled; taken off oral meds 6/19   • DVT (deep venous thrombosis) ([facility])     right leg   • GERD (gastroesophageal reflux disease)    • Hypercholesterolemia    • Hypertension    • Irregular heart beat     paroxsysmal a fib   • Pancreatic cyst    • Paroxysmal atrial fibrillation (HCC)    • Pericardial effusion with cardiac tamponade    • Pleural effusion    • Renal disorder     dialysis started in 2019   • Weight loss, non-intentional      Past Surgical History  Past Surgical History:   Procedure Laterality Date   • APPENDECTOMY  1953   • CATARACT EXTRACTION Bilateral 2016? • COLONOSCOPY     • COLONOSCOPY N/A 1/24/2019    Procedure: COLONOSCOPY with polypectomies;  Surgeon: Lidya Cordova MD;  Location: [facility]; Service: Gastroenterology   • ESOPHAGOGASTRODUODENOSCOPY N/A 1/24/2019    Procedure: ESOPHAGOGASTRODUODENOSCOPY (EGD) with bx;  Surgeon: Lidya Cordova MD;  Location: [facility];   Service: Gastroenterology   • IR TUNNELED DIALYSIS CATHETER CHECK/CHANGE/REPOSITION/ANGIOPLASTY  4/18/2019   • IR TUNNELED DIALYSIS CATHETER PLACEMENT  3/6/2019   • IR TUNNELED DIALYSIS CATHETER REMOVAL  7/30/2019   • PERICARDIAL WINDOW N/A 2/26/2019    Procedure: WINDOW PERICARDIAL;  Surgeon: Inocencio Wang MD;  Location: [facility];  Service: Thoracic   • ND ANASTOMOSIS,AV,ANY SITE Left 7/3/2019    Procedure: CREATION FISTULA ARTERIOVENOUS (AV) left upper extremity brachial artery to axillary vein [place]-Mushtaq graft ;  Surgeon: Zenon Barbosa MD;  Location: [address] [place] [address] MAIN OR;  Service: Vascular   • PROSTATE BIOPSY          Bedside Swallow Evaluation:    Summary:  Currently on CVVH  Pt presented w/ variable alertness  Needed persistent stim to participate  Speech was not intelligible until i put his dentures in  Responded no when asked if he was hungry or thirsty  Trialed ice chips to begin  Oral manipulation was slow  Transfer and swallow were delayed  Exhalation sounded wet  Patient was cued to cough  He was unable to follow most requests  With palpation appears to have pooled secretions in pharynx/larynx  Trialed small amount of applesauce  Swallow delayed,  appeared weak  Patient refused anymore  He was suctioned and although he was resistive he appeared to have residual either near the base of his tongue or in his pharynx  Oral stage appears at least moderate  Pharyngeal stage that there  Patient is also not maintaining alertness  Recommendations:  Diet: Strict n p o  for now  Please do frequent oral care w/ suction  Meds:nonoral  Oral care: every 4 hours  Aspiration precautions  Reflux precautions  Therapy Prognosis: guarded given current medical status  Frequency: 2-5x/wk as able and indicated    Consider consult w/:  Rehab  Nutrition    Goal(s):  Pt will tolerate least restrictive diet w/out s/s aspiration or oral/pharyngeal difficulties       H&P/Admit info/ pertinent provider notes: (PMH noted above)  ----------------------------------------------------------------------------------------------------------  Chief Complaint: AMS     History of Present Illness        HX and PE limited by: Srinath Valentine Marikay Gilford  is a 80 y o  male  With a PMH of CKD stage 5 not currently on HD, DM II, COPD, HTN who presents with AMS  Pt has a home health aid who visits throughout the week and noted that the patient has been increasingly more confused  She reported that the patient wife who is his primary caregiver has been in the hospital for about a week and the patient has been home alone and has not eaten or drank anything in the past few days  History obtained from [facility]  Special Studies:  12/6/22 Ct abdomen/pelvis  IMPRESSION:  No hydronephrosis seen  Enlarged prostate   Bladder catheter is seen, however 70 mL of residual urine is seen  The tip of the bladder catheter is tenting the dome of bladder, image 87 series 602, consider mild retraction  12/6 cxr  Chronic scar in the left lower lobe with no definite acute disease  12/6/ct head:  Neg acute    Previous VBS:  None  EGD: 1/24/19:  FINDINGS:  #1  Esophagus- widely patent esophagus, normal esophagus  GE junction at 36 cm from the incisors  One cm tongue of pink squamocolumnar tissue seen extending proximally from the GE junction, biopsies taken to evaluate for Obrien's esophagus  #2  Stomach- mild antral predominant gastritis, biopsies taken  Hiatal hernia seen on GE junction retroflexed views  #3  Duodenum- mild duodenal bulb duodenitis, biopsies taken  Normal 2nd portion of duodenum  Patient's goal:none stated    Did the pt report pain? no  If yes, was nursing notified/was it addressed? na    Reason for consult:  R/o aspiration  Determine safest and least restrictive diet  Change in mental status    Precautions:  Aspiration  Fall      Food Allergies: none   Current Diet: npo   Premorbid diet: ? Regular  Had not been eating or drinking  O2 requirement: 2nc   Social/Prior living Home alone  Voice/Speech: Unintelligible until upper and lowers were placed  ? [place]      Follows commands: poorly   Cognitive status: confused     [facility]  Dentition: upepr and lower plate  Velum (X):unabelto assess  Secretion management:mouth dry, ? Retained secretions in pharynx/larynx  Volitional cough: inconsistent  Not following oral mech commands  Esophageal stage:  H/o EGD  H/o Obrien's esophagus ? ?   See above    Results d/w:  Pt, nursing, resident

## 2022-12-08 NOTE — ASSESSMENT & PLAN NOTE
Per chart review patient has hx of PAF  Rate controlled with metoprolol  CHADVASC score 4  High bleeding risk per HASBLED score    Plan:  Toprol XL 12 5 mg daily

## 2022-12-08 NOTE — PROGRESS NOTES
[facility]  Progress Note - Miguelangel Park  1935, 80 y o  male MRN: [id number]  Unit/Bed#: ICU 02 Encounter: [id number]  Primary Care Provider: Nikky Otero DO   Date and time admitted to hospital: 12/6/2022 12:01 PM    * ERIK (acute kidney injury) [facility])  Assessment & Plan  See CKD Plan   Improving with CVVH and fluid replacement      CKD (chronic kidney disease) stage 5, GFR less than 15 ml/min [facility])  Assessment & Plan  Lab Results   Component Value Date    EGFR 4 12/08/2022    EGFR 3 12/07/2022    EGFR 2 12/07/2022    CREATININE 9 10 (H) 12/08/2022    CREATININE 10 91 (H) 12/07/2022    CREATININE 15 85 (H) 12/07/2022   Not currently on HD  Baseline 2 8-3 3  Hx of ERIK transitioned to CKD w/ renal recovery   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  No bruit auscultated on exam     CVVH started  Patient noted to be extremely volume depleted while placing triple-lumen catheter  Given multiple boluses of IV fluid   Potassium trending down, anion gap closing, creatinine improving  Currently on insulin and bicarb drip    Plan-  Appreciate Nephrology recommendations  CVVH  Albuterol, D5/Insulin, Calcium Gluconate   Trend bmp         AMS (altered mental status)  Assessment & Plan  Home health aid visits throughout the week   Noticed pt was increasingly lethargic  Status, confusion, decreased p o  intake over the past 3 days  Wife is hospitalized, per primary caregiver, he has been home alone past 3 days   CT Head- Neg   BUN- 229 and trending down  UA- Neg   Most likely metabolic encephalopathy secondary to renal failure  Improving with CVVH    Plan-   Serial Neuro Checks  Appreciate Nephrology recommendations  Continue CVVH  Reach out to granddaughter to establish patient's baseline        COPD (chronic obstructive pulmonary disease) ([facility] [address] )  Assessment & Plan  Home medications include Dulera and Flonase  Was on Prednisone taper however likely did not finish  In no MOSHE  Saturating well on room air  Plan-  Continue home medications when pt is more alert and oriented       Paroxysmal atrial fibrillation ([address] )  Assessment & Plan  Per chart review patient has hx of PAF  Rate controlled with metoprolol  CHADVASC score 4  High bleeding risk per HASBLED score    Plan:  Toprol XL 12 5 mg daily     High anion gap metabolic acidosis  Assessment & Plan  See CKD and AMS Plan       DM type 2 (diabetes mellitus, type 2) [facility])  Assessment & Plan  Lab Results   Component Value Date    HGBA1C 6 7 (A) 2022       Recent Labs     22  0012 22  0208 22  0423 22  0621   POCGLU 165* 142* 139 133       Blood Sugar Average: Last 72 hrs:  (P) 189 55   Home medications include Tradjenta     Plan-  Continue insulin drip  Hypoglycemia protocol  accucheck q6h  Swallow study to assess ability to tolerate oral diet        ----------------------------------------------------------------------------------------  HPI/24hr events: CVVH momentarily stopped due to clotting  Patient tachycardic through the night requiring beta-blockers  Tachycardia improved with beta-blockers and fluid boluses    Patient appropriate for transfer out of the ICU today?: No  Disposition: Continue Critical Care   Code Status: Level 1 - Full Code  ---------------------------------------------------------------------------------------  SUBJECTIVE  Subjective data collected    Patient altered mental status and has incomprehensible speech       ---------------------------------------------------------------------------------------  OBJECTIVE    Vitals   Vitals:    22 0600 22 0627 22 0700 22 0741   BP: 104/57  101/60    Pulse: (!) 150 (!) 140 (!) 142    Resp: 14 14 16    Temp:    98 5 °F (36 9 °C)   TempSrc:    Oral   SpO2: 94% 93% 99%    Weight: 65 7 kg (144 lb 13 5 oz)        Temp (24hrs), Av 1 °F (36 7 °C), Min:97 2 °F (36 2 °C), Max:98 7 °F (37 1 °C)  Current: Temperature: 98 5 °F (36 9 °C)          Respiratory:  SpO2: SpO2: 99 %       Invasive/non-invasive ventilation settings   Respiratory    Lab Data (Last 4 hours)    None         O2/Vent Data (Last 4 hours)    None                Physical Exam  Vitals and nursing note reviewed  Constitutional:       General: He is not in acute distress  Appearance: He is well-developed  He is not ill-appearing  HENT:      Head: Normocephalic and atraumatic  Mouth/Throat:      Mouth: Mucous membranes are dry  Pharynx: Oropharynx is clear  No oropharyngeal exudate  Comments: Dry mucous membranes  Eyes:      General: No scleral icterus  Extraocular Movements: Extraocular movements intact  Conjunctiva/sclera: Conjunctivae normal       Pupils: Pupils are equal, round, and reactive to light  Cardiovascular:      Rate and Rhythm: Regular rhythm  Tachycardia present  Pulses: Normal pulses  Heart sounds: Normal heart sounds  No murmur heard  Pulmonary:      Effort: Pulmonary effort is normal  No respiratory distress  Breath sounds: Wheezing present  No rhonchi  Comments: Mild expiratory wheezes  Chest:      Chest wall: No tenderness  Abdominal:      General: Abdomen is flat  Bowel sounds are normal  There is no distension  Palpations: Abdomen is soft  Tenderness: There is no abdominal tenderness  Musculoskeletal:         General: No swelling  Normal range of motion  Cervical back: Normal range of motion and neck supple  Right lower leg: No edema  Left lower leg: No edema  Skin:     General: Skin is warm and dry  Capillary Refill: Capillary refill takes less than 2 seconds  Neurological:      Mental Status: He is alert  He is disoriented and confused  Cranial Nerves: No cranial nerve deficit  Sensory: No sensory deficit  Motor: No weakness        Comments: Responds to name, follows commands intermittently  More alert than the day before  Speech incomprehensible  Strength/ Motor intact    Psychiatric:         Mood and Affect: Mood normal              Laboratory and Diagnostics:  Results from last 7 days   Lab Units 12/08/22  0525 12/07/22  0807 12/07/22  0228 12/06/22  1233   WBC Thousand/uL 14 40* 13 21* 12 38* 13 79*   HEMOGLOBIN g/dL 7 0* 8 1* 7 8* 10 0*   HEMATOCRIT % 20 1* 24 0* 22 8* 30 3*   PLATELETS Thousands/uL 106* 131* 140* 192   NEUTROS PCT % 85*  --  92* 87*   MONOS PCT % 10  --  4 8     Results from last 7 days   Lab Units 12/08/22 0525 12/07/22  2316 12/07/22  1455 12/07/22  0807 12/07/22  0212 12/06/22 2052 12/06/22  1603 12/06/22  1233   SODIUM mmol/L 140 139 139 138 137 136 136 136   POTASSIUM mmol/L 3 9 4 1 4 6 4 5 5 1 5 7* 6 8* 7 0*   CHLORIDE mmol/L 98 96 96 97 97 98 102 98   CO2 mmol/L 27 26 20* 18* 15* 14* 10* 10*   ANION GAP mmol/L 15* 17* 23* 23* 25* 24* 24* 28*   BUN mg/dL 124* 147* 250* 306* 226* 226* 229* 216*   CREATININE mg/dL 9 10* 10 91* 15 85* 16 29* 16 46* 16 41* 15 91* 17 52*   CALCIUM mg/dL 7 3* 7 3* 8 0* 8 2* 8 5 8 7 8 0* 8 8   GLUCOSE RANDOM mg/dL 141* 169* 90 185* 266* 310* 235* 186*   ALT U/L  --   --   --   --   --   --   --  30   AST U/L  --   --   --   --   --   --   --  19   ALK PHOS U/L  --   --   --   --   --   --   --  73   ALBUMIN g/dL  --   --   --   --   --   --   --  3 5   TOTAL BILIRUBIN mg/dL  --   --   --   --   --   --   --  0 36     Results from last 7 days   Lab Units 12/08/22  0525 12/07/22 2316 12/07/22  1455 12/07/22  0807 12/07/22  0212   MAGNESIUM mg/dL 2 1 1 9 2 2 2 4  --    PHOSPHORUS mg/dL 4 8* 5 7* 8 7* 8 7* 10 1*      Results from last 7 days   Lab Units 12/07/22  1213   INR  1 17   PTT seconds 31          Results from last 7 days   Lab Units 12/06/22  1233   LACTIC ACID mmol/L <0 3*     ABG:    VBG:          Micro        EKG: Reviewed  Imaging: I have personally reviewed pertinent reports        Intake and Output  I/O       12/06 0701 12/07 0700 12/07 0701 12/08 0700 12/08 0701 12/09 0700    I V  (mL/kg) 2232 8 (34) 5660 1 (86 2)     IV Piggyback  105     Total Intake(mL/kg) 2232 8 (34) 5765 1 (87 7)     Urine (mL/kg/hr) 960 805 (0 5)     Other  1351     Total Output 960 2156     Net +1272 8 +3609 1                  Height and Weights         Body mass index is 26 49 kg/m²  Weight (last 2 days)     Date/Time Weight    12/08/22 0600 65 7 (144 84)    12/07/22 0553 65 6 (144 62)    12/06/22 2217 65 6 (144 62)            Nutrition       Diet Orders   (From admission, onward)             Start     Ordered    12/06/22 1724  Diet NPO  Diet effective now        References:    Nutrtion Support Algorithm Enteral vs  Parenteral   Question Answer Comment   Diet Type NPO    RD to adjust diet per protocol?  Yes        12/06/22 1724                  Active Medications  Scheduled Meds:  Current Facility-Administered Medications   Medication Dose Route Frequency Provider Last Rate   • calcium gluconate  2 g Intravenous Once Suzette K Kp, DO 2 g (12/08/22 0809)   • heparin (porcine)  5,000 Units Subcutaneous Q8H [facility] Bernabe Arnold MD     • insulin regular (HumuLIN R,NovoLIN R) infusion  0 3-21 Units/hr Intravenous Titrated SUJATA Whitehead 0 5 Units/hr (12/08/22 0430)   • multi-electrolyte  500 mL Intravenous Once SUJATA Umaña     • NxStage K 4/Ca 3  20,000 mL Dialysis Continuous Suzette K Kp, DO     • sodium bicarbonate infusion  150 mL/hr Intravenous Continuous SUJATA Liz 150 mL/hr (12/08/22 0739)     Continuous Infusions:  insulin regular (HumuLIN R,NovoLIN R) infusion, 0 3-21 Units/hr, Last Rate: 0 5 Units/hr (12/08/22 0430)  NxStage K 4/Ca 3, 20,000 mL  sodium bicarbonate infusion, 150 mL/hr, Last Rate: 150 mL/hr (12/08/22 0739)      PRN Meds:        Invasive Devices Review  Invasive Devices     Peripheral Intravenous Line  Duration           Peripheral IV 12/06/22 Right;Upper Arm 1 day    Peripheral IV 12/06/22 Right;Ventral (anterior) Forearm 1 day          Line  Duration Hemodialysis AV Fistula 07/03/19 Left Upper arm 1253 days          Hemodialysis Catheter  Duration           HD Temporary Double Catheter <1 day          Drain  Duration           Urethral Catheter 16 Fr  1 day                Rationale for remaining devices: Acute on chronic renal failure  ---------------------------------------------------------------------------------------  Advance Directive and Living Will:      Power of : Yes  POLST:    ---------------------------------------------------------------------------------------  Care Time Delivered:   No Critical Care time spent       Samina Smith MD      Portions of the record may have been created with voice recognition software  Occasional wrong word or "sound a like" substitutions may have occurred due to the inherent limitations of voice recognition software    Read the chart carefully and recognize, using context, where substitutions have occurred

## 2022-12-08 NOTE — PLAN OF CARE
Problem: Potential for Falls  Goal: Patient will remain free of falls  Description: INTERVENTIONS:  - Educate patient/family on patient safety including physical limitations  - Instruct patient to call for assistance with activity   - Consult OT/PT to assist with strengthening/mobility   - Keep Call bell within reach  - Keep bed low and locked with side rails adjusted as appropriate  - Keep care items and personal belongings within reach  - Initiate and maintain comfort rounds  - Make Fall Risk Sign visible to staff  - Offer Toileting every 2 Hours, in advance of need  - Initiate/Maintain bed alarm  - Obtain necessary fall risk management equipment:   - Apply yellow socks and bracelet for high fall risk patients  - Consider moving patient to room near nurses station  Outcome: Progressing     Problem: MOBILITY - ADULT  Goal: Maintain or return to baseline ADL function  Description: INTERVENTIONS:  - Educate patient/family on patient safety including physical limitations  - Instruct patient to call for assistance with activity   - Consult OT/PT to assist with strengthening/mobility   - Keep Call bell within reach  - Keep bed low and locked with side rails adjusted as appropriate  - Keep care items and personal belongings within reach  - Initiate and maintain comfort rounds  - Make Fall Risk Sign visible to staff  - Offer Toileting every 2 Hours, in advance of need  - Initiate/Maintain bed alarm  - Obtain necessary fall risk management equipment:   - Consider moving patient to room near nurses station  Outcome: Progressing  Goal: Maintains/Returns to pre admission functional level  Description: INTERVENTIONS:  - Perform BMAT or MOVE assessment daily    - Set and communicate daily mobility goal to care team and patient/family/caregiver     - Collaborate with rehabilitation services on mobility goals if consulted  - Out of bed for toileting  - Record patient progress and toleration of activity level   Outcome: Progressing     Problem: Prexisting or High Potential for Compromised Skin Integrity  Goal: Skin integrity is maintained or improved  Description: INTERVENTIONS:  - Identify patients at risk for skin breakdown  - Assess and monitor skin integrity  - Assess and monitor nutrition and hydration status  - Monitor labs   - Assess for incontinence   - Turn and reposition patient  - Assist with mobility/ambulation  - Relieve pressure over bony prominences  - Avoid friction and shearing  - Provide appropriate hygiene as needed including keeping skin clean and dry  - Evaluate need for skin moisturizer/barrier cream  - Collaborate with interdisciplinary team   - Patient/family teaching  - Consider wound care consult   Outcome: Progressing     Problem: PAIN - ADULT  Goal: Verbalizes/displays adequate comfort level or baseline comfort level  Description: Interventions:  - Encourage patient to monitor pain and request assistance  - Assess pain using appropriate pain scale  - Administer analgesics based on type and severity of pain and evaluate response  - Implement non-pharmacological measures as appropriate and evaluate response  - Consider cultural and social influences on pain and pain management  - Notify physician/advanced practitioner if interventions unsuccessful or patient reports new pain  Outcome: Progressing     Problem: INFECTION - ADULT  Goal: Absence or prevention of progression during hospitalization  Description: INTERVENTIONS:  - Assess and monitor for signs and symptoms of infection  - Monitor lab/diagnostic results  - Monitor all insertion sites, i e  indwelling lines, tubes, and drains  - Monitor endotracheal if appropriate and nasal secretions for changes in amount and color  - Waves appropriate cooling/warming therapies per order  - Administer medications as ordered  - Instruct and encourage patient and family to use good hand hygiene technique  - Identify and instruct in appropriate isolation precautions for identified infection/condition  Outcome: Progressing  Goal: Absence of fever/infection during neutropenic period  Description: INTERVENTIONS:  - Monitor WBC    Outcome: Progressing     Problem: SAFETY ADULT  Goal: Patient will remain free of falls  Description: INTERVENTIONS:  - Educate patient/family on patient safety including physical limitations  - Instruct patient to call for assistance with activity   - Consult OT/PT to assist with strengthening/mobility   - Keep Call bell within reach  - Keep bed low and locked with side rails adjusted as appropriate  - Keep care items and personal belongings within reach  - Initiate and maintain comfort rounds  - Make Fall Risk Sign visible to staff  - Offer Toileting every 2 Hours, in advance of need  - Initiate/Maintain bed alarm  - Obtain necessary fall risk management equipment:   - Apply yellow socks and bracelet for high fall risk patients  - Consider moving patient to room near nurses station  Outcome: Progressing  Goal: Maintain or return to baseline ADL function  Description: INTERVENTIONS:  - Educate patient/family on patient safety including physical limitations  - Instruct patient to call for assistance with activity   - Consult OT/PT to assist with strengthening/mobility   - Keep Call bell within reach  - Keep bed low and locked with side rails adjusted as appropriate  - Keep care items and personal belongings within reach  - Initiate and maintain comfort rounds  - Make Fall Risk Sign visible to staff  - Offer Toileting every 2 Hours, in advance of need  - Initiate/Maintain bed alarm  - Obtain necessary fall risk management equipment:   - Consider moving patient to room near nurses station  Outcome: Progressing  Goal: Maintains/Returns to pre admission functional level  Description: INTERVENTIONS:  - Perform BMAT or MOVE assessment daily    - Set and communicate daily mobility goal to care team and patient/family/caregiver     - Collaborate with rehabilitation services on mobility goals if consulted  - Out of bed for toileting  - Record patient progress and toleration of activity level   Outcome: Progressing     Problem: DISCHARGE PLANNING  Goal: Discharge to home or other facility with appropriate resources  Description: INTERVENTIONS:  - Identify barriers to discharge w/patient and caregiver  - Arrange for needed discharge resources and transportation as appropriate  - Identify discharge learning needs (meds, wound care, etc )  - Arrange for interpretive services to assist at discharge as needed  - Refer to Case Management Department for coordinating discharge planning if the patient needs post-hospital services based on physician/advanced practitioner order or complex needs related to functional status, cognitive ability, or social support system  Outcome: Progressing     Problem: Knowledge Deficit  Goal: Patient/family/caregiver demonstrates understanding of disease process, treatment plan, medications, and discharge instructions  Description: Complete learning assessment and assess knowledge base    Interventions:  - Provide teaching at level of understanding  - Provide teaching via preferred learning methods  Outcome: Progressing

## 2022-12-08 NOTE — PROGRESS NOTES
Procedure Note - Nephrology   Steve Abt  80 y o  male MRN: [id number]  Unit/Bed#: ICU 02 Encounter: [id number]      Assessment / Plan:  1  ERIK, present on admission, on top of CKD stage 4 in setting of probable prerenal ERIK vs CKD progression in setting of ARB use - admit sCr 17, b/l sCr mid 2-3s, follows outpatient with Dr Levy Jorge  -had required HD in the past, has an AVG in place that Is nonfunctional  -patient seen and examined by me on CRRT today  -as BUN has dropped significantly despite low substitution fluid rate on CRRT, will hold further CRRT and monitor urine output, provide prn IVF as suspect significant volume depletion on admission  -s/p temp HD line placement  -monitor serial labs and UOP to watch for renal recovery  -CT imaging show unremarkable kidneys  -UA with microscopy positive glucose, moderate blood, 1+ protein, 4-10 WBC  -consent obtained from patient's grand daughter for CRRT, consent is on file  2  Azotemia with concern for uremia - r/o GIB as BUN in 300s and downtrending rapidly despite low Qsub on CVVH  CRRT discontinued  Patient is urinating  Monitor serial labs  3  Hyperkalemia - K 7, down to 4 5 with medical management and CRRT  Monitor serial labs off CRRT  4  Elevated anion gap metabolic acidosis - corrected s/p CRRT, monitor this off CRRT  5  Anemia - Hgb has dropped to 7 ? Dilutional vs GIB in setting of very high BUN-receiving blood transfusion today, monitor CBC  6  HTN - BP acceptable for ERIK, avoid relative hypotension, continue holding losartan   On amlodipine and metoprolol at home   monitor on intermittent IVF  Subjective:   Patient seen and examined by me on CRRT at approximately 12:10 PM   Blood pressure 122/64, UF goal even  Patient is slow to respond to questions and is a poor historian  He is receiving blood products and IV fluids  Objective:     Vitals: Blood pressure 131/75, pulse (!) 130, temperature 98 3 °F (36 8 °C), resp   rate (!) 28, weight 65 7 kg (144 lb 13 5 oz), SpO2 92 %  ,Body mass index is 26 49 kg/m²  Temp (24hrs), Av 2 °F (36 8 °C), Min:97 2 °F (36 2 °C), Max:98 8 °F (37 1 °C)      Weight (last 2 days)     Date/Time Weight    22 0600 65 7 (144 84)    22 0553 65 6 (144 62)    22 2217 65 6 (144 62)            Intake/Output Summary (Last 24 hours) at 2022 1412  Last data filed at 2022 1400  Gross per 24 hour   Intake 7887 12 ml   Output 3122 ml   Net 4765 12 ml     I/O last 24 hours: In: 8557 8 [I V :7099 8; Blood:514; IV AYVXZMPCB:625]  Out: 4393 [AIUTO:7640; Other:1782]        Physical Exam:   Physical Exam  Vitals reviewed  Constitutional:       General: He is not in acute distress  Appearance: He is well-developed  He is ill-appearing  He is not diaphoretic  HENT:      Head: Normocephalic and atraumatic  Nose: Nose normal       Mouth/Throat:      Mouth: Mucous membranes are moist       Pharynx: No oropharyngeal exudate  Eyes:      General: No scleral icterus  Right eye: No discharge  Left eye: No discharge  Neck:      Thyroid: No thyromegaly  Cardiovascular:      Rate and Rhythm: Regular rhythm  Tachycardia present  Heart sounds: Normal heart sounds  Pulmonary:      Effort: Pulmonary effort is normal       Breath sounds: Normal breath sounds  No wheezing or rales  Abdominal:      General: Bowel sounds are normal  There is no distension  Palpations: Abdomen is soft  Tenderness: There is no abdominal tenderness  Genitourinary:     Comments: medeiros  Musculoskeletal:         General: No swelling  Normal range of motion  Cervical back: Neck supple  Lymphadenopathy:      Cervical: No cervical adenopathy  Skin:     General: Skin is warm and dry  Findings: No rash  Neurological:      Mental Status: He is alert        Comments: Awake but confused, mumbling   Psychiatric:         Behavior: Behavior normal          Invasive Devices     Peripheral Intravenous Line  Duration           Peripheral IV 12/06/22 Right;Upper Arm 1 day    Peripheral IV 12/06/22 Right;Ventral (anterior) Forearm 1 day          Line  Duration           Hemodialysis AV Fistula 07/03/19 Left Upper arm 1254 days          Hemodialysis Catheter  Duration           HD Temporary Double Catheter <1 day          Drain  Duration           Urethral Catheter 16 Fr  1 day                Medications:    Scheduled Meds:  Current Facility-Administered Medications   Medication Dose Route Frequency Provider Last Rate   • Calcium Gluconate  2 g Intravenous Once Jose Fritz MD 2 g (12/08/22 1407)   • heparin (porcine)  5,000 Units Subcutaneous Q8H [address] Kalyn Boo MD     • insulin regular (HumuLIN R,NovoLIN R) infusion  0 3-21 Units/hr Intravenous Titrated SUJATA Lynne 1 Units/hr (12/08/22 1240)   • multi-electrolyte  1,000 mL Intravenous Once Kalyn Boo MD 1,000 mL (12/08/22 1028)   • NxStage K 4/Ca 3  20,000 mL Dialysis Continuous Suzette Goodrich DO         PRN Meds:     Continuous Infusions:insulin regular (HumuLIN R,NovoLIN R) infusion, 0 3-21 Units/hr, Last Rate: 1 Units/hr (12/08/22 1240)  NxStage K 4/Ca 3, 20,000 mL            LAB RESULTS:      Results from last 7 days   Lab Units 12/08/22  1101 12/08/22  0525 12/07/22  2316 12/07/22  1455 12/07/22  0807 12/07/22  0228 12/07/22  0212 12/06/22  2052 12/06/22  1603 12/06/22  1233   WBC Thousand/uL  --  14 40*  --   --  13 21* 12 38*  --   --   --  13 79*   HEMOGLOBIN g/dL  --  7 0*  --   --  8 1* 7 8*  --   --   --  10 0*   HEMATOCRIT %  --  20 1*  --   --  24 0* 22 8*  --   --   --  30 3*   PLATELETS Thousands/uL  --  106*  --   --  131* 140*  --   --   --  192   NEUTROS PCT %  --  85*  --   --   --  92*  --   --   --  87*   LYMPHS PCT %  --  3*  --   --   --  3*  --   --   --  4*   MONOS PCT %  --  10  --   --   --  4  --   --   --  8   EOS PCT %  --  0  --   --   --  0  --   --   --  0   POTASSIUM mmol/L 4 6 3 9 4 1 4 6 4 5 --  5 1 5 7*   < > 7 0*   CHLORIDE mmol/L 99 98 96 96 97  --  97 98   < > 98   CO2 mmol/L 28 27 26 20* 18*  --  15* 14*   < > 10*   BUN mg/dL 102* 124* 147* 250* 306*  --  226* 226*   < > 216*   CREATININE mg/dL 7 54* 9 10* 10 91* 15 85* 16 29*  --  16 46* 16 41*   < > 17 52*   CALCIUM mg/dL 7 8* 7 3* 7 3* 8 0* 8 2*  --  8 5 8 7   < > 8 8   ALK PHOS U/L  --   --   --   --   --   --   --   --   --  73   ALT U/L  --   --   --   --   --   --   --   --   --  30   AST U/L  --   --   --   --   --   --   --   --   --  19   MAGNESIUM mg/dL 1 9 2 1 1 9 2 2 2 4  --   --   --   --   --    PHOSPHORUS mg/dL 4 4* 4 8* 5 7* 8 7* 8 7*  --  10 1*  --   --   --     < > = values in this interval not displayed  CUTURES:  Lab Results   Component Value Date    URINECX >100,000 cfu/ml Pseudomonas aeruginosa (A) 03/07/2019    URINECX No Growth <1000 cfu/mL 09/21/2018                 Portions of the record may have been created with voice recognition software  Occasional wrong word or "sound a like" substitutions may have occurred due to the inherent limitations of voice recognition software  Read the chart carefully and recognize, using context, where substitutions have occurred  If you have any questions, please contact the dictating provider

## 2022-12-09 VITALS
TEMPERATURE: 97 F | OXYGEN SATURATION: 95 % | SYSTOLIC BLOOD PRESSURE: 104 MMHG | DIASTOLIC BLOOD PRESSURE: 58 MMHG | HEART RATE: 70 BPM | RESPIRATION RATE: 16 BRPM

## 2022-12-09 PROBLEM — R41.82 AMS (ALTERED MENTAL STATUS): Status: RESOLVED | Noted: 2022-12-06 | Resolved: 2022-12-09

## 2022-12-09 PROBLEM — E87.29 HIGH ANION GAP METABOLIC ACIDOSIS: Status: RESOLVED | Noted: 2022-01-01 | Resolved: 2022-01-01

## 2022-12-09 NOTE — PROGRESS NOTES
Progress Note - Nephrology   Manuel Rodríguez  80 y o  male MRN: [id number]  Unit/Bed#: ICU 02 Encounter: [id number]      Assessment / Plan:  1  ERIK, present on admission, on top of CKD stage 4 in setting of probable prerenal ERIK vs CKD progression in setting of ARB use - admit sCr 17, b/l sCr mid 2-3s, follows outpatient with Dr Waleska Otto  -had required HD in the past, has an AVG in place that Is nonfunctional-consider graftogram by IR for further evaluation vs vascular evaluation as no thrill/bruit  -off CRRT since 12/8 d/t significant drop in BUN  -did urinate in response to diuresis, but BUN/sCr remains elevated and patient volume up (net +7 4L for admission)  -s/p temp HD line placement, plan for HD today with UF as tolerated  Will likely need HD again for volume removal tomorrow  Avoid relative hypotension as this can slow renal recovery  -monitor BMP  -CT imaging show unremarkable kidneys  -UA with microscopy positive glucose, moderate blood, 1+ protein, 4-10 WBC  -consent obtained from patient's grand daughter for RRT, consent is on file  2  Azotemia with concern for uremia - r/o GIB as BUN in 300s and downtrending rapidly despite low Qsub on CVVH  CRRT discontinued  Patient is urinating  Plan for HD today, monitor BUN   3  Hyperkalemia - K 7, down to 4 9 with medical management and off CRRT  Monitor BMP  4  Elevated anion gap metabolic acidosis - corrected s/p CRRT, monitor with HD  5  Anemia - Hgb has dropped to 7 ? Dilutional vs GIB in setting of very high BUN-improved to 9 7 s/p blood transfusion 12/8, monitor CBC  6  HTN - BP low normal for ERIK, avoid relative hypotension, continue holding losartan   On amlodipine and metoprolol at home  Now on metoprolol prn IV  7  afib with RVR - on digoxin per ICU team  8  Metabolic encephalopathy - slowly improving        Subjective:   Patient is on Bipap  Unable to elicit HPI/ROS         Objective:     Vitals: Blood pressure 114/72, pulse (!) 140, temperature 99 3 °F (37 4 °C), temperature source Axillary, resp  rate (!) 24, weight 65 8 kg (145 lb 1 oz), SpO2 97 %  ,Body mass index is 26 53 kg/m²  Temp (24hrs), Av 9 °F (36 6 °C), Min:97 °F (36 1 °C), Max:99 3 °F (37 4 °C)      Weight (last 2 days)     Date/Time Weight    22 0559 65 8 (145 06)    22 0600 65 7 (144 84)    22 0553 65 6 (144 62)            Intake/Output Summary (Last 24 hours) at 2022 1355  Last data filed at 2022 1301  Gross per 24 hour   Intake 2279 5 ml   Output 1655 ml   Net 624 5 ml     I/O last 24 hours: In: 4172 2 [I V :3119 2; Blood:514; IV Piggyback:539]  Out: 5363 [Urine:2120; Other:431]        Physical Exam:   Physical Exam  Vitals reviewed  Constitutional:       General: He is not in acute distress  Appearance: He is well-developed  He is ill-appearing  He is not diaphoretic  Comments: On bipap   HENT:      Head: Normocephalic and atraumatic  Nose: Nose normal       Mouth/Throat:      Mouth: Mucous membranes are moist       Pharynx: No oropharyngeal exudate  Eyes:      General: No scleral icterus  Right eye: No discharge  Left eye: No discharge  Neck:      Thyroid: No thyromegaly  Cardiovascular:      Rate and Rhythm: Normal rate and regular rhythm  Heart sounds: Normal heart sounds  Pulmonary:      Effort: Pulmonary effort is normal       Breath sounds: No wheezing or rales  Comments: Decreased BS b/l  Abdominal:      General: Bowel sounds are normal  There is no distension  Palpations: Abdomen is soft  Tenderness: There is no abdominal tenderness  Musculoskeletal:         General: No swelling or tenderness  Normal range of motion  Cervical back: Neck supple  Lymphadenopathy:      Cervical: No cervical adenopathy  Skin:     General: Skin is warm and dry  Findings: No rash  Neurological:      Mental Status: He is alert        Comments: Awake on bipap, mumbles answers at times   Psychiatric: Behavior: Behavior normal          Invasive Devices     Peripheral Intravenous Line  Duration           Peripheral IV 12/06/22 Right;Upper Arm 2 days    Peripheral IV 12/06/22 Right;Ventral (anterior) Forearm 2 days          Line  Duration           Hemodialysis AV Fistula 07/03/19 Left Upper arm 1255 days          Hemodialysis Catheter  Duration           HD Temporary Double Catheter 1 day          Drain  Duration           Urethral Catheter 16 Fr  2 days                Medications:    Scheduled Meds:  Current Facility-Administered Medications   Medication Dose Route Frequency Provider Last Rate   • digoxin  250 mcg Intravenous Once Kelly Fortune MD     • heparin (porcine)  5,000 Units Subcutaneous [facility] Kelly Fortune MD     • insulin lispro  1-5 Units Subcutaneous Q6H [facility] Kelly Fortune MD     • ipratropium  0 5 mg Nebulization 4x Daily Kelly Fortune MD     • levalbuterol  1 25 mg Nebulization Q6H PRN Kelly Fortune MD     • metoprolol  10 mg Intravenous Q6H Kelly Fortune MD         PRN Meds: •  levalbuterol    Continuous Infusions:         LAB RESULTS:      Results from last 7 days   Lab Units 12/09/22  0443 12/08/22 2003 12/08/22  1447 12/08/22  1101 12/08/22  0525 12/07/22  2316 12/07/22  1455 12/07/22  0807 12/07/22  0228 12/07/22  0212 12/06/22  1603 12/06/22  1233   WBC Thousand/uL 14 76*  --  12 86*  --  14 40*  --   --  13 21* 12 38*  --   --  13 79*   HEMOGLOBIN g/dL 9 7*  --  9 3*  --  7 0*  --   --  8 1* 7 8*  --   --  10 0*   HEMATOCRIT % 28 2*  --  26 5*  --  20 1*  --   --  24 0* 22 8*  --   --  30 3*   PLATELETS Thousands/uL 91*  --  87*  --  106*  --   --  131* 140*  --   --  192   NEUTROS PCT %  --   --   --   --  85*  --   --   --  92*  --   --  87*   LYMPHS PCT %  --   --   --   --  3*  --   --   --  3*  --   --  4*   MONOS PCT %  --   --   --   --  10  --   --   --  4  --   --  8   EOS PCT %  --   --   --   --  0  --   --   --  0  --   --  0   POTASSIUM mmol/L 4 9 4 9  -- 4 6 3 9 4 1 4 6 4 5  --  5 1   < > 7 0*   CHLORIDE mmol/L 99 99  --  99 98 96 96 97  --  97   < > 98   CO2 mmol/L 23 23  --  28 27 26 20* 18*  --  15*   < > 10*   BUN mg/dL 106* 101*  --  102* 124* 147* 250* 306*  --  226*   < > 216*   CREATININE mg/dL 7 77* 7 49*  --  7 54* 9 10* 10 91* 15 85* 16 29*  --  16 46*   < > 17 52*   CALCIUM mg/dL 8 2* 8 3  --  7 8* 7 3* 7 3* 8 0* 8 2*  --  8 5   < > 8 8   ALK PHOS U/L  --   --   --   --   --   --   --   --   --   --   --  73   ALT U/L  --   --   --   --   --   --   --   --   --   --   --  30   AST U/L  --   --   --   --   --   --   --   --   --   --   --  19   MAGNESIUM mg/dL 2 2  --   --  1 9 2 1 1 9 2 2 2 4  --   --   --   --    PHOSPHORUS mg/dL 6 6*  --   --  4 4* 4 8* 5 7* 8 7* 8 7*  --  10 1*  --   --     < > = values in this interval not displayed  CUTURES:  Lab Results   Component Value Date    URINECX >100,000 cfu/ml Pseudomonas aeruginosa (A) 03/07/2019    URINECX No Growth <1000 cfu/mL 09/21/2018                 Portions of the record may have been created with voice recognition software  Occasional wrong word or "sound a like" substitutions may have occurred due to the inherent limitations of voice recognition software  Read the chart carefully and recognize, using context, where substitutions have occurred  If you have any questions, please contact the dictating provider

## 2022-12-09 NOTE — PLAN OF CARE
Post-Dialysis RN Treatment Note    Blood Pressure:  Pre 95/52 mm/Hg  Post 102/66 mmHg   EDW  TBD kg    Weight:  Pre 65 8 kg   Post 63 3 kg   Mode of weight measurement: Bed Scale   Volume Removed  2500 ml    Treatment duration 180 minutes    NS given  No    Treatment shortened?  No   Medications given during Rx Not Applicable   Estimated Kt/V  Not Applicable   Access type: Temporary HD catheter   Access Issues: No    Report called to primary nurse   Yes / Dinh Elizondo RN        3000 ml as tolerated, 3 hrs, 2K bath  Problem: METABOLIC, FLUID AND ELECTROLYTES - ADULT  Goal: Electrolytes maintained within normal limits  Description: INTERVENTIONS:  - Monitor labs and assess patient for signs and symptoms of electrolyte imbalances  - Administer electrolyte replacement as ordered  - Monitor response to electrolyte replacements, including repeat lab results as appropriate  - Instruct patient on fluid and nutrition as appropriate  Outcome: Progressing  Goal: Fluid balance maintained  Description: INTERVENTIONS:  - Monitor labs   - Monitor I/O and WT  - Instruct patient on fluid and nutrition as appropriate  - Assess for signs & symptoms of volume excess or deficit  Outcome: Progressing

## 2022-12-09 NOTE — PLAN OF CARE
Problem: Potential for Falls  Goal: Patient will remain free of falls  Description: INTERVENTIONS:  - Educate patient/family on patient safety including physical limitations  - Instruct patient to call for assistance with activity   - Consult OT/PT to assist with strengthening/mobility   - Keep Call bell within reach  - Keep bed low and locked with side rails adjusted as appropriate  - Keep care items and personal belongings within reach  - Initiate and maintain comfort rounds  - Make Fall Risk Sign visible to staff  - Offer Toileting every 2 Hours, in advance of need  - Initiate/Maintain bed alarm  - Obtain necessary fall risk management equipment:   - Apply yellow socks and bracelet for high fall risk patients  - Consider moving patient to room near nurses station  Outcome: Progressing     Problem: MOBILITY - ADULT  Goal: Maintain or return to baseline ADL function  Description: INTERVENTIONS:  - Educate patient/family on patient safety including physical limitations  - Instruct patient to call for assistance with activity   - Consult OT/PT to assist with strengthening/mobility   - Keep Call bell within reach  - Keep bed low and locked with side rails adjusted as appropriate  - Keep care items and personal belongings within reach  - Initiate and maintain comfort rounds  - Make Fall Risk Sign visible to staff  - Offer Toileting every 2 Hours, in advance of need  - Initiate/Maintain bed alarm  - Obtain necessary fall risk management equipment:   - Consider moving patient to room near nurses station  Outcome: Progressing  Goal: Maintains/Returns to pre admission functional level  Description: INTERVENTIONS:  - Perform BMAT or MOVE assessment daily    - Set and communicate daily mobility goal to care team and patient/family/caregiver     - Collaborate with rehabilitation services on mobility goals if consulted  - Out of bed for toileting  - Record patient progress and toleration of activity level   Outcome: Progressing     Problem: Prexisting or High Potential for Compromised Skin Integrity  Goal: Skin integrity is maintained or improved  Description: INTERVENTIONS:  - Identify patients at risk for skin breakdown  - Assess and monitor skin integrity  - Assess and monitor nutrition and hydration status  - Monitor labs   - Assess for incontinence   - Turn and reposition patient  - Assist with mobility/ambulation  - Relieve pressure over bony prominences  - Avoid friction and shearing  - Provide appropriate hygiene as needed including keeping skin clean and dry  - Evaluate need for skin moisturizer/barrier cream  - Collaborate with interdisciplinary team   - Patient/family teaching  - Consider wound care consult   Outcome: Progressing     Problem: PAIN - ADULT  Goal: Verbalizes/displays adequate comfort level or baseline comfort level  Description: Interventions:  - Encourage patient to monitor pain and request assistance  - Assess pain using appropriate pain scale  - Administer analgesics based on type and severity of pain and evaluate response  - Implement non-pharmacological measures as appropriate and evaluate response  - Consider cultural and social influences on pain and pain management  - Notify physician/advanced practitioner if interventions unsuccessful or patient reports new pain  Outcome: Progressing     Problem: INFECTION - ADULT  Goal: Absence or prevention of progression during hospitalization  Description: INTERVENTIONS:  - Assess and monitor for signs and symptoms of infection  - Monitor lab/diagnostic results  - Monitor all insertion sites, i e  indwelling lines, tubes, and drains  - Monitor endotracheal if appropriate and nasal secretions for changes in amount and color  - [place] appropriate cooling/warming therapies per order  - Administer medications as ordered  - Instruct and encourage patient and family to use good hand hygiene technique  - Identify and instruct in appropriate isolation precautions for identified infection/condition  Outcome: Progressing  Goal: Absence of fever/infection during neutropenic period  Description: INTERVENTIONS:  - Monitor WBC    Outcome: Progressing     Problem: SAFETY ADULT  Goal: Patient will remain free of falls  Description: INTERVENTIONS:  - Educate patient/family on patient safety including physical limitations  - Instruct patient to call for assistance with activity   - Consult OT/PT to assist with strengthening/mobility   - Keep Call bell within reach  - Keep bed low and locked with side rails adjusted as appropriate  - Keep care items and personal belongings within reach  - Initiate and maintain comfort rounds  - Make Fall Risk Sign visible to staff  - Offer Toileting every 2 Hours, in advance of need  - Initiate/Maintain bed alarm  - Obtain necessary fall risk management equipment:   - Apply yellow socks and bracelet for high fall risk patients  - Consider moving patient to room near nurses station  Outcome: Progressing  Goal: Maintain or return to baseline ADL function  Description: INTERVENTIONS:  - Educate patient/family on patient safety including physical limitations  - Instruct patient to call for assistance with activity   - Consult OT/PT to assist with strengthening/mobility   - Keep Call bell within reach  - Keep bed low and locked with side rails adjusted as appropriate  - Keep care items and personal belongings within reach  - Initiate and maintain comfort rounds  - Make Fall Risk Sign visible to staff  - Offer Toileting every 2 Hours, in advance of need  - Initiate/Maintain bed alarm  - Obtain necessary fall risk management equipment:   - Consider moving patient to room near nurses station  Outcome: Progressing  Goal: Maintains/Returns to pre admission functional level  Description: INTERVENTIONS:  - Perform BMAT or MOVE assessment daily    - Set and communicate daily mobility goal to care team and patient/family/caregiver     - Collaborate with rehabilitation services on mobility goals if consulted    - Record patient progress and toleration of activity level   Outcome: Progressing     Problem: DISCHARGE PLANNING  Goal: Discharge to home or other facility with appropriate resources  Description: INTERVENTIONS:  - Identify barriers to discharge w/patient and caregiver  - Arrange for needed discharge resources and transportation as appropriate  - Identify discharge learning needs (meds, wound care, etc )  - Arrange for interpretive services to assist at discharge as needed  - Refer to Case Management Department for coordinating discharge planning if the patient needs post-hospital services based on physician/advanced practitioner order or complex needs related to functional status, cognitive ability, or social support system  Outcome: Progressing     Problem: Knowledge Deficit  Goal: Patient/family/caregiver demonstrates understanding of disease process, treatment plan, medications, and discharge instructions  Description: Complete learning assessment and assess knowledge base    Interventions:  - Provide teaching at level of understanding  - Provide teaching via preferred learning methods  Outcome: Progressing     Problem: SAFETY,RESTRAINT: NV/NON-SELF DESTRUCTIVE BEHAVIOR  Goal: Remains free of harm/injury (restraint for non violent/non self-detsructive behavior)  Description: INTERVENTIONS:  - Instruct patient/family regarding restraint use   - Assess and monitor physiologic and psychological status   - Provide interventions and comfort measures to meet assessed patient needs   - Identify and implement measures to help patient regain control  - Assess readiness for release of restraint   Outcome: Progressing  Goal: Returns to optimal restraint-free functioning  Description: INTERVENTIONS:  - Assess the patient's behavior and symptoms that indicate continued need for restraint  - Identify and implement measures to help patient regain control  - Assess readiness for release of restraint   Outcome: Progressing

## 2022-12-09 NOTE — SPEECH THERAPY NOTE
Speech Language/Pathology  Spoke w/ nurse  Pt unable to come off bipap  May be fluid overloaded  Getting HD  Asked that I try again later  Currntly no NG

## 2022-12-09 NOTE — ASSESSMENT & PLAN NOTE
Lab Results   Component Value Date    HGBA1C 6 7 (A) 09/07/2022       Recent Labs     12/08/22  1634 12/08/22  2057 12/09/22  0037 12/09/22  0602   POCGLU 120 171* 171* 170*       Blood Sugar Average: Last 72 hrs:  (P) 726 [id number]   Home medications include Tradjenta     Plan-  SSI  Hypoglycemia protocol  accucheck q6h  Swallow study if patient off BiPAP, tube feeds if patient stable

## 2022-12-09 NOTE — PLAN OF CARE
Problem: Potential for Falls  Goal: Patient will remain free of falls  Description: INTERVENTIONS:  - Educate patient/family on patient safety including physical limitations  - Instruct patient to call for assistance with activity   - Consult OT/PT to assist with strengthening/mobility   - Keep Call bell within reach  - Keep bed low and locked with side rails adjusted as appropriate  - Keep care items and personal belongings within reach  - Initiate and maintain comfort rounds  - Make Fall Risk Sign visible to staff  - Offer Toileting every 2 Hours, in advance of need  - Initiate/Maintain bed alarm  - Obtain necessary fall risk management equipment:   - Apply yellow socks and bracelet for high fall risk patients  - Consider moving patient to room near nurses station  Outcome: Progressing     Problem: MOBILITY - ADULT  Goal: Maintain or return to baseline ADL function  Description: INTERVENTIONS:  - Educate patient/family on patient safety including physical limitations  - Instruct patient to call for assistance with activity   - Consult OT/PT to assist with strengthening/mobility   - Keep Call bell within reach  - Keep bed low and locked with side rails adjusted as appropriate  - Keep care items and personal belongings within reach  - Initiate and maintain comfort rounds  - Make Fall Risk Sign visible to staff  - Offer Toileting every 2 Hours, in advance of need  - Initiate/Maintain bed alarm  - Obtain necessary fall risk management equipment:   - Consider moving patient to room near nurses station  Outcome: Not Progressing  Goal: Maintains/Returns to pre admission functional level  Description: INTERVENTIONS:  - Perform BMAT or MOVE assessment daily    - Set and communicate daily mobility goal to care team and patient/family/caregiver       - Out of bed for toileting  - Record patient progress and toleration of activity level   Outcome: Not Progressing     Problem: Prexisting or High Potential for Compromised Skin Integrity  Goal: Skin integrity is maintained or improved  Description: INTERVENTIONS:  - Identify patients at risk for skin breakdown  - Assess and monitor skin integrity  - Assess and monitor nutrition and hydration status  - Monitor labs   - Assess for incontinence   - Turn and reposition patient  - Assist with mobility/ambulation  - Relieve pressure over bony prominences  - Avoid friction and shearing  - Provide appropriate hygiene as needed including keeping skin clean and dry  - Evaluate need for skin moisturizer/barrier cream  - Collaborate with interdisciplinary team   - Patient/family teaching  - Consider wound care consult   Outcome: Not Progressing     Problem: PAIN - ADULT  Goal: Verbalizes/displays adequate comfort level or baseline comfort level  Description: Interventions:  - Encourage patient to monitor pain and request assistance  - Assess pain using appropriate pain scale  - Administer analgesics based on type and severity of pain and evaluate response  - Implement non-pharmacological measures as appropriate and evaluate response  - Consider cultural and social influences on pain and pain management  - Notify physician/advanced practitioner if interventions unsuccessful or patient reports new pain  Outcome: Progressing     Problem: INFECTION - ADULT  Goal: Absence or prevention of progression during hospitalization  Description: INTERVENTIONS:  - Assess and monitor for signs and symptoms of infection  - Monitor lab/diagnostic results  - Monitor all insertion sites, i e  indwelling lines, tubes, and drains  - Monitor endotracheal if appropriate and nasal secretions for changes in amount and color  - [place] appropriate cooling/warming therapies per order  - Administer medications as ordered  - Instruct and encourage patient and family to use good hand hygiene technique  - Identify and instruct in appropriate isolation precautions for identified infection/condition  Outcome: Progressing  Goal: Absence of fever/infection during neutropenic period  Description: INTERVENTIONS:  - Monitor WBC    Outcome: Progressing     Problem: SAFETY ADULT  Goal: Patient will remain free of falls  Description: INTERVENTIONS:  - Educate patient/family on patient safety including physical limitations  - Instruct patient to call for assistance with activity   - Consult OT/PT to assist with strengthening/mobility   - Keep Call bell within reach  - Keep bed low and locked with side rails adjusted as appropriate  - Keep care items and personal belongings within reach  - Initiate and maintain comfort rounds  - Make Fall Risk Sign visible to staff  - Offer Toileting every 2 Hours, in advance of need  - Initiate/Maintain bed alarm  - Obtain necessary fall risk management equipment:   - Apply yellow socks and bracelet for high fall risk patients  - Consider moving patient to room near nurses station  Outcome: Progressing  Goal: Maintain or return to baseline ADL function  Description: INTERVENTIONS:  - Educate patient/family on patient safety including physical limitations  - Instruct patient to call for assistance with activity   - Consult OT/PT to assist with strengthening/mobility   - Keep Call bell within reach  - Keep bed low and locked with side rails adjusted as appropriate  - Keep care items and personal belongings within reach  - Initiate and maintain comfort rounds  - Make Fall Risk Sign visible to staff  - Offer Toileting every 2 Hours, in advance of need  - Initiate/Maintain bed alarm  - Obtain necessary fall risk management equipment:   - Consider moving patient to room near nurses station  Outcome: Not Progressing  Goal: Maintains/Returns to pre admission functional level  Description: INTERVENTIONS:  - Perform BMAT or MOVE assessment daily    - Set and communicate daily mobility goal to care team and patient/family/caregiver     - Collaborate with rehabilitation services on mobility goals if consulted    - Out of bed for toileting  - Record patient progress and toleration of activity level   Outcome: Not Progressing     Problem: DISCHARGE PLANNING  Goal: Discharge to home or other facility with appropriate resources  Description: INTERVENTIONS:  - Identify barriers to discharge w/patient and caregiver  - Arrange for needed discharge resources and transportation as appropriate  - Identify discharge learning needs (meds, wound care, etc )  - Arrange for interpretive services to assist at discharge as needed  - Refer to Case Management Department for coordinating discharge planning if the patient needs post-hospital services based on physician/advanced practitioner order or complex needs related to functional status, cognitive ability, or social support system  Outcome: Progressing     Problem: Knowledge Deficit  Goal: Patient/family/caregiver demonstrates understanding of disease process, treatment plan, medications, and discharge instructions  Description: Complete learning assessment and assess knowledge base    Interventions:  - Provide teaching at level of understanding  - Provide teaching via preferred learning methods  Outcome: Progressing     Problem: SAFETY,RESTRAINT: NV/NON-SELF DESTRUCTIVE BEHAVIOR  Goal: Remains free of harm/injury (restraint for non violent/non self-detsructive behavior)  Description: INTERVENTIONS:  - Instruct patient/family regarding restraint use   - Assess and monitor physiologic and psychological status   - Provide interventions and comfort measures to meet assessed patient needs   - Identify and implement measures to help patient regain control  - Assess readiness for release of restraint   Outcome: Progressing  Goal: Returns to optimal restraint-free functioning  Description: INTERVENTIONS:  - Assess the patient's behavior and symptoms that indicate continued need for restraint  - Identify and implement measures to help patient regain control  - Assess readiness for release of restraint Outcome: Progressing

## 2022-12-09 NOTE — ASSESSMENT & PLAN NOTE
Home medications include Dulera and Flonase  Was on Prednisone taper however likely did not finish  Patient became hypoxic overnight with increased work of breathing    Placed on NC oxygen and BiPAP       Plan-  xopenex and atrovent nebulizers q6h  bipap 16/8  Nasal canula oxygen to maintain O2sat >88%

## 2022-12-09 NOTE — ASSESSMENT & PLAN NOTE
Lab Results   Component Value Date    IRON 127 12/07/2022    FERRITIN 2,641 (H) 12/07/2022    TIBC 226 (L) 12/07/2022    CONCFE 56 (H) 12/07/2022     Recent Labs     12/08/22  0525 12/08/22  1447 12/09/22  0443   HGB 7 0* 9 3* 9 7*       S/p 1 U pRBC with adequate response in hemoglobin  Will continue to trend hemoglobin and transfuse if needed

## 2022-12-09 NOTE — PROGRESS NOTES
[facility]  Progress Note - Gonzalo Perales  1935, 80 y o  male MRN: [id number]  Unit/Bed#: ICU 02 Encounter: [id number]  Primary Care Provider: Dorie Saba DO   Date and time admitted to hospital: 12/6/2022 12:01 PM    * ERIK (acute kidney injury) [facility])  Assessment & Plan  See CKD Plan   CVVH discontinued  Patient may benefit from further dialysis this admission  Will reach out to nephrology for further input  CKD (chronic kidney disease) stage 5, GFR less than 15 ml/min [facility])  Assessment & Plan  Lab Results   Component Value Date    EGFR 5 12/09/2022    EGFR 5 12/08/2022    EGFR 5 12/08/2022    CREATININE 7 77 (H) 12/09/2022    CREATININE 7 49 (H) 12/08/2022    CREATININE 7 54 (H) 12/08/2022   Not currently on HD  Baseline 2 8-3 3  Hx of ERIK transitioned to CKD w/ renal recovery   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  No bruit auscultated on exam     CVVH discontinued due to rapid drop in BUN and concerns in osmotic shifts  Blood work continues to improve    Plan-  Appreciate Nephrology recommendations  Will consult with nephrology for input regarding patient's need for further CVVH   Trend bmp         COPD (chronic obstructive pulmonary disease) ([facility] [address] )  Assessment & Plan  Home medications include Dulera and Flonase  Was on Prednisone taper however likely did not finish  Patient became hypoxic overnight with increased work of breathing    Placed on NC oxygen and BiPAP       Plan-  xopenex and atrovent nebulizers q6h  bipap 16/8  Nasal canula oxygen to maintain O2sat >88%      AMS (altered mental status)-resolved as of 12/9/2022  Assessment & Plan  Home health aid visits throughout the week   Noticed pt was increasingly lethargic  Status, confusion, decreased p o  intake over the past 3 days  Wife is hospitalized, per primary caregiver, he has been home alone past 3 days   CT Head- Neg   BUN- 229 and trending down  UA- Neg   Most likely metabolic encephalopathy secondary to renal failure  Patient's mental status greatly improved  Speaking coherently and following commands  Plan-   Resolved        Paroxysmal atrial fibrillation ([address] )  Assessment & Plan  Per chart review patient has hx of PAF  Rate controlled with metoprolol  CHADVASC score 4  High bleeding risk per HASBLED score    Plan:  Lopressor 5 mg q6h    High anion gap metabolic acidosis  Assessment & Plan  See CKD and AMS Plan       DM type 2 (diabetes mellitus, type 2) (HCC)  Assessment & Plan  Lab Results   Component Value Date    HGBA1C 6 7 (A) 09/07/2022       Recent Labs     12/08/22  1634 12/08/22  2057 12/09/22  0037 12/09/22  0602   POCGLU 120 171* 171* 170*       Blood Sugar Average: Last 72 hrs:  (P) 542 [id number]   Home medications include Tradjenta     Plan-  SSI  Hypoglycemia protocol  accucheck q6h  Swallow study if patient off BiPAP, tube feeds if patient stable       Anemia due to chronic kidney disease  Assessment & Plan  Lab Results   Component Value Date    IRON 127 12/07/2022    FERRITIN 2,641 (H) 12/07/2022    TIBC 226 (L) 12/07/2022    CONCFE 56 (H) 12/07/2022     Recent Labs     12/08/22  0525 12/08/22  1447 12/09/22  0443   HGB 7 0* 9 3* 9 7*       S/p 1 U pRBC with adequate response in hemoglobin  Will continue to trend hemoglobin and transfuse if needed    Hyperkalemia-resolved as of 12/7/2022  Assessment & Plan  Secondary to renal failure  Potassium trending down with use of albuterol and insulin and bicarb drips    Plan:  Continue current management        ----------------------------------------------------------------------------------------  HPI/24hr events: patient hypoxic and had increased work of breathing requiring NC oxygen and Bipap overnight  Otherwise stable       Patient appropriate for transfer out of the ICU today?: No  Disposition: Continue Critical Care   Code Status: Level 1 - Full Code  ---------------------------------------------------------------------------------------  SUBJECTIVE  Patient feeling better this morning  He says he feels short of breath  Patient denies chest pain  He is not having abdominal pain, nausea or feel the need to vomit  No pain with urination  Denies numbness or weakness in the extremities  Review of Systems   All other systems reviewed and are negative       ---------------------------------------------------------------------------------------  OBJECTIVE    Vitals   Vitals:    22 0600 22 0700 22 0723 22 0724   BP: 106/63 93/65     Pulse: (!) 138 (!) 140 (!) 126 (!) 128   Resp: 18 21 (!) 26 (!) 31   Temp:   (!) 97 4 °F (36 3 °C)    TempSrc:   Axillary    SpO2: 96% 97% (!) 84% 93%   Weight:         Temp (24hrs), Av 1 °F (36 7 °C), Min:97 °F (36 1 °C), Max:98 8 °F (37 1 °C)  Current: Temperature: (!) 97 4 °F (36 3 °C)          Respiratory:  SpO2: SpO2: 93 %       Invasive/non-invasive ventilation settings   Respiratory    Lab Data (Last 4 hours)    None         O2/Vent Data (Last 4 hours)       0408          Non-Invasive Ventilation Mode BiPAP                   Physical Exam  Vitals and nursing note reviewed  Constitutional:       General: He is not in acute distress  Appearance: He is well-developed  He is not ill-appearing  HENT:      Head: Normocephalic and atraumatic  Ears:      Comments: Hard of hearing  Hears better on left than right  Mouth/Throat:      Mouth: Mucous membranes are dry  Pharynx: Oropharynx is clear  No oropharyngeal exudate  Comments: Dry mucous membranes  Eyes:      General: No scleral icterus  Extraocular Movements: Extraocular movements intact  Conjunctiva/sclera: Conjunctivae normal       Pupils: Pupils are equal, round, and reactive to light  Cardiovascular:      Rate and Rhythm: Regular rhythm  Tachycardia present  Pulses: Normal pulses        Heart sounds: Normal heart sounds  No murmur heard  Pulmonary:      Effort: Pulmonary effort is normal  No respiratory distress  Breath sounds: Wheezing present  No rhonchi  Comments: Mild expiratory wheezes  Chest:      Chest wall: No tenderness  Abdominal:      General: Abdomen is flat  Bowel sounds are normal  There is no distension  Palpations: Abdomen is soft  Tenderness: There is no abdominal tenderness  Musculoskeletal:         General: No swelling  Normal range of motion  Cervical back: Normal range of motion and neck supple  Right lower leg: No edema  Left lower leg: No edema  Skin:     General: Skin is warm and dry  Capillary Refill: Capillary refill takes less than 2 seconds  Neurological:      Mental Status: He is alert  He is disoriented and confused  Cranial Nerves: No cranial nerve deficit  Sensory: No sensory deficit  Motor: No weakness  Comments: Mental status improved  Speech improved   Able to follow commands and instructions  Strength/ Motor intact    Psychiatric:         Mood and Affect: Mood normal              Laboratory and Diagnostics:  Results from last 7 days   Lab Units 12/09/22 0443 12/08/22  1447 12/08/22  0525 12/07/22  0807 12/07/22  0228 12/06/22  1233   WBC Thousand/uL 14 76* 12 86* 14 40* 13 21* 12 38* 13 79*   HEMOGLOBIN g/dL 9 7* 9 3* 7 0* 8 1* 7 8* 10 0*   HEMATOCRIT % 28 2* 26 5* 20 1* 24 0* 22 8* 30 3*   PLATELETS Thousands/uL 91* 87* 106* 131* 140* 192   NEUTROS PCT %  --   --  85*  --  92* 87*   MONOS PCT %  --   --  10  --  4 8     Results from last 7 days   Lab Units 12/09/22  0443 12/08/22  2003 12/08/22  1101 12/08/22  0525 12/07/22  2316 12/07/22  1455 12/07/22  0807 12/06/22  1603 12/06/22  1233   SODIUM mmol/L 139 139 139 140 139 139 138   < > 136   POTASSIUM mmol/L 4 9 4 9 4 6 3 9 4 1 4 6 4 5   < > 7 0*   CHLORIDE mmol/L 99 99 99 98 96 96 97   < > 98   CO2 mmol/L 23 23 28 27 26 20* 18*   < > 10*   ANION GAP mmol/L 17* 17* 12 15* 17* 23* 23*   < > 28*   BUN mg/dL 106* 101* 102* 124* 147* 250* 306*   < > 216*   CREATININE mg/dL 7 77* 7 49* 7 54* 9 10* 10 91* 15 85* 16 29*   < > 17 52*   CALCIUM mg/dL 8 2* 8 3 7 8* 7 3* 7 3* 8 0* 8 2*   < > 8 8   GLUCOSE RANDOM mg/dL 172* 173* 137 141* 169* 90 185*   < > 186*   ALT U/L  --   --   --   --   --   --   --   --  30   AST U/L  --   --   --   --   --   --   --   --  19   ALK PHOS U/L  --   --   --   --   --   --   --   --  73   ALBUMIN g/dL  --   --   --   --   --   --   --   --  3 5   TOTAL BILIRUBIN mg/dL  --   --   --   --   --   --   --   --  0 36    < > = values in this interval not displayed  Results from last 7 days   Lab Units 12/09/22  0443 12/08/22  1101 12/08/22  0525 12/07/22  2316 12/07/22  1455 12/07/22  0807 12/07/22  0212   MAGNESIUM mg/dL 2 2 1 9 2 1 1 9 2 2 2 4  --    PHOSPHORUS mg/dL 6 6* 4 4* 4 8* 5 7* 8 7* 8 7* 10 1*      Results from last 7 days   Lab Units 12/07/22  1213   INR  1 17   PTT seconds 31          Results from last 7 days   Lab Units 12/06/22  1233   LACTIC ACID mmol/L <0 3*     ABG:    VBG:          Micro        EKG: Reviewed  Imaging: I have personally reviewed pertinent reports  Intake and Output  I/O       12/07 0701  12/08 0700 12/08 0701  12/09 0700 12/09 0701  12/10 0700    I V  (mL/kg) 5660 1 (86 2) 3119 2 (47 4)     Blood  514     IV Piggyback 605 539     Total Intake(mL/kg) 6265 1 (95 4) 4172 2 (63 4)     Urine (mL/kg/hr) 875 (0 6) 1695 (1 1)     Other 1351 431     Stool  0     Total Output 2226 2126     Net +4039 1 +2046  2            Unmeasured Stool Occurrence  3 x           Height and Weights         Body mass index is 26 53 kg/m²    Weight (last 2 days)     Date/Time Weight    12/09/22 0559 65 8 (145 06)    12/08/22 0600 65 7 (144 84)    12/07/22 0553 65 6 (144 62)            Nutrition       Diet Orders   (From admission, onward)             Start     Ordered    12/06/22 1724  Diet NPO  Diet effective now References:    Nutrtion Support Algorithm Enteral vs  Parenteral   Question Answer Comment   Diet Type NPO    RD to adjust diet per protocol? Yes        12/06/22 1724                  Active Medications  Scheduled Meds:  Current Facility-Administered Medications   Medication Dose Route Frequency Provider Last Rate   • heparin (porcine)  5,000 Units Subcutaneous [facility] Dwayne Fong MD     • insulin lispro  1-5 Units Subcutaneous Q6H [facility] [place] Dwayne Fong MD     • ipratropium  0 5 mg Nebulization 4x Daily Dwayne Fong MD     • levalbuterol  1 25 mg Nebulization Q6H PRN Dwayne Fong MD     • metoprolol  5 mg Intravenous Q6H Dwayne Fong MD       Continuous Infusions:     PRN Meds:   levalbuterol, 1 25 mg, Q6H PRN        Invasive Devices Review  Invasive Devices     Peripheral Intravenous Line  Duration           Peripheral IV 12/06/22 Right;Upper Arm 2 days    Peripheral IV 12/06/22 Right;Ventral (anterior) Forearm 2 days          Line  Duration           Hemodialysis AV Fistula 07/03/19 Left Upper arm 1254 days          Hemodialysis Catheter  Duration           HD Temporary Double Catheter 1 day          Drain  Duration           Urethral Catheter 16 Fr  2 days                Rationale for remaining devices: Acute on chronic renal failure, hypoxia  ---------------------------------------------------------------------------------------  Advance Directive and Living Will:      Power of : Yes  POLST:    ---------------------------------------------------------------------------------------  Care Time Delivered:   No Critical Care time spent       Dwayne Fong MD      Portions of the record may have been created with voice recognition software  Occasional wrong word or "sound a like" substitutions may have occurred due to the inherent limitations of voice recognition software    Read the chart carefully and recognize, using context, where substitutions have occurred

## 2022-12-09 NOTE — ASSESSMENT & PLAN NOTE
Lab Results   Component Value Date    EGFR 5 12/09/2022    EGFR 5 12/08/2022    EGFR 5 12/08/2022    CREATININE 7 77 (H) 12/09/2022    CREATININE 7 49 (H) 12/08/2022    CREATININE 7 54 (H) 12/08/2022   Not currently on HD  Baseline 2 8-3 3  Hx of ERIK transitioned to CKD w/ renal recovery   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  No bruit auscultated on exam     CVVH discontinued due to rapid drop in BUN and concerns in osmotic shifts     Blood work continues to improve    Plan-  Appreciate Nephrology recommendations  Will consult with nephrology for input regarding patient's need for further CVVH   Trend bmp

## 2022-12-09 NOTE — ASSESSMENT & PLAN NOTE
See CKD Plan   CVVH discontinued  Patient may benefit from further dialysis this admission  Will reach out to nephrology for further input

## 2022-12-09 NOTE — ASSESSMENT & PLAN NOTE
Per chart review patient has hx of PAF  Rate controlled with metoprolol  CHADVASC score 4  High bleeding risk per HASBLED score    Plan:  Lopressor 5 mg q6h

## 2022-12-09 NOTE — ASSESSMENT & PLAN NOTE
Home health aid visits throughout the week   Noticed pt was increasingly lethargic  Status, confusion, decreased p o  intake over the past 3 days  Wife is hospitalized, per primary caregiver, he has been home alone past 3 days   CT Head- Neg   BUN- 229 and trending down  UA- Neg   Most likely metabolic encephalopathy secondary to renal failure  Patient's mental status greatly improved  Speaking coherently and following commands        Plan-   Resolved

## 2022-12-10 PROBLEM — J96.01 ACUTE RESPIRATORY FAILURE WITH HYPOXIA (HCC): Status: ACTIVE | Noted: 2019-02-25

## 2022-12-10 PROBLEM — N18.9 ACUTE KIDNEY INJURY SUPERIMPOSED ON CHRONIC KIDNEY DISEASE (HCC): Status: ACTIVE | Noted: 2019-02-23

## 2022-12-10 NOTE — PLAN OF CARE
Problem: Potential for Falls  Goal: Patient will remain free of falls  Description: INTERVENTIONS:  - Educate patient/family on patient safety including physical limitations  - Instruct patient to call for assistance with activity   - Consult OT/PT to assist with strengthening/mobility   - Keep Call bell within reach  - Keep bed low and locked with side rails adjusted as appropriate  - Keep care items and personal belongings within reach  - Initiate and maintain comfort rounds  - Make Fall Risk Sign visible to staff  - Offer Toileting every 2 Hours, in advance of need  - Initiate/Maintain bed alarm  - Obtain necessary fall risk management equipment:   - Apply yellow socks and bracelet for high fall risk patients  - Consider moving patient to room near nurses station  Outcome: Progressing     Problem: MOBILITY - ADULT  Goal: Maintain or return to baseline ADL function  Description: INTERVENTIONS:  - Educate patient/family on patient safety including physical limitations  - Instruct patient to call for assistance with activity   - Consult OT/PT to assist with strengthening/mobility   - Keep Call bell within reach  - Keep bed low and locked with side rails adjusted as appropriate  - Keep care items and personal belongings within reach  - Initiate and maintain comfort rounds  - Make Fall Risk Sign visible to staff  - Offer Toileting every 2 Hours, in advance of need  - Initiate/Maintain bed alarm  - Obtain necessary fall risk management equipment:   - Consider moving patient to room near nurses station  Outcome: Progressing  Goal: Maintains/Returns to pre admission functional level  Description: INTERVENTIONS:  - Perform BMAT or MOVE assessment daily    - Set and communicate daily mobility goal to care team and patient/family/caregiver     - Out of bed for toileting  - Record patient progress and toleration of activity level   Outcome: Progressing     Problem: Prexisting or High Potential for Compromised Skin Integrity  Goal: Skin integrity is maintained or improved  Description: INTERVENTIONS:  - Identify patients at risk for skin breakdown  - Assess and monitor skin integrity  - Assess and monitor nutrition and hydration status  - Monitor labs   - Assess for incontinence   - Turn and reposition patient  - Assist with mobility/ambulation  - Relieve pressure over bony prominences  - Avoid friction and shearing  - Provide appropriate hygiene as needed including keeping skin clean and dry  - Evaluate need for skin moisturizer/barrier cream  - Collaborate with interdisciplinary team   - Patient/family teaching  - Consider wound care consult   Outcome: Progressing     Problem: PAIN - ADULT  Goal: Verbalizes/displays adequate comfort level or baseline comfort level  Description: Interventions:  - Encourage patient to monitor pain and request assistance  - Assess pain using appropriate pain scale  - Administer analgesics based on type and severity of pain and evaluate response  - Implement non-pharmacological measures as appropriate and evaluate response  - Consider cultural and social influences on pain and pain management  - Notify physician/advanced practitioner if interventions unsuccessful or patient reports new pain  Outcome: Progressing     Problem: INFECTION - ADULT  Goal: Absence or prevention of progression during hospitalization  Description: INTERVENTIONS:  - Assess and monitor for signs and symptoms of infection  - Monitor lab/diagnostic results  - Monitor all insertion sites, i e  indwelling lines, tubes, and drains  - Monitor endotracheal if appropriate and nasal secretions for changes in amount and color  - [place] appropriate cooling/warming therapies per order  - Administer medications as ordered  - Instruct and encourage patient and family to use good hand hygiene technique  - Identify and instruct in appropriate isolation precautions for identified infection/condition  Outcome: Progressing  Goal: Absence of fever/infection during neutropenic period  Description: INTERVENTIONS:  - Monitor WBC    Outcome: Progressing     Problem: SAFETY ADULT  Goal: Patient will remain free of falls  Description: INTERVENTIONS:  - Educate patient/family on patient safety including physical limitations  - Instruct patient to call for assistance with activity   - Consult OT/PT to assist with strengthening/mobility   - Keep Call bell within reach  - Keep bed low and locked with side rails adjusted as appropriate  - Keep care items and personal belongings within reach  - Initiate and maintain comfort rounds  - Make Fall Risk Sign visible to staff  - Offer Toileting every 2 Hours, in advance of need  - Initiate/Maintain bed alarm  - Obtain necessary fall risk management equipment:   - Apply yellow socks and bracelet for high fall risk patients  - Consider moving patient to room near nurses station  Outcome: Progressing  Goal: Maintain or return to baseline ADL function  Description: INTERVENTIONS:  - Educate patient/family on patient safety including physical limitations  - Instruct patient to call for assistance with activity   - Consult OT/PT to assist with strengthening/mobility   - Keep Call bell within reach  - Keep bed low and locked with side rails adjusted as appropriate  - Keep care items and personal belongings within reach  - Initiate and maintain comfort rounds  - Make Fall Risk Sign visible to staff  - Offer Toileting every 2 Hours, in advance of need  - Initiate/Maintain bed alarm  - Obtain necessary fall risk management equipment:   - Consider moving patient to room near nurses station  Outcome: Progressing  Goal: Maintains/Returns to pre admission functional level  Description: INTERVENTIONS:  - Perform BMAT or MOVE assessment daily    - Set and communicate daily mobility goal to care team and patient/family/caregiver     - Collaborate with rehabilitation services on mobility goals if consulted  - Out of bed for toileting  - Record patient progress and toleration of activity level   Outcome: Progressing     Problem: DISCHARGE PLANNING  Goal: Discharge to home or other facility with appropriate resources  Description: INTERVENTIONS:  - Identify barriers to discharge w/patient and caregiver  - Arrange for needed discharge resources and transportation as appropriate  - Identify discharge learning needs (meds, wound care, etc )  - Arrange for interpretive services to assist at discharge as needed  - Refer to Case Management Department for coordinating discharge planning if the patient needs post-hospital services based on physician/advanced practitioner order or complex needs related to functional status, cognitive ability, or social support system  Outcome: Progressing     Problem: Knowledge Deficit  Goal: Patient/family/caregiver demonstrates understanding of disease process, treatment plan, medications, and discharge instructions  Description: Complete learning assessment and assess knowledge base    Interventions:  - Provide teaching at level of understanding  - Provide teaching via preferred learning methods  Outcome: Progressing     Problem: SAFETY,RESTRAINT: NV/NON-SELF DESTRUCTIVE BEHAVIOR  Goal: Remains free of harm/injury (restraint for non violent/non self-detsructive behavior)  Description: INTERVENTIONS:  - Instruct patient/family regarding restraint use   - Assess and monitor physiologic and psychological status   - Provide interventions and comfort measures to meet assessed patient needs   - Identify and implement measures to help patient regain control  - Assess readiness for release of restraint   Outcome: Progressing  Goal: Returns to optimal restraint-free functioning  Description: INTERVENTIONS:  - Assess the patient's behavior and symptoms that indicate continued need for restraint  - Identify and implement measures to help patient regain control  - Assess readiness for release of restraint   Outcome: Progressing Problem: Nutrition/Hydration-ADULT  Goal: Nutrient/Hydration intake appropriate for improving, restoring or maintaining nutritional needs  Description: Monitor and assess patient's nutrition/hydration status for malnutrition  Collaborate with interdisciplinary team and initiate plan and interventions as ordered  Monitor patient's weight and dietary intake as ordered or per policy  Utilize nutrition screening tool and intervene as necessary  Determine patient's food preferences and provide high-protein, high-caloric foods as appropriate       INTERVENTIONS:  - Monitor oral intake, urinary output, labs, and treatment plans  - Assess nutrition and hydration status and recommend course of action  - Evaluate amount of meals eaten  - Assist patient with eating if necessary   - Allow adequate time for meals  - Recommend/ encourage appropriate diets, oral nutritional supplements, and vitamin/mineral supplements  - Order, calculate, and assess calorie counts as needed  - Recommend, monitor, and adjust tube feedings and TPN/PPN based on assessed needs  - Assess need for intravenous fluids  - Provide specific nutrition/hydration education as appropriate  - Include patient/family/caregiver in decisions related to nutrition  Outcome: Progressing     Problem: METABOLIC, FLUID AND ELECTROLYTES - ADULT  Goal: Electrolytes maintained within normal limits  Description: INTERVENTIONS:  - Monitor labs and assess patient for signs and symptoms of electrolyte imbalances  - Administer electrolyte replacement as ordered  - Monitor response to electrolyte replacements, including repeat lab results as appropriate  - Instruct patient on fluid and nutrition as appropriate  Outcome: Progressing  Goal: Fluid balance maintained  Description: INTERVENTIONS:  - Monitor labs   - Monitor I/O and WT  - Instruct patient on fluid and nutrition as appropriate  - Assess for signs & symptoms of volume excess or deficit  Outcome: Progressing

## 2022-12-10 NOTE — ASSESSMENT & PLAN NOTE
Lab Results   Component Value Date    IRON 127 12/07/2022    FERRITIN 2,641 (H) 12/07/2022    TIBC 226 (L) 12/07/2022    CONCFE 56 (H) 12/07/2022     Recent Labs     12/08/22  1447 12/09/22  0443 12/10/22  0442   HGB 9 3* 9 7* 8 9*       S/p 1 U pRBC with adequate response in hemoglobin  Will continue to trend hemoglobin and transfuse if needed

## 2022-12-10 NOTE — ASSESSMENT & PLAN NOTE
· Patient has Hx of restrictive lung disease concern for pneumoconiosis vs sarcoidosis  Last PFT with a moderate restriction, TLC of 58% 3 44 L, FVC 1 87 L/63%   · Sees pulmonologist dr Armani Dial in the outpatient setting  · Patient dependent more on  Bipap  Had a short transition to high flow on 12/9/22 but due to poor oxygenation (patient appeared to have cyanotic extremities and with ABG showing pH 7 45,  P O2 48 8, P CO2 31, HCO3 21) was placed back on bipap  The repeat ABG pH 7 413, PCO2 33, PO2 150, HCO3 21  · A repeat cxr was done  Noted improvement right lower lobe opacity compared to previous image however pending final reading  · Nebulizers were made as scheduled          Plan-  · xopenex and atrovent nebulizers q6h  · bipap 16/8 FIO2 increased from 45 to 60%, pending a repeat ABG at 8:00 am  · Follow up on chest x ray final reading  · Will consider goals of care with the family

## 2022-12-10 NOTE — PLAN OF CARE
Problem: Potential for Falls  Goal: Patient will remain free of falls  Description: INTERVENTIONS:  - Educate patient/family on patient safety including physical limitations  - Instruct patient to call for assistance with activity   - Consult OT/PT to assist with strengthening/mobility   - Keep Call bell within reach  - Keep bed low and locked with side rails adjusted as appropriate  - Keep care items and personal belongings within reach  - Initiate and maintain comfort rounds  - Make Fall Risk Sign visible to staff  - Offer Toileting every 2 Hours, in advance of need  - Initiate/Maintain bed alarm  - Obtain necessary fall risk management equipment:   - Apply yellow socks and bracelet for high fall risk patients  - Consider moving patient to room near nurses station  Outcome: Progressing     Problem: MOBILITY - ADULT  Goal: Maintain or return to baseline ADL function  Description: INTERVENTIONS:  - Educate patient/family on patient safety including physical limitations  - Instruct patient to call for assistance with activity   - Consult OT/PT to assist with strengthening/mobility   - Keep Call bell within reach  - Keep bed low and locked with side rails adjusted as appropriate  - Keep care items and personal belongings within reach  - Initiate and maintain comfort rounds  - Make Fall Risk Sign visible to staff  - Offer Toileting every 2 Hours, in advance of need  - Initiate/Maintain bed alarm  - Obtain necessary fall risk management equipment:   - Consider moving patient to room near nurses station  Outcome: Progressing  Goal: Maintains/Returns to pre admission functional level  Description: INTERVENTIONS:  - Perform BMAT or MOVE assessment daily    - Set and communicate daily mobility goal to care team and patient/family/caregiver  - Collaborate with rehabilitation services on mobility goals if consulted  - Perform Range of Motion  times a day  - Reposition patient every  hours    - Dangle patient  times a day  - Stand patient  times a day  - Ambulate patient  times a day  - Out of bed to chair  times a day   - Out of bed for meals  times a day  - Out of bed for toileting  - Record patient progress and toleration of activity level   Outcome: Progressing     Problem: Prexisting or High Potential for Compromised Skin Integrity  Goal: Skin integrity is maintained or improved  Description: INTERVENTIONS:  - Identify patients at risk for skin breakdown  - Assess and monitor skin integrity  - Assess and monitor nutrition and hydration status  - Monitor labs   - Assess for incontinence   - Turn and reposition patient  - Assist with mobility/ambulation  - Relieve pressure over bony prominences  - Avoid friction and shearing  - Provide appropriate hygiene as needed including keeping skin clean and dry  - Evaluate need for skin moisturizer/barrier cream  - Collaborate with interdisciplinary team   - Patient/family teaching  - Consider wound care consult   Outcome: Progressing     Problem: PAIN - ADULT  Goal: Verbalizes/displays adequate comfort level or baseline comfort level  Description: Interventions:  - Encourage patient to monitor pain and request assistance  - Assess pain using appropriate pain scale  - Administer analgesics based on type and severity of pain and evaluate response  - Implement non-pharmacological measures as appropriate and evaluate response  - Consider cultural and social influences on pain and pain management  - Notify physician/advanced practitioner if interventions unsuccessful or patient reports new pain  Outcome: Progressing     Problem: INFECTION - ADULT  Goal: Absence or prevention of progression during hospitalization  Description: INTERVENTIONS:  - Assess and monitor for signs and symptoms of infection  - Monitor lab/diagnostic results  - Monitor all insertion sites, i e  indwelling lines, tubes, and drains  - Monitor endotracheal if appropriate and nasal secretions for changes in amount and color  - [place] appropriate cooling/warming therapies per order  - Administer medications as ordered  - Instruct and encourage patient and family to use good hand hygiene technique  - Identify and instruct in appropriate isolation precautions for identified infection/condition  Outcome: Progressing  Goal: Absence of fever/infection during neutropenic period  Description: INTERVENTIONS:  - Monitor WBC    Outcome: Progressing     Problem: SAFETY ADULT  Goal: Patient will remain free of falls  Description: INTERVENTIONS:  - Educate patient/family on patient safety including physical limitations  - Instruct patient to call for assistance with activity   - Consult OT/PT to assist with strengthening/mobility   - Keep Call bell within reach  - Keep bed low and locked with side rails adjusted as appropriate  - Keep care items and personal belongings within reach  - Initiate and maintain comfort rounds  - Make Fall Risk Sign visible to staff  - Offer Toileting every 2 Hours, in advance of need  - Initiate/Maintain bed alarm  - Obtain necessary fall risk management equipment:   - Apply yellow socks and bracelet for high fall risk patients  - Consider moving patient to room near nurses station  Outcome: Progressing  Goal: Maintain or return to baseline ADL function  Description: INTERVENTIONS:  - Educate patient/family on patient safety including physical limitations  - Instruct patient to call for assistance with activity   - Consult OT/PT to assist with strengthening/mobility   - Keep Call bell within reach  - Keep bed low and locked with side rails adjusted as appropriate  - Keep care items and personal belongings within reach  - Initiate and maintain comfort rounds  - Make Fall Risk Sign visible to staff  - Offer Toileting every 2 Hours, in advance of need  - Initiate/Maintain bed alarm  - Obtain necessary fall risk management equipment:   - Consider moving patient to room near nurses station  Outcome: Progressing  Goal: Maintains/Returns to pre admission functional level  Description: INTERVENTIONS:  - Perform BMAT or MOVE assessment daily    - Set and communicate daily mobility goal to care team and patient/family/caregiver  - Collaborate with rehabilitation services on mobility goals if consulted  - Perform Range of Motion  times a day  - Reposition patient every  hours  - Dangle patient  times a day  - Stand patient  times a day  - Ambulate patient  times a day  - Out of bed to chair  times a day   - Out of bed for meals  times a day  - Out of bed for toileting  - Record patient progress and toleration of activity level   Outcome: Progressing     Problem: DISCHARGE PLANNING  Goal: Discharge to home or other facility with appropriate resources  Description: INTERVENTIONS:  - Identify barriers to discharge w/patient and caregiver  - Arrange for needed discharge resources and transportation as appropriate  - Identify discharge learning needs (meds, wound care, etc )  - Arrange for interpretive services to assist at discharge as needed  - Refer to Case Management Department for coordinating discharge planning if the patient needs post-hospital services based on physician/advanced practitioner order or complex needs related to functional status, cognitive ability, or social support system  Outcome: Progressing     Problem: Knowledge Deficit  Goal: Patient/family/caregiver demonstrates understanding of disease process, treatment plan, medications, and discharge instructions  Description: Complete learning assessment and assess knowledge base    Interventions:  - Provide teaching at level of understanding  - Provide teaching via preferred learning methods  Outcome: Progressing     Problem: SAFETY,RESTRAINT: NV/NON-SELF DESTRUCTIVE BEHAVIOR  Goal: Remains free of harm/injury (restraint for non violent/non self-detsructive behavior)  Description: INTERVENTIONS:  - Instruct patient/family regarding restraint use   - Assess and monitor physiologic and psychological status   - Provide interventions and comfort measures to meet assessed patient needs   - Identify and implement measures to help patient regain control  - Assess readiness for release of restraint   Outcome: Progressing  Goal: Returns to optimal restraint-free functioning  Description: INTERVENTIONS:  - Assess the patient's behavior and symptoms that indicate continued need for restraint  - Identify and implement measures to help patient regain control  - Assess readiness for release of restraint   Outcome: Progressing     Problem: Nutrition/Hydration-ADULT  Goal: Nutrient/Hydration intake appropriate for improving, restoring or maintaining nutritional needs  Description: Monitor and assess patient's nutrition/hydration status for malnutrition  Collaborate with interdisciplinary team and initiate plan and interventions as ordered  Monitor patient's weight and dietary intake as ordered or per policy  Utilize nutrition screening tool and intervene as necessary  Determine patient's food preferences and provide high-protein, high-caloric foods as appropriate       INTERVENTIONS:  - Monitor oral intake, urinary output, labs, and treatment plans  - Assess nutrition and hydration status and recommend course of action  - Evaluate amount of meals eaten  - Assist patient with eating if necessary   - Allow adequate time for meals  - Recommend/ encourage appropriate diets, oral nutritional supplements, and vitamin/mineral supplements  - Order, calculate, and assess calorie counts as needed  - Recommend, monitor, and adjust tube feedings and TPN/PPN based on assessed needs  - Assess need for intravenous fluids  - Provide specific nutrition/hydration education as appropriate  - Include patient/family/caregiver in decisions related to nutrition  Outcome: Progressing     Problem: METABOLIC, FLUID AND ELECTROLYTES - ADULT  Goal: Electrolytes maintained within normal limits  Description: INTERVENTIONS:  - Monitor labs and assess patient for signs and symptoms of electrolyte imbalances  - Administer electrolyte replacement as ordered  - Monitor response to electrolyte replacements, including repeat lab results as appropriate  - Instruct patient on fluid and nutrition as appropriate  Outcome: Progressing  Goal: Fluid balance maintained  Description: INTERVENTIONS:  - Monitor labs   - Monitor I/O and WT  - Instruct patient on fluid and nutrition as appropriate  - Assess for signs & symptoms of volume excess or deficit  Outcome: Progressing

## 2022-12-10 NOTE — PROGRESS NOTES
[address] [facility]  Progress Note - John Ramos  1935, 80 y o  male MRN: [id number]  Unit/Bed#: ICU 02 Encounter: [id number]  Primary Care Provider: Dave Sofia DO   Date and time admitted to hospital: 12/6/2022 12:01 PM    * Acute kidney injury superimposed on chronic kidney disease [facility])  Assessment & Plan  Lab Results   Component Value Date    EGFR 8 12/10/2022    EGFR 5 12/09/2022    EGFR 5 12/08/2022    CREATININE 5 55 (H) 12/10/2022    CREATININE 7 77 (H) 12/09/2022    CREATININE 7 49 (H) 12/08/2022     Not currently on HD  Baseline 2 8-3 3 from last lab work done in July 2022  Unclear if ERIK superimposed on CKD or a progression of CKD  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  No bruit auscultated on exam     CVVH discontinued due to rapid drop in BUN and concerns in osmotic shifts  Temporary dialysis catheter in place  Underwent a session of dialysis on 12/9/22 with a removal of 3 L of fluids  (patient was + 7L since admission)      Plan-  Will likely require another session of dialysis  Appreciated nephrology input  Acute respiratory failure with hypoxia (HCC)  Assessment & Plan  · Patient has Hx of restrictive lung disease concern for pneumoconiosis vs sarcoidosis  Last PFT with a moderate restriction, TLC of 58% 3 44 L, FVC 1 87 L/63%   · Sees pulmonologist dr Asaf Albrecht in the outpatient setting  · Patient dependent more on  Bipap  Had a short transition to high flow on 12/9/22 but due to poor oxygenation (patient appeared to have cyanotic extremities and with ABG showing pH 7 45,  P O2 48 8, P CO2 31, HCO3 21) was placed back on bipap  The repeat ABG pH 7 413, PCO2 33, PO2 150, HCO3 21  · A repeat cxr was done  Noted improvement right lower lobe opacity compared to previous image however pending final reading  · Nebulizers were made as scheduled          Plan-  · xopenex and atrovent nebulizers q6h  · bipap 16/8 FIO2 increased from 45 to 60%, pending a repeat ABG at 8:00 am  · Follow up on chest x ray final reading  · Will consider goals of care with the family      Azotemia  Assessment & Plan  Lab Results   Component Value Date    BUN 65 (H) 12/10/2022     (H) 12/09/2022     (H) 12/08/2022     (H) 12/08/2022     Initially in 300s on presentation  Underwent CVVH with significant drop in BUN  Patient s/p one session of HD on 12/9/22  Noted improvement in azotemia  Monitor daily BMP    CKD (chronic kidney disease) stage 5, GFR less than 15 ml/min [facility])  Assessment & Plan  Lab Results   Component Value Date    EGFR 8 12/10/2022    EGFR 5 12/09/2022    EGFR 5 12/08/2022    CREATININE 5 55 (H) 12/10/2022    CREATININE 7 77 (H) 12/09/2022    CREATININE 7 49 (H) 12/08/2022     See plan for ERIK on CKD        DM type 2 (diabetes mellitus, type 2) [facility])  Assessment & Plan  Lab Results   Component Value Date    HGBA1C 6 7 (A) 09/07/2022       Recent Labs     12/09/22  1124 12/09/22  1727 12/10/22  0047 12/10/22  0611   POCGLU 139 110 152* 122       Blood Sugar Average: Last 72 hrs:  (P) 958 [id number]   Home medications include Tradjenta     Plan-  SSI  Hypoglycemia protocol  accucheck q6h  Swallow study if patient off BiPAP, tube feeds if patient stable       Anemia due to chronic kidney disease  Assessment & Plan  Lab Results   Component Value Date    IRON 127 12/07/2022    FERRITIN 2,641 (H) 12/07/2022    TIBC 226 (L) 12/07/2022    CONCFE 56 (H) 12/07/2022     Recent Labs     12/08/22  1447 12/09/22  0443 12/10/22  0442   HGB 9 3* 9 7* 8 9*       S/p 1 U pRBC with adequate response in hemoglobin  Will continue to trend hemoglobin and transfuse if needed    Renovascular hypertension  Assessment & Plan  At home on Cozzar 25 mg, amlodipine 5 mg and metoprolol 25 mg daily  Will hold antihypertensive medication due to BP being benign  Paroxysmal atrial fibrillation [facility])  Assessment & Plan  Per chart review patient has hx of PAF   Rate controlled with metoprolol at home  CHADVASC score 4  High bleeding risk per HASBLED score  Initially on  IV metoprolol 5 mg q 6 with temporary control in HR  Was loaded on 22 with digoxin 500 mcg  Received another 2x 250 mcg    Plan:  · Continuous cardiac monitoring  · If no response after management with digoxin will consider involving cardiology  ----------------------------------------------------------------------------------------  HPI/24hr events: Patient became hypoxic overnight requiring continues Bipap  The ABG reveal severe hypoxemia while patient being on high flow  Was transitioned back to bi pap showed improvement in PO2  Patient also noted to be tachy in 140s overnight  Received another dose of digoxin 250mcg  Patient appropriate for transfer out of the ICU today?: No  Disposition: Continue Critical Care   Code Status: Level 1 - Full Code  ---------------------------------------------------------------------------------------  SUBJECTIVE  Patient lying in bed uncomfortable with the bipap on  Unable to give anymore details       Review of Systems  Review of systems was unable to be performed secondary to Patient on bipap and with altered mental status   ---------------------------------------------------------------------------------------  OBJECTIVE    Vitals   Vitals:    12/10/22 0456 12/10/22 0536 12/10/22 0700 12/10/22 0728   BP:   135/62 106/59   Pulse:   (!) 124 102   Resp:   (!) 31 (!) 26   Temp:       TempSrc:       SpO2: 99%   100%   Weight:  65 8 kg (145 lb 1 oz)       Temp (24hrs), Av 5 °F (36 9 °C), Min:97 7 °F (36 5 °C), Max:99 3 °F (37 4 °C)  Current: Temperature: 97 7 °F (36 5 °C)          Respiratory:  SpO2: SpO2: 100 %, SpO2 Device: O2 Device: BiPAP       Invasive/non-invasive ventilation settings   Respiratory    Lab Data (Last 4 hours)    None         O2/Vent Data (Last 4 hours)      12/10 0404 12/10 0728        Non-Invasive Ventilation Mode BiPAP BiPAP Physical Exam  Vitals and nursing note reviewed  Constitutional:       General: He is not in acute distress  Appearance: He is well-developed  He is ill-appearing  HENT:      Head: Normocephalic and atraumatic  Eyes:      Conjunctiva/sclera: Conjunctivae normal    Cardiovascular:      Rate and Rhythm: Normal rate and regular rhythm  Heart sounds: No murmur heard  Pulmonary:      Effort: Pulmonary effort is normal  No respiratory distress  Breath sounds: Normal breath sounds  Abdominal:      General: Bowel sounds are normal  There is no distension  Palpations: Abdomen is soft  Tenderness: There is no abdominal tenderness  Musculoskeletal:         General: No swelling  Cervical back: Neck supple  Right lower leg: No edema  Left lower leg: No edema  Skin:     General: Skin is warm and dry  Capillary Refill: Capillary refill takes less than 2 seconds  Findings: Bruising (upper extremities) present  Neurological:      Mental Status: He is alert  He is disoriented     Psychiatric:         Mood and Affect: Mood normal              Laboratory and Diagnostics:  Results from last 7 days   Lab Units 12/10/22  0442 12/09/22 0443 12/08/22  1447 12/08/22  0525 12/07/22  0807 12/07/22  0228 12/06/22  1233   WBC Thousand/uL 13 12* 14 76* 12 86* 14 40* 13 21* 12 38* 13 79*   HEMOGLOBIN g/dL 8 9* 9 7* 9 3* 7 0* 8 1* 7 8* 10 0*   HEMATOCRIT % 26 9* 28 2* 26 5* 20 1* 24 0* 22 8* 30 3*   PLATELETS Thousands/uL 76* 91* 87* 106* 131* 140* 192   NEUTROS PCT % 90*  --   --  85*  --  92* 87*   MONOS PCT % 6  --   --  10  --  4 8     Results from last 7 days   Lab Units 12/10/22  0442 12/09/22  0443 12/08/22  2003 12/08/22  1101 12/08/22  0525 12/07/22  2316 12/07/22  1455 12/06/22  1603 12/06/22  1233   SODIUM mmol/L 142 139 139 139 140 139 139   < > 136   POTASSIUM mmol/L 4 4 4 9 4 9 4 6 3 9 4 1 4 6   < > 7 0*   CHLORIDE mmol/L 101 99 99 99 98 96 96   < > 98   CO2 mmol/L 24 23 23 28 27 26 20*   < > 10*   ANION GAP mmol/L 17* 17* 17* 12 15* 17* 23*   < > 28*   BUN mg/dL 65* 106* 101* 102* 124* 147* 250*   < > 216*   CREATININE mg/dL 5 55* 7 77* 7 49* 7 54* 9 10* 10 91* 15 85*   < > 17 52*   CALCIUM mg/dL 7 8* 8 2* 8 3 7 8* 7 3* 7 3* 8 0*   < > 8 8   GLUCOSE RANDOM mg/dL 118 172* 173* 137 141* 169* 90   < > 186*   ALT U/L 40  --   --   --   --   --   --   --  30   AST U/L 69*  --   --   --   --   --   --   --  19   ALK PHOS U/L 64  --   --   --   --   --   --   --  73   ALBUMIN g/dL 2 2*  --   --   --   --   --   --   --  3 5   TOTAL BILIRUBIN mg/dL 0 70  --   --   --   --   --   --   --  0 36    < > = values in this interval not displayed  Results from last 7 days   Lab Units 12/10/22  0442 12/09/22  0443 12/08/22  1101 12/08/22  0525 12/07/22  2316 12/07/22  1455 12/07/22  0807   MAGNESIUM mg/dL 2 1 2 2 1 9 2 1 1 9 2 2 2 4   PHOSPHORUS mg/dL 5 4* 6 6* 4 4* 4 8* 5 7* 8 7* 8 7*      Results from last 7 days   Lab Units 12/07/22  1213   INR  1 17   PTT seconds 31          Results from last 7 days   Lab Units 12/06/22  1233   LACTIC ACID mmol/L <0 3*     ABG:  Results from last 7 days   Lab Units 12/10/22  0015   PH ART  7 413   PCO2 ART mm Hg 33 8*   PO2 ART mm Hg 150 0*   HCO3 ART mmol/L 21 1*   BASE EXC ART mmol/L -3 0   ABG SOURCE  Radial, Right     VBG:  Results from last 7 days   Lab Units 12/10/22  0015   ABG SOURCE  Radial, Right           Micro  Results from last 7 days   Lab Units 12/08/22  1104   MRSA CULTURE ONLY  No Methicillin Resistant Staphlyococcus aureus (MRSA) isolated       EKG: done on 12/9/22 shows sinus tachycardia with first degree AV block  Imaging: I have personally reviewed pertinent reports     and I have personally reviewed pertinent films in PACS    Intake and Output  I/O       12/08 0701  12/09 0700 12/09 0701  12/10 0700 12/10 0701  12/11 0700    I V  (mL/kg) 3119 2 (47 4) 522 8 (7 9)     Blood 514      IV Piggyback 539      Total Intake(mL/kg) 4172 2 (63 4) 522 8 (7 9)     Urine (mL/kg/hr) 1695 (1 1) 1055 (0 7)     Other 431 3000     Stool 0      Total Output 2126 4055     Net +2046 2 -3532 2            Unmeasured Stool Occurrence 3 x            Height and Weights         Body mass index is 26 53 kg/m²  Weight (last 2 days)     Date/Time Weight    12/10/22 0536 65 8 (145 06)    12/09/22 0559 65 8 (145 06)    12/08/22 0600 65 7 (144 84)            Nutrition       Diet Orders   (From admission, onward)             Start     Ordered    12/06/22 1724  Diet NPO  Diet effective now        References:    Nutrtion Support Algorithm Enteral vs  Parenteral   Question Answer Comment   Diet Type NPO    RD to adjust diet per protocol?  Yes        12/06/22 1724                  Active Medications  Scheduled Meds:  Current Facility-Administered Medications   Medication Dose Route Frequency Provider Last Rate   • heparin (porcine)  5,000 Units Subcutaneous [facility] Lyndsey Leigh MD     • insulin lispro  1-5 Units Subcutaneous Q6H [address] Lyndsey Leigh MD     • ipratropium  0 5 mg Nebulization Q6H Cooper Hogan MD     • levalbuterol  1 25 mg Nebulization Q6H Cooper Hogan MD       Continuous Infusions:     PRN Meds:        Invasive Devices Review  Invasive Devices     Peripheral Intravenous Line  Duration           Peripheral IV 12/06/22 Right;Upper Arm 3 days    Peripheral IV 12/06/22 Right;Ventral (anterior) Forearm 3 days          Line  Duration           Hemodialysis AV Fistula 07/03/19 Left Upper arm 1255 days          Hemodialysis Catheter  Duration           HD Temporary Double Catheter 2 days          Drain  Duration           Urethral Catheter 16 Fr  3 days                ---------------------------------------------------------------------------------------  Advance Directive and Living Will:      Power of : Yes  POLST:    ---------------------------------------------------------------------------------------  Care Time Delivered:   See attending's attestation      Ema Guardado MD      Portions of the record may have been created with voice recognition software  Occasional wrong word or "sound a like" substitutions may have occurred due to the inherent limitations of voice recognition software    Read the chart carefully and recognize, using context, where substitutions have occurred

## 2022-12-10 NOTE — PROGRESS NOTES
Interval Progress Note - Critical Care   Onnie Credit  80 y o  male MRN: [id number]  Unit/Bed#: ICU 02 Encounter: [id number]    Spoke with the granddaughter  Mr Genny Leahy baseline mental status is excellent  Up until his recent hospitalization was apparently taking care of his bills, appointments, and functioned very independently    Current mental status is not at baseline      Catracho Roca MD

## 2022-12-10 NOTE — PLAN OF CARE
Pre-tx:  UF 2L as tolerated per order  Plan of care discussed with primary RN/critical care team should pt become hypotensive  3 hr session today  BP stable to start HD  Will cont to monitor  Post-Dialysis RN Treatment Note    Blood Pressure:  Pre 118/61 mm/Hg  Post 91/63 mmHg   EDW  TBD kg    Weight:  Pre 67 9 kg   Post 65 9  kg   Mode of weight measurement: Bed Scale   Volume Removed 2000  ml    Treatment duration  3 hrs   NS given  No   Treatment shortened? No   Medications given during Rx Not Applicable   Estimated Kt/V  Not Applicable   Access type: Temporary HD catheter   Access Issues:  Unable to aspirate from venous lumen and resistance with flushing, sluggish arterial lumen aspiration, flushed well   Lost flow throughout tx, by the end of tx only able to attain 250 BFR d/t arterial pressure alarming   Report called to primary nurse   Yes

## 2022-12-10 NOTE — ASSESSMENT & PLAN NOTE
At home on Cozzar 25 mg, amlodipine 5 mg and metoprolol 25 mg daily  Will hold antihypertensive medication due to BP being benign

## 2022-12-10 NOTE — ASSESSMENT & PLAN NOTE
Per chart review patient has hx of PAF  Rate controlled with metoprolol at home  CHADVASC score 4  High bleeding risk per HASBLED score  Initially on  IV metoprolol 5 mg q 6 with temporary control in HR  Was loaded on 12/9/22 with digoxin 500 mcg  Received another 2x 250 mcg    Plan:  · Continuous cardiac monitoring  · If no response after management with digoxin will consider involving cardiology

## 2022-12-10 NOTE — ASSESSMENT & PLAN NOTE
Lab Results   Component Value Date    BUN 65 (H) 12/10/2022     (H) 12/09/2022     (H) 12/08/2022     (H) 12/08/2022     Initially in 300s on presentation  Underwent CVVH with significant drop in BUN  Patient s/p one session of HD on 12/9/22  Noted improvement in azotemia    Monitor daily BMP

## 2022-12-10 NOTE — ASSESSMENT & PLAN NOTE
Lab Results   Component Value Date    HGBA1C 6 7 (A) 09/07/2022       Recent Labs     12/09/22  1124 12/09/22  1727 12/10/22  0047 12/10/22  0611   POCGLU 139 110 152* 122       Blood Sugar Average: Last 72 hrs:  (P) 541 [id number]   Home medications include Tradjenta     Plan-  SSI  Hypoglycemia protocol  accucheck q6h  Swallow study if patient off BiPAP, tube feeds if patient stable

## 2022-12-10 NOTE — ASSESSMENT & PLAN NOTE
Lab Results   Component Value Date    EGFR 8 12/10/2022    EGFR 5 12/09/2022    EGFR 5 12/08/2022    CREATININE 5 55 (H) 12/10/2022    CREATININE 7 77 (H) 12/09/2022    CREATININE 7 49 (H) 12/08/2022     Not currently on HD  Baseline 2 8-3 3 from last lab work done in July 2022  Unclear if ERIK superimposed on CKD or a progression of CKD  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  No bruit auscultated on exam     CVVH discontinued due to rapid drop in BUN and concerns in osmotic shifts  Temporary dialysis catheter in place  Underwent a session of dialysis on 12/9/22 with a removal of 3 L of fluids  (patient was + 7L since admission)      Plan-  Appreciated nephrology input     Avoid nephrotoxic agents and hypoperfusion  Monitor close I/O

## 2022-12-10 NOTE — PROGRESS NOTES
Progress Note - Nephrology   Miguelangel Park  80 y o  male MRN: [id number]  Unit/Bed#: ICU 02 Encounter: [id number]      Assessment / Plan:  1  ERIK, present on admission, on top of CKD stage 4 in setting of probable prerenal ERIK vs CKD progression in setting of ARB use - admit sCr 17, b/l sCr mid 2-3s, follows outpatient with Dr Carmela Rebolledo  -had required HD in the past, has an AVG in place that Is nonfunctional-consider graftogram by IR for further evaluation vs vascular evaluation as no thrill/bruit  -off CRRT since 12/8 d/t significant drop in BUN  -did urinate in response to diuresis, but BUN/sCr remains elevated and patient volume up (net +3 7L for admission)  -s/p temp HD line placement, plan for HD again today with UF as tolerated  -will evaluate daily for HD needs  -Avoid relative hypotension as this can slow renal recovery  -monitor BMP  -CT imaging show unremarkable kidneys  -UA with microscopy positive glucose, moderate blood, 1+ protein, 4-10 WBC  -consent obtained from patient's grand daughter for RRT, consent is on file  2  Azotemia with concern for uremia -improving with RRT, monitor BUN  3  Hyperkalemia - resolved with medical management, correct with IHD  4  Elevated anion gap metabolic acidosis - corrected s/p CRRT, monitor with HD  5  Anemia - Hgb has dropped to 7 ? Dilutional vs GIB in setting of very high BUN-improved to 9 7 s/p blood transfusion but dropped back down to 8 9, monitor CBC, transfuse prn  6  HTN - BP low normal for ERIK, avoid relative hypotension, continue holding losartan   On amlodipine and metoprolol at home  Now on metoprolol prn IV as patient NPO on Bipap  Required sherry gtt during HD 12/9  BP improved today  UF as tolerated  7  afib with RVR - on digoxin, metoprolol IV per ICU team  8  Metabolic encephalopathy - stable, work up per primary team        Subjective:   Patient is on BiPAP  He remains confused  Unable to elicit HPI or review of systems        Objective: Vitals: Blood pressure 117/72, pulse 104, temperature 98 1 °F (36 7 °C), temperature source Axillary, resp  rate 21, weight 65 8 kg (145 lb 1 oz), SpO2 99 %  ,Body mass index is 26 53 kg/m²  Temp (24hrs), Av 9 °F (36 6 °C), Min:97 7 °F (36 5 °C), Max:98 1 °F (36 7 °C)      Weight (last 2 days)     Date/Time Weight    12/10/22 0536 65 8 (145 06)    22 0559 65 8 (145 06)    22 0600 65 7 (144 84)            Intake/Output Summary (Last 24 hours) at 12/10/2022 1130  Last data filed at 12/10/2022 0945  Gross per 24 hour   Intake 522 76 ml   Output 3945 ml   Net -3422 24 ml     I/O last 24 hours: In: 522 8 [I V :522 8]  Out: 4170 [Urine:1170; Other:3000]        Physical Exam:   Physical Exam  Vitals reviewed  Constitutional:       General: He is not in acute distress  Appearance: He is well-developed  He is ill-appearing  He is not diaphoretic  HENT:      Head: Normocephalic and atraumatic  Nose: Nose normal       Mouth/Throat:      Pharynx: No oropharyngeal exudate  Comments: bipap  Eyes:      General: No scleral icterus  Right eye: No discharge  Left eye: No discharge  Neck:      Thyroid: No thyromegaly  Cardiovascular:      Rate and Rhythm: Normal rate and regular rhythm  Heart sounds: Normal heart sounds  Pulmonary:      Effort: Pulmonary effort is normal       Breath sounds: No wheezing or rales  Comments: Coarse BS b/l  Abdominal:      General: Bowel sounds are normal  There is no distension  Palpations: Abdomen is soft  Tenderness: There is no abdominal tenderness  Genitourinary:     Comments: medeiros  Musculoskeletal:         General: No swelling  Cervical back: Neck supple  Lymphadenopathy:      Cervical: No cervical adenopathy  Skin:     General: Skin is warm and dry  Coloration: Skin is pale  Findings: No rash  Neurological:      Mental Status: He is alert        Comments: Awake but confused, on bipap Invasive Devices     Peripheral Intravenous Line  Duration           Peripheral IV 12/06/22 Right;Upper Arm 3 days    Peripheral IV 12/06/22 Right;Ventral (anterior) Forearm 3 days          Line  Duration           Hemodialysis AV Fistula 07/03/19 Left Upper arm 1255 days          Hemodialysis Catheter  Duration           HD Temporary Double Catheter 2 days          Drain  Duration           Urethral Catheter 16 Fr  3 days                Medications:    Scheduled Meds:  Current Facility-Administered Medications   Medication Dose Route Frequency Provider Last Rate   • [START ON 12/11/2022] digoxin  125 mcg Intravenous Every Other Day Kirsten Dixon MD     • heparin (porcine)  5,000 Units Subcutaneous Q8H [facility] Felix Duckworth MD     • insulin lispro  1-5 Units Subcutaneous Q6H [facility] Felix Duckworth MD     • ipratropium  0 5 mg Nebulization Q6H Angelita Osorio MD     • levalbuterol  1 25 mg Nebulization Q6H Angelita Osorio MD     • metoprolol  5 mg Intravenous Q4H Kirsten Dixon MD         PRN Meds:     Continuous Infusions:         LAB RESULTS:      Results from last 7 days   Lab Units 12/10/22  0442 12/09/22  0443 12/08/22 2003 12/08/22  1447 12/08/22  1101 12/08/22  0525 12/07/22  2316 12/07/22  1455 12/07/22  0807 12/07/22  0228 12/06/22  1603 12/06/22  1233   WBC Thousand/uL 13 12* 14 76*  --  12 86*  --  14 40*  --   --  13 21* 12 38*  --  13 79*   HEMOGLOBIN g/dL 8 9* 9 7*  --  9 3*  --  7 0*  --   --  8 1* 7 8*  --  10 0*   HEMATOCRIT % 26 9* 28 2*  --  26 5*  --  20 1*  --   --  24 0* 22 8*  --  30 3*   PLATELETS Thousands/uL 76* 91*  --  87*  --  106*  --   --  131* 140*  --  192   NEUTROS PCT % 90*  --   --   --   --  85*  --   --   --  92*  --  87*   LYMPHS PCT % 3*  --   --   --   --  3*  --   --   --  3*  --  4*   MONOS PCT % 6  --   --   --   --  10  --   --   --  4  --  8   EOS PCT % 0  --   --   --   --  0  --   --   --  0  --  0   POTASSIUM mmol/L 4 4 4 9 4 9  --  4 6 3 9 4 1 4 6 4 5 --    < > 7 0*   CHLORIDE mmol/L 101 99 99  --  99 98 96 96 97  --    < > 98   CO2 mmol/L 24 23 23  --  28 27 26 20* 18*  --    < > 10*   BUN mg/dL 65* 106* 101*  --  102* 124* 147* 250* 306*  --    < > 216*   CREATININE mg/dL 5 55* 7 77* 7 49*  --  7 54* 9 10* 10 91* 15 85* 16 29*  --    < > 17 52*   CALCIUM mg/dL 7 8* 8 2* 8 3  --  7 8* 7 3* 7 3* 8 0* 8 2*  --    < > 8 8   ALK PHOS U/L 64  --   --   --   --   --   --   --   --   --   --  73   ALT U/L 40  --   --   --   --   --   --   --   --   --   --  30   AST U/L 69*  --   --   --   --   --   --   --   --   --   --  19   MAGNESIUM mg/dL 2 1 2 2  --   --  1 9 2 1 1 9 2 2 2 4  --   --   --    PHOSPHORUS mg/dL 5 4* 6 6*  --   --  4 4* 4 8* 5 7* 8 7* 8 7*  --    < >  --     < > = values in this interval not displayed  CUTURES:  Lab Results   Component Value Date    URINECX >100,000 cfu/ml Pseudomonas aeruginosa (A) 03/07/2019    URINECX No Growth <1000 cfu/mL 09/21/2018                 Portions of the record may have been created with voice recognition software  Occasional wrong word or "sound a like" substitutions may have occurred due to the inherent limitations of voice recognition software  Read the chart carefully and recognize, using context, where substitutions have occurred  If you have any questions, please contact the dictating provider

## 2022-12-10 NOTE — ASSESSMENT & PLAN NOTE
Lab Results   Component Value Date    EGFR 8 12/10/2022    EGFR 5 12/09/2022    EGFR 5 12/08/2022    CREATININE 5 55 (H) 12/10/2022    CREATININE 7 77 (H) 12/09/2022    CREATININE 7 49 (H) 12/08/2022     See plan for ERIK on CKD

## 2022-12-11 PROBLEM — T82.590A AV GRAFT MALFUNCTION (HCC): Status: ACTIVE | Noted: 2022-01-01

## 2022-12-11 NOTE — PROGRESS NOTES
[address] [facility]  Progress Note - Oj Rushing  1935, 80 y o  male MRN: [id number]  Unit/Bed#: ICU 02 Encounter: [id number]  Primary Care Provider: Giovanni Jenkins DO   Date and time admitted to hospital: 12/6/2022 12:01 PM    * Acute kidney injury superimposed on chronic kidney disease [facility])  Assessment & Plan  Lab Results   Component Value Date    EGFR 11 12/11/2022    EGFR 8 12/10/2022    EGFR 5 12/09/2022    CREATININE 4 40 (H) 12/11/2022    CREATININE 5 55 (H) 12/10/2022    CREATININE 7 77 (H) 12/09/2022     Now receiving HD  Baseline 2 8-3 3 from last lab work done in July 2022  Unclear if ERIK superimposed on CKD or a progression of CKD  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  No bruit auscultated on exam     CVVH discontinued due to rapid drop in BUN and concerns in osmotic shifts  Temporary dialysis catheter in place  Underwent a session of dialysis on 12/9/22 with a removal of 3 L of fluids  (patient was + 7L since admission)  ERIK continues to improve with dialysis       Plan-  Appreciated nephrology input  Continue scheduled HD  Avoid nephrotoxic agents and hypoperfusion  Monitor close I/O  Daily BMP      CKD (chronic kidney disease) stage 5, GFR less than 15 ml/min [facility])  Assessment & Plan  Lab Results   Component Value Date    EGFR 11 12/11/2022    EGFR 8 12/10/2022    EGFR 5 12/09/2022    CREATININE 4 40 (H) 12/11/2022    CREATININE 5 55 (H) 12/10/2022    CREATININE 7 77 (H) 12/09/2022     See plan for ERIK on CKD        Acute respiratory failure with hypoxia [facility])  Assessment & Plan  · Patient has Hx of restrictive lung disease concern for pneumoconiosis vs sarcoidosis  Last PFT with a moderate restriction, TLC of 58% 3 44 L, FVC 1 87 L/63%   · Sees pulmonologist dr Armani Dial in the outpatient setting  · ABGs have been inconsistent  Need to establish if patient is truly hypoxic without BiPAP  · Saturating at 91% on 12L NC O2  · A repeat cxr was done  Persistent left effusion and left base opacity which may be due to atelectasis and/or pneumonia  Edema is improving      Plan-  · xopenex and atrovent nebulizers q6h  · bipap 16/8 FIO2 increased from 45 to 60%  · VBG while patient off of BiPAP  · Home O2 evaluation  · Will consider goals of care with the family      AMS (altered mental status)-resolved as of 12/9/2022  Assessment & Plan  Home health aid visits throughout the week   Noticed pt was increasingly lethargic  Status, confusion, decreased p o  intake over the past 3 days  Wife is hospitalized, per primary caregiver, he has been home alone past 3 days   CT Head- Neg   BUN- 229 and trending down  UA- Neg   Most likely metabolic encephalopathy secondary to renal failure  Patient's mental status greatly improved  Speaking coherently and following commands  Plan-   Resolved        Paroxysmal atrial fibrillation [facility])  Assessment & Plan  Per chart review patient has hx of PAF  Rate controlled with metoprolol at home  CHADVASC score 4  High bleeding risk per HASBLED score  Initially on  IV metoprolol 5 mg q 6 with temporary control in HR  Was loaded on 12/9/22 with digoxin 500 mcg  Received another 2x 250 mcg  Now on maintenance dose of 125 mcg daily  Heart rate ranges between 100 and 120  Plan:  · Continuous cardiac monitoring  · If no response after management with digoxin will consider involving cardiology  Azotemia  Assessment & Plan  Lab Results   Component Value Date    BUN 45 (H) 12/11/2022    BUN 65 (H) 12/10/2022     (H) 12/09/2022     (H) 12/08/2022     Initially in 300s on presentation  Underwent CVVH with significant drop in BUN  Patient s/p one session of HD on 12/9/22  Noted improvement in azotemia    Monitor daily BMP    DM type 2 (diabetes mellitus, type 2) [facility])  Assessment & Plan  Lab Results   Component Value Date    HGBA1C 6 7 (A) 09/07/2022       Recent Labs     12/10/22  1147 12/10/22  1807 12/11/22  0013 12/11/22 0602   POCGLU 153* 127 161* 126       Blood Sugar Average: Last 72 hrs:  (P) 303 [id number]   Home medications include Tradjenta     Plan-  SSI  Hypoglycemia protocol  accucheck q6h  Swallow study if patient off BiPAP, tube feeds if patient stable       Anemia due to chronic kidney disease  Assessment & Plan  Lab Results   Component Value Date    IRON 127 12/07/2022    FERRITIN 2,641 (H) 12/07/2022    TIBC 226 (L) 12/07/2022    CONCFE 56 (H) 12/07/2022     Recent Labs     12/09/22  0443 12/10/22  0442 12/11/22  0423   HGB 9 7* 8 9* 8 4*       S/p 1 U pRBC with adequate response in hemoglobin  Will continue to trend hemoglobin and transfuse if needed    High anion gap metabolic acidosis-resolved as of 12/9/2022  Assessment & Plan  See CKD and AMS Plan       Hyperkalemia-resolved as of 12/7/2022  Assessment & Plan  Secondary to renal failure  Potassium now wnl    Plan:  Continue current management        ----------------------------------------------------------------------------------------  HPI/24hr events: Patient continues to have increased work of breathing and breathlessness when not on BiPAP  Additionally becomes hypoxic on room air  Saturating at 91% on 12 L nasal cannula oxygen  Patient appropriate for transfer out of the ICU today?: No  Disposition: Continue Critical Care   Code Status: Level 1 - Full Code  ---------------------------------------------------------------------------------------  SUBJECTIVE  Patient claims to be short of breath while on nasal cannula oxygen  No other subjective data collected  Patient continues to be intermittently confused        ---------------------------------------------------------------------------------------  OBJECTIVE    Vitals   Vitals:    12/11/22 0700 12/11/22 0724 12/11/22 0747 12/11/22 0800   BP: 155/68   109/60   Pulse: (!) 126   104   Resp: (!) 23   18   Temp: 99 3 °F (37 4 °C)      TempSrc: Axillary      SpO2: 97% 98% 92% 91%   Weight: Temp (24hrs), Av 5 °F (36 9 °C), Min:97 7 °F (36 5 °C), Max:100 °F (37 8 °C)  Current: Temperature: 99 3 °F (37 4 °C)          Respiratory:  SpO2: SpO2: 91 %       Invasive/non-invasive ventilation settings   Respiratory    Lab Data (Last 4 hours)    None         O2/Vent Data (Last 4 hours)       0724          Non-Invasive Ventilation Mode BiPAP                   Physical Exam  Vitals and nursing note reviewed  Constitutional:       General: He is not in acute distress  Appearance: He is well-developed  He is ill-appearing  HENT:      Head: Normocephalic and atraumatic  Eyes:      General: No scleral icterus  Extraocular Movements: Extraocular movements intact  Conjunctiva/sclera: Conjunctivae normal       Pupils: Pupils are equal, round, and reactive to light  Cardiovascular:      Rate and Rhythm: Tachycardia present  Rhythm irregular  Heart sounds: No murmur heard  Pulmonary:      Effort: Pulmonary effort is normal  No respiratory distress  Breath sounds: Wheezing present  Comments: Inspiratory wheezing  Abdominal:      General: Bowel sounds are normal  There is no distension  Palpations: Abdomen is soft  Tenderness: There is no abdominal tenderness  Musculoskeletal:         General: No swelling  Cervical back: Normal range of motion and neck supple  Right lower leg: No edema  Left lower leg: No edema  Skin:     General: Skin is warm and dry  Capillary Refill: Capillary refill takes less than 2 seconds  Findings: Bruising (upper extremities) present  Neurological:      General: No focal deficit present  Mental Status: He is alert and oriented to person, place, and time     Psychiatric:         Mood and Affect: Mood normal       Comments: Patient oriented to time place and self however becomes intermittently confused upon further questioning             Laboratory and Diagnostics:  Results from last 7 days   Lab Units 12/11/22  0423 12/10/22  0442 12/09/22 0443 12/08/22  1447 12/08/22  0525 12/07/22  0807 12/07/22  0228 12/06/22  1233   WBC Thousand/uL 9 66 13 12* 14 76* 12 86* 14 40* 13 21* 12 38* 13 79*   HEMOGLOBIN g/dL 8 4* 8 9* 9 7* 9 3* 7 0* 8 1* 7 8* 10 0*   HEMATOCRIT % 26 1* 26 9* 28 2* 26 5* 20 1* 24 0* 22 8* 30 3*   PLATELETS Thousands/uL 71* 76* 91* 87* 106* 131* 140* 192   NEUTROS PCT % 89* 90*  --   --  85*  --  92* 87*   MONOS PCT % 8 6  --   --  10  --  4 8     Results from last 7 days   Lab Units 12/11/22  0423 12/10/22  0442 12/09/22  0443 12/08/22  2003 12/08/22  1101 12/08/22  0525 12/07/22  2316 12/06/22  1603 12/06/22  1233   SODIUM mmol/L 141 142 139 139 139 140 139   < > 136   POTASSIUM mmol/L 4 1 4 4 4 9 4 9 4 6 3 9 4 1   < > 7 0*   CHLORIDE mmol/L 101 101 99 99 99 98 96   < > 98   CO2 mmol/L 26 24 23 23 28 27 26   < > 10*   ANION GAP mmol/L 14* 17* 17* 17* 12 15* 17*   < > 28*   BUN mg/dL 45* 65* 106* 101* 102* 124* 147*   < > 216*   CREATININE mg/dL 4 40* 5 55* 7 77* 7 49* 7 54* 9 10* 10 91*   < > 17 52*   CALCIUM mg/dL 8 1* 7 8* 8 2* 8 3 7 8* 7 3* 7 3*   < > 8 8   GLUCOSE RANDOM mg/dL 135 118 172* 173* 137 141* 169*   < > 186*   ALT U/L  --  40  --   --   --   --   --   --  30   AST U/L  --  69*  --   --   --   --   --   --  19   ALK PHOS U/L  --  64  --   --   --   --   --   --  73   ALBUMIN g/dL  --  2 2*  --   --   --   --   --   --  3 5   TOTAL BILIRUBIN mg/dL  --  0 70  --   --   --   --   --   --  0 36    < > = values in this interval not displayed       Results from last 7 days   Lab Units 12/11/22  0423 12/10/22  0442 12/09/22  0443 12/08/22  1101 12/08/22  0525 12/07/22  2316 12/07/22  1455   MAGNESIUM mg/dL 2 0 2 1 2 2 1 9 2 1 1 9 2 2   PHOSPHORUS mg/dL 4 8* 5 4* 6 6* 4 4* 4 8* 5 7* 8 7*      Results from last 7 days   Lab Units 12/07/22  1213   INR  1 17   PTT seconds 31          Results from last 7 days   Lab Units 12/06/22  1233   LACTIC ACID mmol/L <0 3*     ABG:  Results from last 7 days Lab Units 12/10/22  0720   PH ART  7 422   PCO2 ART mm Hg 33 6*   PO2 ART mm Hg 136 4*   HCO3 ART mmol/L 21 4*   BASE EXC ART mmol/L -2 6   ABG SOURCE  Radial, Right     VBG:  Results from last 7 days   Lab Units 12/10/22  0720   ABG SOURCE  Radial, Right           Micro  Results from last 7 days   Lab Units 12/08/22  1104   MRSA CULTURE ONLY  No Methicillin Resistant Staphlyococcus aureus (MRSA) isolated       EKG: Reviewed  Imaging: I have personally reviewed pertinent reports  Intake and Output  I/O       12/09 0701  12/10 0700 12/10 0701  12/11 0700 12/11 0701  12/12 0700    I V  (mL/kg) 522 8 (7 9) 500 (7 6)     Blood       Other  100     IV Piggyback       Total Intake(mL/kg) 522 8 (7 9) 600 (9 1)     Urine (mL/kg/hr) 1055 (0 7) 475 (0 3) 45 (0 5)    Other 3000 2473     Stool       Total Output 4055 2948 45    Net -4542 2 -2828 -45                 Height and Weights         Body mass index is 26 49 kg/m²  Weight (last 2 days)     Date/Time Weight    12/11/22 0600 65 7 (144 84)    12/10/22 0536 65 8 (145 06)    12/09/22 0559 65 8 (145 06)            Nutrition       Diet Orders   (From admission, onward)             Start     Ordered    12/06/22 1724  Diet NPO  Diet effective now        References:    Nutrtion Support Algorithm Enteral vs  Parenteral   Question Answer Comment   Diet Type NPO    RD to adjust diet per protocol?  Yes        12/06/22 1724                  Active Medications  Scheduled Meds:  Current Facility-Administered Medications   Medication Dose Route Frequency Provider Last Rate   • digoxin  125 mcg Intravenous Every Other Day Timi Angeles MD     • heparin (porcine)  5,000 Units Subcutaneous Q8H [address] Sierra Ramirez MD     • insulin lispro  1-5 Units Subcutaneous Q6H [address] Sierra Ramirez MD     • ipratropium  0 5 mg Nebulization Q6H Kaitlin Mccarty MD     • levalbuterol  1 25 mg Nebulization Q6H Kaitlin Mccarty MD     • metoprolol  5 mg Intravenous Q4H Timi Angeles MD • phenylephine   mcg/min Intravenous Titrated Fernando Pringle MD Stopped (12/10/22 1740)     Continuous Infusions:  phenylephine,  mcg/min, Last Rate: Stopped (12/10/22 1740)      PRN Meds:        Invasive Devices Review  Invasive Devices     Peripheral Intravenous Line  Duration           Peripheral IV 12/10/22 Right;Upper;Ventral (anterior) Arm <1 day          Line  Duration           Hemodialysis AV Fistula 07/03/19 Left Upper arm 1256 days          Hemodialysis Catheter  Duration           HD Temporary Double Catheter 3 days          Drain  Duration           Urethral Catheter 16 Fr  4 days                Rationale for remaining devices: Acute on chronic renal failure, acute hypoxic respiratory failure  ---------------------------------------------------------------------------------------  Advance Directive and Living Will:      Power of : Yes  POLST:    ---------------------------------------------------------------------------------------  Care Time Delivered:   No Critical Care time spent       Cari Howard MD      Portions of the record may have been created with voice recognition software  Occasional wrong word or "sound a like" substitutions may have occurred due to the inherent limitations of voice recognition software    Read the chart carefully and recognize, using context, where substitutions have occurred

## 2022-12-11 NOTE — PLAN OF CARE
Problem: Potential for Falls  Goal: Patient will remain free of falls  Description: INTERVENTIONS:  - Educate patient/family on patient safety including physical limitations  - Instruct patient to call for assistance with activity   - Consult OT/PT to assist with strengthening/mobility   - Keep Call bell within reach  - Keep bed low and locked with side rails adjusted as appropriate  - Keep care items and personal belongings within reach  - Initiate and maintain comfort rounds  - Make Fall Risk Sign visible to staff  - Offer Toileting every 2 Hours, in advance of need  - Initiate/Maintain bed alarm  - Obtain necessary fall risk management equipment:   - Apply yellow socks and bracelet for high fall risk patients  - Consider moving patient to room near nurses station  Outcome: Progressing     Problem: MOBILITY - ADULT  Goal: Maintain or return to baseline ADL function  Description: INTERVENTIONS:  - Educate patient/family on patient safety including physical limitations  - Instruct patient to call for assistance with activity   - Consult OT/PT to assist with strengthening/mobility   - Keep Call bell within reach  - Keep bed low and locked with side rails adjusted as appropriate  - Keep care items and personal belongings within reach  - Initiate and maintain comfort rounds  - Make Fall Risk Sign visible to staff  - Offer Toileting every 2 Hours, in advance of need  - Initiate/Maintain bed alarm  - Obtain necessary fall risk management equipment:   - Consider moving patient to room near nurses station  Outcome: Progressing  Goal: Maintains/Returns to pre admission functional level  Description: INTERVENTIONS:  - Perform BMAT or MOVE assessment daily    - Set and communicate daily mobility goal to care team and patient/family/caregiver     - Collaborate with rehabilitation services on mobility goals if consulted  - Out of bed for toileting  - Record patient progress and toleration of activity level   Outcome: Progressing     Problem: Prexisting or High Potential for Compromised Skin Integrity  Goal: Skin integrity is maintained or improved  Description: INTERVENTIONS:  - Identify patients at risk for skin breakdown  - Assess and monitor skin integrity  - Assess and monitor nutrition and hydration status  - Monitor labs   - Assess for incontinence   - Turn and reposition patient  - Assist with mobility/ambulation  - Relieve pressure over bony prominences  - Avoid friction and shearing  - Provide appropriate hygiene as needed including keeping skin clean and dry  - Evaluate need for skin moisturizer/barrier cream  - Collaborate with interdisciplinary team   - Patient/family teaching  - Consider wound care consult   Outcome: Progressing     Problem: PAIN - ADULT  Goal: Verbalizes/displays adequate comfort level or baseline comfort level  Description: Interventions:  - Encourage patient to monitor pain and request assistance  - Assess pain using appropriate pain scale  - Administer analgesics based on type and severity of pain and evaluate response  - Implement non-pharmacological measures as appropriate and evaluate response  - Consider cultural and social influences on pain and pain management  - Notify physician/advanced practitioner if interventions unsuccessful or patient reports new pain  Outcome: Progressing     Problem: INFECTION - ADULT  Goal: Absence or prevention of progression during hospitalization  Description: INTERVENTIONS:  - Assess and monitor for signs and symptoms of infection  - Monitor lab/diagnostic results  - Monitor all insertion sites, i e  indwelling lines, tubes, and drains  - Monitor endotracheal if appropriate and nasal secretions for changes in amount and color  - [place] appropriate cooling/warming therapies per order  - Administer medications as ordered  - Instruct and encourage patient and family to use good hand hygiene technique  - Identify and instruct in appropriate isolation precautions for identified infection/condition  Outcome: Progressing  Goal: Absence of fever/infection during neutropenic period  Description: INTERVENTIONS:  - Monitor WBC    Outcome: Progressing     Problem: SAFETY ADULT  Goal: Patient will remain free of falls  Description: INTERVENTIONS:  - Educate patient/family on patient safety including physical limitations  - Instruct patient to call for assistance with activity   - Consult OT/PT to assist with strengthening/mobility   - Keep Call bell within reach  - Keep bed low and locked with side rails adjusted as appropriate  - Keep care items and personal belongings within reach  - Initiate and maintain comfort rounds  - Make Fall Risk Sign visible to staff  - Offer Toileting every 2 Hours, in advance of need  - Initiate/Maintain bed alarm  - Obtain necessary fall risk management equipment:   - Apply yellow socks and bracelet for high fall risk patients  - Consider moving patient to room near nurses station  Outcome: Progressing  Goal: Maintain or return to baseline ADL function  Description: INTERVENTIONS:  - Educate patient/family on patient safety including physical limitations  - Instruct patient to call for assistance with activity   - Consult OT/PT to assist with strengthening/mobility   - Keep Call bell within reach  - Keep bed low and locked with side rails adjusted as appropriate  - Keep care items and personal belongings within reach  - Initiate and maintain comfort rounds  - Make Fall Risk Sign visible to staff  - Offer Toileting every 2 Hours, in advance of need  - Initiate/Maintain bed alarm  - Obtain necessary fall risk management equipment:   - Consider moving patient to room near nurses station  Outcome: Progressing  Goal: Maintains/Returns to pre admission functional level  Description: INTERVENTIONS:  - Perform BMAT or MOVE assessment daily    - Set and communicate daily mobility goal to care team and patient/family/caregiver     - Collaborate with rehabilitation services on mobility goals if consulted  - Out of bed for toileting  - Record patient progress and toleration of activity level   Outcome: Progressing     Problem: DISCHARGE PLANNING  Goal: Discharge to home or other facility with appropriate resources  Description: INTERVENTIONS:  - Identify barriers to discharge w/patient and caregiver  - Arrange for needed discharge resources and transportation as appropriate  - Identify discharge learning needs (meds, wound care, etc )  - Arrange for interpretive services to assist at discharge as needed  - Refer to Case Management Department for coordinating discharge planning if the patient needs post-hospital services based on physician/advanced practitioner order or complex needs related to functional status, cognitive ability, or social support system  Outcome: Progressing     Problem: Knowledge Deficit  Goal: Patient/family/caregiver demonstrates understanding of disease process, treatment plan, medications, and discharge instructions  Description: Complete learning assessment and assess knowledge base    Interventions:  - Provide teaching at level of understanding  - Provide teaching via preferred learning methods  Outcome: Progressing     Problem: SAFETY,RESTRAINT: NV/NON-SELF DESTRUCTIVE BEHAVIOR  Goal: Remains free of harm/injury (restraint for non violent/non self-detsructive behavior)  Description: INTERVENTIONS:  - Instruct patient/family regarding restraint use   - Assess and monitor physiologic and psychological status   - Provide interventions and comfort measures to meet assessed patient needs   - Identify and implement measures to help patient regain control  - Assess readiness for release of restraint   Outcome: Progressing  Goal: Returns to optimal restraint-free functioning  Description: INTERVENTIONS:  - Assess the patient's behavior and symptoms that indicate continued need for restraint  - Identify and implement measures to help patient regain control  - Assess readiness for release of restraint   Outcome: Progressing     Problem: Nutrition/Hydration-ADULT  Goal: Nutrient/Hydration intake appropriate for improving, restoring or maintaining nutritional needs  Description: Monitor and assess patient's nutrition/hydration status for malnutrition  Collaborate with interdisciplinary team and initiate plan and interventions as ordered  Monitor patient's weight and dietary intake as ordered or per policy  Utilize nutrition screening tool and intervene as necessary  Determine patient's food preferences and provide high-protein, high-caloric foods as appropriate       INTERVENTIONS:  - Monitor oral intake, urinary output, labs, and treatment plans  - Assess nutrition and hydration status and recommend course of action  - Evaluate amount of meals eaten  - Assist patient with eating if necessary   - Allow adequate time for meals  - Recommend/ encourage appropriate diets, oral nutritional supplements, and vitamin/mineral supplements  - Order, calculate, and assess calorie counts as needed  - Recommend, monitor, and adjust tube feedings and TPN/PPN based on assessed needs  - Assess need for intravenous fluids  - Provide specific nutrition/hydration education as appropriate  - Include patient/family/caregiver in decisions related to nutrition  Outcome: Progressing     Problem: METABOLIC, FLUID AND ELECTROLYTES - ADULT  Goal: Electrolytes maintained within normal limits  Description: INTERVENTIONS:  - Monitor labs and assess patient for signs and symptoms of electrolyte imbalances  - Administer electrolyte replacement as ordered  - Monitor response to electrolyte replacements, including repeat lab results as appropriate  - Instruct patient on fluid and nutrition as appropriate  Outcome: Progressing  Goal: Fluid balance maintained  Description: INTERVENTIONS:  - Monitor labs   - Monitor I/O and WT  - Instruct patient on fluid and nutrition as appropriate  - Assess for signs & symptoms of volume excess or deficit  Outcome: Progressing

## 2022-12-11 NOTE — ASSESSMENT & PLAN NOTE
Lab Results   Component Value Date    HGBA1C 6 7 (A) 09/07/2022       Recent Labs     12/10/22  1147 12/10/22  1807 12/11/22  0013 12/11/22  0602   POCGLU 153* 127 161* 126       Blood Sugar Average: Last 72 hrs:  (P) 059 [id number]   Home medications include Tradjenta     Plan-  SSI  Hypoglycemia protocol  accucheck q6h  Swallow study if patient off BiPAP, tube feeds if patient stable

## 2022-12-11 NOTE — ASSESSMENT & PLAN NOTE
Lab Results   Component Value Date    IRON 127 12/07/2022    FERRITIN 2,641 (H) 12/07/2022    TIBC 226 (L) 12/07/2022    CONCFE 56 (H) 12/07/2022     Recent Labs     12/09/22  0443 12/10/22  0442 12/11/22  0423   HGB 9 7* 8 9* 8 4*       S/p 1 U pRBC with adequate response in hemoglobin  Will continue to trend hemoglobin and transfuse if needed

## 2022-12-11 NOTE — ASSESSMENT & PLAN NOTE
Per chart review patient has hx of PAF  Rate controlled with metoprolol at home  CHADVASC score 4  High bleeding risk per HASBLED score  Initially on  IV metoprolol 5 mg q 6 with temporary control in HR  Was loaded on 12/9/22 with digoxin 500 mcg  Received another 2x 250 mcg  Now on maintenance dose of 125 mcg daily  Heart rate ranges between 100 and 120  Plan:  · Continuous cardiac monitoring  · If no response after management with digoxin will consider involving cardiology    · Digoxin level ordered

## 2022-12-11 NOTE — PROGRESS NOTES
Progress Note - Nephrology   Steve Abt  80 y o  male MRN: [id number]  Unit/Bed#: ICU 02 Encounter: [id number]      Assessment / Plan:  1  ERIK, present on admission, on top of CKD stage 4 in setting of probable prerenal ERIK vs CKD progression in setting of ARB use - admit sCr 17, b/l sCr mid 2-3s, follows outpatient with Dr Levy Jorge  -had required HD in the past, has an AVG in place that Is nonfunctional-consider graftogram by IR for further evaluation vs vascular evaluation as no thrill/bruit  -plan for permacath tomorrow, next HD likely tomorrow, poor blood flow through catheter at end of last treatment 12/10  -off CRRT since 12/8 d/t significant drop in BUN  -did urinate in response to diuresis, but BUN/sCr remains elevated and patient volume up (net +3 7L for admission)  -s/p temp HD line placement,  -will evaluate daily for HD needs  -Avoid relative hypotension as this can slow renal recovery  -monitor BMP  -CT imaging show unremarkable kidneys  -UA with microscopy positive glucose, moderate blood, 1+ protein, 4-10 WBC  -consent obtained from patient's grand daughter for RRT, consent is on file  2  Azotemia with concern for uremia -improved with RRT, monitor BUN  3  Hyperkalemia - resolved with medical management, corrected with IHD  4  Elevated anion gap metabolic acidosis - corrected s/p RRT, monitor with HD  5  Anemia - Hgb has dropped to 7 ? Dilutional vs GIB in setting of very high BUN-improved to 9 7 s/p blood transfusion but dropped back down to 8 4, monitor CBC, transfuse prn  6  HTN - BP low normal for ERIK, avoid relative hypotension, continue holding losartan   On amlodipine and metoprolol at home  Now on metoprolol prn IV as patient NPO on Bipap  Required shrery gtt during HD 12/9  BP improved today  UF as tolerated  7  afib with RVR - on digoxin, metoprolol IV per ICU team  8   Metabolic encephalopathy - stable, work up per primary team        Subjective:   Patient with ongoing altered mental status on BiPAP  Unable to elicit HPI or review of systems  Objective:     Vitals: Blood pressure 112/53, pulse 96, temperature 100 2 °F (37 9 °C), temperature source Axillary, resp  rate 16, weight 65 7 kg (144 lb 13 5 oz), SpO2 97 %  ,Body mass index is 26 49 kg/m²  Temp (24hrs), Av 7 °F (37 1 °C), Min:97 7 °F (36 5 °C), Max:100 2 °F (37 9 °C)      Weight (last 2 days)     Date/Time Weight    22 0600 65 7 (144 84)    12/10/22 0536 65 8 (145 06)    22 0559 65 8 (145 06)            Intake/Output Summary (Last 24 hours) at 2022 1119  Last data filed at 2022 1106  Gross per 24 hour   Intake 604 46 ml   Output 2973 ml   Net -2368 54 ml     I/O last 24 hours: In: 604 5 [I V :504 5; Other:100]  Out: 3088 [Urine:615; Other:2473]        Physical Exam:   Physical Exam  Vitals reviewed  Constitutional:       General: He is not in acute distress  Appearance: He is well-developed  He is ill-appearing  He is not diaphoretic  Comments: On bipap   HENT:      Head: Normocephalic and atraumatic  Nose: Nose normal       Mouth/Throat:      Mouth: Mucous membranes are moist       Pharynx: No oropharyngeal exudate  Eyes:      General: No scleral icterus  Right eye: No discharge  Left eye: No discharge  Neck:      Thyroid: No thyromegaly  Cardiovascular:      Rate and Rhythm: Regular rhythm  Tachycardia present  Heart sounds: Normal heart sounds  Pulmonary:      Effort: Pulmonary effort is normal       Breath sounds: No wheezing or rales  Comments: coarse BS b/l  Abdominal:      General: Bowel sounds are normal  There is no distension  Palpations: Abdomen is soft  Tenderness: There is no abdominal tenderness  Genitourinary:     Comments: medeiros  Musculoskeletal:         General: No swelling  Normal range of motion  Cervical back: Neck supple  Lymphadenopathy:      Cervical: No cervical adenopathy  Skin:     General: Skin is warm and dry  Coloration: Skin is not jaundiced  Findings: No rash  Neurological:      Mental Status: He is alert        Comments: confused         Invasive Devices     Peripheral Intravenous Line  Duration           Peripheral IV 12/10/22 Right;Upper;Ventral (anterior) Arm <1 day          Line  Duration           Hemodialysis AV Fistula 07/03/19 Left Upper arm 1256 days          Hemodialysis Catheter  Duration           HD Temporary Double Catheter 3 days          Drain  Duration           Urethral Catheter 16 Fr  4 days                Medications:    Scheduled Meds:  Current Facility-Administered Medications   Medication Dose Route Frequency Provider Last Rate   • dexmedetomidine  0 1-0 7 mcg/kg/hr Intravenous Titrated Yolis Arellano MD 0 2 mcg/kg/hr (12/11/22 1045)   • digoxin  125 mcg Intravenous Every Other Day Sandra Thakkar MD     • heparin (porcine)  5,000 Units Subcutaneous Q8H [facility] Yolis Arellano MD     • insulin lispro  1-5 Units Subcutaneous Q6H [facility] Yolis Arellano MD     • ipratropium  0 5 mg Nebulization Q6H Casi De La Garza MD     • levalbuterol  1 25 mg Nebulization Q6H Casi De La Garza MD     • metoprolol  5 mg Intravenous Q4H Sandra Thakkar MD     • phenylephine   mcg/min Intravenous Titrated Sandra Thakkar MD Stopped (12/10/22 1740)       PRN Meds:     Continuous Infusions:dexmedetomidine, 0 1-0 7 mcg/kg/hr, Last Rate: 0 2 mcg/kg/hr (12/11/22 1045)  phenylephine,  mcg/min, Last Rate: Stopped (12/10/22 1740)            LAB RESULTS:      Results from last 7 days   Lab Units 12/11/22  0423 12/10/22  0442 12/09/22  0443 12/08/22  2003 12/08/22  1447 12/08/22  1101 12/08/22  0525 12/07/22  2316 12/07/22  1455 12/07/22  0807 12/07/22  0228 12/07/22  0212 12/06/22  1603 12/06/22  1233   WBC Thousand/uL 9 66 13 12* 14 76*  --  12 86*  --  14 40*  --   --  13 21* 12 38*  --   --  13 79*   HEMOGLOBIN g/dL 8 4* 8 9* 9 7*  --  9 3*  --  7 0*  --   --  8 1* 7 8*  --   --  10 0* HEMATOCRIT % 26 1* 26 9* 28 2*  --  26 5*  --  20 1*  --   --  24 0* 22 8*  --   --  30 3*   PLATELETS Thousands/uL 71* 76* 91*  --  87*  --  106*  --   --  131* 140*  --   --  192   NEUTROS PCT % 89* 90*  --   --   --   --  85*  --   --   --  92*  --   --  87*   LYMPHS PCT % 2* 3*  --   --   --   --  3*  --   --   --  3*  --   --  4*   MONOS PCT % 8 6  --   --   --   --  10  --   --   --  4  --   --  8   EOS PCT % 0 0  --   --   --   --  0  --   --   --  0  --   --  0   POTASSIUM mmol/L 4 1 4 4 4 9 4 9  --  4 6 3 9 4 1 4 6 4 5  --   --    < > 7 0*   CHLORIDE mmol/L 101 101 99 99  --  99 98 96 96 97  --   --    < > 98   CO2 mmol/L 26 24 23 23  --  28 27 26 20* 18*  --   --    < > 10*   BUN mg/dL 45* 65* 106* 101*  --  102* 124* 147* 250* 306*  --   --    < > 216*   CREATININE mg/dL 4 40* 5 55* 7 77* 7 49*  --  7 54* 9 10* 10 91* 15 85* 16 29*  --   --    < > 17 52*   CALCIUM mg/dL 8 1* 7 8* 8 2* 8 3  --  7 8* 7 3* 7 3* 8 0* 8 2*  --   --    < > 8 8   ALK PHOS U/L  --  64  --   --   --   --   --   --   --   --   --   --   --  73   ALT U/L  --  40  --   --   --   --   --   --   --   --   --   --   --  30   AST U/L  --  69*  --   --   --   --   --   --   --   --   --   --   --  19   MAGNESIUM mg/dL 2 0 2 1 2 2  --   --  1 9 2 1 1 9 2 2 2 4  --    < >  --   --    PHOSPHORUS mg/dL 4 8* 5 4* 6 6*  --   --  4 4* 4 8* 5 7* 8 7* 8 7*  --   --    < >  --     < > = values in this interval not displayed  CUTURES:  Lab Results   Component Value Date    URINECX >100,000 cfu/ml Pseudomonas aeruginosa (A) 03/07/2019    URINECX No Growth <1000 cfu/mL 09/21/2018                 Portions of the record may have been created with voice recognition software  Occasional wrong word or "sound a like" substitutions may have occurred due to the inherent limitations of voice recognition software  Read the chart carefully and recognize, using context, where substitutions have occurred  If you have any questions, please contact the dictating provider

## 2022-12-11 NOTE — ASSESSMENT & PLAN NOTE
Lab Results   Component Value Date    BUN 45 (H) 12/11/2022    BUN 65 (H) 12/10/2022     (H) 12/09/2022     (H) 12/08/2022     Initially in 300s on presentation  Underwent CVVH with significant drop in BUN  Patient s/p one session of HD on 12/9/22  Noted improvement in azotemia    Monitor daily BMP

## 2022-12-11 NOTE — ASSESSMENT & PLAN NOTE
81 yo male with multiple comorbidities including ESRD on HD s/p AVG in 2018, now with nonfunctioning graft for which Vascular Surgery has been consulted  Plan:  Agree with plan for IR temp HD cath so pt can continue to undergo HD  Would benefit from fistulogram as well  Pt with palpable distal graft pulse, but nothing proximal  Recommend Duplex HD study to better evaluate graft flow  Vascular will continue to follow

## 2022-12-11 NOTE — CONSULTS
[facility]  1935, 80 y o  male MRN: [id number]  Unit/Bed#: ICU 02 Encounter: [id number]  Primary Care Provider: Lulu Slade DO   Date and time admitted to hospital: 12/6/2022 12:01 PM    Inpatient consult to Vascular Surgery  Consult performed by: Ashli Soto MD  Consult ordered by: Felix Duckworth MD          * AV graft malfunction [facility])  Assessment & Plan  79 yo male with multiple comorbidities including ESRD on HD s/p AVG in 2019, now with nonfunctioning graft for which Vascular Surgery has been consulted  Plan:  Agree with plan for IR temp HD cath so pt can continue to undergo HD  Would benefit from fistulogram  Pt with palpable distal graft pulse, but nothing proximal    Vascular will continue to follow  History of Present Illness     HPI:  Manuel Rodríguez  is a 80 y o  male with pmhx of anxiety, GERD, T2DM, DVT, HTN, Afib, ESRD on HD s/p LUE brachial artery-axillary v  AVG who presented to hospital on 12/6 due to [address], now with acute respiratory failure requiring intubation today  He is HD dependent with a nonfunctioning AVG  Pt currently intubated so unable to obtain ROS at this time      Review of Systems   Unable to perform ROS: Intubated         Historical Information   Past Medical History:   Diagnosis Date   • AMS (altered mental status) 12/6/2022   • Anxiety    • Obrien's esophagus    • BPH (benign prostatic hyperplasia)    • Cancer ([facility] [address] )     pt states hx skin cancer, pt confused   • Cardiomegaly    • Diabetes ([facility] [address] )     type 2  controlled; taken off oral meds 6/19   • DVT (deep venous thrombosis) (HCC)     right leg   • GERD (gastroesophageal reflux disease)    • Hypercholesterolemia    • Hypertension    • Irregular heart beat     paroxsysmal a fib   • Pancreatic cyst    • Paroxysmal atrial fibrillation (HCC)    • Pericardial effusion with cardiac tamponade    • Pleural effusion    • Renal disorder     dialysis started in 2019   • Weight loss, non-intentional      Past Surgical History:   Procedure Laterality Date   • APPENDECTOMY     • CATARACT EXTRACTION Bilateral 2016? • COLONOSCOPY     • COLONOSCOPY N/A 2019    Procedure: COLONOSCOPY with polypectomies;  Surgeon: Javy Light MD;  Location: [facility]; Service: Gastroenterology   • ESOPHAGOGASTRODUODENOSCOPY N/A 2019    Procedure: ESOPHAGOGASTRODUODENOSCOPY (EGD) with bx;  Surgeon: Javy Light MD;  Location: [facility]; Service: Gastroenterology   • IR TUNNELED DIALYSIS CATHETER CHECK/CHANGE/REPOSITION/ANGIOPLASTY  2019   • IR TUNNELED DIALYSIS CATHETER PLACEMENT  3/6/2019   • IR TUNNELED DIALYSIS CATHETER REMOVAL  2019   • PERICARDIAL WINDOW N/A 2019    Procedure: WINDOW PERICARDIAL;  Surgeon: Anne Marie Jimenez MD;  Location: [facility];  Service: Thoracic   • WA ARTERIOVENOUS ANASTOMOSIS OPEN DIRECT Left 7/3/2019    Procedure: CREATION FISTULA ARTERIOVENOUS (AV) left upper extremity brachial artery to axillary vein [place]-Mushtaq graft ;  Surgeon: Laci Mccauley MD;  Location: [address] [place] [address] MAIN OR;  Service: Vascular   • PROSTATE BIOPSY       Social History   Social History     Substance and Sexual Activity   Alcohol Use No     Social History     Substance and Sexual Activity   Drug Use Never     Social History     Tobacco Use   Smoking Status Former   • Packs/day: 0 50   • Types: Cigars, Cigarettes   • Quit date:    • Years since quittin 9   Smokeless Tobacco Never   Tobacco Comments    current non-smoker     Family History:   Family History   Problem Relation Age of Onset   • Diabetes Mother    • Diabetes type II Mother    • Diabetes Father    • Diabetes type II Father    • Heart attack Father    • Pulmonary embolism Sister    • Prostate cancer Brother    • Diabetes Brother        Meds/Allergies   PTA meds:   Prior to Admission Medications   Prescriptions Last Dose Informant Patient Reported? Taking?    ALPRAZolam (XANAX) 0 25 mg tablet   No No   Sig: Take 1 tablet (0 25 mg total) by mouth every 8 (eight) hours as needed for anxiety   Blood Glucose Monitoring Suppl (ONE TOUCH ULTRA 2) w/Device KIT   No No   Sig: by Does not apply route 2 (two) times a day   Blood Glucose Monitoring Suppl (ONE TOUCH ULTRA 2) w/Device KIT   No No   Sig: Use daily   Blood Glucose Monitoring Suppl (TRUE METRIX METER) YASMEEN   No No   Sig: by Does not apply route 2 (two) times a day   [employer] Iron 240 (27 Fe) MG tablet   No No   Sig: TAKE 1 TABLET (240 MG TOTAL) BY MOUTH EVERY OTHER DAY   Patient not taking: Reported on 11/29/2022   Cholecalciferol (VITAMIN D PO)   Yes No   Sig: Take 5,000 Units by mouth every morning    Patient not taking: Reported on 11/29/2022   Lancets (ONETOUCH ULTRASOFT) lancets   No No   Sig: USE DAILY AS DIRECTED  Lancets (onetouch ultrasoft) lancets   No No   Sig: Use daily as instructed   PARoxetine (PAXIL) 30 mg tablet   No Yes   Sig: Take 1 tablet (30 mg total) by mouth every morning As directed   Tradjenta 5 MG TABS   No No   Sig: TAKE 1 TABLET BY MOUTH EVERY DAY   Patient not taking: Reported on 7/11/2022   acetaminophen (TYLENOL) 325 mg tablet   Yes No   Sig: Take 650 mg by mouth daily   Patient not taking: Reported on 11/29/2022   amLODIPine (NORVASC) 5 mg tablet   No Yes   Sig: TAKE 1 TABLET (5 MG TOTAL) BY MOUTH DAILY     finasteride (PROSCAR) 5 mg tablet   No Yes   Sig: TAKE 1 TABLET BY MOUTH EVERY DAY   fluticasone (FLONASE) 50 mcg/act nasal spray   No No   Sig: USE 2 SPRAYS IN EACH NOSTRIL DAILY   Patient not taking: Reported on 8/29/2022   glucose blood (OneTouch Ultra) test strip   No No   Sig: Test once daily as directed, DX E11 9 type 2 diabetes w/out complication   glucose blood (TRUE METRIX BLOOD GLUCOSE TEST) test strip   No No   Sig: Test twice daily   losartan (COZAAR) 25 mg tablet   No Yes   Sig: TAKE 1 TABLET BY MOUTH EVERY DAY IN THE MORNING   metoprolol tartrate (LOPRESSOR) 25 mg tablet   No Yes   Sig: TAKE 1 TABLET BY MOUTH EVERY DAY mometasone-formoterol (Dulera) 100-5 MCG/ACT inhaler   No No   Sig: Inhale 2 puffs every 12 (twelve) hours Rinse mouth after use  Patient not taking: Reported on 2022   omeprazole (PriLOSEC) 40 MG capsule   Yes No   Sig: Take 40 mg by mouth daily     Patient not taking: Reported on 2022   predniSONE 10 mg tablet   No No   Si tablets daily, all at one time, with food  Then on day #4 decrease by 1 pill each day until finished     rosuvastatin (CRESTOR) 40 MG tablet   No Yes   Sig: TAKE 1 TABLET BY MOUTH EVERY DAY IN THE MORNING   traMADol (ULTRAM) 50 mg tablet   No No   Sig: Take 1 tablet (50 mg total) by mouth every 6 (six) hours as needed for moderate pain   Patient not taking: Reported on 2022      Facility-Administered Medications: None     Allergies   Allergen Reactions   • Bee Venom Facial Swelling   • Ace Inhibitors Other (See Comments)     Unknown reaction       Objective   First Vitals:   Blood Pressure: 145/66 (22 1209)  Pulse: 88 (22 1223)  Temperature: 97 5 °F (36 4 °C) (22 1223)  Temp Source: Axillary (22 2217)  Respirations: 19 (22 1223)  Weight - Scale: 65 6 kg (144 lb 10 oz) (22 2217)  SpO2: 99 % (22 1225)    Current Vitals:   Blood Pressure: 117/53 (22 1345)  Pulse: 92 (22 1345)  Temperature: (!) 101 4 °F (38 6 °C) (22 1500)  Temp Source: Axillary (22 1500)  Respirations: 19 (22 1345)  Weight - Scale: 65 7 kg (144 lb 13 5 oz) (22 0600)  SpO2: 93 % (22 1437)      Intake/Output Summary (Last 24 hours) at 2022 1548  Last data filed at 2022 1338  Gross per 24 hour   Intake 329 08 ml   Output 2845 ml   Net -2515 92 ml       Invasive Devices     Line  Duration           Hemodialysis AV Fistula 19 Left Upper arm 1257 days          Hemodialysis Catheter  Duration           HD Temporary Double Catheter 4 days          Drain  Duration           Urethral Catheter 16 Fr  5 days NG/OG/Enteral Tube Orogastric Center mouth <1 day          Airway  Duration           ETT  Cuffed 7 5 mm <1 day                Physical Exam  Vitals reviewed  Constitutional:       Interventions: He is sedated and intubated  Pulmonary:      Effort: He is intubated  Abdominal:      Palpations: Abdomen is soft  Musculoskeletal:      Comments: Swelling of RUE  LUE with nonpalpable proximal graft, palpable distal portion and radial and ulnar pulse  Somewhat dusky appearing 3-4th distal digit  Motor intact   Feet:      Comments: Palpable DP b/l        Lab Results:   CBC:   Lab Results   Component Value Date    WBC 9 66 12/11/2022    HGB 8 4 (L) 12/11/2022    HCT 26 1 (L) 12/11/2022    MCV 97 12/11/2022    PLT 71 (L) 12/11/2022    MCH 31 2 12/11/2022    MCHC 32 2 12/11/2022    RDW 13 4 12/11/2022    MPV 11 3 12/11/2022    NRBC 0 12/11/2022   , CMP:   Lab Results   Component Value Date    SODIUM 141 12/11/2022    K 4 1 12/11/2022     12/11/2022    CO2 26 12/11/2022    BUN 45 (H) 12/11/2022    CREATININE 4 40 (H) 12/11/2022    CALCIUM 8 1 (L) 12/11/2022    EGFR 11 12/11/2022   , Coagulation: No results found for: PT, INR, APTT  Imaging: I have personally reviewed pertinent reports  EKG, Pathology, and Other Studies: I have personally reviewed pertinent reports        Code Status: Level 1 - Full Code  Advance Directive and Living Will:      Power of : Yes  POLST:

## 2022-12-11 NOTE — CONSULTS
e-Consult (IPC)  - Interventional Radiology  Aline Novak  80 y o  male MRN: [id number]  Unit/Bed#: ICU 02 Encounter: [id number]          Interventional Radiology has been consulted to evaluate Aline Novak  IP Consult to IR  Consult performed by: Ronnie De La Garza MD  Consult ordered by: Dwayne Fong MD        12/11/22    Assessment/Recommendation:   80 yom ERIK on CKD, respiratory failure, encephalopathy  IR consulted for [facility]  He has a nTDC in place which is intermittently causing problems  No WBC  Afebrile  No recent blood cultures  We'll plan on [facility] placement early in the week, hopefully Monday pending scheduling availability  Please make NPO at [place]     5-10 minutes, >50% of the total time devoted to medical consultative verbal/EMR discussion between providers  Written report will be generated in the EMR  Thank you for allowing Interventional Radiology to participate in the care of Aline Novak    Please don't hesitate to call or TigerText us with any questions       Ronnie De La Garza MD

## 2022-12-11 NOTE — ASSESSMENT & PLAN NOTE
· Patient has Hx of restrictive lung disease concern for pneumoconiosis vs sarcoidosis  Last PFT with a moderate restriction, TLC of 58% 3 44 L, FVC 1 87 L/63%   · Sees pulmonologist dr Asaf Albrecht in the outpatient setting  · ABGs have been inconsistent  Need to establish if patient is truly hypoxic without BiPAP  · Saturating at 91% on 12L NC O2  · A repeat cxr was done  Persistent left effusion and left base opacity which may be due to atelectasis and/or pneumonia   Edema is improving      Plan-  · xopenex and atrovent nebulizers q6h  · bipap 16/8 FIO2 increased from 45 to 60%  · VBG while patient off of BiPAP  · Home O2 evaluation  · Will consider goals of care with the family

## 2022-12-11 NOTE — PROCEDURES
Intubation    Date/Time: 12/11/2022 11:40 AM  Performed by: Divya Nuno MD  Authorized by: Divya Nuno MD     Patient location:  Bedside  Other Assisting Provider: Yes (comment) Donaldo Bell )    Consent:     Consent obtained:  Verbal    Consent given by:  Healthcare agent    Risks discussed:  Pneumothorax, laryngeal injury, hypoxia, dental trauma, bleeding and aspiration  Universal protocol:     Procedure explained and questions answered to patient or proxy's satisfaction: yes      Relevant documents present and verified: yes      Test results available and properly labeled: yes      Radiology Images displayed and confirmed  If images not available, report reviewed: yes      Required blood products, implants, devices, and special equipment available: no      Patient identity confirmed:  Hospital-assigned identification number  Pre-procedure details:     Patient status:  Awake    Pretreatment medications:  Propofol and etomidate    Paralytics:  None  Indications:     Indications for intubation: respiratory distress, airway protection and hypoxemia    Procedure details:     Preoxygenation:  Bag valve mask    CPR in progress: no      Intubation method:  Oral    Oral intubation technique:  Glidescope    Tube size (mm):  7 5    Tube type:  Cuffed    Number of attempts:  1    Cricoid pressure: no      Tube visualized through cords: yes    Placement assessment:     ETT to lip:  23 cm    Tube secured with:  ETT ann    Breath sounds:  Equal    Placement verification: chest rise, colorimetric ETCO2 device and CXR verification      CXR findings:  ETT in proper place  Post-procedure details:     Patient tolerance of procedure:   Tolerated well, no immediate complications

## 2022-12-11 NOTE — PLAN OF CARE
Problem: Potential for Falls  Goal: Patient will remain free of falls  Description: INTERVENTIONS:  - Educate patient/family on patient safety including physical limitations  - Instruct patient to call for assistance with activity   - Consult OT/PT to assist with strengthening/mobility   - Keep Call bell within reach  - Keep bed low and locked with side rails adjusted as appropriate  - Keep care items and personal belongings within reach  - Initiate and maintain comfort rounds  - Make Fall Risk Sign visible to staff  - Offer Toileting every 2 Hours, in advance of need  - Initiate/Maintain bed alarm  - Obtain necessary fall risk management equipment:   - Apply yellow socks and bracelet for high fall risk patients  - Consider moving patient to room near nurses station  Outcome: Progressing     Problem: MOBILITY - ADULT  Goal: Maintain or return to baseline ADL function  Description: INTERVENTIONS:  - Educate patient/family on patient safety including physical limitations  - Instruct patient to call for assistance with activity   - Consult OT/PT to assist with strengthening/mobility   - Keep Call bell within reach  - Keep bed low and locked with side rails adjusted as appropriate  - Keep care items and personal belongings within reach  - Initiate and maintain comfort rounds  - Make Fall Risk Sign visible to staff  - Offer Toileting every 2 Hours, in advance of need  - Initiate/Maintain bed alarm  - Obtain necessary fall risk management equipment:   - Consider moving patient to room near nurses station  Outcome: Progressing  Goal: Maintains/Returns to pre admission functional level  Description: INTERVENTIONS:  - Perform BMAT or MOVE assessment daily    - Set and communicate daily mobility goal to care team and patient/family/caregiver  - Collaborate with rehabilitation services on mobility goals if consulted  - Perform Range of Motion  times a day  - Reposition patient every  hours    - Dangle patient  times a day  - Stand patient  times a day  - Ambulate patient  times a day  - Out of bed to chair  times a day   - Out of bed for meals  times a day  - Out of bed for toileting  - Record patient progress and toleration of activity level   Outcome: Progressing     Problem: Prexisting or High Potential for Compromised Skin Integrity  Goal: Skin integrity is maintained or improved  Description: INTERVENTIONS:  - Identify patients at risk for skin breakdown  - Assess and monitor skin integrity  - Assess and monitor nutrition and hydration status  - Monitor labs   - Assess for incontinence   - Turn and reposition patient  - Assist with mobility/ambulation  - Relieve pressure over bony prominences  - Avoid friction and shearing  - Provide appropriate hygiene as needed including keeping skin clean and dry  - Evaluate need for skin moisturizer/barrier cream  - Collaborate with interdisciplinary team   - Patient/family teaching  - Consider wound care consult   Outcome: Progressing     Problem: PAIN - ADULT  Goal: Verbalizes/displays adequate comfort level or baseline comfort level  Description: Interventions:  - Encourage patient to monitor pain and request assistance  - Assess pain using appropriate pain scale  - Administer analgesics based on type and severity of pain and evaluate response  - Implement non-pharmacological measures as appropriate and evaluate response  - Consider cultural and social influences on pain and pain management  - Notify physician/advanced practitioner if interventions unsuccessful or patient reports new pain  Outcome: Progressing     Problem: INFECTION - ADULT  Goal: Absence or prevention of progression during hospitalization  Description: INTERVENTIONS:  - Assess and monitor for signs and symptoms of infection  - Monitor lab/diagnostic results  - Monitor all insertion sites, i e  indwelling lines, tubes, and drains  - Monitor endotracheal if appropriate and nasal secretions for changes in amount and color  - [place] appropriate cooling/warming therapies per order  - Administer medications as ordered  - Instruct and encourage patient and family to use good hand hygiene technique  - Identify and instruct in appropriate isolation precautions for identified infection/condition  Outcome: Progressing  Goal: Absence of fever/infection during neutropenic period  Description: INTERVENTIONS:  - Monitor WBC    Outcome: Progressing     Problem: SAFETY ADULT  Goal: Patient will remain free of falls  Description: INTERVENTIONS:  - Educate patient/family on patient safety including physical limitations  - Instruct patient to call for assistance with activity   - Consult OT/PT to assist with strengthening/mobility   - Keep Call bell within reach  - Keep bed low and locked with side rails adjusted as appropriate  - Keep care items and personal belongings within reach  - Initiate and maintain comfort rounds  - Make Fall Risk Sign visible to staff  - Offer Toileting every 2 Hours, in advance of need  - Initiate/Maintain bed alarm  - Obtain necessary fall risk management equipment:   - Apply yellow socks and bracelet for high fall risk patients  - Consider moving patient to room near nurses station  Outcome: Progressing  Goal: Maintain or return to baseline ADL function  Description: INTERVENTIONS:  - Educate patient/family on patient safety including physical limitations  - Instruct patient to call for assistance with activity   - Consult OT/PT to assist with strengthening/mobility   - Keep Call bell within reach  - Keep bed low and locked with side rails adjusted as appropriate  - Keep care items and personal belongings within reach  - Initiate and maintain comfort rounds  - Make Fall Risk Sign visible to staff  - Offer Toileting every 2 Hours, in advance of need  - Initiate/Maintain bed alarm  - Obtain necessary fall risk management equipment:   - Consider moving patient to room near nurses station  Outcome: Progressing  Goal: Maintains/Returns to pre admission functional level  Description: INTERVENTIONS:  - Perform BMAT or MOVE assessment daily    - Set and communicate daily mobility goal to care team and patient/family/caregiver  - Collaborate with rehabilitation services on mobility goals if consulted  - Perform Range of Motion  times a day  - Reposition patient every  hours  - Dangle patient  times a day  - Stand patient  times a day  - Ambulate patient  times a day  - Out of bed to chair  times a day   - Out of bed for meals  times a day  - Out of bed for toileting  - Record patient progress and toleration of activity level   Outcome: Progressing     Problem: DISCHARGE PLANNING  Goal: Discharge to home or other facility with appropriate resources  Description: INTERVENTIONS:  - Identify barriers to discharge w/patient and caregiver  - Arrange for needed discharge resources and transportation as appropriate  - Identify discharge learning needs (meds, wound care, etc )  - Arrange for interpretive services to assist at discharge as needed  - Refer to Case Management Department for coordinating discharge planning if the patient needs post-hospital services based on physician/advanced practitioner order or complex needs related to functional status, cognitive ability, or social support system  Outcome: Progressing     Problem: Knowledge Deficit  Goal: Patient/family/caregiver demonstrates understanding of disease process, treatment plan, medications, and discharge instructions  Description: Complete learning assessment and assess knowledge base    Interventions:  - Provide teaching at level of understanding  - Provide teaching via preferred learning methods  Outcome: Progressing     Problem: SAFETY,RESTRAINT: NV/NON-SELF DESTRUCTIVE BEHAVIOR  Goal: Remains free of harm/injury (restraint for non violent/non self-detsructive behavior)  Description: INTERVENTIONS:  - Instruct patient/family regarding restraint use   - Assess and monitor physiologic and psychological status   - Provide interventions and comfort measures to meet assessed patient needs   - Identify and implement measures to help patient regain control  - Assess readiness for release of restraint   Outcome: Progressing  Goal: Returns to optimal restraint-free functioning  Description: INTERVENTIONS:  - Assess the patient's behavior and symptoms that indicate continued need for restraint  - Identify and implement measures to help patient regain control  - Assess readiness for release of restraint   Outcome: Progressing     Problem: Nutrition/Hydration-ADULT  Goal: Nutrient/Hydration intake appropriate for improving, restoring or maintaining nutritional needs  Description: Monitor and assess patient's nutrition/hydration status for malnutrition  Collaborate with interdisciplinary team and initiate plan and interventions as ordered  Monitor patient's weight and dietary intake as ordered or per policy  Utilize nutrition screening tool and intervene as necessary  Determine patient's food preferences and provide high-protein, high-caloric foods as appropriate       INTERVENTIONS:  - Monitor oral intake, urinary output, labs, and treatment plans  - Assess nutrition and hydration status and recommend course of action  - Evaluate amount of meals eaten  - Assist patient with eating if necessary   - Allow adequate time for meals  - Recommend/ encourage appropriate diets, oral nutritional supplements, and vitamin/mineral supplements  - Order, calculate, and assess calorie counts as needed  - Recommend, monitor, and adjust tube feedings and TPN/PPN based on assessed needs  - Assess need for intravenous fluids  - Provide specific nutrition/hydration education as appropriate  - Include patient/family/caregiver in decisions related to nutrition  Outcome: Progressing     Problem: METABOLIC, FLUID AND ELECTROLYTES - ADULT  Goal: Electrolytes maintained within normal limits  Description: INTERVENTIONS:  - Monitor labs and assess patient for signs and symptoms of electrolyte imbalances  - Administer electrolyte replacement as ordered  - Monitor response to electrolyte replacements, including repeat lab results as appropriate  - Instruct patient on fluid and nutrition as appropriate  Outcome: Progressing  Goal: Fluid balance maintained  Description: INTERVENTIONS:  - Monitor labs   - Monitor I/O and WT  - Instruct patient on fluid and nutrition as appropriate  - Assess for signs & symptoms of volume excess or deficit  Outcome: Progressing

## 2022-12-11 NOTE — ASSESSMENT & PLAN NOTE
Lab Results   Component Value Date    EGFR 11 12/11/2022    EGFR 8 12/10/2022    EGFR 5 12/09/2022    CREATININE 4 40 (H) 12/11/2022    CREATININE 5 55 (H) 12/10/2022    CREATININE 7 77 (H) 12/09/2022

## 2022-12-11 NOTE — ASSESSMENT & PLAN NOTE
Lab Results   Component Value Date    EGFR 11 12/11/2022    EGFR 8 12/10/2022    EGFR 5 12/09/2022    CREATININE 4 40 (H) 12/11/2022    CREATININE 5 55 (H) 12/10/2022    CREATININE 7 77 (H) 12/09/2022     Now receiving HD  Baseline 2 8-3 3 from last lab work done in July 2022  Unclear if ERIK superimposed on CKD or a progression of CKD  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  No bruit auscultated on exam     CVVH discontinued due to rapid drop in BUN and concerns in osmotic shifts  Temporary dialysis catheter in place  Underwent a session of dialysis on 12/9/22 with a removal of 3 L of fluids  (patient was + 7L since admission)  ERIK continues to improve with dialysis       Plan-  Appreciated nephrology input     Continue scheduled HD  Avoid nephrotoxic agents and hypoperfusion  Monitor close I/O  Daily BMP

## 2022-12-11 NOTE — SPEECH THERAPY NOTE
Attempted to see pt for dysphagia tx  Pt is now intubated  ST will sign off case  Please send a new order when pt is extubated and appropriate

## 2022-12-11 NOTE — ASSESSMENT & PLAN NOTE
79 yo male with multiple comorbidities including ESRD on HD s/p AVG in 2019, now with nonfunctioning graft for which Vascular Surgery has been consulted  Patient with multiple episodes of hypotension and hypoxia overnight with A  fib RVR  Patient has had T-max of 102 7 within the last 24 hours  He is now requiring levo and sherry  He is being maintained on an amnio drip for his A  fib RVR  Continues to require CVVH currently being run even  Plan:  - Triple-lumen HD cath placed 11/12 by IR to allow for HD access  - Will need tunneled HD cath when no longer concern for sepsis  - Would benefit from fistulogram as well  -  HD duplex on 12/12 shows occlusion of the brachial axillary AVG  - Given unknown length of chronicity of AVG, he will need outpatient follow-up for discussion of new AV access following discharge when stable

## 2022-12-12 NOTE — ASSESSMENT & PLAN NOTE
Lab Results   Component Value Date    IRON 127 12/07/2022    FERRITIN 2,641 (H) 12/07/2022    TIBC 226 (L) 12/07/2022    CONCFE 56 (H) 12/07/2022     Recent Labs     12/10/22  0442 12/11/22  0423 12/12/22  0430   HGB 8 9* 8 4* 7 0*       S/p 1 U pRBC with adequate response in hemoglobin  Will continue to trend hemoglobin and transfuse if needed

## 2022-12-12 NOTE — ASSESSMENT & PLAN NOTE
Patient has left sided AV graft placed in 2019 but on presentation this admission physical exam would suggest it is no longer functional  Patient now will most likely need long term scheduled dialysis     Plan:   IR consulted - recommendations are appreciated  Vascular surgery consulted - recommendations are appreciated  Ultrasound today to evaluate AV graft patency

## 2022-12-12 NOTE — PROGRESS NOTES
[facility]  Progress Note - Yovanny Anderson  1935, 80 y o  male MRN: [id number]  Unit/Bed#: ICU 02 Encounter: [id number]  Primary Care Provider: Jenifer Barone, DO   Date and time admitted to hospital: 12/6/2022 12:01 PM    * AV graft malfunction [facility])  Assessment & Plan  80year old male former smoker w/HTN, HLD, type II DM, atrial fibrillation, CKD 4 s/p LUE brach-ax AVG 7/3/19 Rehana Hanley), presenting with altered mental status and now with ERIK requiring HD, noted to have AVG malfunction  Vascular surgery consulted for input  Diagnostics:  -HD duplex: pending    Recommendations:  - Acute hypoxic respiratory failure in the setting of fluid overload, ERIK/CKD, requiring HD, and sepsis, likely 2/2 pneumonia  Hx of LUE AVG placed in '19  - Unable to appreciate thrill or bruit on exam  Pulsatile doppler signal overlying where graft is  Unsure if this is the graft itself vs brachial artery  Clinically the graft appears to be occluded  - Recommend HD duplex for formal assessment  Given that fistula was placed in 2019, if this is occluded would have to consider it to be chronic and therefore would need new access  This would be done on an outpatient basis if indicated  - Agree with temporary HD access placement for HD as needed  - Formal recommendations to follow HD duplex    Subjective: Patient seen and examined  Intubated  Minimally responsive  Motor intact of LUE  Vitals:  BP (!) 112/45   Pulse (!) 126   Temp (!) 102 °F (38 9 °C)   Resp 22   Wt 66 kg (145 lb 8 1 oz)   SpO2 92%   BMI 26 61 kg/m²     I/Os:  I/O last 3 completed shifts: In: 471 6 [I V :230 6; NG/GT:80; Feedings:161]  Out: 479 [Urine:479]  No intake/output data recorded      Lab Results and Cultures:   CBC with diff: Lab Results   Component Value Date    WBC 9 39 12/12/2022    HGB 7 0 (L) 12/12/2022    HCT 21 5 (L) 12/12/2022    MCV 98 12/12/2022    PLT 60 (L) 12/12/2022    MCH 32 0 12/12/2022    MCHC 32 6 12/12/2022    RDW 13 5 12/12/2022    MPV 11 2 12/12/2022    NRBC 0 12/11/2022   ,   BMP/CMP:  Lab Results   Component Value Date    SODIUM 141 12/12/2022    K 3 6 12/12/2022    K 4 3 07/21/2016     12/12/2022     07/21/2016    CO2 28 12/12/2022    CO2 20 07/21/2016    ANIONGAP 5 07/07/2015    BUN 65 (H) 12/12/2022    BUN 17 07/21/2016    CREATININE 5 56 (H) 12/12/2022    CREATININE 1 02 07/21/2016    GLUCOSE 130 07/07/2015    CALCIUM 8 0 (L) 12/12/2022    CALCIUM 9 7 07/21/2016    AST 69 (H) 12/10/2022    AST 15 07/21/2016    ALT 40 12/10/2022    ALT 12 07/21/2016    ALKPHOS 64 12/10/2022    ALKPHOS 57 07/21/2016    PROT 7 3 07/21/2016    BILITOT 0 4 07/21/2016    EGFR 8 12/12/2022   ,   Lipid Panel:   Lab Results   Component Value Date    CHOL 151 07/21/2016   ,   Coags:   Lab Results   Component Value Date    PTT 31 12/07/2022    PTT 44 (H) 06/27/2015    INR 1 17 12/07/2022    INR 1 07 06/27/2015   ,     Blood Culture: No results found for: BLOODCX,   Urinalysis: Lab Results   Component Value Date    COLORU Yellow 12/06/2022    COLORU Yellow 06/27/2015    CLARITYU Clear 12/06/2022    CLARITYU Clear 06/27/2015    SPECGRAV 1 020 12/06/2022    SPECGRAV >=1 030 06/27/2015    PHUR 5 0 12/06/2022    PHUR 7 0 04/25/2020    PHUR 6 0 06/27/2015    LEUKOCYTESUR Negative 12/06/2022    LEUKOCYTESUR Negative 06/27/2015    NITRITE Negative 12/06/2022    NITRITE Negative 06/27/2015    PROTEINUA 30 (1+) (A) 06/27/2015    GLUCOSEU 100 (1/10%) (A) 12/06/2022    GLUCOSEU Negative 06/27/2015    KETONESU Negative 12/06/2022    KETONESU 15 (1+) (A) 06/27/2015    BILIRUBINUR Negative 12/06/2022    BILIRUBINUR Negative 06/27/2015    BLOODU Moderate (A) 12/06/2022    BLOODU Negative 06/27/2015   ,   Urine Culture:   Lab Results   Component Value Date    URINECX >100,000 cfu/ml Pseudomonas aeruginosa (A) 03/07/2019   ,   Wound Culure: No results found for: WOUNDCULT    Medications:  Current Facility-Administered Medications Medication Dose Route Frequency   • acetaminophen (TYLENOL) oral suspension 650 mg  650 mg Oral Q4H PRN   • cefepime (MAXIPIME) 2,000 mg in dextrose 5 % 50 mL IVPB  2,000 mg Intravenous Q12H   • digoxin (LANOXIN) injection 125 mcg  125 mcg Intravenous Every Other Day   • fentaNYL 1000 mcg in sodium chloride 0 9% 100mL infusion  50 mcg/hr Intravenous Continuous   • heparin (porcine) subcutaneous injection 5,000 Units  5,000 Units Subcutaneous Q8H [address]   • insulin lispro (HumaLOG) 100 units/mL subcutaneous injection 1-5 Units  1-5 Units Subcutaneous Q6H [address]   • ipratropium (ATROVENT) 0 02 % inhalation solution 0 5 mg  0 5 mg Nebulization Q6H   • levalbuterol (XOPENEX) inhalation solution 1 25 mg  1 25 mg Nebulization Q6H   • metoprolol (LOPRESSOR) injection 5 mg  5 mg Intravenous Q4H   • phenylephrine (NARCISO-SYNEPHRINE) 50 mg (STANDARD CONCENTRATION) in sodium chloride 0 9% 250 mL   mcg/min Intravenous Titrated   • vancomycin (VANCOCIN) 1,750 mg in sodium chloride 0 9 % 500 mL IVPB  25 mg/kg Intravenous Once       Imaging:  Reviewed    Physical Exam:    General: Intubated  In restraints   CV: Irregularly irregular   Respiratory: Intubated   Abdominal: Soft, non-distended   Extremities: Bilateral upper extremities are motor intact   No thrill or bruit over LUE graft, appears to clinically be occluded      Feliciano Benoit PA-C  12/12/2022

## 2022-12-12 NOTE — SEPSIS NOTE
Sepsis Note   David Lu  80 y o  male MRN: [id number]  Unit/Bed#: ICU 02 Encounter: [id number]       qSOFA     Row Name 12/12/22 15:52:27 12/12/22 15:44:33 12/12/22 15:38:46 12/12/22 15:38:41 12/12/22 1515    Altered mental status GCS < 15 -- -- -- -- --    Respiratory Rate > / =22 1 1 1 -- --    Systolic BP < / =663 0 0 -- 0 0    Q Sofa Score 2 2 2 1 1    Row Name 12/12/22 1500 12/12/22 1445 12/12/22 1430 12/12/22 1417 12/12/22 1400    Altered mental status GCS < 15 -- -- -- -- --    Respiratory Rate > / =22 0 0 0 0 0    Systolic BP < / =707 0 0 0 0 0    Q Sofa Score 1 1 1 1 1    Row Name 12/12/22 1345 12/12/22 1330 12/12/22 1320 12/12/22 1308 12/12/22 1230    Altered mental status GCS < 15 -- -- -- -- --    Respiratory Rate > / =22 0 0 0 0 1    Systolic BP < / =182 0 0 0 0 1    Q Sofa Score 1 1 1 1 3    Row Name 12/12/22 1215 12/12/22 1205 12/12/22 1200 12/12/22 1154 12/12/22 1145    Altered mental status GCS < 15 -- -- -- 1 --    Respiratory Rate > / =22 1 1 1 1 1    Systolic BP < / =002 1 1 1 1 1    Q Sofa Score 3 3 3 3 3    Row Name 12/12/22 1135 12/12/22 1130 12/12/22 1115 12/12/22 1100 12/12/22 1052    Altered mental status GCS < 15 -- -- -- -- --    Respiratory Rate > / =22 1 1 1 1 1    Systolic BP < / =838 1 0 0 1 1    Q Sofa Score 3 2 2 3 3    Row Name 12/12/22 1045 12/12/22 1000 12/12/22 0930 12/12/22 0918 12/12/22 0900    Altered mental status GCS < 15 -- -- -- -- 1    Respiratory Rate > / =22 1 0 1 1 --    Systolic BP < / =814 0 1 1 1 --    Q Sofa Score 2 2 3 2 2    Row Name 12/12/22 0600 12/12/22 0530 12/12/22 0500 12/12/22 0400 12/12/22 0300    Altered mental status GCS < 15 -- -- -- 1 --    Respiratory Rate > / =22 1 1 0 1 0    Systolic BP < / =645 0 0 1 0 0    Q Sofa Score 1 1 1 2 1    Row Name 12/12/22 0200 12/12/22 0100 12/12/22 0000 12/11/22 2300 12/11/22 2200    Altered mental status GCS < 15 -- -- 1 -- --    Respiratory Rate > / =22 0 0 1 0 0    Systolic BP < / =749 0 0 0 0 0    Q Sofa Score 1 0 2 1 1    Row Name 12/11/22 2100 12/11/22 2000 12/11/22 1900 12/11/22 1830 12/11/22 1800    Altered mental status GCS < 15 -- 1 -- -- --    Respiratory Rate > / =22 0 1 0 0 0    Systolic BP < / =770 1 1 0 1 0    Q Sofa Score 1 3 1 2 1    Row Name 12/11/22 1715 12/11/22 1630 12/11/22 1600 12/11/22 1551 12/11/22 1530    Altered mental status GCS < 15 -- -- -- 1 --    Respiratory Rate > / =22 0 0 0 -- 0    Systolic BP < / =585 1 0 1 -- 0    Q Sofa Score 2 1 2 1 1    Row Name 12/11/22 1500 12/11/22 1400 12/11/22 1345 12/11/22 1330 12/11/22 1300    Altered mental status GCS < 15 -- -- -- -- --    Respiratory Rate > / =22 0 0 0 0 0    Systolic BP < / =995 0 0 0 0 1    Q Sofa Score 1 1 1 1 2    Row Name 12/11/22 1245 12/11/22 1230 12/11/22 1222 12/11/22 1215 12/11/22 1145    Altered mental status GCS < 15 -- -- 1 -- --    Respiratory Rate > / =22 0 0 -- 1 0    Systolic BP < / =850 1 1 -- 0 0    Q Sofa Score 2 2 2 2 1    Row Name 12/11/22 1045 12/11/22 0945 12/11/22 0845 12/11/22 0800 12/11/22 0700    Altered mental status GCS < 15 -- -- 1 -- --    Respiratory Rate > / =22 0 0 1 0 1    Systolic BP < / =887 0 0 0 0 0    Q Sofa Score 1 1 2 1 2    Row Name 12/11/22 0600 12/11/22 0500 12/11/22 0400 12/11/22 0300 12/11/22 0200    Altered mental status GCS < 15 -- -- 1 -- --    Respiratory Rate > / =22 0 1 0 0 0    Systolic BP < / =189 0 0 0 0 0    Q Sofa Score 1 2 1 1 1    Row Name 12/11/22 0100 12/11/22 0000 12/10/22 2300 12/10/22 2200 12/10/22 2100    Altered mental status GCS < 15 -- 1 -- -- --    Respiratory Rate > / =22 0 0 1 0 0    Systolic BP < / =166 0 1 0 0 0    Q Sofa Score 1 2 2 1 1    Row Name 12/10/22 2000 12/10/22 1900 12/10/22 1800 12/10/22 1700 12/10/22 1640    Altered mental status GCS < 15 1 -- -- -- --    Respiratory Rate > / =22 0 0 1 1 0    Systolic BP < / =580 0 0 0 0 1    Q Sofa Score 1 1 2 1 1               Initial Sepsis Screening     Row Name 12/12/22 1632                Is the patient's history suggestive of a new or worsening infection? Yes (Proceed)  -CS        Suspected source of infection pneumonia  -CS        Are two or more of the following signs & symptoms of infection both present and new to the patient? Yes (Proceed)  -CS        Indicate SIRS criteria Tachycardia > 90 bpm;Tachypnea > 20 resp per min  -CS        If the answer is yes to both questions, suspicion of sepsis is present --        If severe sepsis is present AND tissue hypoperfusion perists in the hour after fluid resuscitation or lactate > 4, the patient meets criteria for SEPTIC SHOCK --        Are any of the following organ dysfunction criteria present within 6 hours of suspected infection and SIRS criteria that are NOT considered to be chronic conditions? --        Organ dysfunction SBP < 90 mmHg  -CS        Date of presentation of severe sepsis 12/12/22  -CS        Time of presentation of severe sepsis 1552  -CS        Tissue hypoperfusion persists in the hour after crystalloid fluid administration, evidenced, by either: --        Was hypotension present within one hour of the conclusion of crystalloid fluid administration? --        Date of presentation of septic shock --        Time of presentation of septic shock --              User Key  (r) = Recorded By, (t) = Taken By, (c) = Cosigned By    [address] Name Provider Type    CS Yuli Coleah, [facility]  Nurse Practitioner                Patient developed tachycardia and tachypnea with associated hypotension and had been started on IV pressors  He is noted to be 2 L positive  His fluid resuscitation for today was a combination of IV fluids of lactated Ringer's 1700 mL and 1 unit of packed red blood cells equaling 350 mL  The patient received a total of 2050 mL for fluid resuscitation

## 2022-12-12 NOTE — PLAN OF CARE
Problem: Potential for Falls  Goal: Patient will remain free of falls  Description: INTERVENTIONS:  - Educate patient/family on patient safety including physical limitations  - Instruct patient to call for assistance with activity   - Consult OT/PT to assist with strengthening/mobility   - Keep Call bell within reach  - Keep bed low and locked with side rails adjusted as appropriate  - Keep care items and personal belongings within reach  - Initiate and maintain comfort rounds  - Make Fall Risk Sign visible to staff  - Offer Toileting every 2 Hours, in advance of need  - Initiate/Maintain bed alarm  - Obtain necessary fall risk management equipment:   - Apply yellow socks and bracelet for high fall risk patients  - Consider moving patient to room near nurses station  12/11/2022 2244 by Daja Patton RN  Outcome: Progressing  12/11/2022 2244 by Daja Patton RN  Outcome: Progressing     Problem: MOBILITY - ADULT  Goal: Maintain or return to baseline ADL function  Description: INTERVENTIONS:  - Educate patient/family on patient safety including physical limitations  - Instruct patient to call for assistance with activity   - Consult OT/PT to assist with strengthening/mobility   - Keep Call bell within reach  - Keep bed low and locked with side rails adjusted as appropriate  - Keep care items and personal belongings within reach  - Initiate and maintain comfort rounds  - Make Fall Risk Sign visible to staff  - Offer Toileting every 2 Hours, in advance of need  - Initiate/Maintain bed alarm  - Obtain necessary fall risk management equipment:   - Consider moving patient to room near nurses station  12/11/2022 2244 by Daja Patton RN  Outcome: Progressing  12/11/2022 2244 by Daja Patton RN  Outcome: Progressing  Goal: Maintains/Returns to pre admission functional level  Description: INTERVENTIONS:  - Perform BMAT or MOVE assessment daily    - Set and communicate daily mobility goal to care team and patient/family/caregiver     - Collaborate with rehabilitation services on mobility goals if consulted  - Out of bed for toileting  - Record patient progress and toleration of activity level   12/11/2022 2244 by Kong Edmond RN  Outcome: Progressing  12/11/2022 2244 by Kong Edmond RN  Outcome: Progressing     Problem: Prexisting or High Potential for Compromised Skin Integrity  Goal: Skin integrity is maintained or improved  Description: INTERVENTIONS:  - Identify patients at risk for skin breakdown  - Assess and monitor skin integrity  - Assess and monitor nutrition and hydration status  - Monitor labs   - Assess for incontinence   - Turn and reposition patient  - Assist with mobility/ambulation  - Relieve pressure over bony prominences  - Avoid friction and shearing  - Provide appropriate hygiene as needed including keeping skin clean and dry  - Evaluate need for skin moisturizer/barrier cream  - Collaborate with interdisciplinary team   - Patient/family teaching  - Consider wound care consult   12/11/2022 2244 by Kong Edmond RN  Outcome: Progressing  12/11/2022 2244 by Kong Edmond RN  Outcome: Progressing     Problem: PAIN - ADULT  Goal: Verbalizes/displays adequate comfort level or baseline comfort level  Description: Interventions:  - Encourage patient to monitor pain and request assistance  - Assess pain using appropriate pain scale  - Administer analgesics based on type and severity of pain and evaluate response  - Implement non-pharmacological measures as appropriate and evaluate response  - Consider cultural and social influences on pain and pain management  - Notify physician/advanced practitioner if interventions unsuccessful or patient reports new pain  12/11/2022 2244 by Kogn Edmond RN  Outcome: Progressing  12/11/2022 2244 by Kong Edmond RN  Outcome: Progressing     Problem: INFECTION - ADULT  Goal: Absence or prevention of progression during hospitalization  Description: INTERVENTIONS:  - Assess and monitor for signs and symptoms of infection  - Monitor lab/diagnostic results  - Monitor all insertion sites, i e  indwelling lines, tubes, and drains  - Monitor endotracheal if appropriate and nasal secretions for changes in amount and color  - [place] appropriate cooling/warming therapies per order  - Administer medications as ordered  - Instruct and encourage patient and family to use good hand hygiene technique  - Identify and instruct in appropriate isolation precautions for identified infection/condition  12/11/2022 2244 by Tanya Davila RN  Outcome: Progressing  12/11/2022 2244 by Tanya Davila RN  Outcome: Progressing  Goal: Absence of fever/infection during neutropenic period  Description: INTERVENTIONS:  - Monitor WBC    12/11/2022 2244 by Tanya Davila RN  Outcome: Progressing  12/11/2022 2244 by Tanya Davila RN  Outcome: Progressing     Problem: SAFETY ADULT  Goal: Patient will remain free of falls  Description: INTERVENTIONS:  - Educate patient/family on patient safety including physical limitations  - Instruct patient to call for assistance with activity   - Consult OT/PT to assist with strengthening/mobility   - Keep Call bell within reach  - Keep bed low and locked with side rails adjusted as appropriate  - Keep care items and personal belongings within reach  - Initiate and maintain comfort rounds  - Make Fall Risk Sign visible to staff  - Offer Toileting every 2 Hours, in advance of need  - Initiate/Maintain bed alarm  - Obtain necessary fall risk management equipment:   - Apply yellow socks and bracelet for high fall risk patients  - Consider moving patient to room near nurses station  12/11/2022 2244 by Tanya Davila RN  Outcome: Progressing  12/11/2022 2244 by Tanya Davila RN  Outcome: Progressing  Goal: Maintain or return to baseline ADL function  Description: INTERVENTIONS:  - Educate patient/family on patient safety including physical limitations  - Instruct patient to call for assistance with activity   - Consult OT/PT to assist with strengthening/mobility   - Keep Call bell within reach  - Keep bed low and locked with side rails adjusted as appropriate  - Keep care items and personal belongings within reach  - Initiate and maintain comfort rounds  - Make Fall Risk Sign visible to staff  - Offer Toileting every 2 Hours, in advance of need  - Initiate/Maintain bed alarm  - Obtain necessary fall risk management equipment:   - Consider moving patient to room near nurses station  12/11/2022 2244 by Sukhjinder Yang RN  Outcome: Progressing  12/11/2022 2244 by Sukhjinder Yang RN  Outcome: Progressing  Goal: Maintains/Returns to pre admission functional level  Description: INTERVENTIONS:  - Perform BMAT or MOVE assessment daily    - Set and communicate daily mobility goal to care team and patient/family/caregiver     - Collaborate with rehabilitation services on mobility goals if consulted  - Out of bed for toileting  - Record patient progress and toleration of activity level   12/11/2022 2244 by Sukhjinder Yang RN  Outcome: Progressing  12/11/2022 2244 by Sukhjinder Yang RN  Outcome: Progressing     Problem: DISCHARGE PLANNING  Goal: Discharge to home or other facility with appropriate resources  Description: INTERVENTIONS:  - Identify barriers to discharge w/patient and caregiver  - Arrange for needed discharge resources and transportation as appropriate  - Identify discharge learning needs (meds, wound care, etc )  - Arrange for interpretive services to assist at discharge as needed  - Refer to Case Management Department for coordinating discharge planning if the patient needs post-hospital services based on physician/advanced practitioner order or complex needs related to functional status, cognitive ability, or social support system  12/11/2022 2244 by Sukhjinder Yang RN  Outcome: Progressing  12/11/2022 2244 by Sukhjinder Yang RN  Outcome: Progressing     Problem: Knowledge Deficit  Goal: Patient/family/caregiver demonstrates understanding of disease process, treatment plan, medications, and discharge instructions  Description: Complete learning assessment and assess knowledge base  Interventions:  - Provide teaching at level of understanding  - Provide teaching via preferred learning methods  12/11/2022 2244 by Marta Mcgowan RN  Outcome: Progressing  12/11/2022 2244 by Marta Mcgowan RN  Outcome: Progressing     Problem: SAFETY,RESTRAINT: NV/NON-SELF DESTRUCTIVE BEHAVIOR  Goal: Remains free of harm/injury (restraint for non violent/non self-detsructive behavior)  Description: INTERVENTIONS:  - Instruct patient/family regarding restraint use   - Assess and monitor physiologic and psychological status   - Provide interventions and comfort measures to meet assessed patient needs   - Identify and implement measures to help patient regain control  - Assess readiness for release of restraint   12/11/2022 2244 by Marta Mcgowan RN  Outcome: Progressing  12/11/2022 2244 by Marta Mcgowan RN  Outcome: Progressing  Goal: Returns to optimal restraint-free functioning  Description: INTERVENTIONS:  - Assess the patient's behavior and symptoms that indicate continued need for restraint  - Identify and implement measures to help patient regain control  - Assess readiness for release of restraint   12/11/2022 2244 by Marta Mcgowan RN  Outcome: Progressing  12/11/2022 2244 by Marta Mcgowan RN  Outcome: Progressing     Problem: Nutrition/Hydration-ADULT  Goal: Nutrient/Hydration intake appropriate for improving, restoring or maintaining nutritional needs  Description: Monitor and assess patient's nutrition/hydration status for malnutrition  Collaborate with interdisciplinary team and initiate plan and interventions as ordered  Monitor patient's weight and dietary intake as ordered or per policy   Utilize nutrition screening tool and intervene as necessary  Determine patient's food preferences and provide high-protein, high-caloric foods as appropriate       INTERVENTIONS:  - Monitor oral intake, urinary output, labs, and treatment plans  - Assess nutrition and hydration status and recommend course of action  - Evaluate amount of meals eaten  - Assist patient with eating if necessary   - Allow adequate time for meals  - Recommend/ encourage appropriate diets, oral nutritional supplements, and vitamin/mineral supplements  - Order, calculate, and assess calorie counts as needed  - Recommend, monitor, and adjust tube feedings and TPN/PPN based on assessed needs  - Assess need for intravenous fluids  - Provide specific nutrition/hydration education as appropriate  - Include patient/family/caregiver in decisions related to nutrition  12/11/2022 2244 by Kong Edmond RN  Outcome: Progressing  12/11/2022 2244 by Kong Edmond RN  Outcome: Progressing     Problem: METABOLIC, FLUID AND ELECTROLYTES - ADULT  Goal: Electrolytes maintained within normal limits  Description: INTERVENTIONS:  - Monitor labs and assess patient for signs and symptoms of electrolyte imbalances  - Administer electrolyte replacement as ordered  - Monitor response to electrolyte replacements, including repeat lab results as appropriate  - Instruct patient on fluid and nutrition as appropriate  12/11/2022 2244 by Kong Edmond RN  Outcome: Progressing  12/11/2022 2244 by Kong Edmond RN  Outcome: Progressing  Goal: Fluid balance maintained  Description: INTERVENTIONS:  - Monitor labs   - Monitor I/O and WT  - Instruct patient on fluid and nutrition as appropriate  - Assess for signs & symptoms of volume excess or deficit  12/11/2022 2244 by Kong Edmond RN  Outcome: Progressing  12/11/2022 2244 by Kong Edmond RN  Outcome: Progressing

## 2022-12-12 NOTE — ASSESSMENT & PLAN NOTE
· Respiratory failure in the setting of volume overload secondary to renal failure  · Patient has Hx of restrictive lung disease concern for pneumoconiosis vs sarcoidosis  Last PFT with a moderate restriction, TLC of 58% 3 44 L, FVC 1 87 L/63%   · Sees pulmonologist dr Jennifer Waite in the outpatient setting  · ABGs have been inconsistent  · A repeat cxr was done  Persistent left effusion and left base opacity which may be due to atelectasis and/or pneumonia   Edema is improving  · Intubated yesterday due to increased work of breathing and persistent hypoxia       Plan-  · xopenex and atrovent nebulizers q6h  · Continue mechanical ventilation  · Home O2 evaluation  · Will consider goals of care with the family  · Continue scheduled hemodialysis with goal of net negative/euvolemia

## 2022-12-12 NOTE — PROGRESS NOTES
Progress Note - Nephrology   Anastasiia Parks  80 y o  male MRN: [id number]  Unit/Bed#: ICU 02 Encounter: [id number]    ASSESSMENT and PLAN:  Acute kidney injury (POA):  Etiology: Severe prerenal azotemia versus CKD progression requiring CRRT now IHD  Assessment/plan:  • Of note patient had required HD in the past  • As left AV graft-nonfunctional  • Last IHD 12/10  • Admission creatinine 17 52  • Current creatinine 5 56  • Acid base and lytes stable   • Follows Dr Matt as outpatient for CVK IV  Workup:  • Urinalysis with micro reports:positive glucose, moderate blood, 1+ protein, 4-10 WBC  • CT  renal imaging reveals-unremarkable kidney  Plan:  • With increased temp, hypotension, chest x-ray revealed an change patchy hazy opacity in left lower lobe concerning for atelectasis or pneumonia-we will likely need to reinitiate CRRT  • Discussed with Dr Ja Burris and critical care team  • Patient with right IJ temp cath functioning with last hemodialysis  • Will need new temp cath prior to RRT    Access:  Assessment and plan;  · Left upper arm AV graft-on functional  Placed n 2019  · Right IJ temporary HD cath-or functioning with last HD on Saturday  · IR consulted for new temp cath placement    Prerenal azotemia-concern for uremia  · Improving with RRT  · BUN 65  · Admission     Blood pressure/Hypertension: Currently hypotension  Assessment and Plan:  • Current blood pressure: Hypotension 87/47  • Currently not on pressors  • Discussed with primary team  • Maximize hemodynamics to maintain MAP >65  • Avoid hypotension or fluctuations in blood pressure  • Will continue to trend    H&H/anemia: Multifactorial-CKD component  ? GI  Assessment and Plan:  • Current hemoglobin 7 0  • Status post transfusion  • Per primary team  • Transfuse if hemoglobin less than 7 0    Thrombocytopenia  Assessment and Plan:  • Critical care following managed AM note reviewed-doubt HIT  • Current platelets 60  • Patient previously on CRRT Electrolytes/acid-base  Assessment and Plan:  · Electrolytes and acid-base within normal limits  · Continue to monitor and treat as needed with HD  · Previously hyperkalemic which now has resolved  · Sinew to monitor    Atrial fibrillation with RVR-management per primary team    Metabolic encephalopathy-management per primary team    Acute hypoxic respiratory failure: Intubated-Per primary team    Review of systems  Patient seen and examined at bedside: Now with fevers, hypotension discussed with primary team  Review of Systems   Unable to perform ROS: Intubated          Physical exam:  Current Weight: Weight - Scale: 66 kg (145 lb 8 1 oz)  Vitals:    12/12/22 0600 12/12/22 0715 12/12/22 0717 12/12/22 0918   BP: (!) 106/41   (!) 91/49   Pulse: (!) 108   (!) 116   Resp: (!) 26   22   Temp:   (!) 102 1 °F (38 9 °C)    TempSrc:   Oral    SpO2: 94% 92%  91%   Weight: 66 kg (145 lb 8 1 oz)          Intake/Output Summary (Last 24 hours) at 12/12/2022 0940  Last data filed at 12/12/2022 0600  Gross per 24 hour   Intake 471 64 ml   Output 204 ml   Net 267 64 ml       Physical Exam  Vitals and nursing note reviewed  Constitutional:       Appearance: He is ill-appearing  HENT:      Head: Atraumatic  Nose: Nose normal       Mouth/Throat:      Mouth: Mucous membranes are dry  Pharynx: Oropharynx is clear  Eyes:      Extraocular Movements: Extraocular movements intact  Neck:      Comments: Right IJ HD temp cath-intact  Cardiovascular:      Rate and Rhythm: Regular rhythm  Tachycardia present  Pulses: Normal pulses  Heart sounds: Normal heart sounds  Pulmonary:      Breath sounds: Rhonchi present  Comments: Intubated and decreased bases  Abdominal:      General: Bowel sounds are normal       Palpations: Abdomen is soft  Genitourinary:     Comments: Singh catheter patent for small amount of justine urine  Musculoskeletal:         General: Normal range of motion        Cervical back: Normal range of motion and neck supple  Comments: Left upper AV graft-non- functional no bruit or thrill he noted   Skin:     General: Skin is warm and dry  Coloration: Skin is pale  Neurological:      General: No focal deficit present     Psychiatric:         Behavior: Behavior normal             Medications:    Current Facility-Administered Medications:   •  acetaminophen (TYLENOL) oral suspension 650 mg, 650 mg, Oral, Q4H PRN, Mireya Worthington, SUJATA, 650 mg at 12/11/22 1631  •  dexmedeTOMIDine (Precedex) 400 mcg in sodium chloride 0 9% 100 mL, 0 1-0 7 mcg/kg/hr, Intravenous, Titrated, Felix Duckworth MD, Last Rate: 11 5 mL/hr at 12/12/22 0140, 0 7 mcg/kg/hr at 12/12/22 0140  •  digoxin (LANOXIN) injection 125 mcg, 125 mcg, Intravenous, Every Other Day, Kirsten Dixon MD, 125 mcg at 12/11/22 0851  •  fentaNYL 1000 mcg in sodium chloride 0 9% 100mL infusion, 50 mcg/hr, Intravenous, Continuous, Felix Duckworth MD, Last Rate: 5 mL/hr at 12/12/22 0809, 50 mcg/hr at 12/12/22 0809  •  heparin (porcine) subcutaneous injection 5,000 Units, 5,000 Units, Subcutaneous, Q8H [address], Felix Duckworth MD, 5,000 Units at 12/12/22 0622  •  insulin lispro (HumaLOG) 100 units/mL subcutaneous injection 1-5 Units, 1-5 Units, Subcutaneous, Q6H [address], Felix Duckworth MD, 2 Units at 12/12/22 0037  •  ipratropium (ATROVENT) 0 02 % inhalation solution 0 5 mg, 0 5 mg, Nebulization, Q6H, Angelita Osorio MD, 0 5 mg at 12/12/22 3939  •  levalbuterol (XOPENEX) inhalation solution 1 25 mg, 1 25 mg, Nebulization, Q6H, Angelita Osorio MD, 1 25 mg at 12/12/22 6097  •  metoprolol (LOPRESSOR) injection 5 mg, 5 mg, Intravenous, Q4H, Kirsten Dixon MD, 5 mg at 12/12/22 0141  •  midodrine (PROAMATINE) tablet 10 mg, 10 mg, Oral, TID AC, Felix Duckworth MD, 10 mg at 12/12/22 0809    Laboratory Results:  Results from last 7 days   Lab Units 12/12/22  0430 12/11/22  0423 12/10/22  0442 12/09/22  0443 12/08/22 2003 12/08/22  1447 12/08/22  1101 12/08/22  0525 12/07/22  2316 12/07/22  1455 12/07/22  0807   WBC Thousand/uL 9 39 9 66 13 12* 14 76*  --  12 86*  --  14 40*  --   --  13 21*   HEMOGLOBIN g/dL 7 0* 8 4* 8 9* 9 7*  --  9 3*  --  7 0*  --   --  8 1*   HEMATOCRIT % 21 5* 26 1* 26 9* 28 2*  --  26 5*  --  20 1*  --   --  24 0*   PLATELETS Thousands/uL 60* 71* 76* 91*  --  87*  --  106*  --   --  131*   SODIUM mmol/L 141 141 142 139 139  --  139 140 139 139 138   POTASSIUM mmol/L 3 6 4 1 4 4 4 9 4 9  --  4 6 3 9 4 1 4 6 4 5   CHLORIDE mmol/L 102 101 101 99 99  --  99 98 96 96 97   CO2 mmol/L 28 26 24 23 23  --  28 27 26 20* 18*   BUN mg/dL 65* 45* 65* 106* 101*  --  102* 124* 147* 250* 306*   CREATININE mg/dL 5 56* 4 40* 5 55* 7 77* 7 49*  --  7 54* 9 10* 10 91* 15 85* 16 29*   CALCIUM mg/dL 8 0* 8 1* 7 8* 8 2* 8 3  --  7 8* 7 3* 7 3* 8 0* 8 2*   MAGNESIUM mg/dL  --  2 0 2 1 2 2  --   --  1 9 2 1 1 9 2 2 2 4   PHOSPHORUS mg/dL  --  4 8* 5 4* 6 6*  --   --  4 4* 4 8* 5 7* 8 7* 8 7*

## 2022-12-12 NOTE — ASSESSMENT & PLAN NOTE
80year old male former smoker w/HTN, HLD, type II DM, atrial fibrillation, CKD 4 s/p LUE brach-ax AVG 7/3/19 Jad Yeh), presenting with altered mental status and now with ERIK requiring HD, noted to have AVG malfunction  Vascular surgery consulted for input  Diagnostics:  -HD duplex: pending    Recommendations:  - Acute hypoxic respiratory failure in the setting of fluid overload, ERIK/CKD, requiring HD, and sepsis, likely 2/2 pneumonia  Hx of LUE AVG placed in '19  - Unable to appreciate thrill or bruit on exam  Pulsatile doppler signal overlying where graft is  Unsure if this is the graft itself vs brachial artery  Clinically the graft appears to be occluded  - Recommend HD duplex for formal assessment  Given that fistula was placed in 2019, if this is occluded would have to consider it to be chronic and therefore would need new access  This would be done on an outpatient basis if indicated     - Agree with temporary HD access placement for HD as needed  - Formal recommendations to follow HD duplex

## 2022-12-12 NOTE — ASSESSMENT & PLAN NOTE
Lab Results   Component Value Date    HGBA1C 6 7 (A) 09/07/2022       Recent Labs     12/11/22  1215 12/11/22  1829 12/12/22  0031 12/12/22  0623   POCGLU 185* 228* 211* 106       Blood Sugar Average: Last 72 hrs:  (P) 154 [id number]   Home medications include Tradjenta     Plan-  SSI  Hypoglycemia protocol  accucheck q6h  OG tube feeds at goal

## 2022-12-12 NOTE — ASSESSMENT & PLAN NOTE
Platelets have consistently trended down throughout admission  Patient has been on subq heparin for dvt prophylaxis - possible HIT component    Plan:  HIT antibody ordered - will change anticoagulation if positive  Transfuse platelets if <40K  Trend CBC

## 2022-12-12 NOTE — ASSESSMENT & PLAN NOTE
Lab Results   Component Value Date    EGFR 8 12/12/2022    EGFR 11 12/11/2022    EGFR 8 12/10/2022    CREATININE 5 56 (H) 12/12/2022    CREATININE 4 40 (H) 12/11/2022    CREATININE 5 55 (H) 12/10/2022     See plan for ERIK on CKD

## 2022-12-12 NOTE — PROCEDURES
Central Line    Date/Time: 12/12/2022 3:55 PM  Performed by: Luis Enrique Up MD  Authorized by: Luis Enrique Up MD     Patient location:  IR  Consent:     Consent given by:  Spouse  Universal protocol:     Procedure explained and questions answered to patient or proxy's satisfaction: yes      Required blood products, implants, devices, and special equipment available: yes      Immediately prior to procedure, a time out was called: yes      Patient identity confirmed:  Verbally with patient, arm band and provided demographic data  Pre-procedure details:     Hand hygiene: Hand hygiene performed prior to insertion      Sterile barrier technique: All elements of maximal sterile technique followed      Skin preparation:  ChloraPrep    Skin preparation agent: Skin preparation agent completely dried prior to procedure    Indications:     Central line indications: dialysis    Anesthesia (see MAR for exact dosages): Anesthesia method:  Local infiltration    Local anesthetic:  Lidocaine 1% w/o epi  Procedure details:     Location:  Right internal jugular    Approach: percutaneous technique used      Catheter type:  Triple lumen 20cm    Catheter size:  12 Fr    Successful placement: yes      Vessel of catheter tip end:  RA  Post-procedure details:     Post-procedure:  Line sutured and dressing applied    Assessment:  Blood return through all ports and placement verified by x-ray    Post-procedure complications: none      Patient tolerance of procedure:   Tolerated well, no immediate complications

## 2022-12-12 NOTE — PROGRESS NOTES
Ronald Mackay  Progress Note - Lashonda Masterson  1935, 80 y o  male MRN: [id number]  Unit/Bed#: ICU 02 Encounter: [id number]  Primary Care Provider: Sylvain Jones DO   Date and time admitted to hospital: 12/6/2022 12:01 PM    Acute kidney injury superimposed on chronic kidney disease [facility])  Assessment & Plan  Lab Results   Component Value Date    EGFR 8 12/12/2022    EGFR 11 12/11/2022    EGFR 8 12/10/2022    CREATININE 5 56 (H) 12/12/2022    CREATININE 4 40 (H) 12/11/2022    CREATININE 5 55 (H) 12/10/2022     Now receiving HD  Baseline 2 8-3 3 from last lab work done in July 2022  Unclear if ERIK superimposed on CKD or a progression of CKD  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  No bruit auscultated on exam     CVVH discontinued due to rapid drop in BUN and concerns in osmotic shifts  Temporary dialysis catheter in place  Underwent a session of dialysis on 12/9/22 with a removal of 3 L of fluids  Catheter reportedly giving problems for nurses and dialysis technicians  Will need permacath placed  Creatinine worsened overnight but anticipate it to improve once again with further dialysis sessions      Plan-  Appreciated nephrology input     Continue scheduled HD  permacath placement planned for today   Avoid nephrotoxic agents and hypoperfusion  Monitor close I/O  Daily BMP      CKD (chronic kidney disease) stage 5, GFR less than 15 ml/min [facility])  Assessment & Plan  Lab Results   Component Value Date    EGFR 8 12/12/2022    EGFR 11 12/11/2022    EGFR 8 12/10/2022    CREATININE 5 56 (H) 12/12/2022    CREATININE 4 40 (H) 12/11/2022    CREATININE 5 55 (H) 12/10/2022     See plan for ERIK on CKD        AMS (altered mental status)  Assessment & Plan  Home health aid visits throughout the week   Noticed pt was increasingly lethargic  Status, confusion, decreased p o  intake over the past 3 days  Wife is hospitalized, per primary caregiver, he has been home alone past 3 days CT Head- Neg   BUN- 229 and trending down  UA- Neg   Originally thought to be secondary to azotemia however patient remains altered despite marked reduction in BUN  Now most likely has a component of ICU delirium  Patient only intermittently understands commands and makes sense while speaking  Speech remains rather incomprehensible  Per patients family, he is still far off of his baseline  Plan-   Zyprexa qhs   Fentanyl prn for agitation  Reorient frequently   Maintain steady sleep wake cycle          Acute respiratory failure with hypoxia (HCC)  Assessment & Plan  · Respiratory failure in the setting of volume overload secondary to renal failure  · Patient has Hx of restrictive lung disease concern for pneumoconiosis vs sarcoidosis  Last PFT with a moderate restriction, TLC of 58% 3 44 L, FVC 1 87 L/63%   · Sees pulmonologist dr Wm Huggins in the outpatient setting  · ABGs have been inconsistent  · A repeat cxr was done  Persistent left effusion and left base opacity which may be due to atelectasis and/or pneumonia  Edema is improving  · Intubated yesterday due to increased work of breathing and persistent hypoxia       Plan-  · xopenex and atrovent nebulizers q6h  · Continue mechanical ventilation  · Home O2 evaluation  · Will consider goals of care with the family  · Continue scheduled hemodialysis with goal of net negative/euvolemia       Paroxysmal atrial fibrillation [facility])  Assessment & Plan  Per chart review patient has hx of PAF  Rate controlled with metoprolol at home  CHADVASC score 4  High bleeding risk per HASBLED score  Initially on  IV metoprolol 5 mg q 6 with temporary control in HR  Was loaded on 12/9/22 with digoxin 500 mcg  Received another 2x 250 mcg  Now on maintenance dose of 125 mcg daily  Heart rate ranges between 100 and 120    Digoxin level resulted 2 3    Plan:  · Continuous cardiac monitoring  · If no response after management with digoxin will consider involving cardiology    · Continue digoxin and metoprolol     Thrombocytopenia (HCC)  Assessment & Plan  Platelets have consistently trended down throughout admission  Patient has been on subq heparin for dvt prophylaxis - possible HIT component    Plan:  HIT antibody ordered - will change anticoagulation if positive  Transfuse platelets if <40P  Trend CBC    DM type 2 (diabetes mellitus, type 2) [facility])  Assessment & Plan  Lab Results   Component Value Date    HGBA1C 6 7 (A) 09/07/2022       Recent Labs     12/11/22  1215 12/11/22  1829 12/12/22  0031 12/12/22  0623   POCGLU 185* 228* 211* 106       Blood Sugar Average: Last 72 hrs:  (P) 154 [id number]   Home medications include Tradjenta     Plan-  SSI  Hypoglycemia protocol  accucheck q6h  OG tube feeds at goal       Anemia due to chronic kidney disease  Assessment & Plan  Lab Results   Component Value Date    IRON 127 12/07/2022    FERRITIN 2,641 (H) 12/07/2022    TIBC 226 (L) 12/07/2022    CONCFE 56 (H) 12/07/2022     Recent Labs     12/10/22  0442 12/11/22  0423 12/12/22  0430   HGB 8 9* 8 4* 7 0*       S/p 1 U pRBC with adequate response in hemoglobin  Will continue to trend hemoglobin and transfuse if needed    * AV graft malfunction [facility])  Assessment & Plan  Patient has left sided AV graft placed in 2019 but on presentation this admission physical exam would suggest it is no longer functional  Patient now will most likely need long term scheduled dialysis     Plan:   IR consulted - recommendations are appreciated  Vascular surgery consulted - recommendations are appreciated  Ultrasound today to evaluate AV graft patency       ----------------------------------------------------------------------------------------  HPI/24hr events: no overnight events reported    Patient appropriate for transfer out of the ICU today?: No  Disposition: Continue Critical Care   Code Status: Level 1 - Full Code  ---------------------------------------------------------------------------------------  SUBJECTIVE  No subjective data collected  Patient sedated and intubated  ---------------------------------------------------------------------------------------  OBJECTIVE    Vitals   Vitals:    22 0530 22 0600 22 0715 22 0717   BP: (!) 125/44 (!) 106/41     Pulse: 96 (!) 108     Resp: (!) 23 (!) 26     Temp:    (!) 102 1 °F (38 9 °C)   TempSrc:    Oral   SpO2: 94% 94% 92%    Weight:  66 kg (145 lb 8 1 oz)       Temp (24hrs), Av 1 °F (37 8 °C), Min:98 5 °F (36 9 °C), Max:102 1 °F (38 9 °C)  Current: Temperature: (!) 102 1 °F (38 9 °C)          Respiratory:  SpO2: SpO2: 92 %       Invasive/non-invasive ventilation settings   Respiratory    Lab Data (Last 4 hours)    None         O2/Vent Data (Last 4 hours)       0715          Patient safety screen outcome: Passed                   Physical Exam  Vitals and nursing note reviewed  Constitutional:       General: He is not in acute distress  Appearance: He is well-developed  He is ill-appearing  HENT:      Head: Normocephalic and atraumatic  Eyes:      General: No scleral icterus  Extraocular Movements: Extraocular movements intact  Conjunctiva/sclera: Conjunctivae normal       Pupils: Pupils are equal, round, and reactive to light  Cardiovascular:      Rate and Rhythm: Tachycardia present  Rhythm irregular  Heart sounds: No murmur heard  Comments: Heart sounds distant  Pulmonary:      Effort: Pulmonary effort is normal  No respiratory distress  Breath sounds: Wheezing and rhonchi present  Comments: Inspiratory wheezing  Course rhonchi hear diffusely and bilaterally  Abdominal:      General: Bowel sounds are normal  There is no distension  Palpations: Abdomen is soft  Tenderness: There is no abdominal tenderness  Musculoskeletal:         General: No swelling        Cervical back: Neck supple  Right lower leg: No edema  Left lower leg: No edema  Skin:     General: Skin is warm and dry  Capillary Refill: Capillary refill takes less than 2 seconds  Findings: Bruising (upper extremities) present  Neurological:      General: No focal deficit present  Mental Status: He is alert and oriented to person, place, and time     Psychiatric:         Mood and Affect: Mood normal       Comments: Patient oriented to time place and self however becomes intermittently confused upon further questioning             Laboratory and Diagnostics:  Results from last 7 days   Lab Units 12/12/22  0430 12/11/22  0423 12/10/22  0442 12/09/22  0443 12/08/22  1447 12/08/22  0525 12/07/22  0807 12/07/22 0228 12/06/22  1233   WBC Thousand/uL 9 39 9 66 13 12* 14 76* 12 86* 14 40* 13 21* 12 38* 13 79*   HEMOGLOBIN g/dL 7 0* 8 4* 8 9* 9 7* 9 3* 7 0* 8 1* 7 8* 10 0*   HEMATOCRIT % 21 5* 26 1* 26 9* 28 2* 26 5* 20 1* 24 0* 22 8* 30 3*   PLATELETS Thousands/uL 60* 71* 76* 91* 87* 106* 131* 140* 192   NEUTROS PCT %  --  89* 90*  --   --  85*  --  92* 87*   BANDS PCT % 1  --   --   --   --   --   --   --   --    MONOS PCT %  --  8 6  --   --  10  --  4 8   MONO PCT % 2*  --   --   --   --   --   --   --   --      Results from last 7 days   Lab Units 12/12/22  0430 12/11/22  0423 12/10/22  0442 12/09/22  0443 12/08/22  2003 12/08/22  1101 12/08/22  0525 12/06/22  1603 12/06/22  1233   SODIUM mmol/L 141 141 142 139 139 139 140   < > 136   POTASSIUM mmol/L 3 6 4 1 4 4 4 9 4 9 4 6 3 9   < > 7 0*   CHLORIDE mmol/L 102 101 101 99 99 99 98   < > 98   CO2 mmol/L 28 26 24 23 23 28 27   < > 10*   ANION GAP mmol/L 11 14* 17* 17* 17* 12 15*   < > 28*   BUN mg/dL 65* 45* 65* 106* 101* 102* 124*   < > 216*   CREATININE mg/dL 5 56* 4 40* 5 55* 7 77* 7 49* 7 54* 9 10*   < > 17 52*   CALCIUM mg/dL 8 0* 8 1* 7 8* 8 2* 8 3 7 8* 7 3*   < > 8 8   GLUCOSE RANDOM mg/dL 119 135 118 172* 173* 137 141*   < > 186*   ALT U/L  --   -- 40  --   --   --   --   --  30   AST U/L  --   --  69*  --   --   --   --   --  19   ALK PHOS U/L  --   --  64  --   --   --   --   --  73   ALBUMIN g/dL  --   --  2 2*  --   --   --   --   --  3 5   TOTAL BILIRUBIN mg/dL  --   --  0 70  --   --   --   --   --  0 36    < > = values in this interval not displayed  Results from last 7 days   Lab Units 12/11/22  0423 12/10/22  0442 12/09/22  0443 12/08/22  1101 12/08/22  0525 12/07/22  2316 12/07/22  1455   MAGNESIUM mg/dL 2 0 2 1 2 2 1 9 2 1 1 9 2 2   PHOSPHORUS mg/dL 4 8* 5 4* 6 6* 4 4* 4 8* 5 7* 8 7*      Results from last 7 days   Lab Units 12/07/22  1213   INR  1 17   PTT seconds 31          Results from last 7 days   Lab Units 12/06/22  1233   LACTIC ACID mmol/L <0 3*     ABG:  Results from last 7 days   Lab Units 12/11/22  1334   PH ART  7 397   PCO2 ART mm Hg 39 2   PO2 ART mm Hg 160 7*   HCO3 ART mmol/L 23 6   BASE EXC ART mmol/L -1 1   ABG SOURCE  Radial, Right     VBG:  Results from last 7 days   Lab Units 12/11/22  1334 12/11/22  0836   PH JACINTA   --  7 374   PCO2 JACINTA mm Hg  --  44 6   PO2 JACINTA mm Hg  --  32 5*   HCO3 JACINTA mmol/L  --  25 4   BASE EXC JACINTA mmol/L  --  0 1   ABG SOURCE  Radial, Right  --            Micro  Results from last 7 days   Lab Units 12/08/22  1104   MRSA CULTURE ONLY  No Methicillin Resistant Staphlyococcus aureus (MRSA) isolated       EKG: Reviewed  Imaging: I have personally reviewed pertinent reports  Intake and Output  I/O       12/10 0701  12/11 0700 12/11 0701  12/12 0700 12/12 0701  12/13 0700    I V  (mL/kg) 500 (7 6) 230 6 (3 5)     Other 100      NG/GT  80     Feedings  161     Total Intake(mL/kg) 600 (9 1) 471 6 (7 1)     Urine (mL/kg/hr) 475 (0 3) 284 (0 2)     Other 2473      Stool  0     Total Output 2948 284     Net -2348 +187 6            Unmeasured Stool Occurrence  1 x           Height and Weights         Body mass index is 26 61 kg/m²    Weight (last 2 days)     Date/Time Weight    12/12/22 0600 66 (145 5) 12/11/22 0600 65 7 (144 84)    12/10/22 0536 65 8 (145 06)            Nutrition       Diet Orders   (From admission, onward)             Start     Ordered    12/12/22 0748  Diet Enteral/Parenteral; Tube Feeding No Oral Diet; Nepro; Continuous; 25  Diet effective midnight        References:    Nutrtion Support Algorithm Enteral vs  Parenteral   Question Answer Comment   Diet Type Enteral/Parenteral    Enteral/Parenteral Tube Feeding No Oral Diet    Tube Feeding Formula: Nepro    Bolus/Cyclic/Continuous Continuous    Tube Feeding Goal Rate (mL/hr): 25    RD to adjust diet per protocol?  Yes        12/12/22 0748                  Active Medications  Scheduled Meds:  Current Facility-Administered Medications   Medication Dose Route Frequency Provider Last Rate   • acetaminophen  650 mg Oral Q4H PRN SUJATA Segovia     • dexmedetomidine  0 1-0 7 mcg/kg/hr Intravenous Titrated Sabino Hayes MD 0 7 mcg/kg/hr (12/12/22 0140)   • digoxin  125 mcg Intravenous Every Other Day Angelika Mims MD     • fentaNYL  50 mcg/hr Intravenous Continuous Sabino Hayes MD 50 mcg/hr (12/12/22 0809)   • heparin (porcine)  5,000 Units Subcutaneous [facility] aSbino Hayes MD     • insulin lispro  1-5 Units Subcutaneous Q6H [address] Sabino Hayes MD     • ipratropium  0 5 mg Nebulization Q6H Anastasiia Huggins MD     • levalbuterol  1 25 mg Nebulization Q6H Anastasiia Huggins MD     • metoprolol  5 mg Intravenous Q4H Angelika Mims MD     • midodrine  10 mg Oral TID AC Sabino Hayes MD       Continuous Infusions:  dexmedetomidine, 0 1-0 7 mcg/kg/hr, Last Rate: 0 7 mcg/kg/hr (12/12/22 0140)  fentaNYL, 50 mcg/hr, Last Rate: 50 mcg/hr (12/12/22 0809)      PRN Meds:   acetaminophen, 650 mg, Q4H PRN        Invasive Devices Review  Invasive Devices     Peripheral Intravenous Line  Duration           Peripheral IV 12/11/22 Right;Upper;Ventral (anterior) Arm <1 day          Line  Duration           Hemodialysis AV Fistula 07/03/19 Left Upper arm 1257 days          Hemodialysis Catheter  Duration           HD Temporary Double Catheter 4 days          Drain  Duration           Urethral Catheter 16 Fr  5 days    NG/OG/Enteral Tube Orogastric Center mouth <1 day          Airway  Duration           ETT  Cuffed 7 5 mm <1 day                Rationale for remaining devices: acute respiratory and renal failure  ---------------------------------------------------------------------------------------  Advance Directive and Living Will:      Power of : Yes  POLST:    ---------------------------------------------------------------------------------------  Care Time Delivered:   No Critical Care time spent       Zach Tello MD      Portions of the record may have been created with voice recognition software  Occasional wrong word or "sound a like" substitutions may have occurred due to the inherent limitations of voice recognition software    Read the chart carefully and recognize, using context, where substitutions have occurred

## 2022-12-12 NOTE — ASSESSMENT & PLAN NOTE
Per chart review patient has hx of PAF  Rate controlled with metoprolol at home  CHADVASC score 4  High bleeding risk per HASBLED score  Initially on  IV metoprolol 5 mg q 6 with temporary control in HR  Was loaded on 12/9/22 with digoxin 500 mcg  Received another 2x 250 mcg  Now on maintenance dose of 125 mcg daily  Heart rate ranges between 100 and 120  Digoxin level resulted 2 3    Plan:  · Continuous cardiac monitoring  · If no response after management with digoxin will consider involving cardiology    · Continue digoxin and metoprolol

## 2022-12-12 NOTE — ASSESSMENT & PLAN NOTE
Lab Results   Component Value Date    EGFR 8 12/12/2022    EGFR 11 12/11/2022    EGFR 8 12/10/2022    CREATININE 5 56 (H) 12/12/2022    CREATININE 4 40 (H) 12/11/2022    CREATININE 5 55 (H) 12/10/2022     Now receiving HD  Baseline 2 8-3 3 from last lab work done in July 2022  Unclear if ERIK superimposed on CKD or a progression of CKD  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  No bruit auscultated on exam     CVVH discontinued due to rapid drop in BUN and concerns in osmotic shifts  Temporary dialysis catheter in place  Underwent a session of dialysis on 12/9/22 with a removal of 3 L of fluids  Catheter reportedly giving problems for nurses and dialysis technicians  Will need permacath placed  Creatinine worsened overnight but anticipate it to improve once again with further dialysis sessions      Plan-  Appreciated nephrology input     Continue scheduled HD  permacath placement planned for today   Avoid nephrotoxic agents and hypoperfusion  Monitor close I/O  Daily BMP

## 2022-12-12 NOTE — PLAN OF CARE
Problem: Potential for Falls  Goal: Patient will remain free of falls  Description: INTERVENTIONS:  - Educate patient/family on patient safety including physical limitations  - Instruct patient to call for assistance with activity   - Consult OT/PT to assist with strengthening/mobility   - Keep Call bell within reach  - Keep bed low and locked with side rails adjusted as appropriate  - Keep care items and personal belongings within reach  - Initiate and maintain comfort rounds  - Make Fall Risk Sign visible to staff  - Offer Toileting every 2 Hours, in advance of need  - Initiate/Maintain bed alarm  - Obtain necessary fall risk management equipment:   - Apply yellow socks and bracelet for high fall risk patients  - Consider moving patient to room near nurses station  Outcome: Progressing     Problem: Prexisting or High Potential for Compromised Skin Integrity  Goal: Skin integrity is maintained or improved  Description: INTERVENTIONS:  - Identify patients at risk for skin breakdown  - Assess and monitor skin integrity  - Assess and monitor nutrition and hydration status  - Monitor labs   - Assess for incontinence   - Turn and reposition patient  - Assist with mobility/ambulation  - Relieve pressure over bony prominences  - Avoid friction and shearing  - Provide appropriate hygiene as needed including keeping skin clean and dry  - Evaluate need for skin moisturizer/barrier cream  - Collaborate with interdisciplinary team   - Patient/family teaching  - Consider wound care consult   Outcome: Progressing     Problem: PAIN - ADULT  Goal: Verbalizes/displays adequate comfort level or baseline comfort level  Description: Interventions:  - Encourage patient to monitor pain and request assistance  - Assess pain using appropriate pain scale  - Administer analgesics based on type and severity of pain and evaluate response  - Implement non-pharmacological measures as appropriate and evaluate response  - Consider cultural and social influences on pain and pain management  - Notify physician/advanced practitioner if interventions unsuccessful or patient reports new pain  Outcome: Progressing     Problem: INFECTION - ADULT  Goal: Absence or prevention of progression during hospitalization  Description: INTERVENTIONS:  - Assess and monitor for signs and symptoms of infection  - Monitor lab/diagnostic results  - Monitor all insertion sites, i e  indwelling lines, tubes, and drains  - Monitor endotracheal if appropriate and nasal secretions for changes in amount and color  - [place] appropriate cooling/warming therapies per order  - Administer medications as ordered  - Instruct and encourage patient and family to use good hand hygiene technique  - Identify and instruct in appropriate isolation precautions for identified infection/condition  Outcome: Progressing  Goal: Absence of fever/infection during neutropenic period  Description: INTERVENTIONS:  - Monitor WBC    Outcome: Progressing     Problem: SAFETY ADULT  Goal: Patient will remain free of falls  Description: INTERVENTIONS:  - Educate patient/family on patient safety including physical limitations  - Instruct patient to call for assistance with activity   - Consult OT/PT to assist with strengthening/mobility   - Keep Call bell within reach  - Keep bed low and locked with side rails adjusted as appropriate  - Keep care items and personal belongings within reach  - Initiate and maintain comfort rounds  - Make Fall Risk Sign visible to staff  - Offer Toileting every 2 Hours, in advance of need  - Initiate/Maintain bed alarm  - Obtain necessary fall risk management equipment:   - Apply yellow socks and bracelet for high fall risk patients  - Consider moving patient to room near nurses station  Outcome: Progressing     Problem: DISCHARGE PLANNING  Goal: Discharge to home or other facility with appropriate resources  Description: INTERVENTIONS:  - Identify barriers to discharge w/patient and caregiver  - Arrange for needed discharge resources and transportation as appropriate  - Identify discharge learning needs (meds, wound care, etc )  - Arrange for interpretive services to assist at discharge as needed  - Refer to Case Management Department for coordinating discharge planning if the patient needs post-hospital services based on physician/advanced practitioner order or complex needs related to functional status, cognitive ability, or social support system  Outcome: Progressing     Problem: Knowledge Deficit  Goal: Patient/family/caregiver demonstrates understanding of disease process, treatment plan, medications, and discharge instructions  Description: Complete learning assessment and assess knowledge base    Interventions:  - Provide teaching at level of understanding  - Provide teaching via preferred learning methods  Outcome: Progressing     Problem: SAFETY,RESTRAINT: NV/NON-SELF DESTRUCTIVE BEHAVIOR  Goal: Remains free of harm/injury (restraint for non violent/non self-detsructive behavior)  Description: INTERVENTIONS:  - Instruct patient/family regarding restraint use   - Assess and monitor physiologic and psychological status   - Provide interventions and comfort measures to meet assessed patient needs   - Identify and implement measures to help patient regain control  - Assess readiness for release of restraint   Outcome: Progressing  Goal: Returns to optimal restraint-free functioning  Description: INTERVENTIONS:  - Assess the patient's behavior and symptoms that indicate continued need for restraint  - Identify and implement measures to help patient regain control  - Assess readiness for release of restraint   Outcome: Progressing     Problem: METABOLIC, FLUID AND ELECTROLYTES - ADULT  Goal: Electrolytes maintained within normal limits  Description: INTERVENTIONS:  - Monitor labs and assess patient for signs and symptoms of electrolyte imbalances  - Administer electrolyte replacement as ordered  - Monitor response to electrolyte replacements, including repeat lab results as appropriate  - Instruct patient on fluid and nutrition as appropriate  Outcome: Progressing  Goal: Fluid balance maintained  Description: INTERVENTIONS:  - Monitor labs   - Monitor I/O and WT  - Instruct patient on fluid and nutrition as appropriate  - Assess for signs & symptoms of volume excess or deficit  Outcome: Progressing     Problem: MOBILITY - ADULT  Goal: Maintain or return to baseline ADL function  Description: INTERVENTIONS:  - Educate patient/family on patient safety including physical limitations  - Instruct patient to call for assistance with activity   - Consult OT/PT to assist with strengthening/mobility   - Keep Call bell within reach  - Keep bed low and locked with side rails adjusted as appropriate  - Keep care items and personal belongings within reach  - Initiate and maintain comfort rounds  - Make Fall Risk Sign visible to staff  - Offer Toileting every 2 Hours, in advance of need  - Initiate/Maintain bed alarm  - Obtain necessary fall risk management equipment:   - Consider moving patient to room near nurses station  Outcome: Not Progressing  Goal: Maintains/Returns to pre admission functional level  Description: INTERVENTIONS:  - Perform BMAT or MOVE assessment daily    - Set and communicate daily mobility goal to care team and patient/family/caregiver     - Collaborate with rehabilitation services on mobility goals if consulted  - Out of bed for toileting  - Record patient progress and toleration of activity level   Outcome: Not Progressing     Problem: SAFETY ADULT  Goal: Maintain or return to baseline ADL function  Description: INTERVENTIONS:  - Educate patient/family on patient safety including physical limitations  - Instruct patient to call for assistance with activity   - Consult OT/PT to assist with strengthening/mobility   - Keep Call bell within reach  - Keep bed low and locked with side rails adjusted as appropriate  - Keep care items and personal belongings within reach  - Initiate and maintain comfort rounds  - Make Fall Risk Sign visible to staff  - Offer Toileting every 2 Hours, in advance of need  - Initiate/Maintain bed alarm  - Obtain necessary fall risk management equipment:   - Consider moving patient to room near nurses station  Outcome: Not Progressing  Goal: Maintains/Returns to pre admission functional level  Description: INTERVENTIONS:  - Perform BMAT or MOVE assessment daily    - Set and communicate daily mobility goal to care team and patient/family/caregiver  - Collaborate with rehabilitation services on mobility goals if consulted  - Out of bed for toileting  - Record patient progress and toleration of activity level   Outcome: Not Progressing     Problem: Nutrition/Hydration-ADULT  Goal: Nutrient/Hydration intake appropriate for improving, restoring or maintaining nutritional needs  Description: Monitor and assess patient's nutrition/hydration status for malnutrition  Collaborate with interdisciplinary team and initiate plan and interventions as ordered  Monitor patient's weight and dietary intake as ordered or per policy  Utilize nutrition screening tool and intervene as necessary  Determine patient's food preferences and provide high-protein, high-caloric foods as appropriate       INTERVENTIONS:  - Monitor oral intake, urinary output, labs, and treatment plans  - Assess nutrition and hydration status and recommend course of action  - Evaluate amount of meals eaten  - Assist patient with eating if necessary   - Allow adequate time for meals  - Recommend/ encourage appropriate diets, oral nutritional supplements, and vitamin/mineral supplements  - Order, calculate, and assess calorie counts as needed  - Recommend, monitor, and adjust tube feedings and TPN/PPN based on assessed needs  - Assess need for intravenous fluids  - Provide specific nutrition/hydration education as appropriate  - Include patient/family/caregiver in decisions related to nutrition  Outcome: Not Progressing

## 2022-12-12 NOTE — QUICK NOTE
Drop in his BP earlier tonight thought to be related to sedation with propofol which was stopped  His BP improved some, but is still with systolic's in the low 17'H    We will give 50 gm of albumin now   He may need vasopressor therapy if he fails to improve

## 2022-12-12 NOTE — TELEMEDICINE
e-Consult (IPC)  - Interventional Radiology  Yovanny Anderson  80 y o  male MRN: [id number]  Unit/Bed#: ICU 02 Encounter: [id number]          Interventional Radiology has been consulted to evaluate Yovanny Anderson  IP Consult to IR  Consult performed by: Golden Bellamy MD  Consult ordered by: Jairo Aguirre MD        12/12/22    Assessment/Recommendation:   Consult to assess dialysis graft  70-year-old man admitted with change in mental status and ERIK started on hemodialysis with a nontunneled dialysis catheter placed in the ICU which has not been functioning well  He is currently intubated  We had initially planned on placing a tunneled catheter today but after discussion with nephrology, patient developed a fever today and we will therefore place a new nontunneled line  Patient has a dialysis graft which has been deemed "nonfunctional" though uncertain if any attempt has ever been made to use it  Apparently has no thrill or bruit  Duplex has been ordered by vascular surgery  Will await duplex  It is possible his graft is chronically occluded in which case a percutaneous thrombectomy would not be offered  Either way, intervention should be deferred until he is clinically improved and ideally out of the ICU     11-20 minutes, >50% of the total time devoted to medical consultative verbal/EMR discussion between providers  Written report will be generated in the EMR  Thank you for allowing Interventional Radiology to participate in the care of Yovanny Anderson    Please don't hesitate to call or TigerText us with any questions       Leela Romo MD

## 2022-12-12 NOTE — ASSESSMENT & PLAN NOTE
80year old male former smoker w/HTN, HLD, type II DM, atrial fibrillation, CKD 4 s/p LUE brach-ax AVG 7/3/19 Charles Ku), presenting with altered mental status and now with ERIK requiring HD, noted to have AVG malfunction  Vascular surgery consulted for input  Diagnostics:  -HD duplex: pending    Recommendations:  - Acute hypoxic respiratory failure in the setting of fluid overload, ERIK/CKD, requiring HD, and sepsis, likely 2/2 pneumonia  Hx of LUE AVG placed in '19  - Unable to appreciate thrill or bruit on exam  Pulsatile doppler signal overlying where graft is  Unsure if this is the graft itself vs brachial artery  Clinically the graft appears to be occluded  - Recommend HD duplex for formal assessment  Given that fistula was placed in 2019, if this is occluded would have to consider it to be chronic and therefore would need new access  This would be done on an outpatient basis if indicated     - Agree with temporary HD access placement for HD as needed  - Formal recommendations to follow HD duplex

## 2022-12-12 NOTE — PROGRESS NOTES
Nelli Villagomez  is a 80 y o  male who is currently ordered Vancomycin IV with management by the Pharmacy Consult service  Relevant clinical data and objective / subjective history reviewed  Vancomycin Assessment:  Indication and Goal AUC/Trough: Pneumonia (goal -600, trough >10), -600, trough >10  Clinical Status: worsening  Micro:   12/6 Flu/RSV/Covid PCR: negative  12/8 MRSA culture: negative  12/12 sputum culture: in process  12/12 blood cultures x 2: in process  12/12 MRSA culture: in process    Renal Function:  SCr: 5 56 mg/dL  CrCl: 7 8 mL/min  Renal replacement: CVVHD to be started this evening  Days of Therapy: 1  Current Dose: Received a loading dose of 1,750 mg x 1 this morning at 1130  Vancomycin Plan:  New Dosing: Start 750 mg q12h @ 3644  Next Level: 12/13 @ 1100  Renal Function Monitoring: Daily BMP and [place] will continue to follow closely for s/sx of nephrotoxicity, infusion reactions and appropriateness of therapy  BMP and CBC will be ordered per protocol  We will continue to follow the patient’s culture results and clinical progress daily      Giovanna Mata, PharmD, [address] and Internal Medicine Clinical Pharmacist  [phone] or via [facility]

## 2022-12-12 NOTE — ASSESSMENT & PLAN NOTE
Home health aid visits throughout the week   Noticed pt was increasingly lethargic  Status, confusion, decreased p o  intake over the past 3 days  Wife is hospitalized, per primary caregiver, he has been home alone past 3 days   CT Head- Neg   BUN- 229 and trending down  UA- Neg   Originally thought to be secondary to azotemia however patient remains altered despite marked reduction in BUN  Now most likely has a component of ICU delirium  Patient only intermittently understands commands and makes sense while speaking  Speech remains rather incomprehensible  Per patients family, he is still far off of his baseline       Plan-   Zyprexa qhs   Fentanyl prn for agitation  Reorient frequently   Maintain steady sleep wake cycle

## 2022-12-13 PROBLEM — J18.9 HOSPITAL-ACQUIRED PNEUMONIA: Status: ACTIVE | Noted: 2022-01-01

## 2022-12-13 PROBLEM — A41.9 SEPSIS (HCC): Status: ACTIVE | Noted: 2022-01-01
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
## 2022-12-13 NOTE — ASSESSMENT & PLAN NOTE
Lab Results   Component Value Date    EGFR 10 12/13/2022    EGFR 7 12/12/2022    EGFR 8 12/12/2022    CREATININE 4 67 (H) 12/13/2022    CREATININE 5 90 (H) 12/12/2022    CREATININE <0 15 (L) 12/12/2022     Now receiving CVVH  Baseline 2 8-3 3 from last lab work done in July 2022  Unclear if ERIK superimposed on CKD or a progression of CKD  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  No bruit auscultated on exam     CVVH discontinued due to rapid drop in BUN and concerns in osmotic shifts  Temporary dialysis catheter in place  Underwent a session of dialysis on 12/9/22 with a removal of 3 L of fluids  S/p second line placement however catheter tip appears to be advanced too far on CXR and will need to be withdrawn slightly  Patient did not tolerate CVVH well - became hypoxic  Chest xray shows worsening pulmonary edemed overnight but anticipate it to improve once again with further dialysis sessiona but no hemothorax/pneumothorax identified      Plan-  Appreciated nephrology input     Continue cvvh   Avoid nephrotoxic agents and hypoperfusion  Monitor close I/O  Daily BMP

## 2022-12-13 NOTE — ASSESSMENT & PLAN NOTE
Most likely secondary to pneumonia  Met sepsis criteria through tachycardia, fever and hypotension with suspected source of pneumonia  Checks x-rays reveal left-sided evolving consolidation throughout his hospital admission  Patient became hypoxic ultimately requiring intubation  Patient became tensive requiring fluid resuscitation and pressor support    On broad-spectrum antibiotics on 12/12 vancomycin and cefepime    Plan:  Continue pressor support; wean as tolerated  Discontinue IV fluids  Continue broad-spectrum antibiotics  Follow-up on sputum cultures  Follow-up on blood cultures  Morning CBC

## 2022-12-13 NOTE — ACP (ADVANCE CARE PLANNING)
Advanced Care Planning Note     Anastasiia Parks  80 y o  male MRN: [id number]    Unit/Bed#: ICU 02 Encounter: [id number]    Keya Becerra is a 80year-old male that presented today secondary to having an acute condition requiring critical care provider evaluation  The patient has chronic comorbidities and now is inflicted with following acute conditions: septic shock, ESRD on HD, pneumonia  Due to the severity of the patient's acute condition and/or the extent of chronic conditions, there is need for advanced care planning at this time  Please see my previous documentation in regards to the patient's current condition, assessment, and treatment plan  During this discussion with the patient and/or family members, it was established that all stake holders were agreeable and understood the rationale for advanced care planning to occur at this time  The following individuals were present & participated in the face to face conversation I had about the patient's advanced directives & advanced care planning: Wife Scot Johnson  Please see my following comments for details of the conversation & decisions that were made:    Level 2  Arrange family meeting in person with possible transition to comfort care    Total time spent, (15) minutes (1410 to 1425)      CODE STATUS: Level 2 - DNAR: but accepts endotracheal intubation  POA: Yes  POLST:      SIGNATURE: Yohannes Sifuentes MD  DATE: December 13, 2022  TIME: 2:29 PM

## 2022-12-13 NOTE — ASSESSMENT & PLAN NOTE
Lab Results   Component Value Date    EGFR 10 12/13/2022    EGFR 7 12/12/2022    EGFR 8 12/12/2022    CREATININE 4 67 (H) 12/13/2022    CREATININE 5 90 (H) 12/12/2022    CREATININE <0 15 (L) 12/12/2022     See plan for ERIK on CKD

## 2022-12-13 NOTE — DISCHARGE SUMMARY
[address] [facility]  Discharge- Archana Sanchez  1935, 80 y o  male MRN: [id number]  Unit/Bed#: ICU 02 Encounter: [id number]  Primary Care Provider: Nikkie Velasco DO   Date and time admitted to hospital: 12/6/2022 12:01 PM    Acute kidney injury superimposed on chronic kidney disease [facility])  Assessment & Plan  Lab Results   Component Value Date    EGFR 10 12/13/2022    EGFR 7 12/12/2022    EGFR 8 12/12/2022    CREATININE 4 67 (H) 12/13/2022    CREATININE 5 90 (H) 12/12/2022    CREATININE <0 15 (L) 12/12/2022     Baseline 2 8-3 3 from last lab work done in July 2022  Unclear if ERIK superimposed on CKD or a progression of CKD  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  No bruit auscultated on exam     CVVH discontinued due to rapid drop in BUN and concerns in osmotic shifts  Temporary dialysis catheter in place  Underwent a session of dialysis on 12/9/22 with a removal of 3 L of fluids  S/p second line placement however catheter tip appears to be advanced too far on CXR and will need to be withdrawn slightly  Patient did not tolerate CVVH well - became hypoxic  Chest xray shows worsening pulmonary edemed overnight but anticipate it to improve once again with further dialysis sessiona but no hemothorax/pneumothorax identified      Plan-  Appreciated nephrology input     Continue cvvh   Avoid nephrotoxic agents and hypoperfusion  Monitor close I/O  Daily BMP      CKD (chronic kidney disease) stage 5, GFR less than 15 ml/min [facility])  Assessment & Plan  Lab Results   Component Value Date    EGFR 10 12/13/2022    EGFR 7 12/12/2022    EGFR 8 12/12/2022    CREATININE 4 67 (H) 12/13/2022    CREATININE 5 90 (H) 12/12/2022    CREATININE <0 15 (L) 12/12/2022     See plan for ERIK on CKD        AMS (altered mental status)  Assessment & Plan  Home health aid visits throughout the week   Noticed pt was increasingly lethargic  Status, confusion, decreased p o  intake over the past 3 days  Wife is hospitalized, per primary caregiver, he has been home alone past 3 days   CT Head- Neg   BUN- 229 and trending down  UA- Neg   Originally thought to be secondary to azotemia however patient remains altered despite marked reduction in BUN  Now most likely has a component of ICU delirium  Patient only intermittently understands commands and makes sense while speaking  Speech remains rather incomprehensible  Per patients family, he is still far off of his baseline  Plan-   Zyprexa qhs   Fentanyl prn for agitation  Reorient frequently   Maintain steady sleep wake cycle          Acute respiratory failure with hypoxia (HCC)  Assessment & Plan  · Respiratory failure in the setting of volume overload secondary to renal failure and hospital-acquired pneumonia  · Patient has Hx of restrictive lung disease concern for pneumoconiosis vs sarcoidosis  Last PFT with a moderate restriction, TLC of 58% 3 44 L, FVC 1 87 L/63%   · Sees pulmonologist dr Kenna Barone in the outpatient setting  · ABGs have been inconsistent  · A repeat cxr was done  Persistent left effusion and left base opacity which may be due to atelectasis and/or pneumonia  Edema is improving  · Intubated 12/11 due to increased work of breathing and persistent hypoxia   · Patient did not tolerate CVVH due to hypoxia  CXR shows worsening pulmonary edema but no pneumothorax identified      Plan-  · xopenex and atrovent nebulizers q6h  · Continue mechanical ventilation and sedation; wean as tolerated  · Home O2 evaluation  · Will consider goals of care with the family  · Continue CVVH with goal of net negative/euvolemia        Hospital-acquired pneumonia  Assessment & Plan  Patient developed hypotension and hypoxia on 12/10  He needed BiPAP and supplementary oxygen to maintain adequate oxygenation  Patient ultimately intubated and placed on pressor support  Developed fever of 102 on the night of 12/11     White blood cell count within normal limits   Chest x-ray shows evolving left lower lobe consolidation  started on broad-spectrum antibiotics on 12/12    Plan:  Continue broad-spectrum antibiotics  Trend CBC  Procalcitonin ordered  Continue mechanical ventilation; wean as tolerated  Continue pressor support to maintain MAP above 65; wean as tolerated  Follow-up on sputum cultures; narrow antibiotic therapy based on susceptibilities    Paroxysmal atrial fibrillation [facility])  Assessment & Plan  Per chart review patient has hx of PAF  Rate controlled with metoprolol at home  CHADVASC score 4  High bleeding risk per HASBLED score  Initially on  IV metoprolol 5 mg q 6 with temporary control in HR  Was loaded on 12/9/22 with digoxin 500 mcg  Received another 2x 250 mcg  Now on maintenance dose of 125 mcg every other day  Heart rate ranges between 100 and 120  Digoxin level resulted 2 3  Started on amiodarone 12/12    Plan:  · Continuous cardiac monitoring  · If no response after management with digoxin will consider involving cardiology  · Continue digoxin and amiodarone  · Lopressor discontinued    Sepsis ([address] )  Assessment & Plan  Most likely secondary to pneumonia  Met sepsis criteria through tachycardia, fever and hypotension with suspected source of pneumonia  Checks x-rays reveal left-sided evolving consolidation throughout his hospital admission  Patient became hypoxic ultimately requiring intubation  Patient became tensive requiring fluid resuscitation and pressor support    On broad-spectrum antibiotics on 12/12 vancomycin and cefepime    Plan:  Continue pressor support; wean as tolerated  Discontinue IV fluids  Continue broad-spectrum antibiotics  Follow-up on sputum cultures  Follow-up on blood cultures  Morning CBC    Thrombocytopenia (HCC)  Assessment & Plan  Platelets have consistently trended down throughout admission  Most likely multifactorial    Plan:  Transfuse platelets if <43A  Monitor for bleeding  Trend CBC    DM type 2 (diabetes mellitus, type 2) [facility])  Assessment & Plan  Lab Results   Component Value Date    HGBA1C 6 7 (A) 09/07/2022       Recent Labs     12/12/22  1835 12/13/22  0018 12/13/22  0600 12/13/22  1238   POCGLU 124 199* 155* 153*       Blood Sugar Average: Last 72 hrs:  (P) 269 [id number]   Home medications include Tradjenta     Plan-  SSI  Hypoglycemia protocol  accucheck q6h  OG tube feeds of nepro at goal       Anemia due to chronic kidney disease  Assessment & Plan  Lab Results   Component Value Date    IRON 127 12/07/2022    FERRITIN 2,641 (H) 12/07/2022    TIBC 226 (L) 12/07/2022    CONCFE 56 (H) 12/07/2022     Recent Labs     12/11/22  0423 12/12/22  0430 12/13/22  0553   HGB 8 4* 7 0* 8 7*       S/p 2 U pRBC with adequate response in hemoglobin  Will continue to trend hemoglobin and transfuse if needed    * AV graft malfunction [facility])  Assessment & Plan  Patient has left sided AV graft placed in 2019 but on presentation this admission physical exam would suggest it is no longer functional  Patient now will most likely need long term scheduled dialysis   S/p temporary CVVH catheter placement    Catheter appears to be functioning properly    Plan:    Continue to monitor for line associated infection and for proper functioning   Maintain clean insertion site      High anion gap metabolic acidosis-resolved as of 12/9/2022  Assessment & Plan  See CKD and AMS Plan       Hyperkalemia-resolved as of 12/7/2022  Assessment & Plan  Secondary to renal failure  Potassium now wnl    Plan:  Continue current management          Medical Problems     Resolved Problems  Date Reviewed: 12/13/2022          Resolved    Hyperkalemia 12/7/2022     Resolved by  Andrew Hernandez DO    High anion gap metabolic acidosis 91/1/5684     Resolved by  Andrew Hernandez DO          Admission Date:   Admission Orders (From admission, onward)     Ordered        12/06/22 1716  INPATIENT ADMISSION  Once                        Admitting Diagnosis: Hyperkalemia [E87 5]  Altered mental status [R41 82]  Altered mental state [R41 82]  ARF (acute renal failure) ([facility] [address] ) [N17 9]  Elevated troponin [R77 8]    HPI: Patient was a 31-year-old male presenting with acute on chronic renal failure resulting in volume overload and respiratory failure  Patient additionally had developed superimposed left lower lobe pneumonia and sepsis  Due to the patient's poor prognosis, goals of care conversation was had with family and was ultimately decided to place the patient on comfort measures  Care was withdrawn and the patient  shortly thereafter  Procedures Performed:   Orders Placed This Encounter   Procedures   • Central Line   • Critical Care   • Intubation   • Temporary HD Catheter       Summary of Hospital Course: Patient was a 31-year-old male who presented with acute on chronic renal failure, hyperkalemia, altered mental status and rapid atrial fibrillation  Patient was placed on CVVH with rapid improvement of his renal function  CVVH was stopped due to concern over osmotic shifts from precipitous drops in BUN  The patient's mental status fluctuated throughout his hospital course and he intermittently followed commands never reached his baseline  His heart rate was persistently difficult to control despite use of digoxin and amiodarone  Two days after his admission, the patient developed hypoxia and respiratory distress eventually requiring endotracheal intubation  Following intubation the patient developed fever and hypotension  Repeat chest x-ray revealed evolving left lower lobe pneumonia and worsening pulmonary edema and he was placed on broad-spectrum antibiotics  Patient met criteria for septic shock and was started on resuscitative fluids and pressor support  His clinical status continued to deteriorate despite medical management    For this reason goals of care conversation was had with with the family and it was decided to place the patient on comfort measures  Care was withdrawn upon the family's instruction and the patient  shortly thereafter  Significant Findings, Care, Treatment and Services Provided: CVVH, left lower lobe pneumonia    Complications: None    Condition at Time of Death: Poor    Final Diagnosis: Respiratory failure, sepsis    Date, Time and Cause of Death    Date of Death: 22  Time of Death:  4:38 PM  Preliminary Cause of Death: Respiratory failure with hypoxia ([address] )  Entered by: Gilmer Perkins MD[TK1 1]     Attribution     TK1  [address] [facility], [place] 22 16:49          PCP: Chika Marte DO    Disposition:

## 2022-12-13 NOTE — PROGRESS NOTES
[address] [facility]  Progress Note - Yovanny Anderson  1935, 80 y o  male MRN: [id number]  Unit/Bed#: ICU 02 Encounter: [id number]  Primary Care Provider: Jenifer Fees, DO   Date and time admitted to hospital: 12/6/2022 12:01 PM    Acute kidney injury superimposed on chronic kidney disease [facility])  Assessment & Plan  Lab Results   Component Value Date    EGFR 10 12/13/2022    EGFR 7 12/12/2022    EGFR 8 12/12/2022    CREATININE 4 67 (H) 12/13/2022    CREATININE 5 90 (H) 12/12/2022    CREATININE <0 15 (L) 12/12/2022     Now receiving CVVH  Baseline 2 8-3 3 from last lab work done in July 2022  Unclear if ERIK superimposed on CKD or a progression of CKD  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  No bruit auscultated on exam     CVVH discontinued due to rapid drop in BUN and concerns in osmotic shifts  Temporary dialysis catheter in place  Underwent a session of dialysis on 12/9/22 with a removal of 3 L of fluids  S/p second line placement however catheter tip appears to be advanced too far on CXR and will need to be withdrawn slightly  Patient did not tolerate CVVH well - became hypoxic  Chest xray shows worsening pulmonary edemed overnight but anticipate it to improve once again with further dialysis sessiona but no hemothorax/pneumothorax identified      Plan-  Appreciated nephrology input     Continue cvvh   Avoid nephrotoxic agents and hypoperfusion  Monitor close I/O  Daily BMP      CKD (chronic kidney disease) stage 5, GFR less than 15 ml/min [facility])  Assessment & Plan  Lab Results   Component Value Date    EGFR 10 12/13/2022    EGFR 7 12/12/2022    EGFR 8 12/12/2022    CREATININE 4 67 (H) 12/13/2022    CREATININE 5 90 (H) 12/12/2022    CREATININE <0 15 (L) 12/12/2022     See plan for ERIK on CKD        AMS (altered mental status)  Assessment & Plan  Home health aid visits throughout the week   Noticed pt was increasingly lethargic  Status, confusion, decreased p o  intake over the past 3 days  Wife is hospitalized, per primary caregiver, he has been home alone past 3 days   CT Head- Neg   BUN- 229 and trending down  UA- Neg   Originally thought to be secondary to azotemia however patient remains altered despite marked reduction in BUN  Now most likely has a component of ICU delirium  Patient only intermittently understands commands and makes sense while speaking  Speech remains rather incomprehensible  Per patients family, he is still far off of his baseline  Plan-   Zyprexa qhs   Fentanyl prn for agitation  Reorient frequently   Maintain steady sleep wake cycle          Acute respiratory failure with hypoxia (HCC)  Assessment & Plan  · Respiratory failure in the setting of volume overload secondary to renal failure and hospital-acquired pneumonia  · Patient has Hx of restrictive lung disease concern for pneumoconiosis vs sarcoidosis  Last PFT with a moderate restriction, TLC of 58% 3 44 L, FVC 1 87 L/63%   · Sees pulmonologist dr Dar Renteria in the outpatient setting  · ABGs have been inconsistent  · A repeat cxr was done  Persistent left effusion and left base opacity which may be due to atelectasis and/or pneumonia  Edema is improving  · Intubated 12/11 due to increased work of breathing and persistent hypoxia   · Patient did not tolerate CVVH due to hypoxia  CXR shows worsening pulmonary edema but no pneumothorax identified      Plan-  · xopenex and atrovent nebulizers q6h  · Continue mechanical ventilation and sedation; wean as tolerated  · Home O2 evaluation  · Will consider goals of care with the family  · Continue CVVH with goal of net negative/euvolemia        Hospital-acquired pneumonia  Assessment & Plan  Patient developed hypotension and hypoxia on 12/10  He needed BiPAP and supplementary oxygen to maintain adequate oxygenation  Patient ultimately intubated and placed on pressor support  Developed fever of 102 on the night of 12/11     White blood cell count within normal limits   Chest x-ray shows evolving left lower lobe consolidation  started on broad-spectrum antibiotics on 12/12    Plan:  Continue broad-spectrum antibiotics  Trend CBC  Procalcitonin ordered  Continue mechanical ventilation; wean as tolerated  Continue pressor support to maintain MAP above 65; wean as tolerated  Follow-up on sputum cultures; narrow antibiotic therapy based on susceptibilities    Paroxysmal atrial fibrillation [facility])  Assessment & Plan  Per chart review patient has hx of PAF  Rate controlled with metoprolol at home  CHADVASC score 4  High bleeding risk per HASBLED score  Initially on  IV metoprolol 5 mg q 6 with temporary control in HR  Was loaded on 12/9/22 with digoxin 500 mcg  Received another 2x 250 mcg  Now on maintenance dose of 125 mcg every other day  Heart rate ranges between 100 and 120  Digoxin level resulted 2 3  Started on amiodarone 12/12    Plan:  · Continuous cardiac monitoring  · If no response after management with digoxin will consider involving cardiology  · Continue digoxin and amiodarone  · Lopressor discontinued    Sepsis ([address] )  Assessment & Plan  Most likely secondary to pneumonia  Met sepsis criteria through tachycardia, fever and hypotension with suspected source of pneumonia  Checks x-rays reveal left-sided evolving consolidation throughout his hospital admission  Patient became hypoxic ultimately requiring intubation  Patient became tensive requiring fluid resuscitation and pressor support    On broad-spectrum antibiotics on 12/12 vancomycin and cefepime    Plan:  Continue pressor support; wean as tolerated  Discontinue IV fluids  Continue broad-spectrum antibiotics  Follow-up on sputum cultures  Follow-up on blood cultures  Morning CBC    Thrombocytopenia (HCC)  Assessment & Plan  Platelets have consistently trended down throughout admission  Most likely multifactorial    Plan:  Transfuse platelets if <49Q  Monitor for bleeding  Trend CBC    Azotemia  Assessment & Plan  Lab Results   Component Value Date    BUN 56 (H) 12/13/2022    BUN 67 (H) 12/12/2022    BUN 70 (H) 12/12/2022    BUN 65 (H) 12/12/2022     Initially in 300s on presentation  Patients BUN improved with CVVH  Monitor daily BMP  Continue dialysis as tolerated    DM type 2 (diabetes mellitus, type 2) [facility])  Assessment & Plan  Lab Results   Component Value Date    HGBA1C 6 7 (A) 09/07/2022       Recent Labs     12/12/22  1137 12/12/22  1835 12/13/22  0018 12/13/22  0600   POCGLU 119 124 199* 155*       Blood Sugar Average: Last 72 hrs:  (P) 154 [id number]   Home medications include Tradjenta     Plan-  SSI  Hypoglycemia protocol  accucheck q6h  OG tube feeds of nepro at goal       Anemia due to chronic kidney disease  Assessment & Plan  Lab Results   Component Value Date    IRON 127 12/07/2022    FERRITIN 2,641 (H) 12/07/2022    TIBC 226 (L) 12/07/2022    CONCFE 56 (H) 12/07/2022     Recent Labs     12/11/22  0423 12/12/22  0430 12/13/22  0553   HGB 8 4* 7 0* 8 7*       S/p 2 U pRBC with adequate response in hemoglobin  Will continue to trend hemoglobin and transfuse if needed    * AV graft malfunction [facility])  Assessment & Plan  Patient has left sided AV graft placed in 2019 but on presentation this admission physical exam would suggest it is no longer functional  Patient now will most likely need long term scheduled dialysis   S/p temporary CVVH catheter placement    Catheter appears to be functioning properly    Plan:    Continue to monitor for line associated infection and for proper functioning   Maintain clean insertion site      High anion gap metabolic acidosis-resolved as of 12/9/2022  Assessment & Plan  See CKD and AMS Plan       Hyperkalemia-resolved as of 12/7/2022  Assessment & Plan  Secondary to renal failure  Potassium now wnl    Plan:  Continue current management      ----------------------------------------------------------------------------------------  HPI/24hr events: Patient had CVVH line replaced yesterday  Started on CVVH however 3 hours into the procedure patient became hypoxic  Nurses have had additional trouble with pulling sufficient amounts of fluid from the patient  Heart rate continues to be poorly controlled  No changes in neurological status  Patient appropriate for transfer out of the ICU today?: No  Disposition: Continue Critical Care   Code Status: Level 1 - Full Code  ---------------------------------------------------------------------------------------  SUBJECTIVE  No subjective data collected  Patient sedated and intubated  ---------------------------------------------------------------------------------------  OBJECTIVE    Vitals   Vitals:    22 0430 22 0500 22 0600 22 0605   BP:       Pulse: (!) 124 (!) 132 (!) 156    Resp: 14 15 15    Temp:       TempSrc:       SpO2: 91% 91% 91%    Weight:    72 7 kg (160 lb 4 4 oz)     Temp (24hrs), Av 1 °F (38 4 °C), Min:98 2 °F (36 8 °C), Max:102 7 °F (39 3 °C)  Current: Temperature: 98 2 °F (36 8 °C)  Arterial Line BP: 124/  Arterial Line MAP (mmHg): (!) 54 mmHg    Respiratory:  SpO2: SpO2: 92 %       Invasive/non-invasive ventilation settings   Respiratory    Lab Data (Last 4 hours)    None         O2/Vent Data (Last 4 hours)    None                Physical Exam  Vitals and nursing note reviewed  Constitutional:       General: He is not in acute distress  Appearance: He is well-developed  He is ill-appearing  HENT:      Head: Normocephalic and atraumatic  Mouth/Throat:      Mouth: Mucous membranes are dry  Pharynx: No oropharyngeal exudate  Eyes:      General: No scleral icterus  Pupils: Pupils are equal, round, and reactive to light  Comments: Conjunctive appear dry   Cardiovascular:      Rate and Rhythm: Tachycardia present  Rhythm irregular  Heart sounds: No murmur heard  Comments: Bounding irregular pulses    Heart sounds are more prominent today than prior exams  Pulmonary:      Effort: Pulmonary effort is normal  No respiratory distress  Breath sounds: Wheezing, rhonchi and rales present  Comments: Inspiratory wheezing  Course rhonchi hear diffusely and bilaterally  Patient's lungs sound worse than prior exams  Patient sounds as if he has pulmonary edema  Abdominal:      General: Bowel sounds are normal  There is no distension  Palpations: Abdomen is soft  Tenderness: There is no abdominal tenderness  Musculoskeletal:      Cervical back: Neck supple  Right lower leg: Edema present  Left lower leg: Edema present  Comments: Patient now edematous in all 4 extremities   Skin:     General: Skin is warm and dry  Capillary Refill: Capillary refill takes less than 2 seconds  Findings: Bruising (upper extremities) present  Neurological:      Mental Status: He is alert  Comments: Pupillary light reflexes intact  Gag reflex intact  Spontaneously moves all 4 extremities and head     Psychiatric:      Comments: Sedated             Laboratory and Diagnostics:  Results from last 7 days   Lab Units 12/13/22  0553 12/12/22  0430 12/11/22  0423 12/10/22  0442 12/09/22  0443 12/08/22  1447 12/08/22  0525 12/07/22  0807 12/07/22  0228 12/06/22  1233   WBC Thousand/uL 23 62* 9 39 9 66 13 12* 14 76* 12 86* 14 40*   < > 12 38* 13 79*   HEMOGLOBIN g/dL 8 7* 7 0* 8 4* 8 9* 9 7* 9 3* 7 0*   < > 7 8* 10 0*   HEMATOCRIT % 27 4* 21 5* 26 1* 26 9* 28 2* 26 5* 20 1*   < > 22 8* 30 3*   PLATELETS Thousands/uL 63* 60* 71* 76* 91* 87* 106*   < > 140* 192   NEUTROS PCT %  --   --  89* 90*  --   --  85*  --  92* 87*   BANDS PCT %  --  1  --   --   --   --   --   --   --   --    MONOS PCT %  --   --  8 6  --   --  10  --  4 8   MONO PCT %  --  2*  --   --   --   --   --   --   --   --     < > = values in this interval not displayed       Results from last 7 days   Lab Units 12/13/22  0455 12/13/22  0408 12/12/22 2025 12/12/22  1338 12/12/22  0430 12/11/22  0423 12/10/22  0442 12/09/22  0443 12/06/22  1603 12/06/22  1233   SODIUM mmol/L  --  137 140 142 141 141 142 139   < > 136   POTASSIUM mmol/L  --  4 4 4 0 3 8 3 6 4 1 4 4 4 9   < > 7 0*   CHLORIDE mmol/L  --  102 102 104 102 101 101 99   < > 98   CO2 mmol/L  --  24 24 24 28 26 24 23   < > 10*   ANION GAP mmol/L  --  11 14* 14* 11 14* 17* 17*   < > 28*   BUN mg/dL  --  56* 67* 70* 65* 45* 65* 106*   < > 216*   CREATININE mg/dL  --  4 67* 5 90* <0 15* 5 56* 4 40* 5 55* 7 77*   < > 17 52*   CALCIUM mg/dL  --  8 5 8 0* <5 0* 8 0* 8 1* 7 8* 8 2*   < > 8 8   GLUCOSE RANDOM mg/dL  --  159* 166* 122 119 135 118 172*   < > 186*   ALT U/L 60  --   --   --   --   --  40  --   --  30   AST U/L 101*  --   --   --   --   --  69*  --   --  19   ALK PHOS U/L 124*  --   --   --   --   --  64  --   --  73   ALBUMIN g/dL 2 2*  --   --   --   --   --  2 2*  --   --  3 5   TOTAL BILIRUBIN mg/dL 0 83  --   --   --   --   --  0 70  --   --  0 36    < > = values in this interval not displayed       Results from last 7 days   Lab Units 12/13/22 0408 12/12/22 2025 12/12/22  1338 12/12/22  1031 12/11/22  0423 12/10/22  0442 12/09/22  0443 12/08/22  1101   MAGNESIUM mg/dL 2 1 2 1 <0 2*  --  2 0 2 1 2 2 1 9   PHOSPHORUS mg/dL 4 5* 5 0* 4 3* 3 8 4 8* 5 4* 6 6* 4 4*      Results from last 7 days   Lab Units 12/07/22  1213   INR  1 17   PTT seconds 31          Results from last 7 days   Lab Units 12/12/22  2025 12/12/22  1711 12/06/22  1233   LACTIC ACID mmol/L 2 1* 2 6* <0 3*     ABG:  Results from last 7 days   Lab Units 12/11/22  1334   PH ART  7 397   PCO2 ART mm Hg 39 2   PO2 ART mm Hg 160 7*   HCO3 ART mmol/L 23 6   BASE EXC ART mmol/L -1 1   ABG SOURCE  Radial, Right     VBG:  Results from last 7 days   Lab Units 12/11/22  1334 12/11/22  0836   PH JACINTA   --  7 374   PCO2 JACINTA mm Hg  --  44 6   PO2 JACINTA mm Hg  --  32 5*   HCO3 JACINTA mmol/L  --  25 4   BASE EXC JACINTA mmol/L  --  0 1   ABG SOURCE  Radial, Right  --      Results from last 7 days   Lab Units 12/13/22  0453 12/12/22  1711   PROCALCITONIN ng/ml 66 62* 58 86*       Micro  Results from last 7 days   Lab Units 12/12/22  1113 12/12/22  1035 12/08/22  1104   BLOOD CULTURE  Received in Microbiology Lab  Culture in Progress  Received in Microbiology Lab  Culture in Progress  --   --    GRAM STAIN RESULT   --  2+ Polys*  Rare Gram negative rods*  --    MRSA CULTURE ONLY   --   --  No Methicillin Resistant Staphlyococcus aureus (MRSA) isolated       EKG: Reviewed  Imaging: I have personally reviewed pertinent reports  Intake and Output  I/O       12/11 0701 12/12 0700 12/12 0701 12/13 0700 12/13 0701 12/14 0700    I V  (mL/kg) 230 6 (3 5) 1337 4 (18 4)     Blood  350     Other       NG/GT 80      IV Piggyback  2450     Feedings 161 269     Total Intake(mL/kg) 471 6 (7 1) 4406 4 (60 6)     Urine (mL/kg/hr) 284 (0 2) 140 (0 1)     Other  1123     Stool 0      Total Output 284 1263     Net +187 6 +3143 4            Unmeasured Stool Occurrence 1 x            Height and Weights         Body mass index is 29 31 kg/m²  Weight (last 2 days)     Date/Time Weight    12/13/22 0605 72 7 (160 27)    12/12/22 1327 66 (145 5)    12/12/22 0600 66 (145 5)    12/11/22 0600 65 7 (144 84)            Nutrition       Diet Orders   (From admission, onward)             Start     Ordered    12/12/22 0748  Diet Enteral/Parenteral; Tube Feeding No Oral Diet; Nepro; Continuous; 25  Diet effective midnight        References:    Nutrtion Support Algorithm Enteral vs  Parenteral   Question Answer Comment   Diet Type Enteral/Parenteral    Enteral/Parenteral Tube Feeding No Oral Diet    Tube Feeding Formula: Nepro    Bolus/Cyclic/Continuous Continuous    Tube Feeding Goal Rate (mL/hr): 25    RD to adjust diet per protocol?  Yes        12/12/22 0748                  Active Medications  Scheduled Meds:  Current Facility-Administered Medications   Medication Dose Route Frequency Provider Last Rate   • acetaminophen  650 mg Oral Q4H PRN SUJATA Vang     • amiodarone  0 5 mg/min Intravenous Continuous SUJATA Nix 0 5 mg/min (12/13/22 0600)   • cefepime  2,000 mg Intravenous Q12H John Maxwell MD Stopped (12/12/22 3542)   • chlorhexidine  15 mL Mouth/Throat Q12H Raji Coleman PA-C     • digoxin  125 mcg Intravenous Every Other Day Jeromy Henson MD     • fentaNYL  50 mcg/hr Intravenous Continuous John Maxwell MD 50 mcg/hr (12/13/22 0600)   • heparin (porcine)  5,000 Units Subcutaneous Q8H [facility] John Maxwell MD     • insulin lispro  1-5 Units Subcutaneous Q6H [facility] John Maxwell MD     • ipratropium  0 5 mg Nebulization Q6H Judah Crisostomo MD     • levalbuterol  1 25 mg Nebulization Q6H Judah Crisostomo MD     • norepinephrine  1-30 mcg/min Intravenous Titrated Ellen Ang PA-C     • norepinephrine 4 mg in 0 9% sodium chloride 250 mL          • NxStage K 4/Ca 3  20,000 mL Dialysis Continuous Kwaku Camp MD     • phenylephine   mcg/min Intravenous Titrated Ellen Ang PA-C     • vancomycin  10 mg/kg Intravenous Q12H John Maxwell MD Stopped (12/13/22 0025)     Continuous Infusions:  amiodarone, 0 5 mg/min, Last Rate: 0 5 mg/min (12/13/22 0600)  fentaNYL, 50 mcg/hr, Last Rate: 50 mcg/hr (12/13/22 0600)  norepinephrine, 1-30 mcg/min  NxStage K 4/Ca 3, 20,000 mL  phenylephine,  mcg/min      PRN Meds:   acetaminophen, 650 mg, Q4H PRN        Invasive Devices Review  Invasive Devices     Central Venous Catheter Line  Duration           CVC Central Lines 12/12/22 Triple 20cm <1 day          Peripheral Intravenous Line  Duration           Peripheral IV 12/11/22 Right;Upper;Ventral (anterior) Arm 1 day    Peripheral IV 12/12/22 Distal;Left;Lower;Ventral (anterior) Leg <1 day    Peripheral IV 12/12/22 Right;Ventral (anterior) Wrist <1 day          Arterial Line  Duration           Arterial Line 12/12/22 Radial <1 day          Line  Duration           Hemodialysis AV Fistula 07/03/19 Left Upper arm 1258 days          Drain  Duration           Urethral Catheter 16 Fr  6 days    NG/OG/Enteral Tube Orogastric Center mouth 1 day          Airway  Duration           ETT  Cuffed 7 5 mm 1 day                Rationale for remaining devices: Acute hypoxic respiratory failure, acute on chronic renal failure  ---------------------------------------------------------------------------------------  Advance Directive and Living Will:      Power of : Yes  POLST:    ---------------------------------------------------------------------------------------  Care Time Delivered:   No Critical Care time spent       Bo Kumar MD      Portions of the record may have been created with voice recognition software  Occasional wrong word or "sound a like" substitutions may have occurred due to the inherent limitations of voice recognition software    Read the chart carefully and recognize, using context, where substitutions have occurred

## 2022-12-13 NOTE — PROCEDURES
Arterial Line Insertion    Date/Time: 12/12/2022 7:31 PM  Performed by: Dilia Gamez PA-C  Authorized by: Dilia Gamez PA-C     Patient location:  ICU  Other Assisting Provider: No    Consent:     Consent obtained:  Emergent situation (pt confused  intubated  procedure verballized to him anyway )  Universal protocol:     Relevant documents present and verified: yes      Site/side marked: yes      Immediately prior to procedure a time out was called: yes      Patient identity confirmed:  Arm band and anonymous protocol, patient vented/unresponsive  Indications:     Indications: hemodynamic monitoring, multiple ABGs, continuous blood pressure monitoring and frequent labs / infusion    Pre-procedure details:     Skin preparation:  Chlorhexidine    Preparation: Patient was prepped and draped in sterile fashion    Sedation:     Sedation type: Moderate (conscious) sedation  Anesthesia (see MAR for exact dosages): Anesthesia method:  Local infiltration    Local anesthetic:  Lidocaine 1% w/o epi  Procedure details:     Location / Tip of Catheter:  Radial    Laterality:  Right    Christiano's test performed: yes      Christiano's test abnormal: no      Needle gauge:  20 G    Placement technique:  Percutaneous    Number of attempts:  1    Successful placement: yes      Transducer: waveform confirmed    Post-procedure details:     Post-procedure:  Biopatch applied, secured with tape, sterile dressing applied and sutured    CMS:  Unchanged    Patient tolerance of procedure:   Tolerated well, no immediate complications

## 2022-12-13 NOTE — NURSING NOTE
CVVHD initiated at 21:30 following fluid resuscitation per sepsis protocol  Pt vasopressor requirements now reduced with Levophed and Phenylephrine infusing  Unable to maintain the dialysis flow with elevated access pressures  Pt also developed acute hypoxia with SpO2 80% and hypotension with progressive titration of vasopressive agents  KATHERIN Robins at the bedside  Chest xray ordered  The central line pulled taut by the PA and secured with a hemostat  Will continue to monitor

## 2022-12-13 NOTE — ASSESSMENT & PLAN NOTE
Lab Results   Component Value Date    IRON 127 12/07/2022    FERRITIN 2,641 (H) 12/07/2022    TIBC 226 (L) 12/07/2022    CONCFE 56 (H) 12/07/2022     Recent Labs     12/11/22  0423 12/12/22  0430 12/13/22  0553   HGB 8 4* 7 0* 8 7*       S/p 2 U pRBC with adequate response in hemoglobin  Will continue to trend hemoglobin and transfuse if needed

## 2022-12-13 NOTE — QUICK NOTE
Critical Care Services- Interval Progress Note   Olya Posadas  80 y o  male MRN: [id number]  Unit/Bed#: ICU 02 Encounter: [id number]  Assessment and Plan  Interval Events:  called to pt room after start of cvvh  Pt hypoxic in 80's and becoming hypotensive coinciding with start of cvvh  Bagged pt and increased pressors  Pt was suctioned without increased secretions  Pt bagged easily  Pt slowly improved  PCXR ordered and showed increased b/l vascular congestion  No ptx noted  • Diagnosis: likely pulmonary congestion 2/2 volume overload with pna  o Plan: run cvvh even and attempt negative if pt can tolerate later  o Increased peep from 8-10 temporarily and will decrease as able, wean fio2 as able  o Cont abx  Cont pressors  Hold more volume for now    -HD cath positional  If unable to continue cvvh due to high pressures then will return volume and re-evaluate line  New line May need to be pulled back 1cm  Upon my evaluation, this patient had a high probability of imminent or life-threatening deterioration due to hypoxia, hypotension, which required my direct attention, intervention, and personal management  10  I have personally provided 25 minutes ([phone]) on 12/12/2022 of critical care time, exclusive of procedures, teaching, family meetings, and any prior time recorded by providers other than myself  Time includes review of laboratory data, review of imaging/radiology results, discussion with consultants, monitoring for potential decompensation  Interventions were performed as documented above   Discussed with attending   -------------------------------------------------------------------------------------------------------------------------------------  Hemodynamic Monitoring:  Vital Signs:   HR: 118  BP: 148/34  MAP: 69  RR: 14  SpO2: 90 on 100% oxygen  Cardiac Monitoring:   Telemetry rhythm: rafib        Laboratory   Results from last 7 days   Lab Units 12/12/22  3563 12/11/22  2386 12/10/22  0442 12/09/22  0443 12/08/22  1447 12/08/22  0525 12/07/22  0807 12/07/22  0228 12/06/22  1233   WBC Thousand/uL 9 39 9 66 13 12* 14 76* 12 86* 14 40* 13 21* 12 38* 13 79*   HEMOGLOBIN g/dL 7 0* 8 4* 8 9* 9 7* 9 3* 7 0* 8 1* 7 8* 10 0*   HEMATOCRIT % 21 5* 26 1* 26 9* 28 2* 26 5* 20 1* 24 0* 22 8* 30 3*   PLATELETS Thousands/uL 60* 71* 76* 91* 87* 106* 131* 140* 192   NEUTROS PCT %  --  89* 90*  --   --  85*  --  92* 87*   BANDS PCT % 1  --   --   --   --   --   --   --   --    MONOS PCT %  --  8 6  --   --  10  --  4 8   MONO PCT % 2*  --   --   --   --   --   --   --   --      Results from last 7 days   Lab Units 12/12/22 2025 12/12/22  1338 12/12/22  0430 12/11/22  0423 12/10/22  0442 12/09/22  0443 12/08/22 2003 12/06/22  1603 12/06/22  1233   SODIUM mmol/L 140 142 141 141 142 139 139   < > 136   POTASSIUM mmol/L 4 0 3 8 3 6 4 1 4 4 4 9 4 9   < > 7 0*   CHLORIDE mmol/L 102 104 102 101 101 99 99   < > 98   CO2 mmol/L 24 24 28 26 24 23 23   < > 10*   ANION GAP mmol/L 14* 14* 11 14* 17* 17* 17*   < > 28*   BUN mg/dL 67* 70* 65* 45* 65* 106* 101*   < > 216*   CREATININE mg/dL 5 90* <0 15* 5 56* 4 40* 5 55* 7 77* 7 49*   < > 17 52*   CALCIUM mg/dL 8 0* <5 0* 8 0* 8 1* 7 8* 8 2* 8 3   < > 8 8   GLUCOSE RANDOM mg/dL 166* 122 119 135 118 172* 173*   < > 186*   ALT U/L  --   --   --   --  40  --   --   --  30   AST U/L  --   --   --   --  69*  --   --   --  19   ALK PHOS U/L  --   --   --   --  64  --   --   --  73   ALBUMIN g/dL  --   --   --   --  2 2*  --   --   --  3 5   TOTAL BILIRUBIN mg/dL  --   --   --   --  0 70  --   --   --  0 36    < > = values in this interval not displayed       Results from last 7 days   Lab Units 12/12/22 2025 12/12/22  1338 12/12/22  1031 12/11/22  0423 12/10/22  0442 12/09/22  0443 12/08/22  1101 12/08/22  0525   MAGNESIUM mg/dL 2 1 <0 2*  --  2 0 2 1 2 2 1 9 2 1   PHOSPHORUS mg/dL 5 0* 4 3* 3 8 4 8* 5 4* 6 6* 4 4* 4 8*      Results from last 7 days   Lab Units 12/07/22  1213   INR  1 17   PTT seconds 31          Results from last 7 days   Lab Units 12/12/22  2025 12/12/22  1711 12/06/22  1233   LACTIC ACID mmol/L 2 1* 2 6* <0 3*     ABG:  Results from last 7 days   Lab Units 12/11/22  1334   PH ART  7 397   PCO2 ART mm Hg 39 2   PO2 ART mm Hg 160 7*   HCO3 ART mmol/L 23 6   BASE EXC ART mmol/L -1 1   ABG SOURCE  Radial, Right     VBG:  Results from last 7 days   Lab Units 12/11/22  1334 12/11/22  0836   PH JACINTA   --  7 374   PCO2 JACINTA mm Hg  --  44 6   PO2 JACINTA mm Hg  --  32 5*   HCO3 JACINTA mmol/L  --  25 4   BASE EXC JACINTA mmol/L  --  0 1   ABG SOURCE  Radial, Right  --      Results from last 7 days   Lab Units 12/12/22  1711   PROCALCITONIN ng/ml 58 86*       Micro  Results from last 7 days   Lab Units 12/12/22  1113 12/12/22  1035 12/08/22  1104   BLOOD CULTURE  Received in Microbiology Lab  Culture in Progress  Received in Microbiology Lab  Culture in Progress  --   --    GRAM STAIN RESULT   --  2+ Polys*  Rare Gram negative rods*  --    MRSA CULTURE ONLY   --   --  No Methicillin Resistant Staphlyococcus aureus (MRSA) isolated       Diagnostic Imaging / Data: I have personally reviewed pertinent reports     and I have personally reviewed pertinent films in PACS    Medications:  Current Facility-Administered Medications   Medication Dose Route Frequency   • acetaminophen (TYLENOL) oral suspension 650 mg  650 mg Oral Q4H PRN   • amiodarone (CORDARONE) 900 mg in dextrose 5 % 500 mL infusion  0 5 mg/min Intravenous Continuous   • cefepime (MAXIPIME) 2,000 mg in dextrose 5 % 50 mL IVPB  2,000 mg Intravenous Q12H   • digoxin (LANOXIN) injection 125 mcg  125 mcg Intravenous Every Other Day   • fentaNYL 1000 mcg in sodium chloride 0 9% 100mL infusion  50 mcg/hr Intravenous Continuous   • heparin (porcine) subcutaneous injection 5,000 Units  5,000 Units Subcutaneous Q8H [facility]   • insulin lispro (HumaLOG) 100 units/mL subcutaneous injection 1-5 Units  1-5 Units Subcutaneous Q6H [facility] • ipratropium (ATROVENT) 0 02 % inhalation solution 0 5 mg  0 5 mg Nebulization Q6H   • lactated ringers bolus 200 mL  200 mL Intravenous Once   • levalbuterol (XOPENEX) inhalation solution 1 25 mg  1 25 mg Nebulization Q6H   • NOREPINEPHRINE 4 MG  ML NSS (CMPD ORDER) infusion **ADS Override Pull**       • NOREPINEPHRINE 4 MG  ML NSS (CMPD ORDER) infusion  1-30 mcg/min Intravenous Titrated   • NxStage K 4/Ca 3 dialysis solution (RFP-401) 20,000 mL  20,000 mL Dialysis Continuous   • phenylephrine (ANRCISO-SYNEPHRINE) 50 mg (STANDARD CONCENTRATION) in sodium chloride 0 9% 250 mL   mcg/min Intravenous Titrated   • vancomycin (VANCOCIN) IVPB (premix in dextrose) 750 mg 150 mL  10 mg/kg Intravenous Q12H       SIGNATURE: Jessi Garcia PA-C    Portions of the record may have been created with voice recognition software  Occasional wrong word or "sound a like" substitutions may have occurred due to the inherent limitations of voice recognition software    Read the chart carefully and recognize, using context, where substitutions have occurred

## 2022-12-13 NOTE — PROGRESS NOTES
NEPHROLOGY PROGRESS NOTE   Manuel Rodríguez  80 y o  male MRN: [id number]  Unit/Bed#: ICU 02 Encounter: [id number]      HPI/24hr EVENTS:    -80-year-old male past medical history CKD 5, DM2, COPD, hypertension  With altered mental status  Neurology consulted for assistance in management of ERIK on CKD      -HD line issues overnight, high pressures, patient taken off CVVHD overnight josias off pending line adjustment    ASSESSMENT/PLAN:    ERIK (POA) CKD 5  -Baseline creatinine: 2-3  -Creatinine on admission 17 5, most recent creatinine 1 67  -Etiology: Prerenal azotemia in the setting of decreased oral intake versus CKD progression, ARB use as outpatient, required HD in the past with renal recovery  -Avoid hypotension, avoid nephrotoxins, avoid NSAIDS  -Trend BMP  -Required CRRT then transition to IHD, last treatment was 12/10, due to ongoing hypotension requiring vasopressors was reinitiated on CRRT yesterday, had line issues overnight with elevated pressures and is currently on CRRT  -Discussed with critical care team, will attempt to adjust line, then plan to restart CRRT, likely run even due to ongoing hypotension, can consider running negative per critical care team    Access  -Nonfunctional AVG  -Currently has right IJ temporary HD catheter placed 12/12 by IR however issues with high pressures    Acute hypoxemic respiratory failure  -remains intubated with FiO2 100% PEEP of 10, reported desaturation episodes with movement  -Management per critical care team    Atrial fibrillation with RVR  -Management per critical care team    Anemia  -recommend transfuse goal greater than 7 per primary team  -Hemoglobin 8 7    Septic shock  -Due to hospital-acquired pneumonia  -Currently on vasopressors (Mc-Synephrine 30 mics per minute, norepinephrine 12 mics per minute)    Additional medical problems: Transaminitis, DM2, COPD    SUBJECTIVE:  Note offered/intubated and sedated    ROS:  Review of Systems   Unable to perform ROS: Intubated      A complete 10 point review of systems was performed and found to be negative unless otherwise noted above or in the HPI  OBJECTIVE:  Current Weight: Weight - Scale: 72 7 kg (160 lb 4 4 oz)  Vitals:    22 0814 22 0830 22 0900 22 0930   BP:       Pulse:  (!) 162 (!) 158 (!) 134   Resp:  14 14 14   Temp:  99 7 °F (37 6 °C) 99 7 °F (37 6 °C) 99 7 °F (37 6 °C)   TempSrc:       SpO2: 92% 92% 93% 93%   Weight:           Intake/Output Summary (Last 24 hours) at 2022 1017  Last data filed at 2022 0901  Gross per 24 hour   Intake 4799 96 ml   Output 1565 ml   Net 3234 96 ml     Physical Exam  Vitals and nursing note reviewed  Constitutional:       General: He is not in acute distress  Appearance: He is ill-appearing  He is not diaphoretic  Interventions: He is sedated and intubated  HENT:      Head: Normocephalic and atraumatic  Nose: Nose normal       Mouth/Throat:      Mouth: Mucous membranes are moist    Eyes:      General: No scleral icterus  Cardiovascular:      Rate and Rhythm: Tachycardia present  Rhythm irregular  Pulses: Normal pulses  Pulmonary:      Effort: No respiratory distress  He is intubated  Breath sounds: Rales present  No rhonchi  Comments: Mechanical breath sounds  Abdominal:      General: Abdomen is flat  Palpations: Abdomen is soft  Musculoskeletal:      Cervical back: Neck supple  Right lower leg: No edema  Left lower leg: No edema  Skin:     General: Skin is warm and dry     Neurological:      Comments: Intubated and sedated, opens eyes spontaneously but does not follow commands          Medications:    Current Facility-Administered Medications:   •  acetaminophen (TYLENOL) oral suspension 650 mg, 650 mg, Oral, Q4H PRN, Clarance Hodgkins, CRNP, 650 mg at 22 1106  •  [] amiodarone (CORDARONE) 900 mg in dextrose 5 % 500 mL infusion, 1 mg/min, Intravenous, Continuous, Stopped at 12/12/22 2137 **FOLLOWED BY** amiodarone (CORDARONE) 900 mg in dextrose 5 % 500 mL infusion, 0 5 mg/min, Intravenous, Continuous, SUJATA Potts, Last Rate: 16 7 mL/hr at 12/13/22 0600, 0 5 mg/min at 12/13/22 0600  •  cefepime (MAXIPIME) 2,000 mg in dextrose 5 % 50 mL IVPB, 2,000 mg, Intravenous, Q12H, Rach Stringer MD, Stopped at 12/12/22 2318  •  chlorhexidine (PERIDEX) 0 12 % oral rinse 15 mL, 15 mL, Mouth/Throat, Q12H [address], Lisandra Javier PA-C, 15 mL at 12/13/22 0848  •  digoxin (LANOXIN) injection 125 mcg, 125 mcg, Intravenous, Every Other Day, Maverick Ahn MD, 125 mcg at 12/13/22 0849  •  fentaNYL 1000 mcg in sodium chloride 0 9% 100mL infusion, 50 mcg/hr, Intravenous, Continuous, Rach Stringer MD, Last Rate: 5 mL/hr at 12/13/22 0600, 50 mcg/hr at 12/13/22 0600  •  heparin (porcine) subcutaneous injection 5,000 Units, 5,000 Units, Subcutaneous, Q8H [address], Rach Stringer MD, 5,000 Units at 12/13/22 0503  •  insulin lispro (HumaLOG) 100 units/mL subcutaneous injection 1-5 Units, 1-5 Units, Subcutaneous, Q6H [address], Rach Stringer MD, 1 Units at 12/13/22 1602  •  ipratropium (ATROVENT) 0 02 % inhalation solution 0 5 mg, 0 5 mg, Nebulization, Q6H, Natacha Coffman MD, 0 5 mg at 12/13/22 0240  •  levalbuterol (Lynne Brass) inhalation solution 1 25 mg, 1 25 mg, Nebulization, Q6H, Natacha Coffman MD, 1 25 mg at 12/13/22 1113  •  norepinephrine (LEVOPHED) 8 mg (DOUBLE CONCENTRATION) IV in sodium chloride 0 9% 250 mL, 1-30 mcg/min, Intravenous, Titrated, Everet Signs, PA-C  •  NOREPINEPHRINE 4 MG  ML NSS (CMPD ORDER) infusion **ADS Override Pull**, , , ,   •  NxStage K 4/Ca 3 dialysis solution (RFP-401) 20,000 mL, 20,000 mL, Dialysis, Continuous, Diane Mendez MD, 20,000 mL at 12/12/22 2031  •  phenylephrine (NARCISO-SYNEPHRINE) 50 mg (STANDARD CONCENTRATION) in sodium chloride 0 9% 250 mL,  mcg/min, Intravenous, Titrated, Everet Signs, PA-C  •  sodium chloride 0 9 % inhalation solution **ADS Override Pull**, , , ,   •  vancomycin (VANCOCIN) IVPB (premix in dextrose) 750 mg 150 mL, 10 mg/kg, Intravenous, Q12H, Felix Duckworth MD, Stopped at 12/13/22 0025    Laboratory Results:  Results from last 7 days   Lab Units 12/13/22  0553 12/13/22  0408 12/12/22  2025 12/12/22  1338 12/12/22  1031 12/12/22  0430 12/11/22  0423 12/10/22  0442 12/09/22  0443 12/08/22 2003 12/08/22  1447 12/08/22  1101 12/08/22  0525   WBC Thousand/uL 23 62*  --   --   --   --  9 39 9 66 13 12* 14 76*  --  12 86*  --  14 40*   HEMOGLOBIN g/dL 8 7*  --   --   --   --  7 0* 8 4* 8 9* 9 7*  --  9 3*  --  7 0*   HEMATOCRIT % 27 4*  --   --   --   --  21 5* 26 1* 26 9* 28 2*  --  26 5*  --  20 1*   PLATELETS Thousands/uL 63*  --   --   --   --  60* 71* 76* 91*  --  87*  --  106*   POTASSIUM mmol/L  --  4 4 4 0 3 8  --  3 6 4 1 4 4 4 9   < >  --  4 6 3 9   CHLORIDE mmol/L  --  102 102 104  --  102 101 101 99   < >  --  99 98   CO2 mmol/L  --  24 24 24  --  28 26 24 23   < >  --  28 27   BUN mg/dL  --  56* 67* 70*  --  65* 45* 65* 106*   < >  --  102* 124*   CREATININE mg/dL  --  4 67* 5 90* <0 15*  --  5 56* 4 40* 5 55* 7 77*   < >  --  7 54* 9 10*   CALCIUM mg/dL  --  8 5 8 0* <5 0*  --  8 0* 8 1* 7 8* 8 2*   < >  --  7 8* 7 3*   MAGNESIUM mg/dL  --  2 1 2 1 <0 2*  --   --  2 0 2 1 2 2  --   --  1 9 2 1   PHOSPHORUS mg/dL  --  4 5* 5 0* 4 3* 3 8  --  4 8* 5 4* 6 6*  --   --  4 4* 4 8*    < > = values in this interval not displayed  I have personally reviewed the blood work as stated above and in my note  I have personally reviewed CXR imaging studies  I have personally reviewed critical care note

## 2022-12-13 NOTE — ASSESSMENT & PLAN NOTE
Lab Results   Component Value Date    BUN 56 (H) 12/13/2022    BUN 67 (H) 12/12/2022    BUN 70 (H) 12/12/2022    BUN 65 (H) 12/12/2022     Initially in 300s on presentation  Patients BUN improved with CVVH    Monitor daily BMP  Continue dialysis as tolerated

## 2022-12-13 NOTE — ACP (ADVANCE CARE PLANNING)
Advanced Care Planning Note     Anitha Arredondo  80 y o  male MRN: [id number]    Unit/Bed#: ICU 02 Encounter: [id number]    Adelina Martinez is a 80year-old male that presented today secondary to having an acute condition requiring critical care provider evaluation  The patient has chronic comorbidities and now is inflicted with following acute conditions: septic shock, ESRD on HD, pneumonia  Due to the severity of the patient's acute condition and/or the extent of chronic conditions, there is need for advanced care planning at this time  Please see my previous documentation in regards to the patient's current condition, assessment, and treatment plan  During this discussion with the patient and/or family members, it was established that all stake holders were agreeable and understood the rationale for advanced care planning to occur at this time  The following individuals were present & participated in the face to face conversation I had about the patient's advanced directives & advanced care planning: Wife Delmis Rogel, granddaughter, brother-in-law  Please see my following comments for details of the conversation & decisions that were made:    Level 4- comfort care    Total time spent, (15) minutes (1525 to [phone] 21)      CODE STATUS: Level 4 - Comfort Care  POA: Yes  POLST:      SIGNATURE: Etienne Hooper MD  DATE: December 13, 2022  TIME: 3:46 PM

## 2022-12-13 NOTE — PROGRESS NOTES
[facility]  Progress Note - Nelli Villagomez  1935, 80 y o  male MRN: [id number]  Unit/Bed#: ICU 02 Encounter: [id number]  Primary Care Provider: Therese Griggs DO   Date and time admitted to hospital: 12/6/2022 12:01 PM    * AV graft malfunction [facility])  Assessment & Plan  79 yo male with multiple comorbidities including ESRD on HD s/p AVG in 2019, now with nonfunctioning graft for which Vascular Surgery has been consulted  Patient with multiple episodes of hypotension and hypoxia overnight with A  fib RVR  Patient has had T-max of 102 7 within the last 24 hours  He is now requiring levo and sherry  He is being maintained on an amnio drip for his A  fib RVR  Continues to require CVVH currently being run even  Plan:  - Triple-lumen HD cath placed 11/12 by IR to allow for HD access  - Will need tunneled HD cath when no longer concern for sepsis  - Would benefit from fistulogram as well  - Pt with palpable distal graft pulse, but nothing proximal   HD duplex on 12/12 shows occlusion of the brachial axillary AVG  - Given unknown length of chronicity of AVG, he will need outpatient follow-up for discussion of new AV access following discharge when stable  Subjective/Objective     Subjective: Patient intubated and sedated    Objective:   Vitals: Blood pressure 161/81, pulse (!) 132, temperature 98 2 °F (36 8 °C), temperature source Axillary, resp  rate 15, weight 66 kg (145 lb 8 1 oz), SpO2 91 %  ,Body mass index is 26 61 kg/m²      I/O       12/11 0701  12/12 0700 12/12 0701  12/13 0700    I V  (mL/kg) 230 6 (3 5) 1193 3 (18 1)    Blood  350    NG/GT 80     IV Piggyback  2450    Feedings 161     Total Intake(mL/kg) 471 6 (7 1) 3993 3 (60 5)    Urine (mL/kg/hr) 284 (0 2) 135 (0 1)    Other  1037    Stool 0     Total Output 284 1172    Net +187 6 +2821 3          Unmeasured Stool Occurrence 1 x           Physical Exam:  Gen: NAD, Aox3, Comfortable in bed  Resp: Intubated CMV 400, 10, 100%  Abd: Soft, ND, NT    : Singh in place  CVVH being run even  Ext: No Edema  No palpable LUE thrill or bruit  Skin: Warm, Dry, Intact      Lab, Imaging and other studies:   I have personally reviewed pertinent reports  , CBC with diff:   Lab Results   Component Value Date    WBC 23 62 (H) 12/13/2022    HGB 8 7 (L) 12/13/2022    HCT 27 4 (L) 12/13/2022    MCV 99 (H) 12/13/2022    PLT 63 (L) 12/13/2022    MCH 31 3 12/13/2022    MCHC 31 8 12/13/2022    RDW 15 3 (H) 12/13/2022    MPV 11 7 12/13/2022   , BMP/CMP:   Lab Results   Component Value Date    SODIUM 137 12/13/2022    K 4 4 12/13/2022     12/13/2022    CO2 24 12/13/2022    BUN 56 (H) 12/13/2022    CREATININE 4 67 (H) 12/13/2022    CALCIUM 8 5 12/13/2022     (H) 12/13/2022    ALT 60 12/13/2022    ALKPHOS 124 (H) 12/13/2022    EGFR 10 12/13/2022   , Magnesium: No components found for: MAG, Coags: No results found for: PT, PTT, INR, Blood Culture:   Lab Results   Component Value Date    BLOODCX Received in Microbiology Lab  Culture in Progress  12/12/2022    BLOODCX Received in Microbiology Lab  Culture in Progress   12/12/2022     VTE Pharmacologic Prophylaxis: Heparin  VTE Mechanical Prophylaxis: sequential compression device        Anita Willams MD  12/13/2022  5:47 AM

## 2022-12-13 NOTE — ASSESSMENT & PLAN NOTE
Platelets have consistently trended down throughout admission  Most likely multifactorial    Plan:  Transfuse platelets if <68J  Monitor for bleeding  Trend CBC

## 2022-12-13 NOTE — DEATH NOTE
INPATIENT DEATH NOTE  Miguelangel Park  80 y o  male MRN: [id number]  Unit/Bed#: ICU 02 Encounter: [id number]             PHYSICAL EXAM:  Spontaneous respirations absent, Breath sounds absent, Carotid pulse absent, Heart sounds absent, Pupillary light reflex absent and Corneal blink reflex absent    Medical Examiner notification criteria:  NONE APPLICABLE   Medical Examiner's office notified?:  No, does not meet ME notification criteria   Medical Examiner accepted case?:  No  Name of Medical Examiner: N/A         Autopsy Options:  Post-mortem examination declined by next of kin    Primary Service Attending Physician notified?:  yes - Attending:  Manuel Wakefield MD    Physician/Resident responsible for completing Discharge Summary:  Sidney Emanuel MD

## 2022-12-13 NOTE — PROGRESS NOTES
RD recommendation for enteral nutrition  If goals of care indicate continued enteral nutrition support,  recommend increase to Nepro 35ml/hr, 2 packets prosource daily via OG  EN will provide 610 ml free water daily  Water flushes per MD   Nutrition documentation viewable in flow sheet section

## 2022-12-13 NOTE — NURSING NOTE
Pt suffered from two more episodes of acute development of hypoxia and hypotension  Vasopressors titrated accordingly  Hypoxia spontaneously resolving without new interventions  [facility]

## 2022-12-13 NOTE — ASSESSMENT & PLAN NOTE
Lab Results   Component Value Date    HGBA1C 6 7 (A) 09/07/2022       Recent Labs     12/12/22  1137 12/12/22  1835 12/13/22  0018 12/13/22  0600   POCGLU 119 124 199* 155*       Blood Sugar Average: Last 72 hrs:  (P) 154 [id number]   Home medications include Tradjenta     Plan-  SSI  Hypoglycemia protocol  accucheck q6h  OG tube feeds of nepro at goal

## 2022-12-13 NOTE — ASSESSMENT & PLAN NOTE
Platelets have consistently trended down throughout admission  Most likely multifactorial    Plan:  Transfuse platelets if <20X  Monitor for bleeding  Trend CBC

## 2022-12-13 NOTE — PROGRESS NOTES
Oj Rushing  is a 80 y o  male who is currently ordered Vancomycin IV with management by the Pharmacy Consult service  Relevant clinical data and objective / subjective history reviewed  Vancomycin Assessment:  1  Indication and Goal AUC/Trough: Pneumonia (goal -600, trough >10), -600, trough >10  2  Clinical Status: worsening  3  Micro:   12/6 Flu/RSV/Covid PCR: negative  12/8 MRSA culture: negative  12/12 sputum culture: 2+ Klebsiella pneumoniae  12/12 blood cultures x 2: in process  12/12 MRSA culture: in process    4  Renal Function:  · Renal replacement: CVVHD was started last evening, but has been off since this morning due to line issues  5  Days of Therapy: 2  6  Current Dose: 750 mg q12h, last dose on 12/12 @ 2325  7  Most recent level: Trough level prior to 3rd dose is supratherapeutic at 23 7  Vancomycin Plan:  1  New Dosing: If CVVHD is not able to be restarted, will change dosing to 750 mg daily PRN vancomycin < 15  If CVVHD is able to be restarted, will restart 750 mg q12h 12 hours after re-initiation of CVVHD  2  Next Level: Will plan to check random level with AM labs on 12/14 unless CVVHD is restarted  3  Renal Function Monitoring: Daily BMP and UOP  Pharmacy will continue to follow closely for s/sx of nephrotoxicity, infusion reactions and appropriateness of therapy  BMP and CBC will be ordered per protocol  We will continue to follow the patient’s culture results and clinical progress daily  John Fink, PharmD, BCCCP  Critical Care and Internal Medicine Clinical Pharmacist  [phone] or via [address] [place] [id number]: Vancomycin therapy has been discontinued  Pharmacy will sign off  Thank you for this consult  Please do not hesitate to call us with questions or re-consult us if the need arises

## 2022-12-13 NOTE — ASSESSMENT & PLAN NOTE
Patient developed hypotension and hypoxia on 12/10  He needed BiPAP and supplementary oxygen to maintain adequate oxygenation  Patient ultimately intubated and placed on pressor support  Developed fever of 102 on the night of 12/11     White blood cell count within normal limits   Chest x-ray shows evolving left lower lobe consolidation  started on broad-spectrum antibiotics on 12/12    Plan:  Continue broad-spectrum antibiotics  Trend CBC  Procalcitonin ordered  Continue mechanical ventilation; wean as tolerated  Continue pressor support to maintain MAP above 65; wean as tolerated  Follow-up on sputum cultures; narrow antibiotic therapy based on susceptibilities

## 2022-12-13 NOTE — ASSESSMENT & PLAN NOTE
· Respiratory failure in the setting of volume overload secondary to renal failure and hospital-acquired pneumonia  · Patient has Hx of restrictive lung disease concern for pneumoconiosis vs sarcoidosis  Last PFT with a moderate restriction, TLC of 58% 3 44 L, FVC 1 87 L/63%   · Sees pulmonologist dr Binu Ngo in the outpatient setting  · ABGs have been inconsistent  · A repeat cxr was done  Persistent left effusion and left base opacity which may be due to atelectasis and/or pneumonia  Edema is improving  · Intubated 12/11 due to increased work of breathing and persistent hypoxia   · Patient did not tolerate CVVH due to hypoxia   CXR shows worsening pulmonary edema but no pneumothorax identified      Plan-  · xopenex and atrovent nebulizers q6h  · Continue mechanical ventilation and sedation; wean as tolerated  · Home O2 evaluation  · Will consider goals of care with the family  · Continue CVVH with goal of net negative/euvolemia

## 2022-12-13 NOTE — ASSESSMENT & PLAN NOTE
Per chart review patient has hx of PAF  Rate controlled with metoprolol at home  CHADVASC score 4  High bleeding risk per HASBLED score  Initially on  IV metoprolol 5 mg q 6 with temporary control in HR  Was loaded on 12/9/22 with digoxin 500 mcg  Received another 2x 250 mcg  Now on maintenance dose of 125 mcg every other day  Heart rate ranges between 100 and 120  Digoxin level resulted 2 3  Started on amiodarone 12/12    Plan:  · Continuous cardiac monitoring  · If no response after management with digoxin will consider involving cardiology    · Continue digoxin and amiodarone  · Lopressor discontinued

## 2022-12-13 NOTE — ASSESSMENT & PLAN NOTE
Patient has left sided AV graft placed in 2019 but on presentation this admission physical exam would suggest it is no longer functional  Patient now will most likely need long term scheduled dialysis   S/p temporary CVVH catheter placement    Catheter appears to be functioning properly    Plan:    Continue to monitor for line associated infection and for proper functioning   Maintain clean insertion site

## 2022-12-13 NOTE — QUICK NOTE
Catheter was not functioning properly and appeared to be advanced too far on CXR  Right sided IJ CVC pulled back and approximately 2 cm  Catheter resutured and new bandage placed

## 2022-12-13 NOTE — ASSESSMENT & PLAN NOTE
· Respiratory failure in the setting of volume overload secondary to renal failure and hospital-acquired pneumonia  · Patient has Hx of restrictive lung disease concern for pneumoconiosis vs sarcoidosis  Last PFT with a moderate restriction, TLC of 58% 3 44 L, FVC 1 87 L/63%   · Sees pulmonologist dr Horacio Mederos in the outpatient setting  · ABGs have been inconsistent  · A repeat cxr was done  Persistent left effusion and left base opacity which may be due to atelectasis and/or pneumonia  Edema is improving  · Intubated 12/11 due to increased work of breathing and persistent hypoxia   · Patient did not tolerate CVVH due to hypoxia   CXR shows worsening pulmonary edema but no pneumothorax identified      Plan-  · xopenex and atrovent nebulizers q6h  · Continue mechanical ventilation and sedation; wean as tolerated  · Home O2 evaluation  · Will consider goals of care with the family  · Continue CVVH with goal of net negative/euvolemia

## 2022-12-13 NOTE — ASSESSMENT & PLAN NOTE
Lab Results   Component Value Date    HGBA1C 6 7 (A) 09/07/2022       Recent Labs     12/12/22  1835 12/13/22  0018 12/13/22  0600 12/13/22  1238   POCGLU 124 199* 155* 153*       Blood Sugar Average: Last 72 hrs:  (P) 455 [id number]   Home medications include Tradjenta     Plan-  SSI  Hypoglycemia protocol  accucheck q6h  OG tube feeds of nepro at goal

## 2022-12-14 ENCOUNTER — HOME CARE VISIT (OUTPATIENT)
Dept: HOME HEALTH SERVICES | Facility: HOME HEALTHCARE | Age: 87
End: 2022-12-14

## 2022-12-15 LAB
BACTERIA SPT RESP CULT: ABNORMAL
GRAM STN SPEC: ABNORMAL
GRAM STN SPEC: ABNORMAL

## 2022-12-17 LAB
BACTERIA BLD CULT: NORMAL
BACTERIA BLD CULT: NORMAL

## 2023-02-04 DIAGNOSIS — E11.9 TYPE 2 DIABETES MELLITUS WITHOUT COMPLICATION, WITH LONG-TERM CURRENT USE OF INSULIN (HCC): ICD-10-CM

## 2023-02-04 DIAGNOSIS — Z79.4 TYPE 2 DIABETES MELLITUS WITHOUT COMPLICATION, WITH LONG-TERM CURRENT USE OF INSULIN (HCC): ICD-10-CM

## 2023-02-04 RX ORDER — BLOOD SUGAR DIAGNOSTIC
STRIP MISCELLANEOUS
Qty: 100 STRIP | Refills: 7 | OUTPATIENT
Start: 2023-02-04

## 2023-09-13 NOTE — H&P
Assessment & Plan     Atrial fibrillation with rapid ventricular response (H)  Improved heart rate control on increased metoprolol.  Continue with this.  Monitor for recurrent symptoms. He has an upcoming appointment with cardiology next week.  - metoprolol tartrate (LOPRESSOR) 100 MG tablet; Take 1 tablet (100 mg) by mouth 2 times daily    Moderate major depression (H)  He discontinued Fetzima since he did not think it was helping and he feels like his depression has actually improved slightly since doing this.    Anemia, unspecified type  Unclear etiology. We will recheck CBC and iron indices.  He has a history of adenomatous colon polyps.   Discussed diagnostic colonoscopy.  See below.  - Iron & Iron Binding Capacity; Future  - Ferritin; Future    Weight loss  He has lost approximately 10 pounds in the last  - TSH with free T4 reflex; Future  - CBC with Platelets & Differential; Future  - CRP inflammation; Future  - Erythrocyte sedimentation rate auto; Future  - Comprehensive metabolic panel; Future  - Adult GI  Referral - Procedure Only; Future    Pleural effusion  Stable CXR.  - XR Chest 2 Views; Future         MED REC REQUIRED  Post Medication Reconciliation Status:       Lizzy Leiva MD  [facility]    Greg Alonso is a 80 year old, presenting for the following health issues:  ER F/U        9/13/2023     8:58 AM   Additional Questions   Roomed by Krystle ALAS CMA       HPI     ED/UC Followup:    Facility:  [facility]  Date of visit: 8/15/2023  Reason for visit: Atrial fibrillation with RVR (H)       Bilateral pleural effusion  Current Status: improved        Review of Systems   Constitutional, neuro, ENT, endocrine, pulmonary, cardiac, gastrointestinal, genitourinary, musculoskeletal, integument and psychiatric systems are negative, except as otherwise noted.       Objective    /78   Pulse 88   Temp 97.4  F (36.3  C) (Tympanic)   Resp 16   Ht  History and Physical - [facility]  Aline Novak  80 y o  male MRN: [id number]                  HPI: Aline Novak  is a 80y o  year old male who presents for weight loss abnormal CT imaging      REVIEW OF SYSTEMS: Per the HPI, and otherwise unremarkable      Historical Information   Past Medical History:   Diagnosis Date    Anxiety     Diabetes ([address] )     GERD (gastroesophageal reflux disease)     Hypercholesterolemia     Hypertension     Pancreatic cyst     Weight loss, non-intentional      Past Surgical History:   Procedure Laterality Date    APPENDECTOMY      CATARACT EXTRACTION      PROSTATE BIOPSY       Social History   History   Alcohol Use No     History   Drug Use No     History   Smoking Status    Former Smoker   Smokeless Tobacco    Never Used     Comment: current non-smoker, per [employer]     Family History   Problem Relation Age of Onset    Diabetes Mother     Diabetes Father     Prostate cancer Brother     Diabetes Brother     Pulmonary embolism Sister        Meds/Allergies     Prescriptions Prior to Admission   Medication    ALPRAZolam (XANAX) 0 25 mg tablet    amLODIPine (NORVASC) 5 mg tablet    apixaban (ELIQUIS) 5 mg    Ascorbic Acid (V-R VITAMIN C CR PO)    Cholecalciferol (VITAMIN D) 2000 units CAPS    DULERA 100-5 MCG/ACT inhaler    EPINEPHrine (EPIPEN) 0 3 mg/0 3 mL SOAJ    finasteride (PROSCAR) 5 mg tablet    furosemide (LASIX) 40 mg tablet    glucose blood (ONE TOUCH ULTRA TEST) test strip    Lancets (ONETOUCH ULTRASOFT) lancets    Lancets (ONETOUCH ULTRASOFT) lancets    losartan (COZAAR) 50 mg tablet    metFORMIN (GLUCOPHAGE) 500 mg tablet    Na Sulfate-K Sulfate-Mg Sulf (SUPREP BOWEL PREP KIT) 17 5-3 13-1 6 GM/177ML SOLN    Omega-3 Fatty Acids (FISH OIL) 1200 MG CAPS    omeprazole (PriLOSEC) 40 MG capsule    pantoprazole (PROTONIX) 20 mg tablet    PARoxetine (PAXIL) 30 mg tablet    potassium chloride (MICRO-K) 10 MEQ CR capsule "1.727 m (5' 8\")   Wt 71.7 kg (158 lb)   SpO2 92%   BMI 24.02 kg/m    Body mass index is 24.02 kg/m .  Physical Exam   GENERAL: Pleasant, well appearing male.  HEENT: PERRL, EOMI.  NECK: supple, no thyromegaly or thyroid masses, no lymphadenopathy.  CV: RRR, no murmurs, rubs or gallops.  LUNGS: Clear to auscultation bilaterally, normal effort.  ABDOMEN: Soft, non-distended, non-tender.  No hepatosplenomegaly or palpable masses.    SKIN: warm and dry without obvious rashes.   EXTREMITIES: No edema, normal pulses.                 Answers submitted by the patient for this visit:  Patient Health Questionnaire (Submitted on 9/13/2023)  If you checked off any problems, how difficult have these problems made it for you to do your work, take care of things at home, or get along with other people?: Somewhat difficult  PHQ9 TOTAL SCORE: 6    "  rosuvastatin (CRESTOR) 10 MG tablet    rosuvastatin (CRESTOR) 40 MG tablet    terazosin (HYTRIN) 2 mg capsule    TRUE METRIX BLOOD GLUCOSE TEST test strip       Allergies   Allergen Reactions    Ace Inhibitors     Bee Venom        Objective     Blood pressure 146/67, pulse 90, temperature 98 1 °F (36 7 °C), temperature source Temporal, resp  rate 16, height 5' 2" (1 575 m), weight 61 2 kg (135 lb), SpO2 95 %  PHYSICAL EXAM    Gen: NAD  CV: RRR  CHEST: Clear  ABD: soft, NT/ND  EXT: no edema      ASSESSMENT/PLAN:  This is a 80y o  year old male here for weight loss, abnormal CT imaging, and he is stable and optimized for his procedure

## 2024-04-09 NOTE — PROGRESS NOTES
Pt;s telemetry alarmed, looking through events on monitor pt was found to have had a 13 beat run of Vtach  RN checked pt  He is a/o pt is actively receiving dialysis with dialysis RN at bedside along with pt's family  Pt denies pain and sob  Andrés Cole from Cardiology was made aware  No further orders at this time  Will continue to monitor  09-Apr-2024 06:55

## (undated) DEVICE — SYRINGE 1ML TB 25G X 5/8 NON SAFETY

## (undated) DEVICE — SCANLAN® VASCU-STATT® II SINGLE-USE BULLDOG CLAMP W/FIRMER CLAMPING PRESS - MIDI ANGLED 45° (YELLOW), CLAMPING PRESSURE 75-80 G (2/STERILE PKG): Brand: SCANLAN® VASCU-STATT® II SINGLE-USE BULLDOG CLAMP W/FIRMER CLAMPING PRESS

## (undated) DEVICE — SUT PROLENE 6-0 BV130 30 IN 8709H

## (undated) DEVICE — ALL PURPOSE SPONGES,NON-WOVEN, 4 PLY: Brand: CURITY

## (undated) DEVICE — TOWEL SET X-RAY

## (undated) DEVICE — GLOVE SRG BIOGEL 7.5

## (undated) DEVICE — ELECTRODE BLADE MOD E-Z CLEAN  2.75IN 7CM -0012AM

## (undated) DEVICE — SINGLE-USE BIOPSY FORCEPS: Brand: RADIAL JAW 4

## (undated) DEVICE — 2000CC GUARDIAN II: Brand: GUARDIAN

## (undated) DEVICE — MEDI-VAC YANKAUER SUCTION HANDLE W/BULBOUS AND CONTROL VENT: Brand: CARDINAL HEALTH

## (undated) DEVICE — BETHLEHEM UNIVERSAL MINOR GEN: Brand: CARDINAL HEALTH

## (undated) DEVICE — INTENDED FOR TISSUE SEPARATION, AND OTHER PROCEDURES THAT REQUIRE A SHARP SURGICAL BLADE TO PUNCTURE OR CUT.: Brand: BARD-PARKER ® CARBON RIB-BACK BLADES

## (undated) DEVICE — STRL BETHLEHEM A V FISTULA PK: Brand: CARDINAL HEALTH

## (undated) DEVICE — SURGICEL FIBRILLAR 1 X 2

## (undated) DEVICE — GLOVE SRG BIOGEL 6

## (undated) DEVICE — ADHESIVE SKN CLSR HISTOACRYL FLEX 0.5ML LF

## (undated) DEVICE — NEEDLE 25G X 1 1/2

## (undated) DEVICE — SPECIMEN CONTAINER STERILE PEEL PACK

## (undated) DEVICE — TIBURON SPLIT SHEET: Brand: CONVERTORS

## (undated) DEVICE — VESSEL LOOP MINI YELLOW

## (undated) DEVICE — SUT VICRYL 3-0 SH 27 IN J416H

## (undated) DEVICE — SUT GORTEX CV-6 6M04A

## (undated) DEVICE — HALF SHEET: Brand: CONVERTORS

## (undated) DEVICE — IV CATH INTROCAN 18G X 1 1/4 SAFETY

## (undated) DEVICE — SUT MONOCRYL 4-0 PS-2 27 IN Y426H

## (undated) DEVICE — SPECIMEN TRAP: Brand: ARGYLE

## (undated) DEVICE — TELFA NON-ADHERENT ABSORBENT DRESSING: Brand: TELFA

## (undated) DEVICE — SYRINGE 20ML LL

## (undated) DEVICE — SCD SEQUENTIAL COMPRESSION COMFORT SLEEVE MEDIUM KNEE LENGTH: Brand: KENDALL SCD

## (undated) DEVICE — JP CHAN DRN SIL HUBLESS 19FR W/TRO: Brand: CARDINAL HEALTH

## (undated) DEVICE — THE EXACTO COLD SNARE IS INTENDED TO BE USED WITHOUT DIATHERMIC ENERGY FOR THE ENDOSCOPIC RESECTION OF POLYP TISSUE IN THE GASTROINTESTINAL TRACT.: Brand: EXACTO

## (undated) DEVICE — SUT SILK 4-0 18 IN A183H

## (undated) DEVICE — 3M™ TEGADERM™ TRANSPARENT FILM DRESSING FRAME STYLE, 1626W, 4 IN X 4-3/4 IN (10 CM X 12 CM), 50/CT 4CT/CASE: Brand: 3M™ TEGADERM™

## (undated) DEVICE — SYRINGE 10ML LL

## (undated) DEVICE — POOLE SUCTION HANDLE: Brand: CARDINAL HEALTH

## (undated) DEVICE — NEEDLE 18 G X 1 1/2 SAFETY

## (undated) DEVICE — JACKSON-PRATT 100CC BULB RESERVOIR: Brand: CARDINAL HEALTH

## (undated) DEVICE — TUBING SUCTION 5MM X 12 FT

## (undated) DEVICE — INTENDED FOR TISSUE SEPARATION, AND OTHER PROCEDURES THAT REQUIRE A SHARP SURGICAL BLADE TO PUNCTURE OR CUT.: Brand: BARD-PARKER SAFETY BLADES SIZE 10, STERILE

## (undated) DEVICE — GLOVE INDICATOR PI UNDERGLOVE SZ 8 BLUE

## (undated) DEVICE — BAG DECANTER

## (undated) DEVICE — CHLORAPREP HI-LITE 26ML ORANGE

## (undated) DEVICE — PENCIL ELECTROSURG E-Z CLEAN -0035H

## (undated) DEVICE — SUT GORTEX CV-6 TTC-9 30 IN 6M02A MICROPOROUS COATED

## (undated) DEVICE — SUT MONOCRYL 3-0 SH 27 IN Y416H

## (undated) DEVICE — SUT SILK 2-0 30 IN A305H

## (undated) DEVICE — DRAPE EQUIPMENT RF WAND

## (undated) DEVICE — NEEDLE SPINAL 25G X 3.5 IN QUINCKE

## (undated) DEVICE — TIBURON LAPAROTOMY DRAPE: Brand: CONVERTORS

## (undated) DEVICE — GLOVE INDICATOR PI UNDERGLOVE SZ 6.5 BLUE

## (undated) DEVICE — INTENDED FOR TISSUE SEPARATION, AND OTHER PROCEDURES THAT REQUIRE A SHARP SURGICAL BLADE TO PUNCTURE OR CUT.: Brand: BARD-PARKER SAFETY BLADES SIZE 15, STERILE

## (undated) DEVICE — LIGACLIP MCA MULTIPLE CLIP APPLIERS, 20 SMALL CLIPS: Brand: LIGACLIP

## (undated) DEVICE — TRANSPOSAL ULTRAFLEX DUO/QUAD ULTRA CART MANIFOLD

## (undated) DEVICE — GLOVE SRG BIOGEL 8

## (undated) DEVICE — SUT PDS II 1 CT-1 27 IN Z347H

## (undated) DEVICE — SUT PROLENE 0 CT-1 30 IN 8424H

## (undated) DEVICE — DRAIN SPONGES,6 PLY: Brand: EXCILON